# Patient Record
Sex: MALE | Race: WHITE | NOT HISPANIC OR LATINO | Employment: OTHER | URBAN - METROPOLITAN AREA
[De-identification: names, ages, dates, MRNs, and addresses within clinical notes are randomized per-mention and may not be internally consistent; named-entity substitution may affect disease eponyms.]

---

## 2020-03-27 ENCOUNTER — TRANSFERRED RECORDS (OUTPATIENT)
Dept: HEALTH INFORMATION MANAGEMENT | Facility: CLINIC | Age: 75
End: 2020-03-27

## 2020-03-27 ENCOUNTER — APPOINTMENT (OUTPATIENT)
Dept: GENERAL RADIOLOGY | Facility: CLINIC | Age: 75
DRG: 308 | End: 2020-03-27
Attending: EMERGENCY MEDICINE
Payer: COMMERCIAL

## 2020-03-27 ENCOUNTER — HOSPITAL ENCOUNTER (INPATIENT)
Facility: CLINIC | Age: 75
LOS: 14 days | Discharge: ANOTHER HOSPITAL | DRG: 308 | End: 2020-04-10
Attending: EMERGENCY MEDICINE | Admitting: INTERNAL MEDICINE
Payer: COMMERCIAL

## 2020-03-27 DIAGNOSIS — I21.4 NSTEMI (NON-ST ELEVATED MYOCARDIAL INFARCTION) (H): ICD-10-CM

## 2020-03-27 DIAGNOSIS — I48.91 ATRIAL FIBRILLATION WITH RVR (H): ICD-10-CM

## 2020-03-27 DIAGNOSIS — I48.91 ATRIAL FIBRILLATION WITH RAPID VENTRICULAR RESPONSE (H): Primary | ICD-10-CM

## 2020-03-27 DIAGNOSIS — N28.9 RENAL INSUFFICIENCY: ICD-10-CM

## 2020-03-27 LAB
ANION GAP SERPL CALCULATED.3IONS-SCNC: 9 MMOL/L (ref 3–14)
BASE DEFICIT BLDV-SCNC: 15.2 MMOL/L
BASOPHILS # BLD AUTO: 0.1 10E9/L (ref 0–0.2)
BASOPHILS NFR BLD AUTO: 0.5 %
BUN SERPL-MCNC: 33 MG/DL (ref 7–30)
CALCIUM SERPL-MCNC: 8.8 MG/DL (ref 8.5–10.1)
CHLORIDE SERPL-SCNC: 102 MMOL/L (ref 94–109)
CO2 SERPL-SCNC: 20 MMOL/L (ref 20–32)
CREAT SERPL-MCNC: 2.4 MG/DL (ref 0.66–1.25)
CRP SERPL-MCNC: 23.1 MG/L (ref 0–8)
DIFFERENTIAL METHOD BLD: ABNORMAL
EOSINOPHIL # BLD AUTO: 0 10E9/L (ref 0–0.7)
EOSINOPHIL NFR BLD AUTO: 0.3 %
ERYTHROCYTE [DISTWIDTH] IN BLOOD BY AUTOMATED COUNT: 15.3 % (ref 10–15)
FERRITIN SERPL-MCNC: 49 NG/ML (ref 26–388)
GFR SERPL CREATININE-BSD FRML MDRD: 25 ML/MIN/{1.73_M2}
GLUCOSE BLDC GLUCOMTR-MCNC: 200 MG/DL (ref 70–99)
GLUCOSE BLDC GLUCOMTR-MCNC: 203 MG/DL (ref 70–99)
GLUCOSE BLDC GLUCOMTR-MCNC: 218 MG/DL (ref 70–99)
GLUCOSE SERPL-MCNC: 212 MG/DL (ref 70–99)
HBA1C MFR BLD: 6.2 % (ref 0–5.6)
HCO3 BLDV-SCNC: 12 MMOL/L (ref 21–28)
HCT VFR BLD AUTO: 32.6 % (ref 40–53)
HGB BLD-MCNC: 10 G/DL (ref 13.3–17.7)
IMM GRANULOCYTES # BLD: 0 10E9/L (ref 0–0.4)
IMM GRANULOCYTES NFR BLD: 0.4 %
INR PPP: 1.2 (ref 0.86–1.14)
INTERPRETATION ECG - MUSE: NORMAL
IRON SATN MFR SERPL: 8 % (ref 15–46)
IRON SERPL-MCNC: 27 UG/DL (ref 35–180)
LACTATE BLD-SCNC: 2.3 MMOL/L (ref 0.7–2)
LACTATE BLD-SCNC: 4.3 MMOL/L (ref 0.7–2)
LYMPHOCYTES # BLD AUTO: 1.3 10E9/L (ref 0.8–5.3)
LYMPHOCYTES NFR BLD AUTO: 12.7 %
MCH RBC QN AUTO: 25.6 PG (ref 26.5–33)
MCHC RBC AUTO-ENTMCNC: 30.7 G/DL (ref 31.5–36.5)
MCV RBC AUTO: 84 FL (ref 78–100)
MONOCYTES # BLD AUTO: 0.6 10E9/L (ref 0–1.3)
MONOCYTES NFR BLD AUTO: 6 %
NEUTROPHILS # BLD AUTO: 8 10E9/L (ref 1.6–8.3)
NEUTROPHILS NFR BLD AUTO: 80.1 %
NRBC # BLD AUTO: 0 10*3/UL
NRBC BLD AUTO-RTO: 0 /100
NT-PROBNP SERPL-MCNC: ABNORMAL PG/ML (ref 0–1800)
O2/TOTAL GAS SETTING VFR VENT: ABNORMAL %
OXYHGB MFR BLDV: 59 %
PCO2 BLDV: 33 MM HG (ref 40–50)
PH BLDV: 7.18 PH (ref 7.32–7.43)
PLATELET # BLD AUTO: 487 10E9/L (ref 150–450)
PO2 BLDV: 39 MM HG (ref 25–47)
POTASSIUM SERPL-SCNC: 4.2 MMOL/L (ref 3.4–5.3)
RBC # BLD AUTO: 3.9 10E12/L (ref 4.4–5.9)
SODIUM SERPL-SCNC: 131 MMOL/L (ref 133–144)
TIBC SERPL-MCNC: 345 UG/DL (ref 240–430)
TROPONIN I SERPL-MCNC: 0.12 UG/L (ref 0–0.04)
TROPONIN I SERPL-MCNC: 0.17 UG/L (ref 0–0.04)
TSH SERPL DL<=0.005 MIU/L-ACNC: 2.62 MU/L (ref 0.4–4)
WBC # BLD AUTO: 10 10E9/L (ref 4–11)

## 2020-03-27 PROCEDURE — 25800030 ZZH RX IP 258 OP 636: Performed by: EMERGENCY MEDICINE

## 2020-03-27 PROCEDURE — 83880 ASSAY OF NATRIURETIC PEPTIDE: CPT | Performed by: EMERGENCY MEDICINE

## 2020-03-27 PROCEDURE — 96376 TX/PRO/DX INJ SAME DRUG ADON: CPT

## 2020-03-27 PROCEDURE — 36415 COLL VENOUS BLD VENIPUNCTURE: CPT | Performed by: INTERNAL MEDICINE

## 2020-03-27 PROCEDURE — 83036 HEMOGLOBIN GLYCOSYLATED A1C: CPT | Performed by: EMERGENCY MEDICINE

## 2020-03-27 PROCEDURE — 94660 CPAP INITIATION&MGMT: CPT

## 2020-03-27 PROCEDURE — 96366 THER/PROPH/DIAG IV INF ADDON: CPT

## 2020-03-27 PROCEDURE — 83036 HEMOGLOBIN GLYCOSYLATED A1C: CPT | Performed by: INTERNAL MEDICINE

## 2020-03-27 PROCEDURE — 00000146 ZZHCL STATISTIC GLUCOSE BY METER IP

## 2020-03-27 PROCEDURE — 87641 MR-STAPH DNA AMP PROBE: CPT | Performed by: INTERNAL MEDICINE

## 2020-03-27 PROCEDURE — 87635 SARS-COV-2 COVID-19 AMP PRB: CPT | Performed by: EMERGENCY MEDICINE

## 2020-03-27 PROCEDURE — 85610 PROTHROMBIN TIME: CPT | Performed by: EMERGENCY MEDICINE

## 2020-03-27 PROCEDURE — 25000132 ZZH RX MED GY IP 250 OP 250 PS 637: Performed by: INTERNAL MEDICINE

## 2020-03-27 PROCEDURE — 20000003 ZZH R&B ICU

## 2020-03-27 PROCEDURE — 82728 ASSAY OF FERRITIN: CPT | Performed by: EMERGENCY MEDICINE

## 2020-03-27 PROCEDURE — 83540 ASSAY OF IRON: CPT | Performed by: EMERGENCY MEDICINE

## 2020-03-27 PROCEDURE — 83605 ASSAY OF LACTIC ACID: CPT | Performed by: INTERNAL MEDICINE

## 2020-03-27 PROCEDURE — 80048 BASIC METABOLIC PNL TOTAL CA: CPT | Performed by: EMERGENCY MEDICINE

## 2020-03-27 PROCEDURE — 25000125 ZZHC RX 250: Performed by: EMERGENCY MEDICINE

## 2020-03-27 PROCEDURE — 85025 COMPLETE CBC W/AUTO DIFF WBC: CPT | Performed by: INTERNAL MEDICINE

## 2020-03-27 PROCEDURE — 25000132 ZZH RX MED GY IP 250 OP 250 PS 637: Performed by: EMERGENCY MEDICINE

## 2020-03-27 PROCEDURE — 84145 PROCALCITONIN (PCT): CPT | Performed by: INTERNAL MEDICINE

## 2020-03-27 PROCEDURE — 25000131 ZZH RX MED GY IP 250 OP 636 PS 637: Performed by: INTERNAL MEDICINE

## 2020-03-27 PROCEDURE — 80048 BASIC METABOLIC PNL TOTAL CA: CPT | Performed by: INTERNAL MEDICINE

## 2020-03-27 PROCEDURE — 71045 X-RAY EXAM CHEST 1 VIEW: CPT

## 2020-03-27 PROCEDURE — 25000125 ZZHC RX 250

## 2020-03-27 PROCEDURE — 25000128 H RX IP 250 OP 636: Performed by: INTERNAL MEDICINE

## 2020-03-27 PROCEDURE — 93005 ELECTROCARDIOGRAM TRACING: CPT

## 2020-03-27 PROCEDURE — 83605 ASSAY OF LACTIC ACID: CPT | Performed by: EMERGENCY MEDICINE

## 2020-03-27 PROCEDURE — 84484 ASSAY OF TROPONIN QUANT: CPT | Performed by: INTERNAL MEDICINE

## 2020-03-27 PROCEDURE — 99285 EMERGENCY DEPT VISIT HI MDM: CPT | Mod: 25

## 2020-03-27 PROCEDURE — 84443 ASSAY THYROID STIM HORMONE: CPT | Performed by: EMERGENCY MEDICINE

## 2020-03-27 PROCEDURE — 86140 C-REACTIVE PROTEIN: CPT | Performed by: INTERNAL MEDICINE

## 2020-03-27 PROCEDURE — 82805 BLOOD GASES W/O2 SATURATION: CPT | Performed by: INTERNAL MEDICINE

## 2020-03-27 PROCEDURE — 40000275 ZZH STATISTIC RCP TIME EA 10 MIN

## 2020-03-27 PROCEDURE — 87640 STAPH A DNA AMP PROBE: CPT | Performed by: INTERNAL MEDICINE

## 2020-03-27 PROCEDURE — 84484 ASSAY OF TROPONIN QUANT: CPT | Performed by: EMERGENCY MEDICINE

## 2020-03-27 PROCEDURE — 25000128 H RX IP 250 OP 636: Performed by: EMERGENCY MEDICINE

## 2020-03-27 PROCEDURE — 85025 COMPLETE CBC W/AUTO DIFF WBC: CPT | Performed by: EMERGENCY MEDICINE

## 2020-03-27 PROCEDURE — 83550 IRON BINDING TEST: CPT | Performed by: EMERGENCY MEDICINE

## 2020-03-27 PROCEDURE — 99291 CRITICAL CARE FIRST HOUR: CPT | Performed by: INTERNAL MEDICINE

## 2020-03-27 PROCEDURE — 96365 THER/PROPH/DIAG IV INF INIT: CPT | Mod: 59

## 2020-03-27 PROCEDURE — 83550 IRON BINDING TEST: CPT | Performed by: INTERNAL MEDICINE

## 2020-03-27 RX ORDER — ASPIRIN 81 MG/1
81 TABLET ORAL DAILY
Status: DISCONTINUED | OUTPATIENT
Start: 2020-03-28 | End: 2020-03-30 | Stop reason: CLARIF

## 2020-03-27 RX ORDER — HEPARIN SODIUM 10000 [USP'U]/100ML
1100 INJECTION, SOLUTION INTRAVENOUS CONTINUOUS
Status: DISCONTINUED | OUTPATIENT
Start: 2020-03-27 | End: 2020-03-28 | Stop reason: DRUGHIGH

## 2020-03-27 RX ORDER — ASPIRIN 81 MG/1
324 TABLET, CHEWABLE ORAL ONCE
Status: COMPLETED | OUTPATIENT
Start: 2020-03-27 | End: 2020-03-27

## 2020-03-27 RX ORDER — METOPROLOL TARTRATE 25 MG/1
25 TABLET, FILM COATED ORAL 2 TIMES DAILY
Status: DISCONTINUED | OUTPATIENT
Start: 2020-03-27 | End: 2020-03-27

## 2020-03-27 RX ORDER — NALOXONE HYDROCHLORIDE 0.4 MG/ML
.1-.4 INJECTION, SOLUTION INTRAMUSCULAR; INTRAVENOUS; SUBCUTANEOUS
Status: DISCONTINUED | OUTPATIENT
Start: 2020-03-27 | End: 2020-03-28

## 2020-03-27 RX ORDER — GUAIFENESIN 600 MG/1
600 TABLET, EXTENDED RELEASE ORAL 2 TIMES DAILY
Status: DISCONTINUED | OUTPATIENT
Start: 2020-03-27 | End: 2020-03-28

## 2020-03-27 RX ORDER — AMOXICILLIN 250 MG
1 CAPSULE ORAL 2 TIMES DAILY PRN
Status: DISCONTINUED | OUTPATIENT
Start: 2020-03-27 | End: 2020-03-30

## 2020-03-27 RX ORDER — ONDANSETRON 4 MG/1
4 TABLET, ORALLY DISINTEGRATING ORAL EVERY 6 HOURS PRN
Status: DISCONTINUED | OUTPATIENT
Start: 2020-03-27 | End: 2020-04-10 | Stop reason: HOSPADM

## 2020-03-27 RX ORDER — NALOXONE HYDROCHLORIDE 0.4 MG/ML
.1-.4 INJECTION, SOLUTION INTRAMUSCULAR; INTRAVENOUS; SUBCUTANEOUS
Status: DISCONTINUED | OUTPATIENT
Start: 2020-03-27 | End: 2020-03-27

## 2020-03-27 RX ORDER — LIDOCAINE 40 MG/G
CREAM TOPICAL
Status: DISCONTINUED | OUTPATIENT
Start: 2020-03-27 | End: 2020-04-10 | Stop reason: HOSPADM

## 2020-03-27 RX ORDER — POLYETHYLENE GLYCOL 3350 17 G/17G
17 POWDER, FOR SOLUTION ORAL DAILY PRN
Status: DISCONTINUED | OUTPATIENT
Start: 2020-03-27 | End: 2020-03-30

## 2020-03-27 RX ORDER — ACETAMINOPHEN 325 MG/1
650 TABLET ORAL EVERY 4 HOURS PRN
Status: DISCONTINUED | OUTPATIENT
Start: 2020-03-27 | End: 2020-03-30

## 2020-03-27 RX ORDER — FUROSEMIDE 10 MG/ML
40 INJECTION INTRAMUSCULAR; INTRAVENOUS ONCE
Status: COMPLETED | OUTPATIENT
Start: 2020-03-27 | End: 2020-03-27

## 2020-03-27 RX ORDER — FUROSEMIDE 10 MG/ML
20 INJECTION INTRAMUSCULAR; INTRAVENOUS ONCE
Status: DISCONTINUED | OUTPATIENT
Start: 2020-03-27 | End: 2020-03-27

## 2020-03-27 RX ORDER — TAMSULOSIN HYDROCHLORIDE 0.4 MG/1
0.4 CAPSULE ORAL DAILY
Status: DISCONTINUED | OUTPATIENT
Start: 2020-03-27 | End: 2020-03-28

## 2020-03-27 RX ORDER — ONDANSETRON 2 MG/ML
4 INJECTION INTRAMUSCULAR; INTRAVENOUS EVERY 6 HOURS PRN
Status: DISCONTINUED | OUTPATIENT
Start: 2020-03-27 | End: 2020-04-10 | Stop reason: HOSPADM

## 2020-03-27 RX ORDER — ALBUTEROL SULFATE 90 UG/1
2 AEROSOL, METERED RESPIRATORY (INHALATION)
Status: DISCONTINUED | OUTPATIENT
Start: 2020-03-27 | End: 2020-04-10 | Stop reason: HOSPADM

## 2020-03-27 RX ORDER — NITROGLYCERIN 0.4 MG/1
0.4 TABLET SUBLINGUAL EVERY 5 MIN PRN
Status: DISCONTINUED | OUTPATIENT
Start: 2020-03-27 | End: 2020-04-10 | Stop reason: HOSPADM

## 2020-03-27 RX ORDER — LIDOCAINE 40 MG/G
CREAM TOPICAL
Status: DISCONTINUED | OUTPATIENT
Start: 2020-03-27 | End: 2020-03-27

## 2020-03-27 RX ORDER — AMOXICILLIN 250 MG
2 CAPSULE ORAL 2 TIMES DAILY PRN
Status: DISCONTINUED | OUTPATIENT
Start: 2020-03-27 | End: 2020-03-30

## 2020-03-27 RX ORDER — GLIPIZIDE 5 MG/1
5 TABLET, FILM COATED, EXTENDED RELEASE ORAL DAILY
Status: DISCONTINUED | OUTPATIENT
Start: 2020-03-27 | End: 2020-03-28

## 2020-03-27 RX ORDER — DOBUTAMINE HYDROCHLORIDE 200 MG/100ML
2.5-2 INJECTION INTRAVENOUS CONTINUOUS
Status: DISCONTINUED | OUTPATIENT
Start: 2020-03-27 | End: 2020-03-28 | Stop reason: ALTCHOICE

## 2020-03-27 RX ORDER — NICOTINE POLACRILEX 4 MG
15-30 LOZENGE BUCCAL
Status: DISCONTINUED | OUTPATIENT
Start: 2020-03-27 | End: 2020-03-30

## 2020-03-27 RX ORDER — TAMSULOSIN HYDROCHLORIDE 0.4 MG/1
0.4 CAPSULE ORAL
COMMUNITY
Start: 2020-01-20 | End: 2020-04-28

## 2020-03-27 RX ORDER — DILTIAZEM HYDROCHLORIDE 5 MG/ML
20 INJECTION INTRAVENOUS ONCE
Status: COMPLETED | OUTPATIENT
Start: 2020-03-27 | End: 2020-03-27

## 2020-03-27 RX ORDER — HEPARIN SODIUM 10000 [USP'U]/100ML
850 INJECTION, SOLUTION INTRAVENOUS CONTINUOUS
Status: DISCONTINUED | OUTPATIENT
Start: 2020-03-27 | End: 2020-03-27 | Stop reason: DRUGHIGH

## 2020-03-27 RX ORDER — GLIPIZIDE 5 MG/1
5 TABLET, FILM COATED, EXTENDED RELEASE ORAL DAILY
Status: ON HOLD | COMMUNITY
Start: 2019-05-22 | End: 2020-04-17

## 2020-03-27 RX ORDER — DEXTROSE MONOHYDRATE 25 G/50ML
25-50 INJECTION, SOLUTION INTRAVENOUS
Status: DISCONTINUED | OUTPATIENT
Start: 2020-03-27 | End: 2020-03-30

## 2020-03-27 RX ORDER — DILTIAZEM HYDROCHLORIDE 5 MG/ML
INJECTION INTRAVENOUS
Status: COMPLETED
Start: 2020-03-27 | End: 2020-03-27

## 2020-03-27 RX ADMIN — DILTIAZEM HYDROCHLORIDE 20 MG: 5 INJECTION INTRAVENOUS at 13:57

## 2020-03-27 RX ADMIN — METOPROLOL TARTRATE 25 MG: 25 TABLET, FILM COATED ORAL at 19:53

## 2020-03-27 RX ADMIN — ASPIRIN 81 MG 81 MG: 81 TABLET ORAL at 15:09

## 2020-03-27 RX ADMIN — DILTIAZEM HYDROCHLORIDE 5 MG/HR: 5 INJECTION INTRAVENOUS at 15:16

## 2020-03-27 RX ADMIN — HEPARIN SODIUM 850 UNITS/HR: 10000 INJECTION, SOLUTION INTRAVENOUS at 15:19

## 2020-03-27 RX ADMIN — Medication 4300 UNITS: at 15:20

## 2020-03-27 RX ADMIN — SODIUM CHLORIDE 500 ML: 9 INJECTION, SOLUTION INTRAVENOUS at 13:57

## 2020-03-27 RX ADMIN — DILTIAZEM HYDROCHLORIDE 20 MG: 5 INJECTION, SOLUTION INTRAVENOUS at 13:57

## 2020-03-27 RX ADMIN — FUROSEMIDE 40 MG: 10 INJECTION, SOLUTION INTRAMUSCULAR; INTRAVENOUS at 20:00

## 2020-03-27 RX ADMIN — GUAIFENESIN 600 MG: 600 TABLET, EXTENDED RELEASE ORAL at 20:00

## 2020-03-27 RX ADMIN — INSULIN ASPART 2 UNITS: 100 INJECTION, SOLUTION INTRAVENOUS; SUBCUTANEOUS at 19:52

## 2020-03-27 RX ADMIN — GLIPIZIDE 5 MG: 5 TABLET, FILM COATED, EXTENDED RELEASE ORAL at 19:53

## 2020-03-27 ASSESSMENT — ACTIVITIES OF DAILY LIVING (ADL): ADLS_ACUITY_SCORE: 13

## 2020-03-27 ASSESSMENT — MIFFLIN-ST. JEOR
SCORE: 1639.14
SCORE: 1609.63

## 2020-03-27 NOTE — H&P
[facility]  Hospitalist Admission Note  Name: Gene Pagan    MRN: [id number]  YOB: 1945    Age: 74 year old  Date of admission: 3/27/2020  Primary care provider: Ken, Red Ashley    Chief Complaint:  Shortness of breath    Assessment and Plan:   Shortness of breath: 6 to 8 weeks of shortness of breath that was initially exertional now fairly constant.  Associated with chest tightness when he feels short of breath.  Acutely worse today and waking him up from sleep, description sounds like PND without orthopnea.  Suspect his new A. fib with RVR is playing a significant role in symptoms.  He has trace peripheral edema, but may have developed cardiomyopathy from the A. Fib.  His BNP is quite elevated at 19,000.  No murmur on exam.  Chest x-ray shows some by basilar heterogeneous opacities which may be infectious, however I think this is more likely mild pulmonary edema.  He has had intermittent cough for the past 6 weeks with some production of white phlegm unchanged recently.  Also has no fevers or chills.  No sick contacts or recent travel.  Does not have fever here or leukocytosis.  Lactic acid elevated 2.3, but this is in the setting of a creatinine of 2.4, heart rate 160, and is also on metformin.  Doubt sepsis.  -Seems unlikely to be COVID19, but this is being ruled out currently, contact and droplet precautions  -Rate control for now to try to see if this improves shortness of breath, hold off on Lasix as his blood pressures is already low to begin with and starting p.o. metoprolol and is on IV diltiazem  -TTE tomorrow will tell us more  -No empiric antibiotics, monitor for fevers  -Repeat lactic acid this evening  Addendum: After admission patient reporting increased shortness of breath now on 3 L with respiratory rate mid 20s.  Lactic acid repeated and up to 4.3 despite 500 mL normal saline.  Heart rate is under better control on diltiazem drip.  I am less suspicious for  infection at this time and more suspicious of heart failure/A. fib with RVR contributing to shortness of breath.  Based on this no empiric antibiotics and regardless of the elevated lactic acid which may be in part from his metformin, kidney injury, and tachycardia will give 40 mg IV Lasix and reassess respiratory status    New afib with RVR: New A. fib with RVR with heart rates 160s.  Denies palpitations.  -Start p.o. metoprolol tartrate 25 mg twice daily  -Continue IV diltiazem gtt, ideally titrate heart rate to less than 110-120 if able.  Blood pressure may be limiting factor  -Started on heparin drip for elevated troponin and chadsvasc of 3 for age and diabetes  -Obtain TTE   -Telemetry  -Check TSH  -Pharmacy liaison consult for warfarin versus NOAC coverage  -Consult cardiology    Elevated troponin, chest tightness: Troponin elevated at 0.12.  Describes chest tightness intermittently for the past 6 weeks when he is short of breath.  He describes it more like he cannot catch his breath as opposed to actual pressure or pain sensation.  There is no radiation or association with nausea or diaphoresis.  Suspect that the troponin elevation is demand ischemia in the setting of a heart rate of 160s and A. fib.  BNP also elevated 19,000 so may have new CHF as well.  EKG does not show any ST elevation or depression.    -Serial troponin  -He is on heparin drip for now, however doubt ACS at this time  -Received 324 mg aspirin, ordered 81 mg daily for tomorrow  -Check lipid panel in the morning  -Obtain TTE  -Consult cardiology    Type II DM: PTA on metformin 1000 mg twice daily and glipizide XL 5 mg daily.  Blood glucose 212.  Did not take his medications day of admit.  -Hold metformin due to elevated lactic acid  -Resume pta glipizide tomorrow and start medium dose sliding scale insulin    CKD, possible DEONDRE: Creatinine is 2.4.  His creatinine was elevated at 2.3 in 1/2020 and 1.8 in 5/2019, however was only 1.2 in 2018.   Suspect mild DEONDRE on at least stage III CKD.  -Received 1 L normal saline, holding diuretics for right now and rechecking BMP tomorrow.  -Check postvoid residual given recent diagnosis of BPH and starting on Flomax to make sure not retention not contributing to elevated creatinine    Hyponatremia: Sodium 131.  Is not on diuretics.  Decreased oral intake the past couple of days, but denies lack of fluid intake.  Trace edema on exam so may be from CHF.  -Received 1 L normal saline in the ED, no further IV fluids tonight and hold off on diuretics until echocardiogram tomorrow unless more short of breath overnight    BPH: Resume PTA Flomax, started this 6 weeks ago.     Anemia: Hemoglobin is 10.0.  No recent values to compare to.  Denies bloody stools.  MCV is 84.  -Check iron studies    DVT Prophylaxis: Heparin drip  Code Status: Full Code  FEN: Cardiac diet, 2.4 g sodium restriction, no IV fluids  Discharge Dispo: Home  Estimated Disch Date / # of Days until Disch: Admit inpatient for A. fib with RVR and suspect new CHF.  Evaluating for possible COVID19.  Anticipate 2 night hospitalization.  [facility] status for diltiazem drip titration.      History of Present Illness:  Gene Pagan is a 74 year old male with PMH including type II DM on oral agents and BPH who presented initially to urgent care and then referred to the emergency department for A. fib with RVR.  He reports approximately 6 to 8 weeks of shortness of breath, around this time he started Flomax for BPH symptoms.  Initially the shortness of breath was with exertion and then has progressed to at rest as well.  It has been more intermittent over this time and can be worse at night although denies any issues with laying flat.  For the past few nights he said issues with waking up very short of breath after just falling asleep, feeling like he is gasping for air.  He came in today due to acutely worsening shortness of breath since this morning when he woke up.  He  denies any chest pain, but has had some chest tightness when he feels short of breath like he cannot get air in.  Does not describe it as a pressure sensation.  It does not radiate anywhere.  Not associate with nausea or diaphoresis.  He has not had any fevers or chills.  He has had intermittent cough over the past 6 weeks productive of white sputum that is not acutely worse this week.  No recent travel and he has been sheltering at home.  He lives with another person and they have not had any infectious symptoms.  His appetite and oral intake is been lower the past 2 to 3 days although has been drinking plenty of fluids.  He did not take his metformin or glipizide today.  He did not notice any leg swelling, however he said he was told he has some mild edema at urgent care.  He has not felt any palpitations.    History obtained from patient, medical record, and from Dr. Contreras in the emergency department.  EKG shows A. fib with RVR.  Heart rate initially in the 160s.  No ST elevation or depression on EKG.  Lactic acid was elevated at 2.3 so he received 500 mL normal saline.  His troponin is elevated 0.12 so he was given an aspirin 324 mg daily.  His BNP is elevated at 19,387 and to me his chest x-ray looks like mild pulmonary edema so more likely has CHF.  Formal x-ray read states bilateral opacities at the bases suspicious for infection.  White blood cell count is 10.0 and he is afebrile.  Due to his cough and shortness of breath he was tested for COVID19 and placed in contact and droplet precautions.  Rest of his labs show hemoglobin of 10.0, platelet count 487, sodium 131, potassium 4.2, chloride 102, carbon oxide 20, BUN 33, and creatinine 2.4.  Blood glucose is 212.  INR is 1.2.  He will be admitted inpatient to cardiac telemetry unit.  He was started on IV heparin drip and IV diltiazem drip.     Past Medical History reviewed:  Past Medical History:   Diagnosis Date     BPH (benign prostatic hyperplasia)       "Diabetes (H)      Past Surgical History reviewed:  History reviewed. No pertinent surgical history.  Social History reviewed:  Social History     Tobacco Use     Smoking status: Never Smoker   Substance Use Topics     Alcohol use: Yes     Comment: Rare     Social History     Social History Narrative     Not on file     Family History reviewed:  Family History   Problem Relation Age of Onset     Diabetes Sister    Denies any family cardiac history    Allergies:  No Known Allergies  Medications:  Prior to Admission medications    Medication Sig Last Dose Taking? Auth Provider   aspirin (ASA) 81 MG tablet Take 81 mg by mouth daily  3/26/2020 at Unknown time Yes Reported, Patient   glipiZIDE (GLUCOTROL XL) 5 MG 24 hr tablet Take 5 mg by mouth daily  3/26/2020 at Unknown time Yes Reported, Patient   metFORMIN (GLUCOPHAGE) 1000 MG tablet Take 1,000 mg by mouth 2 times daily (with meals)  3/26/2020 Yes Reported, Patient   tamsulosin (FLOMAX) 0.4 MG capsule Take 0.4 mg by mouth 3/26/2020 at Unknown time Yes Reported, Patient     Review of Systems:  A Comprehensive greater than 10 system review of systems was carried out.  Pertinent positives and negatives are noted above.  Otherwise negative.     Physical Exam:  Blood pressure 115/80, pulse 140, temperature 98.9  F (37.2  C), temperature source Oral, resp. rate 27, height 1.753 m (5' 9.02\"), weight 88.9 kg (195 lb 15.8 oz), SpO2 100 %.  Wt Readings from Last 1 Encounters:   03/27/20 88.9 kg (195 lb 15.8 oz)     Exam:  Constitutional: Awake, NAD   Eyes: sclera white   HEENT: atraumatic, MMM  Respiratory: no respiratory distress, does have some crackles at bilateral bases, no wheeze  Cardiovascular: Irregularly, irregular rhythm, tachycardia, no murmur  GI: non-tender, not distended, bowel sounds present  Skin: no rash or lesions, acyanotic  Musculoskeletal/extremities: atraumatic, no major deformities.  Trace bilateral lower extremity edema  Neurologic: A&O, speech clear, " strength and light touch sensation grossly normal  Psychiatric: calm, cooperative, normal affect    Lab and imaging data personally reviewed:  Labs:  Recent Labs   Lab 03/27/20  1348   WBC 10.0   HGB 10.0*   HCT 32.6*   MCV 84   *     Recent Labs   Lab 03/27/20  1348   NTBNPI 19,387*     Recent Labs   Lab 03/27/20  1348   INR 1.20*     Recent Labs   Lab 03/27/20  1348   LACT 2.3*     Recent Labs   Lab 03/27/20  1348   TROPI 0.120*       EKG: A. fib with RVR, no ST elevation or depression    Imaging:  Recent Results (from the past 24 hour(s))   XR Chest Port 1 View    Narrative    CHEST ONE VIEW  3/27/2020 2:26 PM     HISTORY: SOB    COMPARISON: CT dated 1/17/09      Impression    IMPRESSION: Heterogeneous opacities in the lung bases are concerning  for an infectious or inflammatory process. No pleural fluid or  pneumothorax. The heart is normal in size. The cardiomediastinal  silhouette is at the upper limits of normal in size. Old healed rib  and clavicle fractures are again seen.    LESTER J FAHRNER, MD Derek Eklund, MD  Hospitalist  [facility]

## 2020-03-27 NOTE — ED PROVIDER NOTES
History     Chief Complaint:  Shortness of Breath and Tachycardia       HPI   Gene Pagan is a very pleasant 74-year-old male presenting to the emergency department from urgent care for evaluation of shortness of breath and tachycardia.  Patient describes over the past 6 to 8 weeks, he has had increasing shortness of breath.  He notes this has progressively worsened since its onset.  It all began around the time he began Flomax for symptoms of BPH.  He denies feelings of palpitations, though in conjunction with his shortness of breath, will occasionally feel chest tightness.  Patient does acknowledge symptoms worsened in the supine position though denies any associated weight gain.  Over the past few weeks, he also has noted flulike symptoms including feeling feverish, though has not measured his temperature.  He has noted symptoms of nasal congestion, though no rhinorrhea, sore throat, cough, nor body aches.  What brought him to seek care today was his breathing became worse, with periods where he felt as though he was gasping for breath.  He denies any recent travel outside of the state or country.  He denies exposure to anyone known to have been diagnosed with COVID-19.  He does acknowledge exposure at the grocery store and around town.    Allergies:  No Known Allergies     Medications:    aspirin (ASA) 81 MG tablet  Glipizide   Metformin     Past Medical History:    Diabetes     Past Surgical History:    History reviewed. No pertinent surgical history.     Family History:    Family history reviewed. No pertinent family history.      Social History:  Smoking Status: Never Smoker  Smokeless Tobacco: Never Used  Alcohol Use: Yes    Review of Systems  10 point ROS negative aside from that mentioned in HPI    Physical Exam     Patient Vitals for the past 24 hrs:   BP Temp Temp src Pulse Heart Rate Resp SpO2 Height Weight   03/27/20 1500 (!) 127/97 -- -- 135 137 22 98 % -- --   03/27/20 1445 106/73 -- -- 142 133  "25 100 % -- --   03/27/20 1430 92/77 -- -- 124 125 30 100 % -- --   03/27/20 1415 (!) 114/93 -- -- 112 120 16 99 % -- --   03/27/20 1400 106/85 -- -- 140 154 20 98 % -- --   03/27/20 1345 (!) 118/102 -- -- 163 170 (!) 32 98 % -- --   03/27/20 1343 (!) 147/89 98.9  F (37.2  C) Oral 158 158 20 100 % 1.753 m (5' 9.02\") 88.9 kg (195 lb 15.8 oz)   03/27/20 1330 -- -- -- -- 154 (!) 41 98 % -- --        Physical Exam  General:              Well-nourished              Speaking in full sentences  Eyes:              Conjunctiva without injection or scleral icterus  ENT:              Moist mucous membranes              Nares patent              Pinnae normal  Neck:              Full ROM              No stiffness appreciated  Resp:              Diminished breath sounds at bases bilaterally  CV:                    Irregularly irregular rate and rhythm              S1 and S2 present              No murmur, gallop or rub  GI:              BS present              Abdomen soft without distention              Non-tender to light and deep palpation              No guarding or rebound tenderness  Skin:              Warm, dry, well perfused              No rashes or open wounds on exposed skin  MSK:              Moves all extremities              No focal deformities              Trace LE edema  Neuro:              Alert              Answers questions appropriately              Moves all extremities equally              Gait stable  Psych:              Normal affect, normal mood    Emergency Department Course   ECG:  ECG taken at 1329, ECG read at 1408  Atrial fibrillation with rapid ventricular response   Left axis deviation   Anterior infarct, age undetermined   Abnormal ECG  Rate 158 bpm. SC interval * ms. QRS duration 86 ms. QT/QTc 244/395 ms. P-R-T axes * -30 87.     Imaging:  Radiology findings were communicated with the patient who voiced understanding of the findings.    XR Chest Port 1 View  Final Result  IMPRESSION: " Heterogeneous opacities in the lung bases are concerning  for an infectious or inflammatory process. No pleural fluid or  pneumothorax. The heart is normal in size. The cardiomediastinal  silhouette is at the upper limits of normal in size. Old healed rib  and clavicle fractures are again seen.  LESTER J FAHRNER, MD  Reading per radiology     Laboratory:  Laboratory findings were communicated with the patient who voiced understanding of the findings.    COVID-19 Virus (Coronavirus) by PCR (In process)     CBC:  WBC 10.0, HGB 10.0 (L),  (H), o/w WNL     BMP: Glucose 212 (H),  (L), bun 33 (H), GFR 25 (L), o/w WNL (Creatinine: 2.40 (H))     INR: 1.20 (H)    Troponin(1348):  0.120 (H)    BNP: 45853 (H)    Lactic Acid whole blood (1348): 2.3 (H)        Glucose by meter (1329): 200 (H)     Interventions:  1357 Diltiazem 20 mg IV  1357 0.9% sodium chloride BOLUS 300 mL IV - discontinued  Aspirin 324 mg PO  Heparin gtt  Diltiazem gtt    Emergency Department Course:  Past medical records, nursing notes, and vitals reviewed.    1350 I performed an exam of the patient as documented above.    The patient was sent for imaging while in the emergency department, results above.      IV was inserted and blood was drawn for laboratory testing, results above.      1427 Patient rechecked and updated.       Findings and plan explained to the Patient who consents to admission. Discussed the patient with Dr. Sabillon, who will admit the patient to a cardiac telemetry bed for further monitoring, evaluation, and treatment.      Impression & Plan     Medical Decision Making:  Gene Pagan is a 74 year old male who presents to the emergency department today with shortness of breath and tachycardia.  VS on presentation reveal elevated HR.  Patient is found to be in atrial fibrillation with RVR.  No prior diagnosis of this.  He was provided 1 dose of IV diltiazem, with improved HR, though ultimately required initiation of  "diltiazem drip.  In the absence of hypotension, ischemic chest pain, or AMS, no indication for emergent cardioversion.  [id number] score calculated at 2 given age and diabetes for which patient we started on heparin drip.  This was discussed with the patient who was in agreement.  Given above history, timing of onset of A. fib not clear, though certainly may be as long as 6 to 8 weeks prior when he developed shortness of breath.  Laboratory evaluation does reveal elevated BNP as well as elevated troponin, which I suspect to be secondary to demand ischemia in the setting of rapid ventricular response, as well as pulmonary edema from component of LV dysfunction.  Patient does acknowledge \"flulike\" symptoms, the only describes nasal congestion.  She denies associated cough, has a normal WBC count, and is afebrile here.  Do feel holding on systemic antibiotics is reasonable in light of the above history, presentation, and evaluation.  This was discussed with the hospitalist, who was also in agreement.  I considered pulmonary embolism, though clinically feel this to be unlikely.  Pt showing signs of LV dysfunction/HF, which would not be expected with PE (right sided HF).  In light of the current outbreak of COVID-19, this swab was also sent, and patient was maintained on contact and droplet precautions.  Results and clinical impression discussed with the patient.Labs reveal Cr of 2.4, though review of outside records note Cr of 2.3 in January of 2020.  Will plan admission to cardiac telemetry for further treatment and cares.  Questions answered prior to admission.    Discharge Diagnosis:    ICD-10-CM    1. Atrial fibrillation with RVR (H)  I48.91    2. NSTEMI (non-ST elevated myocardial infarction) (H)  I21.4    3. Renal insufficiency  N28.9        Disposition:  Admitted to Dr. Sabillon.    Scribe Disclosure:  Richard KEYS, am serving as a scribe at 1:52 PM on 3/27/2020 to document services personally performed by Ben, " Kannan Wolf MD based on my observations and the provider's statements to me.      3/27/2020   Kannan Contreras MD Roach, Brian Donald, MD  03/27/20 1528

## 2020-03-27 NOTE — ED TRIAGE NOTES
"Patient here by EMS from [facility] with new onset Afib with heart rate of 140's. He was recently started on Flomax \"for an enlarged prostate\" 6-8 weeks ago and has been short of breath since, getting worse. EKG on arrival afib with rate of 158. Denies chest pain, denies fevers/chills.  "

## 2020-03-27 NOTE — PHARMACY-ADMISSION MEDICATION HISTORY
Admission medication history interview status for this patient is complete. See [facility] admission navigator for allergy information, prior to admission medications and immunization status.     Medication history interview source(s):Patient - reviewed PTA medications over the phone.   Medication history resources (including written lists, pill bottles, clinic record):None    Changes made to PTA medication list:  Added: none  Deleted: none  Changed: added frequency to asa and glipizide, changed metformin daily -> BID with meals     Actions taken by pharmacist (provider contacted, etc):None     Additional medication history information: Pt does not take any inhalers, creams/ointments, or eye drops.     Medication reconciliation/reorder completed by provider prior to medication history? No    Prior to Admission medications    Medication Sig Last Dose Taking? Auth Provider   aspirin (ASA) 81 MG tablet Take 81 mg by mouth daily  3/26/2020 at Unknown time Yes Reported, Patient   glipiZIDE (GLUCOTROL XL) 5 MG 24 hr tablet Take 5 mg by mouth daily  3/26/2020 at Unknown time Yes Reported, Patient   metFORMIN (GLUCOPHAGE) 1000 MG tablet Take 1,000 mg by mouth 2 times daily (with meals)  3/26/2020 Yes Reported, Patient   tamsulosin (FLOMAX) 0.4 MG capsule Take 0.4 mg by mouth 3/26/2020 at Unknown time Yes Reported, Patient

## 2020-03-27 NOTE — ED NOTES
"[facility]  ED Nurse Handoff Report    Gene Pagan is a 74 year old male   ED Chief complaint: Shortness of Breath and Tachycardia  . ED Diagnosis:   Final diagnoses:   Atrial fibrillation with RVR (H)   NSTEMI (non-ST elevated myocardial infarction) (H)   Renal insufficiency     Allergies: No Known Allergies    Code Status: Full Code  Activity level - Baseline/Home:  Independent. Activity Level - Current:   Assist X 1. Lift room needed: No. Bariatric: No   Needed: No   Isolation: Yes. Infection: Other - r/o COVID    Vital Signs:   Vitals:    03/27/20 1445 03/27/20 1500 03/27/20 1515 03/27/20 1530   BP: 106/73 (!) 127/97  (!) 121/99   Pulse: 142 135  133   Resp: 25 22 26 27   Temp:       TempSrc:       SpO2: 100% 98% 95% 98%   Weight:       Height:           Cardiac Rhythm:  ,   Cardiac  Cardiac Rhythm: Atrial fibrillation  Pain level: 0-10 Pain Scale: 0  Patient confused: No. Patient Falls Risk: Yes.   Elimination Status: has not voided  Patient Report - Initial Complaint: Patient here by EMS from [facility] with new onset Afib with heart rate of 140's. He was recently started on Flomax \"for an enlarged prostate\" 6-8 weeks ago and has been short of breath since, getting worse. EKG on arrival afib with rate of 158. Denies chest pain, denies fevers/chills.. Focused Assessment: Respiratory - Respiratory WDL: all  Rhythm/Pattern, Respiratory: shortness of breath; tachypneic  Breath Sounds: All Fields   Head To Toe Assessment - All Lung Fields Breath Sounds: crackles; diminished    Tests Performed: labs, xray,. Abnormal Results:   Labs Ordered and Resulted from Time of ED Arrival Up to the Time of Departure from the ED   GLUCOSE BY METER - Abnormal; Notable for the following components:       Result Value    Glucose 200 (*)     All other components within normal limits   CBC WITH PLATELETS DIFFERENTIAL - Abnormal; Notable for the following components:    RBC Count 3.90 (*)     Hemoglobin 10.0 (*)  "    Hematocrit 32.6 (*)     MCH 25.6 (*)     MCHC 30.7 (*)     RDW 15.3 (*)     Platelet Count 487 (*)     All other components within normal limits   BASIC METABOLIC PANEL - Abnormal; Notable for the following components:    Sodium 131 (*)     Glucose 212 (*)     Urea Nitrogen 33 (*)     Creatinine 2.40 (*)     GFR Estimate 25 (*)     GFR Estimate If Black 29 (*)     All other components within normal limits   INR - Abnormal; Notable for the following components:    INR 1.20 (*)     All other components within normal limits   TROPONIN I - Abnormal; Notable for the following components:    Troponin I ES 0.120 (*)     All other components within normal limits   NT PROBNP INPATIENT - Abnormal; Notable for the following components:    N-Terminal Pro BNP Inpatient 19,387 (*)     All other components within normal limits   LACTIC ACID WHOLE BLOOD - Abnormal; Notable for the following components:    Lactic Acid 2.3 (*)     All other components within normal limits   PLATELETS MONITORED PER HEPARIN TREATMENT PROTOCOL (FOR MEANINGFUL USE   MEASURE WEIGHT   NOTIFY PHYSICIAN   NOTIFY PHYSICIAN   COVID-19 VIRUS (CORONAVIRUS) BY PCR     XR Chest Port 1 View   Final Result   IMPRESSION: Heterogeneous opacities in the lung bases are concerning   for an infectious or inflammatory process. No pleural fluid or   pneumothorax. The heart is normal in size. The cardiomediastinal   silhouette is at the upper limits of normal in size. Old healed rib   and clavicle fractures are again seen.      LESTER J FAHRNER, MD        .   Treatments provided: heparin, diltiazem drip, oxygen- 3L NC  Family Comments: family contact- Verónica Joe- [phone]  OBS brochure/video discussed/provided to patient:  N/A  ED Medications:   Medications   heparin ANTICOAGULANT  Loading Dose bolus dose from infusion pump 4,300 Units (4,300 Units Intravenous Given 3/27/20 1520)     Followed by   heparin 25,000 units in 0.45% NaCl 250 mL ANTICOAGULANT  infusion (0  Units/hr Intravenous ED Infusing on Admission/transfer 3/27/20 1531)   diltiazem (CARDIZEM) 125 mg in sodium chloride 0.9 % 125 mL infusion (10 mg/hr Intravenous Rate/Dose Change 3/27/20 1543)   diltiazem (CARDIZEM) injection 20 mg (20 mg Intravenous Given 3/27/20 1357)   0.9% sodium chloride BOLUS (0 mLs Intravenous Stopped 3/27/20 1430)   aspirin (ASA) chewable tablet 324 mg (81 mg Oral Given 3/27/20 1509)     Drips infusing:  Yes  For the majority of the shift, the patient's behavior Green. Interventions performed were none.    Sepsis treatment initiated: No       ED Nurse Name/Phone Number: Estrellita Lucas RN,   3:52 PM    RECEIVING UNIT ED HANDOFF REVIEW    Above ED Nurse Handoff Report was reviewed: Yes  Reviewed by: Jose Infante RN on March 27, 2020 at 4:44 PM

## 2020-03-27 NOTE — PHARMACY-RX INSURANCE COVERAGE
Anticoagulation coverage check.  Patient has Medicare D through [facility] with $435 (of $435) unmet deductible.    Xarelto/Eliquis  Mar:  Upon receipt of RX, Discharge Pharmacy can dispense 1 month free.  April: $442  (fulfills $435 deductible)  May-Dec: $115/mo ($80 at [facility], [facility], [facility], [facility] pharmacies)    Pradaxa is non-formulary and more expensive.    Jantoven (warfarin)  $5/mo      -VIRGINIA Mchugh, Pharmacy Technician/Liaison, Discharge Pharmacy, [phone]

## 2020-03-28 ENCOUNTER — ANESTHESIA (OUTPATIENT)
Dept: INTENSIVE CARE | Facility: CLINIC | Age: 75
DRG: 308 | End: 2020-03-28
Payer: COMMERCIAL

## 2020-03-28 ENCOUNTER — APPOINTMENT (OUTPATIENT)
Dept: GENERAL RADIOLOGY | Facility: CLINIC | Age: 75
DRG: 308 | End: 2020-03-28
Attending: INTERNAL MEDICINE
Payer: COMMERCIAL

## 2020-03-28 ENCOUNTER — ANESTHESIA EVENT (OUTPATIENT)
Dept: INTENSIVE CARE | Facility: CLINIC | Age: 75
DRG: 308 | End: 2020-03-28
Payer: COMMERCIAL

## 2020-03-28 ENCOUNTER — APPOINTMENT (OUTPATIENT)
Dept: CARDIOLOGY | Facility: CLINIC | Age: 75
DRG: 308 | End: 2020-03-28
Attending: INTERNAL MEDICINE
Payer: COMMERCIAL

## 2020-03-28 ENCOUNTER — APPOINTMENT (OUTPATIENT)
Dept: ULTRASOUND IMAGING | Facility: CLINIC | Age: 75
DRG: 308 | End: 2020-03-28
Attending: OBSTETRICS & GYNECOLOGY
Payer: COMMERCIAL

## 2020-03-28 ENCOUNTER — APPOINTMENT (OUTPATIENT)
Dept: CT IMAGING | Facility: CLINIC | Age: 75
DRG: 308 | End: 2020-03-28
Attending: OBSTETRICS & GYNECOLOGY
Payer: COMMERCIAL

## 2020-03-28 LAB
ALBUMIN SERPL-MCNC: 2.8 G/DL (ref 3.4–5)
ALBUMIN SERPL-MCNC: 2.8 G/DL (ref 3.4–5)
ALBUMIN SERPL-MCNC: 2.9 G/DL (ref 3.4–5)
ALBUMIN SERPL-MCNC: 2.9 G/DL (ref 3.4–5)
ALBUMIN SERPL-MCNC: 3 G/DL (ref 3.4–5)
ALBUMIN UR-MCNC: 300 MG/DL
ALP SERPL-CCNC: 91 U/L (ref 40–150)
ALP SERPL-CCNC: 93 U/L (ref 40–150)
ALP SERPL-CCNC: 93 U/L (ref 40–150)
ALP SERPL-CCNC: 94 U/L (ref 40–150)
ALP SERPL-CCNC: 94 U/L (ref 40–150)
ALT SERPL W P-5'-P-CCNC: 2625 U/L (ref 0–70)
ALT SERPL W P-5'-P-CCNC: 4195 U/L (ref 0–70)
ALT SERPL W P-5'-P-CCNC: 4613 U/L (ref 0–70)
ALT SERPL W P-5'-P-CCNC: 4789 U/L (ref 0–70)
ALT SERPL W P-5'-P-CCNC: 5254 U/L (ref 0–70)
ANION GAP SERPL CALCULATED.3IONS-SCNC: 12 MMOL/L (ref 3–14)
ANION GAP SERPL CALCULATED.3IONS-SCNC: 13 MMOL/L (ref 3–14)
ANION GAP SERPL CALCULATED.3IONS-SCNC: 14 MMOL/L (ref 3–14)
ANION GAP SERPL CALCULATED.3IONS-SCNC: 14 MMOL/L (ref 3–14)
ANION GAP SERPL CALCULATED.3IONS-SCNC: 20 MMOL/L (ref 3–14)
ANION GAP SERPL CALCULATED.3IONS-SCNC: NORMAL MMOL/L (ref 3–14)
ANISOCYTOSIS BLD QL SMEAR: SLIGHT
APPEARANCE UR: ABNORMAL
AST SERPL W P-5'-P-CCNC: 2948 U/L (ref 0–45)
AST SERPL W P-5'-P-CCNC: 5606 U/L (ref 0–45)
AST SERPL W P-5'-P-CCNC: 6473 U/L (ref 0–45)
AST SERPL W P-5'-P-CCNC: 7144 U/L (ref 0–45)
AST SERPL W P-5'-P-CCNC: 8219 U/L (ref 0–45)
BASE DEFICIT BLDA-SCNC: 10.5 MMOL/L
BASE DEFICIT BLDA-SCNC: 13.5 MMOL/L
BASE DEFICIT BLDA-SCNC: 19.8 MMOL/L
BASE DEFICIT BLDA-SCNC: 22.2 MMOL/L
BASE DEFICIT BLDV-SCNC: 19.8 MMOL/L
BASOPHILS # BLD AUTO: 0 10E9/L (ref 0–0.2)
BASOPHILS # BLD AUTO: 0.1 10E9/L (ref 0–0.2)
BASOPHILS # BLD AUTO: 0.1 10E9/L (ref 0–0.2)
BASOPHILS NFR BLD AUTO: 0.2 %
BASOPHILS NFR BLD AUTO: 0.3 %
BASOPHILS NFR BLD AUTO: 0.5 %
BILIRUB SERPL-MCNC: 0.6 MG/DL (ref 0.2–1.3)
BILIRUB SERPL-MCNC: 0.7 MG/DL (ref 0.2–1.3)
BILIRUB SERPL-MCNC: 0.8 MG/DL (ref 0.2–1.3)
BILIRUB SERPL-MCNC: 0.8 MG/DL (ref 0.2–1.3)
BILIRUB SERPL-MCNC: 1 MG/DL (ref 0.2–1.3)
BILIRUB UR QL STRIP: NEGATIVE
BUN SERPL-MCNC: 44 MG/DL (ref 7–30)
BUN SERPL-MCNC: 47 MG/DL (ref 7–30)
BUN SERPL-MCNC: 47 MG/DL (ref 7–30)
BUN SERPL-MCNC: 51 MG/DL (ref 7–30)
BUN SERPL-MCNC: 55 MG/DL (ref 7–30)
BUN SERPL-MCNC: NORMAL MG/DL (ref 7–30)
CA-I BLD-MCNC: 4.4 MG/DL (ref 4.4–5.2)
CALCIUM SERPL-MCNC: 7.9 MG/DL (ref 8.5–10.1)
CALCIUM SERPL-MCNC: 8.2 MG/DL (ref 8.5–10.1)
CALCIUM SERPL-MCNC: 8.2 MG/DL (ref 8.5–10.1)
CALCIUM SERPL-MCNC: 8.3 MG/DL (ref 8.5–10.1)
CALCIUM SERPL-MCNC: 8.3 MG/DL (ref 8.5–10.1)
CALCIUM SERPL-MCNC: NORMAL MG/DL (ref 8.5–10.1)
CHLORIDE SERPL-SCNC: 102 MMOL/L (ref 94–109)
CHLORIDE SERPL-SCNC: 102 MMOL/L (ref 94–109)
CHLORIDE SERPL-SCNC: 103 MMOL/L (ref 94–109)
CHLORIDE SERPL-SCNC: 103 MMOL/L (ref 94–109)
CHLORIDE SERPL-SCNC: 104 MMOL/L (ref 94–109)
CHLORIDE SERPL-SCNC: NORMAL MMOL/L (ref 94–109)
CO2 SERPL-SCNC: 13 MMOL/L (ref 20–32)
CO2 SERPL-SCNC: 15 MMOL/L (ref 20–32)
CO2 SERPL-SCNC: 15 MMOL/L (ref 20–32)
CO2 SERPL-SCNC: 17 MMOL/L (ref 20–32)
CO2 SERPL-SCNC: 9 MMOL/L (ref 20–32)
CO2 SERPL-SCNC: NORMAL MMOL/L (ref 20–32)
COLOR UR AUTO: ABNORMAL
CORTIS SERPL-MCNC: 41.4 UG/DL (ref 4–22)
CREAT SERPL-MCNC: 3.57 MG/DL (ref 0.66–1.25)
CREAT SERPL-MCNC: 3.79 MG/DL (ref 0.66–1.25)
CREAT SERPL-MCNC: 3.94 MG/DL (ref 0.66–1.25)
CREAT SERPL-MCNC: 4.2 MG/DL (ref 0.66–1.25)
CREAT SERPL-MCNC: 4.46 MG/DL (ref 0.66–1.25)
CREAT SERPL-MCNC: NORMAL MG/DL (ref 0.66–1.25)
DIFFERENTIAL METHOD BLD: ABNORMAL
EOSINOPHIL # BLD AUTO: 0 10E9/L (ref 0–0.7)
EOSINOPHIL NFR BLD AUTO: 0 %
EOSINOPHIL NFR BLD AUTO: 0.1 %
EOSINOPHIL NFR BLD AUTO: 0.1 %
ERYTHROCYTE [DISTWIDTH] IN BLOOD BY AUTOMATED COUNT: 15.1 % (ref 10–15)
ERYTHROCYTE [DISTWIDTH] IN BLOOD BY AUTOMATED COUNT: 15.2 % (ref 10–15)
ERYTHROCYTE [DISTWIDTH] IN BLOOD BY AUTOMATED COUNT: 15.4 % (ref 10–15)
FLUAV+FLUBV RNA SPEC QL NAA+PROBE: NEGATIVE
FLUAV+FLUBV RNA SPEC QL NAA+PROBE: NEGATIVE
GFR SERPL CREATININE-BSD FRML MDRD: 12 ML/MIN/{1.73_M2}
GFR SERPL CREATININE-BSD FRML MDRD: 13 ML/MIN/{1.73_M2}
GFR SERPL CREATININE-BSD FRML MDRD: 14 ML/MIN/{1.73_M2}
GFR SERPL CREATININE-BSD FRML MDRD: 14 ML/MIN/{1.73_M2}
GFR SERPL CREATININE-BSD FRML MDRD: 16 ML/MIN/{1.73_M2}
GLUCOSE BLDC GLUCOMTR-MCNC: 106 MG/DL (ref 70–99)
GLUCOSE BLDC GLUCOMTR-MCNC: 147 MG/DL (ref 70–99)
GLUCOSE BLDC GLUCOMTR-MCNC: 156 MG/DL (ref 70–99)
GLUCOSE BLDC GLUCOMTR-MCNC: 189 MG/DL (ref 70–99)
GLUCOSE BLDC GLUCOMTR-MCNC: 201 MG/DL (ref 70–99)
GLUCOSE BLDC GLUCOMTR-MCNC: 203 MG/DL (ref 70–99)
GLUCOSE SERPL-MCNC: 114 MG/DL (ref 70–99)
GLUCOSE SERPL-MCNC: 155 MG/DL (ref 70–99)
GLUCOSE SERPL-MCNC: 205 MG/DL (ref 70–99)
GLUCOSE SERPL-MCNC: 218 MG/DL (ref 70–99)
GLUCOSE SERPL-MCNC: 219 MG/DL (ref 70–99)
GLUCOSE SERPL-MCNC: NORMAL MG/DL (ref 70–99)
GLUCOSE UR STRIP-MCNC: 200 MG/DL
HCO3 BLD-SCNC: 12 MMOL/L (ref 21–28)
HCO3 BLD-SCNC: 14 MMOL/L (ref 21–28)
HCO3 BLD-SCNC: 5 MMOL/L (ref 21–28)
HCO3 BLD-SCNC: 8 MMOL/L (ref 21–28)
HCO3 BLDV-SCNC: 8 MMOL/L (ref 21–28)
HCT VFR BLD AUTO: 36.4 % (ref 40–53)
HCT VFR BLD AUTO: 39.5 % (ref 40–53)
HCT VFR BLD AUTO: 40.1 % (ref 40–53)
HGB BLD-MCNC: 10.9 G/DL (ref 13.3–17.7)
HGB BLD-MCNC: 11.4 G/DL (ref 13.3–17.7)
HGB BLD-MCNC: 11.5 G/DL (ref 13.3–17.7)
HGB UR QL STRIP: ABNORMAL
IMM GRANULOCYTES # BLD: 0.4 10E9/L (ref 0–0.4)
IMM GRANULOCYTES # BLD: 0.5 10E9/L (ref 0–0.4)
IMM GRANULOCYTES # BLD: 0.6 10E9/L (ref 0–0.4)
IMM GRANULOCYTES NFR BLD: 2 %
IMM GRANULOCYTES NFR BLD: 2.7 %
IMM GRANULOCYTES NFR BLD: 3.6 %
KETONES UR STRIP-MCNC: NEGATIVE MG/DL
L PNEUMO1 AG UR QL IA: NORMAL
LACTATE BLD-SCNC: 11.2 MMOL/L (ref 0.7–2)
LACTATE BLD-SCNC: 12.1 MMOL/L (ref 0.7–2)
LACTATE BLD-SCNC: 3.2 MMOL/L (ref 0.7–2)
LACTATE BLD-SCNC: 3.3 MMOL/L (ref 0.7–2)
LACTATE BLD-SCNC: 3.9 MMOL/L (ref 0.7–2)
LACTATE BLD-SCNC: 4.7 MMOL/L (ref 0.7–2)
LEUKOCYTE ESTERASE UR QL STRIP: ABNORMAL
LMWH PPP CHRO-ACNC: 0.12 IU/ML
LMWH PPP CHRO-ACNC: 0.12 IU/ML
LMWH PPP CHRO-ACNC: <0.1 IU/ML
LYMPHOCYTES # BLD AUTO: 0.9 10E9/L (ref 0.8–5.3)
LYMPHOCYTES # BLD AUTO: 1.3 10E9/L (ref 0.8–5.3)
LYMPHOCYTES # BLD AUTO: 1.3 10E9/L (ref 0.8–5.3)
LYMPHOCYTES NFR BLD AUTO: 3.4 %
LYMPHOCYTES NFR BLD AUTO: 7.8 %
LYMPHOCYTES NFR BLD AUTO: 8.9 %
MAGNESIUM SERPL-MCNC: 2.3 MG/DL (ref 1.6–2.3)
MCH RBC QN AUTO: 25.4 PG (ref 26.5–33)
MCH RBC QN AUTO: 25.6 PG (ref 26.5–33)
MCH RBC QN AUTO: 25.8 PG (ref 26.5–33)
MCHC RBC AUTO-ENTMCNC: 28.7 G/DL (ref 31.5–36.5)
MCHC RBC AUTO-ENTMCNC: 28.9 G/DL (ref 31.5–36.5)
MCHC RBC AUTO-ENTMCNC: 29.9 G/DL (ref 31.5–36.5)
MCV RBC AUTO: 85 FL (ref 78–100)
MCV RBC AUTO: 88 FL (ref 78–100)
MCV RBC AUTO: 90 FL (ref 78–100)
MONOCYTES # BLD AUTO: 1 10E9/L (ref 0–1.3)
MONOCYTES # BLD AUTO: 1.3 10E9/L (ref 0–1.3)
MONOCYTES # BLD AUTO: 1.8 10E9/L (ref 0–1.3)
MONOCYTES NFR BLD AUTO: 6.9 %
MONOCYTES NFR BLD AUTO: 7.3 %
MONOCYTES NFR BLD AUTO: 7.9 %
MRSA DNA SPEC QL NAA+PROBE: NEGATIVE
NEUTROPHILS # BLD AUTO: 11.4 10E9/L (ref 1.6–8.3)
NEUTROPHILS # BLD AUTO: 13 10E9/L (ref 1.6–8.3)
NEUTROPHILS # BLD AUTO: 22.8 10E9/L (ref 1.6–8.3)
NEUTROPHILS NFR BLD AUTO: 80.1 %
NEUTROPHILS NFR BLD AUTO: 80.8 %
NEUTROPHILS NFR BLD AUTO: 87.4 %
NITRATE UR QL: NEGATIVE
NRBC # BLD AUTO: 0 10*3/UL
NRBC # BLD AUTO: 0 10*3/UL
NRBC # BLD AUTO: 0.1 10*3/UL
NRBC BLD AUTO-RTO: 0 /100
O2/TOTAL GAS SETTING VFR VENT: 80 %
O2/TOTAL GAS SETTING VFR VENT: ABNORMAL %
OXYHGB MFR BLD: 98 % (ref 92–100)
OXYHGB MFR BLD: 98 % (ref 92–100)
OXYHGB MFR BLD: 99 % (ref 92–100)
OXYHGB MFR BLD: 99 % (ref 92–100)
OXYHGB MFR BLDV: 98 %
PCO2 BLD: 13 MM HG (ref 35–45)
PCO2 BLD: 23 MM HG (ref 35–45)
PCO2 BLD: 26 MM HG (ref 35–45)
PCO2 BLD: 28 MM HG (ref 35–45)
PCO2 BLDV: 23 MM HG (ref 40–50)
PH BLD: 7.13 PH (ref 7.35–7.45)
PH BLD: 7.15 PH (ref 7.35–7.45)
PH BLD: 7.26 PH (ref 7.35–7.45)
PH BLD: 7.32 PH (ref 7.35–7.45)
PH BLDV: 7.13 PH (ref 7.32–7.43)
PH UR STRIP: 7.5 PH (ref 5–7)
PHOSPHATE SERPL-MCNC: 7.5 MG/DL (ref 2.5–4.5)
PLATELET # BLD AUTO: 475 10E9/L (ref 150–450)
PLATELET # BLD AUTO: 510 10E9/L (ref 150–450)
PLATELET # BLD AUTO: 518 10E9/L (ref 150–450)
PLATELET # BLD EST: ABNORMAL 10*3/UL
PO2 BLD: 168 MM HG (ref 80–105)
PO2 BLD: 195 MM HG (ref 80–105)
PO2 BLD: 218 MM HG (ref 80–105)
PO2 BLD: 284 MM HG (ref 80–105)
PO2 BLDV: 168 MM HG (ref 25–47)
POIKILOCYTOSIS BLD QL SMEAR: SLIGHT
POTASSIUM SERPL-SCNC: 4.9 MMOL/L (ref 3.4–5.3)
POTASSIUM SERPL-SCNC: 5.3 MMOL/L (ref 3.4–5.3)
POTASSIUM SERPL-SCNC: 5.4 MMOL/L (ref 3.4–5.3)
POTASSIUM SERPL-SCNC: 6.2 MMOL/L (ref 3.4–5.3)
POTASSIUM SERPL-SCNC: 6.4 MMOL/L (ref 3.4–5.3)
POTASSIUM SERPL-SCNC: NORMAL MMOL/L (ref 3.4–5.3)
PROCALCITONIN SERPL-MCNC: 0.07 NG/ML
PROT SERPL-MCNC: 6.2 G/DL (ref 6.8–8.8)
PROT SERPL-MCNC: 6.4 G/DL (ref 6.8–8.8)
PROT SERPL-MCNC: 6.5 G/DL (ref 6.8–8.8)
PROT SERPL-MCNC: 6.5 G/DL (ref 6.8–8.8)
PROT SERPL-MCNC: 6.7 G/DL (ref 6.8–8.8)
RBC # BLD AUTO: 4.26 10E12/L (ref 4.4–5.9)
RBC # BLD AUTO: 4.46 10E12/L (ref 4.4–5.9)
RBC # BLD AUTO: 4.49 10E12/L (ref 4.4–5.9)
RBC #/AREA URNS AUTO: >182 /HPF (ref 0–2)
RSV RNA SPEC NAA+PROBE: NEGATIVE
S PNEUM AG SPEC QL: NORMAL
SARS-COV-2 RNA SPEC QL NAA+PROBE: NOT DETECTED
SODIUM SERPL-SCNC: 130 MMOL/L (ref 133–144)
SODIUM SERPL-SCNC: 131 MMOL/L (ref 133–144)
SODIUM SERPL-SCNC: 134 MMOL/L (ref 133–144)
SODIUM SERPL-SCNC: NORMAL MMOL/L (ref 133–144)
SOURCE: ABNORMAL
SP GR UR STRIP: 1.02 (ref 1–1.03)
SPECIMEN SOURCE: NORMAL
TROPONIN I SERPL-MCNC: 0.22 UG/L (ref 0–0.04)
TROPONIN I SERPL-MCNC: 0.29 UG/L (ref 0–0.04)
TROPONIN I SERPL-MCNC: 0.34 UG/L (ref 0–0.04)
TROPONIN I SERPL-MCNC: 0.44 UG/L (ref 0–0.04)
UROBILINOGEN UR STRIP-MCNC: NORMAL MG/DL (ref 0–2)
WBC # BLD AUTO: 14.1 10E9/L (ref 4–11)
WBC # BLD AUTO: 16.2 10E9/L (ref 4–11)
WBC # BLD AUTO: 26.1 10E9/L (ref 4–11)
WBC #/AREA URNS AUTO: 179 /HPF (ref 0–5)

## 2020-03-28 PROCEDURE — 93306 TTE W/DOPPLER COMPLETE: CPT | Mod: 26 | Performed by: INTERNAL MEDICINE

## 2020-03-28 PROCEDURE — 87899 AGENT NOS ASSAY W/OPTIC: CPT | Performed by: OBSTETRICS & GYNECOLOGY

## 2020-03-28 PROCEDURE — 82533 TOTAL CORTISOL: CPT | Performed by: OBSTETRICS & GYNECOLOGY

## 2020-03-28 PROCEDURE — 99291 CRITICAL CARE FIRST HOUR: CPT | Performed by: OBSTETRICS & GYNECOLOGY

## 2020-03-28 PROCEDURE — 25000128 H RX IP 250 OP 636: Performed by: OBSTETRICS & GYNECOLOGY

## 2020-03-28 PROCEDURE — 25000125 ZZHC RX 250: Performed by: INTERNAL MEDICINE

## 2020-03-28 PROCEDURE — 25500064 ZZH RX 255 OP 636: Performed by: INTERNAL MEDICINE

## 2020-03-28 PROCEDURE — 36600 WITHDRAWAL OF ARTERIAL BLOOD: CPT

## 2020-03-28 PROCEDURE — 25000128 H RX IP 250 OP 636: Performed by: INTERNAL MEDICINE

## 2020-03-28 PROCEDURE — 5A1955Z RESPIRATORY VENTILATION, GREATER THAN 96 CONSECUTIVE HOURS: ICD-10-PCS | Performed by: INTERNAL MEDICINE

## 2020-03-28 PROCEDURE — 87040 BLOOD CULTURE FOR BACTERIA: CPT | Performed by: OBSTETRICS & GYNECOLOGY

## 2020-03-28 PROCEDURE — 40000281 ZZH STATISTIC TRANSPORT TIME EA 15 MIN

## 2020-03-28 PROCEDURE — 80048 BASIC METABOLIC PNL TOTAL CA: CPT | Performed by: INTERNAL MEDICINE

## 2020-03-28 PROCEDURE — 82330 ASSAY OF CALCIUM: CPT | Performed by: OBSTETRICS & GYNECOLOGY

## 2020-03-28 PROCEDURE — 76705 ECHO EXAM OF ABDOMEN: CPT

## 2020-03-28 PROCEDURE — 83605 ASSAY OF LACTIC ACID: CPT | Performed by: INTERNAL MEDICINE

## 2020-03-28 PROCEDURE — 25800030 ZZH RX IP 258 OP 636: Performed by: OBSTETRICS & GYNECOLOGY

## 2020-03-28 PROCEDURE — 40000986 XR CHEST PORT 1 VW

## 2020-03-28 PROCEDURE — 82805 BLOOD GASES W/O2 SATURATION: CPT | Performed by: INTERNAL MEDICINE

## 2020-03-28 PROCEDURE — 3E043XZ INTRODUCTION OF VASOPRESSOR INTO CENTRAL VEIN, PERCUTANEOUS APPROACH: ICD-10-PCS | Performed by: INTERNAL MEDICINE

## 2020-03-28 PROCEDURE — 83735 ASSAY OF MAGNESIUM: CPT | Performed by: OBSTETRICS & GYNECOLOGY

## 2020-03-28 PROCEDURE — 87631 RESP VIRUS 3-5 TARGETS: CPT | Performed by: OBSTETRICS & GYNECOLOGY

## 2020-03-28 PROCEDURE — 40000671 ZZH STATISTIC ANESTHESIA CASE

## 2020-03-28 PROCEDURE — 25000131 ZZH RX MED GY IP 250 OP 636 PS 637: Performed by: INTERNAL MEDICINE

## 2020-03-28 PROCEDURE — 87449 NOS EACH ORGANISM AG IA: CPT | Performed by: OBSTETRICS & GYNECOLOGY

## 2020-03-28 PROCEDURE — 84484 ASSAY OF TROPONIN QUANT: CPT | Performed by: INTERNAL MEDICINE

## 2020-03-28 PROCEDURE — 37000011 ZZH ANESTHESIA WARD SERVICE

## 2020-03-28 PROCEDURE — 84484 ASSAY OF TROPONIN QUANT: CPT | Performed by: OBSTETRICS & GYNECOLOGY

## 2020-03-28 PROCEDURE — 25000132 ZZH RX MED GY IP 250 OP 250 PS 637: Performed by: OBSTETRICS & GYNECOLOGY

## 2020-03-28 PROCEDURE — 20000003 ZZH R&B ICU

## 2020-03-28 PROCEDURE — 25800030 ZZH RX IP 258 OP 636: Performed by: INTERNAL MEDICINE

## 2020-03-28 PROCEDURE — 00000146 ZZHCL STATISTIC GLUCOSE BY METER IP

## 2020-03-28 PROCEDURE — 36415 COLL VENOUS BLD VENIPUNCTURE: CPT | Performed by: INTERNAL MEDICINE

## 2020-03-28 PROCEDURE — 40000809 ZZH STATISTIC NO DOCUMENTATION TO SUPPORT CHARGE

## 2020-03-28 PROCEDURE — 40000264 ECHOCARDIOGRAM COMPLETE

## 2020-03-28 PROCEDURE — 83735 ASSAY OF MAGNESIUM: CPT | Performed by: INTERNAL MEDICINE

## 2020-03-28 PROCEDURE — 25000128 H RX IP 250 OP 636: Performed by: NURSE ANESTHETIST, CERTIFIED REGISTERED

## 2020-03-28 PROCEDURE — 25000132 ZZH RX MED GY IP 250 OP 250 PS 637: Performed by: INTERNAL MEDICINE

## 2020-03-28 PROCEDURE — 74176 CT ABD & PELVIS W/O CONTRAST: CPT

## 2020-03-28 PROCEDURE — 87086 URINE CULTURE/COLONY COUNT: CPT | Performed by: INTERNAL MEDICINE

## 2020-03-28 PROCEDURE — 84100 ASSAY OF PHOSPHORUS: CPT | Performed by: INTERNAL MEDICINE

## 2020-03-28 PROCEDURE — 40000275 ZZH STATISTIC RCP TIME EA 10 MIN

## 2020-03-28 PROCEDURE — 99292 CRITICAL CARE ADDL 30 MIN: CPT | Performed by: OBSTETRICS & GYNECOLOGY

## 2020-03-28 PROCEDURE — 81001 URINALYSIS AUTO W/SCOPE: CPT | Performed by: OBSTETRICS & GYNECOLOGY

## 2020-03-28 PROCEDURE — 99233 SBSQ HOSP IP/OBS HIGH 50: CPT | Performed by: INTERNAL MEDICINE

## 2020-03-28 PROCEDURE — 83605 ASSAY OF LACTIC ACID: CPT | Performed by: OBSTETRICS & GYNECOLOGY

## 2020-03-28 PROCEDURE — 94002 VENT MGMT INPAT INIT DAY: CPT

## 2020-03-28 PROCEDURE — 31500 INSERT EMERGENCY AIRWAY: CPT

## 2020-03-28 PROCEDURE — 80053 COMPREHEN METABOLIC PANEL: CPT | Performed by: INTERNAL MEDICINE

## 2020-03-28 PROCEDURE — 85025 COMPLETE CBC W/AUTO DIFF WBC: CPT | Performed by: INTERNAL MEDICINE

## 2020-03-28 PROCEDURE — 99223 1ST HOSP IP/OBS HIGH 75: CPT | Mod: 25 | Performed by: INTERNAL MEDICINE

## 2020-03-28 PROCEDURE — 85520 HEPARIN ASSAY: CPT | Performed by: INTERNAL MEDICINE

## 2020-03-28 PROCEDURE — 80053 COMPREHEN METABOLIC PANEL: CPT | Performed by: OBSTETRICS & GYNECOLOGY

## 2020-03-28 PROCEDURE — C9113 INJ PANTOPRAZOLE SODIUM, VIA: HCPCS | Performed by: INTERNAL MEDICINE

## 2020-03-28 PROCEDURE — 25800025 ZZH RX 258: Performed by: INTERNAL MEDICINE

## 2020-03-28 PROCEDURE — 25000125 ZZHC RX 250: Performed by: OBSTETRICS & GYNECOLOGY

## 2020-03-28 RX ORDER — DOPAMINE HYDROCHLORIDE 160 MG/100ML
2-20 INJECTION, SOLUTION INTRAVENOUS CONTINUOUS
Status: DISCONTINUED | OUTPATIENT
Start: 2020-03-28 | End: 2020-03-28 | Stop reason: ALTCHOICE

## 2020-03-28 RX ORDER — NICOTINE POLACRILEX 4 MG
15-30 LOZENGE BUCCAL
Status: DISCONTINUED | OUTPATIENT
Start: 2020-03-28 | End: 2020-04-05

## 2020-03-28 RX ORDER — PROPOFOL 10 MG/ML
5-75 INJECTION, EMULSION INTRAVENOUS CONTINUOUS
Status: DISCONTINUED | OUTPATIENT
Start: 2020-03-28 | End: 2020-03-28

## 2020-03-28 RX ORDER — DEXTROSE MONOHYDRATE 25 G/50ML
25 INJECTION, SOLUTION INTRAVENOUS ONCE
Status: COMPLETED | OUTPATIENT
Start: 2020-03-28 | End: 2020-03-28

## 2020-03-28 RX ORDER — DOPAMINE HYDROCHLORIDE 160 MG/100ML
INJECTION, SOLUTION INTRAVENOUS
Status: DISCONTINUED
Start: 2020-03-28 | End: 2020-03-28 | Stop reason: HOSPADM

## 2020-03-28 RX ORDER — DEXTROSE MONOHYDRATE 25 G/50ML
25-50 INJECTION, SOLUTION INTRAVENOUS
Status: DISCONTINUED | OUTPATIENT
Start: 2020-03-28 | End: 2020-04-05

## 2020-03-28 RX ORDER — PROPOFOL 10 MG/ML
5-75 INJECTION, EMULSION INTRAVENOUS CONTINUOUS
Status: DISCONTINUED | OUTPATIENT
Start: 2020-03-28 | End: 2020-04-02

## 2020-03-28 RX ORDER — LORAZEPAM 2 MG/ML
2 INJECTION INTRAMUSCULAR EVERY 4 HOURS PRN
Status: DISCONTINUED | OUTPATIENT
Start: 2020-03-28 | End: 2020-04-02

## 2020-03-28 RX ORDER — PROPOFOL 10 MG/ML
INJECTION, EMULSION INTRAVENOUS PRN
Status: DISCONTINUED | OUTPATIENT
Start: 2020-03-28 | End: 2020-03-28

## 2020-03-28 RX ORDER — METOLAZONE 5 MG/1
5 TABLET ORAL ONCE
Status: COMPLETED | OUTPATIENT
Start: 2020-03-28 | End: 2020-03-28

## 2020-03-28 RX ORDER — NALOXONE HYDROCHLORIDE 0.4 MG/ML
.1-.4 INJECTION, SOLUTION INTRAMUSCULAR; INTRAVENOUS; SUBCUTANEOUS
Status: DISCONTINUED | OUTPATIENT
Start: 2020-03-28 | End: 2020-04-10 | Stop reason: HOSPADM

## 2020-03-28 RX ORDER — FUROSEMIDE 10 MG/ML
80 INJECTION INTRAMUSCULAR; INTRAVENOUS ONCE
Status: COMPLETED | OUTPATIENT
Start: 2020-03-28 | End: 2020-03-28

## 2020-03-28 RX ORDER — FENTANYL CITRATE 50 UG/ML
25 INJECTION, SOLUTION INTRAMUSCULAR; INTRAVENOUS
Status: DISCONTINUED | OUTPATIENT
Start: 2020-03-28 | End: 2020-04-02

## 2020-03-28 RX ORDER — SODIUM POLYSTYRENE SULFONATE 15 G/60ML
15 SUSPENSION ORAL; RECTAL ONCE
Status: COMPLETED | OUTPATIENT
Start: 2020-03-28 | End: 2020-03-28

## 2020-03-28 RX ORDER — HEPARIN SODIUM 10000 [USP'U]/100ML
1550 INJECTION, SOLUTION INTRAVENOUS CONTINUOUS
Status: DISCONTINUED | OUTPATIENT
Start: 2020-03-28 | End: 2020-03-29 | Stop reason: DRUGHIGH

## 2020-03-28 RX ORDER — BUMETANIDE 0.25 MG/ML
2 INJECTION INTRAMUSCULAR; INTRAVENOUS ONCE
Status: COMPLETED | OUTPATIENT
Start: 2020-03-28 | End: 2020-03-28

## 2020-03-28 RX ORDER — NOREPINEPHRINE BITARTRATE 0.06 MG/ML
0.03-0.4 INJECTION, SOLUTION INTRAVENOUS CONTINUOUS
Status: DISCONTINUED | OUTPATIENT
Start: 2020-03-28 | End: 2020-04-02

## 2020-03-28 RX ORDER — NOREPINEPHRINE BITARTRATE 0.06 MG/ML
INJECTION, SOLUTION INTRAVENOUS
Status: DISCONTINUED
Start: 2020-03-28 | End: 2020-03-28 | Stop reason: HOSPADM

## 2020-03-28 RX ORDER — CEFAZOLIN SODIUM 1 G/50ML
2000 SOLUTION INTRAVENOUS
Status: DISCONTINUED | OUTPATIENT
Start: 2020-03-28 | End: 2020-03-29

## 2020-03-28 RX ADMIN — INSULIN ASPART 2 UNITS: 100 INJECTION, SOLUTION INTRAVENOUS; SUBCUTANEOUS at 12:37

## 2020-03-28 RX ADMIN — AMIODARONE HYDROCHLORIDE 150 MG: 1.5 INJECTION, SOLUTION INTRAVENOUS at 16:36

## 2020-03-28 RX ADMIN — PROPOFOL 30 MCG/KG/MIN: 10 INJECTION, EMULSION INTRAVENOUS at 18:41

## 2020-03-28 RX ADMIN — Medication 0.4 MCG/KG/MIN: at 08:31

## 2020-03-28 RX ADMIN — Medication 4 MG/HR: at 15:35

## 2020-03-28 RX ADMIN — VASOPRESSIN 2.4 UNITS/HR: 20 INJECTION INTRAVENOUS at 08:16

## 2020-03-28 RX ADMIN — Medication 3000 UNITS: at 13:04

## 2020-03-28 RX ADMIN — Medication 0.03 MCG/KG/MIN: at 01:14

## 2020-03-28 RX ADMIN — FUROSEMIDE 10 MG/HR: 10 INJECTION, SOLUTION INTRAMUSCULAR; INTRAVENOUS at 00:40

## 2020-03-28 RX ADMIN — TAZOBACTAM SODIUM AND PIPERACILLIN SODIUM 2.25 G: 250; 2 INJECTION, SOLUTION INTRAVENOUS at 00:12

## 2020-03-28 RX ADMIN — DOBUTAMINE HYDROCHLORIDE 2.5 MCG/KG/MIN: 200 INJECTION INTRAVENOUS at 00:08

## 2020-03-28 RX ADMIN — SODIUM BICARBONATE 50 MEQ: 84 INJECTION INTRAVENOUS at 11:31

## 2020-03-28 RX ADMIN — FUROSEMIDE 80 MG: 10 INJECTION, SOLUTION INTRAMUSCULAR; INTRAVENOUS at 11:19

## 2020-03-28 RX ADMIN — VANCOMYCIN HYDROCHLORIDE 2000 MG: 10 INJECTION, POWDER, LYOPHILIZED, FOR SOLUTION INTRAVENOUS at 03:35

## 2020-03-28 RX ADMIN — AMIODARONE HYDROCHLORIDE 0.5 MG/MIN: 50 INJECTION, SOLUTION INTRAVENOUS at 22:47

## 2020-03-28 RX ADMIN — TAZOBACTAM SODIUM AND PIPERACILLIN SODIUM 2.25 G: 250; 2 INJECTION, SOLUTION INTRAVENOUS at 11:04

## 2020-03-28 RX ADMIN — Medication 2100 UNITS: at 20:38

## 2020-03-28 RX ADMIN — GUAIFENESIN 10 ML: 100 SOLUTION ORAL at 20:23

## 2020-03-28 RX ADMIN — VASOPRESSIN 2.4 UNITS/HR: 20 INJECTION INTRAVENOUS at 16:51

## 2020-03-28 RX ADMIN — HEPARIN SODIUM 1100 UNITS/HR: 10000 INJECTION, SOLUTION INTRAVENOUS at 10:20

## 2020-03-28 RX ADMIN — AMIODARONE HYDROCHLORIDE 1 MG/MIN: 50 INJECTION, SOLUTION INTRAVENOUS at 16:48

## 2020-03-28 RX ADMIN — Medication 0.24 MCG/KG/MIN: at 16:02

## 2020-03-28 RX ADMIN — Medication 8 UNITS: at 14:09

## 2020-03-28 RX ADMIN — METOLAZONE 5 MG: 5 TABLET ORAL at 13:14

## 2020-03-28 RX ADMIN — HUMAN ALBUMIN MICROSPHERES AND PERFLUTREN 3 ML: 10; .22 INJECTION, SOLUTION INTRAVENOUS at 11:30

## 2020-03-28 RX ADMIN — ASPIRIN 81 MG: 81 TABLET, COATED ORAL at 08:50

## 2020-03-28 RX ADMIN — Medication 4 MG/HR: at 18:29

## 2020-03-28 RX ADMIN — TAZOBACTAM SODIUM AND PIPERACILLIN SODIUM 2.25 G: 250; 2 INJECTION, SOLUTION INTRAVENOUS at 05:30

## 2020-03-28 RX ADMIN — Medication 2350 UNITS: at 04:47

## 2020-03-28 RX ADMIN — GUAIFENESIN 10 ML: 100 SOLUTION ORAL at 08:50

## 2020-03-28 RX ADMIN — PROPOFOL 35 MCG/KG/MIN: 10 INJECTION, EMULSION INTRAVENOUS at 08:10

## 2020-03-28 RX ADMIN — GUAIFENESIN 10 ML: 100 SOLUTION ORAL at 15:48

## 2020-03-28 RX ADMIN — CALCIUM GLUCONATE 1 G: 98 INJECTION, SOLUTION INTRAVENOUS at 21:00

## 2020-03-28 RX ADMIN — PROPOFOL 5 MCG/KG/MIN: 10 INJECTION, EMULSION INTRAVENOUS at 02:01

## 2020-03-28 RX ADMIN — HEPARIN SODIUM 1250 UNITS/HR: 10000 INJECTION, SOLUTION INTRAVENOUS at 13:04

## 2020-03-28 RX ADMIN — TAZOBACTAM SODIUM AND PIPERACILLIN SODIUM 2.25 G: 250; 2 INJECTION, SOLUTION INTRAVENOUS at 23:41

## 2020-03-28 RX ADMIN — BUMETANIDE 2 MG: 0.25 INJECTION INTRAMUSCULAR; INTRAVENOUS at 13:08

## 2020-03-28 RX ADMIN — PROPOFOL 30 MCG/KG/MIN: 10 INJECTION, EMULSION INTRAVENOUS at 13:05

## 2020-03-28 RX ADMIN — Medication 4 MG/HR: at 23:57

## 2020-03-28 RX ADMIN — PROPOFOL 80 MG: 10 INJECTION, EMULSION INTRAVENOUS at 00:56

## 2020-03-28 RX ADMIN — SODIUM BICARBONATE 25 MEQ: 84 INJECTION INTRAVENOUS at 14:10

## 2020-03-28 RX ADMIN — Medication 4 MG/HR: at 20:36

## 2020-03-28 RX ADMIN — SODIUM POLYSTYRENE SULFONATE 15 G: 15 SUSPENSION ORAL; RECTAL at 14:13

## 2020-03-28 RX ADMIN — PANTOPRAZOLE SODIUM 40 MG: 40 INJECTION, POWDER, FOR SOLUTION INTRAVENOUS at 15:31

## 2020-03-28 RX ADMIN — INSULIN ASPART 2 UNITS: 100 INJECTION, SOLUTION INTRAVENOUS; SUBCUTANEOUS at 09:23

## 2020-03-28 RX ADMIN — DOPAMINE HYDROCHLORIDE 2 MCG/KG/MIN: 160 INJECTION, SOLUTION INTRAVENOUS at 01:10

## 2020-03-28 RX ADMIN — DEXTROSE MONOHYDRATE 25 ML: 25 INJECTION, SOLUTION INTRAVENOUS at 14:06

## 2020-03-28 RX ADMIN — Medication 100 MG: at 00:56

## 2020-03-28 RX ADMIN — VASOPRESSIN 2.4 UNITS/HR: 20 INJECTION INTRAVENOUS at 03:27

## 2020-03-28 RX ADMIN — TAZOBACTAM SODIUM AND PIPERACILLIN SODIUM 2.25 G: 250; 2 INJECTION, SOLUTION INTRAVENOUS at 16:50

## 2020-03-28 RX ADMIN — CALCIUM GLUCONATE 1 G: 98 INJECTION, SOLUTION INTRAVENOUS at 11:31

## 2020-03-28 ASSESSMENT — ACTIVITIES OF DAILY LIVING (ADL)
ADLS_ACUITY_SCORE: 13
ADLS_ACUITY_SCORE: 20
ADLS_ACUITY_SCORE: 13
ADLS_ACUITY_SCORE: 20

## 2020-03-28 ASSESSMENT — ENCOUNTER SYMPTOMS: DYSRHYTHMIAS: 1

## 2020-03-28 ASSESSMENT — MIFFLIN-ST. JEOR: SCORE: 1645.49

## 2020-03-28 NOTE — PROGRESS NOTES
"[facility] ICU VENTILATOR RESPIRATORY NOTE  Date of Admission: 03/27/2020  Date of Intubation (most recent): 03/28/2020  Reason for Mechanical Ventilation:  Shock, likely cardiogenic versus septic  Number of Days on Mechanical Ventilation: 1  Met Criteria for Pressure Support Trial: No  Length of Pressure Support Trial:   Reason for Stopping Pressure Support Trial:   Reason for No Pressure Support Trial: Due to current respiratory status and needs  Significant Events Today:  ABG Results: 7.32/28/195/14 @ 1230  ETT appearance on chest x-ray: mid trachea     Plan: Will continue to monitor and assess the pt's current respiratory status and needs, in hopes of liberating the pt from the ventilator.    Ventilation Mode: CMV/AC  (Continuous Mandatory Ventilation/ Assist Control)  FiO2 (%): 50 %  Rate Set (breaths/minute): 16 breaths/min  Tidal Volume Set (mL): 500 mL  PEEP (cm H2O): 10 cmH2O  Oxygen Concentration (%): 50 %  Resp: 21    Vital signs:  Temp: 99.1  F (37.3  C) Temp src: Bladder BP: 130/64 Pulse: (!) 20 Heart Rate: 101 Resp: 21 SpO2: 100 % O2 Device: Mechanical Ventilator Oxygen Delivery: 2 LPM Height: 174 cm (5' 8.5\") Weight: 93.3 kg (205 lb 11.2 oz)  Estimated body mass index is 30.82 kg/m  as calculated from the following:    Height as of this encounter: 1.74 m (5' 8.5\").    Weight as of this encounter: 93.3 kg (205 lb 11.2 oz).    Recent Labs   Lab 03/28/20  1230 03/28/20  0800 03/28/20  0321 03/28/20  0022   PH 7.32* 7.26* 7.13* 7.15*   PCO2 28* 26* 23* 13*   PO2 195* 284* 168* 218*   HCO3 14* 12* 8* 5*   O2PER 50% 80 100%vent  100% 60%     Past Medical History:   Diagnosis Date     BPH (benign prostatic hyperplasia)      Diabetes (H)        History reviewed. No pertinent surgical history.    Family History   Problem Relation Age of Onset     Diabetes Sister        Social History     Tobacco Use     Smoking status: Never Smoker   Substance Use Topics     Alcohol use: Yes     Comment: Alee CHUNG  M " [facility]  3/28/2020

## 2020-03-28 NOTE — PLAN OF CARE
ICU End of Shift Summary.  For vital signs and complete assessments, please see documentation flowsheets.     Pertinent assessments:   ---Decreased sedation today:  Patient opened eyes, was able to make eye contact, became very restless kicking and thrashing.  RAAS goal changed to -2 to -3.  Propofol titrated accordingly.   ---Unable to tolerate HOB elevated without MAP <60.  Third presser almost initiated this morning. Sedation vacation performed and was able to titrate down on levophed.  Patient slowly began to tolerate increased HOB and now is at 30 degrees.  Vasopressin maintained,    ---SR to Afib HR 's.  Initiated amiodarone.  Converted to SR 70-80's.  Amiodarone infusion maintained.    ---Poor UOP, red in color.  Bumex IVP, metolazone oral and Bumex infusion initiated.  Noted increased UOP danyel in color.  ---Elevated K--Multiple interventions per MD. Frequent lab draws  ---NG LIWS.  Dark brown.  Clamped at times for kayexalate and other oral medications.    ---Heparin infusion adjusted per pharmacy.  ---Coxxyc red with slow blanching.  Turned Q2 hours.    ---  Major Shift Events:   Plan (Upcoming Events):   Discharge/Transfer Needs:     Bedside Shift Report Completed :   Bedside Safety Check Completed:    Right wrist, Left wrist and soft restraints restraints continued 3/28/2020    Clinical Justification: Pulling lines, pulling tubes, and pulling equipment  Less Restrictive Alternative: Repositioning, Reorientation  Attending Physician Notified: MD ordered restraint,     New orders placed Yes  Length of Order: 1 Day      Marsha Marte RN

## 2020-03-28 NOTE — PROGRESS NOTES
Reevaluated patient due to chest pain and shortness of breath.  After taking Mucinex and coughing quite a bit he developed substernal chest pain described as pressure which was different than the chest tightness has been having the past 6 weeks.  It lasted for about 30 minutes and then went away.  He has been having worsening shortness of breath since admission now breathing in the rate of the low 30s.  Heyolande says he cannot breathe at all and when we try to get him to lie flat he can not do this for more than 30 seconds.  Attempted repeat EKG, or he was moving to much to get useful information.  Heart rate is now controlled in the 80s so diltiazem drip shut off as he ripped out 1 of his IVs.  Blood pressure in the 90s systolic, should improve with stopping diltiazem.  His lungs actually sound fairly clear without significant crackles or wheeze, but he is certainly in respiratory distress.  See H&P for further details, however current presentation seems very suspicious for cardiac etiology for shortness of breath and specifically heart failure with a BNP of 19,000, A. fib with RVR on presentation, and elevated troponin.  He already received aspirin and is on a heparin drip.  Only 25 mL urine output with 40 mg of IV Lasix, however creatinine is currently 2.4 so may not of been a large enough dose.  He is being ruled out for COVID19, however other than some bilateral opacities on x-ray which seems more likely to be pulmonary edema he does not have fevers, change in his chronic cough for 6 weeks, lymphopenia, or any exposure history.    Spoke with the tele ICU physician regarding management.    Awaiting VBG and repeat troponin results.  Adding on CRP level and if significantly elevated this would lean in favor of COVID19 infection.    Moving the patient over to the ICU.  Will start with management for suspected heart failure and start him on BiPAP rescue therapy and if blood pressure improving off of diltiazem drip  will start a nitroglycerin drip.  Stat albuterol inhaler ordered to see if this helps in any way, if unable to use then albuterol neb.  Obtain ABG 30 minutes after starting BiPAP.  He did not like morphine or other opiates in the past so, will give a dose of IV Ativan 0.5 mg once placed on BiPAP to try to calm him down.  At that time I will attempt a bedside ultrasound to see if we can get any assessment of gross systolic function or dysfunction.  Also, depending on labs and this imaging will likely give additional IV Lasix at higher dose 60 mg.  Again cannot rule out COVID19 at this time so taking all precautions including adding airborne precautions due to BiPAP therapy and potential need for nebulization treatment.     ADDENDUM:  Patient is moved to the ICU and his shortness of breath feels much better on BiPAP.  He continues to be tachypneic to around 30.  His VBG shows a pH of 7.18, PCO2 33, PO2 39, bicarbonate 12, and venous oxyhemoglobin of 59.  Repeat troponin 0 0.170.  CRP 23.1.  I attempted bedside cardiac ultrasound although views were quite difficult in the setting of tachypnea and BiPAP.  His global EF appeared down on the views I could obtain, likely under 30%.  His IVC looked dilated when he came into view.  His venous O2 sat on his VBG is low which would support cardiogenic shock as the etiology for his worsening acidosis and almost certainly have significant elevation of lactic acid and repeat check now, although not a true mixed venous blood gas.  His extremities are cool and this along with dilated IVC and what to me looks like a depressed EF on 4 views would all support cardiogenic shock.  His heart rate is now down into the 50s.  Blood pressure just above 100 systolic.  Initially when presenting with primarily A. fib with RVR no shock like state I did start metoprolol tartrate earlier to help with rate control and he received 25 mg which is now discontinued.  CRP is not significantly elevated  and this along with duration of symptoms, lack of fever, and no lymphopenia would go against COVID19 infection.    -Given bilateral opacities will start broad-spectrum empiric antibiotics with oral vancomycin and Zosyn to make sure not missing infectious etiology.    -Spoke with intensivist Dr. Licea who recommended dobutamine initiation along with Lasix drip which have been ordered.  -Obtaining second peripheral IV now, dobutamine can be given via peripheral line  -Repeat ABG in 30-60 minutes  -Updated patient's RN, who will follow up with Dr. Licea a little bit later regarding how the dobutamine and Lasix drip are going along with follow-up test results.    -Confirmed with patient he is a Full Code    (In addition to admission/H&P encounter earlier this evening I spent an additional 60 minutes of critical care time management of this patient at the bedside)

## 2020-03-28 NOTE — PROGRESS NOTES
[facility]    Medicine Progress Note - Hospitalist Service       Date of Admission:  3/27/2020  Assessment & Plan      Gene Pagan is a 74 year old male with PMH including type II DM on oral agents and BPH who presented initially to urgent care and then referred to the emergency department for A. fib with RVR.  He reports approximately 6 to 8 weeks of shortness of breath, around this time he started Flomax for BPH symptoms.  Initially the shortness of breath was with exertion and then has progressed to at rest as well.  It has been more intermittent over this time and can be worse at night although denies any issues with laying flat.  For the past few nights he said issues with waking up very short of breath after just falling asleep, feeling like he is gasping for air.  He came in today due to acutely worsening shortness of breath since this morning when he woke up.  He denies any chest pain, but has had some chest tightness when he feels short of breath like he cannot get air in.  Does not describe it as a pressure sensation.  It does not radiate anywhere.  Not associate with nausea or diaphoresis.  He has not had any fevers or chills.  He has had intermittent cough over the past 6 weeks productive of white sputum that is not acutely worse this week.  No recent travel and he has been sheltering at home.  He lives with another person and they have not had any infectious symptoms.  His appetite and oral intake is been lower the past 2 to 3 days although has been drinking plenty of fluids.  He did not take his metformin or glipizide today.  He did not notice any leg swelling, however he said he was told he has some mild edema at urgent care.  He has not felt any palpitations.    He was more SOB last night, transferred to ICU, placed on BiPAP and ultimately intubated and started on pressors.    1. Shock, likely cardiogenic versus septic.  - Favor the former.  - TTE pending.  - On levophed and  vasopressin.  - On emperic Abx to include IV zosyn and IV vavnc.  - Cx NGTD.  - COVID PCR pending.    2. ARF.  - Likely due to above.  - Oliguric, likely due to above.  - Will need RRT though his prognosis is poor.    3. Transaminitis.  - due to shock state.    4. PAF.  - On IV heparin.  Diet: NPO  DVT Prophylaxis: IV heparin.  Mckenzie Catheter: in place, indication: Strict 1-2 Hour I&O  Code Status: DNR      Disposition Plan   Expected discharge: unknown, recommended to unknown once renal function improved.  Entered: Lorelei Andrade MD 03/28/2020, 9:39 AM       The patient's care was discussed with the Bedside Nurse.    Lorelei Andrade MD  Hospitalist Service  [facility] [place]    ______________________________________________________________________    Interval History     Intubated and sedated.    Data reviewed today: I reviewed all medications, new labs and imaging results over the last 24 hours. I personally reviewed no images or EKG's today.    Physical Exam   Vital Signs: Temp: 98.4  F (36.9  C) Temp src: Bladder BP: 114/48 Pulse: 82 Heart Rate: 81 Resp: 23 SpO2: 100 % O2 Device: Mechanical Ventilator Oxygen Delivery: 2 LPM  Weight: 205 lbs 11.2 oz    Gen - intubated and sedated.  Lungs - CTA B.  Heart - RR,S1+S2 nml, no m/g/r.  Abd - soft, NT, ND, + BS.  Ext - no edema.    Data   Recent Labs   Lab 03/28/20  0530 03/28/20  0030 03/27/20  2353 03/27/20  2209 03/27/20  1348   WBC 26.1* 16.2* 14.1*  --  10.0   HGB 10.9* 11.5* 11.4*  --  10.0*   MCV 85 90 88  --  84   * 518* 475*  --  487*   INR  --   --   --   --  1.20*   *  --  Canceled, Test credited, specimen discarded  --  131*   POTASSIUM 5.3  --  Canceled, Test credited, specimen discarded  --  4.2   CHLORIDE 102  --  Canceled, Test credited, specimen discarded  --  102   CO2 9*  --  Canceled, Test credited, specimen discarded  --  20   BUN 44*  --  Canceled, Test credited, specimen discarded  --  33*   CR 3.57*  --  Canceled, Test  credited, specimen discarded  --  2.40*   ANIONGAP 20*  --  Not Calculated  --  9   HARPER 8.3*  --  Canceled, Test credited, specimen discarded  --  8.8   *  --  Canceled, Test credited, specimen discarded  --  212*   ALBUMIN 3.0*  --   --   --   --    PROTTOTAL 6.5*  --   --   --   --    BILITOTAL 1.0  --   --   --   --    ALKPHOS 91  --   --   --   --    ALT 2,625*  --   --   --   --    AST 2,948*  --   --   --   --    TROPI 0.222*  --   --  0.170* 0.120*     Recent Results (from the past 24 hour(s))   XR Chest Port 1 View    Narrative    CHEST ONE VIEW  3/27/2020 2:26 PM     HISTORY: SOB    COMPARISON: CT dated 1/17/09      Impression    IMPRESSION: Heterogeneous opacities in the lung bases are concerning  for an infectious or inflammatory process. No pleural fluid or  pneumothorax. The heart is normal in size. The cardiomediastinal  silhouette is at the upper limits of normal in size. Old healed rib  and clavicle fractures are again seen.    LESTER J FAHRNER, MD   XR Chest Port 1 View    Narrative    EXAM: XR CHEST PORT 1 VW  LOCATION: [facility]  DATE/TIME: 3/28/2020 1:39 AM    INDICATION: Intubation.  COMPARISON: 03/27/2020 at 0216 hours      Impression    IMPRESSION: New ETT with tip in mid trachea at level of aortic arch. Small right pleural effusion and adjacent right lower lung infiltrate or atelectasis, increased. Mild retrocardiac atelectasis/infiltrate may be slightly increased. Heart size near   upper limits of normal.   XR Chest Port 1 View    Narrative    EXAM: XR CHEST PORTABLE 1 VIEW  LOCATION: [facility]  DATE/TIME: 03/28/2020, 6:24 AM    INDICATION: Line placement.  COMPARISON: 03/28/2020 at 0153 hours.      Impression    IMPRESSION: New right IJ line tip in SVC. Otherwise, no change. ETT tip in the mid trachea. Bilateral lower lung opacities.       Medications     HEParin 1,100 Units/hr (03/28/20 0900)     norepinephrine 0.4 mcg/kg/min (03/28/20 0831)     -  MEDICATION INSTRUCTIONS -       - MEDICATION INSTRUCTIONS -       propofol (DIPRIVAN) infusion 35 mcg/kg/min (03/28/20 0810)     vasopressin (PITRESSIN) infusion ADULT (40 mL) 2.4 Units/hr (03/28/20 0816)       aspirin  81 mg Oral Daily     DOPamine         guaiFENesin  10 mL Oral or Feeding Tube Q4H     insulin aspart  1-7 Units Subcutaneous TID AC     insulin aspart  1-5 Units Subcutaneous At Bedtime     piperacillin-tazobactam  2.25 g Intravenous Q6H     sodium chloride (PF)  3 mL Intracatheter Q8H     sodium chloride (PF)  3 mL Intracatheter Q8H     vancomycin (VANCOCIN) IV  2,000 mg Intravenous Q48H

## 2020-03-28 NOTE — PROGRESS NOTES
A BiPAP of  13/7 @ 60% was applied to the pt via the mask for an increase in WOB and/or SOB.  The bridge of the nose looks good Skin integrity is good.Pt is tolerating it well. Will continue to monitor and assess the pt's current respiratory status and needs.    Sujata Stallworth, RT  March 27, 2020.11:00 PM

## 2020-03-28 NOTE — PROGRESS NOTES
ICU Progress Note    74 M admit with 6 week prodrome admit with dyspnea CXR with bilateral LL infiltrate concerning for infection (PNA and COVID rule out) versus edema. Empiric tx for infection and heart failure Labs with DEONDRE 2.4 and elevated LA with new chest pain and dyspnea transferred to the ICU for rapid respiratory decompensation for BIPAP. Repeat labs with worsening lactic acidosis to 12.  On exam patient labored on BIPAP speaking in short sentences and marked WOB.  Pt denied chest pain    Discussed with patient options - with trial of intubation to see if can temporize respiratory status - discussed if COVID infection that we do not have good treatment options. Did mention that is his heart were to stop, resuscitative efforts woulld not be beneficial and thus agreed to DNR.      He named his significant other China Ying as a medical decision maker as well as his daughter Catherine Dumont both were updated via phone and aware of prognosis becoming poor given his multiorgan failure.     Serg Licea MD        Patient intubated hypotensive, dobutamine and lasix stopped. Levophed initiated however still labile hemodynamics thus vasopressin added. MDA at bedside and place TLC and art line. Repeat LA 11.  Overall concerning for mixed shock picture , cardiac and sepsis, ? Trigger.   Continue to try and optimize hemodynamics and ventilatory status.     Serg Licea MD

## 2020-03-28 NOTE — ANESTHESIA CARE TRANSFER NOTE
Patient: Gene Pagan    * No procedures listed *    Diagnosis: * No pre-op diagnosis entered *  Diagnosis Additional Information: No value filed.    Anesthesia Type:   No value filed.     Note:  Airway :ETT  Patient transferred to:ICU  Comments: Pt intubated and placed on ventilator per RT. Report to RN. BBS=.      Vitals: (Last set prior to Anesthesia Care Transfer)              Electronically Signed By: LUIS Martines CRNA  March 28, 2020  1:44 AM

## 2020-03-28 NOTE — PLAN OF CARE
Admitted for new onset A fib. On Dilt at 10 mg per hour with improved HR.. Heparin dose adjusted by RX for weight. Resp 24-28 dim throughout. Sputum thick pale yellow. Receiving Lasix IVP for possible HF.

## 2020-03-28 NOTE — PROVIDER NOTIFICATION
"Writer on phone with MD at this time and pt called and reported to support staff of either \"chest pressure or chest pain\". Writer discussed with MD who ordered and EKG. Pt is already on hep gtt. MD wanted to wait on nitroglycerin until EKG came back.     On assessment pt stated, \"when I was having the CP it was painful and it was 3/10. Pt very SOB and in tripod position EOB. VSS ex RR. RR in the 30s during CP and SOB episode. Back down to 20s while in tripod position. BP 95/58. HR 80s. O2 98%. CP only lasted while pt was working to breath. Writer tried to calm pt down and CP went away but SOB remained. Pt voided only 25 ml (after 40mg IV lasix) and bladder scanned for 0 ml.    MD came to assess pt. EKG completed. IV running dilt ripped out at one point during this event. MD verbally told writer to get another access but not to restart dilt d/t BP soft and HR controlled. Hep gtt infusing. Pt still working to breath. Labs, VBGs, IV placed, EKG done, and pt tranferred to ICU for continuous BIPAP. Off MS3 approx 2300. Pt belongings brought over to ICU.     Updated significant other [place] approx 2330 regarding change in condition and transfer to ICU.     "

## 2020-03-28 NOTE — PROVIDER NOTIFICATION
Lactic acid 4.3 Feeling a little more sob-hard to get deep breathe. RR 24-28 Sat 97 on 3 L.    Dr. Sabillon returned page. Reviewed chart. Feels CHF component and treating with IV Lasix.

## 2020-03-28 NOTE — ANESTHESIA PROCEDURE NOTES
ARTERIAL LINE PROCEDURE NOTE:  Staff -   Anesthesiologist:  Omkar Hernandez MD      Performed By: anesthesiologist    Referred By: silvia     Pre-Procedure  Performed by Omkar Hernandez MD  Referred by silvia  Location: ICU    Procedure Times:3/28/2020 2:56 AM and 3/28/2020 3:14 AM  Pre-Anesthestic Checklist: patient identified, IV checked, site marked, monitors and equipment checked and at physician/surgeon's request    Timeout  Correct Patient: Yes   Correct Procedure: Yes   Correct Site: Yes   Correct Laterality: Yes   Correct Position: Yes   Site Marked: Yes   .   Procedure Documentation  Procedure: arterial line  Diagnosis:PUI COVID19 ASA 4  Supine  Insertion Site:radial.Skin infiltrated with mL of 1% lidocaine. Injection technique: Seldinger Technique  .  .  Patient Prep/Sterile Barriers; all elements of maximal sterile barrier technique followed, mask, hat, sterile gown, sterile gloves, draped, hand hygiene, chlorhexidine gluconate and isopropyl alcohol    Assessment/Narrative    Catheter: 20 gauge, 12 cm     Secured by suture  Tegaderm dressing used.    Arterial waveform: Yes

## 2020-03-28 NOTE — CONSULTS
Consult Date:  03/28/2020      REFERRING PHYSICIAN:  Dr. Marquez.      HISTORY OF PRESENT ILLNESS:  Mr. Pagan is a 74-year-old male who was admitted to the hospital 03/27/2020 after presenting with symptoms of shortness of breath and tachycardia.  He is currently intubated; therefore, all the information is obtained from staff and medical records.  I did review his chart from Care Everywhere.  He had actually presented to his primary care provider on 01/20 with symptoms of BPH and was prescribed Flomax.  He was then nurse triaged by his primary care on 03/27 where he complained of 6-8 weeks of shortness of breath, worsening, and subjective fever, muscle aches and flu-like symptoms.  He was referred to the Emergency Department.  In the Emergency Department, his initial temperature was 98.9, blood pressure 147/89 and pulse was 158.  He was found to be in atrial fibrillation with rapid ventricular response and placed in respiratory isolation and tested for COVID-19.  I do not see that they tested for influenza.  Initial blood work showed a creatinine of 2.4, which if I review his chart back on 01/20, his primary had checked his creatinine and it was 2.32.  He had an underlying chronic kidney disease noted.  NT proBNP was markedly elevated at 19,387.  His lactic acid was also elevated at 2.3.  While in the hospital, he did complain of 3/10 chest pain the night of admission around 9:15 p.m.  Troponins were assessed every 4-6 hours and remained flat, measuring 0.12, subsequent 0.17 and 0.222.  I reviewed his EKGs.  Initial EKG shows atrial fibrillation with a rapid ventricular response.  No acute ST or T-wave abnormalities.  There is left axis deviation noted and poor R-wave progression.  A subsequent EKG performed after the diltiazem IV was given demonstrates a sinus rhythm with wavy baseline, possibly some ST segment changes in the lateral leads, difficult because of the wavy baseline with respirations.  A chest  x-ray, which was read by Radiology as heterogeneous opacities in the lung bases concerning for infectious or inflammatory process, the heart size was normal.  Cardiomediastinal silhouette was upper limits of normal.  His respiratory status continued to deteriorate with the use of BiPAP.  He was placed on heart failure medications including a combination of dobutamine and dopamine with hypotension and ultimately was intubated around 11:30 p.m. last night.  He initially responded to intravenous Lasix with 1000 mL put out yesterday.  His urine output has dropped significantly to 60 mL in the last 8 hours.      PAST MEDICAL HISTORY:  Obtained from his primary care and chart, type 2 diabetes with renal insufficiency, elevated PSA with benign prostatic hypertrophy.      MEDICATIONS ON ADMISSION:  According to his 01/20 visit to his primary, aspirin, Glipizide, Metformin, multivitamin, Flomax, which was just added.  He was recommended to discontinue metformin on that visit.      SOCIAL HISTORY:  Lifelong nonsmoker.  Rare alcohol use.  No travel history.      FAMILY HISTORY AND REVIEW OF SYSTEMS:  I cannot obtain a family history or review of systems at this time.      ALLERGIES:  IN THE DOCUMENTATION OF MEDICAL CHART IS LISTED AS NO KNOWN DRUG ALLERGIES.      PHYSICAL EXAMINATION:   VITAL SIGNS:  His T-max is 99.1 in the last 24 hours.  His blood pressure is ranging between  systolic over 59-88 diastolic.  This is on vasopressin and norepinephrine.  Heart rates are now in the 70s-80s.  He is on a mechanical ventilator, oxygenating 99% on 50 FiO2.   GENERAL:  He is pale and sedate.   HEENT:  Head appears atraumatic.   NECK:  Supple.  I cannot appreciate for jugular venous distention.     SKIN:  There is no rash or jaundice noted on his skin.  His skin is warm to touch.   CARDIOVASCULAR:  Tones are regular.  I did not appreciate a murmur, gallop or rub.   LUNGS:  Lung sounds are coarse anteriorly.   ABDOMEN:  Soft.    EXTREMITIES:  Without significant edema.      ASSESSMENT AND PLAN:  This is a 74-year-old male with underlying type 2 diabetes, chronic kidney disease and BPH with at least a 6 to 8-week history of progressive shortness of breath, dyspnea on exertion and symptoms suggestive of orthopnea with progressive deterioration requiring mechanical ventilation.  His troponins do not suggest a pattern consistent with an acute coronary syndrome.  He was found to be in atrial fibrillation with a rapid ventricular response.  Preliminary echocardiogram findings suggest biventricular heart failure with at least 3+ mitral insufficiency.  Etiology of the mitral insufficiency is unclear at this time.  The LV cavity does not appear significantly dilated and I do not see any flail or severely prolapsing mitral valve on the transthoracic preliminary views.  In reviewing his laboratory values, he came in with hyponatremia with a sodium of 131.  His creatinine baseline was 2.4, has deteriorated to 3.79 today.  His initial white count was normal at 10, hemoglobin of 10, reactive platelets at 487.  Strep pneumonia and Legionella, as well as MRSA have been drawn and pending at this time.  Again, I do not see an influenza swab.  His lactic acid, started at 2.3, peaked at 12.1 and is down to 4.7 today.  I would agree that this appears to be present as cardiogenic shock at this time with notable severe LV and RV dysfunction on echocardiogram, as well as significant valvular disease with worsening renal insufficiency.  Again, his pattern of troponin is not consistent with an acute coronary syndrome and there is global LV dysfunction.  Without a previous patient history, it is difficult to determine whether he has had atrial fibrillation in the past or tachycardia or palpitations.  This could be tachycardia-induced cardiomyopathy in the face of worsening renal insufficiency.  I feel it is important to note that he presented a month ago with BPH  symptoms and was prescribed Flomax, which might suggest urinary retention and worsening renal insufficiency as well.  I would continue supportive cares including blood pressure support with norepinephrine and vasopressin at this time.  I would recommend high-dose Lasix given his drop in urinary output today.  I do not see any clear urgency to take him for ischemic testing such as angiogram given the EKG and global LV dysfunction and troponin pattern.  We are awaiting COVID-19 testing.  I would also add influenza testing.  It appears as heart rate is well controlled and actually in normal sinus at this time after being given IV injection of diltiazem.  I do agree with heparin, given evidence of atrial fibrillation with rapid ventricular response initially with a CHADS-VASc score of 3.  We will recommend continued supportive cares with close monitoring of his urine output and kidney function.  This patient may benefit from a Nephrology consult in consideration of CRT or dialysis with evidence of further renal failure.  I will be happy to follow along in his care.  Please feel free to contact me with any questions you have in regards to his care.         FABIANO OROZCO DO             D: 2020   T: 2020   MT: MARY      Name:     MIKE DUNHAM   MRN:      [id number]        Account:       [id number]   :      1945           Consult Date:  2020      Document: [id number]       cc: Martínez Marquez MD

## 2020-03-28 NOTE — PLAN OF CARE
ICU End of Shift Summary.  For vital signs and complete assessments, please see documentation flowsheets.     Pertinent assessments: intubated, sedated, tele afib CVR, LS dim w/ crackles in the bases, BS +, baker in place  Major Shift Events: transferred from [facility] on bipap, Md at bedside to attempt an echo- see note, soon after pt was not tolerating bipap and decision was made to intubate, tele ICU spoke with SO and oldest daughter prior to initiating, BPs and MAP's were very soft- pressors initiated, sedation initiated with improved map, restraints initiated, NG placed, baker placed with ~1000 out, IV access was very poor, ART line and central line placed, heparin gtt adjusted, x ray pending on central line/NG placement  Plan (Upcoming Events): NESTOR? Monitor blood gases/labs, covid pending  Discharge/Transfer Needs: TBD     Bedside Shift Report Completed : Y  Bedside Safety Check Completed: Y

## 2020-03-28 NOTE — PROGRESS NOTES
Hospitalist paged to see patient at bedside. Tele hub MD would like hospitalist to talk to patient re: intubation based on recent labs. Awaiting return call or MD at bedside.

## 2020-03-28 NOTE — PROGRESS NOTES
RT attempted to draw ABG from right radial artery but unable.      Sujata Stallworth, RT  March 28, 2020.1:49 AM

## 2020-03-28 NOTE — PROGRESS NOTES
Patient remains on vent CMV 18/500/+7/80%, attempting to wean Fi02 as able, breath sounds decreased, coarse at times, RT will continue to monitor.    Sujata Stallworth, RRT  March 28, 2020.6:13 AM

## 2020-03-28 NOTE — PROGRESS NOTES
[facility]  Critical Care Service    Progress Note  Date of Service: 03/28/2020     Assessment & Plan   Gene Pagan is a 74 year old male who was admitted on 3/27/2020. Now here with decompensated heart failure and possible combination cardiogenic and septic shock.    CNS:   RASS 0 to -1: Propofol for acute sedation  Pulm:   Hypoxic respiratory failure 2/2 increased O2 needs and work of breathing. Failed BIPAP earlier  - VC 16;500;PEEP 10/ FiO2 50%  Last ABG with metabolic acidosis and respiratory compensation.  CV:   Shock - suspect combination of cardiogenic shock and possible septic shock.  - EF of 20-25% with volume overload and mitral regurgitation  - Continue Levophed and Vasopressin for BP support  - will attempt diuresis given his volume overload status.   - normal cortisol at 41, will not plan stress dose steroids  - influenza testing and COVID 19 testing as possible viral triggers for cardiomyopathy  Atrial fibrillation with RVR  - initially started on diltiazem with hypotension.   - had been in sinus and rate controlled.   - ultimately had paroxysmal A fib  - Amiodarone started given patient's hemodynamic instability  - heparin drip  FEN/GI:   NPO, will not start tube feeds given significant pressor needs  Abdomen without concerns and no abdominal pain on admission, less likely abdominal source of sepsis  Significant Transaminitis, increasing  - ?shock liver vs congestive hepatopathy in the setting of acute decompensated heart failure.   - continue close monitoring, no acetaminophen  :   DEONDRE on ?CKD - baseline creatinine in 2019 of 1.8, but January of this year, crt was 0.8  - oliguria despite lasix and bumex  - hyperkalemia s/p Insulin, bicarb x2, calcium, kayexlate started - continue to monitor closely  - discussed dialysis with patient's daughter, Catherine, Grand daughter, Smooth and significant other, [place]. He would require transfer to a facility that has the capability for CCRT. And  we discussed that there is a chance that his kidneys recover, if he lives through this, but there is a possibility that he would need long term dialysis.  Heme:  Mild anemia - likely dilutional  Elevated platelets - likely reactive  No acute issues  Musculoskeletal: No acute issues  Endocrine:   DM - hold home metformin in the setting of lactic acidosis  - continue close monitoring of BG  - MSSI  - s/p D50, insulin for hyperkalemia  ID:   Unclear if sepsis is playing a part  - leukocytosis, rising with a left shift is concerning for infection, unclear what this could be given the patient's lack of focal presenting symptoms that are consistent with infection. Could be reactive, will continue to follow. Procalcitonin is only 0.07  - COVID rule out - patient has not been febrile, will need to continue monitoring.  ICU:   Will hold on PT/OT  Prognosis is grim.    General cares:  DVT Prophylaxis: Heparin drip  GI Prophylaxis: PPI  Restraints: Restraints for medical healing needed: YES  Family update by me today: Yes   Current lines are required for patient management  Access: Left internal jugular, ETT, baker, arterial line    Martina Villafana MD 3/28/2020 7:35 AM   Surgery Critical Care Fellow      Time Spent on this Encounter   Billing:  I spent 75 minutes bedside and on the inpatient unit today managing the critical care of Gene Pagan in relation to the issues listed in this note.    Main Plans for Today   Wean pressors as able  Attempt diuresis  Shift potassium    Interval History   Admitted overnight with hypotension in the setting of atrial fibrillation.     Physical Exam   Temp: 98.1  F (36.7  C) Temp src: Axillary Temp  Min: 95.9  F (35.5  C)  Max: 98.9  F (37.2  C) BP: 104/59 Pulse: 84 Heart Rate: 87 Resp: 23 SpO2: 100 % O2 Device: Mechanical Ventilator Oxygen Delivery: 2 LPM  Vitals:    03/27/20 1343 03/27/20 1746 03/28/20 0630   Weight: 88.9 kg (195 lb 15.8 oz) 92.7 kg (204 lb 4.8 oz) 93.3 kg (205 lb  11.2 oz)     I/O last 3 completed shifts:  In: 24.65 [I.V.:24.65]  Out: 1050 [Urine:1050]    GEN: sedated, intubated  EYES: pupils small   HEENT: normocephalic, atraumatic  CV: regular rate and rhythm.   PULM/CHEST: lungs clear to auscultation bilaterally, though diminished.  GI: abdomen is soft, nondistended  : Mckenzie in place  EXTREMITIES: no significant LE edema   NEURO: sedated on my exam  SKIN: no acute skin issues  PSYCH:  Not assessed given intubation    Imaging personally reviewed:  CXR - internal jugular CVC can be used, pulmonary edema bilaterally.    Medications     HEParin 1,100 Units/hr (03/28/20 0452)     norepinephrine 0.4 mcg/kg/min (03/28/20 0200)     - MEDICATION INSTRUCTIONS -       - MEDICATION INSTRUCTIONS -       propofol (DIPRIVAN) infusion 35 mcg/kg/min (03/28/20 0435)     vasopressin (PITRESSIN) infusion ADULT (40 mL) 2.4 Units/hr (03/28/20 0327)       aspirin  81 mg Oral Daily     DOPamine         glipiZIDE  5 mg Oral Daily     guaiFENesin  600 mg Oral BID     insulin aspart  1-7 Units Subcutaneous TID AC     insulin aspart  1-5 Units Subcutaneous At Bedtime     piperacillin-tazobactam  2.25 g Intravenous Q6H     sodium chloride (PF)  3 mL Intracatheter Q8H     sodium chloride (PF)  3 mL Intracatheter Q8H     vancomycin (VANCOCIN) IV  2,000 mg Intravenous Q48H       Data   Recent Labs   Lab 03/28/20  0530 03/28/20  0030 03/27/20  2353 03/27/20  2209 03/27/20  1348   WBC 26.1* 16.2* 14.1*  --  10.0   HGB 10.9* 11.5* 11.4*  --  10.0*   MCV 85 90 88  --  84   * 518* 475*  --  487*   INR  --   --   --   --  1.20*   *  --  Canceled, Test credited, specimen discarded  --  131*   POTASSIUM 5.3  --  Canceled, Test credited, specimen discarded  --  4.2   CHLORIDE 102  --  Canceled, Test credited, specimen discarded  --  102   CO2 PENDING  --  Canceled, Test credited, specimen discarded  --  20   BUN PENDING  --  Canceled, Test credited, specimen discarded  --  33*   CR PENDING  --   Canceled, Test credited, specimen discarded  --  2.40*   ANIONGAP PENDING  --  Not Calculated  --  9   HARPER PENDING  --  Canceled, Test credited, specimen discarded  --  8.8   GLC PENDING  --  Canceled, Test credited, specimen discarded  --  212*   ALBUMIN PENDING  --   --   --   --    PROTTOTAL PENDING  --   --   --   --    BILITOTAL PENDING  --   --   --   --    ALKPHOS PENDING  --   --   --   --    ALT PENDING  --   --   --   --    AST PENDING  --   --   --   --    TROPI  --   --   --  0.170* 0.120*     Recent Results (from the past 24 hour(s))   XR Chest Port 1 View    Narrative    CHEST ONE VIEW  3/27/2020 2:26 PM     HISTORY: SOB    COMPARISON: CT dated 1/17/09      Impression    IMPRESSION: Heterogeneous opacities in the lung bases are concerning  for an infectious or inflammatory process. No pleural fluid or  pneumothorax. The heart is normal in size. The cardiomediastinal  silhouette is at the upper limits of normal in size. Old healed rib  and clavicle fractures are again seen.    LESTER J FAHRNER, MD   XR Chest Port 1 View    Narrative    EXAM: XR CHEST PORT 1 VW  LOCATION: [facility]  DATE/TIME: 3/28/2020 1:39 AM    INDICATION: Intubation.  COMPARISON: 03/27/2020 at 0216 hours      Impression    IMPRESSION: New ETT with tip in mid trachea at level of aortic arch. Small right pleural effusion and adjacent right lower lung infiltrate or atelectasis, increased. Mild retrocardiac atelectasis/infiltrate may be slightly increased. Heart size near   upper limits of normal.   XR Chest Port 1 View    Narrative    EXAM: XR CHEST PORTABLE 1 VIEW  LOCATION: [facility]  DATE/TIME: 03/28/2020, 6:24 AM    INDICATION: Line placement.  COMPARISON: 03/28/2020 at 0153 hours.      Impression    IMPRESSION: New right IJ line tip in SVC. Otherwise, no change. ETT tip in the mid trachea. Bilateral lower lung opacities.

## 2020-03-28 NOTE — ANESTHESIA POSTPROCEDURE EVALUATION
Patient: Gene Pagan    * No procedures listed *    Diagnosis:* No pre-op diagnosis entered *  Diagnosis Additional Information: Respiratory failure.  COVID 1    Anesthesia Type:  Awake technique    Note:  Anesthesia Post Evaluation         Comments: Patient lined up in ICU without problem.        Last vitals:  Vitals:    03/28/20 0526 03/28/20 0530 03/28/20 0600   BP:  110/66 109/70   Pulse: 81 85 84   Resp:      Temp:  97.3  F (36.3  C) 97.7  F (36.5  C)   SpO2: 97% 100% (!) 85%         Electronically Signed By: Omkar Hernandez MD  March 28, 2020  6:59 AM

## 2020-03-28 NOTE — PROVIDER NOTIFICATION
03/27/20 4093   Significant Event   Significant Event Critical result/value  (Venous pH 7.18, RN notified)     MD notified of critical pH.

## 2020-03-28 NOTE — PHARMACY-VANCOMYCIN DOSING SERVICE
Pharmacy Vancomycin Initial Note  Date of Service 2020  Patient's  1945  74 year old, male    Indication: Sepsis    Current estimated CrCl = Estimated Creatinine Clearance: 29.2 mL/min (A) (based on SCr of 2.4 mg/dL (H)).    Creatinine for last 3 days  3/27/2020:  1:48 PM Creatinine 2.40 mg/dL    Recent Vancomycin Level(s) for last 3 days  No results found for requested labs within last 72 hours.      Vancomycin IV Administrations (past 72 hours)      No vancomycin orders with administrations in past 72 hours.                Nephrotoxins and other renal medications (From now, onward)    Start     Dose/Rate Route Frequency Ordered Stop    20 0030  vancomycin (VANCOCIN) 2,000 mg in sodium chloride 0.9 % 500 mL intermittent infusion      2,000 mg  over 2 Hours Intravenous EVERY 48 HOURS 20 0012      20 2345  piperacillin-tazobactam (ZOSYN) infusion 2.25 g      2.25 g  over 1 Hours Intravenous EVERY 6 HOURS 20 2330      20 2330  furosemide (LASIX) 100 mg in sodium chloride 0.9 % 100 mL infusion      10 mg/hr  10 mL/hr  Intravenous CONTINUOUS 20 2329            Contrast Orders - past 72 hours (72h ago, onward)    None                Plan:  1.  Start vancomycin  2000 mg IV q48h.   2.  Goal Trough Level: 15-20 mg/L   3.  Pharmacy will check trough levels as appropriate in 1-3 Days.    4. Serum creatinine levels will be ordered daily for the first week of therapy and at least twice weekly for subsequent weeks.    5. [place] method utilized to dose vancomycin therapy: Method 1    German Galeana [facility]

## 2020-03-28 NOTE — ANESTHESIA PREPROCEDURE EVALUATION
"Anesthesia Pre-Procedure Evaluation    Patient: Gene Pagan   MRN: [id number] : 1945          Preoperative Diagnosis: * No pre-op diagnosis entered *    * No procedures listed *    Past Medical History:   Diagnosis Date     BPH (benign prostatic hyperplasia)      Diabetes (H)      History reviewed. No pertinent surgical history.  Anesthesia Evaluation     .             ROS/MED HX    ENT/Pulmonary:       Neurologic:       Cardiovascular:     (+) ----. : . . . :. dysrhythmias a-fib, .       METS/Exercise Tolerance:     Hematologic:         Musculoskeletal:         GI/Hepatic:         Renal/Genitourinary:         Endo:     (+) type I DM, .      Psychiatric:         Infectious Disease:         Malignancy:         Other:                                 Lab Results   Component Value Date    WBC 16.2 (H) 2020    HGB 11.5 (L) 2020    HCT 40.1 2020     (H) 2020    CRP 23.1 (H) 2020    NA Canceled, Test credited, specimen discarded 2020    POTASSIUM Canceled, Test credited, specimen discarded 2020    CHLORIDE Canceled, Test credited, specimen discarded 2020    CO2 Canceled, Test credited, specimen discarded 2020    BUN Canceled, Test credited, specimen discarded 2020    CR Canceled, Test credited, specimen discarded 2020    GLC Canceled, Test credited, specimen discarded 2020    HARPER Canceled, Test credited, specimen discarded 2020    INR 1.20 (H) 2020    TSH 2.62 2020       Preop Vitals  BP Readings from Last 3 Encounters:   20 (!) 69/35    Pulse Readings from Last 3 Encounters:   20 55      Resp Readings from Last 3 Encounters:   20 23    SpO2 Readings from Last 3 Encounters:   20 93%      Temp Readings from Last 1 Encounters:   20 96.1  F (35.6  C) (Axillary)    Ht Readings from Last 1 Encounters:   20 1.74 m (5' 8.5\")      Wt Readings from Last 1 Encounters:   20 92.7 kg " "(204 lb 4.8 oz)    Estimated body mass index is 30.61 kg/m  as calculated from the following:    Height as of this encounter: 1.74 m (5' 8.5\").    Weight as of this encounter: 92.7 kg (204 lb 4.8 oz).       Anesthesia Plan      History & Physical Review      ASA Status:  4 .    NPO Status:  Full stomach    Plan for General with Intravenous induction.            Postoperative Care      Consents                 LUIS Martines CRNA                    .  "

## 2020-03-28 NOTE — PROGRESS NOTES
Patient intubated for airway protection with ETT 8.0 23 @ teeth, bilateral breath sounds, good color change from ETC02 placed on vent, CMV 12/500/+7/100%, will obtain ABG.    Sujata Stallworth, RRT  March 28, 2020.1:22 AM

## 2020-03-28 NOTE — ANESTHESIA PROCEDURE NOTES
CENTRAL LINE INSERTION PROCEDURE NOTE:   Staff -   Anesthesiologist:  Omkar Hernandez MD      Performed By: anesthesiologist    Referred By: silvia    Pre-Procedure  Performed by Omkar Hernandez MD  Referred by silvia  Location: ICU    Procedure Times:3/28/2020 2:23 AM and 3/28/2020 2:55 AM  Pre-Anesthestic Checklist: patient identified, IV checked, site marked and monitors and equipment checked    Timeout  Correct Patient: Yes   Correct Procedure: Yes   Correct Site: Yes   Correct Laterality: Yes   Correct Position: Yes   Site Marked: Yes   .   Procedure Documentation   Procedure: central line  Position: supine  Patient Prep/Sterile Barriers; chlorhexidine gluconate and isopropyl alcohol, maximum sterile barriers used during central venous catheter insertion  Diagnosis:PUI COVID!(    Insertion Site:right, internal jugular    Skin infiltrated with mL of 1% lidocaine.  Catheter: 7 Fr, 20 cm, 3-lumen.  Assessment/Narrative    Catheter: 7 Fr, 20 cm, 3-lumen.     Secured by suture and anchor securement device  Tegaderm and Biopatch dressing used.  blood aspirated from all lumens  All lumens flushed: Yes  Verification method: X-rayPerson verifying: radha

## 2020-03-29 LAB
ALBUMIN SERPL-MCNC: 2.8 G/DL (ref 3.4–5)
ALP SERPL-CCNC: 102 U/L (ref 40–150)
ALP SERPL-CCNC: 106 U/L (ref 40–150)
ALP SERPL-CCNC: 115 U/L (ref 40–150)
ALP SERPL-CCNC: 121 U/L (ref 40–150)
ALT SERPL W P-5'-P-CCNC: 6193 U/L (ref 0–70)
ALT SERPL W P-5'-P-CCNC: 6983 U/L (ref 0–70)
ALT SERPL W P-5'-P-CCNC: 7094 U/L (ref 0–70)
ALT SERPL W P-5'-P-CCNC: 7341 U/L (ref 0–70)
ANION GAP SERPL CALCULATED.3IONS-SCNC: 14 MMOL/L (ref 3–14)
ANION GAP SERPL CALCULATED.3IONS-SCNC: 15 MMOL/L (ref 3–14)
AST SERPL W P-5'-P-CCNC: 9081 U/L (ref 0–45)
AST SERPL W P-5'-P-CCNC: 9339 U/L (ref 0–45)
AST SERPL W P-5'-P-CCNC: ABNORMAL U/L (ref 0–45)
AST SERPL W P-5'-P-CCNC: ABNORMAL U/L (ref 0–45)
BACTERIA SPEC CULT: NO GROWTH
BASE DEFICIT BLDA-SCNC: 12.6 MMOL/L
BASE DEFICIT BLDA-SCNC: 7.7 MMOL/L
BASOPHILS # BLD AUTO: 0 10E9/L (ref 0–0.2)
BASOPHILS NFR BLD AUTO: 0.1 %
BILIRUB SERPL-MCNC: 0.7 MG/DL (ref 0.2–1.3)
BILIRUB SERPL-MCNC: 0.8 MG/DL (ref 0.2–1.3)
BUN SERPL-MCNC: 56 MG/DL (ref 7–30)
BUN SERPL-MCNC: 64 MG/DL (ref 7–30)
BUN SERPL-MCNC: 65 MG/DL (ref 7–30)
BUN SERPL-MCNC: 70 MG/DL (ref 7–30)
CALCIUM SERPL-MCNC: 6.9 MG/DL (ref 8.5–10.1)
CALCIUM SERPL-MCNC: 7.1 MG/DL (ref 8.5–10.1)
CALCIUM SERPL-MCNC: 7.4 MG/DL (ref 8.5–10.1)
CALCIUM SERPL-MCNC: 7.8 MG/DL (ref 8.5–10.1)
CHLORIDE SERPL-SCNC: 101 MMOL/L (ref 94–109)
CHLORIDE SERPL-SCNC: 101 MMOL/L (ref 94–109)
CHLORIDE SERPL-SCNC: 102 MMOL/L (ref 94–109)
CHLORIDE SERPL-SCNC: 99 MMOL/L (ref 94–109)
CO2 SERPL-SCNC: 15 MMOL/L (ref 20–32)
CO2 SERPL-SCNC: 16 MMOL/L (ref 20–32)
CREAT SERPL-MCNC: 4.59 MG/DL (ref 0.66–1.25)
CREAT SERPL-MCNC: 4.85 MG/DL (ref 0.66–1.25)
CREAT SERPL-MCNC: 4.94 MG/DL (ref 0.66–1.25)
CREAT SERPL-MCNC: 5.14 MG/DL (ref 0.66–1.25)
DIFFERENTIAL METHOD BLD: ABNORMAL
EOSINOPHIL # BLD AUTO: 0 10E9/L (ref 0–0.7)
EOSINOPHIL NFR BLD AUTO: 0 %
ERYTHROCYTE [DISTWIDTH] IN BLOOD BY AUTOMATED COUNT: 15.3 % (ref 10–15)
FIBRINOGEN PPP-MCNC: 405 MG/DL (ref 200–420)
GFR SERPL CREATININE-BSD FRML MDRD: 10 ML/MIN/{1.73_M2}
GFR SERPL CREATININE-BSD FRML MDRD: 10 ML/MIN/{1.73_M2}
GFR SERPL CREATININE-BSD FRML MDRD: 11 ML/MIN/{1.73_M2}
GFR SERPL CREATININE-BSD FRML MDRD: 11 ML/MIN/{1.73_M2}
GLUCOSE BLDC GLUCOMTR-MCNC: 129 MG/DL (ref 70–99)
GLUCOSE BLDC GLUCOMTR-MCNC: 137 MG/DL (ref 70–99)
GLUCOSE BLDC GLUCOMTR-MCNC: 143 MG/DL (ref 70–99)
GLUCOSE BLDC GLUCOMTR-MCNC: 143 MG/DL (ref 70–99)
GLUCOSE BLDC GLUCOMTR-MCNC: 147 MG/DL (ref 70–99)
GLUCOSE BLDC GLUCOMTR-MCNC: 159 MG/DL (ref 70–99)
GLUCOSE BLDC GLUCOMTR-MCNC: 162 MG/DL (ref 70–99)
GLUCOSE SERPL-MCNC: 145 MG/DL (ref 70–99)
GLUCOSE SERPL-MCNC: 165 MG/DL (ref 70–99)
GLUCOSE SERPL-MCNC: 166 MG/DL (ref 70–99)
GLUCOSE SERPL-MCNC: 173 MG/DL (ref 70–99)
HCO3 BLD-SCNC: 14 MMOL/L (ref 21–28)
HCO3 BLD-SCNC: 17 MMOL/L (ref 21–28)
HCT VFR BLD AUTO: 35.1 % (ref 40–53)
HGB BLD-MCNC: 11 G/DL (ref 13.3–17.7)
IMM GRANULOCYTES # BLD: 0.2 10E9/L (ref 0–0.4)
IMM GRANULOCYTES NFR BLD: 1 %
LACTATE BLD-SCNC: 1.7 MMOL/L (ref 0.7–2)
LIPASE SERPL-CCNC: 917 U/L (ref 73–393)
LMWH PPP CHRO-ACNC: 0.1 IU/ML
LMWH PPP CHRO-ACNC: 0.14 IU/ML
LMWH PPP CHRO-ACNC: 0.16 IU/ML
LYMPHOCYTES # BLD AUTO: 1 10E9/L (ref 0.8–5.3)
LYMPHOCYTES NFR BLD AUTO: 5 %
MAGNESIUM SERPL-MCNC: 2.2 MG/DL (ref 1.6–2.3)
MCH RBC QN AUTO: 25.6 PG (ref 26.5–33)
MCHC RBC AUTO-ENTMCNC: 31.3 G/DL (ref 31.5–36.5)
MCV RBC AUTO: 82 FL (ref 78–100)
MONOCYTES # BLD AUTO: 0.8 10E9/L (ref 0–1.3)
MONOCYTES NFR BLD AUTO: 4 %
NEUTROPHILS # BLD AUTO: 18.5 10E9/L (ref 1.6–8.3)
NEUTROPHILS NFR BLD AUTO: 89.9 %
NRBC # BLD AUTO: 0.2 10*3/UL
NRBC BLD AUTO-RTO: 1 /100
O2/TOTAL GAS SETTING VFR VENT: ABNORMAL %
O2/TOTAL GAS SETTING VFR VENT: ABNORMAL %
OXYHGB MFR BLD: 98 % (ref 92–100)
OXYHGB MFR BLD: 99 % (ref 92–100)
PCO2 BLD: 29 MM HG (ref 35–45)
PCO2 BLD: 36 MM HG (ref 35–45)
PH BLD: 7.21 PH (ref 7.35–7.45)
PH BLD: 7.36 PH (ref 7.35–7.45)
PHOSPHATE SERPL-MCNC: 7.6 MG/DL (ref 2.5–4.5)
PLATELET # BLD AUTO: 413 10E9/L (ref 150–450)
PO2 BLD: 111 MM HG (ref 80–105)
PO2 BLD: 141 MM HG (ref 80–105)
POTASSIUM SERPL-SCNC: 4.5 MMOL/L (ref 3.4–5.3)
POTASSIUM SERPL-SCNC: 4.6 MMOL/L (ref 3.4–5.3)
POTASSIUM SERPL-SCNC: 5 MMOL/L (ref 3.4–5.3)
POTASSIUM SERPL-SCNC: 5.7 MMOL/L (ref 3.4–5.3)
PROT SERPL-MCNC: 6.1 G/DL (ref 6.8–8.8)
PROT SERPL-MCNC: 6.2 G/DL (ref 6.8–8.8)
PROT SERPL-MCNC: 6.2 G/DL (ref 6.8–8.8)
PROT SERPL-MCNC: 6.3 G/DL (ref 6.8–8.8)
RBC # BLD AUTO: 4.29 10E12/L (ref 4.4–5.9)
SODIUM SERPL-SCNC: 130 MMOL/L (ref 133–144)
SODIUM SERPL-SCNC: 131 MMOL/L (ref 133–144)
SPECIMEN SOURCE: NORMAL
WBC # BLD AUTO: 20.6 10E9/L (ref 4–11)

## 2020-03-29 PROCEDURE — 93010 ELECTROCARDIOGRAM REPORT: CPT | Performed by: INTERNAL MEDICINE

## 2020-03-29 PROCEDURE — 85300 ANTITHROMBIN III ACTIVITY: CPT | Performed by: INTERNAL MEDICINE

## 2020-03-29 PROCEDURE — C9113 INJ PANTOPRAZOLE SODIUM, VIA: HCPCS | Performed by: INTERNAL MEDICINE

## 2020-03-29 PROCEDURE — 25800030 ZZH RX IP 258 OP 636: Performed by: OBSTETRICS & GYNECOLOGY

## 2020-03-29 PROCEDURE — 82805 BLOOD GASES W/O2 SATURATION: CPT | Performed by: INTERNAL MEDICINE

## 2020-03-29 PROCEDURE — 25000128 H RX IP 250 OP 636: Performed by: INTERNAL MEDICINE

## 2020-03-29 PROCEDURE — 84100 ASSAY OF PHOSPHORUS: CPT | Performed by: INTERNAL MEDICINE

## 2020-03-29 PROCEDURE — 83735 ASSAY OF MAGNESIUM: CPT | Performed by: INTERNAL MEDICINE

## 2020-03-29 PROCEDURE — 94003 VENT MGMT INPAT SUBQ DAY: CPT

## 2020-03-29 PROCEDURE — 93005 ELECTROCARDIOGRAM TRACING: CPT

## 2020-03-29 PROCEDURE — 85384 FIBRINOGEN ACTIVITY: CPT | Performed by: INTERNAL MEDICINE

## 2020-03-29 PROCEDURE — 25000125 ZZHC RX 250: Performed by: OBSTETRICS & GYNECOLOGY

## 2020-03-29 PROCEDURE — 40000275 ZZH STATISTIC RCP TIME EA 10 MIN

## 2020-03-29 PROCEDURE — 25000132 ZZH RX MED GY IP 250 OP 250 PS 637: Performed by: INTERNAL MEDICINE

## 2020-03-29 PROCEDURE — 83690 ASSAY OF LIPASE: CPT | Performed by: INTERNAL MEDICINE

## 2020-03-29 PROCEDURE — 99291 CRITICAL CARE FIRST HOUR: CPT | Performed by: OBSTETRICS & GYNECOLOGY

## 2020-03-29 PROCEDURE — 85520 HEPARIN ASSAY: CPT | Performed by: INTERNAL MEDICINE

## 2020-03-29 PROCEDURE — 25000125 ZZHC RX 250: Performed by: INTERNAL MEDICINE

## 2020-03-29 PROCEDURE — 20000003 ZZH R&B ICU

## 2020-03-29 PROCEDURE — 99233 SBSQ HOSP IP/OBS HIGH 50: CPT | Performed by: INTERNAL MEDICINE

## 2020-03-29 PROCEDURE — 80053 COMPREHEN METABOLIC PANEL: CPT | Performed by: INTERNAL MEDICINE

## 2020-03-29 PROCEDURE — 00000146 ZZHCL STATISTIC GLUCOSE BY METER IP

## 2020-03-29 PROCEDURE — 80053 COMPREHEN METABOLIC PANEL: CPT | Performed by: OBSTETRICS & GYNECOLOGY

## 2020-03-29 PROCEDURE — 85025 COMPLETE CBC W/AUTO DIFF WBC: CPT | Performed by: OBSTETRICS & GYNECOLOGY

## 2020-03-29 PROCEDURE — 99232 SBSQ HOSP IP/OBS MODERATE 35: CPT | Performed by: INTERNAL MEDICINE

## 2020-03-29 PROCEDURE — 83605 ASSAY OF LACTIC ACID: CPT | Performed by: OBSTETRICS & GYNECOLOGY

## 2020-03-29 PROCEDURE — 83690 ASSAY OF LIPASE: CPT | Performed by: OBSTETRICS & GYNECOLOGY

## 2020-03-29 PROCEDURE — 25000128 H RX IP 250 OP 636: Performed by: OBSTETRICS & GYNECOLOGY

## 2020-03-29 RX ORDER — HEPARIN SODIUM 10000 [USP'U]/100ML
1650 INJECTION, SOLUTION INTRAVENOUS CONTINUOUS
Status: DISCONTINUED | OUTPATIENT
Start: 2020-03-29 | End: 2020-04-01

## 2020-03-29 RX ADMIN — TAZOBACTAM SODIUM AND PIPERACILLIN SODIUM 2.25 G: 250; 2 INJECTION, SOLUTION INTRAVENOUS at 23:47

## 2020-03-29 RX ADMIN — AMIODARONE HYDROCHLORIDE 1 MG/MIN: 50 INJECTION, SOLUTION INTRAVENOUS at 16:46

## 2020-03-29 RX ADMIN — Medication 4 MG/HR: at 08:54

## 2020-03-29 RX ADMIN — Medication 2 MG/HR: at 13:59

## 2020-03-29 RX ADMIN — Medication 0.18 MCG/KG/MIN: at 15:03

## 2020-03-29 RX ADMIN — GUAIFENESIN 10 ML: 100 SOLUTION ORAL at 15:05

## 2020-03-29 RX ADMIN — GUAIFENESIN 10 ML: 100 SOLUTION ORAL at 11:15

## 2020-03-29 RX ADMIN — GUAIFENESIN 10 ML: 100 SOLUTION ORAL at 08:46

## 2020-03-29 RX ADMIN — SENNOSIDES AND DOCUSATE SODIUM 2 TABLET: 8.6; 5 TABLET ORAL at 18:53

## 2020-03-29 RX ADMIN — ASPIRIN 81 MG: 81 TABLET, COATED ORAL at 08:46

## 2020-03-29 RX ADMIN — Medication 2350 UNITS: at 02:52

## 2020-03-29 RX ADMIN — GUAIFENESIN 10 ML: 100 SOLUTION ORAL at 20:12

## 2020-03-29 RX ADMIN — Medication 2 MG/HR: at 19:33

## 2020-03-29 RX ADMIN — PROPOFOL 30 MCG/KG/MIN: 10 INJECTION, EMULSION INTRAVENOUS at 06:18

## 2020-03-29 RX ADMIN — TAZOBACTAM SODIUM AND PIPERACILLIN SODIUM 2.25 G: 250; 2 INJECTION, SOLUTION INTRAVENOUS at 11:15

## 2020-03-29 RX ADMIN — Medication 2 MG/HR: at 10:00

## 2020-03-29 RX ADMIN — Medication 4 MG/HR: at 02:45

## 2020-03-29 RX ADMIN — AMIODARONE HYDROCHLORIDE 0.5 MG/MIN: 50 INJECTION, SOLUTION INTRAVENOUS at 05:00

## 2020-03-29 RX ADMIN — FENTANYL CITRATE 25 MCG: 50 INJECTION INTRAMUSCULAR; INTRAVENOUS at 18:30

## 2020-03-29 RX ADMIN — PROPOFOL 30 MCG/KG/MIN: 10 INJECTION, EMULSION INTRAVENOUS at 12:11

## 2020-03-29 RX ADMIN — HEPARIN SODIUM 1550 UNITS/HR: 10000 INJECTION, SOLUTION INTRAVENOUS at 02:53

## 2020-03-29 RX ADMIN — HEPARIN SODIUM 1650 UNITS/HR: 10000 INJECTION, SOLUTION INTRAVENOUS at 18:58

## 2020-03-29 RX ADMIN — GUAIFENESIN 10 ML: 100 SOLUTION ORAL at 04:40

## 2020-03-29 RX ADMIN — PROPOFOL 22 MCG/KG/MIN: 10 INJECTION, EMULSION INTRAVENOUS at 17:17

## 2020-03-29 RX ADMIN — Medication 4 MG/HR: at 05:52

## 2020-03-29 RX ADMIN — TAZOBACTAM SODIUM AND PIPERACILLIN SODIUM 2.25 G: 250; 2 INJECTION, SOLUTION INTRAVENOUS at 05:44

## 2020-03-29 RX ADMIN — PROPOFOL 30 MCG/KG/MIN: 10 INJECTION, EMULSION INTRAVENOUS at 01:07

## 2020-03-29 RX ADMIN — Medication 0.24 MCG/KG/MIN: at 01:39

## 2020-03-29 RX ADMIN — CALCIUM GLUCONATE 1 G: 98 INJECTION, SOLUTION INTRAVENOUS at 16:48

## 2020-03-29 RX ADMIN — TAZOBACTAM SODIUM AND PIPERACILLIN SODIUM 2.25 G: 250; 2 INJECTION, SOLUTION INTRAVENOUS at 17:54

## 2020-03-29 RX ADMIN — PANTOPRAZOLE SODIUM 40 MG: 40 INJECTION, POWDER, FOR SOLUTION INTRAVENOUS at 09:18

## 2020-03-29 RX ADMIN — AMIODARONE HYDROCHLORIDE 0.5 MG/MIN: 50 INJECTION, SOLUTION INTRAVENOUS at 13:14

## 2020-03-29 ASSESSMENT — ACTIVITIES OF DAILY LIVING (ADL)
ADLS_ACUITY_SCORE: 20
ADLS_ACUITY_SCORE: 18
ADLS_ACUITY_SCORE: 18

## 2020-03-29 ASSESSMENT — MIFFLIN-ST. JEOR: SCORE: 1659.44

## 2020-03-29 NOTE — PROGRESS NOTES
Cardiology Progress Note          Assessment and Plan:    74-year-old male with underlying type 2 diabetes, chronic kidney disease and BPH with at least a 6 to 8-week history of progressive shortness of breath, dyspnea on exertion and symptoms suggestive of orthopnea with progressive deterioration requiring mechanical ventilation  1. COVID and influenza negative  2. ARF on CRF- urine output modest on high bumex gtt, Cr continues to rise  3. Biventricular systolic CHF with 2-3+MR- requiring blood pressor support with multiple ionotropic agents.  4. Concern for infection with high WBC, procalcitonin was only 0.07. On empiric antibiotics per intensivist  5. AFib with RVR- started on amio gtt last evening.  6. Discussed with care team, family has made decision they do not want dialysis/CRT, so will continue supportive cares and discuss palliative cares.      Interval History:   Intubated and sedated                Medications:   I have reviewed this patient's current medications         Physical Exam:         Vital Sign Ranges  Temperature Temp  Av.7  F (37.6  C)  Min: 99  F (37.2  C)  Max: 100  F (37.8  C)   Blood pressure Systolic (24hrs), Av , Min:95 , Max:130        Diastolic (24hrs), Av, Min:42, Max:83      Pulse Pulse  Av.2  Min: 20  Max: 133   Respirations Resp  Av.5  Min: 8  Max: 28   Pulse oximetry SpO2  Av.2 %  Min: 97 %  Max: 100 %         Intake/Output Summary (Last 24 hours) at 3/29/2020 1132  Last data filed at 3/29/2020 1100  Gross per 24 hour   Intake 2919.14 ml   Output 2115 ml   Net 804.14 ml       Constitutional:   pale     Skin:   no jaundice     Neck:   supple, symmetrical, trachea midline     Chest:   Normal Symmetry and no tenderness     Lungs:   Coarse BS     Cardiovascular:   Irregular fast     Extremities and Back:   Cool extremitites, no edema     Neurological:   Unable to assess due to sedation.              Data:     Results for orders placed or performed during  the hospital encounter of 03/27/20 (from the past 24 hour(s))   Blood gas arterial with oxyhemoglobin   Result Value Ref Range    pH Arterial 7.32 (L) 7.35 - 7.45 pH    pCO2 Arterial 28 (L) 35 - 45 mm Hg    pO2 Arterial 195 (H) 80 - 105 mm Hg    Bicarbonate Arterial 14 (L) 21 - 28 mmol/L    FIO2 50%     Oxyhemoglobin Arterial 99 92 - 100 %    Base Deficit Art 10.5 mmol/L   Lactic acid whole blood   Result Value Ref Range    Lactic Acid 3.9 (H) 0.7 - 2.0 mmol/L   Comprehensive metabolic panel   Result Value Ref Range    Sodium 131 (L) 133 - 144 mmol/L    Potassium 6.2 (HH) 3.4 - 5.3 mmol/L    Chloride 103 94 - 109 mmol/L    Carbon Dioxide 15 (L) 20 - 32 mmol/L    Anion Gap 13 3 - 14 mmol/L    Glucose 218 (H) 70 - 99 mg/dL    Urea Nitrogen 47 (H) 7 - 30 mg/dL    Creatinine 3.94 (H) 0.66 - 1.25 mg/dL    GFR Estimate 14 (L) >60 mL/min/[1.73_m2]    GFR Estimate If Black 16 (L) >60 mL/min/[1.73_m2]    Calcium 8.3 (L) 8.5 - 10.1 mg/dL    Bilirubin Total 0.8 0.2 - 1.3 mg/dL    Albumin 2.9 (L) 3.4 - 5.0 g/dL    Protein Total 6.5 (L) 6.8 - 8.8 g/dL    Alkaline Phosphatase 93 40 - 150 U/L    ALT 4,613 (HH) 0 - 70 U/L    AST 6,473 (HH) 0 - 45 U/L   Troponin I (STAT q4h x3)   Result Value Ref Range    Troponin I ES 0.336 (HH) 0.000 - 0.045 ug/L   Glucose by meter   Result Value Ref Range    Glucose 203 (H) 70 - 99 mg/dL   Influenza A and B and RSV PCR   Result Value Ref Range    Specimen Description Nasopharyngeal     Influenza A PCR Negative NEG^Negative    Influenza B PCR Negative NEG^Negative    Resp Syncytial Virus Negative NEG^Negative   Glucose by meter   Result Value Ref Range    Glucose 106 (H) 70 - 99 mg/dL   Lactic acid whole blood   Result Value Ref Range    Lactic Acid 3.3 (H) 0.7 - 2.0 mmol/L   Comprehensive metabolic panel   Result Value Ref Range    Sodium 134 133 - 144 mmol/L    Potassium 4.9 3.4 - 5.3 mmol/L    Chloride 104 94 - 109 mmol/L    Carbon Dioxide 17 (L) 20 - 32 mmol/L    Anion Gap 12 3 - 14 mmol/L     Glucose 114 (H) 70 - 99 mg/dL    Urea Nitrogen 51 (H) 7 - 30 mg/dL    Creatinine 4.20 (H) 0.66 - 1.25 mg/dL    GFR Estimate 13 (L) >60 mL/min/[1.73_m2]    GFR Estimate If Black 15 (L) >60 mL/min/[1.73_m2]    Calcium 8.2 (L) 8.5 - 10.1 mg/dL    Bilirubin Total 0.6 0.2 - 1.3 mg/dL    Albumin 2.8 (L) 3.4 - 5.0 g/dL    Protein Total 6.4 (L) 6.8 - 8.8 g/dL    Alkaline Phosphatase 93 40 - 150 U/L    ALT 4,789 (HH) 0 - 70 U/L    AST 7,144 (HH) 0 - 45 U/L   Magnesium   Result Value Ref Range    Magnesium 2.3 1.6 - 2.3 mg/dL   Phosphorus   Result Value Ref Range    Phosphorus 7.5 (H) 2.5 - 4.5 mg/dL   Heparin 10a Level   Result Value Ref Range    Heparin 10A Level 0.12 IU/mL   Comprehensive metabolic panel   Result Value Ref Range    Sodium 131 (L) 133 - 144 mmol/L    Potassium 5.4 (H) 3.4 - 5.3 mmol/L    Chloride 102 94 - 109 mmol/L    Carbon Dioxide 15 (L) 20 - 32 mmol/L    Anion Gap 14 3 - 14 mmol/L    Glucose 155 (H) 70 - 99 mg/dL    Urea Nitrogen 55 (H) 7 - 30 mg/dL    Creatinine 4.46 (H) 0.66 - 1.25 mg/dL    GFR Estimate 12 (L) >60 mL/min/[1.73_m2]    GFR Estimate If Black 14 (L) >60 mL/min/[1.73_m2]    Calcium 7.9 (L) 8.5 - 10.1 mg/dL    Bilirubin Total 0.7 0.2 - 1.3 mg/dL    Albumin 2.8 (L) 3.4 - 5.0 g/dL    Protein Total 6.2 (L) 6.8 - 8.8 g/dL    Alkaline Phosphatase 94 40 - 150 U/L    ALT 5,254 (HH) 0 - 70 U/L    AST 8,219 (HH) 0 - 45 U/L   Lactic acid whole blood   Result Value Ref Range    Lactic Acid 3.2 (H) 0.7 - 2.0 mmol/L   Troponin I (STAT q4h x3)   Result Value Ref Range    Troponin I ES 0.439 (HH) 0.000 - 0.045 ug/L   Glucose by meter   Result Value Ref Range    Glucose 147 (H) 70 - 99 mg/dL   CT Chest Abdomen Pelvis w/o Contrast    Narrative    CT CHEST ABDOMEN PELVIS WITHOUT CONTRAST  3/28/2020 9:48 PM    HISTORY:  Sepsis, unclear cause, rule out intra-abdominal process.    TECHNIQUE: Scans obtained of the chest, abdomen, and pelvis without IV  contrast.   Radiation dose for this scan was reduced using  automated exposure  control, adjustment of the mA and/or kV according to patient size, or  iterative reconstruction technique.    COMPARISON: Chest x-rays dated 3/28/2020 and CT chest dated 1/17/2009.    FINDINGS:   Chest: Moderate size right and small left pleural fluid collection and  associated compressive atelectasis in the posterior bilateral lungs  are noted. Groundglass densities are seen in the anterior lungs.  Nodular densities are also noted in the bilateral lungs.  Solid-appearing nodular density in the left lung in posterolateral  left upper lobe (image 97 series 5) measuring up to 1.0 cm. This could  also represent atelectasis or inflammatory process. Groundglass  nodular densities in bilateral lungs measuring up to 2.0 cm could  represent an inflammatory process. These were not seen on prior CT  dated 2009.    The heart is normal in size. There are coronary artery and aortic  calcifications. Endotracheal tube tip is in the thoracic trachea below  the thoracic inlet and above the bernadette. Nasogastric tube seen in the  stomach and the esophagus.    The thyroid is grossly normal appearance. No mediastinal, hilar, or  axillary lymphadenopathy is identified.    There are degenerative changes in the spine, left hip and left SI  joint. No aggressive osseous lesions or acute osseous fractures are  identified.    Abdomen and pelvis: Gallbladder somewhat indistinct and there is some  stranding around the gallbladder. Acute cholecystitis is certainly not  excluded in this case. Calcification is noted in the spleen indicating  chronic granulomatous disease. This was also noted in 2009. The liver,  pancreas, spleen and bilateral adrenal glands are otherwise normal in  appearance for noncontrast CT.    There is stranding around both kidneys which was not well-seen on the  prior CT chest dated 1/17/2009. There is prominence of the bilateral  extrarenal pelvises more so on the left. There is bilateral  hydroureter,  moderate on the left and mild on right. No evidence for  renal or ureteral calculus is identified. Urinary bladder is  completely decompressed around a catheter. The prostate gland is  enlarged.    No adenopathy or free air is seen in the peritoneal cavity. Small  amount of free fluid in the pelvis.    The colon is grossly of normal caliber without pericolonic  inflammatory change to suggest acute diverticulitis. Appendix is  normal in appearance. Small bowel is of normal caliber. Stomach is  mostly decompressed but otherwise unremarkable.      Impression    IMPRESSION:  1. Irregularity of the gallbladder and stranding around the  gallbladder could represent acute cholecystitis. Further evaluation  with gallbladder ultrasound is recommended as clinically indicated.  2. Stranding around bilateral kidneys and bilateral renal collecting  system prominence including ureters and extrarenal pelvis could be  secondary to chronic bladder outlet obstruction. Pyelonephritis is not  excluded on this study. No evidence for urinary system calculus is  seen.  3. Enlarged prostate gland.  4. Small mammography fluid in pelvis.  5. Moderate right and small left pleural fluid collections and  associated compressive atelectasis in the bilateral posterior lungs.  Groundglass densities in the lungs could represent vascular congestion  and the heart is enlarged indicating probable congestive heart  failure.  6. Nodule left lung measuring up to 1 cm diameter could represent an  inflammatory or infectious process. This could also represent an  neoplastic nodule.   Recommendations for an incidental lung nodule > 8mm:    Low risk patients: Consider follow-up CT in 3 months, PET/CT, and/or  tissue sampling.    High risk patients: Same as for low risk patients.    *Low Risk: Minimal or absent history of smoking or other known risk  factors.  *Nonsolid (ground-glass) or partly solid nodules may require longer  follow-up to exclude indolent  adenocarcinoma.  *Recommendations based on Guidelines for the Management of Incidental  Pulmonary Nodules Detected at CT: From the Fleischner Society 2017,  Radiology 2017.   7. Groundglass nodules in the lungs likely represent inflammatory  infectious process. Attention to these regions is recommended on  follow-up CT chest for the other nodule.  8. Endotracheal tube is in appropriate position. Nasogastric tube tip  appears to be in the stomach. Side port is difficult to see on this  study.    I discussed the renal, gallbladder, and pulmonary findings with Dr. Villafana on 3/28/2020 at approximately 10:02 PM.    CALLIE HERNANDEZ MD   Comprehensive metabolic panel   Result Value Ref Range    Sodium 131 (L) 133 - 144 mmol/L    Potassium 5.7 (H) 3.4 - 5.3 mmol/L    Chloride 102 94 - 109 mmol/L    Carbon Dioxide 15 (L) 20 - 32 mmol/L    Anion Gap 14 3 - 14 mmol/L    Glucose 166 (H) 70 - 99 mg/dL    Urea Nitrogen 56 (H) 7 - 30 mg/dL    Creatinine 4.59 (H) 0.66 - 1.25 mg/dL    GFR Estimate 11 (L) >60 mL/min/[1.73_m2]    GFR Estimate If Black 13 (L) >60 mL/min/[1.73_m2]    Calcium 7.8 (L) 8.5 - 10.1 mg/dL    Bilirubin Total 0.8 0.2 - 1.3 mg/dL    Albumin 2.8 (L) 3.4 - 5.0 g/dL    Protein Total 6.3 (L) 6.8 - 8.8 g/dL    Alkaline Phosphatase 102 40 - 150 U/L    ALT 6,193 (HH) 0 - 70 U/L    AST 9,339 (HH) 0 - 45 U/L   US Abdomen Limited    Narrative    EXAM: US ABDOMEN LIMITED  LOCATION: [facility]  DATE/TIME: 3/28/2020 11:13 PM    INDICATION: Sepsis. Enlarged gallbladder on CT.  COMPARISON: CT 03/28/2020.  TECHNIQUE: Limited abdominal ultrasound.    FINDINGS:    GALLBLADDER: There are no gallstones. There are areas of gallbladder wall thickening to 5 mm. No sonographic Azar sign.    BILE DUCTS: No biliary dilatation. The common duct measures 3 mm.    LIVER: Normal parenchyma with smooth contour. No focal mass.    RIGHT KIDNEY: Mild dilatation of the right renal collecting system.    PANCREAS: The visualized  portions are normal.    No ascites.      Impression    IMPRESSION:  1.  Mild gallbladder wall thickening, a nonspecific finding. No other evidence of cholecystitis. No gallstones or biliary dilatation.  2.  Mild right hydronephrosis.   Glucose by meter   Result Value Ref Range    Glucose 129 (H) 70 - 99 mg/dL   Heparin Xa (10a) Level   Result Value Ref Range    Heparin 10A Level 0.10 IU/mL   Glucose by meter   Result Value Ref Range    Glucose 162 (H) 70 - 99 mg/dL   Comprehensive metabolic panel   Result Value Ref Range    Sodium 131 (L) 133 - 144 mmol/L    Potassium 5.0 3.4 - 5.3 mmol/L    Chloride 101 94 - 109 mmol/L    Carbon Dioxide 16 (L) 20 - 32 mmol/L    Anion Gap 14 3 - 14 mmol/L    Glucose 173 (H) 70 - 99 mg/dL    Urea Nitrogen 65 (H) 7 - 30 mg/dL    Creatinine 4.85 (H) 0.66 - 1.25 mg/dL    GFR Estimate 11 (L) >60 mL/min/[1.73_m2]    GFR Estimate If Black 12 (L) >60 mL/min/[1.73_m2]    Calcium 7.4 (L) 8.5 - 10.1 mg/dL    Bilirubin Total 0.7 0.2 - 1.3 mg/dL    Albumin 2.8 (L) 3.4 - 5.0 g/dL    Protein Total 6.1 (L) 6.8 - 8.8 g/dL    Alkaline Phosphatase 106 40 - 150 U/L    ALT 6,983 (HH) 0 - 70 U/L    AST 11,607 (HH) 0 - 45 U/L   CBC with platelets differential   Result Value Ref Range    WBC 20.6 (H) 4.0 - 11.0 10e9/L    RBC Count 4.29 (L) 4.4 - 5.9 10e12/L    Hemoglobin 11.0 (L) 13.3 - 17.7 g/dL    Hematocrit 35.1 (L) 40.0 - 53.0 %    MCV 82 78 - 100 fl    MCH 25.6 (L) 26.5 - 33.0 pg    MCHC 31.3 (L) 31.5 - 36.5 g/dL    RDW 15.3 (H) 10.0 - 15.0 %    Platelet Count 413 150 - 450 10e9/L    Diff Method Automated Method     % Neutrophils 89.9 %    % Lymphocytes 5.0 %    % Monocytes 4.0 %    % Eosinophils 0.0 %    % Basophils 0.1 %    % Immature Granulocytes 1.0 %    Nucleated RBCs 1 (H) 0 /100    Absolute Neutrophil 18.5 (H) 1.6 - 8.3 10e9/L    Absolute Lymphocytes 1.0 0.8 - 5.3 10e9/L    Absolute Monocytes 0.8 0.0 - 1.3 10e9/L    Absolute Eosinophils 0.0 0.0 - 0.7 10e9/L    Absolute Basophils 0.0 0.0 - 0.2  10e9/L    Abs Immature Granulocytes 0.2 0 - 0.4 10e9/L    Absolute Nucleated RBC 0.2    Blood gas arterial and oxyhgb   Result Value Ref Range    pH Arterial 7.21 (L) 7.35 - 7.45 pH    pCO2 Arterial 36 35 - 45 mm Hg    pO2 Arterial 111 (H) 80 - 105 mm Hg    Bicarbonate Arterial 14 (L) 21 - 28 mmol/L    FIO2 35%     Oxyhemoglobin Arterial 99 92 - 100 %    Base Deficit Art 12.6 mmol/L   Lipase   Result Value Ref Range    Lipase 917 (H) 73 - 393 U/L   Glucose by meter   Result Value Ref Range    Glucose 159 (H) 70 - 99 mg/dL   Lactic acid whole blood   Result Value Ref Range    Lactic Acid 1.7 0.7 - 2.0 mmol/L   Glucose by meter   Result Value Ref Range    Glucose 147 (H) 70 - 99 mg/dL   Heparin Xa (10a) Level   Result Value Ref Range    Heparin 10A Level 0.14 IU/mL   Comprehensive metabolic panel   Result Value Ref Range    Sodium 131 (L) 133 - 144 mmol/L    Potassium 4.6 3.4 - 5.3 mmol/L    Chloride 101 94 - 109 mmol/L    Carbon Dioxide 16 (L) 20 - 32 mmol/L    Anion Gap 14 3 - 14 mmol/L    Glucose 145 (H) 70 - 99 mg/dL    Urea Nitrogen 64 (H) 7 - 30 mg/dL    Creatinine 4.94 (H) 0.66 - 1.25 mg/dL    GFR Estimate 10 (L) >60 mL/min/[1.73_m2]    GFR Estimate If Black 12 (L) >60 mL/min/[1.73_m2]    Calcium 7.1 (L) 8.5 - 10.1 mg/dL    Bilirubin Total 0.7 0.2 - 1.3 mg/dL    Albumin 2.8 (L) 3.4 - 5.0 g/dL    Protein Total 6.2 (L) 6.8 - 8.8 g/dL    Alkaline Phosphatase 115 40 - 150 U/L    ALT 7,341 (HH) 0 - 70 U/L    AST 12,074 (HH) 0 - 45 U/L   Magnesium   Result Value Ref Range    Magnesium 2.2 1.6 - 2.3 mg/dL   Phosphorus   Result Value Ref Range    Phosphorus 7.6 (H) 2.5 - 4.5 mg/dL         TELE: Afib with RVR -110s

## 2020-03-29 NOTE — CONSULTS
Consult Date:  03/29/2020      IDENTIFICATION:  A 74-year-old male admitted through the emergency room dated 03/27/2020 in the setting of increasing shortness of breath and tachycardia.      REASON FOR CONSULTATION:  Evaluation and assessment with respect to apparent acute on chronic renal insufficiency in the setting of probable cardiogenic shock.      IMPRESSION:   1.  Baseline chronic kidney disease, stage III/IV.  Baseline creatinine appears to be greater than 2.0 mg/dL.  Documented creatinine 01/20/2020, 2.32.  Creatinine in 05/2019, 1.81 mg/dL.   2.  Dipstick positive proteinuria without quantification.   3.  Acute kidney injury in the setting of biventricular congestive heart failure and associated mitral regurgitation.   4.  Hemodynamic instability necessitating the use of pressors.  Currently on norepinephrine.   5.  Respiratory failure requiring mechanical ventilation.   6.  Atrial fibrillation with rapid ventricular response rate, currently requiring amiodarone.   7.  Metabolic acidosis.   8.  Modest hyponatremia, presumed hypervolemic, hyponatremia.   9.  Shock liver.      DISCUSSION:  Acute kidney injury in the setting of cardiogenic shock.  Presumably prerenal and/or component of cardiorenal versus evolving acute tubular necrosis.  The patient has responded to Bumex infusion with increasing urine output.  His renal function continues to increase, but the rate of rise is decreasing.  There is notation in the medical record that he and his family do not want renal replacement therapy.      RECOMMENDATIONS:   1.  No indication for renal replacement therapy at this time.   2.  Continue Bumex infusion.   3.  Concentrate current IV infusions in attempt to limit in's and maximize volume status.   4.  Consideration for bicarbonate based on his metabolic acidosis.  We will follow in this regard.   5.  We will continue to follow with you.      HISTORY:  No history available from the patient.  He is intubated and  sedated.  His case was discussed in detail with the ICU staff, Dr. Martina Villafana.  In addition, he has been seen by a cardiologist and the Hospitalist Service.      As we noted, he presented with a several-week history of progressive shortness of breath and dyspnea on exertion.      His underlying medical history includes diabetes, chronic kidney disease, BPH.  His clinical course has deteriorated since admission, requiring mechanical ventilation.  He is now on a decreasing amount of IV pressor medication and responding to Bumex infusion.      Decompensated on admission and was transferred to the ICU for ongoing management.      PAST MEDICAL HISTORY:  Diabetes.      ADMISSION MEDICATIONS:  Aspirin, glipizide, metformin.      ALLERGIES:  NONE.      FAMILY HISTORY AND SOCIAL HISTORY:  Reviewed in the medical record; unable to be obtained directly from the patient.      REVIEW OF SYSTEMS:  Unable to be obtained from the patient, given that he is currently intubated and sedated.      PHYSICAL EXAMINATION:   VITAL SIGNS:  Low-grade temperature 99.1, heart rate , blood pressure in the range of 100-110, 35% FiO2 requirement via ventilator with 100% sat.   GENERAL:  Older-looking male, chronically ill-appearing.   HEENT:  Demonstrates an oral ET tube.   CHEST:  Decreased breath sounds.   CARDIOVASCULAR:  Borderline tachycardic, irregular.   ABDOMEN:  Soft.   GENITOURINARY:  Male genitalia.  Mild scrotal edema.  Mckenzie catheter.   EXTREMITIES:  Without significant edema.     NEUROLOGIC/PSYCHIATRIC:  Unable, given the degree of sedation.      LABORATORY DATA:  Current and historic reviewed in detail.         DAVON DANG MD             D: 2020   T: 2020   MT: [id number]      Name:     MIKE DUNHAM   MRN:      [id number]        Account:       [id number]   :      1945           Consult Date:  2020      Document: [id number]

## 2020-03-29 NOTE — PHARMACY
Concentrated the following IV infusions in attempt to limit in's and maximize volume status:    -Amiodarone from 60mg/hr to 10ml/hr - to be infused via central line (discussed with RN), new concentration to start after the first 24hrs on Amiodarone therapy. Patient is not a candidate for oral amiodarone at this point.    -Vancomycin from 2g/500ml q48h to 2g/250ml q48h.    Pharmacy will continue to follow as new IV infusions are ordered.

## 2020-03-29 NOTE — PROGRESS NOTES
"An EKG was completed on the pt, with no complications noted during the procedure.    Vital signs:  Temp: 99.3  F (37.4  C) Temp src: Bladder BP: 109/64 Pulse: 97 Heart Rate: 116 Resp: 20 SpO2: 100 % O2 Device: Mechanical Ventilator Oxygen Delivery: 2 LPM Height: 174 cm (5' 8.5\") Weight: 94.7 kg (208 lb 12.4 oz)  Estimated body mass index is 31.28 kg/m  as calculated from the following:    Height as of this encounter: 1.74 m (5' 8.5\").    Weight as of this encounter: 94.7 kg (208 lb 12.4 oz).        Past Medical History:   Diagnosis Date     BPH (benign prostatic hyperplasia)      Diabetes (H)        History reviewed. No pertinent surgical history.    Family History   Problem Relation Age of Onset     Diabetes Sister        Social History     Tobacco Use     Smoking status: Never Smoker   Substance Use Topics     Alcohol use: Yes     Comment: Alee Lazo [facility]  3/29/2020    "

## 2020-03-29 NOTE — PROGRESS NOTES
"[facility] ICU VENTILATOR RESPIRATORY NOTE  Date of Admission: 03/27/2020  Date of Intubation (most recent): 03/28/2020  Reason for Mechanical Ventilation:  Shock, likely cardiogenic versus septic  Number of Days on Mechanical Ventilation: 2  Met Criteria for Pressure Support Trial: No  Length of Pressure Support Trial:   Reason for Stopping Pressure Support Trial:   Reason for No Pressure Support Trial: Due to current respiratory status and needs  Significant Events Today: Suctioning out a small amount of thick creamy secretions.   ABG Results: 7.21/36/111/14 @ 0412  ETT appearance on chest x-ray: mid trachea      Plan: Will continue to monitor and assess the pt's current respiratory status and needs, in hopes of liberating the pt from the ventilator.    Ventilation Mode: CMV/AC  (Continuous Mandatory Ventilation/ Assist Control)  FiO2 (%): 35 %  Rate Set (breaths/minute): 18 breaths/min  Tidal Volume Set (mL): 500 mL  PEEP (cm H2O): 10 cmH2O  Oxygen Concentration (%): 35 %  Resp: 20    Vital signs:  Temp: 99.1  F (37.3  C) Temp src: Bladder BP: 106/64 Pulse: 97 Heart Rate: 96 Resp: 20 SpO2: 100 % O2 Device: Mechanical Ventilator Oxygen Delivery: 2 LPM Height: 174 cm (5' 8.5\") Weight: 94.7 kg (208 lb 12.4 oz)  Estimated body mass index is 31.28 kg/m  as calculated from the following:    Height as of this encounter: 1.74 m (5' 8.5\").    Weight as of this encounter: 94.7 kg (208 lb 12.4 oz).    Recent Labs   Lab 03/29/20  0412 03/28/20  1230 03/28/20  0800 03/28/20  0321   PH 7.21* 7.32* 7.26* 7.13*   PCO2 36 28* 26* 23*   PO2 111* 195* 284* 168*   HCO3 14* 14* 12* 8*   O2PER 35% 50% 80 100%vent  100%     Past Medical History:   Diagnosis Date     BPH (benign prostatic hyperplasia)      Diabetes (H)        History reviewed. No pertinent surgical history.    Family History   Problem Relation Age of Onset     Diabetes Sister        Social History     Tobacco Use     Smoking status: Never Smoker   Substance Use Topics     " Alcohol use: Yes     Comment: Alee CHUNG  [facility]  3/29/2020

## 2020-03-29 NOTE — PROGRESS NOTES
[facility]  Critical Care Service    Progress Note  Date of Service: 03/29/2020     Assessment & Plan   Gene Pagan is a 74 year old male who was admitted on 3/27/2020. Now here with decompensated heart failure and possible combination cardiogenic and septic shock.    CNS:   RASS 0 to -1: Propofol for acute sedation, can add fentanyl if additional sedation needed  Pulm:   Hypoxic respiratory failure 2/2 increased O2 needs and work of breathing. Failed BIPAP earlier  - VC 16;500;PEEP 10/ FiO2 40% - oxygenating well. Increased PEEP is present to promote left heart function.  CV:   Shock - suspect combination of cardiogenic shock and possible septic shock.  - EF of 20-25% with volume overload and mitral regurgitation  - Continue Levophed for BP support - Vasopressin discontinued given antidiuretic properties  - will attempt diuresis given his volume overload status.   - normal cortisol at 41, will not plan stress dose steroids  - influenza testing and COVID 19 testing - NEGATIVE   Atrial fibrillation with RVR  - Amiodarone to maintain patient in sinus  - heparin drip  FEN/GI:   NPO, will wait for Levophed to be <0.12 prior to starting tube feeds  Abdomen without concerns and no abdominal pain on admission, less likely abdominal source of sepsis  Significant Transaminitis, increasing  - ?shock liver vs congestive hepatopathy in the setting of acute decompensated heart failure.   - continue close monitoring, no acetaminophen  :   DEONDRE on ?CKD - baseline creatinine in 2019 of 1.8, but January of this year, crt was 0.8  - oliguria despite lasix and bumex + metolazone - Improved with discontinuation of vasopressin  - hyperkalemia s/p Insulin, bicarb x2, calcium, kayexlate started - continue to monitor closely  - discussed dialysis with patient's daughter(3/28/2020), Catherine, Grand daughter, Smooth and significant other, [place]. He would require transfer to a facility that has the capability for CCRT. And we  discussed that there is a chance that his kidneys recover, if he lives through this, but there is a possibility that he would need long term dialysis.  - Nephrology consult today  Metabolic acidosis - likely secondary to renal failure. Will hold off on bicarbonate given his volume status, can increase respiratory rate to allow for resp compensation.    Heme:  Mild anemia - likely dilutional - stable  Elevated platelets - likely reactive - improving  No acute issues  Musculoskeletal: No acute issues  Endocrine:   DM - hold home metformin in the setting of lactic acidosis  - continue close monitoring of BG  - MSSI  - s/p D50, insulin for hyperkalemia (3/28)  ID:   Unclear if sepsis is playing a part - ?pyelonephritis  - leukocytosis, rising with a left shift is concerning for infection, unclear what this could be given the patient's lack of focal presenting symptoms that are consistent with infection. Could be reactive, will continue to follow. Procalcitonin is only 0.07  - stop vancomycin, continue zosyn  - COVID rule out - Negative  ICU:   Will hold on PT/OT  Prognosis is grim.    General cares:  DVT Prophylaxis: Heparin drip  GI Prophylaxis: PPI  Restraints: Restraints for medical healing needed: YES  Family update by me today: Yes   Current lines are required for patient management  Access: RIGHT internal jugular, ETT, baker, arterial line    Martina Villafana MD 3/29/2020 3:11 PM   Surgery Critical Care       Time Spent on this Encounter   Billing:  I spent 40 minutes bedside and on the inpatient unit today managing the critical care of Gene Pagan in relation to the issues listed in this note.    Main Plans for Today   Wean pressors as able  Attempt diuresis  - await pressor decrease prior to decreasing PEEP  -stop vanc    Interval History   Vasopressin titrated off. CT CAP with possible pneumonia, possible pyelonephritis. Stop vancomycin    Physical Exam   Temp: 99.7  F (37.6  C) Temp src: Bladder Temp   Min: 98.4  F (36.9  C)  Max: 100  F (37.8  C) BP: 99/78 Pulse: 96 Heart Rate: 102 Resp: 21 SpO2: 100 % O2 Device: Mechanical Ventilator    Vitals:    03/27/20 1746 03/28/20 0630 03/29/20 0555   Weight: 92.7 kg (204 lb 4.8 oz) 93.3 kg (205 lb 11.2 oz) 94.7 kg (208 lb 12.4 oz)     I/O last 3 completed shifts:  In: 2884.9 [I.V.:2584.9; NG/GT:300]  Out: 1595 [Urine:1395; Emesis/NG output:200]    GEN: sedated, intubated  EYES: pupils small   HEENT: normocephalic, atraumatic  CV: regular rate and rhythm.   PULM/CHEST: lungs clear to auscultation bilaterally, though diminished.  GI: abdomen is soft, nondistended, grimaces with palpation, but also grimaces with palpation of chest.   : Mckenzie in place  EXTREMITIES: no significant LE edema   NEURO: sedated on my exam  SKIN: no acute skin issues  PSYCH:  Not assessed given intubation    Imaging personally reviewed:  CT, bilateral pleural effusions    Medications     amiodarone 0.5 mg/min (03/29/20 0600)     bumetanide 4 mg/hr (03/29/20 0552)     EPINEPHrine IV infusion ADULT Stopped (03/28/20 1217)     HEParin 1,550 Units/hr (03/29/20 0600)     norepinephrine 0.18 mcg/kg/min (03/29/20 0600)     - MEDICATION INSTRUCTIONS -       - MEDICATION INSTRUCTIONS -       propofol (DIPRIVAN) infusion 30 mcg/kg/min (03/29/20 0618)     vasopressin (PITRESSIN) infusion ADULT (40 mL) Stopped (03/28/20 2215)       aspirin  81 mg Oral Daily     guaiFENesin  10 mL Oral or Feeding Tube Q4H     insulin aspart  1-6 Units Subcutaneous Q4H     pantoprazole (PROTONIX) IV  40 mg Intravenous Daily     piperacillin-tazobactam  2.25 g Intravenous Q6H     sodium chloride (PF)  3 mL Intracatheter Q8H     sodium chloride (PF)  3 mL Intracatheter Q8H     vancomycin (VANCOCIN) IV  2,000 mg Intravenous Q48H       Data   Recent Labs   Lab 03/29/20  0412 03/28/20  2346 03/28/20  1900  03/28/20  1230 03/28/20  1015 03/28/20  0530  03/28/20  0030  03/27/20  1348   WBC 20.6*  --   --   --   --   --  26.1*  --   16.2*   < > 10.0   HGB 11.0*  --   --   --   --   --  10.9*  --  11.5*   < > 10.0*   MCV 82  --   --   --   --   --  85  --  90   < > 84     --   --   --   --   --  510*  --  518*   < > 487*   INR  --   --   --   --   --   --   --   --   --   --  1.20*   * 131* 131*   < > 131* 130* 131*  --   --    < > 131*   POTASSIUM 5.0 5.7* 5.4*   < > 6.2* 6.4* 5.3  --   --    < > 4.2   CHLORIDE 101 102 102   < > 103 103 102  --   --    < > 102   CO2 16* 15* 15*   < > 15* 13* 9*  --   --    < > 20   BUN 65* 56* 55*   < > 47* 47* 44*  --   --    < > 33*   CR 4.85* 4.59* 4.46*   < > 3.94* 3.79* 3.57*  --   --    < > 2.40*   ANIONGAP 14 14 14   < > 13 14 20*  --   --    < > 9   HARPER 7.4* 7.8* 7.9*   < > 8.3* 8.2* 8.3*  --   --    < > 8.8   * 166* 155*   < > 218* 219* 205*  --   --    < > 212*   ALBUMIN 2.8* 2.8* 2.8*   < > 2.9* 2.9* 3.0*   < >  --   --   --    PROTTOTAL 6.1* 6.3* 6.2*   < > 6.5* 6.7* 6.5*   < >  --   --   --    BILITOTAL 0.7 0.8 0.7   < > 0.8 0.8 1.0   < >  --   --   --    ALKPHOS 106 102 94   < > 93 94 91   < >  --   --   --    ALT 6,983* 6,193* 5,254*   < > 4,613* 4,195* 2,625*   < >  --   --   --    AST 11,607* 9,339* 8,219*   < > 6,473* 5,606* 2,948*   < >  --   --   --    TROPI  --   --  0.439*  --  0.336* 0.294* 0.222*  --   --    < > 0.120*    < > = values in this interval not displayed.     Recent Results (from the past 24 hour(s))   Echocardiogram Complete    Narrative    [id number]  [id number]  [id number]  [id number]^ZAYRA^SHREYAS^[facility]  Echocardiography Laboratory  [address] [place] [address]        Name: MIKE DUNHAM  MRN: [id number]  : 1945  Study Date: 2020 10:46 AM  Age: 74 yrs  Gender: Male  Patient Location: [facility]  Reason For Study: Afib  Ordering Physician: SHREYAS IVERSON  Referring Physician: Red Rogers  Performed By: Matilde Cox RDCS     BSA: 2.1 m2  Height: 68 in  Weight: 205 lb  HR: 78  BP: 131/95  mmHg  _____________________________________________________________________________  __        Procedure  Complete Portable Echo Adult. Optison (NDC #[id number]) given intravenously.  _____________________________________________________________________________  __        Interpretation Summary     The left ventricle is normal in size.  Left ventricular systolic function is severely reduced.  The visual ejection fraction is estimated at 20-25%.  There is severe global hypokinesia of the left ventricle.  Grade III or advanced diastolic dysfunction.  The right ventricle is moderately dilated.  The right ventricular systolic function is moderate to severely reduced.  There is moderate to mod-severe (2-3+) mitral regurgitation.  No hemodynamically significant valvular aortic stenosis.  Dilation of the inferior vena cava is present with abnormal respiratory  variation in diameter.  The study was technically difficult. There is no comparison study available.  _____________________________________________________________________________  __        Left Ventricle  The left ventricle is normal in size. There is normal left ventricular wall  thickness. Grade III or advanced diastolic dysfunction. Left ventricular  systolic function is severely reduced. The visual ejection fraction is  estimated at 20-25%. There is severe global hypokinesia of the left ventricle.     Right Ventricle  The right ventricle is moderately dilated. The right ventricular systolic  function is moderate to severely reduced.     Atria  The left atrium is mildly dilated. The left atrium is not well visualized.  Right atrium not well visualized. The right atrium is mildly dilated.     Mitral Valve  The mitral valve is not well visualized. There is moderate to mod-severe (2-  3+) mitral regurgitation. Increased mitral valve velocity.        Tricuspid Valve  The tricuspid valve is not well visualized. The right ventricular systolic  pressure is approximated  at 28.0 mmHg plus the right atrial pressure. There is  mild (1+) tricuspid regurgitation.     Aortic Valve  The aortic valve is not well visualized. The aortic valve is trileaflet. There  is mild (1+) aortic regurgitation. There is an eccentric jet of aortic  insufficiency directed against the anterior mitral leaflet. No hemodynamically  significant valvular aortic stenosis.     Pulmonic Valve  The pulmonic valve is not well visualized.     Vessels  Mild aortic root dilatation. Dilation of the inferior vena cava is present  with abnormal respiratory variation in diameter. Pulmonary venous flow  reversal noted.     Pericardium  There is no pericardial effusion. Moderate left pleural effusion.     _____________________________________________________________________________  __  MMode/2D Measurements & Calculations  IVSd: 0.80 cm  LVIDd: 5.3 cm  LVIDs: 5.0 cm  LVPWd: 0.79 cm  IVC diam: 3.0 cm  FS: 6.7 %  LV mass(C)d: 150.4 grams  LV mass(C)dI: 72.8 grams/m2     Ao root diam: 4.3 cm  LA dimension: 4.5 cm  asc Aorta Diam: 4.2 cm  LA/Ao: 1.1  LVOT diam: 2.6 cm  LVOT area: 5.3 cm2  LA Volume (BP): 73.0 ml  LA Volume Index (BP): 35.3 ml/m2  RWT: 0.30        Doppler Measurements & Calculations  MV E max chaz: 92.8 cm/sec  MV A max chaz: 26.2 cm/sec  MV E/A: 3.5  MV max P.6 mmHg  MV mean P.8 mmHg  MV V2 VTI: 27.5 cm     MV P1/2t max chaz: 121.0 cm/sec  MV P1/2t: 76.2 msec  MVA(P1/2t): 2.9 cm2  MV dec slope: 465.1 cm/sec2  MV dec time: 0.12 sec  Ao V2 max: 129.3 cm/sec  Ao max P.0 mmHg  AI P1/2t: 386.9 msec  MR ERO: 0.23 cm2  MR volume: 25.3 ml  TR max chaz: 264.7 cm/sec  TR max P.0 mmHg  E/E' av.3  Lateral E/e': 8.9  Medial E/e': 17.8           _____________________________________________________________________________  __           Report approved by: Tammi Sanchez 2020 11:53 AM      CT Chest Abdomen Pelvis w/o Contrast    Narrative    CT CHEST ABDOMEN PELVIS WITHOUT CONTRAST  3/28/2020  9:48 PM    HISTORY:  Sepsis, unclear cause, rule out intra-abdominal process.    TECHNIQUE: Scans obtained of the chest, abdomen, and pelvis without IV  contrast.   Radiation dose for this scan was reduced using automated exposure  control, adjustment of the mA and/or kV according to patient size, or  iterative reconstruction technique.    COMPARISON: Chest x-rays dated 3/28/2020 and CT chest dated 1/17/2009.    FINDINGS:   Chest: Moderate size right and small left pleural fluid collection and  associated compressive atelectasis in the posterior bilateral lungs  are noted. Groundglass densities are seen in the anterior lungs.  Nodular densities are also noted in the bilateral lungs.  Solid-appearing nodular density in the left lung in posterolateral  left upper lobe (image 97 series 5) measuring up to 1.0 cm. This could  also represent atelectasis or inflammatory process. Groundglass  nodular densities in bilateral lungs measuring up to 2.0 cm could  represent an inflammatory process. These were not seen on prior CT  dated 2009.    The heart is normal in size. There are coronary artery and aortic  calcifications. Endotracheal tube tip is in the thoracic trachea below  the thoracic inlet and above the bernadette. Nasogastric tube seen in the  stomach and the esophagus.    The thyroid is grossly normal appearance. No mediastinal, hilar, or  axillary lymphadenopathy is identified.    There are degenerative changes in the spine, left hip and left SI  joint. No aggressive osseous lesions or acute osseous fractures are  identified.    Abdomen and pelvis: Gallbladder somewhat indistinct and there is some  stranding around the gallbladder. Acute cholecystitis is certainly not  excluded in this case. Calcification is noted in the spleen indicating  chronic granulomatous disease. This was also noted in 2009. The liver,  pancreas, spleen and bilateral adrenal glands are otherwise normal in  appearance for noncontrast CT.    There  is stranding around both kidneys which was not well-seen on the  prior CT chest dated 1/17/2009. There is prominence of the bilateral  extrarenal pelvises more so on the left. There is bilateral  hydroureter, moderate on the left and mild on right. No evidence for  renal or ureteral calculus is identified. Urinary bladder is  completely decompressed around a catheter. The prostate gland is  enlarged.    No adenopathy or free air is seen in the peritoneal cavity. Small  amount of free fluid in the pelvis.    The colon is grossly of normal caliber without pericolonic  inflammatory change to suggest acute diverticulitis. Appendix is  normal in appearance. Small bowel is of normal caliber. Stomach is  mostly decompressed but otherwise unremarkable.      Impression    IMPRESSION:  1. Irregularity of the gallbladder and stranding around the  gallbladder could represent acute cholecystitis. Further evaluation  with gallbladder ultrasound is recommended as clinically indicated.  2. Stranding around bilateral kidneys and bilateral renal collecting  system prominence including ureters and extrarenal pelvis could be  secondary to chronic bladder outlet obstruction. Pyelonephritis is not  excluded on this study. No evidence for urinary system calculus is  seen.  3. Enlarged prostate gland.  4. Small mammography fluid in pelvis.  5. Moderate right and small left pleural fluid collections and  associated compressive atelectasis in the bilateral posterior lungs.  Groundglass densities in the lungs could represent vascular congestion  and the heart is enlarged indicating probable congestive heart  failure.  6. Nodule left lung measuring up to 1 cm diameter could represent an  inflammatory or infectious process. This could also represent an  neoplastic nodule.   Recommendations for an incidental lung nodule > 8mm:    Low risk patients: Consider follow-up CT in 3 months, PET/CT, and/or  tissue sampling.    High risk patients: Same as  for low risk patients.    *Low Risk: Minimal or absent history of smoking or other known risk  factors.  *Nonsolid (ground-glass) or partly solid nodules may require longer  follow-up to exclude indolent adenocarcinoma.  *Recommendations based on Guidelines for the Management of Incidental  Pulmonary Nodules Detected at CT: From the Fleischner Society 2017,  Radiology 2017.   7. Groundglass nodules in the lungs likely represent inflammatory  infectious process. Attention to these regions is recommended on  follow-up CT chest for the other nodule.  8. Endotracheal tube is in appropriate position. Nasogastric tube tip  appears to be in the stomach. Side port is difficult to see on this  study.    I discussed the renal, gallbladder, and pulmonary findings with Dr. Villafana on 3/28/2020 at approximately 10:02 PM.    CALLIE HERNANDEZ MD   US Abdomen Limited    Narrative    EXAM: US ABDOMEN LIMITED  LOCATION: [facility]  DATE/TIME: 3/28/2020 11:13 PM    INDICATION: Sepsis. Enlarged gallbladder on CT.  COMPARISON: CT 03/28/2020.  TECHNIQUE: Limited abdominal ultrasound.    FINDINGS:    GALLBLADDER: There are no gallstones. There are areas of gallbladder wall thickening to 5 mm. No sonographic Azar sign.    BILE DUCTS: No biliary dilatation. The common duct measures 3 mm.    LIVER: Normal parenchyma with smooth contour. No focal mass.    RIGHT KIDNEY: Mild dilatation of the right renal collecting system.    PANCREAS: The visualized portions are normal.    No ascites.      Impression    IMPRESSION:  1.  Mild gallbladder wall thickening, a nonspecific finding. No other evidence of cholecystitis. No gallstones or biliary dilatation.  2.  Mild right hydronephrosis.

## 2020-03-29 NOTE — PROGRESS NOTES
Respiratory Therapy  Patient remains intubated on mechanical ventilator with the following settings:    Ventilation Mode: CMV/AC  (Continuous Mandatory Ventilation/ Assist Control)  FiO2 (%): 35 %  Rate Set (breaths/minute): 18 breaths/min  Tidal Volume Set (mL): 500 mL  PEEP (cm H2O): 10 cmH2O  Oxygen Concentration (%): 35 %  Resp: 21    Temp: 99.7  F (37.6  C) Temp src: Bladder BP: 116/66 Pulse: 98 Heart Rate: 96 Resp: 21 SpO2: 100 % O2 Device: Mechanical Ventilator      BS clear and diminished. Will continue to follow and assess the patient's respiratory needs.    Yoly Hamilton, RT  3/29/2020 5:18 AM

## 2020-03-29 NOTE — PLAN OF CARE
ICU End of Shift Summary.  For vital signs and complete assessments, please see documentation flowsheets.     Pertinent assessments: pt vented and sedated, RASS -2-3, LS clear/dim, tele afib CVR  Major Shift Events: K 5.4- calcium gluconate given- recheck 5.7 & 5.0, CT of chest/abd/pelvis- see extensive note, US abd showed ,ild hydronephrosis, Vaso turned off, levo titrating down, heparin not therapeutic and increased, Covid neg- iso d/c'd, urine OP increased- 1000 out, FiO2 decreased, CHG bath given, foam dressing applied to coccyx  Plan (Upcoming Events): monitor labs/heart function  Discharge/Transfer Needs: TBD    Bedside Shift Report Completed : Y  Bedside Safety Check Completed: Y

## 2020-03-30 LAB
ALBUMIN SERPL-MCNC: 2.8 G/DL (ref 3.4–5)
ALP SERPL-CCNC: 133 U/L (ref 40–150)
ALT SERPL W P-5'-P-CCNC: 5985 U/L (ref 0–70)
ANION GAP SERPL CALCULATED.3IONS-SCNC: 17 MMOL/L (ref 3–14)
AST SERPL W P-5'-P-CCNC: 4960 U/L (ref 0–45)
AT III ACT/NOR PPP CHRO: 58 % (ref 85–135)
BASE DEFICIT BLDA-SCNC: 5.5 MMOL/L
BASOPHILS # BLD AUTO: 0.1 10E9/L (ref 0–0.2)
BASOPHILS NFR BLD AUTO: 0.4 %
BILIRUB SERPL-MCNC: 0.8 MG/DL (ref 0.2–1.3)
BUN SERPL-MCNC: 78 MG/DL (ref 7–30)
CALCIUM SERPL-MCNC: 6.8 MG/DL (ref 8.5–10.1)
CHLORIDE SERPL-SCNC: 98 MMOL/L (ref 94–109)
CO2 SERPL-SCNC: 15 MMOL/L (ref 20–32)
CREAT SERPL-MCNC: 5.58 MG/DL (ref 0.66–1.25)
DIFFERENTIAL METHOD BLD: ABNORMAL
EOSINOPHIL # BLD AUTO: 0.1 10E9/L (ref 0–0.7)
EOSINOPHIL NFR BLD AUTO: 0.2 %
ERYTHROCYTE [DISTWIDTH] IN BLOOD BY AUTOMATED COUNT: 15.2 % (ref 10–15)
GFR SERPL CREATININE-BSD FRML MDRD: 9 ML/MIN/{1.73_M2}
GLUCOSE BLDC GLUCOMTR-MCNC: 105 MG/DL (ref 70–99)
GLUCOSE BLDC GLUCOMTR-MCNC: 113 MG/DL (ref 70–99)
GLUCOSE BLDC GLUCOMTR-MCNC: 138 MG/DL (ref 70–99)
GLUCOSE BLDC GLUCOMTR-MCNC: 154 MG/DL (ref 70–99)
GLUCOSE BLDC GLUCOMTR-MCNC: 168 MG/DL (ref 70–99)
GLUCOSE BLDC GLUCOMTR-MCNC: 171 MG/DL (ref 70–99)
GLUCOSE SERPL-MCNC: 130 MG/DL (ref 70–99)
HBV SURFACE AB SERPL IA-ACNC: 0 M[IU]/ML
HBV SURFACE AG SERPL QL IA: NONREACTIVE
HCO3 BLD-SCNC: 19 MMOL/L (ref 21–28)
HCT VFR BLD AUTO: 35.1 % (ref 40–53)
HGB BLD-MCNC: 11.1 G/DL (ref 13.3–17.7)
IMM GRANULOCYTES # BLD: 0.3 10E9/L (ref 0–0.4)
IMM GRANULOCYTES NFR BLD: 1.6 %
INTERPRETATION ECG - MUSE: NORMAL
LMWH PPP CHRO-ACNC: 0.29 IU/ML
LYMPHOCYTES # BLD AUTO: 1.4 10E9/L (ref 0.8–5.3)
LYMPHOCYTES NFR BLD AUTO: 6.5 %
MCH RBC QN AUTO: 25.2 PG (ref 26.5–33)
MCHC RBC AUTO-ENTMCNC: 31.6 G/DL (ref 31.5–36.5)
MCV RBC AUTO: 80 FL (ref 78–100)
MONOCYTES # BLD AUTO: 1.3 10E9/L (ref 0–1.3)
MONOCYTES NFR BLD AUTO: 6 %
NEUTROPHILS # BLD AUTO: 17.7 10E9/L (ref 1.6–8.3)
NEUTROPHILS NFR BLD AUTO: 85.3 %
NRBC # BLD AUTO: 0.1 10*3/UL
NRBC BLD AUTO-RTO: 1 /100
O2/TOTAL GAS SETTING VFR VENT: ABNORMAL %
OXYHGB MFR BLD: 98 % (ref 92–100)
PCO2 BLD: 31 MM HG (ref 35–45)
PH BLD: 7.39 PH (ref 7.35–7.45)
PHOSPHATE SERPL-MCNC: 8.6 MG/DL (ref 2.5–4.5)
PLATELET # BLD AUTO: 365 10E9/L (ref 150–450)
PO2 BLD: 119 MM HG (ref 80–105)
POTASSIUM SERPL-SCNC: 4.1 MMOL/L (ref 3.4–5.3)
PROT SERPL-MCNC: 6.3 G/DL (ref 6.8–8.8)
RBC # BLD AUTO: 4.41 10E12/L (ref 4.4–5.9)
SODIUM SERPL-SCNC: 130 MMOL/L (ref 133–144)
WBC # BLD AUTO: 20.8 10E9/L (ref 4–11)

## 2020-03-30 PROCEDURE — 85520 HEPARIN ASSAY: CPT | Performed by: INTERNAL MEDICINE

## 2020-03-30 PROCEDURE — 82805 BLOOD GASES W/O2 SATURATION: CPT | Performed by: INTERNAL MEDICINE

## 2020-03-30 PROCEDURE — 20000003 ZZH R&B ICU

## 2020-03-30 PROCEDURE — 99233 SBSQ HOSP IP/OBS HIGH 50: CPT | Performed by: INTERNAL MEDICINE

## 2020-03-30 PROCEDURE — 86706 HEP B SURFACE ANTIBODY: CPT | Performed by: INTERNAL MEDICINE

## 2020-03-30 PROCEDURE — C9113 INJ PANTOPRAZOLE SODIUM, VIA: HCPCS | Performed by: INTERNAL MEDICINE

## 2020-03-30 PROCEDURE — 25000128 H RX IP 250 OP 636: Performed by: INTERNAL MEDICINE

## 2020-03-30 PROCEDURE — 25000132 ZZH RX MED GY IP 250 OP 250 PS 637: Performed by: INTERNAL MEDICINE

## 2020-03-30 PROCEDURE — 00000146 ZZHCL STATISTIC GLUCOSE BY METER IP

## 2020-03-30 PROCEDURE — 84100 ASSAY OF PHOSPHORUS: CPT | Performed by: INTERNAL MEDICINE

## 2020-03-30 PROCEDURE — 25000125 ZZHC RX 250: Performed by: OBSTETRICS & GYNECOLOGY

## 2020-03-30 PROCEDURE — 80053 COMPREHEN METABOLIC PANEL: CPT | Performed by: INTERNAL MEDICINE

## 2020-03-30 PROCEDURE — 94003 VENT MGMT INPAT SUBQ DAY: CPT

## 2020-03-30 PROCEDURE — 99291 CRITICAL CARE FIRST HOUR: CPT | Performed by: INTERNAL MEDICINE

## 2020-03-30 PROCEDURE — 40000275 ZZH STATISTIC RCP TIME EA 10 MIN

## 2020-03-30 PROCEDURE — 25000125 ZZHC RX 250: Performed by: INTERNAL MEDICINE

## 2020-03-30 PROCEDURE — 85025 COMPLETE CBC W/AUTO DIFF WBC: CPT | Performed by: INTERNAL MEDICINE

## 2020-03-30 PROCEDURE — 25800030 ZZH RX IP 258 OP 636: Performed by: INTERNAL MEDICINE

## 2020-03-30 PROCEDURE — 87340 HEPATITIS B SURFACE AG IA: CPT | Performed by: INTERNAL MEDICINE

## 2020-03-30 RX ORDER — AMOXICILLIN 250 MG
2 CAPSULE ORAL 2 TIMES DAILY PRN
Status: DISCONTINUED | OUTPATIENT
Start: 2020-03-30 | End: 2020-04-05

## 2020-03-30 RX ORDER — ACETAMINOPHEN 325 MG/1
650 TABLET ORAL EVERY 4 HOURS PRN
Status: DISCONTINUED | OUTPATIENT
Start: 2020-03-30 | End: 2020-04-04 | Stop reason: ALTCHOICE

## 2020-03-30 RX ORDER — B COMPLEX C NO.10/FOLIC ACID 900MCG/5ML
5 LIQUID (ML) ORAL DAILY
Status: DISCONTINUED | OUTPATIENT
Start: 2020-03-30 | End: 2020-04-03

## 2020-03-30 RX ORDER — AMINO AC/PROTEIN HYDR/WHEY PRO 10G-100/30
1 LIQUID (ML) ORAL 2 TIMES DAILY
Status: DISCONTINUED | OUTPATIENT
Start: 2020-03-31 | End: 2020-03-31

## 2020-03-30 RX ORDER — DEXTROSE MONOHYDRATE 100 MG/ML
INJECTION, SOLUTION INTRAVENOUS CONTINUOUS PRN
Status: DISCONTINUED | OUTPATIENT
Start: 2020-03-30 | End: 2020-04-10 | Stop reason: HOSPADM

## 2020-03-30 RX ORDER — ASPIRIN 81 MG/1
81 TABLET, CHEWABLE ORAL DAILY
Status: DISCONTINUED | OUTPATIENT
Start: 2020-03-31 | End: 2020-04-04

## 2020-03-30 RX ORDER — POLYETHYLENE GLYCOL 3350 17 G/17G
17 POWDER, FOR SOLUTION ORAL DAILY PRN
Status: DISCONTINUED | OUTPATIENT
Start: 2020-03-30 | End: 2020-04-05

## 2020-03-30 RX ORDER — AMOXICILLIN 250 MG
1 CAPSULE ORAL 2 TIMES DAILY PRN
Status: DISCONTINUED | OUTPATIENT
Start: 2020-03-30 | End: 2020-04-05

## 2020-03-30 RX ADMIN — ASPIRIN 81 MG: 81 TABLET, COATED ORAL at 08:14

## 2020-03-30 RX ADMIN — Medication 5 ML: at 14:33

## 2020-03-30 RX ADMIN — Medication 2 MG/HR: at 07:30

## 2020-03-30 RX ADMIN — PANTOPRAZOLE SODIUM 40 MG: 40 INJECTION, POWDER, FOR SOLUTION INTRAVENOUS at 08:14

## 2020-03-30 RX ADMIN — TAZOBACTAM SODIUM AND PIPERACILLIN SODIUM 2.25 G: 250; 2 INJECTION, SOLUTION INTRAVENOUS at 11:41

## 2020-03-30 RX ADMIN — HEPARIN SODIUM 1650 UNITS/HR: 10000 INJECTION, SOLUTION INTRAVENOUS at 11:41

## 2020-03-30 RX ADMIN — GUAIFENESIN 10 ML: 100 SOLUTION ORAL at 00:21

## 2020-03-30 RX ADMIN — PROPOFOL 20 MCG/KG/MIN: 10 INJECTION, EMULSION INTRAVENOUS at 02:29

## 2020-03-30 RX ADMIN — GUAIFENESIN 10 ML: 100 SOLUTION ORAL at 20:25

## 2020-03-30 RX ADMIN — TAZOBACTAM SODIUM AND PIPERACILLIN SODIUM 2.25 G: 250; 2 INJECTION, SOLUTION INTRAVENOUS at 05:27

## 2020-03-30 RX ADMIN — GUAIFENESIN 10 ML: 100 SOLUTION ORAL at 04:53

## 2020-03-30 RX ADMIN — VANCOMYCIN HYDROCHLORIDE 2000 MG: 1 INJECTION, POWDER, LYOPHILIZED, FOR SOLUTION INTRAVENOUS at 00:21

## 2020-03-30 RX ADMIN — Medication 2 MG/HR: at 20:48

## 2020-03-30 RX ADMIN — GUAIFENESIN 10 ML: 100 SOLUTION ORAL at 23:32

## 2020-03-30 RX ADMIN — PROPOFOL 10 MCG/KG/MIN: 10 INJECTION, EMULSION INTRAVENOUS at 14:33

## 2020-03-30 RX ADMIN — GUAIFENESIN 10 ML: 100 SOLUTION ORAL at 11:41

## 2020-03-30 RX ADMIN — AMIODARONE HYDROCHLORIDE 0.5 MG/MIN: 50 INJECTION, SOLUTION INTRAVENOUS at 23:38

## 2020-03-30 RX ADMIN — Medication 0.11 MCG/KG/MIN: at 11:41

## 2020-03-30 RX ADMIN — AMIODARONE HYDROCHLORIDE 0.5 MG/MIN: 50 INJECTION, SOLUTION INTRAVENOUS at 16:22

## 2020-03-30 RX ADMIN — ACETAMINOPHEN 650 MG: 325 TABLET, FILM COATED ORAL at 23:32

## 2020-03-30 RX ADMIN — Medication 2 MG/HR: at 01:20

## 2020-03-30 RX ADMIN — GUAIFENESIN 10 ML: 100 SOLUTION ORAL at 16:23

## 2020-03-30 RX ADMIN — Medication 2 MG/HR: at 15:20

## 2020-03-30 RX ADMIN — GUAIFENESIN 10 ML: 100 SOLUTION ORAL at 08:14

## 2020-03-30 RX ADMIN — TAZOBACTAM SODIUM AND PIPERACILLIN SODIUM 2.25 G: 250; 2 INJECTION, SOLUTION INTRAVENOUS at 18:35

## 2020-03-30 ASSESSMENT — MIFFLIN-ST. JEOR: SCORE: 1632.44

## 2020-03-30 ASSESSMENT — ACTIVITIES OF DAILY LIVING (ADL)
ADLS_ACUITY_SCORE: 18

## 2020-03-30 NOTE — PROGRESS NOTES
"Intensivist note    Comfortable on vent; following some commands    His creatinine continues to slowly rise, but he is making a fair amount of urine on IV Bumex infusion at 2.    1. Acute respiratory failure- on FIO2 30% and +10 and now +8 PEEP. We ill titrate PEEP down slowly. He may be ready for a trial of extubation soon.   2. Shock, likely combination cardiogenic and septic and remains on Levophed at 0.11. cultures negative but suspicion GB may be occult cause; continue vanco and zosyn   3. Atrial fibrillation- HR in 80's  4. Cardiomyopathy EF 20-25%  5. Acute renal failure (baseline creatinine 2.3)- as above, creatinine now above 5; continue Bumex. Case discussed with Dr. Rodrigez and appreciate his input and fine care  6. Shock liver- enzymes trending down.  Overall, he remains acute and critical. Overall some improvement, especially in respiratory function, but persists with MODS. I spent 40 minutes of critical care time with him today.    Physical exam  Well developed male NAD  /76   Pulse 78   Temp 100  F (37.8  C)   Resp 22   Ht 1.74 m (5' 8.5\")   Wt 92 kg (202 lb 13.2 oz)   SpO2 99%   BMI 30.39 kg/m    Lungs are mostly clear on exam today  Heart is RRR  Abdomen is soft and mostly non-tender with suspicion for tenderness in epigastrium  Extremities are warm with minimal edema  Skin shows no cyanosis or mottling  CNS moves extremities trace to command    Labs reviewed    luisa lora  March 30, 2020                  "

## 2020-03-30 NOTE — PROGRESS NOTES
[facility]             Hospitalist Progress Note    Name: Gene Pagan    MRN: [id number]  YOB: 1945    Age: 74 year old  Date of admission: 3/27/2020  Primary care provider: Red Rogers      Reason for Stay (Diagnosis): Acute shock with acute respiratory failure         Assessment and Plan:      Summary of Stay: Patient is a 74-year-old male with a past medical history significant for type 2 diabetes, and BPH who was seen initially at an outside urgent care center and transferred to the ED for atrial fibrillation with RVR.  According to history, patient reported 6 to 8 weeks of progressive shortness of breath.  His shortness of breath initially started with exertion but had progressed to dyspnea even at rest.  For the past few nights prior to admission, he did report some PND symptoms. Since presentation, patient developed progressive shortness of breath overnight on 3/28/2020.  Was also notably hypotensive.  He was placed on BiPAP initially with some favorable results but respiratory status continued to decompensate and was eventually intubated.  He was also hypotensive and started on levophed. He was also notably in acute renal failure and was initially oliguric.  Nephrology has since been consulted and did raise the discussion of possible transfer to [facility] for CRRT but family had decided to keep the patient here and not transfer understanding that his clinical course can deteriorate at any point in time.    He lives with his significant other for the past 20 years although in talking to his significant other, [place], they are not legally  and he has 2 daughters.     Problem List/Plan:    Acute shock with acute respiratory failure: Given clinical history leading up to hospitalization, suspect cardiogenic in nature with flash pulmonary edema although septic shock cannot be ruled out.  Transthoracic echo demonstrated an EF of 20 to 25% with  severe global hypokinesia and grade 3 diastolic dysfunction, moderate to severe MR as well as moderate to severe RV systolic function.  Not entirely sure if patient's acute decompensation of systolic heart failure could also be related to tachycardic cardiomyopathy from his atrial fibrillation.  CT of the chest, abdomen, and pelvis on 3/28/2020 showed some gallbladder stranding as well as stranding around both kidneys and collecting symptoms.  Abdominal ultrasound demonstrated some nonspecific gallbladder wall thickening and mild right-sided hydronephrosis  -Continue Levophed  -Empirically on vancomycin and Zosyn.  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  Creatinine continues to rise but now making urine on a Bumex drip.  -Discussed with nephrology.  We will continue Bumex drip  -Family initially did not want CRRT but may contemplate regular dialysis here if creatinine continues to worsen as patient does seem to be slowly improving since his decompensation on 3/28/2020.  Did discuss the case with patient's significant other who will confer with patient's daughter to decide on whether or not they should pursue dialysis if that was an option    Acute transaminitis: Secondary to shock liver. LFTs downtrending  -Continue to monitor    Paroxysmal atrial fibrillation: Rate reasonably controlled at this time.  -Discussed with cardiology.  Will decrease amiodarone drip to 0.5 mg/h  -Currently also on a heparin drip    Fluid/electrolyte/nutrition: per discussion with significant other, goals are still restorative so we will start tube feeds via NG tube  -Nutrition consult in order tube feeds per routine    DVT Prophylaxis: Heparin drip  Code Status: DNR  Discharge Dispo: To be determined  Estimated Disch Date / # of Days until Disch: To be determined based on patient's clinical progress with acute  "respiratory failure and acute shock    I did update the patient's significant other, [place], and they will decide on which way to proceed in terms of offering dialysis but she does reaffirm that patient is DNR        Interval History (Subjective):      Unable         Physical Exam:      Vital signs:  Temp: 99.9  F (37.7  C) Temp src: Bladder BP: 114/73 Pulse: 89 Heart Rate: 79 Resp: 23 SpO2: 100 % O2 Device: Mechanical Ventilator Oxygen Delivery: 2 LPM Height: 174 cm (5' 8.5\") Weight: 92 kg (202 lb 13.2 oz)  Estimated body mass index is 30.39 kg/m  as calculated from the following:    Height as of this encounter: 1.74 m (5' 8.5\").    Weight as of this encounter: 92 kg (202 lb 13.2 oz).    I/O last 3 completed shifts:  In: 2125.72 [I.V.:1945.72; NG/GT:180]  Out: 4650 [Urine:4650]  Vitals:    03/27/20 1343 03/27/20 1746 03/28/20 0630 03/29/20 0555   Weight: 88.9 kg (195 lb 15.8 oz) 92.7 kg (204 lb 4.8 oz) 93.3 kg (205 lb 11.2 oz) 94.7 kg (208 lb 12.4 oz)    03/30/20 0600   Weight: 92 kg (202 lb 13.2 oz)       Constitutional:  Intubated sedated     Respiratory:  On the ventilator.  Lung sounds are diminished at the bases but relatively clear anteriorly   Cardiovascular:  Irregularly irregular, normal S1 and S2, and no murmur noted   Abdomen: Normal bowel sounds, soft, non-distended, non-tender   Skin: No rashes, no cyanosis, dry to touch   Neuro:  Pupils constricted but equally round and reactive to light   Extremities:  2+ edema   HEENT Normocephalic, atraumatic, normal nasal turbinates; oropharynx clear   Neck Supple; nl inspection; trachea midline; no thryomegaly   Psychiatric:  Unable to assess          Medications:      All current medications were reviewed with changes reflected in problem list.         Data:      All new lab and imaging data was reviewed.   Labs:  Recent Labs   Lab 03/28/20  0947 03/28/20  0822 03/28/20  0800   CULT No growth No growth after 2 days No growth after 2 days     Recent Labs   Lab " 03/30/20  0500 03/29/20  0412 03/28/20  0530   WBC 20.8* 20.6* 26.1*   HGB 11.1* 11.0* 10.9*   HCT 35.1* 35.1* 36.4*   MCV 80 82 85    413 510*     Recent Labs   Lab 03/30/20  0500 03/29/20  1528 03/29/20  0910  03/28/20  1557   * 130* 131*   < > 134   POTASSIUM 4.1 4.5 4.6   < > 4.9   CHLORIDE 98 99 101   < > 104   CO2 15* 16* 16*   < > 17*   ANIONGAP 17* 15* 14   < > 12   * 165* 145*   < > 114*   BUN 78* 70* 64*   < > 51*   CR 5.58* 5.14* 4.94*   < > 4.20*   GFRESTIMATED 9* 10* 10*   < > 13*   GFRESTBLACK 10* 12* 12*   < > 15*   HARPER 6.8* 6.9* 7.1*   < > 8.2*   MAG  --   --  2.2  --  2.3   PHOS  --   --  7.6*  --  7.5*   PROTTOTAL 6.3* 6.2* 6.2*   < > 6.4*   ALBUMIN 2.8* 2.8* 2.8*   < > 2.8*   BILITOTAL 0.8 0.7 0.7   < > 0.6   ALKPHOS 133 121 115   < > 93   AST 4,960* 9,081* 12,074*   < > 7,144*   ALT 5,985* 7,094* 7,341*   < > 4,789*    < > = values in this interval not displayed.     Recent Labs   Lab 03/28/20  1900 03/28/20  1230 03/28/20  1015   TROPI 0.439* 0.336* 0.294*     Recent Labs   Lab 03/27/20  1348   TSH 2.62      Imaging:   No results found for this or any previous visit (from the past 24 hour(s)).    Renaldo Hameed [phone]

## 2020-03-30 NOTE — PROGRESS NOTES
Renal Medicine Progress Note                                Gene Pagan MRN# [id number]   Age: 74 year old YOB: 1945   Date of Admission: 3/27/2020 Hospital LOS: 3                  Assessment/Plan:     74-year-old male admitted through the emergency room dated 03/27/2020 in the setting of increasing shortness of breath and tachycardia.     Evaluated with respect to apparent acute on chronic renal insufficiency in the setting of probable cardiogenic shock.     1. Baseline CKD III/IV   -creatinine 2.0 mg/dl range   -GFR 30 ml/min  2.  Dipstick proteinuria  3.  ARF   -non oliguric   -pre renal v ATN  4.  Hemodynamic instability   -NE  5.  Metabolic acidosis   -lactate normalized  6.  Hyponatremia    -hypo osmolar hypervolemic  7.  Respiratory failure   -vented  8.  Shock liver      Multisystem organ failure  Creatinine continues to rise  Non oliguric on bumetanide drip    No dialysis indication at this time  Initiation of dialysis likely commits patient indefinitely       Continue diuretic  Further discussion regarding goals of care       Interval History:     Intubated and sedated  IO and UO reviewed  Labs reviewed    Discussed with bedside RN and ICU MD     ROS:     Intubated and sedated: ROS unable    Medications and Allergies:     Reviewed    Physical Exam:     Vitals were reviewed  Patient Vitals for the past 8 hrs:   BP Temp Temp src Pulse Heart Rate Resp SpO2 Weight   03/30/20 1145 -- 100  F (37.8  C) -- -- 84 20 100 % --   03/30/20 1130 100/66 99.9  F (37.7  C) -- 85 90 21 99 % --   03/30/20 1125 -- 99.9  F (37.7  C) -- -- 79 23 100 % --   03/30/20 1120 -- 99.9  F (37.7  C) -- -- 101 21 100 % --   03/30/20 1115 -- 99.9  F (37.7  C) -- -- 92 15 (!) 87 % --   03/30/20 1100 114/73 99.7  F (37.6  C) -- 89 89 20 96 % --   03/30/20 1015 -- 99.9  F (37.7  C) -- -- 101 21 95 % --   03/30/20 1000 116/79 99.9  F (37.7  C) -- 86 92 20 95 % --   03/30/20 0945 -- 99.9  F (37.7  C) -- -- 106 19 100 % --    03/30/20 0930 123/78 99.9  F (37.7  C) -- 90 92 24 100 % --   03/30/20 0915 -- 99.9  F (37.7  C) -- -- 77 25 100 % --   03/30/20 0900 118/73 99.9  F (37.7  C) -- 81 79 21 100 % --   03/30/20 0845 -- 99.9  F (37.7  C) -- -- 103 27 100 % --   03/30/20 0830 107/64 100  F (37.8  C) -- 98 90 25 100 % --   03/30/20 0815 -- 100  F (37.8  C) -- -- 78 19 100 % --   03/30/20 0800 117/80 100  F (37.8  C) Bladder 86 89 23 99 % --   03/30/20 0700 119/70 100.2  F (37.9  C) -- 80 80 24 100 % --   03/30/20 0600 115/71 100.4  F (38  C) -- 83 92 18 100 % 92 kg (202 lb 13.2 oz)   03/30/20 0530 116/67 100.4  F (38  C) -- 86 89 22 100 % --   03/30/20 0505 -- -- -- -- 72 25 100 % --   03/30/20 0500 113/86 -- -- 86 84 24 100 % --   03/30/20 0435 -- -- -- -- 73 22 100 % --   03/30/20 0430 100/70 100.4  F (38  C) -- 88 76 21 -- --   03/30/20 0415 -- -- -- -- 75 20 -- --   03/30/20 0400 95/68 100.2  F (37.9  C) Bladder 84 89 20 97 % --     I/O last 3 completed shifts:  In: 2125.72 [I.V.:1945.72; NG/GT:180]  Out: 4650 [Urine:4650]    Vitals:    03/27/20 1343 03/27/20 1746 03/28/20 0630 03/29/20 0555   Weight: 88.9 kg (195 lb 15.8 oz) 92.7 kg (204 lb 4.8 oz) 93.3 kg (205 lb 11.2 oz) 94.7 kg (208 lb 12.4 oz)    03/30/20 0600   Weight: 92 kg (202 lb 13.2 oz)     GENERAL:  intubated and sedated; chronically ill appearing   HEENT: ETT  RESP:  clear anteriorly  CV: RRR, normal S1 S2  ABDOMEN: soft, nontender, no HSM or masses and bowel sounds normal  :  baker catheter  MS: no clubbing, cyanosis   EXT: bilateral edema    Data:     Recent Labs   Lab 03/30/20  0500 03/29/20  1528 03/29/20  0910 03/29/20  0412   * 130* 131* 131*   POTASSIUM 4.1 4.5 4.6 5.0   CHLORIDE 98 99 101 101   CO2 15* 16* 16* 16*   ANIONGAP 17* 15* 14 14   * 165* 145* 173*   BUN 78* 70* 64* 65*   CR 5.58* 5.14* 4.94* 4.85*   GFRESTIMATED 9* 10* 10* 11*   GFRESTBLACK 10* 12* 12* 12*   HARPER 6.8* 6.9* 7.1* 7.4*         Recent Labs   Lab Test 03/30/20  0500  03/29/20  1528 03/29/20  0910 03/29/20  0412 03/28/20  2346 03/28/20  1900 03/28/20  1557 03/28/20  1230 03/28/20  1015 03/28/20  0530   CR 5.58* 5.14* 4.94* 4.85* 4.59* 4.46* 4.20* 3.94* 3.79* 3.57*     Recent Labs   Lab 03/30/20  0500 03/29/20  1528 03/29/20  0910 03/29/20  0412   ALBUMIN 2.8* 2.8* 2.8* 2.8*     Recent Labs   Lab 03/29/20  0609 03/28/20  1900 03/28/20  1557 03/28/20  1230   LACT 1.7 3.2* 3.3* 3.9*     Recent Labs   Lab 03/30/20  0500 03/29/20  0910 03/29/20  0412 03/28/20  1557 03/28/20  0530 03/28/20  0030   PHOS  --  7.6*  --  7.5*  --   --    HGB 11.1*  --  11.0*  --  10.9* 11.5*     Recent Labs   Lab 03/28/20  0947   COLOR Dark Brown   APPEARANCE Cloudy   URINEGLC 200*   URINEBILI Negative   URINEKETONE Negative   SG 1.024   UBLD Large*   URINEPH 7.5*   PROTEIN 300*   NITRITE Negative   LEUKEST Moderate*   RBCU >182*   WBCU 179*         G Terrell Hilario MD    [facility] - Nephrology  [phone]

## 2020-03-30 NOTE — PHARMACY-CONSULT NOTE
Feeding tube consult    Meds reviewed- change oral to FT route where able.  Changed ASA to chew tab from EC.  No interactions noted

## 2020-03-30 NOTE — PLAN OF CARE
ICU End of Shift Summary.  For vital signs and complete assessments, please see documentation flowsheets.     Pertinent assessments: Sedated with propofol RAAS goal -2--3.  Decreased propofol as tolerated.  Able to titrate Levophed down post calcium gluconate.   Decreased Bumex infusion per MD still adequate UOP.  Increased edema to scrotum and penis during the day.  NPO.    Major Shift Events:   Plan (Upcoming Events):   Discharge/Transfer Needs:     Bedside Shift Report Completed :   Bedside Safety Check Completed:

## 2020-03-30 NOTE — PROGRESS NOTES
Respiratory Therapy  Patient remains intubated on mechanical ventilator with the following settings:    Ventilation Mode: CMV/AC  (Continuous Mandatory Ventilation/ Assist Control)  FiO2 (%): 30 %  Rate Set (breaths/minute): 18 breaths/min  Tidal Volume Set (mL): 500 mL  PEEP (cm H2O): 10 cmH2O  Oxygen Concentration (%): (S) 30 %  Resp: 20    Temp: 100.2  F (37.9  C) Temp src: Bladder BP: (!) 88/68 Pulse: 95 Heart Rate: 92 Resp: 20 SpO2: 100 % O2 Device: Mechanical Ventilator      BS clear and diminished.  Will continue to follow and assess the patient's respiratory needs.    Yoly Hamilton, RT  3/30/2020 1:00 AM

## 2020-03-30 NOTE — CONSULTS
"CLINICAL NUTRITION SERVICES  -  ASSESSMENT NOTE      MALNUTRITION:  % Weight Loss:  None noted  % Intake:  Decreased intake does not meet criteria for malnutrition --> during admit  Subcutaneous Fat Loss: Unable to complete, deferred  Muscle Loss: Unable to complete, deferred  Fluid Retention: None currently documented    Malnutrition Diagnosis: Unable to determine due to lack of history and physical exam per direction of department guidelines in the setting of COVID19          REASON FOR ASSESSMENT  Gene Pagan is a 74 year old male seen by Registered Dietitian for Provider Order - Registered Dietitian to Assess and Order TF per Medical Nutrition Therapy Protocol.    PMH of: DMII, CKD stage III/IV (baseline Cr >2),    Admit 2/2: A fib, SOB, now cardiogenic/septic shock.    NUTRITION HISTORY  - Information obtained from chart given off-site per direction of department guidelines in the setting of COVID19 + patient intubated/sedated.  - Allergies: NKFA.      CURRENT NUTRITION ORDERS  Diet Order:     Cardiac/No caffeine (assuming NPO given below)    Current Intake/Tolerance:  Transferred to ICU on 3/27/2020, initially improved with use of bipap, though unfortunately required intubation 3/28/2020.  Negligible PO intakes since admit, EN not originally pursued per direction of intensivist (refer to notes).       NUTRITION FOCUSED PHYSICAL ASSESSMENT FOR DIAGNOSING MALNUTRITION)  No:  per direction of department guidelines in the setting of COVID19             Observed:    Deferred, d/t above    Obtained from Chart/Interdisciplinary Team:  - Nephrology following, refer to notes regarding dialysis needs/ongoing goals of care discussions  - No documentation of skin issues   - Stooling patterns reviewed, no BM since admit  - 16 Fr NGT (XR \"confirmed\" by CT 3/28/2020), previously to [facility]      ANTHROPOMETRICS  Height: 5' 8.5\"  Weight: 202 lbs 13.17 oz  Body mass index is 30.39 kg/m .  Weight Status:  Obesity Grade I BMI " 30-34.9  Weight History:  Wt Readings from Last 10 Encounters:   03/30/20 92 kg (202 lb 13.2 oz)   - Wt of 205# from 1/20/2020 per care everywhere.    LABS  Labs reviewed:  Electrolytes  Potassium (mmol/L)   Date Value   03/30/2020 4.1   03/29/2020 4.5   03/29/2020 4.6     Phosphorus (mg/dL)   Date Value   03/29/2020 7.6 (H)   03/28/2020 7.5 (H)    Blood Glucose  Glucose (mg/dL)   Date Value   03/30/2020 130 (H)   03/29/2020 165 (H)   03/29/2020 145 (H)   03/29/2020 173 (H)   03/28/2020 166 (H)     Hemoglobin A1C (%)   Date Value   03/27/2020 6.2 (H)    Inflammatory Markers  CRP Inflammation (mg/L)   Date Value   03/27/2020 23.1 (H)     WBC (10e9/L)   Date Value   03/30/2020 20.8 (H)   03/29/2020 20.6 (H)   03/28/2020 26.1 (H)     Albumin (g/dL)   Date Value   03/30/2020 2.8 (L)   03/29/2020 2.8 (L)   03/29/2020 2.8 (L)      Magnesium (mg/dL)   Date Value   03/29/2020 2.2   03/28/2020 2.3     Sodium (mmol/L)   Date Value   03/30/2020 130 (L)   03/29/2020 130 (L)   03/29/2020 131 (L)    Renal  Urea Nitrogen (mg/dL)   Date Value   03/30/2020 78 (H)   03/29/2020 70 (H)   03/29/2020 64 (H)     Creatinine (mg/dL)   Date Value   03/30/2020 5.58 (H)   03/29/2020 5.14 (H)   03/29/2020 4.94 (H)     Additional  Ketones Urine (mg/dL)   Date Value   03/28/2020 Negative        B/P: 100/66, T: 100, P: 85, R: 20      MEDICATIONS  Medications reviewed:    aspirin  81 mg Oral Daily     guaiFENesin  10 mL Oral or Feeding Tube Q4H     insulin aspart  1-6 Units Subcutaneous Q4H     pantoprazole (PROTONIX) IV  40 mg Intravenous Daily     piperacillin-tazobactam  2.25 g Intravenous Q6H     sodium chloride (PF)  3 mL Intracatheter Q8H     sodium chloride (PF)  3 mL Intracatheter Q8H     vancomycin (VANCOCIN) IV  2,000 mg (central catheter) Intravenous Q48H        amiodarone 0.5 mg/min (03/30/20 1128)     bumetanide 2 mg/hr (03/30/20 1000)     HEParin 1,650 Units/hr (03/30/20 1141)     norepinephrine 0.11 mcg/kg/min (03/30/20 1141)     -  MEDICATION INSTRUCTIONS -       - MEDICATION INSTRUCTIONS -       propofol (DIPRIVAN) infusion 10 mcg/kg/min (03/30/20 1000)      - off vaso, prop highest rate of 16.7 ml/hr over the past 24 hrs    ASSESSED NUTRITION NEEDS PER APPROVED PRACTICE GUIDELINES:    Dosing Weight   92 kg - actual body weight for energy  70 kg - ideal body weight for protein  Estimated Energy Needs: 3236-7858 kcals (11-14 Kcal/Kg)  Justification: vented  Estimated Protein Needs: 105-140 grams protein (1.5-2 g pro/Kg)  Justification: hypercatabolism with critical illness and consideration of CKD, not yet on dialysis (ASPEN guidelines = at least 2 g/kg ideal body weight)  Estimated Fluid Needs: per MD      NUTRITION DIAGNOSIS:  Inadequate protein-energy intake related to respiratory needs, now intubated as evidenced by meeting <50% energy needs via Propofol, 0% protein needs since 3 day admit.    NUTRITION INTERVENTIONS  Recommendations / Nutrition Prescription  Initiation of EN per MD:    - EAD: 16 Fr NGT (3/28/2020)  - Continuous  - Peptamen Intense VHP at 40 ml/hr x 24 hrs  - 960 mL provides 960 kcal, 88 g protein, 73 g CHO, 4 g fiber, and 806 mL free water daily.  - 1306 mg K, 653 mg phosphorus daily.  - Renal MVI daily.  - Free water flushes of 30 ml q 4 hrs.   - 1 pkt Prosource BID to provide additional 22 g protein daily, readdress ability to increase daily based on goals of care and consideration of renal function.  - Initiate at 10 ml/hr x 10 hrs.  Advance by 10 mL q 10 hrs as tolerated.    - Goal regimen pending Propofol trending.  - Phos add-on for today, elevated yesterday + daily electrolyte checks while advancing to goal.  - Daily weights.    Implementation  Nutrition education: Not appropriate at this time due to patient condition    Collaboration and Referral of Nutrition care: Discussed POC with team during rounds via phone.    EN Composition, EN Schedule and Feeding Tube Flush: As above.    Nutrition Goals  EN to reach goal  rate w/in 48 hrs of initiation.      MONITORING AND EVALUATION:  Progress towards goals will be monitored and evaluated per protocol and Practice Guidelines        Catherine Rao RDN, LD  Clinical Dietitian  3rd floor/ICU: [phone]  All other floors: [phone]  Weekend/holiday: [phone]

## 2020-03-30 NOTE — PROGRESS NOTES
Ventilation Mode: CMV/AC  (Continuous Mandatory Ventilation/ Assist Control)  FiO2 (%): 45 %  Rate Set (breaths/minute): 18 breaths/min  Tidal Volume Set (mL): 500 mL  PEEP (cm H2O): 8 cmH2O  Oxygen Concentration (%): 30 %  Resp: 18  [facility] ICU VENTILATOR RESPIRATORY NOTE  Date of Admission: 3/27/2020  Date of Intubation (most recent): 3/28/2020  Reason for Mechanical Ventilation: Shock - cardiogenic likely  Number of Days on Mechanical Ventilation: 3  Met Criteria for Pressure Support Trial: No    Reason for No Pressure Support Trial: Peep 8  Significant Events Today: None  ABG Results:   Recent Labs   Lab 03/30/20  1705 03/29/20  1848 03/29/20  0412 03/28/20  1230   PH 7.39 7.36 7.21* 7.32*   PCO2 31* 29* 36 28*   PO2 119* 141* 111* 195*   HCO3 19* 17* 14* 14*   O2PER Ventilator 55% 35% 50%         ETT appearance on chest x-ray: Mid trachea    Plan:  Continue to monitor and wean as able.

## 2020-03-30 NOTE — PLAN OF CARE
ICU End of Shift Summary.  For vital signs and complete assessments, please see documentation flowsheets.     Pertinent assessments: Pt sedated, RASS goal -2 to -3, follows commands. LS crackles/diminished, BS audible - no BM. Mckenzie catheter in place. Tele - Afib CVR.   Major Shift Events:   - Around 1130 pt desatted to 70s, RT called. Large plug suctioned out of ETT with lavage and sterile suction.   - Levophed weaned.   - PEEP decreased to 8 by Dr. Hay.   - Amiodarone infusion decreased.   Plan (Upcoming Events): Wean Levophed, continue ICU cares.  Discharge/Transfer Needs: TBD    Bedside Shift Report Completed : Y  Bedside Safety Check Completed: Y

## 2020-03-30 NOTE — PLAN OF CARE
ICU End of Shift Summary.  For vital signs and complete assessments, please see documentation flowsheets.     Pertinent assessments: Pt remains intubated and sedated- RASS -2-3, LS dim, baker, BM 3/27, tele a fib CVR  Major Shift Events: FiO2 weaned to 30%, large urine OP, levo increased to 0.14 to maintain MAP parameters, prop tritiated 10, 3+ scrotal edema- elevated, CHG bath complete- foam dressing applied to coccyx   Plan (Upcoming Events): wean gtts/ vent, monitor crt  Discharge/Transfer Needs: TBD    Bedside Shift Report Completed : Y  Bedside Safety Check Completed: Y

## 2020-03-31 ENCOUNTER — APPOINTMENT (OUTPATIENT)
Dept: GENERAL RADIOLOGY | Facility: CLINIC | Age: 75
DRG: 308 | End: 2020-03-31
Attending: INTERNAL MEDICINE
Payer: COMMERCIAL

## 2020-03-31 LAB
ALBUMIN SERPL-MCNC: 2.5 G/DL (ref 3.4–5)
ALP SERPL-CCNC: 133 U/L (ref 40–150)
ALT SERPL W P-5'-P-CCNC: 3860 U/L (ref 0–70)
ANION GAP SERPL CALCULATED.3IONS-SCNC: 16 MMOL/L (ref 3–14)
AST SERPL W P-5'-P-CCNC: 1606 U/L (ref 0–45)
BASE DEFICIT BLDA-SCNC: 2.4 MMOL/L
BASE DEFICIT BLDA-SCNC: 3.9 MMOL/L
BASOPHILS # BLD AUTO: 0.1 10E9/L (ref 0–0.2)
BASOPHILS NFR BLD AUTO: 0.5 %
BILIRUB SERPL-MCNC: 0.9 MG/DL (ref 0.2–1.3)
BUN SERPL-MCNC: 91 MG/DL (ref 7–30)
CALCIUM SERPL-MCNC: 7.4 MG/DL (ref 8.5–10.1)
CHLORIDE SERPL-SCNC: 98 MMOL/L (ref 94–109)
CO2 SERPL-SCNC: 21 MMOL/L (ref 20–32)
CREAT SERPL-MCNC: 5.76 MG/DL (ref 0.66–1.25)
DIFFERENTIAL METHOD BLD: ABNORMAL
EOSINOPHIL # BLD AUTO: 0.2 10E9/L (ref 0–0.7)
EOSINOPHIL NFR BLD AUTO: 1.1 %
ERYTHROCYTE [DISTWIDTH] IN BLOOD BY AUTOMATED COUNT: 15.3 % (ref 10–15)
GFR SERPL CREATININE-BSD FRML MDRD: 9 ML/MIN/{1.73_M2}
GLUCOSE BLDC GLUCOMTR-MCNC: 100 MG/DL (ref 70–99)
GLUCOSE BLDC GLUCOMTR-MCNC: 137 MG/DL (ref 70–99)
GLUCOSE BLDC GLUCOMTR-MCNC: 138 MG/DL (ref 70–99)
GLUCOSE BLDC GLUCOMTR-MCNC: 153 MG/DL (ref 70–99)
GLUCOSE BLDC GLUCOMTR-MCNC: 155 MG/DL (ref 70–99)
GLUCOSE BLDC GLUCOMTR-MCNC: 166 MG/DL (ref 70–99)
GLUCOSE SERPL-MCNC: 145 MG/DL (ref 70–99)
HCO3 BLD-SCNC: 20 MMOL/L (ref 21–28)
HCO3 BLD-SCNC: 21 MMOL/L (ref 21–28)
HCT VFR BLD AUTO: 33.6 % (ref 40–53)
HGB BLD-MCNC: 10.8 G/DL (ref 13.3–17.7)
IMM GRANULOCYTES # BLD: 0.2 10E9/L (ref 0–0.4)
IMM GRANULOCYTES NFR BLD: 1.1 %
INTERPRETATION ECG - MUSE: NORMAL
LMWH PPP CHRO-ACNC: 0.28 IU/ML
LYMPHOCYTES # BLD AUTO: 1.1 10E9/L (ref 0.8–5.3)
LYMPHOCYTES NFR BLD AUTO: 6.7 %
MAGNESIUM SERPL-MCNC: 2.3 MG/DL (ref 1.6–2.3)
MCH RBC QN AUTO: 25.4 PG (ref 26.5–33)
MCHC RBC AUTO-ENTMCNC: 32.1 G/DL (ref 31.5–36.5)
MCV RBC AUTO: 79 FL (ref 78–100)
MONOCYTES # BLD AUTO: 1 10E9/L (ref 0–1.3)
MONOCYTES NFR BLD AUTO: 5.8 %
NEUTROPHILS # BLD AUTO: 14.4 10E9/L (ref 1.6–8.3)
NEUTROPHILS NFR BLD AUTO: 84.8 %
NRBC # BLD AUTO: 0.1 10*3/UL
NRBC BLD AUTO-RTO: 1 /100
O2/TOTAL GAS SETTING VFR VENT: ABNORMAL %
O2/TOTAL GAS SETTING VFR VENT: ABNORMAL %
OXYHGB MFR BLD: 97 % (ref 92–100)
OXYHGB MFR BLD: 98 % (ref 92–100)
PCO2 BLD: 30 MM HG (ref 35–45)
PCO2 BLD: 31 MM HG (ref 35–45)
PH BLD: 7.42 PH (ref 7.35–7.45)
PH BLD: 7.44 PH (ref 7.35–7.45)
PHOSPHATE SERPL-MCNC: 8.5 MG/DL (ref 2.5–4.5)
PLATELET # BLD AUTO: 331 10E9/L (ref 150–450)
PO2 BLD: 111 MM HG (ref 80–105)
PO2 BLD: 125 MM HG (ref 80–105)
POTASSIUM SERPL-SCNC: 3.1 MMOL/L (ref 3.4–5.3)
PROT SERPL-MCNC: 6.2 G/DL (ref 6.8–8.8)
RBC # BLD AUTO: 4.25 10E12/L (ref 4.4–5.9)
SODIUM SERPL-SCNC: 134 MMOL/L (ref 133–144)
VANCOMYCIN SERPL-MCNC: 24 MG/L
WBC # BLD AUTO: 17 10E9/L (ref 4–11)

## 2020-03-31 PROCEDURE — 85520 HEPARIN ASSAY: CPT | Performed by: INTERNAL MEDICINE

## 2020-03-31 PROCEDURE — 99291 CRITICAL CARE FIRST HOUR: CPT | Performed by: INTERNAL MEDICINE

## 2020-03-31 PROCEDURE — 85025 COMPLETE CBC W/AUTO DIFF WBC: CPT | Performed by: INTERNAL MEDICINE

## 2020-03-31 PROCEDURE — 25000125 ZZHC RX 250: Performed by: INTERNAL MEDICINE

## 2020-03-31 PROCEDURE — 27210437 ZZH NUTRITION PRODUCT SEMIELEM INTERMED LITER

## 2020-03-31 PROCEDURE — 25000128 H RX IP 250 OP 636: Performed by: INTERNAL MEDICINE

## 2020-03-31 PROCEDURE — 83735 ASSAY OF MAGNESIUM: CPT | Performed by: INTERNAL MEDICINE

## 2020-03-31 PROCEDURE — 82805 BLOOD GASES W/O2 SATURATION: CPT | Performed by: INTERNAL MEDICINE

## 2020-03-31 PROCEDURE — 20000003 ZZH R&B ICU

## 2020-03-31 PROCEDURE — 25000132 ZZH RX MED GY IP 250 OP 250 PS 637: Performed by: INTERNAL MEDICINE

## 2020-03-31 PROCEDURE — 94003 VENT MGMT INPAT SUBQ DAY: CPT

## 2020-03-31 PROCEDURE — 40000275 ZZH STATISTIC RCP TIME EA 10 MIN

## 2020-03-31 PROCEDURE — 40000986 XR ABDOMEN PORT 1 VW

## 2020-03-31 PROCEDURE — 25000125 ZZHC RX 250: Performed by: OBSTETRICS & GYNECOLOGY

## 2020-03-31 PROCEDURE — 80053 COMPREHEN METABOLIC PANEL: CPT | Performed by: INTERNAL MEDICINE

## 2020-03-31 PROCEDURE — 99233 SBSQ HOSP IP/OBS HIGH 50: CPT | Performed by: INTERNAL MEDICINE

## 2020-03-31 PROCEDURE — 00000146 ZZHCL STATISTIC GLUCOSE BY METER IP

## 2020-03-31 PROCEDURE — 84100 ASSAY OF PHOSPHORUS: CPT | Performed by: INTERNAL MEDICINE

## 2020-03-31 PROCEDURE — C9113 INJ PANTOPRAZOLE SODIUM, VIA: HCPCS | Performed by: INTERNAL MEDICINE

## 2020-03-31 PROCEDURE — 80202 ASSAY OF VANCOMYCIN: CPT | Performed by: INTERNAL MEDICINE

## 2020-03-31 RX ORDER — SODIUM CHLORIDE 9 MG/ML
INJECTION, SOLUTION INTRAVENOUS CONTINUOUS
Status: DISCONTINUED | OUTPATIENT
Start: 2020-03-31 | End: 2020-04-02

## 2020-03-31 RX ORDER — AMIODARONE HYDROCHLORIDE 200 MG/1
200 TABLET ORAL DAILY
Status: DISCONTINUED | OUTPATIENT
Start: 2020-03-31 | End: 2020-04-04

## 2020-03-31 RX ADMIN — Medication 1 PACKET: at 08:06

## 2020-03-31 RX ADMIN — PROPOFOL 25 MCG/KG/MIN: 10 INJECTION, EMULSION INTRAVENOUS at 21:24

## 2020-03-31 RX ADMIN — PROPOFOL 25 MCG/KG/MIN: 10 INJECTION, EMULSION INTRAVENOUS at 03:01

## 2020-03-31 RX ADMIN — PANTOPRAZOLE SODIUM 40 MG: 40 INJECTION, POWDER, FOR SOLUTION INTRAVENOUS at 08:06

## 2020-03-31 RX ADMIN — TAZOBACTAM SODIUM AND PIPERACILLIN SODIUM 2.25 G: 250; 2 INJECTION, SOLUTION INTRAVENOUS at 12:08

## 2020-03-31 RX ADMIN — Medication 1 MG/HR: at 18:29

## 2020-03-31 RX ADMIN — Medication 2 MG/HR: at 03:51

## 2020-03-31 RX ADMIN — GUAIFENESIN 10 ML: 100 SOLUTION ORAL at 16:01

## 2020-03-31 RX ADMIN — GUAIFENESIN 10 ML: 100 SOLUTION ORAL at 20:13

## 2020-03-31 RX ADMIN — FENTANYL CITRATE 25 MCG: 50 INJECTION INTRAMUSCULAR; INTRAVENOUS at 20:13

## 2020-03-31 RX ADMIN — SENNOSIDES AND DOCUSATE SODIUM 2 TABLET: 8.6; 5 TABLET ORAL at 17:43

## 2020-03-31 RX ADMIN — HEPARIN SODIUM 1650 UNITS/HR: 10000 INJECTION, SOLUTION INTRAVENOUS at 05:38

## 2020-03-31 RX ADMIN — GUAIFENESIN 10 ML: 100 SOLUTION ORAL at 12:08

## 2020-03-31 RX ADMIN — TAZOBACTAM SODIUM AND PIPERACILLIN SODIUM 2.25 G: 250; 2 INJECTION, SOLUTION INTRAVENOUS at 05:39

## 2020-03-31 RX ADMIN — GUAIFENESIN 10 ML: 100 SOLUTION ORAL at 08:06

## 2020-03-31 RX ADMIN — GUAIFENESIN 10 ML: 100 SOLUTION ORAL at 03:01

## 2020-03-31 RX ADMIN — Medication 5 ML: at 08:05

## 2020-03-31 RX ADMIN — Medication 2 MG/HR: at 10:08

## 2020-03-31 RX ADMIN — ASPIRIN 81 MG 81 MG: 81 TABLET ORAL at 08:06

## 2020-03-31 RX ADMIN — HEPARIN SODIUM 1650 UNITS/HR: 10000 INJECTION, SOLUTION INTRAVENOUS at 20:01

## 2020-03-31 RX ADMIN — TAZOBACTAM SODIUM AND PIPERACILLIN SODIUM 2.25 G: 250; 2 INJECTION, SOLUTION INTRAVENOUS at 17:42

## 2020-03-31 RX ADMIN — FENTANYL CITRATE 25 MCG: 50 INJECTION INTRAMUSCULAR; INTRAVENOUS at 12:08

## 2020-03-31 RX ADMIN — PROPOFOL 15 MCG/KG/MIN: 10 INJECTION, EMULSION INTRAVENOUS at 12:28

## 2020-03-31 RX ADMIN — TAZOBACTAM SODIUM AND PIPERACILLIN SODIUM 2.25 G: 250; 2 INJECTION, SOLUTION INTRAVENOUS at 23:41

## 2020-03-31 RX ADMIN — AMIODARONE HYDROCHLORIDE 200 MG: 200 TABLET ORAL at 10:26

## 2020-03-31 ASSESSMENT — ACTIVITIES OF DAILY LIVING (ADL)
ADLS_ACUITY_SCORE: 18

## 2020-03-31 ASSESSMENT — MIFFLIN-ST. JEOR: SCORE: 1570.44

## 2020-03-31 NOTE — PROGRESS NOTES
"[facility] ICU VENTILATOR RESPIRATORY NOTE    Date of Admission: 3/27/2020  Date of Intubation (most recent): 3/28/2020  Reason for Mechanical Ventilation: shock  Number of Days on Mechanical Ventilation: 4  Met Criteria for Pressure Support Trial: No  Reason for No Pressure Support Trial: High need of PEEP 8    Settings: CMV 18, tidal volume 500, PEEP of 8, 30% FIO2    Vital signs:  Temp: 100.6  F (38.1  C) Temp src: Bladder BP: 112/73 Pulse: 88 Heart Rate: 92 Resp: 17 SpO2: 99 % O2 Device: Mechanical Ventilator Oxygen Delivery: 2 LPM Height: 174 cm (5' 8.5\") Weight: 92 kg (202 lb 13.2 oz)  Estimated body mass index is 30.39 kg/m  as calculated from the following:    Height as of this encounter: 1.74 m (5' 8.5\").    Weight as of this encounter: 92 kg (202 lb 13.2 oz).    Auscultated coarse diminished breath sounds. Suctioned moderate amount of tan to cloudy thick secretions.     Plan:  Continue scheduled treatments. Continue to follow and monitor.    Gianna Roth, RRT        "

## 2020-03-31 NOTE — PROGRESS NOTES
Clinical Nutrition Brief Note    Discussed patient in rounds and reviewed chart. Was asked to review TF schedule today to evaluate for a quicker advancement to goal rate    TF Peptamen VHP currently running at 20 mL/hr (goal rate 40 mL/hr) + Prosource pkt BID. Flushes 30 mL q 4 hrs (plan to keep standard flushes for now per MD)    Medications reviewed:  - Bumex  - Levophed  - Propofol @ 8.3 mL/hr = 219 kcal/day  Labs reviewed:   - K 3.1 (L)  - Mg 3.2 (NL)  - Phos 8.5 (H)  - BUN 91 (H)  - Cr 5.76 (H)    Vitals:    03/29/20 0555 03/30/20 0600 03/31/20 0329   Weight: 94.7 kg (208 lb 12.4 oz) 92 kg (202 lb 13.2 oz) 85.8 kg (189 lb 2.5 oz)       Re-evaluated Nutrient Needs:  Dosing weight:   - 85.8 kg -- lowest actual wt on 3/31 for energy  - 70 kg - ideal body wt for protein  Energy:  3563-4203 kcals (14-17 kcal/kg)  Justification: Overweight, vented   Protein: 105-140 grams protein (1.5-2 g pro/Kg)  Justification: hypercatabolism with critical illness and consideration of CKD, not yet on dialysis (ASPEN guidelines = at least 2 g/kg ideal body weight)    -- Of note with increasing BUN, may consider decreasing protein provisions and pending dialysis needs    OK to increase TF goal rate and advance per the following ~  Increase TF by 15 mL q 6 hrs to new goal rate of 45 mL/hr. This will provide 1080 mL, 1080 kcal, 99 gm protein (1.4 gm/kg), 82 gm CHO, 4 gm fiber, 907 mL free water, 1468 mg K and 734 mg Phos  - Cancel Prosource pkt BID  - Total (TF + Propofol) = 1299 kcal (15 kcal/kg)    Recommend potassium replacements      RD will continue to follow daily      Ashley Vasquez RD, LD  Clinical Dietitian

## 2020-03-31 NOTE — PROGRESS NOTES
"Intensivist note  More awake, but still only following intermittent commands with 4 extremities     His creatinine continues to slowly rise, and agree with decreasing IV bumex to 1.     His vent support was lowered to 30%, +8 to +5 today.     He remains on Levophed but slowly decreasing to 0.03 to 0.06    Assessment and Plan  1. Acute respiratory failure- on FIO2 30% and +8 and now +5 PEEP. We ill titrate support down slowly, and may be ready for a trial of extubation tomorrow.   2. Shock, likely combination cardiogenic and septic and remains on Levophed but down to 0.06. cultures negative but suspicion GB may be occult cause; continue vanco and zosyn   3. Atrial fibrillation- HR in 80's  4. Cardiomyopathy EF 20-25%  5. Acute renal failure (baseline creatinine 2.3)- as above, creatinine now 5.76; continue Bumex at 1. Case discussed with Dr. Rodrigez and appreciate his input and fine care  6. Shock liver- enzymes trending down, and will continue to follow.  Overall, he remains critical with slowly resolving MODS, though will likely need HD. He still appears quite marginalI I spent 40 minutes of critical care time with him today, addressing shock on levophed and mechanical ventilatory support.     Physical exam  Well developed male NAD on vent  /62 (BP Location: Left arm)   Pulse 86   Temp 99  F (37.2  C)   Resp 17   Ht 1.74 m (5' 8.5\")   Wt 85.8 kg (189 lb 2.5 oz)   SpO2 100%   BMI 28.34 kg/m    Lungs mostly clear   Heart is RRR  Abdomen is soft and with only mild epigastric tenderness   Extremities are warm with minimal edema  Skin shows no cyanosis or mottling   CNS as above    Labs reviewed    luisa lora  March 31, 2020    "

## 2020-03-31 NOTE — PROGRESS NOTES
Chart review  74-year-old male with underlying type 2 diabetes, chronic kidney disease and BPH with at least a 6 to 8-week history of progressive shortness of breath, dyspnea on exertion and symptoms suggestive of orthopnea with progressive deterioration requiring mechanical ventilation, acute on chronic renal failure  1. Diuresing well and Cr slowly plateauing- family does not want dialysis, appreciate nephrology managing diuretics.  2. Still requiring BP support with norepi-slowly wean as tolerates  3.AFib with CVR- switch to oral amiodarone 200mg per [place] tube  4. Continue supportive measures for biventricular systolic HF  Will continue to follow

## 2020-03-31 NOTE — PROGRESS NOTES
[facility]             Hospitalist Progress Note    Name: Gene Pagan    MRN: [id number]  YOB: 1945    Age: 74 year old  Date of admission: 3/27/2020  Primary care provider: Red Rogers      Reason for Stay (Diagnosis): Acute shock with acute respiratory failure         Assessment and Plan:      Summary of Stay: Patient is a 74-year-old male with a past medical history significant for type 2 diabetes, and BPH who was seen initially at an outside urgent care center and transferred to the ED for atrial fibrillation with RVR.  According to history, patient reported 6 to 8 weeks of progressive shortness of breath.  His shortness of breath initially started with exertion but had progressed to dyspnea even at rest.  For the past few nights prior to admission, he did report some PND symptoms. Since presentation, patient developed progressive shortness of breath overnight on 3/28/2020.  Was also notably hypotensive.  He was placed on BiPAP initially with some favorable results but respiratory status continued to decompensate and was eventually intubated.  He was also hypotensive and started on levophed. He was also notably in acute renal failure and was initially oliguric.  Nephrology has since been consulted and did raise the discussion of possible transfer to [facility] for CRRT but family had decided to keep the patient here and not transfer understanding that his clinical course can deteriorate at any point in time.  Since then, patient was placed on Bumex drip and started making urine.  Weight down approximately 8 kg now with greater than 4 L urine output on a day-to-day basis.  I have discussed with the patient significant other yesterday and today who are undecided with dialysis.  At this time, not urgent but will ultimately may have to happen if renal function worsens and for the most part will likely need long-term dialysis even if he is able to be  discharged in the near future.    He lives with his significant other for the past 20 years although in talking to his significant other, [place], they are not legally  and he has 2 daughters.     Problem List/Plan:    Acute shock with acute respiratory failure: Given clinical history leading up to hospitalization, suspect cardiogenic in nature with flash pulmonary edema although septic shock cannot be ruled out.  Transthoracic echo demonstrated an EF of 20 to 25% with severe global hypokinesia and grade 3 diastolic dysfunction, moderate to severe MR as well as moderate to severe RV systolic function.  Not entirely sure if patient's acute decompensation of systolic heart failure could also be related to tachycardic cardiomyopathy from his atrial fibrillation.  CT of the chest, abdomen, and pelvis on 3/28/2020 showed some gallbladder stranding as well as stranding around both kidneys and collecting symptoms.  Abdominal ultrasound demonstrated some nonspecific gallbladder wall thickening and mild right-sided hydronephrosis  -Continue Levophed and wean off as able  -Empirically on vancomycin and Zosyn.  Cultures remain negative to date.  Possibly de-escalate antibiotics soon.  -covid-19 negative    Acute kidney injury on chronic kidney disease stage III-IV: Suspect likely due to cardiorenal syndrome.  Creatinine continues to rise slowly but now making good urine on a Bumex drip.  -Discussed with nephrology.  With rising BUN and creatinine, will reduce Bumex to 1 mg/h to see if this may make a difference  -I did discuss with significant yesterday and again today.  She has spoken with the patient's 2 daughters who are the legal guardians and POA.  They have not decided on dialysis yet but I did convey to them that he may potentially needed this admission and likely will need it long-term in the near future even if he is discharged from the hospital.  I told that they should be prepared to make this decision for the  "same.    Acute transaminitis: Secondary to shock liver. LFTs downtrending  -Continue to monitor    Paroxysmal atrial fibrillation: Rate reasonably controlled at this time.  -Discussed with cardiology.  Will discontinue amiodarone drip and start p.o. amiodarone 200 mg daily via Keofeed  -Currently also on a heparin drip    Fluid/electrolyte/nutrition: per discussion with significant other, goals are still restorative so tube feeds slowly started yesterday.  Could tolerate a faster rate as discussed in interdisciplinary rounds.  -Nutrition will make adjustments.      DVT Prophylaxis: Heparin drip  Code Status: DNR  Discharge Dispo: To be determined  Estimated Disch Date / # of Days until Disch: To be determined based on patient's clinical progress with acute respiratory failure and acute shock    I did update the patient's significant other, [place] as well as the daughters are not yet able to commit to dialysis as they are not quite sure what the patient's feelings may be about this.        Interval History (Subjective):      Unable         Physical Exam:      Vital signs:  Temp: 99  F (37.2  C) Temp src: Bladder BP: 109/62 Pulse: 86 Heart Rate: 81 Resp: 17 SpO2: 100 % O2 Device: Mechanical Ventilator Oxygen Delivery: 2 LPM Height: 174 cm (5' 8.5\") Weight: 85.8 kg (189 lb 2.5 oz)  Estimated body mass index is 28.34 kg/m  as calculated from the following:    Height as of this encounter: 1.74 m (5' 8.5\").    Weight as of this encounter: 85.8 kg (189 lb 2.5 oz).    I/O last 3 completed shifts:  In: 1572.99 [I.V.:1152.99; NG/GT:240]  Out: 5565 [Urine:5565]  Vitals:    03/27/20 1746 03/28/20 0630 03/29/20 0555 03/30/20 0600   Weight: 92.7 kg (204 lb 4.8 oz) 93.3 kg (205 lb 11.2 oz) 94.7 kg (208 lb 12.4 oz) 92 kg (202 lb 13.2 oz)    03/31/20 0329   Weight: 85.8 kg (189 lb 2.5 oz)       Constitutional:  Intubated sedated     Respiratory:  On the ventilator.  Lung sounds are diminished at the bases but relatively clear " anteriorly   Cardiovascular:  Irregularly irregular, normal S1 and S2, and no murmur noted   Abdomen: Normal bowel sounds, soft, non-distended, non-tender   Skin: No rashes, no cyanosis, dry to touch   Neuro:  Pupils constricted but equally round and reactive to light   Extremities:  2+ edema   HEENT Normocephalic, atraumatic, normal nasal turbinates; oropharynx clear   Neck Supple; nl inspection; trachea midline; no thryomegaly   Psychiatric:  Unable to assess          Medications:      All current medications were reviewed with changes reflected in problem list.         Data:      All new lab and imaging data was reviewed.   Labs:  Recent Labs   Lab 03/28/20  0947 03/28/20  0822 03/28/20  0800   CULT No growth No growth after 3 days No growth after 3 days     Recent Labs   Lab 03/31/20  0540 03/30/20  0500 03/29/20  0412   WBC 17.0* 20.8* 20.6*   HGB 10.8* 11.1* 11.0*   HCT 33.6* 35.1* 35.1*   MCV 79 80 82    365 413     Recent Labs   Lab 03/31/20  0540 03/30/20  0500 03/29/20  1528 03/29/20  0910  03/28/20  1557    130* 130* 131*   < > 134   POTASSIUM 3.1* 4.1 4.5 4.6   < > 4.9   CHLORIDE 98 98 99 101   < > 104   CO2 21 15* 16* 16*   < > 17*   ANIONGAP 16* 17* 15* 14   < > 12   * 130* 165* 145*   < > 114*   BUN 91* 78* 70* 64*   < > 51*   CR 5.76* 5.58* 5.14* 4.94*   < > 4.20*   GFRESTIMATED 9* 9* 10* 10*   < > 13*   GFRESTBLACK 10* 10* 12* 12*   < > 15*   HARPER 7.4* 6.8* 6.9* 7.1*   < > 8.2*   MAG 2.3  --   --  2.2  --  2.3   PHOS 8.5* 8.6*  --  7.6*  --  7.5*   PROTTOTAL 6.2* 6.3* 6.2* 6.2*   < > 6.4*   ALBUMIN 2.5* 2.8* 2.8* 2.8*   < > 2.8*   BILITOTAL 0.9 0.8 0.7 0.7   < > 0.6   ALKPHOS 133 133 121 115   < > 93   AST 1,606* 4,960* 9,081* 12,074*   < > 7,144*   ALT 3,860* 5,985* 7,094* 7,341*   < > 4,789*    < > = values in this interval not displayed.     Recent Labs   Lab 03/28/20  1900 03/28/20  1230 03/28/20  1015   TROPI 0.439* 0.336* 0.294*     Recent Labs   Lab 03/27/20  1348   TSH  2.62      Imaging:   No results found for this or any previous visit (from the past 24 hour(s)).    Renaldo Hameed [phone]

## 2020-03-31 NOTE — PROGRESS NOTES
[facility] ICU VENTILATOR RESPIRATORY NOTE  Date of Admission: 3/27/2020  Date of Intubation (most recent): 3/28/2020  Reason for Mechanical Ventilation: Respiratory failure from shock  Number of Days on Mechanical Ventilation: 4   Met Criteria for Pressure Support Trial: yes  Length of Pressure Support Trial: 2.5 hours    Reason for Stopping Pressure Support Trial: end of designated trial    Significant Events Today: Vent settings changed RR down to 10 from 18 and PEEP down to 5 from 8        Plan:  Wean as tolerated, did a 2.5 hour pressure support trial today on 10/8 30%.    Ventilation Mode: CMV/AC  (Continuous Mandatory Ventilation/ Assist Control)  FiO2 (%): 30 %  Rate Set (breaths/minute): 10 breaths/min  Tidal Volume Set (mL): 500 mL  PEEP (cm H2O): 5 cmH2O  Pressure Support (cm H2O): 10 cmH2O  Oxygen Concentration (%): 30 %  Resp: 15    Akanksha Ford, RRT  3/31/2020

## 2020-03-31 NOTE — PLAN OF CARE
ICU End of Shift Summary.  For vital signs and complete assessments, please see documentation flowsheets.     Pertinent assessments: Sedated follows commands SOLO when sedation lifted. Restless at times, denies pain.  Lungs slightly coarse and diminished. Secretions less. Belly soft, UOP good with bumex. Mckenzie. Coccyx blanchable redness. Slight edema. PIV X 1, Right I.J. CVC and Art line. Heparin/propofol/bumex/levo. restraints  Major Shift Events: Wean X 2.5 hours, tolerated on 10/8. Decreased peep to 5. Amiodarone drip d/c'd. Bumex drip decreased. Attempt to wean pressors. QID glucs with S.S. insulin coverage as ordered. Keofeed placed and TF advanced to goal.  Plan (Upcoming Events): Family will decide on conventional HD. Continue to monitor kidney function. Diurese per renal. Vent weaning and pressor weaning. ? Extubate tomorrow  Discharge/Transfer Needs: unknown.    Bedside Shift Report Completed   Bedside Safety Check Completed

## 2020-03-31 NOTE — PLAN OF CARE
Right wrist and Left wrist restraints continued 3/31/2020    Clinical Justification: Pulling lines, pulling tubes, and pulling equipment  Less Restrictive Alternative: Repositioning, Pain management, Reorientation  Attending Physician Notified: MD ordered restraint, Attending Physician's Name:  her   New orders placed Yes  Length of Order: 1 Day      Patience Vargas RN

## 2020-03-31 NOTE — PLAN OF CARE
ICU End of Shift Summary.  For vital signs and complete assessments, please see documentation flowsheets.     Pertinent assessments:   Neuro: RASS goal -2/-3 achieved by titrating Propofol; will follow commands; tmax 100.6  Pain: denies; CPOT 0/1  Cardiac: Tele: Afib RVR/ CVR; MAP>65 with levo  Respiratory: Vent 30%/500/18/8; lung sounds coarse and diminished; large amount of secretions; suctioning and oral cares Q4hrs and PRN  : Mckenzie in place with adequate output  GI: BS+; no BM; tube feeding through NG tube @ 20ml/hr Q4hr 30ml flushes  Skin: blanchable redness on coccyx, mepilex in place; Right art line; Right CVC  Ax2 to turn and reposition Q2hrs.      Major Shift Events: Maintain MAP>65 with levo; maintain RASS goal -2/-3 with propofol  Plan (Upcoming Events): wean vent and pressors as able  Discharge/Transfer Needs: TBD    Bedside Shift Report Completed    Bedside Safety Check Completed    Right wrist and Left wrist restraints continued 3/31/2020    Clinical Justification: Pulling lines, pulling tubes, and pulling equipment  Less Restrictive Alternative: 1:1 patient care, Repositioning, De-escalation, Reorientation  Attending Physician Notified: MD ordered restraint, Attending Physician's Name:  Her   New orders placed will need new order this AM  Length of Order: 1 Day      Kari Justice RN

## 2020-03-31 NOTE — PROGRESS NOTES
Renal Medicine Progress Note                                Gene Pagan MRN# [id number]   Age: 74 year old YOB: 1945   Date of Admission: 3/27/2020 Hospital LOS: 4                  Assessment/Plan:     74-year-old male admitted through the emergency room dated 03/27/2020 in the setting of increasing shortness of breath and tachycardia.     Evaluated with respect to apparent acute on chronic renal insufficiency in the setting of probable cardiogenic shock.     1. Baseline CKD III/IV   -creatinine 2.0 mg/dl range   -GFR 30 ml/min  2.  Dipstick proteinuria  3.  ARF   -non oliguric   -pre renal v ATN  4.  Hemodynamic instability   -NE  5.  Metabolic acidosis   -lactate normalized   -improving   6.  Hyponatremia    -hypo osmolar hypervolemic  7.  Respiratory failure   -vented  8.  Shock liver   -transaminases falling      Multisystem organ failure  Creatinine continues to rise  Non oliguric on bumetanide drip    No dialysis indication at this time  Initiation of dialysis likely commits patient indefinitely       Reduce bumetanide drip to 1 mg/hr       Interval History:     Intubated and sedated  IO and UO reviewed  5 liters UO yesterday  Now negative 7 liters  Weight decreasing     Labs reviewed    Discussed with bedside RN and ICU MD     ROS:     Intubated and sedated: ROS unable    Medications and Allergies:     Reviewed    Physical Exam:     Vitals were reviewed  Patient Vitals for the past 8 hrs:   BP Temp Temp src Pulse Heart Rate Resp SpO2   03/31/20 1119 -- -- -- -- -- -- 100 %   03/31/20 1100 -- 99  F (37.2  C) -- -- 81 17 100 %   03/31/20 1000 -- 99.1  F (37.3  C) -- -- 75 17 99 %   03/31/20 0915 -- -- -- -- 71 17 100 %   03/31/20 0900 -- 99.3  F (37.4  C) -- -- 87 14 100 %   03/31/20 0845 -- -- -- -- 84 17 100 %   03/31/20 0830 -- -- -- -- 84 27 100 %   03/31/20 0815 -- -- -- -- 86 21 100 %   03/31/20 0800 -- -- Bladder -- 88 17 100 %   03/31/20 0745 -- -- -- -- 88 16 100 %   03/31/20 0754  109/62 -- -- -- 84 15 100 %   03/31/20 0730 109/62 98.8  F (37.1  C) Oral -- 86 20 100 %   03/31/20 0716 106/43 -- -- -- 68 -- 100 %   03/31/20 0714 -- -- -- -- -- -- 99 %   03/31/20 0500 107/72 99.1  F (37.3  C) -- 86 75 18 100 %   03/31/20 0400 105/71 99.5  F (37.5  C) -- 76 88 18 100 %     I/O last 3 completed shifts:  In: 1572.99 [I.V.:1152.99; NG/GT:240]  Out: 5565 [Urine:5565]    Vitals:    03/27/20 1746 03/28/20 0630 03/29/20 0555 03/30/20 0600   Weight: 92.7 kg (204 lb 4.8 oz) 93.3 kg (205 lb 11.2 oz) 94.7 kg (208 lb 12.4 oz) 92 kg (202 lb 13.2 oz)    03/31/20 0329   Weight: 85.8 kg (189 lb 2.5 oz)     GENERAL:  intubated and sedated; chronically ill appearing   HEENT: ETT  RESP:  clear anteriorly  CV: RRR, normal S1 S2  ABDOMEN: soft, nontender, no HSM or masses and bowel sounds normal  :  baker catheter  MS: no clubbing, cyanosis   EXT: bilateral edema    Data:     Recent Labs   Lab 03/31/20  0540 03/30/20  0500 03/29/20  1528 03/29/20  0910    130* 130* 131*   POTASSIUM 3.1* 4.1 4.5 4.6   CHLORIDE 98 98 99 101   CO2 21 15* 16* 16*   ANIONGAP 16* 17* 15* 14   * 130* 165* 145*   BUN 91* 78* 70* 64*   CR 5.76* 5.58* 5.14* 4.94*   GFRESTIMATED 9* 9* 10* 10*   GFRESTBLACK 10* 10* 12* 12*   HARPER 7.4* 6.8* 6.9* 7.1*         Recent Labs   Lab Test 03/31/20  0540 03/30/20  0500 03/29/20  1528 03/29/20  0910 03/29/20  0412 03/28/20  2346 03/28/20  1900 03/28/20  1557 03/28/20  1230 03/28/20  1015   CR 5.76* 5.58* 5.14* 4.94* 4.85* 4.59* 4.46* 4.20* 3.94* 3.79*     Recent Labs   Lab 03/31/20  0540 03/30/20  0500 03/29/20  1528 03/29/20  0910   ALBUMIN 2.5* 2.8* 2.8* 2.8*     Recent Labs   Lab 03/29/20  0609 03/28/20  1900 03/28/20  1557 03/28/20  1230   LACT 1.7 3.2* 3.3* 3.9*     Recent Labs   Lab 03/31/20  0540 03/30/20  0500 03/29/20  0910 03/29/20  0412 03/28/20  1557 03/28/20  0530   PHOS 8.5* 8.6* 7.6*  --  7.5*  --    HGB 10.8* 11.1*  --  11.0*  --  10.9*     Recent Labs   Lab 03/28/20  0947    COLOR Dark Brown   APPEARANCE Cloudy   URINEGLC 200*   URINEBILI Negative   URINEKETONE Negative   SG 1.024   UBLD Large*   URINEPH 7.5*   PROTEIN 300*   NITRITE Negative   LEUKEST Moderate*   RBCU >182*   WBCU 179*         G Terrell Hilaroi MD    [facility] - Nephrology  [phone]

## 2020-04-01 LAB
ALBUMIN SERPL-MCNC: 2.6 G/DL (ref 3.4–5)
ALP SERPL-CCNC: 141 U/L (ref 40–150)
ALT SERPL W P-5'-P-CCNC: 2750 U/L (ref 0–70)
ANION GAP SERPL CALCULATED.3IONS-SCNC: 14 MMOL/L (ref 3–14)
AST SERPL W P-5'-P-CCNC: 581 U/L (ref 0–45)
BASE EXCESS BLDA CALC-SCNC: 1.8 MMOL/L
BILIRUB SERPL-MCNC: 1.2 MG/DL (ref 0.2–1.3)
BUN SERPL-MCNC: 116 MG/DL (ref 7–30)
CALCIUM SERPL-MCNC: 8.5 MG/DL (ref 8.5–10.1)
CHLORIDE SERPL-SCNC: 95 MMOL/L (ref 94–109)
CO2 SERPL-SCNC: 26 MMOL/L (ref 20–32)
CREAT SERPL-MCNC: 5.72 MG/DL (ref 0.66–1.25)
ERYTHROCYTE [DISTWIDTH] IN BLOOD BY AUTOMATED COUNT: 15.2 % (ref 10–15)
GFR SERPL CREATININE-BSD FRML MDRD: 9 ML/MIN/{1.73_M2}
GLUCOSE BLDC GLUCOMTR-MCNC: 143 MG/DL (ref 70–99)
GLUCOSE BLDC GLUCOMTR-MCNC: 163 MG/DL (ref 70–99)
GLUCOSE BLDC GLUCOMTR-MCNC: 163 MG/DL (ref 70–99)
GLUCOSE BLDC GLUCOMTR-MCNC: 175 MG/DL (ref 70–99)
GLUCOSE BLDC GLUCOMTR-MCNC: 182 MG/DL (ref 70–99)
GLUCOSE BLDC GLUCOMTR-MCNC: 207 MG/DL (ref 70–99)
GLUCOSE SERPL-MCNC: 158 MG/DL (ref 70–99)
HCO3 BLD-SCNC: 26 MMOL/L (ref 21–28)
HCT VFR BLD AUTO: 37.3 % (ref 40–53)
HGB BLD-MCNC: 11.9 G/DL (ref 13.3–17.7)
LAB SCANNED RESULT: NORMAL
LMWH PPP CHRO-ACNC: 0.28 IU/ML
MAGNESIUM SERPL-MCNC: 2.6 MG/DL (ref 1.6–2.3)
MCH RBC QN AUTO: 25.2 PG (ref 26.5–33)
MCHC RBC AUTO-ENTMCNC: 31.9 G/DL (ref 31.5–36.5)
MCV RBC AUTO: 79 FL (ref 78–100)
O2/TOTAL GAS SETTING VFR VENT: NORMAL %
OXYHGB MFR BLD: 97 % (ref 92–100)
PCO2 BLD: 38 MM HG (ref 35–45)
PH BLD: 7.44 PH (ref 7.35–7.45)
PHOSPHATE SERPL-MCNC: 8.1 MG/DL (ref 2.5–4.5)
PLATELET # BLD AUTO: 364 10E9/L (ref 150–450)
PO2 BLD: 104 MM HG (ref 80–105)
POTASSIUM SERPL-SCNC: 2.7 MMOL/L (ref 3.4–5.3)
POTASSIUM SERPL-SCNC: 3.3 MMOL/L (ref 3.4–5.3)
POTASSIUM SERPL-SCNC: 3.5 MMOL/L (ref 3.4–5.3)
PROT SERPL-MCNC: 6.7 G/DL (ref 6.8–8.8)
RBC # BLD AUTO: 4.72 10E12/L (ref 4.4–5.9)
SODIUM SERPL-SCNC: 135 MMOL/L (ref 133–144)
VANCOMYCIN SERPL-MCNC: 23.7 MG/L
WBC # BLD AUTO: 13.4 10E9/L (ref 4–11)

## 2020-04-01 PROCEDURE — 25000132 ZZH RX MED GY IP 250 OP 250 PS 637: Performed by: INTERNAL MEDICINE

## 2020-04-01 PROCEDURE — 85520 HEPARIN ASSAY: CPT | Performed by: INTERNAL MEDICINE

## 2020-04-01 PROCEDURE — 84132 ASSAY OF SERUM POTASSIUM: CPT | Performed by: INTERNAL MEDICINE

## 2020-04-01 PROCEDURE — 80053 COMPREHEN METABOLIC PANEL: CPT | Performed by: INTERNAL MEDICINE

## 2020-04-01 PROCEDURE — 80202 ASSAY OF VANCOMYCIN: CPT | Performed by: INTERNAL MEDICINE

## 2020-04-01 PROCEDURE — 40000275 ZZH STATISTIC RCP TIME EA 10 MIN

## 2020-04-01 PROCEDURE — 82805 BLOOD GASES W/O2 SATURATION: CPT | Performed by: INTERNAL MEDICINE

## 2020-04-01 PROCEDURE — 85027 COMPLETE CBC AUTOMATED: CPT | Performed by: INTERNAL MEDICINE

## 2020-04-01 PROCEDURE — 25000125 ZZHC RX 250: Performed by: INTERNAL MEDICINE

## 2020-04-01 PROCEDURE — 99291 CRITICAL CARE FIRST HOUR: CPT | Performed by: INTERNAL MEDICINE

## 2020-04-01 PROCEDURE — 25000128 H RX IP 250 OP 636: Performed by: INTERNAL MEDICINE

## 2020-04-01 PROCEDURE — 83735 ASSAY OF MAGNESIUM: CPT | Performed by: INTERNAL MEDICINE

## 2020-04-01 PROCEDURE — 00000146 ZZHCL STATISTIC GLUCOSE BY METER IP

## 2020-04-01 PROCEDURE — 84100 ASSAY OF PHOSPHORUS: CPT | Performed by: INTERNAL MEDICINE

## 2020-04-01 PROCEDURE — 99233 SBSQ HOSP IP/OBS HIGH 50: CPT | Performed by: INTERNAL MEDICINE

## 2020-04-01 PROCEDURE — 20000003 ZZH R&B ICU

## 2020-04-01 RX ORDER — POTASSIUM CHLORIDE 7.45 MG/ML
10 INJECTION INTRAVENOUS
Status: DISCONTINUED | OUTPATIENT
Start: 2020-04-01 | End: 2020-04-02

## 2020-04-01 RX ORDER — POTASSIUM CHLORIDE 29.8 MG/ML
20 INJECTION INTRAVENOUS
Status: DISCONTINUED | OUTPATIENT
Start: 2020-04-01 | End: 2020-04-02

## 2020-04-01 RX ORDER — POTASSIUM CL/LIDO/0.9 % NACL 10MEQ/0.1L
10 INTRAVENOUS SOLUTION, PIGGYBACK (ML) INTRAVENOUS
Status: DISCONTINUED | OUTPATIENT
Start: 2020-04-01 | End: 2020-04-02

## 2020-04-01 RX ORDER — POTASSIUM CHLORIDE 1500 MG/1
20-40 TABLET, EXTENDED RELEASE ORAL
Status: DISCONTINUED | OUTPATIENT
Start: 2020-04-01 | End: 2020-04-02

## 2020-04-01 RX ORDER — POTASSIUM CHLORIDE 1.5 G/1.58G
20-40 POWDER, FOR SOLUTION ORAL
Status: DISCONTINUED | OUTPATIENT
Start: 2020-04-01 | End: 2020-04-02

## 2020-04-01 RX ADMIN — HEPARIN SODIUM 1650 UNITS/HR: 10000 INJECTION, SOLUTION INTRAVENOUS at 11:38

## 2020-04-01 RX ADMIN — ASPIRIN 81 MG 81 MG: 81 TABLET ORAL at 08:32

## 2020-04-01 RX ADMIN — GUAIFENESIN 10 ML: 100 SOLUTION ORAL at 15:28

## 2020-04-01 RX ADMIN — TAZOBACTAM SODIUM AND PIPERACILLIN SODIUM 2.25 G: 250; 2 INJECTION, SOLUTION INTRAVENOUS at 11:39

## 2020-04-01 RX ADMIN — GUAIFENESIN 10 ML: 100 SOLUTION ORAL at 08:32

## 2020-04-01 RX ADMIN — AMIODARONE HYDROCHLORIDE 200 MG: 200 TABLET ORAL at 08:32

## 2020-04-01 RX ADMIN — Medication 0.05 MCG/KG/MIN: at 08:08

## 2020-04-01 RX ADMIN — TAZOBACTAM SODIUM AND PIPERACILLIN SODIUM 2.25 G: 250; 2 INJECTION, SOLUTION INTRAVENOUS at 17:04

## 2020-04-01 RX ADMIN — GUAIFENESIN 10 ML: 100 SOLUTION ORAL at 00:17

## 2020-04-01 RX ADMIN — GUAIFENESIN 10 ML: 100 SOLUTION ORAL at 11:42

## 2020-04-01 RX ADMIN — Medication 5 ML: at 08:32

## 2020-04-01 RX ADMIN — Medication 40 MG: at 08:33

## 2020-04-01 RX ADMIN — TAZOBACTAM SODIUM AND PIPERACILLIN SODIUM 2.25 G: 250; 2 INJECTION, SOLUTION INTRAVENOUS at 05:42

## 2020-04-01 RX ADMIN — POTASSIUM CHLORIDE 40 MEQ: 1.5 POWDER, FOR SOLUTION ORAL at 13:09

## 2020-04-01 RX ADMIN — GUAIFENESIN 10 ML: 100 SOLUTION ORAL at 19:47

## 2020-04-01 RX ADMIN — POTASSIUM CHLORIDE 40 MEQ: 1.5 POWDER, FOR SOLUTION ORAL at 06:48

## 2020-04-01 RX ADMIN — GUAIFENESIN 10 ML: 100 SOLUTION ORAL at 04:07

## 2020-04-01 RX ADMIN — POTASSIUM CHLORIDE 20 MEQ: 1.5 POWDER, FOR SOLUTION ORAL at 15:04

## 2020-04-01 RX ADMIN — FENTANYL CITRATE 25 MCG: 50 INJECTION INTRAMUSCULAR; INTRAVENOUS at 02:10

## 2020-04-01 RX ADMIN — POTASSIUM CHLORIDE 20 MEQ: 1.5 POWDER, FOR SOLUTION ORAL at 08:32

## 2020-04-01 RX ADMIN — PROPOFOL 30 MCG/KG/MIN: 10 INJECTION, EMULSION INTRAVENOUS at 04:07

## 2020-04-01 ASSESSMENT — ACTIVITIES OF DAILY LIVING (ADL)
ADLS_ACUITY_SCORE: 18

## 2020-04-01 ASSESSMENT — MIFFLIN-ST. JEOR: SCORE: 1528.44

## 2020-04-01 NOTE — PROGRESS NOTES
Renal Medicine Progress Note                                Gene Pagan MRN# [id number]   Age: 74 year old YOB: 1945   Date of Admission: 3/27/2020 Hospital LOS: 5                  Assessment/Plan:     74-year-old male admitted through the emergency room dated 03/27/2020 in the setting of increasing shortness of breath and tachycardia.     Evaluated with respect to apparent acute on chronic renal insufficiency in the setting of probable cardiogenic shock.     1. Baseline CKD III/IV   -creatinine 2.0 mg/dl range   -GFR 30 ml/min  2.  Dipstick proteinuria  3.  ARF   -non oliguric   -pre renal v ATN  4.  Hemodynamic instability   -NE  5.  Metabolic acidosis   -lactate normalized   -improving   6.  Hyponatremia    -hypo osmolar hypervolemic  7.  Respiratory failure   -vented  8.  Shock liver   -transaminases falling      Bumetanide discontinued    Follow UO  Adjust TF to renal formula  Reduce calories from protein    No dialysis at this time       Interval History:     Extubated  Confused  IO and UO reviewed  4.5 liters UO yesterday  Now negative 9.5 liters  Weight decreasing     Labs reviewed  BUN increasing from diuresis and protein load      ROS:     ROS unable    Medications and Allergies:     Reviewed    Physical Exam:     Vitals were reviewed  Patient Vitals for the past 8 hrs:   BP Temp Heart Rate Resp SpO2   04/01/20 1300 -- 98.6  F (37  C) 110 15 99 %   04/01/20 1200 -- 99  F (37.2  C) 121 18 99 %   04/01/20 1100 -- 99.1  F (37.3  C) 118 18 100 %   04/01/20 1000 -- 99.1  F (37.3  C) 128 14 100 %   04/01/20 0930 -- 99.1  F (37.3  C) 121 12 100 %   04/01/20 0900 -- 99.3  F (37.4  C) 129 16 97 %   04/01/20 0800 -- 99.3  F (37.4  C) 125 20 100 %   04/01/20 0717 110/55 -- 122 -- 99 %   04/01/20 0700 -- 99.3  F (37.4  C) 122 21 100 %     I/O last 3 completed shifts:  In: 2720.87 [I.V.:1225.87; NG/GT:685]  Out: 5925 [Urine:5925]    Vitals:    03/28/20 0630 03/29/20 0555 03/30/20 0600 03/31/20 0329    Weight: 93.3 kg (205 lb 11.2 oz) 94.7 kg (208 lb 12.4 oz) 92 kg (202 lb 13.2 oz) 85.8 kg (189 lb 2.5 oz)    04/01/20 0354   Weight: 81.6 kg (179 lb 14.3 oz)     GENERAL:  intubated and sedated; chronically ill appearing   HEENT: ETT  RESP:  clear anteriorly  CV: RRR, normal S1 S2  ABDOMEN: soft, nontender, no HSM or masses and bowel sounds normal  :  baker catheter  MS: no clubbing, cyanosis   EXT: bilateral edema    Data:     Recent Labs   Lab 04/01/20  1240 04/01/20  0545 03/31/20  0540 03/30/20  0500 03/29/20  1528   NA  --  135 134 130* 130*   POTASSIUM 3.3* 2.7* 3.1* 4.1 4.5   CHLORIDE  --  95 98 98 99   CO2  --  26 21 15* 16*   ANIONGAP  --  14 16* 17* 15*   GLC  --  158* 145* 130* 165*   BUN  --  116* 91* 78* 70*   CR  --  5.72* 5.76* 5.58* 5.14*   GFRESTIMATED  --  9* 9* 9* 10*   GFRESTBLACK  --  10* 10* 10* 12*   HARPER  --  8.5 7.4* 6.8* 6.9*         Recent Labs   Lab Test 04/01/20  0545 03/31/20  0540 03/30/20  0500 03/29/20  1528 03/29/20  0910 03/29/20  0412 03/28/20  2346 03/28/20  1900 03/28/20  1557 03/28/20  1230   CR 5.72* 5.76* 5.58* 5.14* 4.94* 4.85* 4.59* 4.46* 4.20* 3.94*     Recent Labs   Lab 04/01/20  0545 03/31/20  0540 03/30/20  0500 03/29/20  1528   ALBUMIN 2.6* 2.5* 2.8* 2.8*     Recent Labs   Lab 03/29/20  0609 03/28/20  1900 03/28/20  1557 03/28/20  1230   LACT 1.7 3.2* 3.3* 3.9*     Recent Labs   Lab 04/01/20  0545 03/31/20  0540 03/30/20  0500 03/29/20  0910 03/29/20  0412   PHOS 8.1* 8.5* 8.6* 7.6*  --    HGB 11.9* 10.8* 11.1*  --  11.0*     Recent Labs   Lab 03/28/20  0947   COLOR Dark Brown   APPEARANCE Cloudy   URINEGLC 200*   URINEBILI Negative   URINEKETONE Negative   SG 1.024   UBLD Large*   URINEPH 7.5*   PROTEIN 300*   NITRITE Negative   LEUKEST Moderate*   RBCU >182*   WBCU 179*         G Terrell Hilario MD    [facility] - Nephrology  [phone]

## 2020-04-01 NOTE — PROGRESS NOTES
"Significant other called and informed writer that she and daughter would not want any \"medical intervention\" if something were to happen. Writer clarified that they would not want patient intubated again and significant other stated \"that is correct\"  Dr. Gonzalez paged with this information. Await response.  Marilee Viramontes RN    "

## 2020-04-01 NOTE — PROGRESS NOTES
Chart reviewed  73 yo male admitted for respiratory failure. Biventricular systolic dysfunction noted on echo with LVEF 20-25%, mod-severe RV systolic function, 2-3+ MR.  Concern for possible combination cardiogenic and septic shock.  Acute on chronic renal failure- ?post ATN diuresis phase, UO brisk  Still requiring BP support on norepi  Afib with mild RVR at times- transitioned to oral amiodarone yesterday. Would continue and try and wean off Norepi as BP tolerates, liver  Enzymes decreasing.  Mechanical ventilator- Fio2 requirements stable  No further recs at this time, HR may increase at times, unless consistently above 120, nothing really we can do at this time. Call if >120 consistently

## 2020-04-01 NOTE — PHARMACY-VANCOMYCIN DOSING SERVICE
Pharmacy Vancomycin Note  Date of Service 2020  Patient's  1945   74 year old, male    Indication: Sepsis  Goal Trough Level: 15-20 mg/L  Day of Therapy: 5  Current Vancomycin regimen:  Intermittent dosing    Current estimated CrCl = Estimated Creatinine Clearance: 12.7 mL/min (A) (based on SCr of 5.72 mg/dL (H)).    Creatinine for last 3 days  3/29/2020:  9:10 AM Creatinine 4.94 mg/dL;  3:28 PM Creatinine 5.14 mg/dL  3/30/2020:  5:00 AM Creatinine 5.58 mg/dL  3/31/2020:  5:40 AM Creatinine 5.76 mg/dL  2020:  5:45 AM Creatinine 5.72 mg/dL    Recent Vancomycin Levels (past 3 days)  3/31/2020:  8:15 PM Vancomycin Level 24.0 mg/L  2020:  5:45 AM Vancomycin Level 23.7 mg/L    Vancomycin IV Administrations (past 72 hours)                   vancomycin (VANCOCIN) 2,000 mg in sodium chloride 0.9 % 250 mL intermittent infusion (mg) 2,000 mg New Bag 20 0021                Nephrotoxins and other renal medications (From now, onward)    Start     Dose/Rate Route Frequency Ordered Stop    20 1309  vancomycin place asencio - receiving intermittent dosing      1 each Intravenous SEE ADMIN INSTRUCTIONS 20 1310      20 0115  norepinephrine (LEVOPHED) 16 mg in  mL infusion      0.03-0.4 mcg/kg/min × 92.7 kg  2.6-34.8 mL/hr  Intravenous CONTINUOUS 20 0103      20 2345  piperacillin-tazobactam (ZOSYN) infusion 2.25 g      2.25 g  over 1 Hours Intravenous EVERY 6 HOURS 20 2330               Contrast Orders - past 72 hours (72h ago, onward)    None          Interpretation of levels and current regimen:  Trough level is  Supratherapeutic    Has serum creatinine changed > 50% in last 72 hours: No    Urine output:  unable to determine    Renal Function: Stable    Plan:  1.  continue to hold for today- recheck level in am   2.  Pharmacy will check trough levels as appropriate in 1-3 Days.    3. Serum creatinine levels will be ordered daily for the first week of therapy  and at least twice weekly for subsequent weeks.      Lorraine Fernandez [facility]        .

## 2020-04-01 NOTE — PLAN OF CARE
ICU End of Shift Summary.  For vital signs and complete assessments, please see documentation flowsheets.     Pertinent assessments:  Neuro: RASS goal -2/-3 achieved by titrating propofol; tmax 99.5  Pain: CPOT 0-3; PRN pain meds  Cardiac: Tele: Afib RVR; HR 100s-120s; MAP>65 maintained by titrating levo  Respiratory: Vent 30%/500/10/5; lung sounds coarse/ diminished; moderate amount of secretions; oral cares and suctioning Q4hrs and PRN  : Mckenzie in place with adequate output  GI: BS+; BMx1; TF @ 45ml/hr through keofeed with a FW 30ml Q4hrs  Skin: blanchable redness on coccyx  Ax2 to turn and reposition Q2hrs.  Right art line and Right CVC    Major Shift Events: intermittent abx, bumex stopped; titrating levo as able;   Plan (Upcoming Events): wean vent as able, wean levo and sedation as able; family discussing dialysis  Discharge/Transfer Needs: TBD    Bedside Shift Report Completed    Bedside Safety Check Completed    Right wrist and Left wrist restraints continued 4/1/2020    Clinical Justification: Pulling lines, pulling tubes, and pulling equipment  Less Restrictive Alternative: 1:1 patient care, Repositioning, De-escalation, Reorientation, Alarm  Attending Physician Notified: MD ordered restraint, Attending Physician's Name:  Her   New orders placed will need new order today  Length of Order: 1 Day      Kari Justice RN

## 2020-04-01 NOTE — PLAN OF CARE
ICU End of Shift Summary.  For vital signs and complete assessments, please see documentation flowsheets.     Pertinent assessments: lethargic, disoriented to time/situation, follows commands, afebrile, AF/tachy 115-130, BP stable on levo, LS clear/dim on RA after extubation, TF at goal via NJ, very good UO per baker, no BM but good bowel sounds, good pulses  Major Shift Events: extabated at 1200, change TF to Nepro formula, K+ replaced  Plan (Upcoming Events): recheck K+, determine whether pt is DNI or not when he is able to communicate better for himself, wean off levo as elvia, PT and OT consults  Discharge/Transfer Needs: transfer out of ICU when stable off levo, discharge plan TBD    Bedside Shift Report Completed : y  Bedside Safety Check Completed: y

## 2020-04-01 NOTE — PROGRESS NOTES
"Intensivist note  He was comfortable on a breathing trial today, on minimal vent support. He was extubated and is now on room air (remarkably).    His creatinine is the same today, about 5.72; and is making urine. He will still likely need HD but will continue to monitor.     I spoke with significant other, and daughter today. Between the 2 they were inconclusive as to whether he would want to be reintubated with SO saying yes, and daughter no. He is still confused this afternoon and will readdress this issue with him when clear. In the meantime we will re-intubate of necessary but well compensated at this time.     Assessment and Plan  1. Acute respiratory failure- on FIO2 30% and +5 PEEP. He did well on a breathing trial and was extubated.we will focus on pulmonary hygiene.    2. Shock, likely combination cardiogenic and septic and remains on Levophed at about 0.06. cultures negative and suspicion GB as cause; continue vanco and zosyn   3. Atrial fibrillation- HR in 80's  4. Cardiomyopathy EF 20-25%  5. Acute renal failure (baseline creatinine 2.3)- as above, creatinine now 5.72; Bumex now off. Case discussed with Dr. Rodrigez and appreciate his input and fine care  6. Shock liver- enzymes continue to trend down, and will continue to follow.  7. Debility- PT and OT  Overall, he was critical earlier but now with some improvement. I spent 35 minutes of critical care time with him today    Physical exam  Well developed male NAD  /55   Pulse 86   Temp 98.6  F (37  C)   Resp 15   Ht 1.74 m (5' 8.5\")   Wt 81.6 kg (179 lb 14.3 oz)   SpO2 99%   BMI 26.96 kg/m    Lungs mostly clear   Heart is RRR  Abdomen is soft and non-tender today  Extremities are warm with minimal edema  Skin shows no cyanosis or mottling  CNS moves all extremities to command well    Labs reviewed    Rowena lora  April 1, 2020                  "

## 2020-04-01 NOTE — CONSULTS
NUTRITION BRIEF NOTE  See RD note 3/30 for full assessment details    New findings in last 24 hours:    Diet order: NPO. TF at goal rate via NJ placed 3/31: Peptamen Intense VHP at 45 mL/hr - received <50% of goal rate since TF started 3/29, meeting< 50% of kcal needs with kcal contribution from propofol, <25% of protein needs x 7 days since admit. Planned extubation today    Weight: 81.6 kg, down ~7 kg since admit (diuresis)    Skin: coccyx red    BM: 4/1    Labs: reviewed     Electrolytes  Potassium (mmol/L)   Date Value   04/01/2020 2.7 (L)   03/31/2020 3.1 (L)   03/30/2020 4.1     Phosphorus (mg/dL)   Date Value   04/01/2020 8.1 (H)   03/31/2020 8.5 (H)   03/30/2020 8.6 (H)   03/29/2020 7.6 (H)   03/28/2020 7.5 (H)        Blood Glucose  Glucose (mg/dL)   Date Value   04/01/2020 158 (H)   03/31/2020 145 (H)   03/30/2020 130 (H)   03/29/2020 165 (H)   03/29/2020 145 (H)     Hemoglobin A1C (%)   Date Value   03/27/2020 6.2 (H)        Inflammatory Markers  CRP Inflammation (mg/L)   Date Value   03/27/2020 23.1 (H)     WBC (10e9/L)   Date Value   04/01/2020 13.4 (H)   03/31/2020 17.0 (H)   03/30/2020 20.8 (H)     Albumin (g/dL)   Date Value   04/01/2020 2.6 (L)   03/31/2020 2.5 (L)   03/30/2020 2.8 (L)        Magnesium (mg/dL)   Date Value   04/01/2020 2.6 (H)   03/31/2020 2.3   03/29/2020 2.2     Sodium (mmol/L)   Date Value   04/01/2020 135   03/31/2020 134   03/30/2020 130 (L)        Renal  Urea Nitrogen (mg/dL)   Date Value   04/01/2020 116 (H)   03/31/2020 91 (H)   03/30/2020 78 (H)     Creatinine (mg/dL)   Date Value   04/01/2020 5.72 (H)   03/31/2020 5.76 (H)   03/30/2020 5.58 (H)         Additional  Ketones Urine (mg/dL)   Date Value   03/28/2020 Negative                  amiodarone  200 mg Oral or FT or NG tube Daily     aspirin  81 mg Per Feeding Tube Daily     B and C vitamin Complex with folic acid  5 mL Per Feeding Tube Daily     guaiFENesin  10 mL Oral or Feeding Tube Q4H     insulin aspart  1-6 Units  Subcutaneous Q4H     pantoprazole  40 mg Per Feeding Tube QAM AC     piperacillin-tazobactam  2.25 g Intravenous Q6H     vancomycin place asencio - receiving intermittent dosing  1 each Intravenous See Admin Instructions          HEParin 1,650 Units/hr (04/01/20 1000)     norepinephrine 0.08 mcg/kg/min (04/01/20 1000)     propofol (DIPRIVAN) infusion 5 mcg/kg/min (04/01/20 1016)     sodium chloride 10 mL/hr at 04/01/20 1000        Updated Assessed Nutrition Needs (DW: 82 kg):  Energy: 7573-3377 kcals (25-30 kcal/kg)  Justification: Overweight, exutubation   Protein: ~82 grams protein (1 g pro/Kg)  Justification: CKD not yet on HD, hypercatabolism with critical illness    Interventions:  Updated TF regimen:  Type of Feeding Tube: NJ (3/31)  Enteral Frequency:  Continuous  Enteral Regimen: Nepro at 45 mL/hr  Total Enteral Provisions: 1080 mL per day provides 1944 kcal, 87 g protein, 788 ml free H2O, 174 g CHO and 14 g Fiber daily. Meets > 100% of DRI's.  Free Water Flush: 30 mL q4 hours  **May need to transition to combo Nepro + Suplena for higher kcal, lower protein pending plan of care**      Entered orders for regimen outlined above    Collaboration and Referral of care: Discussed patient during interdisciplinary care rounds this morning via phone    Please page/consult as needed.      Judy Carey, MS, RDN, LD, CNSC  Pager - 3rd floor/ICU: [phone]  Pager - All other floors: [phone]  Pager - Weekend/holiday: [phone]  Office: [phone]

## 2020-04-01 NOTE — PROGRESS NOTES
[facility] ICU VENTILATOR RESPIRATORY NOTE    Date of Admission: 3/27/20  Date of Intubation (most recent): 3/28/20  Reason for Mechanical Ventilation: shock  Number of Days on Mechanical Ventilation: 5  Met Criteria for Pressure Support Trial: yes  Length of Pressure Support Trial: Pt tolerated PS 8/5 trial for 3+ hours this am.    Significant Events Today: Patient extubated to room air, SPO2 97%. He had a strong productive cough post extubation. Moderate, thick, and creamy secretions coughed up.   BS diminished t/o. Will continue to follow and assess.

## 2020-04-01 NOTE — PLAN OF CARE
ICU End of Shift Summary.  For vital signs and complete assessments, please see documentation flowsheets.     Pertinent assessments: Patient remains on room air. Still tachycardic. Good urine output  Major Shift Events: levophed weaned off.  Plan (Upcoming Events): continue to monitor  Discharge/Transfer Needs: to be determined    Bedside Shift Report Completed : yes  Bedside Safety Check Completed:yes  Marilee Viramontes RN

## 2020-04-01 NOTE — PROGRESS NOTES
[facility] ICU VENTILATOR RESPIRATORY NOTE    Date of Admission: 3/27/2020    Date of Intubation (most recent): 3/28/2020    Reason for Mechanical Ventilation: Respiratory failure from shock    Number of Days on Mechanical Ventilation: 5     Met Criteria for Pressure Support Trial: yes    Plan:  Patient switched to PS 8/5 at 5:45am. Currently tolerating well. Suctioning small amounts of thick creamy secretions. RT will continue to follow.    Elizabeth Piña, RT  April 1, 2020.5:56 AM

## 2020-04-01 NOTE — PROGRESS NOTES
[facility]    Hospitalist Progress Note      Assessment & Plan   Gene Pagan is a 74 year old male who was admitted on 3/27/2020.    PATIENT WAS PRIMARILY SEEN BY DR HAY, INTENSIVIST. EVALUATED PATIENT DATA AND DISCUSSED DURING MULTI-DISCIPLINARY ROUND.    Summary of Stay:   Patient is a 74-year-old male with a past medical history significant for type 2 diabetes, and BPH who was seen initially at an outside urgent care center and transferred to the ED for atrial fibrillation with RVR.  According to history, patient reported 6 to 8 weeks of progressive shortness of breath.  His shortness of breath initially started with exertion but had progressed to dyspnea even at rest.     patient developed progressive shortness of breath overnight on 3/28/2020.  Was also notably hypotensive.  He was placed on BiPAP initially with some favorable results but respiratory status continued to decompensate and was eventually intubated on the night of 3/28/20.    He was also hypotensive and started on levophed. He was also notably in acute renal failure and was initially oliguric.  Nephrology has since been consulted and did raise the discussion of possible transfer to [facility] for CRRT but family had decided to keep the patient here and not transfer understanding that his clinical course can deteriorate at any point in time.  Since then, patient was placed on Bumex drip and started making urine.  Weight down approximately 8 kg now with greater than 4 L urine output on a day-to-day basis.     Patient was responding well this morning. Was seen by Dr Hay who discussed the situation with patient's family. Patient was extubated afterwards.    Noted to have a fib with rate close to 120. Seen by cardiology as well.    Plan:    Acute hypoxic respiratory failure.  -In the setting of CHF and cardiogenic shock.  - He was doing well on the ventilator so extubated this morning.  - Patient is DNR but not DNI.  -  Continue to closely monitor in the ICU.    Cardiogenic shock  - EF of 20 to 25%, moderate to severe MR, moderate to severe RV systolic dysfunction.  - Overall poor prognosis.  - On low-dose norepinephrine, aim to slowly wean off.  - associated infection can not be ruled out- significant leikocytosis so also on iV zosyn and vanco (start date 3/28)  -NEGATIVE COVID19    Atrial fibrillation with rapid rate.  -Currently on amiodarone 200 mg daily orally.  - Seen by cardiology this morning: Recommended current treatment plan of amiodarone and attempt to decrease and wean off norepinephrine.  No other recommendations per cardiology.  -On heparin infusion.    Acute kidney injury in the setting of chronic kidney disease.  - Nephrology following.  Good urine output.  Creatinine is overall stable.  Baseline creatinine is close to 2    Elevated AST and ALT  -Thought to be ischemic hepatitis/shock liver/liver congestion in the setting of CHF  - LFTs are slowly improving.    1 cm pulmonary nodule on CT.  - Outpatient follow-up.    Hypokalemia  -Replace per protocol.    Lines: Mckenzie. Right IJ. A line.    DVT Prophylaxis: IV heparin  Code Status: DNR  Expected discharge: uncertain    Hemant Gonzalez MD  Text Page (7am - 6pm, M-F)    Interval History   Patient was evaluated with nursing staff. Overnight issues discussed.    Review of systems:  Unable to obtain    -Data reviewed today: Labs and medications.    Physical Exam   Temp: 98.6  F (37  C) Temp src: Bladder BP: 110/55   Heart Rate: 110 Resp: 15 SpO2: 99 % O2 Device: None (Room air)    Vitals:    03/30/20 0600 03/31/20 0329 04/01/20 0354   Weight: 92 kg (202 lb 13.2 oz) 85.8 kg (189 lb 2.5 oz) 81.6 kg (179 lb 14.3 oz)     Vital Signs with Ranges  Temp:  [98.4  F (36.9  C)-99.5  F (37.5  C)] 98.6  F (37  C)  Heart Rate:  [] 110  Resp:  [0-21] 15  BP: (110-112)/(48-55) 110/55  MAP:  [61 mmHg-96 mmHg] 72 mmHg  Arterial Line BP: ()/(40-69) 120/55  FiO2 (%):  [30 %] 30  %  SpO2:  [97 %-100 %] 99 %  I/O last 3 completed shifts:  In: 2720.87 [I.V.:1225.87; NG/GT:685]  Out: 5925 [Urine:5925]      Constitutional: Sedated, comfortable  HEENT: ETT NGT in place  Rest of the exam deferred, see Dr Hay's note.    Medications     dextrose       HEParin 1,650 Units/hr (04/01/20 1200)     norepinephrine 0.03 mcg/kg/min (04/01/20 1232)     - MEDICATION INSTRUCTIONS -       - MEDICATION INSTRUCTIONS -       propofol (DIPRIVAN) infusion Stopped (04/01/20 1150)     sodium chloride 10 mL/hr at 04/01/20 1200       amiodarone  200 mg Oral or FT or NG tube Daily     aspirin  81 mg Per Feeding Tube Daily     B and C vitamin Complex with folic acid  5 mL Per Feeding Tube Daily     guaiFENesin  10 mL Oral or Feeding Tube Q4H     insulin aspart  1-6 Units Subcutaneous Q4H     pantoprazole  40 mg Per Feeding Tube QAM AC     piperacillin-tazobactam  2.25 g Intravenous Q6H     sodium chloride (PF)  10 mL Intracatheter Q8H     sodium chloride (PF)  3 mL Intracatheter Q8H     sodium chloride (PF)  3 mL Intracatheter Q8H     vancomycin place asencio - receiving intermittent dosing  1 each Intravenous See Admin Instructions       Data   Recent Labs   Lab 04/01/20  1240 04/01/20  0545 03/31/20  0540 03/30/20  0500  03/29/20  0412  03/28/20  1900  03/28/20  1230 03/28/20  1015  03/27/20  1348   WBC  --  13.4* 17.0* 20.8*  --  20.6*  --   --   --   --   --    < > 10.0   HGB  --  11.9* 10.8* 11.1*  --  11.0*  --   --   --   --   --    < > 10.0*   MCV  --  79 79 80  --  82  --   --   --   --   --    < > 84   PLT  --  364 331 365  --  413  --   --   --   --   --    < > 487*   INR  --   --   --   --   --   --   --   --   --   --   --   --  1.20*   NA  --  135 134 130*   < > 131*   < > 131*   < > 131* 130*   < > 131*   POTASSIUM 3.3* 2.7* 3.1* 4.1   < > 5.0   < > 5.4*   < > 6.2* 6.4*   < > 4.2   CHLORIDE  --  95 98 98   < > 101   < > 102   < > 103 103   < > 102   CO2  --  26 21 15*   < > 16*   < > 15*   < > 15* 13*    < > 20   BUN  --  116* 91* 78*   < > 65*   < > 55*   < > 47* 47*   < > 33*   CR  --  5.72* 5.76* 5.58*   < > 4.85*   < > 4.46*   < > 3.94* 3.79*   < > 2.40*   ANIONGAP  --  14 16* 17*   < > 14   < > 14   < > 13 14   < > 9   HARPER  --  8.5 7.4* 6.8*   < > 7.4*   < > 7.9*   < > 8.3* 8.2*   < > 8.8   GLC  --  158* 145* 130*   < > 173*   < > 155*   < > 218* 219*   < > 212*   ALBUMIN  --  2.6* 2.5* 2.8*   < > 2.8*   < > 2.8*   < > 2.9* 2.9*   < >  --    PROTTOTAL  --  6.7* 6.2* 6.3*   < > 6.1*   < > 6.2*   < > 6.5* 6.7*   < >  --    BILITOTAL  --  1.2 0.9 0.8   < > 0.7   < > 0.7   < > 0.8 0.8   < >  --    ALKPHOS  --  141 133 133   < > 106   < > 94   < > 93 94   < >  --    ALT  --  2,750* 3,860* 5,985*   < > 6,983*   < > 5,254*   < > 4,613* 4,195*   < >  --    AST  --  581* 1,606* 4,960*   < > 11,607*   < > 8,219*   < > 6,473* 5,606*   < >  --    LIPASE  --   --   --   --   --  917*  --   --   --   --   --   --   --    TROPI  --   --   --   --   --   --   --  0.439*  --  0.336* 0.294*   < > 0.120*    < > = values in this interval not displayed.       No results found for this or any previous visit (from the past 24 hour(s)).

## 2020-04-02 ENCOUNTER — APPOINTMENT (OUTPATIENT)
Dept: PHYSICAL THERAPY | Facility: CLINIC | Age: 75
DRG: 308 | End: 2020-04-02
Attending: INTERNAL MEDICINE
Payer: COMMERCIAL

## 2020-04-02 ENCOUNTER — APPOINTMENT (OUTPATIENT)
Dept: SPEECH THERAPY | Facility: CLINIC | Age: 75
DRG: 308 | End: 2020-04-02
Attending: INTERNAL MEDICINE
Payer: COMMERCIAL

## 2020-04-02 ENCOUNTER — APPOINTMENT (OUTPATIENT)
Dept: OCCUPATIONAL THERAPY | Facility: CLINIC | Age: 75
DRG: 308 | End: 2020-04-02
Attending: INTERNAL MEDICINE
Payer: COMMERCIAL

## 2020-04-02 LAB
ANION GAP SERPL CALCULATED.3IONS-SCNC: 11 MMOL/L (ref 3–14)
BASOPHILS # BLD AUTO: 0.1 10E9/L (ref 0–0.2)
BASOPHILS NFR BLD AUTO: 0.6 %
BUN SERPL-MCNC: 133 MG/DL (ref 7–30)
CALCIUM SERPL-MCNC: 9.1 MG/DL (ref 8.5–10.1)
CHLORIDE SERPL-SCNC: 97 MMOL/L (ref 94–109)
CO2 SERPL-SCNC: 29 MMOL/L (ref 20–32)
CREAT SERPL-MCNC: 5.51 MG/DL (ref 0.66–1.25)
DIFFERENTIAL METHOD BLD: ABNORMAL
EOSINOPHIL # BLD AUTO: 0.4 10E9/L (ref 0–0.7)
EOSINOPHIL NFR BLD AUTO: 3 %
ERYTHROCYTE [DISTWIDTH] IN BLOOD BY AUTOMATED COUNT: 15.5 % (ref 10–15)
GFR SERPL CREATININE-BSD FRML MDRD: 9 ML/MIN/{1.73_M2}
GLUCOSE BLDC GLUCOMTR-MCNC: 180 MG/DL (ref 70–99)
GLUCOSE BLDC GLUCOMTR-MCNC: 187 MG/DL (ref 70–99)
GLUCOSE BLDC GLUCOMTR-MCNC: 191 MG/DL (ref 70–99)
GLUCOSE BLDC GLUCOMTR-MCNC: 235 MG/DL (ref 70–99)
GLUCOSE BLDC GLUCOMTR-MCNC: 257 MG/DL (ref 70–99)
GLUCOSE BLDC GLUCOMTR-MCNC: 285 MG/DL (ref 70–99)
GLUCOSE SERPL-MCNC: 213 MG/DL (ref 70–99)
HCT VFR BLD AUTO: 40 % (ref 40–53)
HGB BLD-MCNC: 12.7 G/DL (ref 13.3–17.7)
IMM GRANULOCYTES # BLD: 0.1 10E9/L (ref 0–0.4)
IMM GRANULOCYTES NFR BLD: 0.8 %
LMWH PPP CHRO-ACNC: <0.1 IU/ML
LYMPHOCYTES # BLD AUTO: 0.9 10E9/L (ref 0.8–5.3)
LYMPHOCYTES NFR BLD AUTO: 6.6 %
MCH RBC QN AUTO: 25.2 PG (ref 26.5–33)
MCHC RBC AUTO-ENTMCNC: 31.8 G/DL (ref 31.5–36.5)
MCV RBC AUTO: 80 FL (ref 78–100)
MONOCYTES # BLD AUTO: 1.7 10E9/L (ref 0–1.3)
MONOCYTES NFR BLD AUTO: 12.2 %
NEUTROPHILS # BLD AUTO: 10.9 10E9/L (ref 1.6–8.3)
NEUTROPHILS NFR BLD AUTO: 76.8 %
NRBC # BLD AUTO: 0 10*3/UL
NRBC BLD AUTO-RTO: 0 /100
PLATELET # BLD AUTO: 359 10E9/L (ref 150–450)
POTASSIUM SERPL-SCNC: 3.3 MMOL/L (ref 3.4–5.3)
POTASSIUM SERPL-SCNC: 4.1 MMOL/L (ref 3.4–5.3)
RBC # BLD AUTO: 5.03 10E12/L (ref 4.4–5.9)
SODIUM SERPL-SCNC: 137 MMOL/L (ref 133–144)
VANCOMYCIN SERPL-MCNC: 20.6 MG/L
WBC # BLD AUTO: 14.2 10E9/L (ref 4–11)

## 2020-04-02 PROCEDURE — 99231 SBSQ HOSP IP/OBS SF/LOW 25: CPT | Performed by: INTERNAL MEDICINE

## 2020-04-02 PROCEDURE — 25000132 ZZH RX MED GY IP 250 OP 250 PS 637: Performed by: INTERNAL MEDICINE

## 2020-04-02 PROCEDURE — 25000131 ZZH RX MED GY IP 250 OP 636 PS 637: Performed by: INTERNAL MEDICINE

## 2020-04-02 PROCEDURE — 97166 OT EVAL MOD COMPLEX 45 MIN: CPT | Mod: GO

## 2020-04-02 PROCEDURE — 80048 BASIC METABOLIC PNL TOTAL CA: CPT | Performed by: INTERNAL MEDICINE

## 2020-04-02 PROCEDURE — 25000125 ZZHC RX 250: Performed by: INTERNAL MEDICINE

## 2020-04-02 PROCEDURE — 97163 PT EVAL HIGH COMPLEX 45 MIN: CPT | Mod: GP

## 2020-04-02 PROCEDURE — 85025 COMPLETE CBC W/AUTO DIFF WBC: CPT | Performed by: INTERNAL MEDICINE

## 2020-04-02 PROCEDURE — 25000128 H RX IP 250 OP 636: Performed by: INTERNAL MEDICINE

## 2020-04-02 PROCEDURE — 92526 ORAL FUNCTION THERAPY: CPT | Mod: GN

## 2020-04-02 PROCEDURE — 97535 SELF CARE MNGMENT TRAINING: CPT | Mod: GO

## 2020-04-02 PROCEDURE — 99232 SBSQ HOSP IP/OBS MODERATE 35: CPT | Performed by: INTERNAL MEDICINE

## 2020-04-02 PROCEDURE — 25800030 ZZH RX IP 258 OP 636: Performed by: INTERNAL MEDICINE

## 2020-04-02 PROCEDURE — 27210432 ZZH NUTRITION PRODUCT RENAL BASIC LITER

## 2020-04-02 PROCEDURE — 92610 EVALUATE SWALLOWING FUNCTION: CPT | Mod: GN

## 2020-04-02 PROCEDURE — 99233 SBSQ HOSP IP/OBS HIGH 50: CPT | Performed by: INTERNAL MEDICINE

## 2020-04-02 PROCEDURE — 85520 HEPARIN ASSAY: CPT | Performed by: INTERNAL MEDICINE

## 2020-04-02 PROCEDURE — 00000146 ZZHCL STATISTIC GLUCOSE BY METER IP

## 2020-04-02 PROCEDURE — 20000003 ZZH R&B ICU

## 2020-04-02 PROCEDURE — 84132 ASSAY OF SERUM POTASSIUM: CPT | Performed by: INTERNAL MEDICINE

## 2020-04-02 PROCEDURE — 97530 THERAPEUTIC ACTIVITIES: CPT | Mod: GP

## 2020-04-02 PROCEDURE — 80202 ASSAY OF VANCOMYCIN: CPT | Performed by: INTERNAL MEDICINE

## 2020-04-02 RX ORDER — POTASSIUM CL/LIDO/0.9 % NACL 10MEQ/0.1L
10 INTRAVENOUS SOLUTION, PIGGYBACK (ML) INTRAVENOUS
Status: DISCONTINUED | OUTPATIENT
Start: 2020-04-02 | End: 2020-04-10 | Stop reason: HOSPADM

## 2020-04-02 RX ORDER — POTASSIUM CHLORIDE 1.5 G/1.58G
20-40 POWDER, FOR SOLUTION ORAL
Status: DISCONTINUED | OUTPATIENT
Start: 2020-04-02 | End: 2020-04-05

## 2020-04-02 RX ORDER — POTASSIUM CHLORIDE 1500 MG/1
20-40 TABLET, EXTENDED RELEASE ORAL
Status: DISCONTINUED | OUTPATIENT
Start: 2020-04-02 | End: 2020-04-10 | Stop reason: HOSPADM

## 2020-04-02 RX ORDER — SODIUM CHLORIDE 9 MG/ML
INJECTION, SOLUTION INTRAVENOUS CONTINUOUS
Status: DISCONTINUED | OUTPATIENT
Start: 2020-04-02 | End: 2020-04-05

## 2020-04-02 RX ORDER — POTASSIUM CHLORIDE 7.45 MG/ML
10 INJECTION INTRAVENOUS
Status: DISCONTINUED | OUTPATIENT
Start: 2020-04-02 | End: 2020-04-10 | Stop reason: HOSPADM

## 2020-04-02 RX ORDER — POTASSIUM CHLORIDE 29.8 MG/ML
20 INJECTION INTRAVENOUS
Status: DISCONTINUED | OUTPATIENT
Start: 2020-04-02 | End: 2020-04-10 | Stop reason: HOSPADM

## 2020-04-02 RX ADMIN — INSULIN GLARGINE 10 UNITS: 100 INJECTION, SOLUTION SUBCUTANEOUS at 22:46

## 2020-04-02 RX ADMIN — TAZOBACTAM SODIUM AND PIPERACILLIN SODIUM 2.25 G: 250; 2 INJECTION, SOLUTION INTRAVENOUS at 06:08

## 2020-04-02 RX ADMIN — POTASSIUM CHLORIDE 40 MEQ: 1.5 POWDER, FOR SOLUTION ORAL at 06:44

## 2020-04-02 RX ADMIN — Medication 40 MG: at 06:44

## 2020-04-02 RX ADMIN — Medication 5 ML: at 08:18

## 2020-04-02 RX ADMIN — GUAIFENESIN 10 ML: 100 SOLUTION ORAL at 08:17

## 2020-04-02 RX ADMIN — Medication 12.5 MG: at 10:38

## 2020-04-02 RX ADMIN — GUAIFENESIN 10 ML: 100 SOLUTION ORAL at 00:36

## 2020-04-02 RX ADMIN — SODIUM CHLORIDE: 9 INJECTION, SOLUTION INTRAVENOUS at 19:54

## 2020-04-02 RX ADMIN — GUAIFENESIN 10 ML: 100 SOLUTION ORAL at 03:52

## 2020-04-02 RX ADMIN — POTASSIUM CHLORIDE 20 MEQ: 1.5 POWDER, FOR SOLUTION ORAL at 08:18

## 2020-04-02 RX ADMIN — GUAIFENESIN 10 ML: 100 SOLUTION ORAL at 12:29

## 2020-04-02 RX ADMIN — GUAIFENESIN 10 ML: 100 SOLUTION ORAL at 19:55

## 2020-04-02 RX ADMIN — TAZOBACTAM SODIUM AND PIPERACILLIN SODIUM 2.25 G: 250; 2 INJECTION, SOLUTION INTRAVENOUS at 11:30

## 2020-04-02 RX ADMIN — SODIUM CHLORIDE: 9 INJECTION, SOLUTION INTRAVENOUS at 11:30

## 2020-04-02 RX ADMIN — GUAIFENESIN 10 ML: 100 SOLUTION ORAL at 16:26

## 2020-04-02 RX ADMIN — TAZOBACTAM SODIUM AND PIPERACILLIN SODIUM 2.25 G: 250; 2 INJECTION, SOLUTION INTRAVENOUS at 18:20

## 2020-04-02 RX ADMIN — AMIODARONE HYDROCHLORIDE 200 MG: 200 TABLET ORAL at 08:18

## 2020-04-02 RX ADMIN — ASPIRIN 81 MG 81 MG: 81 TABLET ORAL at 08:18

## 2020-04-02 RX ADMIN — TAZOBACTAM SODIUM AND PIPERACILLIN SODIUM 2.25 G: 250; 2 INJECTION, SOLUTION INTRAVENOUS at 00:36

## 2020-04-02 ASSESSMENT — ACTIVITIES OF DAILY LIVING (ADL)
ADLS_ACUITY_SCORE: 18
ADLS_ACUITY_SCORE: 17
ADLS_ACUITY_SCORE: 17
PREVIOUS_RESPONSIBILITIES: MEAL PREP;HOUSEKEEPING;LAUNDRY;SHOPPING;YARDWORK;MEDICATION MANAGEMENT;FINANCES;DRIVING;WORK
ADLS_ACUITY_SCORE: 17
ADLS_ACUITY_SCORE: 18
ADLS_ACUITY_SCORE: 18

## 2020-04-02 ASSESSMENT — MIFFLIN-ST. JEOR: SCORE: 1504.44

## 2020-04-02 NOTE — PROGRESS NOTES
Clinical Swallow Evaluation:      04/02/20 1441   General Information   Onset Date 03/27/20   Start of Care Date 04/02/20   Referring Physician Dr. Gonzalez   Patient Profile Review/OT: Additional Occupational Profile Info See Profile for full history and prior level of function   Patient/Family Goals Statement to eat/drink   Swallowing Evaluation Bedside swallow evaluation   Behaviorial Observations WFL (within functional limits)   Mode of current nutrition [place];NPO   Respiratory Status Intubated on (date);Extubated on (date);Room air   Comments Patient is a 74-year-old male with a past medical history significant for type 2 diabetes, and BPH who was seen initially at an outside urgent care center and transferred to the ED for atrial fibrillation with RVR.  According to history, patient reported 6 to 8 weeks of progressive shortness of breath.  His shortness of breath initially started with exertion but had progressed to dyspnea even at rest.    Clinical Swallow Evaluation   Oral Musculature generally intact   Structural Abnormalities none present   Dentition upper dentures;lower dentures  (* needs adhesive to stay in plae)   Secretion Management   (yankour use prior to session (limited use aross day))   Mucosal Quality good   Mandibular Strength and Mobility intact   Oral Labial Strength and Mobility WFL   Lingual Strength and Mobility WFL   Velar Elevation intact   Buccal Strength and Mobility intact   Laryngeal Function Cough;Swallow;Voicing initiated;Dry swallow palpated   Oral Musculature Comments strong cough   Additional Documentation Yes   Clinical Swallow Eval: Thin Liquid Texture Trial   Mode of Presentation, Thin Liquids spoon;cup   Volume of Liquid or Food Presented < 1 oz   Oral Phase of Swallow Premature pharyngeal entry   Pharyngeal Phase of Swallow impaired;reduction in laryngeal movement;coughing/choking;repeated swallows   Diagnostic Statement overt coughing highly suspect aspiration   Clinical Swallow  Eval: Nectar Thick Liquid Texture Trial   Mode of Presentation, Nectar spoon;cup;straw   Volume of Nectar Presented 4 oz   Oral Phase, [place] WFL   Pharyngeal Phase, Nectar impaired;reduction in laryngeal movement;repeated swallows   Diagnostic Statement slow effortful swallow, x2-3 swallows per sip, no signs/sx aspiration noted   Clinical Swallow Eval: Puree Solid Texture Trial   Mode of Presentation, Puree spoon   Volume of Puree Presented 4 oz   Oral Phase, Puree WFL   Pharyngeal Phase, Puree impaired;reduction in laryngeal movement;repeated swallows   Diagnostic Statement slow effortful swallow, x2-3 swallows per bite, no signs/sx aspiration noted   Clinical Swallow Eval: Semisolid Texture Trial   Mode of Presentation, Semisolid self-fed   Volume of Semisolid Food Presented 2 oz   Oral Phase, Semisolid Poor AP movement;Residue in oral cavity   Oral Residue, Semisolid   (lingual surface)   Pharyngeal Phase, Semisolid impaired;reduction in laryngeal movement;repeated swallows   Diagnostic Statement slow mastiation, lingual residuals, slow effortful swallow, x2-3 swallows per bite, no signs/sx aspiration noted   General Therapy Interventions   Planned Therapy Interventions Dysphagia Treatment   Swallow Eval: Clinical Impressions   Skilled Criteria for Therapy Intervention Skilled criteria met.  Treatment indicated.   Functional Assessment Scale (FAS) 4   Treatment Diagnosis mild-moderate oropharyngeal dysphagia    Diet texture recommendations Dysphagia diet level 1;Nectar thick liquids   Recommended Feeding/Eating Techniques alternate between small bites and sips of food/liquid;maintain upright posture during/after eating for 30 mins;small sips/bites;hard swallow w/ each bite or sip   Therapy Frequency 5x/week   Predicted Duration of Therapy Intervention (days/wks) 1 week   Anticipated Discharge Disposition inpatient rehabilitation facility   Risks and Benefits of Treatment have been explained. Yes   Patient,  family and/or staff in agreement with Plan of Care Yes   Clinical Impression Comments Clinical swallow evaluation completed given intubation of 5 days, failed RN bedside screening d/t coughing with water. Pt currently on room air, mild hypophonia, vocal quality functional, pt denies any throat pain/soreness. No hx dysphagia. Assessment completed with thin liquids, nectar thick liquids, puree solids, semi-solids. He currently presents with mild-moderate oropharyngeal dysphagia 2/2 intubation, generalized weakness. Mastication slow/prolonged (*needs adhesive for dentures to stay in place) and mild lingual oral residuals with chewable solids. Hyolaryngeal ROM reduced to palpation, mild delay, effortful to produce. Pt with x2-3 swallows per bite/sips likely secondary to pharyngeal residuals (d/t weakness and also [place] TF placement?). Pt with overt coughing with thin liquids, no signs/sx aspiration with nectar or solids.    Total Evaluation Time   Total Evaluation Time (Minutes) 48

## 2020-04-02 NOTE — PLAN OF CARE
OT: Order received, eval completed and treatment initiated. Pt is a 74 year old male admitted with afib w/ RVR, progressive SOB acute hypoxic respiratory failure requiring intubation. Pt lives with significant other in townhouse, stairs to enter, full flight within, however is attempting to move everything to main level, walk in shower with shower chair, standard height toilet. Pt reports indep in all ADLs, IADLs and mobility tasks with no AD at baseline. 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       Discharge Planner OT   Patient plan for discharge: Home  Current status: RN reporting pt's HR consistently in 130s, ok'd light activity with close HR monitoring. Pt completed bed mobility supine <> sit EOB with mod/max A, max A to advance hips to EOB, pt able to sit EOB with CGA for increased time. HR 130s-140s. Pt completed sit > stand from EOB to FWW with heavy max A, strong posterior lean, unable to advance LEs to attempt side stepping to HOB. HR noted to elevate to 150s while standing. BP 86/67 supine; /84 post activity, mild dizziness reported with position change which resolved with time. Pt requiring max A for LB dressing, min A for light grooming task while seated. Generalized weakness noted in BUEs. No pain reported.   Barriers to return to prior living situation: Current level of assist for ADLs/transfers, decreased functional activity tolerance and strength, impaired balance  Recommendations for discharge: ARU  Rationale for recommendations: Pt is below baseline level of functioning with regards to ADLs, IADLs and mobility tasks. Recommend ongoing skilled OT while IP and in ARU setting to improve strength, functional activity tolerance and balance needed for daily tasks. Pt motivated to return to [facility], has support of significant other in home environment. Anticipate with medical stability, pt will be able to tolerate the 3 hours of therapy/day required in ARU setting.         Entered by: Kathy Partida 04/02/2020 10:08 AM

## 2020-04-02 NOTE — PROGRESS NOTES
Cardiology Progress Note          Assessment and Plan:         73 yo male admitted for respiratory failure. Biventricular systolic dysfunction noted on echo with LVEF 20-25%, mod-severe RV systolic function, 2-3+ MR.  Concern for possible combination cardiogenic and septic shock.  Acute on chronic renal failure- ?post ATN diuresis phase, UO brisk  Cr plateau- 5.51, UO is now negative 11.5  L  Non supplemental O2 requirements off vent this am.  NO CP, SOB  Rapid AF despite full amio load and amio oral 200mg  Recommend trial of low dose metoprolol, if blood pressure does not tolerate can give one time dose of digoxin 0.125mg IV  Continue supportive cares for biventricular systolic HF, slowly try to institute afterload reducing HF meds, but first need to address HR with BB.  Keep K>4.0        Interval History:   denies chest pain and denies shortness of breath                Medications:   I have reviewed this patient's current medications         Physical Exam:         Vital Sign Ranges  Temperature Temp  Av.9  F (37.2  C)  Min: 98.4  F (36.9  C)  Max: 99.5  F (37.5  C)   Blood pressure Systolic (24hrs), Av , Min:99 , Max:99        Diastolic (24hrs), Av, Min:71, Max:71      Pulse No data recorded   Respirations Resp  Av.1  Min: 8  Max: 32   Pulse oximetry SpO2  Av.4 %  Min: 95 %  Max: 100 %         Intake/Output Summary (Last 24 hours) at 2020 1007  Last data filed at 2020 1000  Gross per 24 hour   Intake 2670.21 ml   Output 4455 ml   Net -1784.79 ml       Constitutional:   pale     Skin:   no lesions and no jaundice     Neck:   supple, symmetrical, trachea midline     Chest:   Normal Symmetry and no tenderness     Lungs:   Clear anteriorly     Cardiovascular:   Irregular fast no murmur     Extremities and Back:   No edema     Neurological:   No gross or focal neurologic abnormalities              Data:     Results for orders placed or performed during the hospital encounter of 20  (from the past 24 hour(s))   Glucose by meter   Result Value Ref Range    Glucose 207 (H) 70 - 99 mg/dL   Potassium   Result Value Ref Range    Potassium 3.3 (L) 3.4 - 5.3 mmol/L   Glucose by meter   Result Value Ref Range    Glucose 182 (H) 70 - 99 mg/dL   Potassium   Result Value Ref Range    Potassium 3.5 3.4 - 5.3 mmol/L   Glucose by meter   Result Value Ref Range    Glucose 175 (H) 70 - 99 mg/dL   Glucose by meter   Result Value Ref Range    Glucose 180 (H) 70 - 99 mg/dL   Glucose by meter   Result Value Ref Range    Glucose 187 (H) 70 - 99 mg/dL   Heparin Xa (10a) Level   Result Value Ref Range    Heparin 10A Level <0.10 IU/mL   Vancomycin level   Result Value Ref Range    Vancomycin Level 20.6 mg/L   CBC with platelets differential   Result Value Ref Range    WBC 14.2 (H) 4.0 - 11.0 10e9/L    RBC Count 5.03 4.4 - 5.9 10e12/L    Hemoglobin 12.7 (L) 13.3 - 17.7 g/dL    Hematocrit 40.0 40.0 - 53.0 %    MCV 80 78 - 100 fl    MCH 25.2 (L) 26.5 - 33.0 pg    MCHC 31.8 31.5 - 36.5 g/dL    RDW 15.5 (H) 10.0 - 15.0 %    Platelet Count 359 150 - 450 10e9/L    Diff Method Automated Method     % Neutrophils 76.8 %    % Lymphocytes 6.6 %    % Monocytes 12.2 %    % Eosinophils 3.0 %    % Basophils 0.6 %    % Immature Granulocytes 0.8 %    Nucleated RBCs 0 0 /100    Absolute Neutrophil 10.9 (H) 1.6 - 8.3 10e9/L    Absolute Lymphocytes 0.9 0.8 - 5.3 10e9/L    Absolute Monocytes 1.7 (H) 0.0 - 1.3 10e9/L    Absolute Eosinophils 0.4 0.0 - 0.7 10e9/L    Absolute Basophils 0.1 0.0 - 0.2 10e9/L    Abs Immature Granulocytes 0.1 0 - 0.4 10e9/L    Absolute Nucleated RBC 0.0    Basic metabolic panel   Result Value Ref Range    Sodium 137 133 - 144 mmol/L    Potassium 3.3 (L) 3.4 - 5.3 mmol/L    Chloride 97 94 - 109 mmol/L    Carbon Dioxide 29 20 - 32 mmol/L    Anion Gap 11 3 - 14 mmol/L    Glucose 213 (H) 70 - 99 mg/dL    Urea Nitrogen 133 (H) 7 - 30 mg/dL    Creatinine 5.51 (H) 0.66 - 1.25 mg/dL    GFR Estimate 9 (L) >60  mL/min/[1.73_m2]    GFR Estimate If Black 11 (L) >60 mL/min/[1.73_m2]    Calcium 9.1 8.5 - 10.1 mg/dL   Glucose by meter   Result Value Ref Range    Glucose 191 (H) 70 - 99 mg/dL

## 2020-04-02 NOTE — PHARMACY-VANCOMYCIN DOSING SERVICE
Pharmacy Vancomycin Note  Date of Service 2020  Patient's  1945   74 year old, male    Indication: Sepsis  Goal Trough Level: 15-20 mg/L  Day of Therapy: 6  Current Vancomycin regimen:  Intermittent dosing-last 2000  Mg on 3/30.     Current estimated CrCl = Estimated Creatinine Clearance: 12.8 mL/min (A) (based on SCr of 5.51 mg/dL (H)).    Creatinine for last 3 days  3/31/2020:  5:40 AM Creatinine 5.76 mg/dL  2020:  5:45 AM Creatinine 5.72 mg/dL  2020:  6:10 AM Creatinine 5.51 mg/dL    Recent Vancomycin Levels (past 3 days)  3/31/2020:  8:15 PM Vancomycin Level 24.0 mg/L  2020:  5:45 AM Vancomycin Level 23.7 mg/L  2020:  6:10 AM Vancomycin Level 20.6 mg/L    Vancomycin IV Administrations (past 72 hours)      No vancomycin orders with administrations in past 72 hours.                Nephrotoxins and other renal medications (From now, onward)    Start     Dose/Rate Route Frequency Ordered Stop    20 1309  vancomycin place asencio - receiving intermittent dosing      1 each Intravenous SEE ADMIN INSTRUCTIONS 20 1310      20 0115  norepinephrine (LEVOPHED) 16 mg in  mL infusion      0.03-0.4 mcg/kg/min × 92.7 kg  2.6-34.8 mL/hr  Intravenous CONTINUOUS 20 0103      20 2345  piperacillin-tazobactam (ZOSYN) infusion 2.25 g      2.25 g  over 1 Hours Intravenous EVERY 6 HOURS 20 2330               Contrast Orders - past 72 hours (72h ago, onward)    None          Interpretation of levels and current regimen:  Trough level is  20.6, per ID would like to wait until closer to 15 before re-dosing. Re-check level in AM.    Has serum creatinine changed > 50% in last 72 hours: No    Renal Function: Stable    Plan:  1.  Trough level is  20.6, per ID would like to wait until closer to 15 before re-dosing. Re-check level in AM.  2.  Pharmacy will check trough levels as appropriate in in AM..    3. Serum creatinine levels will be ordered daily for the first week  of therapy and at least twice weekly for subsequent weeks.      Saad Carcamo, [facility]        .

## 2020-04-02 NOTE — PLAN OF CARE
ICU End of Shift Summary.  For vital signs and complete assessments, please see documentation flowsheets.     Pertinent assessments: A&Ox4, AF with rate 110-130s, BP stable, LS dim/clear on RA, good productive cough, BGs increasing, TF at goal, tolerating DD1/nectar thick diet, one soft BM, good UO per baker, icteric colored urine, denies pain  Major Shift Events: A-line and CL removed, SLP assessed and approved pureed diet with nectar thick liquids, PT and OT assessed and to follow  Plan (Upcoming Events): increase diet and activity as elvia, HR control with PO metoprolol, add Lantus at bedtime to decrease BGs  Discharge/Transfer Needs: could transfer out of ICU, likely need rehab upon discharge to improve mobility    Bedside Shift Report Completed : y  Bedside Safety Check Completed: y

## 2020-04-02 NOTE — PLAN OF CARE
Discharge Planner SLP   Patient plan for discharge: did not discuss  Current status: Clinical swallow evaluation completed given intubation of 5 days, failed RN bedside screening d/t coughing with water. Pt currently on room air, mild hypophonia, vocal quality functional, pt denies any throat pain/soreness. No hx dysphagia. Assessment completed with thin liquids, nectar thick liquids, puree solids, semi-solids. He currently presents with mild-moderate oropharyngeal dysphagia 2/2 intubation, generalized weakness. Mastication slow/prolonged (*needs adhesive for dentures to stay in place) and mild lingual oral residuals with chewable solids. Hyolaryngeal ROM reduced to palpation, mild delay, effortful to produce. Pt with x2-3 swallows per bite/sips likely secondary to pharyngeal residuals (d/t weakness and also [place] TF placement?). Pt with overt coughing with thin liquids, no signs/sx aspiration with nectar or solids.     Swallow tx initiated with emphasis on education. Pt educated on dysphagia s/p intubation, current presentation, aspiration risk/signs, possible complications of aspiration, current diet modifications and possibility of video swallow study if unable to safely upgrade diet to baseline (regular/thin) - verbalized understanding.     Recommendations: cautious initiation of dysphagia diet level 1 and nectar thick liquids when fully upright, slow pacing, allow time for x2-3 extra swallow. Hold PO if any decline in respiratory status or any difficulty observed.     Barriers to return to prior living situation: dysphagia, weakness, assist  Recommendations for discharge: ARU  Rationale for recommendations: Ongoing SLP for dysphagia management for safe return to baseline diet of regular/thin. Somewhat slow responses, will monitor cognitive linguistic status/need for evaluation.        Entered by: Maddy Lemus 04/02/2020 2:29 PM

## 2020-04-02 NOTE — PLAN OF CARE
Discharge Planner PT   Patient plan for discharge: Not state  Current status:     Eval complete, treatment indicated. Edu PT role and POC. Pt motivated to participate in session. RN approved.  HR remains elevated/tachy, fluctuating 120-130 however up to 150 with standing. Limit per . Session limited  mostly by elevated HR. BP soft however stable, pt denied dizziness. INcreased time for line management and rm set up.     Supine > sitting EOB with CGA and cues for proper technique. pt demonstrating posterior lean, cures  for postural correction and improved core activation. SBA-CGA provided  sitting EOB.Skilled monitoring of vitals thorughout session. STS x 2 with FWW required mod A x 1 + SBA x 1 for safety, pt heavily braces BLE  against bed to successfully stand, cue sfor  proper hand placement provided. Once standing pt demo posterior lean, cues fo  rimproved posture  and anterior wt shift. Fair correction. Pt  had difficulty marching place 2nd standing trial.     Sit > supine end of session with CGA to elevate LE into bed. Total A x2 boosting in bed. Pt left supine with needs in reach end of session. RN informed of pt performance       Barriers to return to prior living situation: Level of assist, unable to ambulate, impaired  Activity tolerance  Recommendations for discharge: aru  Rationale for recommendations: Pt will benefit from intense therapy at ARU to optimize IND with functional mobility as pt below baseline. Predict pt can/will be able to tolerate 3 hrs of therapy/day          Entered by: Shahida Lee 04/02/2020 4:23 PM

## 2020-04-02 NOTE — PROGRESS NOTES
04/02/20 0928   Quick Adds   Type of Visit Initial Occupational Therapy Evaluation   Living Environment   Lives With significant other   Living Arrangements house  ([facility])   Home Accessibility stairs to enter home;stairs within home   Number of Stairs, Main Entrance 3   Number of Stairs, Within Home, Primary other (see comments)  (15)   Transportation Anticipated car, drives self;family or friend will provide   Living Environment Comment Pt lives with significant other in [facility], stairs to enter, full flight within, however is attempting to move everything to main level, walk in shower with shower chair, standard height toilet   Self-Care   Usual Activity Tolerance good   Current Activity Tolerance fair   Regular Exercise No   Equipment Currently Used at Home none   Functional Level   Ambulation 0-->independent   Transferring 0-->independent   Toileting 0-->independent   Bathing 0-->independent   Dressing 0-->independent   Eating 0-->independent   Communication 0-->understands/communicates without difficulty   Swallowing 0-->swallows foods/liquids without difficulty   Cognition 0 - no cognition issues reported   Fall history within last six months no   Which of the above functional risks had a recent onset or change? transferring;toileting;bathing;dressing   Prior Functional Level Comment Pt reports indep in all ADLs, IADLs and mobility tasks with no AD at baseline. 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    General Information   Onset of Illness/Injury or Date of Surgery - Date 03/27/20   Referring Physician Vernon Hay MD   Patient/Family Goals Statement Pt's goal is to d/c home   Additional Occupational Profile Info/Pertinent History of Current Problem Per chart: Pt is a 74 year old male admitted with afib w/ RVR, progressive SOB acute hypoxic respiratory failure requiring intubation.     Precautions/Limitations fall precautions   Visual Perception   Visual  Perception Wears glasses   Sensory Examination   Sensory Comments Pt denies numbness/tingling in  BUEs   Pain Assessment   Patient Currently in Pain No   Range of Motion (ROM)   ROM Comment BUEs WFL   Strength   Strength Comments Generalized weakness BUEs 4-/5   Hand Strength   Hand Strength Comments Generalized weakness B gross grasp   Bed Mobility Skill: Sit to Supine   Level of [place]: Sit/Supine maximum assist (25% patients effort)   Physical Assist/Nonphysical Assist: Sit/Supine 1 person assist   Bed Mobility Skill: Supine to Sit   Level of [place]: Supine/Sit moderate assist (50% patients effort)   Physical Assist/Nonphysical Assist: Supine/Sit 1 person assist   Transfer Skills   Transfer Comments Attempted sit > stand from EOB to FWW, requiring max A, unable to advance feet to side step   Balance   Balance Comments CGA provided while seated EOB, max A while in stance due to posterior lean   Upper Body Dressing   Level of [place]: Dress Upper Body moderate assist (50% patients effort)   Physical Assist/Nonphysical Assist: Dress Upper Body 1 person assist   Lower Body Dressing   Level of [place]: Dress Lower Body dependent (less than 25% patients effort)   Physical Assist/Nonphysical Assist: Dress Lower Body 1 person assist   Toileting   Level of [place]: Toilet dependent (less than 25% patients effort)   Physical Assist/Nonphysical Assist: Toilet 2 person assist   Grooming   Level of Bland: Grooming minimum assist (75% patients effort)   Physical Assist/Nonphysical Assist: Grooming 1 person assist   Instrumental Activities of Daily Living (IADL)   Previous Responsibilities meal prep;housekeeping;laundry;shopping;yardwork;medication management;finances;driving;work   IADL Comments Pt has support of significant other for IADls in home environment   Activities of Daily Living Analysis   Impairments Contributing to Impaired Activities of Daily Living balance impaired;strength  "decreased   ADL Comments Pt presents to OT below baseline level of functioning in all areas of ADL tasks   General Therapy Interventions   Planned Therapy Interventions ADL retraining;IADL retraining;strengthening;transfer training   Clinical Impression   Criteria for Skilled Therapeutic Interventions Met yes, treatment indicated   OT Diagnosis Impaired ADLs, IADLs and mobility tasks   Influenced by the following impairments Decreased functional activity tolerance and strength, impaired balance   Assessment of Occupational Performance 5 or more Performance Deficits   Identified Performance Deficits Bathing, dressing, grooming, toileting, homemaking, transfers   Clinical Decision Making (Complexity) Moderate complexity   Therapy Frequency Daily   Predicted Duration of Therapy Intervention (days/wks) 4 days   Anticipated Discharge Disposition Acute Rehabilitation Facility   Risks and Benefits of Treatment have been explained. Yes   Patient, Family & other staff in agreement with plan of care Yes   Clinical Impression Comments Pt would benefit from skilled OT tomaximize safety and indep in all ADLs, IADLs and mobility tasks due to current deficits impacting function    [facility] AM-PAC  \"6 Clicks\" Daily Activity Inpatient Short Form   1. Putting on and taking off regular lower body clothing? 2 - A Lot   2. Bathing (including washing, rinsing, drying)? 2 - A Lot   3. Toileting, which includes using toilet, bedpan or urinal? 2 - A Lot   4. Putting on and taking off regular upper body clothing? 3 - A Little   5. Taking care of personal grooming such as brushing teeth? 3 - A Little   6. Eating meals? 3 - A Little   Daily Activity Raw Score (Score out of 24.Lower scores equate to lower levels of function) 15   Total Evaluation Time   Total Evaluation Time (Minutes) 10     "

## 2020-04-02 NOTE — PROGRESS NOTES
[facility]    Hospitalist Progress Note      Assessment & Plan   Gene Pagan is a 74 year old male who was admitted on 3/27/2020.    Summary of Stay:   Patient is a 74-year-old male with a past medical history significant for type 2 diabetes, and BPH who was seen initially at an outside urgent care center and transferred to the ED for atrial fibrillation with RVR.  According to history, patient reported 6 to 8 weeks of progressive shortness of breath.  His shortness of breath initially started with exertion but had progressed to dyspnea even at rest.      patient developed progressive shortness of breath overnight on 3/28/2020.  Was also notably hypotensive.  He was placed on BiPAP initially with some favorable results but respiratory status continued to decompensate and was eventually intubated on the night of 3/28/20.     He was also hypotensive and started on levophed. He was also notably in acute renal failure and was initially oliguric.  Nephrology has since been consulted and did raise the discussion of possible transfer to [facility] for CRRT but family had decided to keep the patient here and not transfer understanding that his clinical course can deteriorate at any point in time.  Since then, patient was placed on Bumex drip and started making urine.  Weight down approximately 8 kg now with greater than 4 L urine output on a day-to-day basis.   Currently, the diuretics have been stopped.     Patient was successfully extubated on 4/1/2020.  Patient is stable from respiratory point of view.  Appears comfortable.  Not requiring oxygen.     Noted to have a fib with rate greater than 120. Seen by cardiology again today, recommended oral metoprolol.  Patient is off of Levophed, stopped yesterday in the evening.    Overnight, patient noted to have gross hematuria.  Per nursing staff, patient might have tugged on the Mckenzie catheter.  The heparin infusion was stopped.  No blood clots seen.   Pink appearing urine.    Plan:    Acute hypoxic respiratory failure.  Improved.  -In the setting of CHF and cardiogenic shock.  - Extubated on 4/1/2020.  - Patient is DNR but not DNI. Dr Hay discussed with the patient regarding intubation if needed again in the future: Patient expressed that he does want to be reintubated if necessary.     Cardiogenic shock  - EF of 20 to 25%, moderate to severe MR, moderate to severe RV systolic dysfunction.  - Overall poor prognosis.  - Norepinephrine has been weaned off, stopped on the evening of 4/1/2020.  - associated infection can not be ruled out- significant leikocytosis so also on iV zosyn and vanco (start date 3/28).  Negative MRSA in nares, discontinue vancomycin.  Continue Zosyn.  -NEGATIVE COVID19     Atrial fibrillation with rapid rate.  Uncontrolled.  -Heart rate continues to be elevated, close to 130.  -Currently on amiodarone 200 mg daily orally.  -Seen by cardiology again: Recommended continuing the amiodarone as it is without increasing it.  Recommended low-dose metoprolol 12.5 twice daily.  May try one-time dose of digoxin.  -heparin infusion: On hold due to gross hematuria.     Acute kidney injury in the setting of chronic kidney disease.  - Nephrology following.  Good urine output, and polyuric phase.  Creatinine is slightly better.  Baseline creatinine is close to 2  -Per nephrology, a liter of normal saline today.     Elevated AST and ALT  -Thought to be ischemic hepatitis/shock liver/liver congestion in the setting of CHF  - LFTs are slowly improving.  -Repeat LFTs in the morning.     1 cm pulmonary nodule on CT.  - Outpatient follow-up.     Hypokalemia  -Replace per protocol.     Lines: Mckenzie. Right IJ. A line.  Discontinue arterial line.     DVT Prophylaxis:  PCD  Code Status: DNR  Expected discharge: uncertain    Hemant Gonzalez MD  Text Page (7am - 6pm, M-F)    Interval History   Patient was evaluated with nursing staff. Overnight issues  discussed.    Review of systems:  No nausea or vomiting.  No abdominal pain.  No diarrhea.  No chest pain/palpitations.  No new cough/shortness of breath.  No headache/visual disturbance/new weakness.    -Data reviewed today: Labs and medications.    Physical Exam   Temp: 99.5  F (37.5  C) Temp src: Bladder BP: 98/68 Pulse: 134 Heart Rate: 129 Resp: 9 SpO2: 98 % O2 Device: None (Room air)    Vitals:    03/31/20 0329 04/01/20 0354 04/02/20 0350   Weight: 85.8 kg (189 lb 2.5 oz) 81.6 kg (179 lb 14.3 oz) 79.2 kg (174 lb 9.7 oz)     Vital Signs with Ranges  Temp:  [98.4  F (36.9  C)-99.5  F (37.5  C)] 99.5  F (37.5  C)  Pulse:  [134] 134  Heart Rate:  [109-144] 129  Resp:  [8-32] 9  BP: (98-99)/(68-71) 98/68  MAP:  [59 mmHg-96 mmHg] 71 mmHg  Arterial Line BP: ()/(42-69) 105/57  FiO2 (%):  [30 %] 30 %  SpO2:  [95 %-100 %] 98 %  I/O last 3 completed shifts:  In: 2728.79 [I.V.:798.79; NG/GT:850]  Out: 4795 [Urine:4795]    Constitutional: Awake, alert, cooperative, no apparent distress  HEENT: Trachea midline, sclera is clear   Respiratory: Clear to auscultation bilaterally, no crackles or wheezing  Cardiovascular: Regular rate and rhythm, normal S1 and S2, and no murmur noted  GI: Normal bowel sounds, soft, non-distended, non-tender  Skin/Integumen: No rashes, no cyanosis    Medications     dextrose       norepinephrine Stopped (04/01/20 1830)     - MEDICATION INSTRUCTIONS -       - MEDICATION INSTRUCTIONS -       sodium chloride 10 mL/hr at 04/02/20 1000       amiodarone  200 mg Oral or FT or NG tube Daily     aspirin  81 mg Per Feeding Tube Daily     B and C vitamin Complex with folic acid  5 mL Per Feeding Tube Daily     guaiFENesin  10 mL Oral or Feeding Tube Q4H     insulin aspart  1-6 Units Subcutaneous Q4H     metoprolol tartrate  12.5 mg Oral BID     pantoprazole  40 mg Per Feeding Tube QAM AC     piperacillin-tazobactam  2.25 g Intravenous Q6H     sodium chloride (PF)  10 mL Intracatheter Q8H     sodium  chloride (PF)  3 mL Intracatheter Q8H     sodium chloride (PF)  3 mL Intracatheter Q8H     vancomycin place asencio - receiving intermittent dosing  1 each Intravenous See Admin Instructions       Data   Recent Labs   Lab 04/02/20  0610 04/01/20  1945 04/01/20  1240 04/01/20  0545 03/31/20  0540  03/29/20  0412  03/28/20  1900  03/28/20  1230 03/28/20  1015  03/27/20  1348   WBC 14.2*  --   --  13.4* 17.0*   < > 20.6*  --   --   --   --   --    < > 10.0   HGB 12.7*  --   --  11.9* 10.8*   < > 11.0*  --   --   --   --   --    < > 10.0*   MCV 80  --   --  79 79   < > 82  --   --   --   --   --    < > 84     --   --  364 331   < > 413  --   --   --   --   --    < > 487*   INR  --   --   --   --   --   --   --   --   --   --   --   --   --  1.20*     --   --  135 134   < > 131*   < > 131*   < > 131* 130*   < > 131*   POTASSIUM 3.3* 3.5 3.3* 2.7* 3.1*   < > 5.0   < > 5.4*   < > 6.2* 6.4*   < > 4.2   CHLORIDE 97  --   --  95 98   < > 101   < > 102   < > 103 103   < > 102   CO2 29  --   --  26 21   < > 16*   < > 15*   < > 15* 13*   < > 20   *  --   --  116* 91*   < > 65*   < > 55*   < > 47* 47*   < > 33*   CR 5.51*  --   --  5.72* 5.76*   < > 4.85*   < > 4.46*   < > 3.94* 3.79*   < > 2.40*   ANIONGAP 11  --   --  14 16*   < > 14   < > 14   < > 13 14   < > 9   HARPER 9.1  --   --  8.5 7.4*   < > 7.4*   < > 7.9*   < > 8.3* 8.2*   < > 8.8   *  --   --  158* 145*   < > 173*   < > 155*   < > 218* 219*   < > 212*   ALBUMIN  --   --   --  2.6* 2.5*   < > 2.8*   < > 2.8*   < > 2.9* 2.9*   < >  --    PROTTOTAL  --   --   --  6.7* 6.2*   < > 6.1*   < > 6.2*   < > 6.5* 6.7*   < >  --    BILITOTAL  --   --   --  1.2 0.9   < > 0.7   < > 0.7   < > 0.8 0.8   < >  --    ALKPHOS  --   --   --  141 133   < > 106   < > 94   < > 93 94   < >  --    ALT  --   --   --  2,750* 3,860*   < > 6,983*   < > 5,254*   < > 4,613* 4,195*   < >  --    AST  --   --   --  581* 1,606*   < > 11,607*   < > 8,219*   < > 6,473* 5,606*   < >   --    LIPASE  --   --   --   --   --   --  917*  --   --   --   --   --   --   --    TROPI  --   --   --   --   --   --   --   --  0.439*  --  0.336* 0.294*   < > 0.120*    < > = values in this interval not displayed.       No results found for this or any previous visit (from the past 24 hour(s)).

## 2020-04-02 NOTE — PROGRESS NOTES
Intensivist note  He was awake and alert today. Oxygenating well on room air and off of vasopressors since late yesterday afternoon.     I had a discussion with regarding potential reintubation (and fortunately he seems to be breathing quite well at present), and if he decompensates he would want to be reintubated.     Though all cultures negative, I wrote for stop dates on antibiotics for a total of 7 full days (stop 4/4).     He is making urine and creatinine continues to hover about 5.5. He does not have an indication for dialysis at this time, but will likely need it going forward.     Critical care will sign off case at this time.    Assessment and Plan  1. Acute respiratory failure- on FIO2 30% and +5 PEEP. He did well on a breathing trial and was extubated.we will focus on pulmonary hygiene.    2. Shock, likely combination cardiogenic and septic and remains on Levophed at about 0.06. cultures negative and suspicion GB as cause; continue vanco and zosyn   3. Atrial fibrillation- HR in 80's  4. Cardiomyopathy EF 20-25%  5. Acute renal failure (baseline creatinine 2.3)- as above, creatinine now 5.72; Bumex now off. Case discussed with Dr. Rodrigez and appreciate his input and fine care  6. Shock liver- enzymes continue to trend down, and will continue to follow.  7. Debility- PT and MARY ANN moran md  April 2, 2020

## 2020-04-02 NOTE — PROGRESS NOTES
04/02/20 1608   Quick Adds   Type of Visit Initial PT Evaluation   Living Environment   Lives With significant other   Living Arrangements house  ([facility])   Home Accessibility stairs to enter home;stairs within home   Number of Stairs, Main Entrance 3   Number of Stairs, Within Home, Primary other (see comments)  (15)   Transportation Anticipated car, drives self;family or friend will provide   Living Environment Comment Pt lives with significant other in [facility], stairs to enter, full flight within, however is attempting to move everything to main level, walk in shower with shower chair, standard height toilet    Self-Care   Usual Activity Tolerance good   Current Activity Tolerance fair   Regular Exercise No   Equipment Currently Used at Home none   Functional Level Prior   Ambulation 0-->independent   Transferring 0-->independent   Toileting 0-->independent   Bathing 0-->independent   Communication 0-->understands/communicates without difficulty   Swallowing 0-->swallows foods/liquids without difficulty   Which of the above functional risks had a recent onset or change? transferring;toileting;bathing;dressing   Prior Functional Level Comment Pt lives with significant other in [facility], stairs to enter, full flight within, however is attempting to move everything to main level, walk in shower with shower chair, standard height toilet; IND at baseline   General Information   Onset of Illness/Injury or Date of Surgery - Date 03/27/20   Referring Physician Vernon Hay MD   Pertinent History of Current Problem (include personal factors and/or comorbidities that impact the POC) Patient is a 74-year-old male with a past medical history significant for type 2 diabetes, and BPH who was seen initially at an outside urgent care center and transferred to the ED for atrial fibrillation with RVR.  According to history, patient reported 6 to 8 weeks of progressive shortness of breath.  His shortness of  "breath initially started with exertion but had progressed to dyspnea even at rest.    Precautions/Limitations fall precautions  (Monitor HR)   General Observations RN approved session   Cognitive Status Examination   Orientation orientation to person, place and time   Posture    Posture Forward head position   Range of Motion (ROM)   ROM Comment BLE WFL   Strength   Strength Comments BLE > 3/5 strength however pt demonstrates significant functional weakness with OOB mobility    Bed Mobility   Bed Mobility Comments supine > sitting EOB with CGA, posterior  lean    Transfer Skills   Transfer Comments STS with FWw and mod A x 1    Gait   Gait Comments NT   Balance   Balance Comments Pt demonstrates posterior lean sitting EoB, requires BUe support and SBA-CGA sittingEOB to  maintain balance   Sensory Examination   Sensory Perception Comments intact to lightt ouch    General Therapy Interventions   Planned Therapy Interventions balance training;bed mobility training;gait training;strengthening;transfer training   Clinical Impression   Criteria for Skilled Therapeutic Intervention yes, treatment indicated   PT Diagnosis impaired IND with functional mobility from baseline   Influenced by the following impairments medical condition, functional weakness, balance, activity tolerance   Functional limitations due to impairments impaired IND with functional mobiltiy    Clinical Presentation Unstable/Unpredictable   Clinical Presentation Rationale Medical condition, co morbidities, clinical judgement   Clinical Decision Making (Complexity) High complexity   Therapy Frequency Daily   Predicted Duration of Therapy Intervention (days/wks) 1  week   Anticipated Discharge Disposition Acute Rehabilitation Facility   Risk & Benefits of therapy have been explained Yes   Patient, Family & other staff in agreement with plan of care Yes   [facility] AM-PAC  \"6 Clicks\" V.2 Basic Mobility Inpatient Short Form   1. Turning from your " back to your side while in a flat bed without using bedrails? 3 - A Little   2. Moving from lying on your back to sitting on the side of a flat bed without using bedrails? 3 - A Little   3. Moving to and from a bed to a chair (including a wheelchair)? 2 - A Lot   4. Standing up from a chair using your arms (e.g., wheelchair, or bedside chair)? 2 - A Lot   5. To walk in hospital room? 2 - A Lot   6. Climbing 3-5 steps with a railing? 1 - Total   Basic Mobility Raw Score (Score out of 24.Lower scores equate to lower levels of function) 13   Total Evaluation Time   Total Evaluation Time (Minutes) 15

## 2020-04-02 NOTE — PLAN OF CARE
ICU End of Shift Summary.  For vital signs and complete assessments, please see documentation flowsheets.     Pertinent assessments:   Neuro: Alert and oriented. Tmax 99.1  Pain: denies  Cardiac: Tele: afib RVR; HR 120s-130s  Respiratory: Room air; lung sounds diminished; infrequent productive cough  : Mckenzie in place; urine red  GI: BS+; BMx3; TF @ 45ml/hr with Q4hr 30ml flushes via keofeed  Skin: blanchable redness on coccyx  Ax2 to turn and reposition Q2hrs.  Right art line and right CVC    Major Shift Events: urine became red, heparin drip stopped, Hgb 12.7 this AM; pt remained off levophed for entire shift maintaining a MAP>65  Plan (Upcoming Events): family discussing dialysis options  Discharge/Transfer Needs: will need PT/OT    Bedside Shift Report Completed   Bedside Safety Check Completed

## 2020-04-02 NOTE — CONSULTS
NUTRITION BRIEF NOTE  See RD note 3/30 for full assessment details    New findings in last 24 hours:    Diet order: NPO. Bedside eval today for diet advancement?    TF at goal rate via NJ placed 3/31: Nepro at 45 mL/hr x 24 hours, fluid flush of 30 mL q4 hours. Formula changed 4/1 after extubation to renal appropriate    Weight: 79.2 kg, down trending    BM: 4/2     Labs: reviewed, Cr and BUN climbing    Updated Assessed Nutrition Needs (DW: 82 kg):  Energy: 8191-9789 kcals (25-30 kcal/kg)  Justification: Overweight, exutubation   Protein: 49-82 grams protein (0.6-1 g pro/Kg)  Justification: CKD not yet on HD, hypercatabolism with critical illness    Interventions:  Updated TF regimen:    Type of Feeding Tube: NJ (3/31)   Enteral Frequency:  Continuous   Enteral Regimen: Suplena at 45 mL/hr   Total Enteral Provisions: 1080 mL daily provides 1944 kcal, 49 g protein, 212 g CHO, 13 g fiber and 799 mL free water. Meets > 100% of DRI's.   Free Water Flush: 30 mL q4 hours     **Goal rate pending adequacy of oral intake**      Entered orders for regimen outlined above    Collaboration and Referral of care: Discussed patient during interdisciplinary care rounds this morning via phone    Please page/consult as needed.      Judy Carey MS, RDN, LD, CNSC  Pager - 3rd floor/ICU: [phone]  Pager - All other floors: [phone]  Pager - Weekend/holiday: [phone]  Office: [phone]

## 2020-04-03 ENCOUNTER — APPOINTMENT (OUTPATIENT)
Dept: PHYSICAL THERAPY | Facility: CLINIC | Age: 75
DRG: 308 | End: 2020-04-03
Payer: COMMERCIAL

## 2020-04-03 ENCOUNTER — APPOINTMENT (OUTPATIENT)
Dept: SPEECH THERAPY | Facility: CLINIC | Age: 75
DRG: 308 | End: 2020-04-03
Payer: COMMERCIAL

## 2020-04-03 ENCOUNTER — APPOINTMENT (OUTPATIENT)
Dept: OCCUPATIONAL THERAPY | Facility: CLINIC | Age: 75
DRG: 308 | End: 2020-04-03
Payer: COMMERCIAL

## 2020-04-03 LAB
ALBUMIN SERPL-MCNC: 2.6 G/DL (ref 3.4–5)
ALP SERPL-CCNC: 244 U/L (ref 40–150)
ALT SERPL W P-5'-P-CCNC: 1171 U/L (ref 0–70)
ANION GAP SERPL CALCULATED.3IONS-SCNC: 10 MMOL/L (ref 3–14)
AST SERPL W P-5'-P-CCNC: 163 U/L (ref 0–45)
BACTERIA SPEC CULT: NO GROWTH
BACTERIA SPEC CULT: NO GROWTH
BILIRUB DIRECT SERPL-MCNC: 0.4 MG/DL (ref 0–0.2)
BILIRUB SERPL-MCNC: 0.7 MG/DL (ref 0.2–1.3)
BUN SERPL-MCNC: 136 MG/DL (ref 7–30)
CALCIUM SERPL-MCNC: 9 MG/DL (ref 8.5–10.1)
CHLORIDE SERPL-SCNC: 99 MMOL/L (ref 94–109)
CO2 SERPL-SCNC: 29 MMOL/L (ref 20–32)
CREAT SERPL-MCNC: 5.18 MG/DL (ref 0.66–1.25)
GFR SERPL CREATININE-BSD FRML MDRD: 10 ML/MIN/{1.73_M2}
GLUCOSE BLDC GLUCOMTR-MCNC: 174 MG/DL (ref 70–99)
GLUCOSE BLDC GLUCOMTR-MCNC: 194 MG/DL (ref 70–99)
GLUCOSE BLDC GLUCOMTR-MCNC: 199 MG/DL (ref 70–99)
GLUCOSE BLDC GLUCOMTR-MCNC: 231 MG/DL (ref 70–99)
GLUCOSE BLDC GLUCOMTR-MCNC: 234 MG/DL (ref 70–99)
GLUCOSE BLDC GLUCOMTR-MCNC: 303 MG/DL (ref 70–99)
GLUCOSE BLDC GLUCOMTR-MCNC: 314 MG/DL (ref 70–99)
GLUCOSE SERPL-MCNC: 223 MG/DL (ref 70–99)
INR PPP: 1.23 (ref 0.86–1.14)
LMWH PPP CHRO-ACNC: 0.37 IU/ML
POTASSIUM SERPL-SCNC: 3.4 MMOL/L (ref 3.4–5.3)
PROT SERPL-MCNC: 6.6 G/DL (ref 6.8–8.8)
SODIUM SERPL-SCNC: 138 MMOL/L (ref 133–144)
SPECIMEN SOURCE: NORMAL
SPECIMEN SOURCE: NORMAL

## 2020-04-03 PROCEDURE — 25000131 ZZH RX MED GY IP 250 OP 636 PS 637: Performed by: INTERNAL MEDICINE

## 2020-04-03 PROCEDURE — 92526 ORAL FUNCTION THERAPY: CPT | Mod: GN

## 2020-04-03 PROCEDURE — 97110 THERAPEUTIC EXERCISES: CPT | Mod: GP

## 2020-04-03 PROCEDURE — 85610 PROTHROMBIN TIME: CPT | Performed by: INTERNAL MEDICINE

## 2020-04-03 PROCEDURE — 20000003 ZZH R&B ICU

## 2020-04-03 PROCEDURE — 25800030 ZZH RX IP 258 OP 636: Performed by: INTERNAL MEDICINE

## 2020-04-03 PROCEDURE — 25000125 ZZHC RX 250: Performed by: INTERNAL MEDICINE

## 2020-04-03 PROCEDURE — 99232 SBSQ HOSP IP/OBS MODERATE 35: CPT | Performed by: INTERNAL MEDICINE

## 2020-04-03 PROCEDURE — 80076 HEPATIC FUNCTION PANEL: CPT | Performed by: INTERNAL MEDICINE

## 2020-04-03 PROCEDURE — 25000132 ZZH RX MED GY IP 250 OP 250 PS 637: Performed by: INTERNAL MEDICINE

## 2020-04-03 PROCEDURE — 25000128 H RX IP 250 OP 636: Performed by: INTERNAL MEDICINE

## 2020-04-03 PROCEDURE — 97530 THERAPEUTIC ACTIVITIES: CPT | Mod: GP

## 2020-04-03 PROCEDURE — 85520 HEPARIN ASSAY: CPT | Performed by: INTERNAL MEDICINE

## 2020-04-03 PROCEDURE — 36415 COLL VENOUS BLD VENIPUNCTURE: CPT | Performed by: INTERNAL MEDICINE

## 2020-04-03 PROCEDURE — 80048 BASIC METABOLIC PNL TOTAL CA: CPT | Performed by: INTERNAL MEDICINE

## 2020-04-03 PROCEDURE — 00000146 ZZHCL STATISTIC GLUCOSE BY METER IP

## 2020-04-03 PROCEDURE — 97530 THERAPEUTIC ACTIVITIES: CPT | Mod: GO | Performed by: REHABILITATION PRACTITIONER

## 2020-04-03 PROCEDURE — 97110 THERAPEUTIC EXERCISES: CPT | Mod: GO | Performed by: REHABILITATION PRACTITIONER

## 2020-04-03 RX ORDER — DIGOXIN 0.25 MG/ML
125 INJECTION INTRAMUSCULAR; INTRAVENOUS ONCE
Status: COMPLETED | OUTPATIENT
Start: 2020-04-03 | End: 2020-04-03

## 2020-04-03 RX ORDER — DIGOXIN 0.25 MG/ML
125 INJECTION INTRAMUSCULAR; INTRAVENOUS ONCE
Status: DISCONTINUED | OUTPATIENT
Start: 2020-04-03 | End: 2020-04-03

## 2020-04-03 RX ORDER — HEPARIN SODIUM 10000 [USP'U]/100ML
1400 INJECTION, SOLUTION INTRAVENOUS CONTINUOUS
Status: DISCONTINUED | OUTPATIENT
Start: 2020-04-03 | End: 2020-04-07

## 2020-04-03 RX ADMIN — Medication 5 ML: at 08:39

## 2020-04-03 RX ADMIN — TAZOBACTAM SODIUM AND PIPERACILLIN SODIUM 2.25 G: 250; 2 INJECTION, SOLUTION INTRAVENOUS at 23:20

## 2020-04-03 RX ADMIN — Medication 40 MG: at 08:39

## 2020-04-03 RX ADMIN — TAZOBACTAM SODIUM AND PIPERACILLIN SODIUM 2.25 G: 250; 2 INJECTION, SOLUTION INTRAVENOUS at 12:10

## 2020-04-03 RX ADMIN — GUAIFENESIN 10 ML: 100 SOLUTION ORAL at 23:20

## 2020-04-03 RX ADMIN — DIGOXIN 125 MCG: 0.25 INJECTION INTRAMUSCULAR; INTRAVENOUS at 06:42

## 2020-04-03 RX ADMIN — GUAIFENESIN 10 ML: 100 SOLUTION ORAL at 12:10

## 2020-04-03 RX ADMIN — TAZOBACTAM SODIUM AND PIPERACILLIN SODIUM 2.25 G: 250; 2 INJECTION, SOLUTION INTRAVENOUS at 00:06

## 2020-04-03 RX ADMIN — Medication 12.5 MG: at 08:44

## 2020-04-03 RX ADMIN — GUAIFENESIN 10 ML: 100 SOLUTION ORAL at 16:18

## 2020-04-03 RX ADMIN — GUAIFENESIN 10 ML: 100 SOLUTION ORAL at 00:09

## 2020-04-03 RX ADMIN — ASPIRIN 81 MG 81 MG: 81 TABLET ORAL at 08:40

## 2020-04-03 RX ADMIN — GUAIFENESIN 10 ML: 100 SOLUTION ORAL at 04:12

## 2020-04-03 RX ADMIN — TAZOBACTAM SODIUM AND PIPERACILLIN SODIUM 2.25 G: 250; 2 INJECTION, SOLUTION INTRAVENOUS at 18:02

## 2020-04-03 RX ADMIN — GUAIFENESIN 10 ML: 100 SOLUTION ORAL at 08:40

## 2020-04-03 RX ADMIN — SODIUM CHLORIDE: 9 INJECTION, SOLUTION INTRAVENOUS at 08:39

## 2020-04-03 RX ADMIN — POTASSIUM CHLORIDE 20 MEQ: 1.5 POWDER, FOR SOLUTION ORAL at 08:45

## 2020-04-03 RX ADMIN — GUAIFENESIN 10 ML: 100 SOLUTION ORAL at 19:48

## 2020-04-03 RX ADMIN — TAZOBACTAM SODIUM AND PIPERACILLIN SODIUM 2.25 G: 250; 2 INJECTION, SOLUTION INTRAVENOUS at 05:53

## 2020-04-03 RX ADMIN — HEPARIN SODIUM 1650 UNITS/HR: 10000 INJECTION, SOLUTION INTRAVENOUS at 14:33

## 2020-04-03 RX ADMIN — AMIODARONE HYDROCHLORIDE 200 MG: 200 TABLET ORAL at 08:40

## 2020-04-03 RX ADMIN — INSULIN GLARGINE 16 UNITS: 100 INJECTION, SOLUTION SUBCUTANEOUS at 22:09

## 2020-04-03 ASSESSMENT — MIFFLIN-ST. JEOR: SCORE: 1517.44

## 2020-04-03 ASSESSMENT — ACTIVITIES OF DAILY LIVING (ADL)
ADLS_ACUITY_SCORE: 16
ADLS_ACUITY_SCORE: 17
ADLS_ACUITY_SCORE: 16
ADLS_ACUITY_SCORE: 17
ADLS_ACUITY_SCORE: 16
ADLS_ACUITY_SCORE: 17

## 2020-04-03 NOTE — PLAN OF CARE
.ICU End of Shift Summary.  For vital signs and complete assessments, please see documentation flowsheets.     Pertinent assessments: A&Ox4.  Afebrile.  Tele Afib 's-130's.  BP stable but soft at times.  Denies pain.  IVF infusing; receiving Zosyn.  Keofeed in place.  TF infusing at goal 45 ml/hr with free water flushes 30 ml every 4 hours.  DD1 Pureed Nectar thick liquids; speech to see.  Mckenzie patent with good urine output.  No BM this shift.  Patient did not sleep all night.  Major Shift Events: was unable to give night time dose of metoprolol due to BP; as the night progresses HR went up to 150's but unsustained.  Tele Hub notified; one time dose of Digoxin given.    Plan (Upcoming Events): Continue with current plan of care.    Discharge/Transfer Needs: TBD    Bedside Shift Report Completed :   Bedside Safety Check Completed:

## 2020-04-03 NOTE — PLAN OF CARE
Discharge Planner PT   Patient plan for discharge: Not stated  Current status: Patient supine upon arrival.  Patient transferred to sitting at EOB with mod A.  Patient unable to maintain balance in sitting without UE support at bed rail for longer than 10 seconds.  Patient participated in limited LEs strengthening exercises including knee extension and seated marching.  With activity, patient's HR between 110-152 bpm, no sustained bpm over 130 noted.  BP taken in supine and sitting, remained in 80s/60s; patient asymptomatic in both positions.  Patient returned to supine at end of session, required max A x2 for bed mobility.  Barriers to return to prior living situation: Level of assist, unable to ambulate, impaired  Activity tolerance  Recommendations for discharge: ARU  Rationale for recommendations: Pt will benefit from intense therapy at ARU to optimize IND with functional mobility as pt below baseline. Predict pt can/will be able to tolerate 3 hrs of therapy/day

## 2020-04-03 NOTE — PROGRESS NOTES
CLINICAL NUTRITION SERVICES - REASSESSMENT NOTE    Recommendations Ordered by Registered Dietitian (RD):     Continue TF regimen as ordered, ok to hold 1 hour before/after meals    Calorie counts    Discontinue nephronex   Future/Additional Recommendations:     Justify wean of nutrition support when able to meet >65% of estimated needs orally, or per MD discretion    Renal diet restrictions when intake consistently adequate, oral nutrition supplements    Malnutrition:  % Weight Loss:  > 2% in 1 week (severe malnutrition)  % Intake:  <75% for > 7 days (non-severe malnutrition)  Subcutaneous Fat Loss: unable to determine   Muscle Loss: unable to determine   Fluid Retention: Trace    Malnutrition Diagnosis: Non-Severe malnutrition  In Context of:  Acute illness or injury with underlying Chronic illness or disease. May meet severe criteria if a nutrition focused physical exam was completed      EVALUATION OF PROGRESS TOWARD GOALS/NEW FINDINGS   Progress towards goals will be monitored and evaluated per protocol and Practice Guidelines    Diet order: Dysphagia Diet Level 2: Pureed; Thin Liquids  Per RN, waiting for breakfast to arrive    Patient remains on enteral nutrition with good tolerance:     Type of Feeding Tube: NJ (3/31)   Enteral Frequency:  Continuous   Enteral Regimen: Suplena at 45 mL/hr   Total Enteral Provisions: 1080 mL daily provides 1944 kcal, 49 g protein, 212 g CHO, 13 g fiber and 799 mL free water. Meets > 100% of DRI's.   Free Water Flush: 30 mL q4 hours     3/26-3/31: receiving <50% of prescribed volume  4/1: Peptamen Intense VHP, extubated --> switched to Nepro, >95% prescribed volume  4/2: switched to Suplena, >95% prescribed volume      BM: 4/2    I/O last 3 completed shifts:    In: 3685 [I.V.:2105; NG/GT:500]    Out: 2965 [Urine:2965]    Skin: coccyx suspected pressure injury, WOCN not consulted    Fluid: +1 generalized edema    Generalized weakness     Weight: 80 kg - down 9 kg since admit  (peak of 94.7 kg)    Sam nutrition score: 3; total score: 16    Labs:    Electrolytes  Potassium (mmol/L)   Date Value   04/03/2020 3.4   04/02/2020 4.1   04/02/2020 3.3 (L)     Phosphorus (mg/dL)   Date Value   04/01/2020 8.1 (H)   03/31/2020 8.5 (H)        Blood Glucose  Glucose (mg/dL)   Date Value   04/03/2020 223 (H)   04/02/2020 213 (H)   04/01/2020 158 (H)   03/31/2020 145 (H)   03/30/2020 130 (H)     Hemoglobin A1C (%)   Date Value   03/27/2020 6.2 (H)        Inflammatory Markers    WBC (10e9/L)   Date Value   04/02/2020 14.2 (H)   04/01/2020 13.4 (H)   03/31/2020 17.0 (H)     Albumin (g/dL)   Date Value   04/03/2020 2.6 (L)   04/01/2020 2.6 (L)   03/31/2020 2.5 (L)        Magnesium (mg/dL)   Date Value   04/01/2020 2.6 (H)   03/31/2020 2.3   03/29/2020 2.2     Sodium (mmol/L)   Date Value   04/03/2020 138   04/02/2020 137   04/01/2020 135        Renal  Urea Nitrogen (mg/dL)   Date Value   04/03/2020 136 (H)   04/02/2020 133 (H)   04/01/2020 116 (H)     Creatinine (mg/dL)   Date Value   04/03/2020 5.18 (H)   04/02/2020 5.51 (H)   04/01/2020 5.72 (H)         Additional  Ketones Urine (mg/dL)   Date Value   03/28/2020 Negative        Meds:    amiodarone  200 mg Oral or FT or NG tube Daily     aspirin  81 mg Per Feeding Tube Daily     B and C vitamin Complex with folic acid  5 mL Per Feeding Tube Daily     guaiFENesin  10 mL Oral or Feeding Tube Q4H     insulin aspart  1-6 Units Subcutaneous Q4H     insulin glargine  10 Units Subcutaneous At Bedtime     metoprolol tartrate  12.5 mg Oral BID     pantoprazole  40 mg Per Feeding Tube QAM AC     piperacillin-tazobactam  2.25 g Intravenous Q6H        sodium chloride 100 mL/hr at 04/03/20 0800        ASSESSED NUTRITION NEEDS (PER APPROVED PRACTICE GUIDELINES; DW: 82 kg):  Energy: 6275-6958 kcals (25-30 kcal/kg)  Justification: Overweight, exutubation   Protein: 49-82 grams protein (0.6-1 g pro/Kg)  Justification: CKD not yet on HD, hypercatabolism with critical  illness    Previous Goals:   EN to reach goal rate w/in 48 hrs of initiation.  Evaluation: Not met    Previous Nutrition Diagnosis:   Inadequate protein-energy intake related to respiratory needs, now intubated as evidenced by meeting <50% energy needs via Propofol, 0% protein needs since 3 day admit.  Evaluation: Improving, updated below    CURRENT NUTRITION DIAGNOSIS  Inadequate oral intake related to post extubation dysphagia as evidenced by DD2 diet restrictions, TF reliance     INTERVENTIONS  Recommendations / Nutrition Prescription  Diet consistency/texture per SLP + Diet appropriate oral nutrition supplements to increase protein and calorie intake. Will need renal diet restrictions once oral intake consistently adequate    Continue TF regimen as ordered, ok to hold for 1 hour before/after meals to promote intake. Justify wean of nutrition support when able to meet >65% of estimated needs orally    Implementation  EN Composition, EN Schedule and Feeding Tube Flush: Entered orders to reflect regimen outlined above  Medical food supplement: Nepro 4 oz if tolerated orally  Diet order: per SLP  Collaboration and Referral of care: Discussed patient during interdisciplinary care rounds this morning    Goals  TF at goal rate to meet >900% of estimated needs until oral intake improves  Oral intake to meet >65% of estimated needs to justify wean of nutrition support       MONITORING AND EVALUATION:  Progress towards goals will be monitored and evaluated per protocol and Practice Guidelines      Judy Carey, MS, RDN, LD, CNSC  Pager - 3rd floor/ICU: [phone]  Pager - All other floors: [phone]  Pager - Weekend/holiday: [phone]  Office: [phone]

## 2020-04-03 NOTE — PLAN OF CARE
Discharge Planner SLP   Patient plan for discharge: did not discuss  Current status: Swallow tx completed. Pt reported good tolerance of PO since evaluation yesterday. Seen with 4 oz nectar thick liquids, 10 oz thin liquids, 4 oz puree solids, 2 oz chewable solids. Improvements in swallow function, strength noted. More timely/complete mastication and oral clearance. Timely swallow trigger with stronger ROM to palpation. Only occasional double swallows observed. Consistent coughing with thin liquids via straw and only x1 cough via cup (on last sip, suspect d/t tipping head/cup back far). No other signs/sx across ~ 9 oz thin via cup, nectar thick liquids or solids. He has good awareness of small single sips and safe swallow strategies.     Recommendations: upgrade to dysphagia diet level 2 with THIN liquids (no straws, small single sips) when fully upright, slow pacing. If any coughing noted with thin liquids, downgrade back to nectar thick. If any concerns re: tolerance, may consider a video swallow study.      Barriers to return to prior living situation: dysphagia, weakness, assist  Recommendations for discharge: ARU  Rationale for recommendations: Ongoing SLP for dysphagia management for safe return to baseline diet of regular/thin. Somewhat slow responses, will monitor cognitive linguistic status/need for evaluation.        Entered by: Maddy Lemus 04/03/2020 9:44 AM

## 2020-04-03 NOTE — PLAN OF CARE
Discharge Planner OT   Patient plan for discharge: not stated  Current status: Patient in bed, requesting to get OOB. Sancho steady obtained, educated patient in process. Nursing A present throughout transfers. BP, HR assess throughout activity. Mod A with transfer from reclined position to sit at EOB. Patient reported mild dizziness upon sitting, trialed standing with sancho steady, min A x2, patient able to maintain standing position with sanhco steady support and CGA, due to BP, patient returned to sit with CGA and supine with min A. Completed 4 sets of B UE CVC's for 1 minute each with 1 minute rest break. Session ended d/t BP concerns. RN updated.   BP at rest 105/84  BP upon sittin/67   BP upon stand: 78/63   BP upon return to bed 97/86   BP during CVC's while in bed: 84/68.   Barriers to return to prior living situation: Current level of assist for ADLs/transfers, decreased functional activity tolerance and strength, impaired balance  Recommendations for discharge: ARU  Rationale for recommendations: Pt is below baseline level of functioning with regards to ADLs, IADLs and mobility tasks. Recommend ongoing skilled OT while IP and in ARU setting to improve strength, functional activity tolerance and balance needed for daily tasks. Pt motivated to return to [facility] P[facility], has support of significant other in home environment. Anticipate with medical stability, pt will be able to tolerate the 3 hours of therapy/day required in ARU setting.        Entered by: Viola Zuniga 2020 1:09 PM

## 2020-04-03 NOTE — PROGRESS NOTES
Noted that PT/OT/SLP recommending ARU at discharge. SW paged Tyler with ARU to assess for appropriateness and bed availability.     1530: Spoke with Tyler, pt likely meets criteria for ARU. They will continue to follow pt during his stay here, and anticipate a bed available early next week.     Sandra Friend, [facility]   Inpatient Care Coordination  [facility]   [phone]

## 2020-04-03 NOTE — PROGRESS NOTES
Renal Medicine Progress Note                                Gene Pagan MRN# [id number]   Age: 74 year old YOB: 1945   Date of Admission: 3/27/2020 Hospital LOS: 7                  Assessment/Plan:     74-year-old male admitted through the emergency room dated 03/27/2020 in the setting of increasing shortness of breath and tachycardia.     Evaluated with respect to apparent acute on chronic renal insufficiency in the setting of probable cardiogenic shock.     1. Baseline CKD III/IV   -creatinine 2.0 mg/dl range   -GFR 30 ml/min  2.  Dipstick proteinuria  3.  ARF   -non oliguric   -pre renal v ATN  4.  Hemodynamic instability   -NE  5.  Metabolic acidosis   -lactate normalized   -improving   6.  Hyponatremia    -hypo osmolar hypervolemic  7.  Respiratory failure   -extubated   8.  Shock liver   -transaminases falling        Continue off diuretic  Replace K  Continue NS but decrease rate     No dialysis at this time  Azotemia stable  Creatinine falling        Interval History:     More alert today     IO and UO reviewed  3.3 liters UO yesterday  IO even     Now negative 11.6 liters  Weight decreasing     Labs reviewed        ROS:     No CP or SOB    Medications and Allergies:     Reviewed    Physical Exam:     Vitals were reviewed  Patient Vitals for the past 8 hrs:   BP Temp Temp src Pulse Heart Rate Resp SpO2   04/03/20 1200 106/73 98.4  F (36.9  C) Bladder 131 112 16 99 %   04/03/20 1130 110/85 98.6  F (37  C) -- 133 134 19 100 %   04/03/20 1100 93/69 98.8  F (37.1  C) -- 111 131 14 98 %   04/03/20 1030 101/83 98.8  F (37.1  C) -- 118 117 15 99 %   04/03/20 1005 97/86 98.4  F (36.9  C) -- 122 126 21 99 %   04/03/20 1000 -- -- -- 128 128 15 --   04/03/20 0930 105/84 98.8  F (37.1  C) -- 121 107 11 100 %   04/03/20 0915 99/79 98.8  F (37.1  C) -- -- 112 9 95 %   04/03/20 0900 -- 98.6  F (37  C) -- -- -- -- 97 %   04/03/20 0845 108/75 98.6  F (37  C) -- 123 134 14 96 %   04/03/20 0844 108/75 -- --  124 -- -- --   04/03/20 0830 95/68 98.6  F (37  C) -- 137 124 21 98 %   04/03/20 0800 96/80 98.8  F (37.1  C) Bladder 144 109 21 97 %   04/03/20 0730 92/73 98.8  F (37.1  C) -- 133 122 17 95 %   04/03/20 0700 (!) 81/66 98.8  F (37.1  C) -- 142 125 23 98 %   04/03/20 0640 116/73 98.8  F (37.1  C) -- 137 137 13 97 %   04/03/20 0630 90/71 99  F (37.2  C) -- 134 125 16 96 %   04/03/20 0600 90/74 99  F (37.2  C) -- 147 134 15 97 %   04/03/20 0540 (!) 113/97 99  F (37.2  C) -- -- 156 26 98 %   04/03/20 0530 (!) 82/69 99  F (37.2  C) -- 133 137 23 95 %   04/03/20 0500 111/72 99.1  F (37.3  C) -- 134 130 22 97 %   04/03/20 0430 101/84 99  F (37.2  C) -- 130 114 20 99 %     I/O last 3 completed shifts:  In: 3685 [I.V.:2105; NG/GT:500]  Out: 2965 [Urine:2965]    Vitals:    03/30/20 0600 03/31/20 0329 04/01/20 0354 04/02/20 0350   Weight: 92 kg (202 lb 13.2 oz) 85.8 kg (189 lb 2.5 oz) 81.6 kg (179 lb 14.3 oz) 79.2 kg (174 lb 9.7 oz)    04/03/20 0400   Weight: 80.5 kg (177 lb 7.5 oz)     GENERAL:  intubated and sedated; chronically ill appearing   HEENT: ETT  RESP:  clear anteriorly  CV: RRR, normal S1 S2  ABDOMEN: soft, nontender, no HSM or masses and bowel sounds normal  :  baker catheter  MS: no clubbing, cyanosis   EXT: bilateral edema    Data:     Recent Labs   Lab 04/03/20  0604 04/02/20  1225 04/02/20  0610 04/01/20  1945  04/01/20  0545 03/31/20  0540     --  137  --   --  135 134   POTASSIUM 3.4 4.1 3.3* 3.5   < > 2.7* 3.1*   CHLORIDE 99  --  97  --   --  95 98   CO2 29  --  29  --   --  26 21   ANIONGAP 10  --  11  --   --  14 16*   *  --  213*  --   --  158* 145*   *  --  133*  --   --  116* 91*   CR 5.18*  --  5.51*  --   --  5.72* 5.76*   GFRESTIMATED 10*  --  9*  --   --  9* 9*   GFRESTBLACK 11*  --  11*  --   --  10* 10*   HARPER 9.0  --  9.1  --   --  8.5 7.4*    < > = values in this interval not displayed.         Recent Labs   Lab Test 04/03/20  0604 04/02/20  0610 04/01/20  0545  03/31/20  0540 03/30/20  0500 03/29/20  1528 03/29/20  0910 03/29/20  0412 03/28/20  2346 03/28/20  1900   CR 5.18* 5.51* 5.72* 5.76* 5.58* 5.14* 4.94* 4.85* 4.59* 4.46*     Recent Labs   Lab 04/03/20  0604 04/01/20  0545 03/31/20  0540 03/30/20  0500   ALBUMIN 2.6* 2.6* 2.5* 2.8*     Recent Labs   Lab 03/29/20  0609 03/28/20  1900 03/28/20  1557 03/28/20  1230   LACT 1.7 3.2* 3.3* 3.9*     Recent Labs   Lab 04/02/20  0610 04/01/20  0545 03/31/20  0540 03/30/20  0500 03/29/20  0910   PHOS  --  8.1* 8.5* 8.6* 7.6*   HGB 12.7* 11.9* 10.8* 11.1*  --      Recent Labs   Lab 03/28/20  0947   COLOR Dark Brown   APPEARANCE Cloudy   URINEGLC 200*   URINEBILI Negative   URINEKETONE Negative   SG 1.024   UBLD Large*   URINEPH 7.5*   PROTEIN 300*   NITRITE Negative   LEUKEST Moderate*   RBCU >182*   WBCU 179*         G Terrell Hilario MD    [facility] - Nephrology  [phone]

## 2020-04-03 NOTE — PLAN OF CARE
ICU End of Shift Summary.  For vital signs and complete assessments, please see documentation flowsheets.     Pertinent assessments: A&Ox4, LS clear/dim on RA. Productive infrequent cough clear thin phlegm. Remains in a-fib rates ranging 80's-120's, up to 140's with activity. BP soft when sitting/standing EOB with PT. Tolerating oral intake and TF, incontinent of one small stool. Large UO via baker catheter. PIV w/IVF and Heparin infusing.   Major Shift Events: Heparin gtt initiated; Worked w/speech (diet advanced); Dizziness with standing at EOB when working with PT/OT (BP 78/63)- resolved w/rest; K+replaced  Plan (Upcoming Events): Nephrology following; Continue w/ IV anbx; PT/OT for improved strength & mobility.  Discharge/Transfer Needs: TBD    Bedside Shift Report Completed Y   Bedside Safety Check Completed: Y

## 2020-04-03 NOTE — PROVIDER NOTIFICATION
Reviewed need for baker catheter w/Dr. Gonzalez. Nephrology asking for strict I/Os. Pt incontinent of stool and voiding large amounts via baker.   Per Dr. Gonzalez, okay to leave baker catheter in.   Plan to re-evaluate tomorrow.

## 2020-04-03 NOTE — PROGRESS NOTES
[facility]    Hospitalist Progress Note      Assessment & Plan   Gene Pagan is a 74 year old male who was admitted on 3/27/2020.    Summary of Stay:   Patient is a 74-year-old male with a past medical history significant for type 2 diabetes, and BPH who was seen initially at an outside urgent care center and transferred to the ED for atrial fibrillation with RVR.  According to history, patient reported 6 to 8 weeks of progressive shortness of breath.  His shortness of breath initially started with exertion but had progressed to dyspnea even at rest.      patient developed progressive shortness of breath overnight on 3/28/2020.  Was also notably hypotensive.  He was placed on BiPAP initially with some favorable results but respiratory status continued to decompensate and was eventually intubated on the night of 3/28/20.     He was also hypotensive and started on levophed. He was also notably in acute renal failure and was initially oliguric.  Nephrology has since been consulted and did raise the discussion of possible transfer to [facility] for CRRT but family had decided to keep the patient here and not transfer understanding that his clinical course can deteriorate at any point in time.  Since then, patient was placed on Bumex drip and started making urine.  Weight down approximately 8 kg now with greater than 4 L urine output on a day-to-day basis.   Currently, the diuretics have been stopped.     Patient was successfully extubated on 4/1/2020.  Patient is stable from respiratory point of view.  Appears comfortable.  Not requiring oxygen.     Noted to have a fib with rate greater than 120. Seen by cardiology again today, recommended oral metoprolol.  Patient is off of Levophed, stopped on 4/1/20, in the evening.     Yesterday, patient noted to have gross hematuria.  Per nursing staff, patient might have tugged on the Mckenzie catheter.  The heparin infusion was stopped.  No blood clots  seen. Now the gross hematuria has resolved.    Plan:    Acute hypoxic respiratory failure.  Improved.  -In the setting of CHF and cardiogenic shock.  - Extubated on 4/1/2020.  - Patient is DNR but not DNI. Dr Hay discussed with the patient regarding intubation if needed again in the future: Patient expressed that he does want to be reintubated if necessary.  - stable, not requiring O2     Cardiogenic shock  - EF of 20 to 25%, moderate to severe MR, moderate to severe RV systolic dysfunction.  - Overall poor prognosis.  - Norepinephrine has been weaned off, stopped on the evening of 4/1/2020.  - associated infection can not be ruled out- significant leikocytosis so also started on iV zosyn and vanco (start date 3/28).  Negative MRSA in nares, vancomycin discontinued on 4/2/20.  Continue Zosyn.  -NEGATIVE COVID19     Atrial fibrillation with rapid rate.  Uncontrolled.  -Heart rate continues to be elevated  -Currently on amiodarone 200 mg daily orally, and metoprolol 12.5 BID which was started yesterday. But did not get evening dose due to low BP  - was also given one time dose of digoxin 125mcg, this morning.  - resume heparin infusion as gross hematuria has resoled     Acute kidney injury in the setting of chronic kidney disease.  - Nephrology following.  Good urine output, and polyuric phase.  Creatinine is 5.1 (<- 5.5).  Baseline creatinine is close to 2  - on IV NS per nephrology     Elevated AST and ALT  -Thought to be ischemic hepatitis/shock liver/liver congestion in the setting of CHF  - LFTs are improving.     1 cm pulmonary nodule on CT.  - Outpatient follow-up.     Hypokalemia  -Replace per protocol.     Lines: Baker. Right IJ.   Can discontinue baker.     DVT Prophylaxis:  PCD. Restart IV heparin  Code Status: DNR  Disposition: can potentially be transferred [place] [facility] [place]    Hemant oGnzalez MD  Text Page (7am - 6pm, M-F)    Interval History   Patient was evaluated with nursing staff. Overnight issues  discussed.    Review of systems:  No nausea or vomiting.  No abdominal pain.  No diarrhea.  No chest pain/palpitations.  No new cough/shortness of breath.  No headache/visual disturbance/new weakness.    -Data reviewed today: Labs and medications.    Physical Exam   Temp: 98.4  F (36.9  C) Temp src: Bladder BP: 106/73 Pulse: 131 Heart Rate: 112 Resp: 16 SpO2: 99 % O2 Device: None (Room air)    Vitals:    04/01/20 0354 04/02/20 0350 04/03/20 0400   Weight: 81.6 kg (179 lb 14.3 oz) 79.2 kg (174 lb 9.7 oz) 80.5 kg (177 lb 7.5 oz)     Vital Signs with Ranges  Temp:  [98.4  F (36.9  C)-99.1  F (37.3  C)] 98.4  F (36.9  C)  Pulse:  [103-147] 131  Heart Rate:  [107-156] 112  Resp:  [9-31] 16  BP: ()/() 106/73  SpO2:  [90 %-100 %] 99 %  I/O last 3 completed shifts:  In: 3685 [I.V.:2105; NG/GT:500]  Out: 2965 [Urine:2965]    Constitutional: Awake, alert, cooperative, no apparent distress  HEENT: Trachea midline, sclera is clear   Respiratory: Clear to auscultation bilaterally, no wheezing  Cardiovascular: irregular rate and rhythm, normal S1 and S2, and no murmur noted, tachycardic  GI: Normal bowel sounds, soft, non-distended, non-tender  Skin/Integumen: No rashes, no cyanosis    Medications     dextrose       - MEDICATION INSTRUCTIONS -       - MEDICATION INSTRUCTIONS -       sodium chloride 75 mL/hr at 04/03/20 1233       amiodarone  200 mg Oral or FT or NG tube Daily     aspirin  81 mg Per Feeding Tube Daily     guaiFENesin  10 mL Oral or Feeding Tube Q4H     insulin aspart  1-6 Units Subcutaneous Q4H     insulin glargine  15 Units Subcutaneous At Bedtime     metoprolol tartrate  12.5 mg Oral BID     pantoprazole  40 mg Per Feeding Tube QAM AC     piperacillin-tazobactam  2.25 g Intravenous Q6H     sodium chloride (PF)  3 mL Intracatheter Q8H       Data   Recent Labs   Lab 04/03/20  0604 04/02/20  1225 04/02/20  0610  04/01/20  0545 03/31/20  0540  03/29/20  0412  03/28/20  1900  03/28/20  1230 03/28/20  1015   03/27/20  1348   WBC  --   --  14.2*  --  13.4* 17.0*   < > 20.6*  --   --   --   --   --    < > 10.0   HGB  --   --  12.7*  --  11.9* 10.8*   < > 11.0*  --   --   --   --   --    < > 10.0*   MCV  --   --  80  --  79 79   < > 82  --   --   --   --   --    < > 84   PLT  --   --  359  --  364 331   < > 413  --   --   --   --   --    < > 487*   INR 1.23*  --   --   --   --   --   --   --   --   --   --   --   --   --  1.20*     --  137  --  135 134   < > 131*   < > 131*   < > 131* 130*   < > 131*   POTASSIUM 3.4 4.1 3.3*   < > 2.7* 3.1*   < > 5.0   < > 5.4*   < > 6.2* 6.4*   < > 4.2   CHLORIDE 99  --  97  --  95 98   < > 101   < > 102   < > 103 103   < > 102   CO2 29  --  29  --  26 21   < > 16*   < > 15*   < > 15* 13*   < > 20   *  --  133*  --  116* 91*   < > 65*   < > 55*   < > 47* 47*   < > 33*   CR 5.18*  --  5.51*  --  5.72* 5.76*   < > 4.85*   < > 4.46*   < > 3.94* 3.79*   < > 2.40*   ANIONGAP 10  --  11  --  14 16*   < > 14   < > 14   < > 13 14   < > 9   HARPER 9.0  --  9.1  --  8.5 7.4*   < > 7.4*   < > 7.9*   < > 8.3* 8.2*   < > 8.8   *  --  213*  --  158* 145*   < > 173*   < > 155*   < > 218* 219*   < > 212*   ALBUMIN 2.6*  --   --   --  2.6* 2.5*   < > 2.8*   < > 2.8*   < > 2.9* 2.9*   < >  --    PROTTOTAL 6.6*  --   --   --  6.7* 6.2*   < > 6.1*   < > 6.2*   < > 6.5* 6.7*   < >  --    BILITOTAL 0.7  --   --   --  1.2 0.9   < > 0.7   < > 0.7   < > 0.8 0.8   < >  --    ALKPHOS 244*  --   --   --  141 133   < > 106   < > 94   < > 93 94   < >  --    ALT 1,171*  --   --   --  2,750* 3,860*   < > 6,983*   < > 5,254*   < > 4,613* 4,195*   < >  --    *  --   --   --  581* 1,606*   < > 11,607*   < > 8,219*   < > 6,473* 5,606*   < >  --    LIPASE  --   --   --   --   --   --   --  917*  --   --   --   --   --   --   --    TROPI  --   --   --   --   --   --   --   --   --  0.439*  --  0.336* 0.294*   < > 0.120*    < > = values in this interval not displayed.       No results found for this or  any previous visit (from the past 24 hour(s)).

## 2020-04-03 NOTE — PROGRESS NOTES
Cardiology Progress Note          Assessment and Plan:     75 yo male admitted for respiratory failure. Biventricular systolic dysfunction noted on echo with LVEF 20-25%, mod-severe RV systolic function, 2-3+ MR.  Concern for possible combination cardiogenic and septic shock.  Acute on chronic renal failure- ?post ATN diuresis phase, UO brisk   (neg 10.5L)  AFib with RVR HR 120s, soft BPs- low dose BB not consistently given due to low BPs, trial of one dose IV digoxin today, may repeat dose times one (125mcg), do not schedule due to renal failure. We can measure level after 6hrs, to ensure no digoxin toxicity given ARF   Continue amiodarone oral 200mg daily- Patient was loaded IV initially and was on IV for several days. Transitioned to oral 2 days ago  Biventricular systolic HF- off pressors, tenuous BP, cautiously starting BB for HR control and HF. Will not tolerate afterload reduction right now, continues brisk diuresis, compensated on exam.   Depending on what happens with his kidney function, at some point when he becomes more stable, will need ischemic eval.        Interval History:   denies chest pain and denies shortness of breath                Medications:   I have reviewed this patient's current medications         Physical Exam:         Vital Sign Ranges  Temperature Temp  Av.9  F (37.2  C)  Min: 98.4  F (36.9  C)  Max: 99.1  F (37.3  C)   Blood pressure Systolic (24hrs), Av , Min:80 , Max:120        Diastolic (24hrs), Av, Min:58, Max:101      Pulse Pulse  Av.2  Min: 103  Max: 147   Respirations Resp  Av.3  Min: 9  Max: 31   Pulse oximetry SpO2  Av.1 %  Min: 90 %  Max: 100 %         Intake/Output Summary (Last 24 hours) at 4/3/2020 1110  Last data filed at 4/3/2020 1000  Gross per 24 hour   Intake 3970 ml   Output 2930 ml   Net 1040 ml       Constitutional:   in no apparent distress     Skin:   normal     Neck:   no JVD     Chest:   Normal Symmetry and no tenderness      Lungs:   Diminished BS      Cardiovascular:   Irregular fast no murmur     Extremities and Back:   no cyanosis or clubbing     Neurological:   No gross or focal neurologic abnormalities              Data:     Results for orders placed or performed during the hospital encounter of 03/27/20 (from the past 24 hour(s))   Glucose by meter   Result Value Ref Range    Glucose 257 (H) 70 - 99 mg/dL   Potassium   Result Value Ref Range    Potassium 4.1 3.4 - 5.3 mmol/L   Glucose by meter   Result Value Ref Range    Glucose 285 (H) 70 - 99 mg/dL   Glucose by meter   Result Value Ref Range    Glucose 235 (H) 70 - 99 mg/dL   Glucose by meter   Result Value Ref Range    Glucose 199 (H) 70 - 99 mg/dL   Glucose by meter   Result Value Ref Range    Glucose 174 (H) 70 - 99 mg/dL   Basic metabolic panel   Result Value Ref Range    Sodium 138 133 - 144 mmol/L    Potassium 3.4 3.4 - 5.3 mmol/L    Chloride 99 94 - 109 mmol/L    Carbon Dioxide 29 20 - 32 mmol/L    Anion Gap 10 3 - 14 mmol/L    Glucose 223 (H) 70 - 99 mg/dL    Urea Nitrogen 136 (H) 7 - 30 mg/dL    Creatinine 5.18 (H) 0.66 - 1.25 mg/dL    GFR Estimate 10 (L) >60 mL/min/[1.73_m2]    GFR Estimate If Black 11 (L) >60 mL/min/[1.73_m2]    Calcium 9.0 8.5 - 10.1 mg/dL   Hepatic panel   Result Value Ref Range    Bilirubin Direct 0.4 (H) 0.0 - 0.2 mg/dL    Bilirubin Total 0.7 0.2 - 1.3 mg/dL    Albumin 2.6 (L) 3.4 - 5.0 g/dL    Protein Total 6.6 (L) 6.8 - 8.8 g/dL    Alkaline Phosphatase 244 (H) 40 - 150 U/L    ALT 1,171 (HH) 0 - 70 U/L     (H) 0 - 45 U/L   INR   Result Value Ref Range    INR 1.23 (H) 0.86 - 1.14   Glucose by meter   Result Value Ref Range    Glucose 234 (H) 70 - 99 mg/dL

## 2020-04-03 NOTE — PROGRESS NOTES
"SPIRITUAL HEALTH SERVICES Progress Note  [facility] ICU    Saw pt Gene per his length of stay.  Gene reported that he is recovering from a heart attack.  He shared that he has good family support, including his SO [place] and two daughters.  His daughters have set up a facebook page to communicate his progress.  Gene mentioned two friends, who are sending him Reiki healing.  He described himself as being spiritual and reflected that when he was \"deciding to keep going or let go\" his family \"encircled\" him, entrusted him to Spirit, and trusted that he would \"make the right decision.\"  Gene denied having an concerns or distress at this time.  He was about to start a job at [employer] before he had the heart attack.  Gene plans to return to looking for work and is considering going back to school but is not sure for what.  Offered reflective listening and affirmation.  Informed him how he can request further  support.    Plan: Per pt's request, this author will see him at the beginning of next week for an emotional support check-in.    Scott Delarosa M.Div., [facility]  Staff   Pager [phone]    "

## 2020-04-04 ENCOUNTER — APPOINTMENT (OUTPATIENT)
Dept: OCCUPATIONAL THERAPY | Facility: CLINIC | Age: 75
DRG: 308 | End: 2020-04-04
Payer: COMMERCIAL

## 2020-04-04 LAB
ANION GAP SERPL CALCULATED.3IONS-SCNC: 7 MMOL/L (ref 3–14)
BUN SERPL-MCNC: 119 MG/DL (ref 7–30)
CALCIUM SERPL-MCNC: 8.9 MG/DL (ref 8.5–10.1)
CHLORIDE SERPL-SCNC: 98 MMOL/L (ref 94–109)
CO2 SERPL-SCNC: 30 MMOL/L (ref 20–32)
CREAT SERPL-MCNC: 4.05 MG/DL (ref 0.66–1.25)
GFR SERPL CREATININE-BSD FRML MDRD: 13 ML/MIN/{1.73_M2}
GLUCOSE BLDC GLUCOMTR-MCNC: 198 MG/DL (ref 70–99)
GLUCOSE BLDC GLUCOMTR-MCNC: 199 MG/DL (ref 70–99)
GLUCOSE BLDC GLUCOMTR-MCNC: 215 MG/DL (ref 70–99)
GLUCOSE BLDC GLUCOMTR-MCNC: 218 MG/DL (ref 70–99)
GLUCOSE BLDC GLUCOMTR-MCNC: 223 MG/DL (ref 70–99)
GLUCOSE BLDC GLUCOMTR-MCNC: 270 MG/DL (ref 70–99)
GLUCOSE SERPL-MCNC: 218 MG/DL (ref 70–99)
LMWH PPP CHRO-ACNC: 0.32 IU/ML
PHOSPHATE SERPL-MCNC: 5.1 MG/DL (ref 2.5–4.5)
POTASSIUM SERPL-SCNC: 3.5 MMOL/L (ref 3.4–5.3)
SODIUM SERPL-SCNC: 135 MMOL/L (ref 133–144)

## 2020-04-04 PROCEDURE — 25000132 ZZH RX MED GY IP 250 OP 250 PS 637: Performed by: INTERNAL MEDICINE

## 2020-04-04 PROCEDURE — 00000146 ZZHCL STATISTIC GLUCOSE BY METER IP

## 2020-04-04 PROCEDURE — 85520 HEPARIN ASSAY: CPT | Performed by: INTERNAL MEDICINE

## 2020-04-04 PROCEDURE — 97535 SELF CARE MNGMENT TRAINING: CPT | Mod: GO

## 2020-04-04 PROCEDURE — 25000131 ZZH RX MED GY IP 250 OP 636 PS 637: Performed by: INTERNAL MEDICINE

## 2020-04-04 PROCEDURE — 99291 CRITICAL CARE FIRST HOUR: CPT | Performed by: INTERNAL MEDICINE

## 2020-04-04 PROCEDURE — 25800030 ZZH RX IP 258 OP 636: Performed by: INTERNAL MEDICINE

## 2020-04-04 PROCEDURE — 25000128 H RX IP 250 OP 636: Performed by: INTERNAL MEDICINE

## 2020-04-04 PROCEDURE — 80048 BASIC METABOLIC PNL TOTAL CA: CPT | Performed by: INTERNAL MEDICINE

## 2020-04-04 PROCEDURE — 84100 ASSAY OF PHOSPHORUS: CPT | Performed by: INTERNAL MEDICINE

## 2020-04-04 PROCEDURE — 20000003 ZZH R&B ICU

## 2020-04-04 PROCEDURE — 99233 SBSQ HOSP IP/OBS HIGH 50: CPT | Performed by: INTERNAL MEDICINE

## 2020-04-04 PROCEDURE — 36415 COLL VENOUS BLD VENIPUNCTURE: CPT | Performed by: INTERNAL MEDICINE

## 2020-04-04 PROCEDURE — 25000125 ZZHC RX 250: Performed by: INTERNAL MEDICINE

## 2020-04-04 RX ORDER — AMIODARONE HYDROCHLORIDE 200 MG/1
200 TABLET ORAL
Status: DISCONTINUED | OUTPATIENT
Start: 2020-04-04 | End: 2020-04-05

## 2020-04-04 RX ADMIN — POTASSIUM CHLORIDE 20 MEQ: 1.5 POWDER, FOR SOLUTION ORAL at 06:22

## 2020-04-04 RX ADMIN — ASPIRIN 81 MG 81 MG: 81 TABLET ORAL at 08:10

## 2020-04-04 RX ADMIN — GUAIFENESIN 10 ML: 100 SOLUTION ORAL at 20:32

## 2020-04-04 RX ADMIN — GUAIFENESIN 10 ML: 100 SOLUTION ORAL at 23:07

## 2020-04-04 RX ADMIN — GUAIFENESIN 10 ML: 100 SOLUTION ORAL at 04:20

## 2020-04-04 RX ADMIN — ACETAMINOPHEN 650 MG: 325 TABLET, FILM COATED ORAL at 02:03

## 2020-04-04 RX ADMIN — INSULIN GLARGINE 20 UNITS: 100 INJECTION, SOLUTION SUBCUTANEOUS at 23:05

## 2020-04-04 RX ADMIN — Medication 12.5 MG: at 20:32

## 2020-04-04 RX ADMIN — TAZOBACTAM SODIUM AND PIPERACILLIN SODIUM 2.25 G: 250; 2 INJECTION, SOLUTION INTRAVENOUS at 05:28

## 2020-04-04 RX ADMIN — Medication 12.5 MG: at 08:10

## 2020-04-04 RX ADMIN — Medication 40 MG: at 08:11

## 2020-04-04 RX ADMIN — GUAIFENESIN 10 ML: 100 SOLUTION ORAL at 08:10

## 2020-04-04 RX ADMIN — GUAIFENESIN 10 ML: 100 SOLUTION ORAL at 16:18

## 2020-04-04 RX ADMIN — AMIODARONE HYDROCHLORIDE 200 MG: 200 TABLET ORAL at 23:07

## 2020-04-04 RX ADMIN — AMIODARONE HYDROCHLORIDE 200 MG: 200 TABLET ORAL at 08:11

## 2020-04-04 RX ADMIN — SODIUM CHLORIDE: 9 INJECTION, SOLUTION INTRAVENOUS at 16:20

## 2020-04-04 RX ADMIN — SODIUM CHLORIDE: 9 INJECTION, SOLUTION INTRAVENOUS at 01:58

## 2020-04-04 RX ADMIN — AMIODARONE HYDROCHLORIDE 200 MG: 200 TABLET ORAL at 16:18

## 2020-04-04 RX ADMIN — GUAIFENESIN 10 ML: 100 SOLUTION ORAL at 11:52

## 2020-04-04 ASSESSMENT — ACTIVITIES OF DAILY LIVING (ADL)
ADLS_ACUITY_SCORE: 14

## 2020-04-04 ASSESSMENT — MIFFLIN-ST. JEOR: SCORE: 1528.44

## 2020-04-04 NOTE — PROGRESS NOTES
Cardiology notified of ongoing heparin drip with pink/red tinged urine.    Plan to discuss with attending and intensivist.  Pharmacy to dose adjust heparin as appropriate.

## 2020-04-04 NOTE — PLAN OF CARE
ICU End of Shift Summary.  For vital signs and complete assessments, please see documentation flowsheets.     Pertinent assessments: AO. Pain controlled with prn tylenol. TMAX 99.3. BP 80-90 systolic. Lungs diminished. RA. Tolerating tube feed. Tolerating thin liquids. Mckenzie in place with adequate urine output.  Major Shift Events: none  Plan (Upcoming Events): heart rate control  Discharge/Transfer Needs: Continue current plan of care    Bedside Shift Report Completed : Y  Bedside Safety Check Completed: Y

## 2020-04-04 NOTE — PROGRESS NOTES
"[facility] Cardiology Progress Note            Interval History:   Sepsis.  Atrial fibrillation with rapid ventricular response.  Biventricular failure.  Ejection fraction approximately 25%.  Acute renal failure.  Respiratory failure.  Initially requiring pressors but now off pressors but blood pressure is soft precluding the use of rate controlling medications.  On low-dose oral amiodarone.  Was on intravenous amiodarone for several days.  Diabetic.  Pulse rate somewhat better than previously at 115 bpm.  Has abnormal liver function tests will have to be careful with the use of amiodarone. Feels better and tolerating metoprolol though dose had to be held last night his blood pressure dropped below 90.  Cherry colored urine.  History of benign prostatic hypertrophy.  Urinary catheter in situ.              Medications:       amiodarone  200 mg Oral or FT or NG tube 3 times daily     aspirin  81 mg Per Feeding Tube Daily     guaiFENesin  10 mL Oral or Feeding Tube Q4H     insulin aspart  4 Units Subcutaneous Q4H     insulin aspart  1-6 Units Subcutaneous Q4H     insulin glargine  20 Units Subcutaneous At Bedtime     metoprolol tartrate  12.5 mg Oral BID     pantoprazole  40 mg Per Feeding Tube QAM AC     piperacillin-tazobactam  2.25 g Intravenous Q6H     sodium chloride (PF)  3 mL Intracatheter Q8H               Physical Exam:   Blood pressure 97/58, pulse 115, temperature 99.1  F (37.3  C), resp. rate 11, height 1.74 m (5' 8.5\"), weight 81.6 kg (179 lb 14.3 oz), SpO2 100 %.  Rhythm: Atrial fibrillation with rapid ventricular response.   Constitutional:   awake, alert, cooperative, no apparent distress, and appears stated age     Neck:   no jugular venous distension and no carotid bruits     Lungs:   no increased work of breathing and crackles right base and left base     Cardiovascular:   irregularly irregular rhythm, variable S1 and normal S2, no murmur noted, no edema and carotids without bruits bilaterally "            Data:          Lab Results   Component Value Date     04/04/2020     04/03/2020     04/02/2020    Lab Results   Component Value Date    CHLORIDE 98 04/04/2020    CHLORIDE 99 04/03/2020    CHLORIDE 97 04/02/2020    Lab Results   Component Value Date     04/04/2020     04/03/2020     04/02/2020      Lab Results   Component Value Date    POTASSIUM 3.5 04/04/2020    POTASSIUM 3.4 04/03/2020    POTASSIUM 4.1 04/02/2020    Lab Results   Component Value Date    CO2 30 04/04/2020    CO2 29 04/03/2020    CO2 29 04/02/2020    Lab Results   Component Value Date    CR 4.05 04/04/2020    CR 5.18 04/03/2020    CR 5.51 04/02/2020        Lab Results   Component Value Date    HGB 12.7 (L) 04/02/2020    HGB 11.9 (L) 04/01/2020    HGB 10.8 (L) 03/31/2020     Lab Results   Component Value Date     04/02/2020     04/01/2020     03/31/2020     Lab Results   Component Value Date    TROPI 0.439 (HH) 03/28/2020    TROPI 0.336 (HH) 03/28/2020    TROPI 0.294 (HH) 03/28/2020     Lab Results   Component Value Date    WBC 14.2 (H) 04/02/2020    WBC 13.4 (H) 04/01/2020    WBC 17.0 (H) 03/31/2020                   Assessment and Plan:   Assessment:   Problem List    Atrial fibrillation with rapid ventricular response (H).  Ventricular rate difficult to control because of hypotension.  Biventricular failure probably related to rapid heartbeat.  Sepsis which appears to be improving.  Acute renal failure.  Mild hematuria.  On heparin.  History of benign prostatic hypertrophy.    * No resolved hospital problems. *       Plan:   1.  Increase oral dose of amiodarone to 200 mg 3 times a day.  Will check hepatic panel today.  2.  Continue with oral metoprolol.  3.  At some stage during this admission with the patient's clinical situation improves we should consider NESTOR and cardioversion.  4.  When clinical situation improves will need an ischemic work-up.       Attestation:  I have  reviewed today's vital signs, notes, medications, labs and imaging.  Amount of time spent providing critical care service today: 35 minutes.  Care coordination / counseling time: 25 minutes         Paulino Perez MD, MD 4/4/2020  8:14 AM

## 2020-04-04 NOTE — PLAN OF CARE
Discharge Planner OT   Patient plan for discharge: not stated  Current status: Pt completed bed mobility supine > sit EOB with CGA. Pt able to sit EOB for increased time with CGA/SBa. Pt completed sit > stand from EOB to Indira Steady with CGA, transferred to bedside chair via Indira Steady. Agreeable to trial sitting upright in bedside chair. VS monitored t/o. BP 99/70,  in supine; BP 82/57,  post activity. HR increasing to 140s at times with activity, no report of symptoms or dizziness/lightheadedness this date. Pt completed shaving task while seated with SBA.    Barriers to return to prior living situation: Current level of assist for ADLs/transfers, decreased functional activity tolerance and strength, impaired balance  Recommendations for discharge: ARU  Rationale for recommendations: Pt is below baseline level of functioning with regards to ADLs, IADLs and mobility tasks. Recommend ongoing skilled OT while IP and in ARU setting to improve strength, functional activity tolerance and balance needed for daily tasks. Pt motivated to return to indep PLOF, has support of significant other in home environment. Anticipate with medical stability, pt will be able to tolerate the 3 hours of therapy/day required in ARU setting.        Entered by: Kathy Partida 04/04/2020 9:02 AM

## 2020-04-04 NOTE — PROGRESS NOTES
[facility]    Hospitalist Progress Note      Assessment & Plan   Gene Pagan is a 74 year old male who was admitted on 3/27/2020.    Summary of Stay:   Patient is a 74-year-old male with a past medical history significant for type 2 diabetes, and BPH who was seen initially at an outside urgent care center and transferred to the ED for atrial fibrillation with RVR.  According to history, patient reported 6 to 8 weeks of progressive shortness of breath.  His shortness of breath initially started with exertion but had progressed to dyspnea even at rest.      patient developed progressive shortness of breath overnight on 3/28/2020.  Was also notably hypotensive.  He was placed on BiPAP initially with some favorable results but respiratory status continued to decompensate and was eventually intubated on the night of 3/28/20.     He was also hypotensive and started on levophed. He was also notably in acute renal failure and was initially oliguric.  Nephrology has since been consulted and did raise the discussion of possible transfer to [facility] for CRRT but family had decided to keep the patient here and not transfer understanding that his clinical course can deteriorate at any point in time.  Since then, patient was placed on Bumex drip and started making urine.  Weight down approximately 8 kg now with greater than 4 L urine output on a day-to-day basis.   Currently, the diuretics have been stopped.     Patient was successfully extubated on 4/1/2020.  Patient is stable from respiratory point of view.  Appears comfortable.  Not requiring oxygen.     Noted to have a fib with rate greater than 120. Seen by cardiology again today, recommended oral metoprolol.  Patient is off of Levophed, stopped on 4/1/20, in the evening.     On the night of 4/1, patient noted to have gross hematuria.  So the heparin was stopped.  Afterwards, hematuria resolved.  Heparin was resumed yesterday.  This morning noted  to have hematuria once again.  Initially the urine was pinkish-red but now getting worse.    Plan:    Cardiogenic shock  - EF of 20 to 25%, moderate to severe MR, moderate to severe RV systolic dysfunction.  - Overall poor prognosis.  - Norepinephrine has been weaned off, stopped on the evening of 4/1/2020.  - associated infection can not be ruled out- significant leikocytosis so also started on iV zosyn and vanco (start date 3/28).  Negative MRSA in nares, vancomycin discontinued on 4/2/20.  Discontinue Zosyn after today's dose  -NEGATIVE COVID19     Atrial fibrillation with rapid rate.  Uncontrolled.  -Heart rate continues to be elevated  -Amiodarone was increased to 203 times a day by cardiology today.  Continue metoprolol 12.5 twice daily.  - was also given one time dose of digoxin 125mcg, on the morning of 4/3/2020.    Gross hematuria  - Gross hematuria has recurred after resuming the IV heparin.  - On IV heparin for stroke prophylaxis in the setting of A. fib.  - This morning, noted to have light pink urine which progressively worsened over the next few hours.  For that reason IV heparin is being held again.  - Xa level 32     Acute kidney injury in the setting of chronic kidney disease.  - Nephrology following.  Good urine output, and polyuric phase.  Creatinine is 4.0 <- 5.1 <- 5.5).  Baseline creatinine is close to 2  -currently on normal saline which is being managed by nephrology.    DM  - on lantus and sliding scale insulin Q4h  - currently getting TF via NGT. Hope to advance oral diet slowly     Elevated AST and ALT  -Thought to be ischemic hepatitis/shock liver/liver congestion in the setting of CHF  - LFTs improved.       Acute hypoxic respiratory failure.  Improved.  -In the setting of CHF and cardiogenic shock.  - Extubated on 4/1/2020.  - Patient is DNR but not DNI. Dr Hay discussed with the patient regarding intubation if needed again in the future: Patient expressed that he does want to be  reintubated if necessary.  - stable, not requiring O2    1 cm pulmonary nodule on CT.  - Outpatient follow-up.     Hypokalemia  -Replace per protocol.    Malnutrition  getting TF via NGT     Lines: Baker. Right IJ.   Continue baker for now given the gross hematuria and need of accurate output monitoring.     DVT Prophylaxis:  PCD. Stop IV heparin for now  Code Status: DNR  Disposition: continue ICU care    Hemant Gonzalez MD  [employer] [facility] (7am - 6pm, M-F)    Interval History   Patient was evaluated with nursing staff. Overnight issues discussed.    Review of systems:  No nausea or vomiting.  No abdominal pain.  No diarrhea.  No chest pain/palpitations.  No new cough/shortness of breath.  No headache/visual disturbance/new weakness.    -Data reviewed today: Labs and medications.    Physical Exam   Temp: 99.1  F (37.3  C) Temp src: Bladder BP: 108/72 Pulse: 128 Heart Rate: 123 Resp: 29 SpO2: 95 % O2 Device: None (Room air)    Vitals:    04/02/20 0350 04/03/20 0400 04/04/20 0540   Weight: 79.2 kg (174 lb 9.7 oz) 80.5 kg (177 lb 7.5 oz) 81.6 kg (179 lb 14.3 oz)     Vital Signs with Ranges  Temp:  [97.7  F (36.5  C)-99.3  F (37.4  C)] 99.1  F (37.3  C)  Pulse:  [104-154] 128  Heart Rate:  [102-146] 123  Resp:  [8-35] 29  BP: ()/(58-89) 108/72  SpO2:  [94 %-100 %] 95 %  I/O last 3 completed shifts:  In: 4308.25 [P.O.:1120; I.V.:1761.25; NG/GT:460]  Out: 3450 [Urine:3450]    Constitutional: Awake, alert, cooperative, no apparent distress  HEENT: Trachea midline, sclera is clear   Respiratory: Clear to auscultation bilaterally, no crackles or wheezing  Cardiovascular: tachycardic, Iregular rate and rhythm, normal S1 and S2, and no murmur noted  GI: Normal bowel sounds, soft, non-distended, non-tender  Skin/Integumen: No rashes, no cyanosis, no edema      Medications     dextrose       [Held by provider] HEParin 1,550 Units/hr (04/04/20 1100)     - MEDICATION INSTRUCTIONS -       - MEDICATION INSTRUCTIONS -       sodium  chloride 75 mL/hr at 04/04/20 0800       amiodarone  200 mg Oral or FT or NG tube 3 times daily     guaiFENesin  10 mL Oral or Feeding Tube Q4H     insulin aspart  4 Units Subcutaneous Q4H     insulin aspart  1-6 Units Subcutaneous Q4H     insulin glargine  20 Units Subcutaneous At Bedtime     metoprolol tartrate  12.5 mg Oral BID     pantoprazole  40 mg Per Feeding Tube QAM AC     piperacillin-tazobactam  2.25 g Intravenous Q6H     sodium chloride (PF)  3 mL Intracatheter Q8H       Data   Recent Labs   Lab 04/04/20  0544 04/03/20  0604 04/02/20  1225 04/02/20  0610  04/01/20  0545 03/31/20  0540  03/29/20  0412  03/28/20  1900   WBC  --   --   --  14.2*  --  13.4* 17.0*   < > 20.6*  --   --    HGB  --   --   --  12.7*  --  11.9* 10.8*   < > 11.0*  --   --    MCV  --   --   --  80  --  79 79   < > 82  --   --    PLT  --   --   --  359  --  364 331   < > 413  --   --    INR  --  1.23*  --   --   --   --   --   --   --   --   --     138  --  137  --  135 134   < > 131*   < > 131*   POTASSIUM 3.5 3.4 4.1 3.3*   < > 2.7* 3.1*   < > 5.0   < > 5.4*   CHLORIDE 98 99  --  97  --  95 98   < > 101   < > 102   CO2 30 29  --  29  --  26 21   < > 16*   < > 15*   * 136*  --  133*  --  116* 91*   < > 65*   < > 55*   CR 4.05* 5.18*  --  5.51*  --  5.72* 5.76*   < > 4.85*   < > 4.46*   ANIONGAP 7 10  --  11  --  14 16*   < > 14   < > 14   HARPER 8.9 9.0  --  9.1  --  8.5 7.4*   < > 7.4*   < > 7.9*   * 223*  --  213*  --  158* 145*   < > 173*   < > 155*   ALBUMIN  --  2.6*  --   --   --  2.6* 2.5*   < > 2.8*   < > 2.8*   PROTTOTAL  --  6.6*  --   --   --  6.7* 6.2*   < > 6.1*   < > 6.2*   BILITOTAL  --  0.7  --   --   --  1.2 0.9   < > 0.7   < > 0.7   ALKPHOS  --  244*  --   --   --  141 133   < > 106   < > 94   ALT  --  1,171*  --   --   --  2,750* 3,860*   < > 6,983*   < > 5,254*   AST  --  163*  --   --   --  581* 1,606*   < > 11,607*   < > 8,219*   LIPASE  --   --   --   --   --   --   --   --  917*  --   --     TROPI  --   --   --   --   --   --   --   --   --   --  0.439*    < > = values in this interval not displayed.       No results found for this or any previous visit (from the past 24 hour(s)).

## 2020-04-04 NOTE — PROGRESS NOTES
CALORIE COUNT    Current Diet Order:    Dysphagia Diet Level 2: Mechan Altered; Thin Liquids     Supplement Order:   4 oz Nepro with meals    Approximate Oral Intake for 4/3:   Calories: 1400 kcals   Protein: 54 g     Number of Meals Recorded: 2  Number of Snacks Recorded: 0    Enteral NS Order:  Type of Feeding Tube: NJ (3/31)   Enteral Frequency:  Continuous   Enteral Regimen: Suplena at 45 mL/hr   Total Enteral Provisions: 1080 mL daily provides 1944 kcal, 49 g protein, 212 g CHO, 13 g fiber and 799 mL free water. Meets > 100% of DRI's.   Free Water Flush: 30 mL q4 hours     ASSESSED NUTRITION NEEDS: (DW 82 kg)  Energy: 3568-6813 kcals (25-30 kcal/kg)  Justification: Overweight, exutubation   Protein: 49-82 grams protein (0.6-1 g pro/Kg)  Justification: CKD not yet on HD    Summary:   Able to meet ~65% of estimated energy needs and at least the low end of estimated protein needs through oral intake. If no setbacks today for breakfast/lunch, would be ok with FT removal; order/final decision per MD discretion in context of insulin administration. Continue to push oral intake. Renal diet restrictions once intake consistent (in next 1-2 days?). Left message for RN.   Calorie counts to continue - appreciate documentation of oral intake. Please page/consult as needed.      Judy Carey RDN, LD, CNSC  3rd floor/ICU: [phone]  All other floors: [phone]  Weekend/holiday: [phone]  Office: [phone]

## 2020-04-04 NOTE — PLAN OF CARE
PT: Attempted to see pt in PM, pt soundly sleeping, arouses to voice. Slow to respond to questions. Requesting to rest this PM, declining OOB mobility

## 2020-04-04 NOTE — PLAN OF CARE
ICU End of Shift Summary.  For vital signs and complete assessments, please see documentation flowsheets.     Pertinent assessments: Patient alert, oriented x4, follows commands, able to make needs known, denies pain.  Participates in care and asks appropriate questions. Room air, intermittent cough, self suctions, room air.  Atrial fibrillation persistant.  Heparin drip. Gross hematuria observed.     Major Shift Events: Atrial fibrillation persistant.  Rate varies significantly. Heparin drip infusing, cardiology present at bedside. Gross hematuria increasing. Physician discontinues heparin drip. Patient bathed, patient participates in OT, unable to participate in PT, oob to chair twice, self feeding meals.     Plan (Upcoming Events): Consider transition to IMC status once blood pressure and heart rate stabilize.     Discharge/Transfer Needs: TBD      Bedside Shift Report Completed : Yes  Bedside Safety Check Completed: Yes

## 2020-04-04 NOTE — PROGRESS NOTES
[facility]    Nephrology Progress Note     Assessment & Plan     74-year-old male admitted through the emergency room dated 03/27/2020 in the setting of increasing shortness of breath and tachycardia.      Evaluated with respect to apparent acute on chronic renal insufficiency in the setting of probable cardiogenic shock.      1. Baseline CKD III/IV              -creatinine 2.0 mg/dl range              -GFR 30 ml/min  2.  Dipstick proteinuria  3.  ARF              -non oliguric              -acting like resolving ATN  4.  Hemodynamic instability              -off NE  5.  Metabolic acidosis              -resolved  6.  Hyponatremia               -hypo osmolar hypervolemic - better  7.  Respiratory failure              -extubated   8.  Shock liver              -transaminases falling       Plan:    Continue NS at 75 mL/hour.             Landry Aguayo MD  [facility] - Nephrology  [phone]    Interval History     Feeling better.  Breathing is fine.  HR still high and BP soft.    On NS 75 mL/hour but is making > 3 liters of urine per day.  Cr down slowly.    Physical Exam   Temp: 99.1  F (37.3  C) Temp src: Bladder BP: 108/72 Pulse: 128 Heart Rate: 123 Resp: 29 SpO2: 95 % O2 Device: None (Room air)    Vitals:    04/02/20 0350 04/03/20 0400 04/04/20 0540   Weight: 79.2 kg (174 lb 9.7 oz) 80.5 kg (177 lb 7.5 oz) 81.6 kg (179 lb 14.3 oz)     Vital Signs with Ranges  Temp:  [97.7  F (36.5  C)-99.3  F (37.4  C)] 99.1  F (37.3  C)  Pulse:  [104-154] 128  Heart Rate:  [102-146] 123  Resp:  [9-29] 29  BP: ()/(58-89) 108/72  SpO2:  [94 %-100 %] 95 %  I/O last 3 completed shifts:  In: 3119 [P.O.:880; I.V.:1087; NG/GT:320]  Out: 3450 [Urine:3450]    GENERAL APPEARANCE: pleasant, NAD, a & o  HEENT:  Eyes/ears/nose/neck grossly normal  RESP: lungs cta b c good efforts, no crackles, rhonchi or wheezes  CV: irreg irreg, no M, no JVD  ABDOMEN: o/s/nt/nd, bs present  EXTREMITIES/SKIN: no rashes/lesions;  no edema    Medications     dextrose       [Held by provider] HEParin 1,550 Units/hr (04/04/20 1100)     - MEDICATION INSTRUCTIONS -       - MEDICATION INSTRUCTIONS -       sodium chloride 75 mL/hr at 04/04/20 0800       amiodarone  200 mg Oral or FT or NG tube 3 times daily     guaiFENesin  10 mL Oral or Feeding Tube Q4H     insulin aspart  4 Units Subcutaneous Q4H     insulin aspart  1-6 Units Subcutaneous Q4H     insulin glargine  20 Units Subcutaneous At Bedtime     metoprolol tartrate  12.5 mg Oral BID     pantoprazole  40 mg Per Feeding Tube QAM AC     piperacillin-tazobactam  2.25 g Intravenous Q6H     sodium chloride (PF)  3 mL Intracatheter Q8H       Data   BMP  Recent Labs   Lab 04/04/20  0544 04/03/20  0604 04/02/20  1225 04/02/20  0610  04/01/20  0545    138  --  137  --  135   POTASSIUM 3.5 3.4 4.1 3.3*   < > 2.7*   CHLORIDE 98 99  --  97  --  95   HARPER 8.9 9.0  --  9.1  --  8.5   CO2 30 29  --  29  --  26   * 136*  --  133*  --  116*   CR 4.05* 5.18*  --  5.51*  --  5.72*   * 223*  --  213*  --  158*    < > = values in this interval not displayed.     [email or web address](phos:4)  CBC)  Recent Labs   Lab 04/02/20  0610 04/01/20  0545 03/31/20  0540 03/30/20  0500   WBC 14.2* 13.4* 17.0* 20.8*   HGB 12.7* 11.9* 10.8* 11.1*   HCT 40.0 37.3* 33.6* 35.1*   MCV 80 79 79 80    364 331 365     Recent Labs   Lab 04/03/20  0604   *   ALT 1,171*   ALKPHOS 244*   BILITOTAL 0.7     Recent Labs   Lab 04/03/20  0604   INR 1.23*     No results found for: D2VIT, D3VIT, DTOT  Recent Labs   Lab 04/02/20  0610   HGB 12.7*   HCT 40.0   MCV 80     No results for input(s): PTHI in the last 168 hours.    Attestation:   I have reviewed today's relevant vital signs, notes, medications, labs and imaging.

## 2020-04-05 ENCOUNTER — APPOINTMENT (OUTPATIENT)
Dept: OCCUPATIONAL THERAPY | Facility: CLINIC | Age: 75
DRG: 308 | End: 2020-04-05
Payer: COMMERCIAL

## 2020-04-05 ENCOUNTER — APPOINTMENT (OUTPATIENT)
Dept: PHYSICAL THERAPY | Facility: CLINIC | Age: 75
DRG: 308 | End: 2020-04-05
Payer: COMMERCIAL

## 2020-04-05 ENCOUNTER — APPOINTMENT (OUTPATIENT)
Dept: CARDIOLOGY | Facility: CLINIC | Age: 75
DRG: 308 | End: 2020-04-05
Attending: INTERNAL MEDICINE
Payer: COMMERCIAL

## 2020-04-05 LAB
ALBUMIN SERPL-MCNC: 2.3 G/DL (ref 3.4–5)
ALP SERPL-CCNC: 210 U/L (ref 40–150)
ALT SERPL W P-5'-P-CCNC: 527 U/L (ref 0–70)
ANION GAP SERPL CALCULATED.3IONS-SCNC: 5 MMOL/L (ref 3–14)
AST SERPL W P-5'-P-CCNC: 62 U/L (ref 0–45)
BILIRUB DIRECT SERPL-MCNC: 0.3 MG/DL (ref 0–0.2)
BILIRUB SERPL-MCNC: 0.7 MG/DL (ref 0.2–1.3)
BUN SERPL-MCNC: 88 MG/DL (ref 7–30)
CALCIUM SERPL-MCNC: 9.2 MG/DL (ref 8.5–10.1)
CHLORIDE SERPL-SCNC: 102 MMOL/L (ref 94–109)
CO2 SERPL-SCNC: 31 MMOL/L (ref 20–32)
CREAT SERPL-MCNC: 2.95 MG/DL (ref 0.66–1.25)
ERYTHROCYTE [DISTWIDTH] IN BLOOD BY AUTOMATED COUNT: 15.6 % (ref 10–15)
GFR SERPL CREATININE-BSD FRML MDRD: 20 ML/MIN/{1.73_M2}
GLUCOSE BLDC GLUCOMTR-MCNC: 129 MG/DL (ref 70–99)
GLUCOSE BLDC GLUCOMTR-MCNC: 171 MG/DL (ref 70–99)
GLUCOSE BLDC GLUCOMTR-MCNC: 171 MG/DL (ref 70–99)
GLUCOSE BLDC GLUCOMTR-MCNC: 181 MG/DL (ref 70–99)
GLUCOSE BLDC GLUCOMTR-MCNC: 258 MG/DL (ref 70–99)
GLUCOSE SERPL-MCNC: 159 MG/DL (ref 70–99)
HCT VFR BLD AUTO: 37.7 % (ref 40–53)
HGB BLD-MCNC: 11.1 G/DL (ref 13.3–17.7)
LMWH PPP CHRO-ACNC: <0.1 IU/ML
MCH RBC QN AUTO: 24.6 PG (ref 26.5–33)
MCHC RBC AUTO-ENTMCNC: 29.4 G/DL (ref 31.5–36.5)
MCV RBC AUTO: 84 FL (ref 78–100)
PHOSPHATE SERPL-MCNC: 4.1 MG/DL (ref 2.5–4.5)
PLATELET # BLD AUTO: 302 10E9/L (ref 150–450)
POTASSIUM SERPL-SCNC: 3.6 MMOL/L (ref 3.4–5.3)
PROT SERPL-MCNC: 6.3 G/DL (ref 6.8–8.8)
RBC # BLD AUTO: 4.51 10E12/L (ref 4.4–5.9)
SODIUM SERPL-SCNC: 137 MMOL/L (ref 133–144)
WBC # BLD AUTO: 14 10E9/L (ref 4–11)

## 2020-04-05 PROCEDURE — 25800030 ZZH RX IP 258 OP 636: Performed by: INTERNAL MEDICINE

## 2020-04-05 PROCEDURE — 25000132 ZZH RX MED GY IP 250 OP 250 PS 637: Performed by: INTERNAL MEDICINE

## 2020-04-05 PROCEDURE — 97110 THERAPEUTIC EXERCISES: CPT | Mod: GP | Performed by: PHYSICAL THERAPIST

## 2020-04-05 PROCEDURE — 84155 ASSAY OF PROTEIN SERUM: CPT | Performed by: INTERNAL MEDICINE

## 2020-04-05 PROCEDURE — 25000128 H RX IP 250 OP 636: Performed by: INTERNAL MEDICINE

## 2020-04-05 PROCEDURE — 97110 THERAPEUTIC EXERCISES: CPT | Mod: GO

## 2020-04-05 PROCEDURE — 25000131 ZZH RX MED GY IP 250 OP 636 PS 637: Performed by: INTERNAL MEDICINE

## 2020-04-05 PROCEDURE — 93325 DOPPLER ECHO COLOR FLOW MAPG: CPT | Mod: 26 | Performed by: INTERNAL MEDICINE

## 2020-04-05 PROCEDURE — 85520 HEPARIN ASSAY: CPT | Performed by: INTERNAL MEDICINE

## 2020-04-05 PROCEDURE — 84450 TRANSFERASE (AST) (SGOT): CPT | Performed by: INTERNAL MEDICINE

## 2020-04-05 PROCEDURE — 93321 DOPPLER ECHO F-UP/LMTD STD: CPT | Mod: 26 | Performed by: INTERNAL MEDICINE

## 2020-04-05 PROCEDURE — 20000003 ZZH R&B ICU

## 2020-04-05 PROCEDURE — 99232 SBSQ HOSP IP/OBS MODERATE 35: CPT | Performed by: INTERNAL MEDICINE

## 2020-04-05 PROCEDURE — 80069 RENAL FUNCTION PANEL: CPT | Performed by: INTERNAL MEDICINE

## 2020-04-05 PROCEDURE — 84460 ALANINE AMINO (ALT) (SGPT): CPT | Performed by: INTERNAL MEDICINE

## 2020-04-05 PROCEDURE — 82248 BILIRUBIN DIRECT: CPT | Performed by: INTERNAL MEDICINE

## 2020-04-05 PROCEDURE — 36415 COLL VENOUS BLD VENIPUNCTURE: CPT | Performed by: INTERNAL MEDICINE

## 2020-04-05 PROCEDURE — 93308 TTE F-UP OR LMTD: CPT | Mod: 26 | Performed by: INTERNAL MEDICINE

## 2020-04-05 PROCEDURE — 97530 THERAPEUTIC ACTIVITIES: CPT | Mod: GP | Performed by: PHYSICAL THERAPIST

## 2020-04-05 PROCEDURE — 84075 ASSAY ALKALINE PHOSPHATASE: CPT | Performed by: INTERNAL MEDICINE

## 2020-04-05 PROCEDURE — 85027 COMPLETE CBC AUTOMATED: CPT | Performed by: INTERNAL MEDICINE

## 2020-04-05 PROCEDURE — 25000125 ZZHC RX 250: Performed by: INTERNAL MEDICINE

## 2020-04-05 PROCEDURE — 00000146 ZZHCL STATISTIC GLUCOSE BY METER IP

## 2020-04-05 PROCEDURE — 25500064 ZZH RX 255 OP 636: Performed by: INTERNAL MEDICINE

## 2020-04-05 PROCEDURE — 93308 TTE F-UP OR LMTD: CPT

## 2020-04-05 PROCEDURE — 99291 CRITICAL CARE FIRST HOUR: CPT | Mod: 25 | Performed by: INTERNAL MEDICINE

## 2020-04-05 PROCEDURE — 82247 BILIRUBIN TOTAL: CPT | Performed by: INTERNAL MEDICINE

## 2020-04-05 RX ORDER — NICOTINE POLACRILEX 4 MG
15-30 LOZENGE BUCCAL
Status: DISCONTINUED | OUTPATIENT
Start: 2020-04-05 | End: 2020-04-10 | Stop reason: HOSPADM

## 2020-04-05 RX ORDER — ACETAMINOPHEN 325 MG/1
650 TABLET ORAL EVERY 4 HOURS PRN
Status: DISCONTINUED | OUTPATIENT
Start: 2020-04-05 | End: 2020-04-10 | Stop reason: HOSPADM

## 2020-04-05 RX ORDER — LIDOCAINE HYDROCHLORIDE 20 MG/ML
JELLY TOPICAL ONCE
Status: COMPLETED | OUTPATIENT
Start: 2020-04-05 | End: 2020-04-05

## 2020-04-05 RX ORDER — PANTOPRAZOLE SODIUM 40 MG/1
40 TABLET, DELAYED RELEASE ORAL
Status: DISCONTINUED | OUTPATIENT
Start: 2020-04-06 | End: 2020-04-10 | Stop reason: HOSPADM

## 2020-04-05 RX ORDER — AMOXICILLIN 250 MG
2 CAPSULE ORAL 2 TIMES DAILY PRN
Status: DISCONTINUED | OUTPATIENT
Start: 2020-04-05 | End: 2020-04-10 | Stop reason: HOSPADM

## 2020-04-05 RX ORDER — AMIODARONE HYDROCHLORIDE 200 MG/1
400 TABLET ORAL 2 TIMES DAILY
Status: DISCONTINUED | OUTPATIENT
Start: 2020-04-05 | End: 2020-04-05

## 2020-04-05 RX ORDER — POLYETHYLENE GLYCOL 3350 17 G/17G
17 POWDER, FOR SOLUTION ORAL DAILY PRN
Status: DISCONTINUED | OUTPATIENT
Start: 2020-04-05 | End: 2020-04-10 | Stop reason: HOSPADM

## 2020-04-05 RX ORDER — AMIODARONE HYDROCHLORIDE 200 MG/1
400 TABLET ORAL 2 TIMES DAILY
Status: DISCONTINUED | OUTPATIENT
Start: 2020-04-05 | End: 2020-04-07

## 2020-04-05 RX ORDER — AMOXICILLIN 250 MG
1 CAPSULE ORAL 2 TIMES DAILY PRN
Status: DISCONTINUED | OUTPATIENT
Start: 2020-04-05 | End: 2020-04-10 | Stop reason: HOSPADM

## 2020-04-05 RX ORDER — DEXTROSE MONOHYDRATE 25 G/50ML
25-50 INJECTION, SOLUTION INTRAVENOUS
Status: DISCONTINUED | OUTPATIENT
Start: 2020-04-05 | End: 2020-04-10 | Stop reason: HOSPADM

## 2020-04-05 RX ORDER — POTASSIUM CHLORIDE 1.5 G/1.58G
20-40 POWDER, FOR SOLUTION ORAL
Status: DISCONTINUED | OUTPATIENT
Start: 2020-04-05 | End: 2020-04-10 | Stop reason: HOSPADM

## 2020-04-05 RX ADMIN — GUAIFENESIN 10 ML: 100 SOLUTION ORAL at 19:51

## 2020-04-05 RX ADMIN — GUAIFENESIN 10 ML: 100 SOLUTION ORAL at 16:28

## 2020-04-05 RX ADMIN — AMIODARONE HYDROCHLORIDE 400 MG: 200 TABLET ORAL at 21:19

## 2020-04-05 RX ADMIN — AMIODARONE HYDROCHLORIDE 400 MG: 200 TABLET ORAL at 08:48

## 2020-04-05 RX ADMIN — GUAIFENESIN 10 ML: 100 SOLUTION ORAL at 08:26

## 2020-04-05 RX ADMIN — HUMAN ALBUMIN MICROSPHERES AND PERFLUTREN 3 ML: 10; .22 INJECTION, SOLUTION INTRAVENOUS at 13:30

## 2020-04-05 RX ADMIN — Medication 6.25 MG: at 21:19

## 2020-04-05 RX ADMIN — Medication 6.25 MG: at 08:48

## 2020-04-05 RX ADMIN — HEPARIN SODIUM 1550 UNITS/HR: 10000 INJECTION, SOLUTION INTRAVENOUS at 16:41

## 2020-04-05 RX ADMIN — GUAIFENESIN 10 ML: 100 SOLUTION ORAL at 12:29

## 2020-04-05 RX ADMIN — POTASSIUM CHLORIDE 20 MEQ: 1.5 POWDER, FOR SOLUTION ORAL at 06:37

## 2020-04-05 RX ADMIN — LIDOCAINE HYDROCHLORIDE: 20 JELLY TOPICAL at 16:28

## 2020-04-05 RX ADMIN — Medication 40 MG: at 06:37

## 2020-04-05 RX ADMIN — GUAIFENESIN 10 ML: 100 SOLUTION ORAL at 23:18

## 2020-04-05 RX ADMIN — Medication 1 MG: at 21:19

## 2020-04-05 RX ADMIN — INSULIN GLARGINE 16 UNITS: 100 INJECTION, SOLUTION SUBCUTANEOUS at 21:16

## 2020-04-05 RX ADMIN — GUAIFENESIN 10 ML: 100 SOLUTION ORAL at 03:01

## 2020-04-05 RX ADMIN — SODIUM CHLORIDE: 9 INJECTION, SOLUTION INTRAVENOUS at 06:39

## 2020-04-05 ASSESSMENT — MIFFLIN-ST. JEOR: SCORE: 1545.44

## 2020-04-05 ASSESSMENT — ACTIVITIES OF DAILY LIVING (ADL)
ADLS_ACUITY_SCORE: 14

## 2020-04-05 NOTE — PLAN OF CARE
ICU End of Shift Summary.  For vital signs and complete assessments, please see documentation flowsheets.     Pertinent assessments: Patient alert, oriented x4, follows commands, able to make needs known, denies pain.  Participates in care and asks appropriate questions. Room air, intermittent cough, self suctions.  Atrial fibrillation persistant at an improved rate.      Major Shift Events: Tube feeding stopped during day time hours. Increased PO intake.  Patient out of bed with PT for standing exercises.  Decrease in observable blood in urine. Urology consult,  Recommendations received for 3 Way 24Fr catheter for possible CBI if hematuria develops. 3 way catheter placed and Heparin drip resumed for anticoaggulation prior to NESTOR with possible cardioversion. Discontinued NGTube. Discontinued Tube feedings. Lantus/Insulin doses adjusted - AC&HS.     Plan (Upcoming Events):  Anticipate planning for NESTOR/cardioverision.  Ongoing monitoring for hematuria while recieving anti-coaggulation.  Anticipate outpatient pulmonary follow up and outpatient stress test.     Discharge/Transfer Needs: TBD    Bedside Shift Report Completed : Yes  Bedside Safety Check Completed: Yes

## 2020-04-05 NOTE — PLAN OF CARE
Discharge Planner PT   Patient plan for discharge: Not stated  Current status: Pt found up in chair, willing to participate in PT. RN reports pt feeling much better with more energy today. HR in the 110's before activity. Instructed patient to perform sit to stand transfer with CGA-Anne-Marie and cuing for UE placement on chair for increased leverage. BP measured in standing at 91/60. Instructed patient to perform stand pivot transfer following exercises and sidestepping EOB with CGA and use of FWW with PT managing lines. Instructed patient to perform sit to supine transfer with Anne-Marie at LEs to complete.    Instructed patient to perform standing marching exercises with B UE support on FWW w/CGA. Pt required increased UE support and had forward flexed posturing d/t fatigue and weakness. Instructed patient to increase upright posturing and provided cuing to perform scapular retraction. Educated patient on muscle lengthening and weakness occuring with posturing. Instructed patient in standing marching exercises and performing to self reported moderate intensity. HR measured in 120's during exercises. Patient able to recognize exercise limitation and take rest breaks as needed. Pt asymptomatic throughout session.  Barriers to return to prior living situation: Level of assist, decrease activity tolerance  Recommendations for discharge: ARU  Rationale for recommendations: Pt will benefit from intense therapy at ARU to optimize IND with functional mobility as pt below baseline. Predict pt can/will be able to tolerate 3 hrs of therapy/day        Entered by: Asim Uribe 04/05/2020 10:03 AM

## 2020-04-05 NOTE — PROGRESS NOTES
[facility]    Hospitalist Progress Note      Assessment & Plan   Gene Pagan is a 74 year old male who was admitted on 3/27/2020.    Summary of Stay:   Patient is a 74-year-old male with a past medical history significant for type 2 diabetes, and BPH who was seen initially at an outside urgent care center and transferred to the ED for atrial fibrillation with RVR.  According to history, patient reported 6 to 8 weeks of progressive shortness of breath.  His shortness of breath initially started with exertion but had progressed to dyspnea even at rest.      patient developed progressive shortness of breath overnight on 3/28/2020.  Was also notably hypotensive.  He was placed on BiPAP initially with some favorable results but respiratory status continued to decompensate and was eventually intubated on the night of 3/28/20.    He was also hypotensive and started on levophed. He was also notably in acute renal failure and was initially oliguric.  Nephrology has since been consulted and did raise the discussion of possible transfer to [facility] for CRRT but family had decided to keep the patient here and not transfer understanding that his clinical course can deteriorate at any point in time.  Since then, patient was placed on Bumex drip and started making urine. Bumex was eventually stopped due to excellent output and polyuria.    Patient was successfully extubated on 4/1/2020.  Patient is stable from respiratory point of view.  Appears comfortable.  Not requiring oxygen.     Noted to have a fib with rate greater than 120. Initially treated with IV amio which was switched to oral Amio. Seen by cardiology, recommended adding low oral metoprolol.  Patient is off of Levophed, stopped on 4/1/20, in the evening. Heart rate remains elevated. Amio dose was increased on 4/4, which has helped control the heart rate- although still elevated. Cardiology is hoping to do NESTOR-cardioversion with IV heparin  onboard.      On the night of 4/1, patient noted to have gross hematuria.  So the heparin was stopped.  Afterwards, hematuria resolved.  Heparin was resumed on 4/3. But on the morning of 4/4 noted to have hematuria once again.  Initially the urine was pinkish-red but then became significantly worse with small clots. So the IV heparin was stopped. Today, the urine has cleared.      Plan:    Cardiogenic shock  - EF of 20 to 25%, moderate to severe MR, moderate to severe RV systolic dysfunction.  - Overall poor prognosis.  - Norepinephrine has been weaned off, stopped on the evening of 4/1/2020.  - associated infection can not be ruled out- significant leikocytosis so also started on iV zosyn and vanco (start date 3/28).  Negative MRSA in nares, vancomycin discontinued on 4/2/20.  Zosyn discontinued on 4/4/2020.  -NEGATIVE COVID19      Atrial fibrillation with rapid rate.  Uncontrolled.  -Heart rate continues to be elevated  -On amiodarone and low-dose metoprolol, per cardiology.  Amiodarone was increased to 400 twice daily.  Metoprolol was decreased to 6.25 twice daily.  -Rate is better controlled although still elevated.  -From cardiology point of view, they would like to do a NESTOR cardioversion in the near future with IV heparin on board.  But patient's recurrent gross hematuria due to IV heparin is complicating the picture.  - Urology consult obtained.  See the discussion below.     Gross hematuria  - Gross hematuria has recurred after resuming the IV heparin.  - On IV heparin for stroke prophylaxis in the setting of A. fib.  - Discussed with urology: They have recommended placing a three-way urinary catheter.  After that, IV heparin can be resumed.  If the patient developed gross hematuria again then start continuous bladder irrigation.     Acute kidney injury in the setting of chronic kidney disease.  - Nephrology following.  Good urine output, and polyuric phase.  Creatinine is 2.95 <- 4.0 <- 5.1 <- 5.5).   Baseline creatinine is close to 2  -currently on normal saline which is being managed by nephrology.     DM  - on lantus and sliding scale insulin Q4h  - currently getting TF via NGT. Hope to advance oral diet slowly     Elevated AST and ALT  -Thought to be ischemic hepatitis/shock liver/liver congestion in the setting of CHF  - LFTs improved.       Acute hypoxic respiratory failure.  Improved.  -In the setting of CHF and cardiogenic shock.  - Extubated on 4/1/2020.  - Patient is DNR but not DNI. Dr Hay discussed with the patient regarding intubation if needed again in the future: Patient expressed that he does want to be reintubated if necessary.  - stable, not requiring O2     1 cm pulmonary nodule on CT.  - Outpatient follow-up.     Malnutrition  getting TF via NGT.    Oral intake has significantly improved.  Remove NG tube.     Lines: Mckenzie.  Continue Mckenzie, as the patient would likely need bladder irrigation.     DVT Prophylaxis:  PCD.   Code Status: DNR  Disposition: continue ICU care    Hemant Gonzalez MD  [facility] (7am - 6pm, M-F)    Interval History   Patient was evaluated with nursing staff. Overnight issues discussed.    Review of systems:  No nausea or vomiting.  No abdominal pain.  No diarrhea.  No chest pain/palpitations.  No new cough/shortness of breath.  No headache/visual disturbance/new weakness.    -Data reviewed today: Labs and medications.    Physical Exam   Temp: 99.3  F (37.4  C) Temp src: Bladder BP: 100/81 Pulse: 120 Heart Rate: 105 Resp: 29 SpO2: 100 % O2 Device: None (Room air)    Vitals:    04/03/20 0400 04/04/20 0540 04/05/20 0430   Weight: 80.5 kg (177 lb 7.5 oz) 81.6 kg (179 lb 14.3 oz) 83.3 kg (183 lb 10.3 oz)     Vital Signs with Ranges  Temp:  [98.8  F (37.1  C)-99.9  F (37.7  C)] 99.3  F (37.4  C)  Pulse:  [101-144] 120  Heart Rate:  [] 105  Resp:  [8-29] 29  BP: ()/(58-87) 100/81  SpO2:  [93 %-100 %] 100 %  I/O last 3 completed shifts:  In: 3677 [P.O.:200;  I.V.:2242; NG/GT:380]  Out: 3300 [Urine:3300]    Constitutional: Awake, alert, cooperative, no apparent distress  HEENT: Trachea midline, sclera is clear, NG tube in place.  Respiratory: Clear to auscultation bilaterally, no crackles or wheezing  Cardiovascular: Regular rate and rhythm, normal S1 and S2, and no murmur noted  GI: Normal bowel sounds, soft, non-distended, non-tender      Medications     dextrose       [Held by provider] HEParin Stopped (04/04/20 1226)     - MEDICATION INSTRUCTIONS -       - MEDICATION INSTRUCTIONS -         amiodarone  400 mg Oral BID     guaiFENesin  10 mL Oral Q4H     insulin aspart  4 Units Subcutaneous Q4H     insulin aspart  1-6 Units Subcutaneous Q4H     insulin glargine  20 Units Subcutaneous At Bedtime     metoprolol tartrate  6.25 mg Oral BID     [START ON 4/6/2020] pantoprazole  40 mg Oral QAM AC     sodium chloride (PF)  3 mL Intracatheter Q8H       Data   Recent Labs   Lab 04/05/20  0544 04/04/20  0544 04/03/20  0604  04/02/20  0610  04/01/20  0545   WBC 14.0*  --   --   --  14.2*  --  13.4*   HGB 11.1*  --   --   --  12.7*  --  11.9*   MCV 84  --   --   --  80  --  79     --   --   --  359  --  364   INR  --   --  1.23*  --   --   --   --     135 138  --  137  --  135   POTASSIUM 3.6 3.5 3.4   < > 3.3*   < > 2.7*   CHLORIDE 102 98 99  --  97  --  95   CO2 31 30 29  --  29  --  26   BUN 88* 119* 136*  --  133*  --  116*   CR 2.95* 4.05* 5.18*  --  5.51*  --  5.72*   ANIONGAP 5 7 10  --  11  --  14   HARPER 9.2 8.9 9.0  --  9.1  --  8.5   * 218* 223*  --  213*  --  158*   ALBUMIN 2.3*  --  2.6*  --   --   --  2.6*   PROTTOTAL 6.3*  --  6.6*  --   --   --  6.7*   BILITOTAL 0.7  --  0.7  --   --   --  1.2   ALKPHOS 210*  --  244*  --   --   --  141   *  --  1,171*  --   --   --  2,750*   AST 62*  --  163*  --   --   --  581*    < > = values in this interval not displayed.       No results found for this or any previous visit (from the past 24  hour(s)).

## 2020-04-05 NOTE — PLAN OF CARE
Discharge Planner OT   Patient plan for discharge: not stated  Current status: Pt being transferred to bedside chair via Indira Steady with nursing. Pt completed grooming tasks including oral cares with mouthwash and face washing with set up. Pt participated in BUE CVC/AROM exercises, able to tolerate 4 exercises x 30 seconds with rest breaks between due to SOB. HR elevated to 130s with UE exercises. Shortened session due to arrival of breakfast.   Barriers to return to prior living situation: Current level of assist for ADLs/transfers, decreased functional activity tolerance and strength, impaired balance  Recommendations for discharge: ARU  Rationale for recommendations: Pt is below baseline level of functioning with regards to ADLs, IADLs and mobility tasks. Recommend ongoing skilled OT while IP and in ARU setting to improve strength, functional activity tolerance and balance needed for daily tasks. Pt motivated to return to indep PLOF, has support of significant other in home environment. Anticipate with medical stability, pt will be able to tolerate the 3 hours of therapy/day required in ARU setting.        Entered by: Kathy Partida 04/05/2020 8:42 AM

## 2020-04-05 NOTE — PROGRESS NOTES
"[facility] Cardiology Progress Note            Interval History:   Sepsis.  Acute renal failure.  Biventricular failure.  Respiratory failure.  Atrial fibrillation with rapid ventricular response.  Initially on pressors.  Heart rate not ideally controlled and lowish blood pressure precludes the use of calcium channel blockers and beta-blockers.  Hematuria on intravenous heparin.  Diabetes mellitus.  Ejection fraction 20 to 25%.  Grade 3 diastolic dysfunction on this echo on 28 March.              Medications:       amiodarone  400 mg Oral or FT or NG tube BID     guaiFENesin  10 mL Oral or Feeding Tube Q4H     insulin aspart  4 Units Subcutaneous Q4H     insulin aspart  1-6 Units Subcutaneous Q4H     insulin glargine  20 Units Subcutaneous At Bedtime     metoprolol tartrate  12.5 mg Oral BID     pantoprazole  40 mg Per Feeding Tube QAM AC     sodium chloride (PF)  3 mL Intracatheter Q8H               Physical Exam:   Blood pressure (!) 89/58, pulse 114, temperature 99.5  F (37.5  C), resp. rate 23, height 1.74 m (5' 8.5\"), weight 83.3 kg (183 lb 10.3 oz), SpO2 98 %.  Rhythm: Atrial fibrillation with rapid ventricular response.   Constitutional:   awake, alert, cooperative, no apparent distress, and appears stated age     Neck:   Feeding tube intranasally., no jugular venous distension and no carotid bruits     Lungs:   No increased work of breathing, good air exchange, clear to auscultation bilaterally, no crackles or wheezing     Cardiovascular:   irregularly irregular rhythm, S3 present, no murmur noted, no edema and variable S1 and normal S2.            Data:          Lab Results   Component Value Date     04/05/2020     04/04/2020     04/03/2020    Lab Results   Component Value Date    CHLORIDE 102 04/05/2020    CHLORIDE 98 04/04/2020    CHLORIDE 99 04/03/2020    Lab Results   Component Value Date    BUN 88 04/05/2020     04/04/2020     04/03/2020      Lab Results "   Component Value Date    POTASSIUM 3.6 04/05/2020    POTASSIUM 3.5 04/04/2020    POTASSIUM 3.4 04/03/2020    Lab Results   Component Value Date    CO2 31 04/05/2020    CO2 30 04/04/2020    CO2 29 04/03/2020    Lab Results   Component Value Date    CR 2.95 04/05/2020    CR 4.05 04/04/2020    CR 5.18 04/03/2020        Lab Results   Component Value Date    HGB 12.7 (L) 04/02/2020    HGB 11.9 (L) 04/01/2020    HGB 10.8 (L) 03/31/2020     Lab Results   Component Value Date     04/02/2020     04/01/2020     03/31/2020     Lab Results   Component Value Date    TROPI 0.439 (HH) 03/28/2020    TROPI 0.336 (HH) 03/28/2020    TROPI 0.294 (HH) 03/28/2020     Lab Results   Component Value Date    WBC 14.2 (H) 04/02/2020    WBC 13.4 (H) 04/01/2020    WBC 17.0 (H) 03/31/2020                   Assessment and Plan:   Assessment:   Problem List    Atrial fibrillation with rapid ventricular response (H).  Difficult to control heart rate as BP is soft.  Biventricular failure with an ejection fraction of 20 to 25% on echocardiography a week ago.  This was in the setting of sepsis and septic shock.  Improving hepatic function.  Renal failure.  Improving renal function.  Hematuria.  Heparin held.  This is a significant issue since the patient will need to be on anticoagulation before we can consider cardioversion.  Being off anticoagulation puts him at significant risk of stroke.      * No resolved hospital problems. *       Plan:   Increase oral dose of amiodarone to 400 mg twice a day.  Hopefully this will control her heart rate.  We will reduce metoprolol to 6.25 mg twice a day.  Investigation of hematuria.  The presence of anticoagulation is important from the point of view of electrical cardioversion but also from the point of view of stroke prophylaxis down the line.  Limited echocardiogram to see if ejection fraction has improved as his clinical situation has improved from the point of view of sepsis and shock.        Attestation:  Amount of time spent providing critical care service today: 25 minutes.  Care coordination / counseling time: 35 minutes         Paulino Perez MD, MD 4/5/2020  7:52 AM

## 2020-04-05 NOTE — PLAN OF CARE
ICU End of Shift Summary.  For vital signs and complete assessments, please see documentation flowsheets.     Pertinent assessments: AO. Denies pain. TMAX 99.9. Tele AFIB 100-130. Lungs diminished. RA. Tolerating diet. Mckenzie in place with adequate urine output. Urine clearing in color.  Major Shift Events: none  Plan (Upcoming Events): rate control  Discharge/Transfer Needs: Continue current plan of care    Bedside Shift Report Completed : Y  Bedside Safety Check Completed: Y

## 2020-04-05 NOTE — PROGRESS NOTES
[facility]    Nephrology Progress Note     Assessment & Plan            74-year-old male admitted through the emergency room dated 03/27/2020 in the setting of increasing shortness of breath and tachycardia.      Evaluated with respect to apparent acute on chronic renal insufficiency in the setting of probable cardiogenic shock.      1. Baseline CKD III/IV              -creatinine 2.0 mg/dl range              -GFR 30 ml/min  2.  Dipstick proteinuria  3.  ARF              -non oliguric              -acting like resolving ATN  4.  Hemodynamic instability              -off NE  5.  Metabolic acidosis              -resolved  6.  Hyponatremia               -hypo osmolar hypervolemic - better  7.  Respiratory failure              -extubated   8.  Shock liver              -transaminases falling    9.  Cardiomyopathy with LVEF 20-25% + Grade III diastolic dysfunction.       Plan:     Stop IV fluid  Observe for more renal recovery.          Landry Aguayo MD  [facility] - Nephrology  [phone]    Interval History     Feeling well.  Not SOB.  Eating and drinking well.  He has been up out of bed for meals.      Physical Exam   Temp: 99.3  F (37.4  C) Temp src: Bladder BP: 100/81 Pulse: 120 Heart Rate: 105 Resp: 29 SpO2: 100 % O2 Device: None (Room air)    Vitals:    04/03/20 0400 04/04/20 0540 04/05/20 0430   Weight: 80.5 kg (177 lb 7.5 oz) 81.6 kg (179 lb 14.3 oz) 83.3 kg (183 lb 10.3 oz)     Vital Signs with Ranges  Temp:  [98.8  F (37.1  C)-99.9  F (37.7  C)] 99.3  F (37.4  C)  Pulse:  [101-144] 120  Heart Rate:  [] 105  Resp:  [8-29] 29  BP: ()/(58-87) 100/81  SpO2:  [93 %-100 %] 100 %  I/O last 3 completed shifts:  In: 3677 [P.O.:200; I.V.:2242; NG/GT:380]  Out: 3300 [Urine:3300]    GENERAL APPEARANCE: pleasant, NAD, a & o  HEENT:  Eyes/ears/nose/neck grossly normal  RESP: lungs cta b c good efforts, no crackles, rhonchi or wheezes  CV: irreg irreg No M  ABDOMEN: o/s/nt/nd, bs  present  EXTREMITIES/SKIN: no rashes/lesions; no edema    Medications     dextrose       [Held by provider] HEParin Stopped (04/04/20 1226)     - MEDICATION INSTRUCTIONS -       - MEDICATION INSTRUCTIONS -         amiodarone  400 mg Oral BID     guaiFENesin  10 mL Oral Q4H     insulin aspart  4 Units Subcutaneous Q4H     insulin aspart  1-6 Units Subcutaneous Q4H     insulin glargine  20 Units Subcutaneous At Bedtime     metoprolol tartrate  6.25 mg Oral BID     [START ON 4/6/2020] pantoprazole  40 mg Oral QAM AC     sodium chloride (PF)  3 mL Intracatheter Q8H       Data   BMP  Recent Labs   Lab 04/05/20  0544 04/04/20  0544 04/03/20  0604 04/02/20  1225 04/02/20  0610    135 138  --  137   POTASSIUM 3.6 3.5 3.4 4.1 3.3*   CHLORIDE 102 98 99  --  97   HARPER 9.2 8.9 9.0  --  9.1   CO2 31 30 29  --  29   BUN 88* 119* 136*  --  133*   CR 2.95* 4.05* 5.18*  --  5.51*   * 218* 223*  --  213*     [id number](phos:4)  CBC)  Recent Labs   Lab 04/05/20  0544 04/02/20  0610 04/01/20  0545 03/31/20  0540   WBC 14.0* 14.2* 13.4* 17.0*   HGB 11.1* 12.7* 11.9* 10.8*   HCT 37.7* 40.0 37.3* 33.6*   MCV 84 80 79 79    359 364 331     Recent Labs   Lab 04/05/20  0544   AST 62*   *   ALKPHOS 210*   BILITOTAL 0.7     Recent Labs   Lab 04/03/20  0604   INR 1.23*     No results found for: D2VIT, D3VIT, DTOT  Recent Labs   Lab 04/05/20  0544   HGB 11.1*   HCT 37.7*   MCV 84     No results for input(s): PTHI in the last 168 hours.    Attestation:   I have reviewed today's relevant vital signs, notes, medications, labs and imaging.

## 2020-04-05 NOTE — CONSULTS
Urology Consult    Name: Gene Pagan    MRN: [id number]   YOB: 1945       We were asked to see Gene Pagan for evaluation and treatment of gross hematuria    History is obtained from the patient          History of Present Illness:   Gene Pagan is a 74 year old man with a past medical history of DMII and a urologic history of LUTS who was admitted omn 3/27 in the setting of SOB, atrial fibrillation with RVR and DEONDRE with further workup suggesting cardiogenic shock.  His hospital course has been notable for respiratory failure requiring intubation on 3/28 with subseqeuent extubation on 4/1.  In the setting of persistent atrial fibrillation with RVR cardiology recommended NESTOR cardioversion with IV heparin on board.  The team has started heparin on two occasions (4/1 and 4/3) in anticipation of cardioversion however each time the patient has developed gross hematuria which has resolved with discontinuation of IV heparin.  Given the continued need for cardioversion on IV heparin, urology has been consulted to evaluate a possible bleeding sources and advise on further management.     Aside from LUTS the patient's prior urologic history is limited to a self-limited episode of gross hematuria with exertion a few years ago.  The patient denies a history of urolithiasis or ongoing flank pain.  He further denies a history of smoking or industrial exposures frequently implicated in cases of urothelial carcinoma.         Past Medical History:     Past Medical History:   Diagnosis Date     BPH (benign prostatic hyperplasia)      Diabetes (H)             Past Surgical History:   History reviewed. No pertinent surgical history.         Social History:     Social History     Tobacco Use     Smoking status: Never Smoker   Substance Use Topics     Alcohol use: Yes     Comment: Rare            Family History:     Family History   Problem Relation Age of Onset     Diabetes Sister             Allergies:   No  Known Allergies         Medications:     Current Facility-Administered Medications   Medication     acetaminophen (TYLENOL) solution 650 mg     acetaminophen (TYLENOL) tablet 650 mg     albuterol (PROAIR HFA/PROVENTIL HFA/VENTOLIN HFA) 108 (90 Base) MCG/ACT inhaler 2 puff     amiodarone (PACERONE) tablet 400 mg     dextrose 10% infusion     glucose gel 15-30 g    Or     dextrose 50 % injection 25-50 mL    Or     glucagon injection 1 mg     guaiFENesin (ROBITUSSIN) 20 mg/mL solution 10 mL     heparin 25,000 units in 0.45% NaCl 250 mL ANTICOAGULANT  infusion     insulin aspart (NovoLOG) injection (RAPID ACTING)     insulin glargine (LANTUS) injection 16 Units     lidocaine (LMX4) cream     lidocaine 1 % 0.1-1 mL     melatonin tablet 1 mg     metoprolol tartrate (LOPRESSOR) quarter-tab 6.25 mg     naloxone (NARCAN) injection 0.1-0.4 mg     nitroGLYcerin (NITROSTAT) sublingual tablet 0.4 mg     ondansetron (ZOFRAN-ODT) ODT tab 4 mg    Or     ondansetron (ZOFRAN) injection 4 mg     [START ON 4/6/2020] pantoprazole (PROTONIX) EC tablet 40 mg     Patient is already receiving anticoagulation with heparin, enoxaparin (LOVENOX), warfarin (COUMADIN)  or other anticoagulant medication     Patient is already receiving anticoagulation with heparin, enoxaparin (LOVENOX), warfarin (COUMADIN)  or other anticoagulant medication     polyethylene glycol (MIRALAX) Packet 17 g     potassium chloride (KLOR-CON) Packet 20-40 mEq     potassium chloride 10 mEq in 100 mL intermittent infusion with 10 mg lidocaine     potassium chloride 10 mEq in 100 mL sterile water intermittent infusion (premix)     potassium chloride 20 mEq in 50 mL intermittent infusion     potassium chloride ER (KLOR-CON M) CR tablet 20-40 mEq     senna-docusate (SENOKOT-S/PERICOLACE) 8.6-50 MG per tablet 1 tablet    Or     senna-docusate (SENOKOT-S/PERICOLACE) 8.6-50 MG per tablet 2 tablet     sodium chloride (PF) 0.9% PF flush 10-20 mL     sodium chloride (PF) 0.9% PF  flush 3 mL     sodium chloride (PF) 0.9% PF flush 3 mL             Review of Systems:    ROS: 10 point ROS neg other than the symptoms noted above in the HPI           Physical Exam:   VS:  T: 99.3    HR: 120    BP: 100/81    RR: 29   GEN:  AOx3.  NAD.    CV:  RRR  LUNGS: Non-labored breathing.   BACK:  No midline or CVA tenderness.  ABD:  Soft.  NT.  ND.    :  Mckenzie in place with danyel output         Data:   All laboratory data reviewed:    Recent Labs   Lab 04/05/20  0544 04/02/20  0610 04/01/20  0545 03/31/20  0540   WBC 14.0* 14.2* 13.4* 17.0*   HGB 11.1* 12.7* 11.9* 10.8*    359 364 331     Recent Labs   Lab 04/05/20  0544 04/04/20  0544 04/03/20  0604 04/02/20  1225 04/02/20  0610  04/01/20  0545 03/31/20  0540    135 138  --  137  --  135 134   POTASSIUM 3.6 3.5 3.4 4.1 3.3*   < > 2.7* 3.1*   CHLORIDE 102 98 99  --  97  --  95 98   CO2 31 30 29  --  29  --  26 21   BUN 88* 119* 136*  --  133*  --  116* 91*   CR 2.95* 4.05* 5.18*  --  5.51*  --  5.72* 5.76*   * 218* 223*  --  213*  --  158* 145*   HARPER 9.2 8.9 9.0  --  9.1  --  8.5 7.4*   MAG  --   --   --   --   --   --  2.6* 2.3   PHOS 4.1 5.1*  --   --   --   --  8.1* 8.5*    < > = values in this interval not displayed.     No lab results found in last 7 days.    Invalid input(s): URINEBLOOD    All pertinent imaging reviewed:           Impression and Plan:      Gene Pagan is a 74 year old man admitted in the setting of cardiogenic shock now with persistent atrial fibrillaiton with RVR requiring IV heparin in anticipation of NESTOR cardioversion.  Unfortunately prior trials on heparin have provoked gross hematuria which has resolved with discontinuation of IV heparin.    The patient's source of the urinary bleeding is not apparent at this time.  Given his history of moderate prostatic enlargement as evidenced on recent CT, a prostatic source is certainly possible.  Although he exhibits no clear risk factors for bladder or upper  urinary tract cancer, he should be evaluated for both at some point going forward.  There is no evidence for underlying urolithiasis or a causative infectious process.  While our evaluation of the renal parenchyma is limited with the available non-contrast imaging, it appears unlikely that he has a large renal mass in communication with the collecting system.      While the patient should eventually undergo a complete hematuria work up with cystoscopy, urine cytology and upper tract imaging, the value of such evaluation of this time is uncertain as the diagnostic steps would delay cardioversion while offering few therapeutic measures in the short term that would allow for immediate resumption of IV heparin.      Considering that urinary bleeds rarely result in significant anemia and that any potential clotting and associated obstruction from a urinary bleed can be avoided with continuous bladder irrigation, we would recommend placement of a 3-way catheter at this time and initiation of IV heparin in anticipation of cardioversion in the near future.  Should heparin result in new onset hematuria, continuous bladder irrigation should be started immediately while continuing anticoagulation.          -Place 3 way catheter - set up CBI (no need to start until bleeding occurs)  -Start IV heparin as needed in anticipation of cardioversion  -Start CBI as needed - wean as able  -Full hematuria workup to follow with urine cytology, upper tract imaging (either retrogrades or MR urogram) and cystoscopy.  Timing to depend on extent of resulting hematuria after transitioning to outpatient anticoagulation regimen    This patient's exam findings, labs, and imaging discussed with urology staff surgeon Dr. Lozoya, who developed the treatment plan.    Jose Andrade MD  Urology Resident

## 2020-04-06 ENCOUNTER — ANESTHESIA EVENT (OUTPATIENT)
Dept: SURGERY | Facility: CLINIC | Age: 75
DRG: 308 | End: 2020-04-06
Payer: COMMERCIAL

## 2020-04-06 ENCOUNTER — APPOINTMENT (OUTPATIENT)
Dept: SPEECH THERAPY | Facility: CLINIC | Age: 75
DRG: 308 | End: 2020-04-06
Payer: COMMERCIAL

## 2020-04-06 ENCOUNTER — APPOINTMENT (OUTPATIENT)
Dept: CARDIOLOGY | Facility: CLINIC | Age: 75
DRG: 308 | End: 2020-04-06
Attending: INTERNAL MEDICINE
Payer: COMMERCIAL

## 2020-04-06 ENCOUNTER — APPOINTMENT (OUTPATIENT)
Dept: OCCUPATIONAL THERAPY | Facility: CLINIC | Age: 75
DRG: 308 | End: 2020-04-06
Payer: COMMERCIAL

## 2020-04-06 ENCOUNTER — ANESTHESIA (OUTPATIENT)
Dept: SURGERY | Facility: CLINIC | Age: 75
DRG: 308 | End: 2020-04-06
Payer: COMMERCIAL

## 2020-04-06 ENCOUNTER — SURGERY (OUTPATIENT)
Age: 75
End: 2020-04-06
Payer: COMMERCIAL

## 2020-04-06 ENCOUNTER — APPOINTMENT (OUTPATIENT)
Dept: CARDIOLOGY | Facility: CLINIC | Age: 75
DRG: 308 | End: 2020-04-06
Attending: PHYSICIAN ASSISTANT
Payer: COMMERCIAL

## 2020-04-06 LAB
ANION GAP SERPL CALCULATED.3IONS-SCNC: 5 MMOL/L (ref 3–14)
BUN SERPL-MCNC: 72 MG/DL (ref 7–30)
CALCIUM SERPL-MCNC: 9.6 MG/DL (ref 8.5–10.1)
CHLORIDE SERPL-SCNC: 102 MMOL/L (ref 94–109)
CO2 SERPL-SCNC: 29 MMOL/L (ref 20–32)
CREAT SERPL-MCNC: 2.32 MG/DL (ref 0.66–1.25)
DIGOXIN SERPL-MCNC: 0.3 UG/L (ref 0.5–2)
GFR SERPL CREATININE-BSD FRML MDRD: 26 ML/MIN/{1.73_M2}
GLUCOSE BLDC GLUCOMTR-MCNC: 125 MG/DL (ref 70–99)
GLUCOSE BLDC GLUCOMTR-MCNC: 129 MG/DL (ref 70–99)
GLUCOSE BLDC GLUCOMTR-MCNC: 141 MG/DL (ref 70–99)
GLUCOSE BLDC GLUCOMTR-MCNC: 190 MG/DL (ref 70–99)
GLUCOSE BLDC GLUCOMTR-MCNC: 216 MG/DL (ref 70–99)
GLUCOSE SERPL-MCNC: 117 MG/DL (ref 70–99)
INR PPP: 1.17 (ref 0.86–1.14)
LMWH PPP CHRO-ACNC: 0.24 IU/ML
LMWH PPP CHRO-ACNC: 0.34 IU/ML
LMWH PPP CHRO-ACNC: 0.45 IU/ML
MAGNESIUM SERPL-MCNC: 2 MG/DL (ref 1.6–2.3)
POTASSIUM SERPL-SCNC: 3.7 MMOL/L (ref 3.4–5.3)
POTASSIUM SERPL-SCNC: 4.2 MMOL/L (ref 3.4–5.3)
SODIUM SERPL-SCNC: 136 MMOL/L (ref 133–144)

## 2020-04-06 PROCEDURE — 85520 HEPARIN ASSAY: CPT | Performed by: INTERNAL MEDICINE

## 2020-04-06 PROCEDURE — 36415 COLL VENOUS BLD VENIPUNCTURE: CPT | Performed by: INTERNAL MEDICINE

## 2020-04-06 PROCEDURE — 99232 SBSQ HOSP IP/OBS MODERATE 35: CPT | Performed by: INTERNAL MEDICINE

## 2020-04-06 PROCEDURE — 25800030 ZZH RX IP 258 OP 636: Performed by: INTERNAL MEDICINE

## 2020-04-06 PROCEDURE — 93010 ELECTROCARDIOGRAM REPORT: CPT | Performed by: INTERNAL MEDICINE

## 2020-04-06 PROCEDURE — 25000128 H RX IP 250 OP 636: Performed by: NURSE ANESTHETIST, CERTIFIED REGISTERED

## 2020-04-06 PROCEDURE — 97530 THERAPEUTIC ACTIVITIES: CPT | Mod: GO

## 2020-04-06 PROCEDURE — 92960 CARDIOVERSION ELECTRIC EXT: CPT | Performed by: INTERNAL MEDICINE

## 2020-04-06 PROCEDURE — 80162 ASSAY OF DIGOXIN TOTAL: CPT | Performed by: INTERNAL MEDICINE

## 2020-04-06 PROCEDURE — 20000003 ZZH R&B ICU

## 2020-04-06 PROCEDURE — 25000128 H RX IP 250 OP 636: Performed by: INTERNAL MEDICINE

## 2020-04-06 PROCEDURE — 93312 ECHO TRANSESOPHAGEAL: CPT | Mod: 26 | Performed by: INTERNAL MEDICINE

## 2020-04-06 PROCEDURE — 25000132 ZZH RX MED GY IP 250 OP 250 PS 637: Performed by: INTERNAL MEDICINE

## 2020-04-06 PROCEDURE — 92960 CARDIOVERSION ELECTRIC EXT: CPT

## 2020-04-06 PROCEDURE — 99232 SBSQ HOSP IP/OBS MODERATE 35: CPT | Mod: 25 | Performed by: INTERNAL MEDICINE

## 2020-04-06 PROCEDURE — 83735 ASSAY OF MAGNESIUM: CPT | Performed by: INTERNAL MEDICINE

## 2020-04-06 PROCEDURE — 00000146 ZZHCL STATISTIC GLUCOSE BY METER IP

## 2020-04-06 PROCEDURE — 25000131 ZZH RX MED GY IP 250 OP 636 PS 637: Performed by: INTERNAL MEDICINE

## 2020-04-06 PROCEDURE — 5A2204Z RESTORATION OF CARDIAC RHYTHM, SINGLE: ICD-10-PCS | Performed by: INTERNAL MEDICINE

## 2020-04-06 PROCEDURE — 85610 PROTHROMBIN TIME: CPT | Performed by: INTERNAL MEDICINE

## 2020-04-06 PROCEDURE — 93325 DOPPLER ECHO COLOR FLOW MAPG: CPT | Mod: 26 | Performed by: INTERNAL MEDICINE

## 2020-04-06 PROCEDURE — 92526 ORAL FUNCTION THERAPY: CPT | Mod: GN

## 2020-04-06 PROCEDURE — 99152 MOD SED SAME PHYS/QHP 5/>YRS: CPT | Mod: 59 | Performed by: INTERNAL MEDICINE

## 2020-04-06 PROCEDURE — 84132 ASSAY OF SERUM POTASSIUM: CPT | Performed by: INTERNAL MEDICINE

## 2020-04-06 PROCEDURE — 25000125 ZZHC RX 250: Performed by: INTERNAL MEDICINE

## 2020-04-06 PROCEDURE — 25000125 ZZHC RX 250

## 2020-04-06 PROCEDURE — 80048 BASIC METABOLIC PNL TOTAL CA: CPT | Performed by: INTERNAL MEDICINE

## 2020-04-06 PROCEDURE — 37000008 ZZH ANESTHESIA TECHNICAL FEE, 1ST 30 MIN

## 2020-04-06 PROCEDURE — 93312 ECHO TRANSESOPHAGEAL: CPT

## 2020-04-06 PROCEDURE — 93320 DOPPLER ECHO COMPLETE: CPT | Mod: 26 | Performed by: INTERNAL MEDICINE

## 2020-04-06 PROCEDURE — 97110 THERAPEUTIC EXERCISES: CPT | Mod: GO

## 2020-04-06 RX ORDER — FLUMAZENIL 0.1 MG/ML
0.2 INJECTION, SOLUTION INTRAVENOUS
Status: ACTIVE | OUTPATIENT
Start: 2020-04-06 | End: 2020-04-08

## 2020-04-06 RX ORDER — LIDOCAINE 50 MG/G
OINTMENT TOPICAL ONCE
Status: DISCONTINUED | OUTPATIENT
Start: 2020-04-06 | End: 2020-04-10 | Stop reason: HOSPADM

## 2020-04-06 RX ORDER — SODIUM CHLORIDE 9 MG/ML
1000 INJECTION, SOLUTION INTRAVENOUS CONTINUOUS
Status: DISCONTINUED | OUTPATIENT
Start: 2020-04-06 | End: 2020-04-07

## 2020-04-06 RX ORDER — DEXTROSE MONOHYDRATE 25 G/50ML
9.5 INJECTION, SOLUTION INTRAVENOUS
Status: DISCONTINUED | OUTPATIENT
Start: 2020-04-06 | End: 2020-04-10 | Stop reason: HOSPADM

## 2020-04-06 RX ORDER — ATROPINE SULFATE 0.1 MG/ML
.5-1 INJECTION INTRAVENOUS
Status: DISCONTINUED | OUTPATIENT
Start: 2020-04-06 | End: 2020-04-10 | Stop reason: HOSPADM

## 2020-04-06 RX ORDER — NALOXONE HYDROCHLORIDE 0.4 MG/ML
INJECTION, SOLUTION INTRAMUSCULAR; INTRAVENOUS; SUBCUTANEOUS
Status: DISCONTINUED
Start: 2020-04-06 | End: 2020-04-06 | Stop reason: WASHOUT

## 2020-04-06 RX ORDER — MAGNESIUM SULFATE HEPTAHYDRATE 40 MG/ML
2 INJECTION, SOLUTION INTRAVENOUS
Status: CANCELLED | OUTPATIENT
Start: 2020-04-06

## 2020-04-06 RX ORDER — POTASSIUM CHLORIDE 1500 MG/1
20 TABLET, EXTENDED RELEASE ORAL
Status: DISCONTINUED | OUTPATIENT
Start: 2020-04-06 | End: 2020-04-10 | Stop reason: HOSPADM

## 2020-04-06 RX ORDER — MAGNESIUM SULFATE HEPTAHYDRATE 40 MG/ML
2 INJECTION, SOLUTION INTRAVENOUS
Status: DISCONTINUED | OUTPATIENT
Start: 2020-04-06 | End: 2020-04-10 | Stop reason: HOSPADM

## 2020-04-06 RX ORDER — FENTANYL CITRATE 50 UG/ML
25 INJECTION, SOLUTION INTRAMUSCULAR; INTRAVENOUS
Status: DISCONTINUED | OUTPATIENT
Start: 2020-04-06 | End: 2020-04-10 | Stop reason: HOSPADM

## 2020-04-06 RX ORDER — PROPOFOL 10 MG/ML
INJECTION, EMULSION INTRAVENOUS PRN
Status: DISCONTINUED | OUTPATIENT
Start: 2020-04-06 | End: 2020-04-06

## 2020-04-06 RX ORDER — METOPROLOL TARTRATE 1 MG/ML
2.5-5 INJECTION, SOLUTION INTRAVENOUS
Status: DISCONTINUED | OUTPATIENT
Start: 2020-04-06 | End: 2020-04-10 | Stop reason: HOSPADM

## 2020-04-06 RX ORDER — NALOXONE HYDROCHLORIDE 0.4 MG/ML
.1-.4 INJECTION, SOLUTION INTRAMUSCULAR; INTRAVENOUS; SUBCUTANEOUS
Status: ACTIVE | OUTPATIENT
Start: 2020-04-06 | End: 2020-04-08

## 2020-04-06 RX ORDER — GLYCOPYRROLATE 0.2 MG/ML
INJECTION INTRAMUSCULAR; INTRAVENOUS
Status: DISCONTINUED
Start: 2020-04-06 | End: 2020-04-07 | Stop reason: HOSPADM

## 2020-04-06 RX ORDER — LIDOCAINE HYDROCHLORIDE 40 MG/ML
1.5 SOLUTION TOPICAL ONCE
Status: DISCONTINUED | OUTPATIENT
Start: 2020-04-06 | End: 2020-04-10 | Stop reason: HOSPADM

## 2020-04-06 RX ORDER — POTASSIUM CHLORIDE 1500 MG/1
20 TABLET, EXTENDED RELEASE ORAL
Status: CANCELLED | OUTPATIENT
Start: 2020-04-06

## 2020-04-06 RX ORDER — POTASSIUM CHLORIDE 1500 MG/1
40 TABLET, EXTENDED RELEASE ORAL
Status: CANCELLED | OUTPATIENT
Start: 2020-04-06

## 2020-04-06 RX ORDER — FENTANYL CITRATE 50 UG/ML
INJECTION, SOLUTION INTRAMUSCULAR; INTRAVENOUS
Status: DISCONTINUED
Start: 2020-04-06 | End: 2020-04-07 | Stop reason: HOSPADM

## 2020-04-06 RX ORDER — GLYCOPYRROLATE 0.2 MG/ML
0.1 INJECTION, SOLUTION INTRAMUSCULAR; INTRAVENOUS ONCE
Status: COMPLETED | OUTPATIENT
Start: 2020-04-06 | End: 2020-04-06

## 2020-04-06 RX ORDER — FLUMAZENIL 0.1 MG/ML
INJECTION, SOLUTION INTRAVENOUS
Status: DISCONTINUED
Start: 2020-04-06 | End: 2020-04-06 | Stop reason: WASHOUT

## 2020-04-06 RX ORDER — POTASSIUM CHLORIDE 1500 MG/1
40 TABLET, EXTENDED RELEASE ORAL
Status: DISCONTINUED | OUTPATIENT
Start: 2020-04-06 | End: 2020-04-10 | Stop reason: HOSPADM

## 2020-04-06 RX ORDER — LIDOCAINE HYDROCHLORIDE 20 MG/ML
SOLUTION OROPHARYNGEAL
Status: COMPLETED
Start: 2020-04-06 | End: 2020-04-06

## 2020-04-06 RX ADMIN — PANTOPRAZOLE SODIUM 40 MG: 40 TABLET, DELAYED RELEASE ORAL at 08:45

## 2020-04-06 RX ADMIN — Medication 6.25 MG: at 08:45

## 2020-04-06 RX ADMIN — HEPARIN SODIUM 1400 UNITS/HR: 10000 INJECTION, SOLUTION INTRAVENOUS at 21:42

## 2020-04-06 RX ADMIN — PROPOFOL 40 MG: 10 INJECTION, EMULSION INTRAVENOUS at 15:52

## 2020-04-06 RX ADMIN — AMIODARONE HYDROCHLORIDE 400 MG: 200 TABLET ORAL at 08:45

## 2020-04-06 RX ADMIN — GUAIFENESIN 10 ML: 100 SOLUTION ORAL at 03:15

## 2020-04-06 RX ADMIN — INSULIN GLARGINE 16 UNITS: 100 INJECTION, SOLUTION SUBCUTANEOUS at 22:21

## 2020-04-06 RX ADMIN — MIDAZOLAM 1 MG: 1 INJECTION INTRAMUSCULAR; INTRAVENOUS at 15:35

## 2020-04-06 RX ADMIN — SODIUM CHLORIDE 1000 ML: 9 INJECTION, SOLUTION INTRAVENOUS at 12:16

## 2020-04-06 RX ADMIN — GLYCOPYRROLATE 0.1 MG: 0.2 INJECTION, SOLUTION INTRAMUSCULAR; INTRAVENOUS at 15:03

## 2020-04-06 RX ADMIN — MIDAZOLAM 1 MG: 1 INJECTION INTRAMUSCULAR; INTRAVENOUS at 15:27

## 2020-04-06 RX ADMIN — LIDOCAINE HYDROCHLORIDE 30 ML: 20 SOLUTION ORAL; TOPICAL at 14:52

## 2020-04-06 RX ADMIN — POTASSIUM CHLORIDE 20 MEQ: 1.5 POWDER, FOR SOLUTION ORAL at 06:12

## 2020-04-06 RX ADMIN — Medication 6.25 MG: at 20:45

## 2020-04-06 RX ADMIN — FENTANYL CITRATE 25 MCG: 50 INJECTION INTRAMUSCULAR; INTRAVENOUS at 15:28

## 2020-04-06 RX ADMIN — TOPICAL ANESTHETIC 1 ML: 200 SPRAY DENTAL; PERIODONTAL at 14:53

## 2020-04-06 RX ADMIN — AMIODARONE HYDROCHLORIDE 400 MG: 200 TABLET ORAL at 20:44

## 2020-04-06 RX ADMIN — HEPARIN SODIUM 1550 UNITS/HR: 10000 INJECTION, SOLUTION INTRAVENOUS at 03:19

## 2020-04-06 ASSESSMENT — ACTIVITIES OF DAILY LIVING (ADL)
ADLS_ACUITY_SCORE: 14

## 2020-04-06 ASSESSMENT — MIFFLIN-ST. JEOR: SCORE: 1554.44

## 2020-04-06 NOTE — PROGRESS NOTES
CLINICAL NUTRITION SERVICES - REASSESSMENT NOTE    Future/Additional Recommendations:   Renal and/or sodium diet restrictions once oral intake consistently adequate (difficult to adhere to low sodium restrictions on dysphagia diet)   Malnutrition:  % Weight Loss:  > 2% in 1 week (severe malnutrition)  % Intake:  <75% for > 7 days (non-severe malnutrition) --> improving since 4/2 with FT and now with diet advancement  Subcutaneous Fat Loss: unable to determine   Muscle Loss: unable to determine   Fluid Retention: Trace     Malnutrition Diagnosis: Non-Severe malnutrition  In Context of:  Acute illness or injury with underlying Chronic illness or disease. May meet severe criteria if a nutrition focused physical exam was completed      EVALUATION OF PROGRESS TOWARD GOALS/NEW FINDINGS   Progress towards goals will be monitored and evaluated per protocol and Practice Guidelines    Diet order: DD3 with thin liquids per SLP (NPO for NESTOR this afternoon)  TF switched to [place] 4/3, held before/after meals 4/4 and FT removed 4/5 after intake improved. Per flow sheet review, intake now 100% for documented meals      BM: 4/5    I/O last 3 completed shifts:    In: 1161.63 [P.O.:780; I.V.:231.63; NG/GT:60]    Out: 2625 [Urine:2625]    Skin: red, blanchable    Fluid: +1 generalized edema    Weight: 84 kg - down from admit of 89 kg    ARU at discharge?    Sam nutrition score: 3; total score: 17    Labs:    Electrolytes  Potassium (mmol/L)   Date Value   04/06/2020 3.7   04/05/2020 3.6   04/04/2020 3.5     Phosphorus (mg/dL)   Date Value   04/05/2020 4.1   04/04/2020 5.1 (H)   04/01/2020 8.1 (H)   03/31/2020 8.5 (H)   03/30/2020 8.6 (H)        Blood Glucose  Glucose (mg/dL)   Date Value   04/06/2020 117 (H)   04/05/2020 159 (H)   04/04/2020 218 (H)   04/03/2020 223 (H)   04/02/2020 213 (H)     Hemoglobin A1C (%)   Date Value   03/27/2020 6.2 (H)        Inflammatory Markers  CRP Inflammation (mg/L)   Date Value   03/27/2020 23.1  (H)     WBC (10e9/L)   Date Value   04/05/2020 14.0 (H)   04/02/2020 14.2 (H)   04/01/2020 13.4 (H)     Albumin (g/dL)   Date Value   04/05/2020 2.3 (L)   04/03/2020 2.6 (L)   04/01/2020 2.6 (L)        Magnesium (mg/dL)   Date Value   04/01/2020 2.6 (H)   03/31/2020 2.3   03/29/2020 2.2     Sodium (mmol/L)   Date Value   04/06/2020 136   04/05/2020 137   04/04/2020 135        Renal  Urea Nitrogen (mg/dL)   Date Value   04/06/2020 72 (H)   04/05/2020 88 (H)   04/04/2020 119 (H)     Creatinine (mg/dL)   Date Value   04/06/2020 2.32 (H)   04/05/2020 2.95 (H)   04/04/2020 4.05 (H)         Additional  Ketones Urine (mg/dL)   Date Value   03/28/2020 Negative        Meds:    amiodarone  400 mg Oral BID     guaiFENesin  10 mL Oral Q4H     insulin aspart  1-6 Units Subcutaneous TID w/meals     insulin aspart  1-5 Units Subcutaneous At Bedtime     insulin aspart   Subcutaneous TID AC     insulin glargine  16 Units Subcutaneous At Bedtime     metoprolol tartrate  6.25 mg Oral BID     pantoprazole  40 mg Oral QAM AC      ASSESSED NUTRITION NEEDS (PER APPROVED PRACTICE GUIDELINES; DW: 82 kg):  Energy: 0491-3971 kcals (25-30 kcal/kg)  Justification: Overweight, exutubation   Protein: 466-82 grams protein (0.8-1 g pro/Kg)  Justification: CKD not yet on HD    Previous Goals:   TF at goal rate to meet >90% of estimated needs until oral intake improves  Evaluation: Met   Oral intake to meet >65% of estimated needs to justify wean of nutrition support   Evaluation: Met    Previous Nutrition Diagnosis:   Inadequate oral intake related to post extubation dysphagia as evidenced by DD2 diet restrictions, TF reliance   Evaluation: Improving, updated below    CURRENT NUTRITION DIAGNOSIS  Swallowing Difficulty  related to post extubation dysphagia as evidenced by DD3 diet, previously reliant on nasoenteric FT to meet nutrition needs    INTERVENTIONS  Recommendations / Nutrition Prescription  Diet consistency/texture per SLP, defer renal  and/or sodium diet restrictions to MD once oral intake consistently adequate (difficult to adhere to low sodium restrictions on dysphagia diet) + oral nutrition supplements prn    Implementation  EN Composition, EN Schedule and Feeding Tube Flush: cancelled associated orders  Diet order: per SLP  Collaboration and Referral of care: Discussed patient during interdisciplinary care rounds this morning    Goals  Patient to consume >/=75% of meals TID       MONITORING AND EVALUATION:  Progress towards goals will be monitored and evaluated per protocol and Practice Guidelines      Judy Carey, MS, RDN, LD, CNSC  Pager - 3rd floor/ICU: [phone]  Pager - All other floors: [phone]  Pager - Weekend/holiday: [phone]  Office: [phone]

## 2020-04-06 NOTE — PROGRESS NOTES
[facility]    Hospitalist Progress Note      Assessment & Plan   Gene Pagan is a 74 year old male who was admitted on 3/27/2020.    Summary of Stay:   Patient is a 74-year-old male with a past medical history significant for type 2 diabetes, and BPH who was seen initially at an outside urgent care center and transferred to the ED for atrial fibrillation with RVR.  According to history, patient reported 6 to 8 weeks of progressive shortness of breath.  His shortness of breath initially started with exertion but had progressed to dyspnea even at rest.      patient developed progressive shortness of breath overnight on 3/28/2020.  Was also notably hypotensive.  He was placed on BiPAP initially with some favorable results but respiratory status continued to decompensate and was eventually intubated on the night of 3/28/20.     He was also hypotensive and started on levophed. He was also notably in acute renal failure and was initially oliguric.  Nephrology has since been consulted and did raise the discussion of possible transfer to [facility] for CRRT but family had decided to keep the patient here and not transfer understanding that his clinical course can deteriorate at any point in time.  Since then, patient was placed on Bumex drip and started making urine. Bumex was eventually stopped due to excellent output and polyuria.     Patient was successfully extubated on 4/1/2020.  Patient is stable from respiratory point of view.  Appears comfortable.  Not requiring oxygen.     Noted to have a fib with rate greater than 120. Initially treated with IV amio which was switched to oral Amio. Seen by cardiology, recommended adding low oral metoprolol.  Patient is off of Levophed, stopped on 4/1/20, in the evening. Heart rate remains elevated. Amio dose was increased on 4/4, which has helped control the heart rate- although still elevated. Cardiology is hoping to do NESTOR-cardioversion with IV heparin  onboard.   This is a new diagnosis.     On the night of 4/1, patient noted to have gross hematuria.  So the heparin was stopped.  Afterwards, hematuria resolved.  Heparin was resumed on 4/3. But on the morning of 4/4 noted to have hematuria once again.  Initially the urine was pinkish-red but then became significantly worse with small clots. So the IV heparin was stopped.      Seen by urology: Recommended placing a three-way catheter and resuming IV heparin.  If patient develops hematuria then start continuous bladder irrigation.  After restarting the IV heparin hematuria has not noted.    Plan:    Cardiogenic shock  - EF of 20 to 25%, moderate to severe MR, moderate to severe RV systolic dysfunction.  - Norepinephrine has been weaned off, stopped on the evening of 4/1/2020.  - associated infection can not be ruled out- significant leikocytosis so also started on iV zosyn and vanco (start date 3/28).  Negative MRSA in nares, vancomycin discontinued on 4/2/20.  Zosyn discontinued on 4/4/2020.  -NEGATIVE COVID19  -Blood pressure is low normal.      Atrial fibrillation with rapid rate.  Uncontrolled.  -On amiodarone and low-dose metoprolol, per cardiology.  Amiodarone was increased to 400 twice daily.  Metoprolol was decreased to 6.25 twice daily.  -Rate is better controlled although still elevated.  -Seen by cardiology: Likely NESTOR plus cardioversion today.  On IV heparin.  -Possibly ischemia work-up in the outpatient setting.  -Transition to oral anticoagulation in a day or 2, depending on renal function and the issue of hematuria.     Gross hematuria  -IV heparin was resumed again yesterday.  Hematuria has not recurred.  Aspirin was also stopped.  -Three-way urinary catheter in place.  If hematuria develops then start continuous bladder irrigation and do not stop IV heparin.  - Further investigations regarding hematuria will likely be in the outpatient setting by urology.     Acute kidney injury in the setting of  chronic kidney disease.  - Nephrology following.  Good urine output, and polyuric phase.  Creatinine is 2.32 <- 2.95 <- 4.0 <- 5.1 <- 5.5).  Baseline creatinine is close to 2     DM  - on lantus and sliding scale insulin     Elevated AST and ALT  -Thought to be ischemic hepatitis/shock liver/liver congestion in the setting of CHF  - LFTs improved.    Acute hypoxic respiratory failure.  Improved.  -In the setting of CHF and cardiogenic shock.  - Extubated on 4/1/2020.  - Patient is DNR but not DNI. Dr Hay discussed with the patient regarding intubation if needed again in the future: Patient expressed that he does want to be reintubated if necessary.  - stable, not requiring O2     1 cm pulmonary nodule on CT.  - Outpatient follow-up.     Malnutrition  Oral intake has significantly improved.     Lines: Mckenzie.  Continue Mckenzie, as the patient may need bladder irrigation.     DVT Prophylaxis:  PCD.   Code Status: DNR  Disposition: continue ICU care    Hemant Gonzalez MD  Text Page (7am - 6pm, M-F)    Interval History   Patient was evaluated with nursing staff. Overnight issues discussed.    Review of systems:  No nausea or vomiting.  No abdominal pain.  No diarrhea.  No chest pain/palpitations.  No new cough/shortness of breath.  No headache/visual disturbance/new weakness.    -Data reviewed today: Labs and medications.    Physical Exam   Temp: 97.5  F (36.4  C) Temp src: Axillary BP: 121/80 Pulse: 116 Heart Rate: 118 Resp: 24 SpO2: 100 % O2 Device: None (Room air)    Vitals:    04/04/20 0540 04/05/20 0430 04/06/20 0621   Weight: 81.6 kg (179 lb 14.3 oz) 83.3 kg (183 lb 10.3 oz) 84.2 kg (185 lb 10 oz)     Vital Signs with Ranges  Temp:  [97.5  F (36.4  C)-99.3  F (37.4  C)] 97.5  F (36.4  C)  Pulse:  [101-135] 116  Heart Rate:  [100-133] 118  Resp:  [10-31] 24  BP: ()/(55-92) 121/80  SpO2:  [88 %-100 %] 100 %  I/O last 3 completed shifts:  In: 1161.63 [P.O.:780; I.V.:231.63; NG/GT:60]  Out: 7981  [Urine:2621]    Constitutional: Awake, alert, cooperative, no apparent distress  HEENT: Trachea midline, sclera is clear   Respiratory: Clear to auscultation bilaterally, no crackles or wheezing  Cardiovascular: Tachycardic, irregular rate and rhythm, normal S1 and S2, and no murmur noted  GI: Normal bowel sounds, soft, non-distended, non-tender    Medications     dextrose       - MEDICATION INSTRUCTIONS -       HEParin 1,400 Units/hr (04/06/20 0634)     - MEDICATION INSTRUCTIONS -       - MEDICATION INSTRUCTIONS -       - MEDICATION INSTRUCTIONS -       sodium chloride 1,000 mL (04/06/20 1216)       amiodarone  400 mg Oral BID     benzocaine 20%  1-4 spray Mouth/Throat Once     glycopyrrolate  0.1 mg Intravenous Once     guaiFENesin  10 mL Oral Q4H     insulin aspart  1-6 Units Subcutaneous TID w/meals     insulin aspart  1-5 Units Subcutaneous At Bedtime     insulin aspart   Subcutaneous TID AC     insulin glargine  16 Units Subcutaneous At Bedtime     lidocaine   Topical Once    Or     lidocaine  1.5 mL Topical Once     metoprolol tartrate  6.25 mg Oral BID     pantoprazole  40 mg Oral QAM AC     sodium chloride (PF)  3 mL Intravenous Q8H     sodium chloride (PF)  3 mL Intracatheter Q8H       Data   Recent Labs   Lab 04/06/20  1154 04/06/20  0542 04/05/20  0544 04/04/20  0544 04/03/20  0604  04/02/20  0610  04/01/20  0545   WBC  --   --  14.0*  --   --   --  14.2*  --  13.4*   HGB  --   --  11.1*  --   --   --  12.7*  --  11.9*   MCV  --   --  84  --   --   --  80  --  79   PLT  --   --  302  --   --   --  359  --  364   INR 1.17*  --   --   --  1.23*  --   --   --   --    NA  --  136 137 135 138  --  137  --  135   POTASSIUM 4.2 3.7 3.6 3.5 3.4   < > 3.3*   < > 2.7*   CHLORIDE  --  102 102 98 99  --  97  --  95   CO2  --  29 31 30 29  --  29  --  26   BUN  --  72* 88* 119* 136*  --  133*  --  116*   CR  --  2.32* 2.95* 4.05* 5.18*  --  5.51*  --  5.72*   ANIONGAP  --  5 5 7 10  --  11  --  14   HARPER  --  9.6 9.2  8.9 9.0  --  9.1  --  8.5   GLC  --  117* 159* 218* 223*  --  213*  --  158*   ALBUMIN  --   --  2.3*  --  2.6*  --   --   --  2.6*   PROTTOTAL  --   --  6.3*  --  6.6*  --   --   --  6.7*   BILITOTAL  --   --  0.7  --  0.7  --   --   --  1.2   ALKPHOS  --   --  210*  --  244*  --   --   --  141   ALT  --   --  527*  --  1,171*  --   --   --  2,750*   AST  --   --  62*  --  163*  --   --   --  581*    < > = values in this interval not displayed.       Recent Results (from the past 24 hour(s))   Echocardiogram Limited    Narrative    [id number]  [id number]  [id number]  [id number]^KOBE^NISREEN^[facility]  Echocardiography Laboratory  [address] [place] [address]        Name: MIKE DUNHAM  MRN: [id number]  : 1945  Study Date: 2020 01:22 PM  Age: 74 yrs  Gender: Male  Patient Location: [facility]  Reason For Study: Cardiomyopathy  Ordering Physician: NISREEN BULLOCK  Referring Physician: [facility]  Performed By: Ramya Mckeon     BSA: 2.0 m2  Height: 68 in  Weight: 183 lb  BP: 105/73 mmHg  _____________________________________________________________________________  __        Procedure  Limited Portable Echo Adult. Optison (NDC #6615-1602) given intravenously.  _____________________________________________________________________________  __        Interpretation Summary     A limited study was performed to reassess LV/RV systolic performance and  compare with last study.  Left ventricular systolic function is severely reduced.  The visual ejection fraction is estimated at 20-25%.  There is severe global hypokinesia of the left ventricle.  The left ventricle is normal in size.  The right ventricle is moderately dilated.  Moderately decreased right ventricular systolic function  There is moderate biatrial enlargement.  There is mild (1+) mitral regurgitation.  The rhythm was atrial fibrillation.     There has been no significant change since  3/28/2020.  _____________________________________________________________________________  __        Left Ventricle  The left ventricle is normal in size. There is normal left ventricular wall  thickness. Left ventricular systolic function is severely reduced. The visual  ejection fraction is estimated at 20-25%. Diastolic function not assessed due  to atrial fibrillation. There is severe global hypokinesia of the left  ventricle. There is no thrombus seen in the left ventricle.     Right Ventricle  The right ventricle is moderately dilated. There is normal right ventricular  wall thickness. Moderately decreased right ventricular systolic function.  There is no mass or thrombus in the right ventricle.     Atria  There is moderate biatrial enlargement. There is no atrial shunt seen. The  left atrial appendage is not well visualized.     Mitral Valve  The mitral valve leaflets appear normal. There is no evidence of stenosis,  fluttering, or prolapse. There is mild (1+) mitral regurgitation. Normal  mitral valve velocity.        Tricuspid Valve  Normal tricuspid valve. The right ventricular systolic pressure is  approximated at 25.7 mmHg plus the right atrial pressure. There is trace to  mild tricuspid regurgitation. There is no tricuspid stenosis.     Aortic Valve  Normal tricuspid aortic valve. No aortic regurgitation is present. No aortic  stenosis is present.     Pulmonic Valve  The pulmonic valve is not well visualized.     Vessels  The aortic root is normal size. Inferior vena cava not well visualized for  estimation of right atrial pressure. Pulmonary arteries not well visualized.     Pericardium  The pericardium appears normal.        Rhythm  The rhythm was atrial fibrillation.  _____________________________________________________________________________  __  MMode/2D Measurements & Calculations     IVSd: 0.90 cm  LVIDd: 5.5 cm  LVIDs: 5.1 cm  LVPWd: 0.74 cm  FS: 6.7 %  LV mass(C)d: 162.4 grams  LV mass(C)dI:  82.5 grams/m2  RWT: 0.27        Doppler Measurements & Calculations  TR max chaz: 253.4 cm/sec  TR max P.7 mmHg           _____________________________________________________________________________  __           Report approved by: Dr. Joseph Castillo 2020 02:27 PM

## 2020-04-06 NOTE — PROGRESS NOTES
[facility]  Cardiology Progress Note    Date of Service: 04/06/2020       Patient being seen by cardiology in follow up of atrial fibrillation with RVR.       Interval history:  Remains in afib, rates 110-130s this AM. Remains on heparin gtt. No hematuria this Am per RN.  No longer on pressors but BP remains marginal.  SCr continues to trend downward.     Discussed with available staff at bedside, chart reviewed, patient seen and examined.      ASSESSMENT/PLAN:    1. Atrial fibrillation with RVR.   --Presented in afib with RVR in setting of respiratory failure and acute on chronic renal failure. Negative COVID/influenza. Mild troponin elevation 0.120-->0.439 without chest pain. EKG without ischemic changes. Echo with low EF 20-25% as below.    --Rate control pursued, initially in IV diltiazem and PO metoprolol. Transitioned to amiodarone due to BP and low EF.  Now loaded amio the last several days, rate control marginal despite continuing to clinically improve. Continues with low dose metoprolol. Plan for NESTOR cardioversion later today. Risks and benefits discussed by Dr. Erazo with patient this AM.    --Continue heparin gtt, continue to monitor for recurrent hematuria. Will eventually need transition to apixaban vs warfarin.     2. Biventricular heart failure.   --2D echo on arrival showed LVEF 20-25%, mod-severe RV systolic function, 2-3+ MR.  Concern for possible combination cardiogenic vs septic shock. Repeat limited echo 4/5 unchanged, EF 20-25%, severe global hypokinesia of the LV. MR1+ reported.    --Diuresed with bumex gtt I/O (-)11L since admission. No longer on diuresis, renal function continues to improve. Volume status appears acceptable currently.   --On low dose metoprolol as above. BP and renal function to not allow for addition of ACE-I for now.    --Will likely still need an ischemic evaluation in the future.       HPI:   Gene Pagan is a 74 year old male with past medical history  "significant for DM2, CKD, BPH who was admitted on 3/27/2020 with progressive shortness of breath and tachycardia. Also had some flu like symptoms over the few weeks prior. COVID/influenza negative. Respiratory failure, required intubation and transient pressor support.       Patient Active Problem List   Diagnosis     Atrial fibrillation with rapid ventricular response (H)       Subjective:  Pt denies chest pain, palpitations, or significant dyspnea currently.       Objective:  Vital signs:  /79   Pulse 116   Temp 98  F (36.7  C)   Resp (!) 31   Ht 1.74 m (5' 8.5\")   Wt 84.2 kg (185 lb 10 oz)   SpO2 95%   BMI 27.81 kg/m      Intake/Output Summary (Last 24 hours) at 4/6/2020 0919  Last data filed at 4/6/2020 0900  Gross per 24 hour   Intake 1041.63 ml   Output 2600 ml   Net -1558.37 ml       Vitals:    04/04/20 0540 04/05/20 0430 04/06/20 0621   Weight: 81.6 kg (179 lb 14.3 oz) 83.3 kg (183 lb 10.3 oz) 84.2 kg (185 lb 10 oz)       PE:  Gen: In general, this is a well nourished  male, resting in bed,  in no acute distress on NC.  Neck: Supple with midline trachea. No significant JVD identified with patient HOB elevated.   CV: Irregularly irregular rhythm. No significant murmur or rub appreciated.   Resp:Respirations are unlabored without use of accessory muscles. Auscultation of the lungs reveals clear lung fields anterolaterally without wheezes or rhonchi.   GI: Soft, nontender, nondistended.   Extrem: Extremities are warm, without cyanosis or clubbing. No pitting lower extremity edema.   Neuro: Patient is alert, oriented, and cooperative but does drift off to sleep at times during exam.     Current Medications:?    amiodarone  400 mg Oral BID     guaiFENesin  10 mL Oral Q4H     insulin aspart  1-6 Units Subcutaneous TID w/meals     insulin aspart  1-5 Units Subcutaneous At Bedtime     insulin aspart   Subcutaneous TID AC     insulin glargine  16 Units Subcutaneous At Bedtime     metoprolol " tartrate  6.25 mg Oral BID     pantoprazole  40 mg Oral QAM AC     sodium chloride (PF)  3 mL Intravenous Q8H     sodium chloride (PF)  3 mL Intracatheter Q8H         Labs/Imaging/Additional testing:  The following labs and imaging were reviewed during today's visit:    Recent Labs   Lab 04/06/20  0542 04/05/20  0544 04/04/20  0544 04/03/20  0604  04/02/20  0610  04/01/20  0545   WBC  --  14.0*  --   --   --  14.2*  --  13.4*   HGB  --  11.1*  --   --   --  12.7*  --  11.9*   MCV  --  84  --   --   --  80  --  79   PLT  --  302  --   --   --  359  --  364   INR  --   --   --  1.23*  --   --   --   --     137 135 138  --  137  --  135   POTASSIUM 3.7 3.6 3.5 3.4   < > 3.3*   < > 2.7*   CHLORIDE 102 102 98 99  --  97  --  95   CO2 29 31 30 29  --  29  --  26   BUN 72* 88* 119* 136*  --  133*  --  116*   CR 2.32* 2.95* 4.05* 5.18*  --  5.51*  --  5.72*   GFRESTIMATED 26* 20* 13* 10*  --  9*  --  9*   GFRESTBLACK 30* 23* 15* 11*  --  11*  --  10*   ANIONGAP 5 5 7 10  --  11  --  14   HARPER 9.6 9.2 8.9 9.0  --  9.1  --  8.5   * 159* 218* 223*  --  213*  --  158*   ALBUMIN  --  2.3*  --  2.6*  --   --   --  2.6*   PROTTOTAL  --  6.3*  --  6.6*  --   --   --  6.7*   BILITOTAL  --  0.7  --  0.7  --   --   --  1.2   ALKPHOS  --  210*  --  244*  --   --   --  141   ALT  --  527*  --  1,171*  --   --   --  2,750*   AST  --  62*  --  163*  --   --   --  581*    < > = values in this interval not displayed.       Echo:  4/5/20     Interpretation Summary     A limited study was performed to reassess LV/RV systolic performance and  compare with last study.  Left ventricular systolic function is severely reduced.  The visual ejection fraction is estimated at 20-25%.  There is severe global hypokinesia of the left ventricle.  The left ventricle is normal in size.  The right ventricle is moderately dilated.  Moderately decreased right ventricular systolic function  There is moderate biatrial enlargement.  There is mild (1+)  mitral regurgitation.  The rhythm was atrial fibrillation.     There has been no significant change since 3/28/2020.    ??  Vicky Jackson PA-C  [facility]  Pager [phone]     Cardiology staff  I have personally taken the patient's interim history, examined him and reviewed laboratory studies. He has recently diagnosed acute systolic heart failure in the setting of atrial fibrillation ( uncertain duration), acute on top of chronic kidney disease and  Diabetes. ECGs show atrial fibrillation with no other acute changes and TTE shows biventricular systolic dysfunction with no regional wall motion abnormalities. Although CAD cannot be entirely excluded at this time, the presentation is more suggestive of nonischemic cardiomyopathy. He has been loaded with amiodarone for a week and has had intermittent hematuria. Urology does not feel urgent cystoscopy will , so he remains on IV heparin. Clinically he shows symptomatic improvement with improved creatinine.   Exam shows lungs are clear. Heart rhythm irregular with no murmur.     Plan  1) NESTOR/cardioversion today.: risks and benefits discussed with patient. I have also discussed management with  who will perform the procedure later today.  2) He will need oral anticoagulant therapy, likely long term. Choices are warfarin or if renal function permits DOAC.  3) We will continue amiodarone, long term target 200 mg daily  4) Eventual reassessment of LV systolic performance once cardioversion performed. Likely upward titration of neurohormonal inhibitors as tolerated. If LVEF fails to return to normal, ischemic evaluation and discussion of ICD MAY be appropriate, however, if he really prefers DNR/DNI status would need to discuss implications of invasive procedures.     Castro

## 2020-04-06 NOTE — PROGRESS NOTES
[facility]    Nephrology Progress Note     Assessment & Plan       74-year-old male admitted through the emergency room dated 03/27/2020 in the setting of increasing shortness of breath and tachycardia.      Evaluated with respect to apparent acute on chronic renal insufficiency in the setting of probable cardiogenic shock.      1. Baseline CKD III/IV              -creatinine 2.0 mg/dl range              -GFR 30 ml/min  2.  Dipstick proteinuria  3.  ARF              -non oliguric              -acting like resolving ATN  4.  Hemodynamic instability              -off NE  5.  Metabolic acidosis              -resolved  6.  Hyponatremia               -hypo osmolar hypervolemic - better  7.  Respiratory failure              -extubated   8.  Shock liver              -transaminases falling    9.  Cardiomyopathy with LVEF 20-25% + Grade III diastolic dysfunction.       Plan:     No IV fluid  No diuretic.        Landry Aguaoy MD  [facility] - Nephrology  [phone]    Interval History     Feeling well.  Not SOB.  Appetite OK.  He was not on any diuretic PTA.      Physical Exam   Temp: 98  F (36.7  C) Temp src: Axillary BP: 106/79 Pulse: 116 Heart Rate: 115 Resp: (!) 31 SpO2: 95 % O2 Device: None (Room air)    Vitals:    04/04/20 0540 04/05/20 0430 04/06/20 0621   Weight: 81.6 kg (179 lb 14.3 oz) 83.3 kg (183 lb 10.3 oz) 84.2 kg (185 lb 10 oz)     Vital Signs with Ranges  Temp:  [97.5  F (36.4  C)-99.3  F (37.4  C)] 98  F (36.7  C)  Pulse:  [101-135] 116  Heart Rate:  [100-133] 115  Resp:  [10-31] 31  BP: ()/(55-92) 106/79  SpO2:  [88 %-100 %] 95 %  I/O last 3 completed shifts:  In: 1161.63 [P.O.:780; I.V.:231.63; NG/GT:60]  Out: 2625 [Urine:2625]    GENERAL APPEARANCE: pleasant, NAD, a & o  HEENT:  Eyes/ears/nose/neck grossly normal  RESP: lungs cta b c good efforts, no crackles, rhonchi or wheezes  CV: irreg irreg, no M  ABDOMEN: o/s/nt/nd, bs present  EXTREMITIES/SKIN: no rashes/lesions;  no edema    Medications     dextrose       - MEDICATION INSTRUCTIONS -       HEParin 1,400 Units/hr (04/06/20 0634)     - MEDICATION INSTRUCTIONS -       - MEDICATION INSTRUCTIONS -       - MEDICATION INSTRUCTIONS -         amiodarone  400 mg Oral BID     guaiFENesin  10 mL Oral Q4H     insulin aspart  1-6 Units Subcutaneous TID w/meals     insulin aspart  1-5 Units Subcutaneous At Bedtime     insulin aspart   Subcutaneous TID AC     insulin glargine  16 Units Subcutaneous At Bedtime     metoprolol tartrate  6.25 mg Oral BID     pantoprazole  40 mg Oral QAM AC     sodium chloride (PF)  3 mL Intravenous Q8H     sodium chloride (PF)  3 mL Intracatheter Q8H       Data   BMP  Recent Labs   Lab 04/06/20  0542 04/05/20  0544 04/04/20  0544 04/03/20  0604    137 135 138   POTASSIUM 3.7 3.6 3.5 3.4   CHLORIDE 102 102 98 99   HARPER 9.6 9.2 8.9 9.0   CO2 29 31 30 29   BUN 72* 88* 119* 136*   CR 2.32* 2.95* 4.05* 5.18*   * 159* 218* 223*     [id number](phos:4)  CBC)  Recent Labs   Lab 04/05/20  0544 04/02/20  0610 04/01/20  0545 03/31/20  0540   WBC 14.0* 14.2* 13.4* 17.0*   HGB 11.1* 12.7* 11.9* 10.8*   HCT 37.7* 40.0 37.3* 33.6*   MCV 84 80 79 79    359 364 331     Recent Labs   Lab 04/05/20  0544   AST 62*   *   ALKPHOS 210*   BILITOTAL 0.7     Recent Labs   Lab 04/03/20  0604   INR 1.23*     No results found for: D2VIT, D3VIT, DTOT  Recent Labs   Lab 04/05/20  0544   HGB 11.1*   HCT 37.7*   MCV 84     No results for input(s): PTHI in the last 168 hours.    Attestation:   I have reviewed today's relevant vital signs, notes, medications, labs and imaging.

## 2020-04-06 NOTE — PLAN OF CARE
Discharge Planner SLP   Patient plan for discharge: Patient did not state  Current status: Patient reported tolerance of current diet over the weekend. Pt consumed 4 oz of thin liquids with no overt s/sx of aspiration, denied coughing/choking. No dentures in initially, but were placed with solid trials to assess mastication. Adequate mastication with cracker with mild oral residue and independent use of strategies to clear. Pt reported he does not eat food he cannot handle, good awareness of swallow function and strategies.     Recommend upgrade to dysphagia diet level 3 with thin liquids. Swallow precautions: no straws, slow rate, upright in chair, small bites/sips, and dentures must be in when eating solid textures. SLP to follow for tolerance.     Barriers to return to prior living situation: Below baseline  Recommendations for discharge: ARU  Rationale for recommendations: Ongoing ST for management of dysphagia and return to baseline diet. Continue to assess for cognitive/linguistic evaluation at next level of care as indicated. Will tolerate 3hr therapy/day.       Entered by: Anat Gerard 04/06/2020 8:52 AM

## 2020-04-06 NOTE — PROGRESS NOTES
Urology    Spoke with nurse  Has 3 way baker in but urine clear and yellow  Plan to leave in 3 way baker and only run irrigation if patient develops gross hematuria  Plan for hematuria workup as an outpatient in the future

## 2020-04-06 NOTE — PLAN OF CARE
ICU End of Shift Summary.  For vital signs and complete assessments, please see documentation flowsheets.     Pertinent assessments: Patient alert and oriented. Up to chair in room.tolerating diet. Remains on heparin drip  Major Shift Events: cardioversion done at bedside.  Plan (Upcoming Events): continue to monitor  Discharge/Transfer Needs: to be determined    Bedside Shift Report Completed : yes  Bedside Safety Check Completed: yes  Marilee Viramontes RN

## 2020-04-06 NOTE — PLAN OF CARE
Discharge Planner OT   Patient plan for discharge: not stated  Current status: Pt completes bed mobility min A for sup>sit and management of lines. Tranfers to chair with CGA and FWW, cues for hand placement. Pt completes UE CVC exercises.    Barriers to return to prior living situation: Current level of assist for ADLs/transfers, decreased functional activity tolerance and strength, impaired balance  Recommendations for discharge: ARU  Rationale for recommendations: Pt is below baseline level of functioning with regards to ADLs, IADLs and mobility tasks. Recommend ongoing skilled OT while IP and in ARU setting to improve strength, functional activity tolerance and balance needed for daily tasks. Pt motivated to return to indep PLOF, has support of significant other in home environment. Anticipate with medical stability, pt will be able to tolerate the 3 hours of therapy/day required in ARU setting.        Entered by: Cristin Knott 04/06/2020 8:03 AM

## 2020-04-06 NOTE — PRE-PROCEDURE
GENERAL PRE-PROCEDURE:   Procedure:  NESTOR and cardioversion  Date/Time:  4/6/2020 3:26 PM    Verbal consent obtained?: Yes    Written consent obtained?: Yes    Risks and benefits: Risks, benefits and alternatives were discussed    Consent given by:  Patient  Patient states understanding of procedure being performed: Yes    Patient's understanding of procedure matches consent: Yes    Procedure consent matches procedure scheduled: Yes    Expected level of sedation:  Moderate  Appropriately NPO:  Yes  ASA Class:  Class 3- Severe systemic disease, definite functional limitations  Mallampati  :  Grade 2- soft palate, base of uvula, tonsillar pillars, and portion of posterior pharyngeal wall visible  Heart:  A-fib  History & Physical reviewed:  History and physical reviewed and no updates needed  Statement of review:  I have reviewed the lab findings, diagnostic data, medications, and the plan for sedation

## 2020-04-06 NOTE — ANESTHESIA CARE TRANSFER NOTE
Patient: Gene Pagan    Procedure(s):  ANESTHESIA, FOR CARDIOVERSION    Diagnosis: Arrhythmia [I49.9]  Diagnosis Additional Information: No value filed.    Anesthesia Type:   MAC     Note:  Airway :Nasal Cannula  Patient transferred to:ICU  Comments: VSS, adequate spontaneous respiration, RN + MD at bedside ICU Handoff: Call for PAUSE to initiate/utilize ICU HANDOFF, Identified Patient, Identified Responsible Provider, Reviewed the Pertinent Medical History, Discussed Surgical Course, Reviewed Intra-OP Anesthesia Management and Issues during Anesthesia, Set Expectations for Post Procedure Period and Allowed Opportunity for Questions and Acknowledgement of Understanding      Vitals: (Last set prior to Anesthesia Care Transfer)    CRNA VITALS  4/6/2020 1545 - 4/6/2020 1615      4/6/2020             NIBP:  92/61    NIBP Mean:  71    Ht Rate:  88                Electronically Signed By: LUIS Kahn CRNA  April 6, 2020  4:15 PM

## 2020-04-06 NOTE — ANESTHESIA PREPROCEDURE EVALUATION
"Anesthesia Pre-Procedure Evaluation    Patient: Gene Pagan   MRN: [id number] : 1945          Preoperative Diagnosis: Arrhythmia [I49.9]    Procedure(s):  ANESTHESIA, FOR CARDIOVERSION    Past Medical History:   Diagnosis Date     BPH (benign prostatic hyperplasia)      Diabetes (H)      History reviewed. No pertinent surgical history.  Anesthesia Evaluation     .             ROS/MED HX    ENT/Pulmonary:       Neurologic:       Cardiovascular:         METS/Exercise Tolerance:     Hematologic:         Musculoskeletal:         GI/Hepatic:         Renal/Genitourinary:         Endo:     (+) type I DM, .      Psychiatric:         Infectious Disease:         Malignancy:         Other:                                 Lab Results   Component Value Date    WBC 14.0 (H) 2020    HGB 11.1 (L) 2020    HCT 37.7 (L) 2020     2020    CRP 23.1 (H) 2020     2020    POTASSIUM 4.2 2020    CHLORIDE 102 2020    CO2 29 2020    BUN 72 (H) 2020    CR 2.32 (H) 2020     (H) 2020    HARPER 9.6 2020    PHOS 4.1 2020    MAG 2.0 2020    ALBUMIN 2.3 (L) 2020    PROTTOTAL 6.3 (L) 2020     (HH) 2020    AST 62 (H) 2020    ALKPHOS 210 (H) 2020    BILITOTAL 0.7 2020    LIPASE 917 (H) 2020    INR 1.17 (H) 2020    FIBR 405 2020    TSH 2.62 2020       Preop Vitals  BP Readings from Last 3 Encounters:   20 92/61    Pulse Readings from Last 3 Encounters:   20 129      Resp Readings from Last 3 Encounters:   20 16    SpO2 Readings from Last 3 Encounters:   20 98%      Temp Readings from Last 1 Encounters:   20 97.5  F (36.4  C) (Axillary)    Ht Readings from Last 1 Encounters:   20 1.74 m (5' 8.5\")      Wt Readings from Last 1 Encounters:   20 84.2 kg (185 lb 10 oz)    Estimated body mass index is 27.81 kg/m  as calculated from the " "following:    Height as of this encounter: 1.74 m (5' 8.5\").    Weight as of this encounter: 84.2 kg (185 lb 10 oz).       Anesthesia Plan      History & Physical Review      ASA Status:  3 .    NPO Status:  > 8 hours    Plan for MAC with Propofol induction. Reason for MAC:  Chronic cardiopulmonary disease (G9)           Postoperative Care      Consents                 LUIS Kahn CRNA                    .  "

## 2020-04-06 NOTE — PROCEDURES
DC Cardioversion Procedure Note:    Informed consent obtained.  Pads placed in AP position.  Anesthesia used, please see their documentation.    Synchronized, biphasic 120J shock x 1 delivered and successful in converting atrial fibrillation to normal sinus rhythm without bradycardia or pauses. Pre-cardioversion heart rate 120 bpm, post cardioversion 75 bpm.    No apparent complications.

## 2020-04-06 NOTE — CONSULTS
Discharge Planner   Discharge Plans in progress: Yes. Therapy recommending discharge to ARU facility. Met with patient to discuss potential discharge plan. Patient informed of ARU services and process. He is agreeable to discharge to an ARU. Patient reports prior to this inpatient stay, he was living independently at home with his s.o., [place]. Patient states his 2 daughters live out of state ([place] & [place]). He requested CC give his s.o. [place] an update on his discharge plan.     CTS called ARU, ph#[phone], spoke with LiaisonSharona. [facility] aware of patient and are following for discharge. Patient meeting criteria for ARU at this time. They will continue to follow and request CC notify ARU liaison 24-48 prior to discharge date, as they will need to request insurance authorization.     CTS called patient s.o.[place]. Family received update on anticipated discharge to ARU. [place] acknowledged understanding of plan. We discussed transportation at time of discharge. Patient will need HE transportation arranged for discharge. Reviewed with s.o, out of pocket cost for [facility] transport, $75 for base rate and $5 per mile to the destination. Family expressed understanding and felt his insurance maybe able to cover the cost.     Barriers to discharge plan: awaiting medical readiness for discharge as determined by MD. Would appreciate ongoing therapy notes and 24-48 hr notification to complete ARU insurance processing.    Follow up plan: CTS will continue to follow and assist with discharge to ARU.        Entered by: Ciara Le 04/06/2020 12:51 PM      Ciara Le RN BSN PHN CCM   Inpatient Care Coordination   [facility]   [phone]

## 2020-04-06 NOTE — PLAN OF CARE
PT: Pt to have NESTOR and cardioversion this PM. Pt with other providers for extended period upon PT check in. Cancel therapy this day

## 2020-04-06 NOTE — PLAN OF CARE
ICU End of Shift Summary.  For vital signs and complete assessments, please see documentation flowsheets.     Pertinent assessments: AO. Denies pain. Afebrile. Afib 100-130. MAPs greater than 65. Lungs diminished. RA. Mckenzie in place with adequate urine output. Tolerating diet.  Major Shift Events: no hematuria  Plan (Upcoming Events): NESTOR with cardioversion?  Discharge/Transfer Needs: IMC status?    Bedside Shift Report Completed : Y  Bedside Safety Check Completed: Y

## 2020-04-07 ENCOUNTER — APPOINTMENT (OUTPATIENT)
Dept: SPEECH THERAPY | Facility: CLINIC | Age: 75
DRG: 308 | End: 2020-04-07
Payer: COMMERCIAL

## 2020-04-07 ENCOUNTER — APPOINTMENT (OUTPATIENT)
Dept: OCCUPATIONAL THERAPY | Facility: CLINIC | Age: 75
DRG: 308 | End: 2020-04-07
Payer: COMMERCIAL

## 2020-04-07 LAB
ALBUMIN SERPL-MCNC: 2.6 G/DL (ref 3.4–5)
ANION GAP SERPL CALCULATED.3IONS-SCNC: 8 MMOL/L (ref 3–14)
BUN SERPL-MCNC: 68 MG/DL (ref 7–30)
CALCIUM SERPL-MCNC: 9.5 MG/DL (ref 8.5–10.1)
CHLORIDE SERPL-SCNC: 101 MMOL/L (ref 94–109)
CO2 SERPL-SCNC: 24 MMOL/L (ref 20–32)
CREAT SERPL-MCNC: 2.25 MG/DL (ref 0.66–1.25)
GFR SERPL CREATININE-BSD FRML MDRD: 27 ML/MIN/{1.73_M2}
GLUCOSE BLDC GLUCOMTR-MCNC: 142 MG/DL (ref 70–99)
GLUCOSE BLDC GLUCOMTR-MCNC: 152 MG/DL (ref 70–99)
GLUCOSE BLDC GLUCOMTR-MCNC: 194 MG/DL (ref 70–99)
GLUCOSE BLDC GLUCOMTR-MCNC: 208 MG/DL (ref 70–99)
GLUCOSE SERPL-MCNC: 163 MG/DL (ref 70–99)
INTERPRETATION ECG - MUSE: NORMAL
INTERPRETATION ECG - MUSE: NORMAL
LMWH PPP CHRO-ACNC: 0.27 IU/ML
PHOSPHATE SERPL-MCNC: 3.5 MG/DL (ref 2.5–4.5)
POTASSIUM SERPL-SCNC: 4.3 MMOL/L (ref 3.4–5.3)
SODIUM SERPL-SCNC: 133 MMOL/L (ref 133–144)

## 2020-04-07 PROCEDURE — 97535 SELF CARE MNGMENT TRAINING: CPT | Mod: GO

## 2020-04-07 PROCEDURE — 25000131 ZZH RX MED GY IP 250 OP 636 PS 637: Performed by: INTERNAL MEDICINE

## 2020-04-07 PROCEDURE — 99232 SBSQ HOSP IP/OBS MODERATE 35: CPT | Mod: 25 | Performed by: INTERNAL MEDICINE

## 2020-04-07 PROCEDURE — 20000003 ZZH R&B ICU

## 2020-04-07 PROCEDURE — 93010 ELECTROCARDIOGRAM REPORT: CPT | Performed by: INTERNAL MEDICINE

## 2020-04-07 PROCEDURE — 25000132 ZZH RX MED GY IP 250 OP 250 PS 637: Performed by: INTERNAL MEDICINE

## 2020-04-07 PROCEDURE — 92526 ORAL FUNCTION THERAPY: CPT | Mod: GN | Performed by: SPEECH-LANGUAGE PATHOLOGIST

## 2020-04-07 PROCEDURE — 80069 RENAL FUNCTION PANEL: CPT | Performed by: INTERNAL MEDICINE

## 2020-04-07 PROCEDURE — 85520 HEPARIN ASSAY: CPT | Performed by: INTERNAL MEDICINE

## 2020-04-07 PROCEDURE — 00000146 ZZHCL STATISTIC GLUCOSE BY METER IP

## 2020-04-07 PROCEDURE — 99233 SBSQ HOSP IP/OBS HIGH 50: CPT | Performed by: INTERNAL MEDICINE

## 2020-04-07 PROCEDURE — 36415 COLL VENOUS BLD VENIPUNCTURE: CPT | Performed by: INTERNAL MEDICINE

## 2020-04-07 RX ORDER — AMIODARONE HYDROCHLORIDE 200 MG/1
200 TABLET ORAL 2 TIMES DAILY
Status: DISCONTINUED | OUTPATIENT
Start: 2020-04-07 | End: 2020-04-10 | Stop reason: HOSPADM

## 2020-04-07 RX ORDER — METOPROLOL SUCCINATE 25 MG/1
25 TABLET, EXTENDED RELEASE ORAL 2 TIMES DAILY
Status: DISCONTINUED | OUTPATIENT
Start: 2020-04-07 | End: 2020-04-08

## 2020-04-07 RX ADMIN — INSULIN GLARGINE 16 UNITS: 100 INJECTION, SOLUTION SUBCUTANEOUS at 21:30

## 2020-04-07 RX ADMIN — ACETAMINOPHEN 650 MG: 325 TABLET, FILM COATED ORAL at 04:32

## 2020-04-07 RX ADMIN — AMIODARONE HYDROCHLORIDE 200 MG: 200 TABLET ORAL at 20:23

## 2020-04-07 RX ADMIN — Medication 6.25 MG: at 07:49

## 2020-04-07 RX ADMIN — Medication 1 MG: at 21:37

## 2020-04-07 RX ADMIN — ACETAMINOPHEN 650 MG: 325 TABLET, FILM COATED ORAL at 07:50

## 2020-04-07 RX ADMIN — AMIODARONE HYDROCHLORIDE 400 MG: 200 TABLET ORAL at 07:49

## 2020-04-07 RX ADMIN — METOPROLOL SUCCINATE 25 MG: 25 TABLET, EXTENDED RELEASE ORAL at 20:23

## 2020-04-07 RX ADMIN — APIXABAN 5 MG: 5 TABLET, FILM COATED ORAL at 20:23

## 2020-04-07 RX ADMIN — ACETAMINOPHEN 650 MG: 325 TABLET, FILM COATED ORAL at 00:35

## 2020-04-07 RX ADMIN — APIXABAN 5 MG: 5 TABLET, FILM COATED ORAL at 12:35

## 2020-04-07 RX ADMIN — PANTOPRAZOLE SODIUM 40 MG: 40 TABLET, DELAYED RELEASE ORAL at 07:49

## 2020-04-07 RX ADMIN — METOPROLOL SUCCINATE 25 MG: 25 TABLET, EXTENDED RELEASE ORAL at 10:51

## 2020-04-07 ASSESSMENT — ACTIVITIES OF DAILY LIVING (ADL)
ADLS_ACUITY_SCORE: 14
ADLS_ACUITY_SCORE: 15

## 2020-04-07 NOTE — PLAN OF CARE
Discharge Planner SLP   Patient plan for discharge: Did not state  Current status: SLP: ?premature spillage with subtle throat clear with thin liquids. Pt refused denture cream and ill fit noted while pt masticating cottage cheese. No additional s/sx of aspiration.  Pt denied Video Swallow Study or further work up at this time. Pt also denied diet upgrade.     Continue dysphagia diet level 3 and thin liquids with pt sitting as upright as possible. Encourage use of denture cream    Barriers to return to prior living situation: ?cognition; deconditioning  Recommendations for discharge: ARU  Rationale for recommendations: Pt agreeable to 1-2 sessions for follow up and further education as needed.       Entered by: Heena Edwards 04/07/2020 3:08 PM

## 2020-04-07 NOTE — PROGRESS NOTES
[facility]    Hospitalist Progress Note      Assessment & Plan   Gene Pagan is a 74 year old male who was admitted on 3/27/2020.    Summary of Stay:   Patient is a 74-year-old male with a past medical history significant for type 2 diabetes, and BPH who was seen initially at an outside urgent care center and transferred to the ED for atrial fibrillation with RVR.  According to history, patient reported 6 to 8 weeks of progressive shortness of breath.  His shortness of breath initially started with exertion but had progressed to dyspnea even at rest.      patient developed progressive shortness of breath overnight on 3/28/2020.  Was also notably hypotensive.  He was placed on BiPAP initially with some favorable results but respiratory status continued to decompensate and was eventually intubated on the night of 3/28/20.     He was also hypotensive and started on levophed. He was also notably in acute renal failure and was initially oliguric.  Nephrology has since been consulted and did raise the discussion of possible transfer to [facility] for CRRT but family had decided to keep the patient here and not transfer understanding that his clinical course can deteriorate at any point in time.  Since then, patient was placed on Bumex drip and started making urine. Bumex was eventually stopped due to excellent output and polyuria.     Patient was successfully extubated on 4/1/2020.  Patient is stable from respiratory point of view.  Appears comfortable.  Not requiring oxygen.     Noted to have a fib with rate greater than 120. Initially treated with IV amio which was switched to oral Amio. Seen by cardiology, recommended adding low oral metoprolol.  Patient is off of Levophed, stopped on 4/1/20, in the evening. Heart rate remains elevated. Amio dose was increased on 4/4, which has helped control the heart rate- although still elevated. Cardiology is hoping to do NESTOR-cardioversion with IV heparin  onboard.   This is a new diagnosis.     On the night of 4/1, patient noted to have gross hematuria.  So the heparin was stopped.  Afterwards, hematuria resolved.  Heparin was resumed on 4/3. But on the morning of 4/4 noted to have hematuria once again.  Initially the urine was pinkish-red but then became significantly worse with small clots. So the IV heparin was stopped.       Seen by urology: Recommended placing a three-way catheter and resuming IV heparin.  If patient develops hematuria then start continuous bladder irrigation.  After restarting the IV heparin hematuria has not been noted.    Patient underwent NESTOR cardioversion on 4/6/2020.  But later on in the evening he went back into A. fib.    Plan:    Cardiogenic shock  - EF of 20 to 25%, moderate to severe MR, moderate to severe RV systolic dysfunction.  - Norepinephrine has been weaned off, stopped on the evening of 4/1/2020.  - associated infection can not be ruled out- significant leikocytosis so also started on iV zosyn and vanco (start date 3/28).  Negative MRSA in nares, vancomycin discontinued on 4/2/20.  Zosyn discontinued on 4/4/2020.  -NEGATIVE COVID19  -Blood pressure is low normal.      Atrial fibrillation with rapid rate.  Uncontrolled.  -On amiodarone and low-dose metoprolol, per cardiology.  Amiodarone was increased to 400 twice daily.  Also on metoprolol.  -Rate is better controlled although still elevated.  -s/p NESTOR plus cardioversion on 4/6/20: But he went back into A. fib later on in the evening.  On IV heparin.  -Possibly ischemia work-up in the outpatient setting.  -Stroke prevention: Currently on IV heparin.  Discussed with cardiology: Transition to oral apixaban.  Per clinical pharmacist recommendations, dose will be 5 mg twice daily.  - Discussed with the patient regarding this plan and he is in agreement.  -After starting the apixaban, keep the three-way Mckenzie in for another day in case patient develops hematuria.     Gross  hematuria  -Happened on 2 different occasions after starting IV heparin.   -This time, gross hematuria did not recurred after heparin was resumed.  Aspirin was stopped.  -Three-way urinary catheter in place.  If hematuria develops then start continuous bladder irrigation and do not stop anticoagulation.   - Further investigations regarding hematuria will likely be in the outpatient setting by urology.     Acute kidney injury in the setting of chronic kidney disease.  - Nephrology following.  Good urine output, and polyuric phase.  Creatinine is 2.25 <- 2.32 <- 2.95 <- 4.0 <- 5.1 <- 5.5).  Baseline creatinine is close to 2     DM  - on lantus and sliding scale insulin     Elevated AST and ALT  -Thought to be ischemic hepatitis/shock liver/liver congestion in the setting of CHF  - LFTs improved.     Acute hypoxic respiratory failure.  Improved.  -In the setting of CHF and cardiogenic shock.  - Extubated on 4/1/2020.  - Patient is DNR but not DNI. Dr Hay discussed with the patient regarding intubation if needed again in the future: Patient expressed that he does want to be reintubated if necessary.  - stable, not requiring O2     1 cm pulmonary nodule on CT.  - Outpatient follow-up.     Malnutrition  Oral intake has significantly improved.     Lines: Mckenzie.  Continue Mckenzie, as the patient may need bladder irrigation.     DVT Prophylaxis:  PCD.   Code Status: DNR  Disposition: continue ICU care      Hemant Gonzalez MD  Text Page (7am - 6pm, M-F)    Interval History   Patient was evaluated with nursing staff. Overnight issues discussed.    Review of systems:  No nausea or vomiting.  No abdominal pain.  No diarrhea.  No chest pain/palpitations.  No new cough/shortness of breath.  No headache/visual disturbance/new weakness.    -Data reviewed today: Labs and medications.    Physical Exam   Temp: 98.6  F (37  C) Temp src: Oral BP: 93/57 Pulse: 106 Heart Rate: 120 Resp: 23 SpO2: 99 % O2 Device: None (Room air) Oxygen  Delivery: 3 LPM  Vitals:    04/04/20 0540 04/05/20 0430 04/06/20 0621   Weight: 81.6 kg (179 lb 14.3 oz) 83.3 kg (183 lb 10.3 oz) 84.2 kg (185 lb 10 oz)     Vital Signs with Ranges  Temp:  [97.3  F (36.3  C)-98.6  F (37  C)] 98.6  F (37  C)  Pulse:  [] 106  Heart Rate:  [] 120  Resp:  [8-34] 23  BP: ()/(50-86) 93/57  SpO2:  [87 %-100 %] 99 %  I/O last 3 completed shifts:  In: 280 [I.V.:280]  Out: 1625 [Urine:1625]    Constitutional: Awake, alert, cooperative, no apparent distress  HEENT: Trachea midline, sclera is clear   Respiratory: Clear to auscultation bilaterally, no crackles or wheezing  Cardiovascular: irregular rate and rhythm, normal S1 and S2, and no murmur noted  GI: Normal bowel sounds, soft, non-distended, non-tender  Skin/Integumen: No rashes, no cyanosis, no edema  Psych: appropriate affect, no agitation   Extremities: No pitting edema     Medications     dextrose       - MEDICATION INSTRUCTIONS -       HEParin 1,400 Units/hr (04/06/20 2142)     - MEDICATION INSTRUCTIONS -       - MEDICATION INSTRUCTIONS -       - MEDICATION INSTRUCTIONS -       sodium chloride Stopped (04/06/20 1600)       amiodarone  400 mg Oral BID     insulin aspart  1-6 Units Subcutaneous TID w/meals     insulin aspart  1-5 Units Subcutaneous At Bedtime     insulin aspart   Subcutaneous TID AC     insulin glargine  16 Units Subcutaneous At Bedtime     lidocaine   Topical Once    Or     lidocaine  1.5 mL Topical Once     metoprolol tartrate  6.25 mg Oral BID     pantoprazole  40 mg Oral QAM AC     sodium chloride (PF)  3 mL Intracatheter Q8H       Data   Recent Labs   Lab 04/07/20  0533 04/06/20  1154 04/06/20  0542 04/05/20  0544  04/03/20  0604  04/02/20  0610  04/01/20  0545   WBC  --   --   --  14.0*  --   --   --  14.2*  --  13.4*   HGB  --   --   --  11.1*  --   --   --  12.7*  --  11.9*   MCV  --   --   --  84  --   --   --  80  --  79   PLT  --   --   --  302  --   --   --  359  --  364   INR  --  1.17*   --   --   --  1.23*  --   --   --   --      --  136 137   < > 138  --  137  --  135   POTASSIUM 4.3 4.2 3.7 3.6   < > 3.4   < > 3.3*   < > 2.7*   CHLORIDE 101  --  102 102   < > 99  --  97  --  95   CO2 24  --  29 31   < > 29  --  29  --  26   BUN 68*  --  72* 88*   < > 136*  --  133*  --  116*   CR 2.25*  --  2.32* 2.95*   < > 5.18*  --  5.51*  --  5.72*   ANIONGAP 8  --  5 5   < > 10  --  11  --  14   HARPER 9.5  --  9.6 9.2   < > 9.0  --  9.1  --  8.5   *  --  117* 159*   < > 223*  --  213*  --  158*   ALBUMIN 2.6*  --   --  2.3*  --  2.6*  --   --   --  2.6*   PROTTOTAL  --   --   --  6.3*  --  6.6*  --   --   --  6.7*   BILITOTAL  --   --   --  0.7  --  0.7  --   --   --  1.2   ALKPHOS  --   --   --  210*  --  244*  --   --   --  141   ALT  --   --   --  527*  --  1,171*  --   --   --  2,750*   AST  --   --   --  62*  --  163*  --   --   --  581*    < > = values in this interval not displayed.       Recent Results (from the past 24 hour(s))   Transesophageal Echocardiogram    Narrative    [id number]  [id number]  [id number]  [id number]^KRISTINA^JORGE ALBERTO           [facility]  Echocardiography Laboratory  [address] [place] [address]        Name: MIKE DUNHAM  MRN: [id number]  : 1945  Study Date: 2020 03:06 PM  Age: 74 yrs  Gender: Male  Patient Location: [facility]  Reason For Study: Afib  Ordering Physician: JORGE ALBERTO ACEVEDO  Performed By: Lyric Crow     BSA: 2.0 m2  Height: 68 in  Weight: 185 lb  _____________________________________________________________________________  __        Procedure  Complete NESTOR Adult.  _____________________________________________________________________________  __        Interpretation Summary     The visual ejection fraction is estimated at 20-25%.  There is severe global hypokinesia of the left ventricle.  There is moderate (2+) mitral regurgitation.  The rhythm was rapid atrial  fibrillation.  _____________________________________________________________________________  __        NESTOR  Prior to the exam, the oral cavity was checked and no overcrowding was noted.  Consent to the procedure was obtained prior to sedation. The transesophageal  probe was passed without difficulty. There were no complications associated  with this procedure. I determined this patient to be an appropriate candidate  for the planned sedation and procedure and have reassessed the patient  immediately prior to sedation and procedure. Total sedation time: 15 minutes  of continuous bedside 1:1 monitoring. Versed (2mg) was given intravenously.  Fentanyl (25mcg) was given intravenously.     Left Ventricle  The visual ejection fraction is estimated at 20-25%. There is severe global  hypokinesia of the left ventricle.     Right Ventricle  The right ventricle is normal in size and function.        Atria  The left atrium is moderately dilated. The right atrium is moderate to  severely dilated. A contrast injection (Bubble Study) was performed that was  negative for flow across the interatrial septum. There is no color Doppler  evidence of an atrial shunt. No thrombus is detected in the left atrial  appendage.     Mitral Valve  The mitral valve leaflets are mildly thickened. There is moderate (2+) mitral  regurgitation.     Tricuspid Valve  There is mild (1+) tricuspid regurgitation.     Aortic Valve  There is trivial trileaflet aortic sclerosis. There is trace aortic  regurgitation.     Pulmonic Valve  There is trace pulmonic valvular regurgitation.        Vessels  Mild aortic root dilatation. The ascending aorta is Borderline dilated.  Moderate atherosclerotic plaque(s) in the descending aorta.     Pericardial/Pleural  There is no pericardial effusion.     Rhythm  The rhythm was rapid atrial fibrillation.  _____________________________________________________________________________  __                                Report  approved by: Garcia Contreras MDon 04/06/2020 04:10 PM                    _____________________________________________________________________________  __

## 2020-04-07 NOTE — PLAN OF CARE
Discharge Planner OT   Patient plan for discharge: not stated  Current status: Pt completed bed mobility supine > sit EOB with min A for advancement of hips to EOB, able to sit EOB with SBA. Pt denies dizziness. Pt completed sit > stand from EOB to FWW with CGA. Pt able to ambulated to bedside chair FWW level with CGA. BP 91/62 in supine; /73 post activity, O2 sats >90% on RA t/o session. Pt completed grooming/hygiene tasks including oral cares with set up.    Barriers to return to prior living situation: Current level of assist for ADLs/transfers, decreased functional activity tolerance and strength, impaired balance  Recommendations for discharge: ARU  Rationale for recommendations: Pt is below baseline level of functioning with regards to ADLs, IADLs and mobility tasks. Recommend ongoing skilled OT while IP and in ARU setting to improve strength, functional activity tolerance and balance needed for daily tasks. Pt motivated to return to indep PLOF, has support of significant other in home environment. Anticipate with medical stability, pt will be able to tolerate the 3 hours of therapy/day required in ARU setting.        Entered by: Kathy Partida 04/07/2020 2:30 PM

## 2020-04-07 NOTE — PROGRESS NOTES
"[facility]  Cardiology Progress Note    Date of Service: 04/07/2020       Patient being seen by cardiology in follow up of atrial fibrillation with RVR.       Interval history:  S/P bedside NESTOR/cardioversion yesterday to sinus rhythm. EF remains significantly impaired at 20-25%. With 2+MR.   This morning, appears to be back in atrial fibrillation with rates low 100s-120.  SCr continues to slowly improve. I/O (-)12.6L since admit, (-)1.3L last 24 hours.      Discussed with available staff at bedside, chart reviewed, patient seen and examined.      ASSESSMENT/PLAN:    1. Atrial fibrillation with RVR.   --Presented in afib with RVR in setting of respiratory failure and acute on chronic renal failure. Negative COVID/influenza. Mild troponin elevation 0.120-->0.439 without chest pain. EKG without ischemic changes. Echo with low EF 20-25% as below.    --Rate control pursued, initially in IV diltiazem and PO metoprolol. Transitioned to amiodarone due to BP and low EF.  Loaded with amiodarone, and now s/p NESTOR/cardioversion yesterday afternoon. Continues with low dose metoprolol. Today  back in afib with variable rates.   --Continue heparin gtt, and monitor for recurrent hematuria. Will eventually need transition to apixaban vs warfarin.     Plan  1) change from metoprolol tartrate to succinate as it is preferred agent in systolic heart failure ( MERIT trial). Metoprolol xl  25 bid and titrate upward  2) Anticoagulation with either apixaban or warfarin.   3) will plan for \"rate control strategy\" at this time, but continue oral amiodarone in hospital while anticoagulating to see if he converts. Another future  try at cardioversion might be considered as he improves clinically.     2. Acute biventricular heart failure.   --2D echo on arrival showed LVEF 20-25%, mod-severe RV systolic dysfunction, 2-3+ MR.  Concern for possible combination cardiogenic vs septic shock. Repeat limited echo 4/5 unchanged, EF 20-25% " "(NESTOR yesterday shows similar), severe global hypokinesis and 1-2+ MR.   --Diuresed with bumex gtt I/O (-)12L since admission. No longer on diuresis, renal function continues to improve. Volume status appears acceptable currently.   --On low dose metoprolol as above. BP and renal function to not allow for addition of ACE-I for now.    -  Plan  1) Depending upon improvement in renal function and blood pressure will consider ACE/  ARB. Alternatives would be Isosorbide/hydralazine  2) Given DNR/DNI status will at some point need to address implications of invasive workup for CAD =/- ICD if LVEF remains below 35%.   3) Will watch weights and symptoms carefully to determine whether he will need chronic diuretic therapy.       HPI:   Gene Pagan is a 74 year old male with past medical history significant for DM2, CKD, BPH who was admitted on 3/27/2020 with progressive shortness of breath and tachycardia. Also had some flu like symptoms over the few weeks prior. COVID/influenza negative. Respiratory failure, required intubation and transient pressor support.       Patient Active Problem List   Diagnosis     Atrial fibrillation with rapid ventricular response (H)       Subjective:  Pt states he feels \"about the same\" as yesterday but currently denies any chest pain or palpitations. Noted some \"pressure\" in his chest earlier but said this went away with Tylenol. Denies shortness of breath.        Objective:  Vital signs:  /78 (BP Location: Left arm)   Pulse 96   Temp 97.5  F (36.4  C) (Oral)   Resp 20   Ht 1.74 m (5' 8.5\")   Wt 84.2 kg (185 lb 10 oz)   SpO2 97%   BMI 27.81 kg/m      Intake/Output Summary (Last 24 hours) at 4/6/2020 0919  Last data filed at 4/6/2020 0900  Gross per 24 hour   Intake 1041.63 ml   Output 2600 ml   Net -1558.37 ml       Vitals:    04/04/20 0540 04/05/20 0430 04/06/20 0621   Weight: 81.6 kg (179 lb 14.3 oz) 83.3 kg (183 lb 10.3 oz) 84.2 kg (185 lb 10 oz)       PE:  Gen: In general, " this is a well nourished  male, resting in bed,  in no acute distress on NC.  Neck: Supple with midline trachea. No significant JVD identified with patient HOB elevated.   CV: Irregular rhythm. No significant murmur or rub appreciated.   Resp:Respirations are unlabored without use of accessory muscles. Auscultation of the lungs reveals clear lung fields anterolaterally without wheezes or rhonchi.   GI: Soft, nontender, nondistended.   Extrem: Extremities are warm, without cyanosis or clubbing. No pitting lower extremity edema.   Neuro: Patient wakes easily to exam but somewhat sleepy. Answers questions appropriately.      Current Medications:?    amiodarone  400 mg Oral BID     insulin aspart  1-6 Units Subcutaneous TID w/meals     insulin aspart  1-5 Units Subcutaneous At Bedtime     insulin aspart   Subcutaneous TID AC     insulin glargine  16 Units Subcutaneous At Bedtime     lidocaine   Topical Once    Or     lidocaine  1.5 mL Topical Once     metoprolol tartrate  6.25 mg Oral BID     pantoprazole  40 mg Oral QAM AC     sodium chloride (PF)  3 mL Intracatheter Q8H         Labs/Imaging/Additional testing:  The following labs and imaging were reviewed during today's visit:    Recent Labs   Lab 04/07/20  0533 04/06/20  1154 04/06/20  0542 04/05/20  0544  04/03/20  0604  04/02/20  0610  04/01/20  0545   WBC  --   --   --  14.0*  --   --   --  14.2*  --  13.4*   HGB  --   --   --  11.1*  --   --   --  12.7*  --  11.9*   MCV  --   --   --  84  --   --   --  80  --  79   PLT  --   --   --  302  --   --   --  359  --  364   INR  --  1.17*  --   --   --  1.23*  --   --   --   --      --  136 137   < > 138  --  137  --  135   POTASSIUM 4.3 4.2 3.7 3.6   < > 3.4   < > 3.3*   < > 2.7*   CHLORIDE 101  --  102 102   < > 99  --  97  --  95   CO2 24  --  29 31   < > 29  --  29  --  26   BUN 68*  --  72* 88*   < > 136*  --  133*  --  116*   CR 2.25*  --  2.32* 2.95*   < > 5.18*  --  5.51*  --  5.72*   GFRESTIMATED  27*  --  26* 20*   < > 10*  --  9*  --  9*   GFRESTBLACK 31*  --  30* 23*   < > 11*  --  11*  --  10*   ANIONGAP 8  --  5 5   < > 10  --  11  --  14   HARPER 9.5  --  9.6 9.2   < > 9.0  --  9.1  --  8.5   *  --  117* 159*   < > 223*  --  213*  --  158*   ALBUMIN 2.6*  --   --  2.3*  --  2.6*  --   --   --  2.6*   PROTTOTAL  --   --   --  6.3*  --  6.6*  --   --   --  6.7*   BILITOTAL  --   --   --  0.7  --  0.7  --   --   --  1.2   ALKPHOS  --   --   --  210*  --  244*  --   --   --  141   ALT  --   --   --  527*  --  1,171*  --   --   --  2,750*   AST  --   --   --  62*  --  163*  --   --   --  581*    < > = values in this interval not displayed.       Echo:  4/7/2020 NESTOR     Interpretation Summary     The visual ejection fraction is estimated at 20-25%.  There is severe global hypokinesia of the left ventricle.  There is moderate (2+) mitral regurgitation.  The rhythm was rapid atrial fibrillation.     Vicky Jackson PA-C  [facility]  Pager [phone]     Cardiology staff  I agree with 's excellent note. Please see plan that we have outlined above  I have personally examined the patient, taken his interim history, reviewed labs and discussed management with him and , his internist. His lungs are clear. Heart tones irregular.

## 2020-04-07 NOTE — PROGRESS NOTES
He is off IV fluid and currently not on diuretic.  PTA he was not on diuretic.    His kidney function is approaching his baseline.    I will let the cards team determine if he needs diuretic going forward to manage his cardiomyopathy with reduced EF of 20-25%.      I will sign off.      Landry Aguayo MD

## 2020-04-07 NOTE — PLAN OF CARE
ICU End of Shift Summary.  For vital signs and complete assessments, please see documentation flowsheets.     Pertinent assessments: patient alert and oriented. Up with walker in room. Vitals stable. Tolerating diet  Major Shift Events: transitioned to oral anticoagulant  Plan (Upcoming Events): continue to monitor  Discharge/Transfer Needs: to be determined    Bedside Shift Report Completed : yes  Bedside Safety Check Completed:yes  Marilee Viramontes RN

## 2020-04-08 ENCOUNTER — APPOINTMENT (OUTPATIENT)
Dept: OCCUPATIONAL THERAPY | Facility: CLINIC | Age: 75
DRG: 308 | End: 2020-04-08
Payer: COMMERCIAL

## 2020-04-08 ENCOUNTER — APPOINTMENT (OUTPATIENT)
Dept: PHYSICAL THERAPY | Facility: CLINIC | Age: 75
DRG: 308 | End: 2020-04-08
Payer: COMMERCIAL

## 2020-04-08 ENCOUNTER — APPOINTMENT (OUTPATIENT)
Dept: SPEECH THERAPY | Facility: CLINIC | Age: 75
DRG: 308 | End: 2020-04-08
Payer: COMMERCIAL

## 2020-04-08 LAB
GLUCOSE BLDC GLUCOMTR-MCNC: 122 MG/DL (ref 70–99)
GLUCOSE BLDC GLUCOMTR-MCNC: 145 MG/DL (ref 70–99)
GLUCOSE BLDC GLUCOMTR-MCNC: 147 MG/DL (ref 70–99)
GLUCOSE BLDC GLUCOMTR-MCNC: 157 MG/DL (ref 70–99)
GLUCOSE BLDC GLUCOMTR-MCNC: 202 MG/DL (ref 70–99)
INTERPRETATION ECG - MUSE: NORMAL
LMWH PPP CHRO-ACNC: >2 IU/ML

## 2020-04-08 PROCEDURE — 20000003 ZZH R&B ICU

## 2020-04-08 PROCEDURE — 85520 HEPARIN ASSAY: CPT | Performed by: INTERNAL MEDICINE

## 2020-04-08 PROCEDURE — 25000132 ZZH RX MED GY IP 250 OP 250 PS 637: Performed by: INTERNAL MEDICINE

## 2020-04-08 PROCEDURE — 25000131 ZZH RX MED GY IP 250 OP 636 PS 637: Performed by: INTERNAL MEDICINE

## 2020-04-08 PROCEDURE — 97530 THERAPEUTIC ACTIVITIES: CPT | Mod: GP

## 2020-04-08 PROCEDURE — 25000132 ZZH RX MED GY IP 250 OP 250 PS 637: Performed by: PHYSICIAN ASSISTANT

## 2020-04-08 PROCEDURE — 97535 SELF CARE MNGMENT TRAINING: CPT | Mod: GO

## 2020-04-08 PROCEDURE — 99232 SBSQ HOSP IP/OBS MODERATE 35: CPT | Performed by: INTERNAL MEDICINE

## 2020-04-08 PROCEDURE — 97530 THERAPEUTIC ACTIVITIES: CPT | Mod: GO

## 2020-04-08 PROCEDURE — 36415 COLL VENOUS BLD VENIPUNCTURE: CPT | Performed by: INTERNAL MEDICINE

## 2020-04-08 PROCEDURE — 00000146 ZZHCL STATISTIC GLUCOSE BY METER IP

## 2020-04-08 PROCEDURE — 99233 SBSQ HOSP IP/OBS HIGH 50: CPT | Performed by: HOSPITALIST

## 2020-04-08 PROCEDURE — 92526 ORAL FUNCTION THERAPY: CPT | Mod: GN

## 2020-04-08 RX ADMIN — INSULIN GLARGINE 16 UNITS: 100 INJECTION, SOLUTION SUBCUTANEOUS at 21:03

## 2020-04-08 RX ADMIN — APIXABAN 5 MG: 5 TABLET, FILM COATED ORAL at 20:36

## 2020-04-08 RX ADMIN — Medication 1 MG: at 21:11

## 2020-04-08 RX ADMIN — PANTOPRAZOLE SODIUM 40 MG: 40 TABLET, DELAYED RELEASE ORAL at 08:30

## 2020-04-08 RX ADMIN — APIXABAN 5 MG: 5 TABLET, FILM COATED ORAL at 08:30

## 2020-04-08 RX ADMIN — ACETAMINOPHEN 650 MG: 325 TABLET, FILM COATED ORAL at 23:46

## 2020-04-08 RX ADMIN — AMIODARONE HYDROCHLORIDE 200 MG: 200 TABLET ORAL at 08:30

## 2020-04-08 RX ADMIN — Medication 12.5 MG: at 20:36

## 2020-04-08 RX ADMIN — AMIODARONE HYDROCHLORIDE 200 MG: 200 TABLET ORAL at 20:36

## 2020-04-08 ASSESSMENT — ACTIVITIES OF DAILY LIVING (ADL)
ADLS_ACUITY_SCORE: 15

## 2020-04-08 ASSESSMENT — MIFFLIN-ST. JEOR: SCORE: 1571.44

## 2020-04-08 NOTE — PLAN OF CARE
Discharge Planner OT   Patient plan for discharge: not stated  Current status: Pt completed sit > stand from chair to FWW with CGA. Pt ambulated to/from BR FWW level with CGA, mild unsteadiness noted. Pt completed toilet transfer on/off with use of grab bar for safe descent, min A provided. Pt required max A for toileting tasks for posterior hygiene post BM. Vitals monitored. BP 76/58, /72 post activity, HR 110s-130s t/o session. Increased time required for toileting tasks.   Barriers to return to prior living situation: Current level of assist for ADLs/transfers, decreased functional activity tolerance and strength, impaired balance  Recommendations for discharge: ARU  Rationale for recommendations: Pt is below baseline level of functioning with regards to ADLs, IADLs and mobility tasks. Recommend ongoing skilled OT while IP and in ARU setting to improve strength, functional activity tolerance and balance needed for daily tasks. Pt motivated to return to indep PLOF, has support of significant other in home environment. Anticipate with medical stability, pt will be able to tolerate the 3 hours of therapy/day required in ARU setting.        Entered by: Kathy Partida 04/08/2020 1:06 PM

## 2020-04-08 NOTE — PLAN OF CARE
ICU End of Shift Summary.  For vital signs and complete assessments, please see documentation flowsheets.     Pertinent assessments: AO. Denies pain. Afebrile. Tele Afib 100-135. MAPs greater than 65. Lungs diminished. RA. Tolerating diet. Up to chair with walker and gait belt. Mckenzie in place with adequate urine output.  Major Shift Events: none  Plan (Upcoming Events): Mckenzie discontinue today?  Discharge/Transfer Needs: TBD    Bedside Shift Report Completed : Y  Bedside Safety Check Completed: Y

## 2020-04-08 NOTE — PLAN OF CARE
Discharge Planner PT   Patient plan for discharge: unknown  Current status: Patient supine upon arrival, BP 91/62, HR between 105-120.  Discussed therapy plan with nursing; nursing agreeable to limited activity within room secondary to low BP and ongoing A-fib.  Patient required min A to transfer to sitting at EOB.  Transferred to standing with CGA and 2WW.  Patient amb 3 feet to bedside chair with 2WW and CGA, assist for line/tube management.  Patient with BP 88/63, HR between 110-135 following limited activity.  Patient asymptomatic.   Barriers to return to prior living situation: Level of assist, decrease activity tolerance  Recommendations for discharge: ARU  Rationale for recommendations: Patient continues to be limited in ability to participate in session secondary to low BP and A-fib.  Patient will benefit from intense therapy at ARU to optimize IND with functional mobility as pt below baseline. Predict pt can/will be able to tolerate 3 hrs of therapy/day

## 2020-04-08 NOTE — PROGRESS NOTES
[facility]  Cardiology Progress Note    Date of Service: 04/08/2020       Patient being seen by cardiology in follow up of atrial fibrillation with RVR.       Interval history:  Patient remains in atrial fibrillation, with rates variable once again in the 100-120s.  SCr continues to slowly improve, near baseline. I/O near even last 24 hrs, still overall (-)14L since admission. No longer on diuretics.     Discussed with available staff at bedside, chart reviewed, patient seen and examined.      ASSESSMENT/PLAN:    1. Atrial fibrillation with RVR.   --Presented in afib with RVR in setting of respiratory failure and acute on chronic renal failure. Mild troponin elevation without chest pain and EKG without ischemic changes. Echo with low EF 20-25% as below.    --Initially in IV diltiazem and PO metoprolol. Transitioned to amiodarone due to BP and low EF.  Loaded with amiodarone, and s/p successful NESTOR cardioversion, though converted back to afib early the next morning.    --Today he remains in afib with variable rate control. We will continue with rate control efforts. Yesterday we converted his metoprolol to succinate formulation but BP is limiting. Will reduce Toprol XL dose to 12.5mg BID and parameters to hold for SBP <90mmHg.    --Long term plan to continue amiodarone for now to see if he spontaneously converts, and potentially pursue a repeat DCCV as an outpatient later if needed.    --Continue apixaban and monitor closely for recurrence of hematuria.    2. Acute biventricular heart failure.   --2D echo on arrival showed LVEF 20-25%, mod-severe RV systolic dysfunction, 2-3+ MR.  Concern for combination cardiogenic vs septic shock. Repeat limited echo 4/5 unchanged, EF 20-25% (NESTOR showed similar), severe global hypokinesis and 1-2+ MR.   --Diuresed with bumex gtt initially and now I/O (-)14L since admission. No longer on diuresis, renal function continues to improve. Volume status appears acceptable  "currently but low threshold to resume oral diuretics with weight gain or swelling. Currently he does not require supplemental oxygen or have subjective dyspnea.    --On Toprol XL as above. BP and renal function to not allow for addition of ACE-I or hydral/isosorbide combination for now.    --Given DNR (note he will still accept intubation) status will at some point need to address implications of invasive workup for CAD =/- ICD if LVEF remains below 35%.     3. Acute on top of chronic kidney injury      Brisk urine output with slowly improving creatinine levels off diuretics.       HPI:   Gene Pagan is a 74 year old male with past medical history significant for DM2, CKD, BPH who was admitted on 3/27/2020 with progressive shortness of breath and tachycardia. Also had some flu like symptoms over the few weeks prior. COVID/influenza negative. Respiratory failure, required intubation and transient pressor support.       Patient Active Problem List   Diagnosis     Atrial fibrillation with rapid ventricular response (H)       Subjective:  Pt states he feels \"pretty good\" this morning.  Denies any current chest pain, palpitations, or shortness of breath. States he was up in chair for breakfast without dizziness.       Objective:  Vital signs:  BP (!) 84/60   Pulse 113   Temp 97.5  F (36.4  C) (Axillary)   Resp 16   Ht 1.74 m (5' 8.5\")   Wt 85.9 kg (189 lb 6 oz)   SpO2 97%   BMI 28.38 kg/m      Intake/Output Summary (Last 24 hours) at 4/6/2020 0919  Last data filed at 4/6/2020 0900  Gross per 24 hour   Intake 1041.63 ml   Output 2600 ml   Net -1558.37 ml       Vitals:    04/05/20 0430 04/06/20 0621 04/08/20 0500   Weight: 83.3 kg (183 lb 10.3 oz) 84.2 kg (185 lb 10 oz) 85.9 kg (189 lb 6 oz)       PE:  Gen: In general, this is a well nourished  male, resting in bed,  in no acute distress on room air.  Neck: Supple with midline trachea. No significant JVD identified with patient HOB elevated.   CV: " Irregular rhythm, intermittently tachycardic. No significant murmur or rub appreciated.   Resp:Respirations are unlabored without use of accessory muscles. Auscultation of the lungs reveals clear lung fields anterolaterally without wheezes or rhonchi. I did not turn him for posterior auscultation.  GI: Soft, nontender, nondistended.   Extrem: Extremities are warm, without cyanosis or clubbing. He has trace pitting bilateral PT edema currently.    Neuro: Patient wakes easily to exam but somewhat sleepy. Answers questions appropriately.      Current Medications:?    amiodarone  200 mg Oral BID     apixaban ANTICOAGULANT  5 mg Oral BID     insulin aspart  1-6 Units Subcutaneous TID w/meals     insulin aspart  1-5 Units Subcutaneous At Bedtime     insulin aspart   Subcutaneous TID AC     insulin glargine  16 Units Subcutaneous At Bedtime     lidocaine   Topical Once    Or     lidocaine  1.5 mL Topical Once     metoprolol succinate ER  25 mg Oral BID     pantoprazole  40 mg Oral QAM AC     sodium chloride (PF)  3 mL Intracatheter Q8H         Labs/Imaging/Additional testing:  The following labs and imaging were reviewed during today's visit:    Recent Labs   Lab 04/07/20  0533 04/06/20  1154 04/06/20  0542 04/05/20  0544  04/03/20  0604  04/02/20  0610   WBC  --   --   --  14.0*  --   --   --  14.2*   HGB  --   --   --  11.1*  --   --   --  12.7*   MCV  --   --   --  84  --   --   --  80   PLT  --   --   --  302  --   --   --  359   INR  --  1.17*  --   --   --  1.23*  --   --      --  136 137   < > 138  --  137   POTASSIUM 4.3 4.2 3.7 3.6   < > 3.4   < > 3.3*   CHLORIDE 101  --  102 102   < > 99  --  97   CO2 24  --  29 31   < > 29  --  29   BUN 68*  --  72* 88*   < > 136*  --  133*   CR 2.25*  --  2.32* 2.95*   < > 5.18*  --  5.51*   GFRESTIMATED 27*  --  26* 20*   < > 10*  --  9*   GFRESTBLACK 31*  --  30* 23*   < > 11*  --  11*   ANIONGAP 8  --  5 5   < > 10  --  11   HARPER 9.5  --  9.6 9.2   < > 9.0  --  9.1    *  --  117* 159*   < > 223*  --  213*   ALBUMIN 2.6*  --   --  2.3*  --  2.6*   < >  --    PROTTOTAL  --   --   --  6.3*  --  6.6*  --   --    BILITOTAL  --   --   --  0.7  --  0.7  --   --    ALKPHOS  --   --   --  210*  --  244*  --   --    ALT  --   --   --  527*  --  1,171*  --   --    AST  --   --   --  62*  --  163*  --   --     < > = values in this interval not displayed.       Echo:  4/7/2020 NESTOR     Interpretation Summary     The visual ejection fraction is estimated at 20-25%.  There is severe global hypokinesia of the left ventricle.  There is moderate (2+) mitral regurgitation.  The rhythm was rapid atrial fibrillation.       Vicky Jackson PA-C  [facility]  Pager [phone]     Cardiology staff  I have personally taken the patient's interim history, examined him and reviewed his laboratory values. Despite hypotension, he is warm, alert, appropriate and free of complaints. Hypotension limits neurohormonal inhibitors. His lungs are clear. His heart tones irregular. Eventually we will address further cardiac testing. In view of his renal dysfunction we would like to hold off on invasive cardiac evaluation until he is at baseline. We will continue to load with amiodarone 200 bid and may consider a second attempt at cardioversion in future while continuing anticoagulants.     [place]

## 2020-04-08 NOTE — PLAN OF CARE
ICU End of Shift Summary.  For vital signs and complete assessments, please see documentation flowsheets.     Pertinent assessments: HR in low 100s to 130s, Afib. Low bp, metoprolol held for low bp this morning. On RA. Up to chair for meals. Up with assist of one with walker and gait belt. Special seat cushion ordered and placed under patient on chair. Ambulated with OT to bathroom. No redness noted on skin. No c/o pain.  Major Shift Events: Spoke with Dr Horne of Urology, he said to pull baker first thing in the morning and order placed.   Plan (Upcoming Events): improve BP and rhythm, increase activity, transfer out of icu.  Discharge/Transfer Needs: TBD    Bedside Shift Report Completed : y  Bedside Safety Check Completed: y

## 2020-04-08 NOTE — PROGRESS NOTES
[facility]    Hospitalist Progress Note      Assessment & Plan   Patient is a 74-year-old male with a past medical history significant for type 2 diabetes, and BPH who was seen initially at an outside urgent care center and transferred to the ED for atrial fibrillation with RVR.  He had 6 to 8 weeks of progressive shortness of breath that progressed to dyspnea at rest.  He was admitted to the ICU with according to history, patient reported 6 to 8 weeks of progressive shortness of breath.  His shortness of breath initially started with exertion but had progressed to dyspnea even at rest.  He was admitted to the ICU in cardiogenic shock and treated for infection (possible pneumonia).  He did initially require pressors and intubation.  He is now off of pressors and extubated.      #Cardiogenic shock.  EF of 20 to 25%, moderate to severe MR, moderate to severe RV systolic dysfunction.  He did initially require norepinephrine which has been weaned off as of 4/1.  There was concern for associated infection with his presentation with significant leukocytosis and shortness of breath.  Chest x-ray did seem to show some infiltrate.  He was initially on vancomycin and Zosyn.  He did complete 7 days of Zosyn therapy.  His COVID19 was negative.    -Blood pressure is low normal.  -I have placed hold parameters on his metoprolol.  -Cardiology is consulted, appreciate recommendations.  -He will require an ischemic work-up in the outpatient setting.     #Atrial fibrillation with rapid rate.  Rates remain elevated in the 110s to 120s.  -On amiodarone and low-dose metoprolol, per cardiology.  Did place hold parameters on his metoprolol given soft blood pressures.  -s/p NESTOR plus cardioversion on 4/6/20: But he went back into A. fib later on in the evening.  On IV heparin.  -Possibly ischemia work-up in the outpatient setting.  -Stroke prevention:  He was on IV heparin.  In consultation with cardiology, he was transitioned  to apixaban therapy.  -Keeping the three-way Mckenzie in place in case he does develop recurrent hematuria.  Would clear with urology before removal.    #Acute hypoxic respiratory failure.  Improved. In the setting of CHF and cardiogenic shock.  He also had a possible pneumonia.  He did complete 7 days of treatment with Zosyn therapy.  Extubated on 4/1/2020.  - Patient is DNR but not DNI. Dr Hay discussed with the patient regarding intubation if needed again in the future: Patient expressed that he does want to be reintubated if necessary.  - stable    #Gross hematuria.  Happened on 2 different occasions on anticoagulation. Three-way urinary catheter in place.  If hematuria develops then start continuous bladder irrigation and do not stop anticoagulation.   - Further investigations regarding hematuria will likely be in the outpatient setting by urology.     #Acute kidney injury in the setting of chronic kidney disease.  Baseline creatinine is around 2.  Creatinine has improved with treatment.  - Nephrology following.       #DM: on lantus and sliding scale insulin     Elevated AST and ALT Thought to be ischemic hepatitis/shock liver/liver congestion in the setting of CHF  - LFTs improved.     #Incidental 1 cm pulmonary nodule on CT.  - Outpatient follow-up.     #Malnutrition  Oral intake has significantly improved.     Lines: Mckenzie.  Continue Mckenzie for now to monitor for hematuria.     DVT Prophylaxis:  PCD.   Code Status: DNR  Disposition: continue ICU care.      Denis Yañez MD  Text Page    Interval History   Patient was softer blood pressures overnight.  Still maintaining maps.  Denies any lightheadedness.  No chest pain.  No significant palpitations.  Breathing feels improved.  Denies any nausea or vomiting.  Discussed with nursing staff.    -Data reviewed today: I reviewed all new labs and imaging results over the last 24 hours.    Physical Exam   Temp: 97.5  F (36.4  C) Temp src: Axillary BP: (!) 84/60 Pulse:  113 Heart Rate: 118 Resp: 16 SpO2: 97 % O2 Device: None (Room air)    Vitals:    04/05/20 0430 04/06/20 0621 04/08/20 0500   Weight: 83.3 kg (183 lb 10.3 oz) 84.2 kg (185 lb 10 oz) 85.9 kg (189 lb 6 oz)     Vital Signs with Ranges  Temp:  [97.1  F (36.2  C)-98.4  F (36.9  C)] 97.5  F (36.4  C)  Pulse:  [106-129] 113  Heart Rate:  [108-131] 118  Resp:  [11-30] 16  BP: ()/(56-82) 84/60  SpO2:  [92 %-99 %] 97 %  I/O last 3 completed shifts:  In: 500.4 [P.O.:240; I.V.:260.4]  Out: 2175 [Urine:2175]    Constitutional: Awake, alert, cooperative, no apparent distress.  Sitting up in bed.  HEENT: Trachea midline, sclera is clear   Respiratory: Clear to auscultation bilaterally, no crackles or wheezing  Cardiovascular: irregular rate and rhythm, normal S1 and S2, and no murmur noted  GI: Normal bowel sounds, soft, non-distended, non-tender  Skin/Integumen: No rashes, no cyanosis, no edema  Psych: appropriate affect, no agitation   Extremities: No pitting edema     Medications     dextrose       - MEDICATION INSTRUCTIONS -       - MEDICATION INSTRUCTIONS -       - MEDICATION INSTRUCTIONS -       - MEDICATION INSTRUCTIONS -         amiodarone  200 mg Oral BID     apixaban ANTICOAGULANT  5 mg Oral BID     insulin aspart  1-6 Units Subcutaneous TID w/meals     insulin aspart  1-5 Units Subcutaneous At Bedtime     insulin aspart   Subcutaneous TID AC     insulin glargine  16 Units Subcutaneous At Bedtime     lidocaine   Topical Once    Or     lidocaine  1.5 mL Topical Once     metoprolol succinate ER  12.5 mg Oral BID     pantoprazole  40 mg Oral QAM AC     sodium chloride (PF)  3 mL Intracatheter Q8H       Data   Recent Labs   Lab 04/07/20  0533 04/06/20  1154 04/06/20  0542 04/05/20  0544  04/03/20  0604  04/02/20  0610   WBC  --   --   --  14.0*  --   --   --  14.2*   HGB  --   --   --  11.1*  --   --   --  12.7*   MCV  --   --   --  84  --   --   --  80   PLT  --   --   --  302  --   --   --  359   INR  --  1.17*  --    --   --  1.23*  --   --      --  136 137   < > 138  --  137   POTASSIUM 4.3 4.2 3.7 3.6   < > 3.4   < > 3.3*   CHLORIDE 101  --  102 102   < > 99  --  97   CO2 24  --  29 31   < > 29  --  29   BUN 68*  --  72* 88*   < > 136*  --  133*   CR 2.25*  --  2.32* 2.95*   < > 5.18*  --  5.51*   ANIONGAP 8  --  5 5   < > 10  --  11   HARPER 9.5  --  9.6 9.2   < > 9.0  --  9.1   *  --  117* 159*   < > 223*  --  213*   ALBUMIN 2.6*  --   --  2.3*  --  2.6*   < >  --    PROTTOTAL  --   --   --  6.3*  --  6.6*  --   --    BILITOTAL  --   --   --  0.7  --  0.7  --   --    ALKPHOS  --   --   --  210*  --  244*  --   --    ALT  --   --   --  527*  --  1,171*  --   --    AST  --   --   --  62*  --  163*  --   --     < > = values in this interval not displayed.       No results found for this or any previous visit (from the past 24 hour(s)).

## 2020-04-08 NOTE — PHARMACY-RX INSURANCE COVERAGE
Anticoagulation coverage check.  Patient has Medicare D through [facility] with $435 (of $435) unmet deductible.    Xarelto/Eliquis  April:  Upon receipt of RX, [facility] can dispense 1 month free.  May: $443  (fulfills $435 deductible)  June-Dec: $115/mo    Pradaxa is non-formulary and more expensive.:     Jantoven (warfarin)  $6/mo      -VIRGINIA Mchugh, Pharmacy Technician/Liaison, [facility], [phone]

## 2020-04-09 LAB
ANION GAP SERPL CALCULATED.3IONS-SCNC: 7 MMOL/L (ref 3–14)
BUN SERPL-MCNC: 68 MG/DL (ref 7–30)
CALCIUM SERPL-MCNC: 9.2 MG/DL (ref 8.5–10.1)
CHLORIDE SERPL-SCNC: 102 MMOL/L (ref 94–109)
CO2 SERPL-SCNC: 24 MMOL/L (ref 20–32)
CREAT SERPL-MCNC: 2.3 MG/DL (ref 0.66–1.25)
ERYTHROCYTE [DISTWIDTH] IN BLOOD BY AUTOMATED COUNT: 15.9 % (ref 10–15)
GFR SERPL CREATININE-BSD FRML MDRD: 26 ML/MIN/{1.73_M2}
GLUCOSE BLDC GLUCOMTR-MCNC: 127 MG/DL (ref 70–99)
GLUCOSE BLDC GLUCOMTR-MCNC: 141 MG/DL (ref 70–99)
GLUCOSE BLDC GLUCOMTR-MCNC: 157 MG/DL (ref 70–99)
GLUCOSE BLDC GLUCOMTR-MCNC: 184 MG/DL (ref 70–99)
GLUCOSE BLDC GLUCOMTR-MCNC: 214 MG/DL (ref 70–99)
GLUCOSE SERPL-MCNC: 117 MG/DL (ref 70–99)
HCT VFR BLD AUTO: 33.8 % (ref 40–53)
HGB BLD-MCNC: 10.1 G/DL (ref 13.3–17.7)
LACTATE BLD-SCNC: 2.1 MMOL/L (ref 0.7–2)
MAGNESIUM SERPL-MCNC: 1.9 MG/DL (ref 1.6–2.3)
MCH RBC QN AUTO: 24.8 PG (ref 26.5–33)
MCHC RBC AUTO-ENTMCNC: 29.9 G/DL (ref 31.5–36.5)
MCV RBC AUTO: 83 FL (ref 78–100)
PLATELET # BLD AUTO: 383 10E9/L (ref 150–450)
POTASSIUM SERPL-SCNC: 3.7 MMOL/L (ref 3.4–5.3)
RBC # BLD AUTO: 4.07 10E12/L (ref 4.4–5.9)
SODIUM SERPL-SCNC: 133 MMOL/L (ref 133–144)
WBC # BLD AUTO: 15.3 10E9/L (ref 4–11)

## 2020-04-09 PROCEDURE — 80048 BASIC METABOLIC PNL TOTAL CA: CPT | Performed by: HOSPITALIST

## 2020-04-09 PROCEDURE — 85027 COMPLETE CBC AUTOMATED: CPT | Performed by: HOSPITALIST

## 2020-04-09 PROCEDURE — 99233 SBSQ HOSP IP/OBS HIGH 50: CPT | Performed by: HOSPITALIST

## 2020-04-09 PROCEDURE — 25000131 ZZH RX MED GY IP 250 OP 636 PS 637: Performed by: INTERNAL MEDICINE

## 2020-04-09 PROCEDURE — 00000146 ZZHCL STATISTIC GLUCOSE BY METER IP

## 2020-04-09 PROCEDURE — 36415 COLL VENOUS BLD VENIPUNCTURE: CPT | Performed by: HOSPITALIST

## 2020-04-09 PROCEDURE — 25000132 ZZH RX MED GY IP 250 OP 250 PS 637: Performed by: INTERNAL MEDICINE

## 2020-04-09 PROCEDURE — 25000132 ZZH RX MED GY IP 250 OP 250 PS 637: Performed by: PHYSICIAN ASSISTANT

## 2020-04-09 PROCEDURE — 12000000 ZZH R&B MED SURG/OB

## 2020-04-09 PROCEDURE — 99232 SBSQ HOSP IP/OBS MODERATE 35: CPT | Performed by: INTERNAL MEDICINE

## 2020-04-09 PROCEDURE — 83735 ASSAY OF MAGNESIUM: CPT | Performed by: HOSPITALIST

## 2020-04-09 PROCEDURE — 83605 ASSAY OF LACTIC ACID: CPT | Performed by: HOSPITALIST

## 2020-04-09 RX ORDER — FUROSEMIDE 40 MG
40 TABLET ORAL DAILY
Status: DISCONTINUED | OUTPATIENT
Start: 2020-04-09 | End: 2020-04-10 | Stop reason: HOSPADM

## 2020-04-09 RX ORDER — METOPROLOL SUCCINATE 25 MG/1
25 TABLET, EXTENDED RELEASE ORAL 2 TIMES DAILY
Status: DISCONTINUED | OUTPATIENT
Start: 2020-04-09 | End: 2020-04-10 | Stop reason: HOSPADM

## 2020-04-09 RX ADMIN — APIXABAN 5 MG: 5 TABLET, FILM COATED ORAL at 21:32

## 2020-04-09 RX ADMIN — AMIODARONE HYDROCHLORIDE 200 MG: 200 TABLET ORAL at 21:32

## 2020-04-09 RX ADMIN — FUROSEMIDE 40 MG: 40 TABLET ORAL at 10:31

## 2020-04-09 RX ADMIN — METOPROLOL SUCCINATE 25 MG: 25 TABLET, EXTENDED RELEASE ORAL at 21:32

## 2020-04-09 RX ADMIN — Medication 12.5 MG: at 08:10

## 2020-04-09 RX ADMIN — POTASSIUM CHLORIDE 20 MEQ: 1.5 POWDER, FOR SOLUTION ORAL at 08:10

## 2020-04-09 RX ADMIN — INSULIN GLARGINE 16 UNITS: 100 INJECTION, SOLUTION SUBCUTANEOUS at 23:06

## 2020-04-09 RX ADMIN — PANTOPRAZOLE SODIUM 40 MG: 40 TABLET, DELAYED RELEASE ORAL at 08:10

## 2020-04-09 RX ADMIN — APIXABAN 5 MG: 5 TABLET, FILM COATED ORAL at 08:11

## 2020-04-09 RX ADMIN — AMIODARONE HYDROCHLORIDE 200 MG: 200 TABLET ORAL at 08:11

## 2020-04-09 ASSESSMENT — MIFFLIN-ST. JEOR: SCORE: 1572.44

## 2020-04-09 ASSESSMENT — ACTIVITIES OF DAILY LIVING (ADL)
ADLS_ACUITY_SCORE: 15

## 2020-04-09 NOTE — PROGRESS NOTES
[facility]  Cardiology Progress Note    Date of Service: 04/09/2020       Patient being seen by cardiology in follow up of atrial fibrillation with RVR.       Interval history:  Patient remains in atrial fibrillation, rates continue to vary 100-120s.  I/O slightly pos last 24 hrs, still overall (-)14L since admission. Not currently on diuretics.   BP has drifted up slightly this morning.     Discussed with available staff at bedside, chart reviewed, patient seen and examined.      ASSESSMENT/PLAN:    1. Atrial fibrillation with RVR.   --Presented in afib with RVR in setting of respiratory failure and acute on chronic renal failure. Mild troponin elevation without chest pain and no ischemic changes on EKG. Echo with low EF 20-25% as below.    --Initially in IV diltiazem and PO metoprolol. Transitioned to amiodarone due to BP and low EF.  Loaded with amiodarone, and s/p successful NESTOR cardioversion, though converted back to afib early the next morning. Has remained in afib with variable rate control since that time.    --Will continue metoprolol 12.5mg BID, but with BP a bit improved this Am, we may be able to try to increase dose further. We have been limited by hypotension over the las several days.    --Long term plan to continue amiodarone 200mg BID for now to see if he spontaneously converts, and potentially pursue a repeat DCCV as an outpatient later if needed. Will also d/w EP to see if an ablation would be considered.    --Continue apixaban. Note that baker removed this AM, now with small amount hematuria again. Continue to monitor.     2. Acute biventricular heart failure.   --2D echo on arrival showed LVEF 20-25%, mod-severe RV systolic dysfunction, 2-3+ MR.  Concern for combination cardiogenic vs septic shock. Repeat limited echo 4/5 unchanged, severe global hypokinesis and 1-2+ MR.   --Diuresed with bumex gtt initially and now I/O (-)14L since admission. No longer on diuresis, and renal  "function felt to be back to baseline. Weight slowly trending back up with some mild lower extremity edema. Will resume diuretics with lasix 40mg PO daily.    --On Toprol XL as above. BP and renal function to not allow for addition of ACE-I or hydral/isosorbide combination for now.    --Given DNR (note he will still accept intubation) status, will at some point need to address implications of invasive workup for CAD +/- ICD if LVEF remains below 35%.     3. Acute on top of chronic kidney disease.      -UO has been acceptable, though recent removal of baker. Monitor UO and continue to trend BMP.       HPI:   Gene Pagan is a 74 year old male with past medical history significant for DM2, CKD, BPH who was admitted on 3/27/2020 with progressive shortness of breath and tachycardia. Also had some flu like symptoms over the few weeks prior. COVID/influenza negative. Respiratory failure, required intubation and transient pressor support.       Patient Active Problem List   Diagnosis     Atrial fibrillation with rapid ventricular response (H)       Subjective:  Pt denies dyspnea,chest pain, or palpitations. States he was up in chair for breakfast without dizziness or significant GORE.       Objective:  Vital signs:  /70   Pulse 111   Temp 98.1  F (36.7  C) (Oral)   Resp 28   Ht 1.74 m (5' 8.5\")   Wt 86 kg (189 lb 9.5 oz)   SpO2 100%   BMI 28.41 kg/m      Intake/Output Summary (Last 24 hours) at 4/6/2020 0919  Last data filed at 4/6/2020 0900  Gross per 24 hour   Intake 1041.63 ml   Output 2600 ml   Net -1558.37 ml       Vitals:    04/06/20 0621 04/08/20 0500 04/09/20 0400   Weight: 84.2 kg (185 lb 10 oz) 85.9 kg (189 lb 6 oz) 86 kg (189 lb 9.5 oz)       PE:  Gen: In general, this is a well nourished  male, resting in bed,  in no acute distress on NC.  Neck: Supple with midline trachea. Mild JVD with HOB relatively flat.   CV: Irregular rhythm, intermittently tachycardic. No significant murmur or rub " appreciated.   Resp:Respirations are unlabored without use of accessory muscles. Auscultation of the lungs reveals clear lung fields anterolaterally without wheezes or rhonchi. I did not turn him for posterior auscultation.  GI: Soft, nontender, nondistended.   Extrem: Extremities are warm, without cyanosis or clubbing. He has trace pitting bilateral PT edema.    Neuro: Patient wakes easily to exam but somewhat sleepy. Answers questions appropriately.      Current Medications:?    amiodarone  200 mg Oral BID     apixaban ANTICOAGULANT  5 mg Oral BID     insulin aspart  1-6 Units Subcutaneous TID w/meals     insulin aspart  1-5 Units Subcutaneous At Bedtime     insulin aspart   Subcutaneous TID AC     insulin glargine  16 Units Subcutaneous At Bedtime     lidocaine   Topical Once    Or     lidocaine  1.5 mL Topical Once     metoprolol succinate ER  12.5 mg Oral BID     pantoprazole  40 mg Oral QAM AC     sodium chloride (PF)  3 mL Intracatheter Q8H         Labs/Imaging/Additional testing:  The following labs and imaging were reviewed during today's visit:    Recent Labs   Lab 04/09/20  0534 04/07/20  0533 04/06/20  1154 04/06/20  0542 04/05/20  0544  04/03/20  0604   WBC 15.3*  --   --   --  14.0*  --   --    HGB 10.1*  --   --   --  11.1*  --   --    MCV 83  --   --   --  84  --   --      --   --   --  302  --   --    INR  --   --  1.17*  --   --   --  1.23*    133  --  136 137   < > 138   POTASSIUM 3.7 4.3 4.2 3.7 3.6   < > 3.4   CHLORIDE 102 101  --  102 102   < > 99   CO2 24 24  --  29 31   < > 29   BUN 68* 68*  --  72* 88*   < > 136*   CR 2.30* 2.25*  --  2.32* 2.95*   < > 5.18*   GFRESTIMATED 26* 27*  --  26* 20*   < > 10*   GFRESTBLACK 31* 31*  --  30* 23*   < > 11*   ANIONGAP 7 8  --  5 5   < > 10   HARPER 9.2 9.5  --  9.6 9.2   < > 9.0   * 163*  --  117* 159*   < > 223*   ALBUMIN  --  2.6*  --   --  2.3*  --  2.6*   PROTTOTAL  --   --   --   --  6.3*  --  6.6*   BILITOTAL  --   --   --   --   0.7  --  0.7   ALKPHOS  --   --   --   --  210*  --  244*   ALT  --   --   --   --  527*  --  1,171*   AST  --   --   --   --  62*  --  163*    < > = values in this interval not displayed.       Echo:  4/7/2020 NESTOR     Interpretation Summary     The visual ejection fraction is estimated at 20-25%.  There is severe global hypokinesia of the left ventricle.  There is moderate (2+) mitral regurgitation.  The rhythm was rapid atrial fibrillation.       Vicky Jackson PA-C  [facility]  Pager [phone]     Cardiology staff  I have personally taken his interim history, examined him and reviewed his medications and labs. His lungs are clear to exam, but he has gained weight and has some pedal edema, which I expect is a consequence of acute systolic heart failure. His rate remains rapid and irregular.  Unfortunately, soft blood pressures have limited upward titration of beta blocker, though he may tolerate an increase in metoprolol xl. I am uncertain at this point whether a repeat attempt at cardioversion would be helpful or if we need to consider AVN/PPM. We would eventually plan for coronary angiography to evaluate his LV dysfunction.   1) increase metoprolol xl to 25 bid, hold systolic pressure less than 85  2) continue amiodarone and apixaban for now  3) will discuss management of his rapid ventricular response rate with EP service in view of failed attempt to maintain NSR after cardioversion on amiodarone earlier this admission.     [place]

## 2020-04-09 NOTE — PROGRESS NOTES
[facility]    Hospitalist Progress Note      Assessment & Plan   Patient is a 74-year-old male with a past medical history significant for type 2 diabetes, and BPH who was seen initially at an outside urgent care center and transferred to the ED for atrial fibrillation with RVR.  He had 6 to 8 weeks of progressive shortness of breath that progressed to dyspnea at rest.  He was admitted to the ICU with according to history, patient reported 6 to 8 weeks of progressive shortness of breath.  His shortness of breath initially started with exertion but had progressed to dyspnea even at rest.  He was admitted to the ICU in cardiogenic shock and treated for infection (possible pneumonia).  He did initially require pressors and intubation.  He is now off of pressors and extubated.      #Cardiogenic shock.  EF of 20 to 25%, moderate to severe MR, moderate to severe RV systolic dysfunction.  He did initially require norepinephrine which has been weaned off as of 4/1.  There was concern for associated infection with his presentation with significant leukocytosis and shortness of breath.  Chest x-ray did seem to show some infiltrate.  He was initially on vancomycin and Zosyn.  He did complete 7 days of Zosyn therapy.  His COVID19 was negative.    He is mildly volume overloaded at this point.  -Blood pressure is low normal but improved to this a.m.  - Giving 40 mg p.o. Lasix daily starting on 4/9.  -We will uptitrate metoprolol as tolerated per cardiology.  Hold parameters are in place.  -Cardiology is consulted, appreciate recommendations.  -He will require an ischemic work-up in the outpatient setting.     #Atrial fibrillation with rapid rate.  Rates remain elevated in the 110s to 120s.  -On amiodarone and low-dose metoprolol, per cardiology.  Did place hold parameters on his metoprolol given soft blood pressures.  -s/p NESTOR plus cardioversion on 4/6/20: But he went back into A. fib later on in the evening.    -Cardiology deciding whether they want to repeat cardioversion versus transfer to [facility] for ablation versus pursue medical therapy.  -Possibly ischemia work-up in the outpatient setting.  -Stroke prevention:  He was on IV heparin.  In consultation with cardiology, he was transitioned to apixaban therapy on 4/8     #Acute hypoxic respiratory failure.  Improved. In the setting of CHF and cardiogenic shock.  He also had a possible pneumonia.  He did complete 7 days of treatment with Zosyn therapy.  Extubated on 4/1/2020.  - Patient is DNR but not DNI. Dr Hay discussed with the patient regarding intubation if needed again in the future: Patient expressed that he does want to be reintubated if necessary.  - stable     #Gross hematuria.  Happened on 2 different occasions on anticoagulation. Three-way urinary catheter in place.  If hematuria develops then start continuous bladder irrigation and do not stop anticoagulation.   - Further investigations regarding hematuria will likely be in the outpatient setting by urology.  --Mckenzie catheter was removed on the a.m. of 4/9 at 0700 per urology.  If patient has persistent hematuria or low urine output then will need a bladder scan and potentially replace Mckenzie and reconsult urology    #Acute kidney injury in the setting of chronic kidney disease.  Baseline creatinine is around 2.  Creatinine has improved with treatment.  - Nephrology following.       #DM: on lantus and sliding scale insulin     Elevated AST and ALT Thought to be ischemic hepatitis/shock liver/liver congestion in the setting of CHF  - LFTs improved.     #Incidental 1 cm pulmonary nodule on CT.  - Outpatient follow-up.     #Malnutrition  Oral intake has significantly improved.     DVT Prophylaxis:  DOAC therapy  Code Status: DNR  Disposition: Transfer out of the ICU to cardiac telemetry given clinical improvement    Denis Yañez MD  Text Page    Interval History   No acute events.  Patient denies any  chest pain or palpitations.  He denies any shortness of breath.  He has been ambulating around the room.  He did have his Mckenzie removed and did have some hematuria immediately after.  He denies any pain or headache.  Fevers or chills    -Data reviewed today: I reviewed all new labs and imaging results over the last 24 hours.    Physical Exam   Temp: 98.1  F (36.7  C) Temp src: Oral BP: 113/70 Pulse: 111 Heart Rate: 122 Resp: 28 SpO2: 100 % O2 Device: Nasal cannula Oxygen Delivery: 1 LPM  Vitals:    04/06/20 0621 04/08/20 0500 04/09/20 0400   Weight: 84.2 kg (185 lb 10 oz) 85.9 kg (189 lb 6 oz) 86 kg (189 lb 9.5 oz)     Vital Signs with Ranges  Temp:  [97.6  F (36.4  C)-98.2  F (36.8  C)] 98.1  F (36.7  C)  Pulse:  [100-129] 111  Heart Rate:  [] 122  Resp:  [13-38] 28  BP: ()/(55-85) 113/70  SpO2:  [88 %-100 %] 100 %  I/O last 3 completed shifts:  In: 1040 [P.O.:1040]  Out: 1990 [Urine:1990]    Constitutional: Awake, alert, cooperative, no apparent distress.    HEENT: Trachea midline, sclera is clear.  There is mild to moderate elevation of JVD at 45 degrees.  Respiratory: Clear to auscultation bilaterally, no crackles or wheezing  Cardiovascular: irregular rate and rhythm, normal S1 and S2, and no murmur noted  GI: Normal bowel sounds, soft, non-distended, non-tender  Skin/Integumen: No rashes, no cyanosis, some mild bilateral edema  Psych: appropriate affect, no agitation   Extremities: Mild bilateral edema.    Medications     dextrose       - MEDICATION INSTRUCTIONS -       - MEDICATION INSTRUCTIONS -       - MEDICATION INSTRUCTIONS -       - MEDICATION INSTRUCTIONS -         amiodarone  200 mg Oral BID     apixaban ANTICOAGULANT  5 mg Oral BID     furosemide  40 mg Oral Daily     insulin aspart  1-6 Units Subcutaneous TID w/meals     insulin aspart  1-5 Units Subcutaneous At Bedtime     insulin aspart   Subcutaneous TID AC     insulin glargine  16 Units Subcutaneous At Bedtime     lidocaine   Topical  Once    Or     lidocaine  1.5 mL Topical Once     metoprolol succinate ER  12.5 mg Oral BID     pantoprazole  40 mg Oral QAM AC     sodium chloride (PF)  3 mL Intracatheter Q8H       Data   Recent Labs   Lab 04/09/20  0534 04/07/20  0533 04/06/20  1154 04/06/20  0542 04/05/20  0544  04/03/20  0604   WBC 15.3*  --   --   --  14.0*  --   --    HGB 10.1*  --   --   --  11.1*  --   --    MCV 83  --   --   --  84  --   --      --   --   --  302  --   --    INR  --   --  1.17*  --   --   --  1.23*    133  --  136 137   < > 138   POTASSIUM 3.7 4.3 4.2 3.7 3.6   < > 3.4   CHLORIDE 102 101  --  102 102   < > 99   CO2 24 24  --  29 31   < > 29   BUN 68* 68*  --  72* 88*   < > 136*   CR 2.30* 2.25*  --  2.32* 2.95*   < > 5.18*   ANIONGAP 7 8  --  5 5   < > 10   HARPER 9.2 9.5  --  9.6 9.2   < > 9.0   * 163*  --  117* 159*   < > 223*   ALBUMIN  --  2.6*  --   --  2.3*  --  2.6*   PROTTOTAL  --   --   --   --  6.3*  --  6.6*   BILITOTAL  --   --   --   --  0.7  --  0.7   ALKPHOS  --   --   --   --  210*  --  244*   ALT  --   --   --   --  527*  --  1,171*   AST  --   --   --   --  62*  --  163*    < > = values in this interval not displayed.       No results found for this or any previous visit (from the past 24 hour(s)).

## 2020-04-09 NOTE — PROGRESS NOTES
[facility] Progress Note  [facility]  ICU    Attempted to see pt Gene twice today for an emotional support check-in.  Pt was sleeping upon first attempt and was receiving cares upon the second.  [facility] will see pt when he is available.    Scott Delarosa M.Div., [facility]  Staff   Pager [phone]

## 2020-04-09 NOTE — PLAN OF CARE
Patient transferred from ICU to room 304, patient stated he will call his SO LA 2.1, MD notified, per MD patient not septic at this time. VS stable, denied pain. BS checked, see MAR for intervention. Tele- A Fib, RVR.Continue to monitor.

## 2020-04-09 NOTE — PLAN OF CARE
OT session attempted - pt currently requiring nursing cares (straight cath), noted HR to be elevated into 130s at rest in supine. Will re-schedule.

## 2020-04-09 NOTE — PLAN OF CARE
ICU End of Shift Summary.  For vital signs and complete assessments, please see documentation flowsheets.     Pertinent assessments: Pt unable to sleep much tonight. States he normally only sleeps from midnight to 0500 at home on a good night. Alert and oriented. Up with assist of 1, walker, and gait belt to chair. C/o pain of 9/10 in L shoulder blade that he has had before this hospitalization. States it feels like muscle pain. Tylenol give and pt moved to chair from bed with pain relieved after 1 hour. LS remain diminished in the bases. Denies SOB. Apneic at times with sats dipping into the 80s on RA. Placed on 2L NC for sleep.   Major Shift Events: Pulled Mckenzie catheter at 0655 per MD order. Resistance met when balloon at urethra site despite being deflated all the way. Rechecked to be sure no fluid left in balloon. A small amount of force used to get catheter out. Balloon confirmed to be completely deflated. Scant amount of blood noted at urethral opening. Will inform next to monitor closely. No hematuria noted throughout shift in catheter bag. Good urine output.                     - Tele remain Afib with rates mainly in the 100s-120s. Into the 90s for short bouts of time. Denies any chest pain.  Plan (Upcoming Events): Monitor heart rate closely on medication per Cardiology. Plan for possibly outpatient cardioversion in the future if needed. Monitor for acute hematuria on blood thinner and start CBI again if it occurs with plan not to stop blood thinner per Urology.   Discharge/Transfer Needs: PT\OT following with recommendation for discharge to ARU.    Bedside Shift Report Completed   Bedside Safety Check Completed

## 2020-04-09 NOTE — PLAN OF CARE
PT: Pt just returned from walking to BR with RN, HR up to 137 currently, will allow for pt to rest before attempting more mobility.     PT check back: resting HR remains >120bpm at rest.

## 2020-04-10 ENCOUNTER — SURGERY (OUTPATIENT)
Age: 75
End: 2020-04-10
Payer: COMMERCIAL

## 2020-04-10 ENCOUNTER — HOSPITAL ENCOUNTER (INPATIENT)
Facility: CLINIC | Age: 75
LOS: 7 days | Discharge: HOME HEALTH | DRG: 242 | End: 2020-04-17
Attending: INTERNAL MEDICINE | Admitting: INTERNAL MEDICINE
Payer: COMMERCIAL

## 2020-04-10 ENCOUNTER — APPOINTMENT (OUTPATIENT)
Dept: SPEECH THERAPY | Facility: CLINIC | Age: 75
DRG: 308 | End: 2020-04-10
Payer: COMMERCIAL

## 2020-04-10 VITALS
TEMPERATURE: 97.6 F | SYSTOLIC BLOOD PRESSURE: 103 MMHG | OXYGEN SATURATION: 97 % | HEIGHT: 69 IN | RESPIRATION RATE: 20 BRPM | HEART RATE: 121 BPM | WEIGHT: 186.4 LBS | BODY MASS INDEX: 27.61 KG/M2 | DIASTOLIC BLOOD PRESSURE: 61 MMHG

## 2020-04-10 DIAGNOSIS — N12 PYELONEPHRITIS: ICD-10-CM

## 2020-04-10 DIAGNOSIS — I48.91 ATRIAL FIBRILLATION WITH RAPID VENTRICULAR RESPONSE (H): Primary | ICD-10-CM

## 2020-04-10 DIAGNOSIS — Z76.89 HEALTH CARE HOME: ICD-10-CM

## 2020-04-10 DIAGNOSIS — I50.40 COMBINED SYSTOLIC AND DIASTOLIC CONGESTIVE HEART FAILURE, UNSPECIFIED HF CHRONICITY (H): ICD-10-CM

## 2020-04-10 DIAGNOSIS — E11.69 TYPE 2 DIABETES MELLITUS WITH OTHER SPECIFIED COMPLICATION, UNSPECIFIED WHETHER LONG TERM INSULIN USE (H): ICD-10-CM

## 2020-04-10 PROBLEM — I50.9 CHF (CONGESTIVE HEART FAILURE) (H): Status: ACTIVE | Noted: 2020-04-10

## 2020-04-10 LAB
ANION GAP SERPL CALCULATED.3IONS-SCNC: 8 MMOL/L (ref 3–14)
BASOPHILS # BLD AUTO: 0.2 10E9/L (ref 0–0.2)
BASOPHILS NFR BLD AUTO: 1.4 %
BUN SERPL-MCNC: 64 MG/DL (ref 7–30)
CALCIUM SERPL-MCNC: 9.4 MG/DL (ref 8.5–10.1)
CHLORIDE SERPL-SCNC: 101 MMOL/L (ref 94–109)
CO2 SERPL-SCNC: 23 MMOL/L (ref 20–32)
CREAT SERPL-MCNC: 2.48 MG/DL (ref 0.66–1.25)
DIFFERENTIAL METHOD BLD: ABNORMAL
EOSINOPHIL # BLD AUTO: 0.5 10E9/L (ref 0–0.7)
EOSINOPHIL NFR BLD AUTO: 3.1 %
ERYTHROCYTE [DISTWIDTH] IN BLOOD BY AUTOMATED COUNT: 16.4 % (ref 10–15)
GFR SERPL CREATININE-BSD FRML MDRD: 24 ML/MIN/{1.73_M2}
GLUCOSE BLDC GLUCOMTR-MCNC: 144 MG/DL (ref 70–99)
GLUCOSE BLDC GLUCOMTR-MCNC: 157 MG/DL (ref 70–99)
GLUCOSE BLDC GLUCOMTR-MCNC: 169 MG/DL (ref 70–99)
GLUCOSE BLDC GLUCOMTR-MCNC: 211 MG/DL (ref 70–99)
GLUCOSE SERPL-MCNC: 136 MG/DL (ref 70–99)
HCT VFR BLD AUTO: 33.4 % (ref 40–53)
HGB BLD-MCNC: 10 G/DL (ref 13.3–17.7)
IMM GRANULOCYTES # BLD: 0.2 10E9/L (ref 0–0.4)
IMM GRANULOCYTES NFR BLD: 1.5 %
LACTATE BLD-SCNC: 2.6 MMOL/L (ref 0.7–2)
LYMPHOCYTES # BLD AUTO: 0.8 10E9/L (ref 0.8–5.3)
LYMPHOCYTES NFR BLD AUTO: 5.2 %
MAGNESIUM SERPL-MCNC: 1.9 MG/DL (ref 1.6–2.3)
MCH RBC QN AUTO: 25.3 PG (ref 26.5–33)
MCHC RBC AUTO-ENTMCNC: 29.9 G/DL (ref 31.5–36.5)
MCV RBC AUTO: 84 FL (ref 78–100)
MONOCYTES # BLD AUTO: 1.5 10E9/L (ref 0–1.3)
MONOCYTES NFR BLD AUTO: 9.9 %
NEUTROPHILS # BLD AUTO: 11.6 10E9/L (ref 1.6–8.3)
NEUTROPHILS NFR BLD AUTO: 78.9 %
NRBC # BLD AUTO: 0 10*3/UL
NRBC BLD AUTO-RTO: 0 /100
PLATELET # BLD AUTO: 454 10E9/L (ref 150–450)
POTASSIUM SERPL-SCNC: 3.9 MMOL/L (ref 3.4–5.3)
PROCALCITONIN SERPL-MCNC: 0.47 NG/ML
RBC # BLD AUTO: 3.96 10E12/L (ref 4.4–5.9)
SODIUM SERPL-SCNC: 132 MMOL/L (ref 133–144)
WBC # BLD AUTO: 14.7 10E9/L (ref 4–11)

## 2020-04-10 PROCEDURE — 80048 BASIC METABOLIC PNL TOTAL CA: CPT | Performed by: HOSPITALIST

## 2020-04-10 PROCEDURE — C1894 INTRO/SHEATH, NON-LASER: HCPCS | Performed by: INTERNAL MEDICINE

## 2020-04-10 PROCEDURE — 99232 SBSQ HOSP IP/OBS MODERATE 35: CPT | Performed by: INTERNAL MEDICINE

## 2020-04-10 PROCEDURE — 92526 ORAL FUNCTION THERAPY: CPT | Mod: GN

## 2020-04-10 PROCEDURE — 25000132 ZZH RX MED GY IP 250 OP 250 PS 637: Performed by: INTERNAL MEDICINE

## 2020-04-10 PROCEDURE — 84145 PROCALCITONIN (PCT): CPT | Performed by: INTERNAL MEDICINE

## 2020-04-10 PROCEDURE — 99222 1ST HOSP IP/OBS MODERATE 55: CPT | Mod: 25 | Performed by: INTERNAL MEDICINE

## 2020-04-10 PROCEDURE — C1900 LEAD, CORONARY VENOUS: HCPCS | Performed by: INTERNAL MEDICINE

## 2020-04-10 PROCEDURE — 36415 COLL VENOUS BLD VENIPUNCTURE: CPT | Performed by: INTERNAL MEDICINE

## 2020-04-10 PROCEDURE — 33225 L VENTRIC PACING LEAD ADD-ON: CPT | Performed by: INTERNAL MEDICINE

## 2020-04-10 PROCEDURE — 99233 SBSQ HOSP IP/OBS HIGH 50: CPT | Performed by: INTERNAL MEDICINE

## 2020-04-10 PROCEDURE — 33225 L VENTRIC PACING LEAD ADD-ON: CPT | Mod: 59 | Performed by: INTERNAL MEDICINE

## 2020-04-10 PROCEDURE — 02HK3JZ INSERTION OF PACEMAKER LEAD INTO RIGHT VENTRICLE, PERCUTANEOUS APPROACH: ICD-10-PCS | Performed by: INTERNAL MEDICINE

## 2020-04-10 PROCEDURE — C1887 CATHETER, GUIDING: HCPCS | Performed by: INTERNAL MEDICINE

## 2020-04-10 PROCEDURE — 02H63JZ INSERTION OF PACEMAKER LEAD INTO RIGHT ATRIUM, PERCUTANEOUS APPROACH: ICD-10-PCS | Performed by: INTERNAL MEDICINE

## 2020-04-10 PROCEDURE — B2111ZZ FLUOROSCOPY OF MULTIPLE CORONARY ARTERIES USING LOW OSMOLAR CONTRAST: ICD-10-PCS | Performed by: INTERNAL MEDICINE

## 2020-04-10 PROCEDURE — 99152 MOD SED SAME PHYS/QHP 5/>YRS: CPT | Performed by: INTERNAL MEDICINE

## 2020-04-10 PROCEDURE — 83735 ASSAY OF MAGNESIUM: CPT | Performed by: HOSPITALIST

## 2020-04-10 PROCEDURE — 25000128 H RX IP 250 OP 636: Performed by: INTERNAL MEDICINE

## 2020-04-10 PROCEDURE — C1769 GUIDE WIRE: HCPCS | Performed by: INTERNAL MEDICINE

## 2020-04-10 PROCEDURE — 99207 ZZC CDG-HISTORY COMP: MEETS EXP. PROBLEM FOCUSED-DOWN CODED LACK OF ROS: CPT | Performed by: INTERNAL MEDICINE

## 2020-04-10 PROCEDURE — 25000131 ZZH RX MED GY IP 250 OP 636 PS 637: Performed by: INTERNAL MEDICINE

## 2020-04-10 PROCEDURE — 33208 INSRT HEART PM ATRIAL & VENT: CPT | Mod: KX | Performed by: INTERNAL MEDICINE

## 2020-04-10 PROCEDURE — 25000125 ZZHC RX 250: Performed by: INTERNAL MEDICINE

## 2020-04-10 PROCEDURE — 27210794 ZZH OR GENERAL SUPPLY STERILE: Performed by: INTERNAL MEDICINE

## 2020-04-10 PROCEDURE — 00000146 ZZHCL STATISTIC GLUCOSE BY METER IP

## 2020-04-10 PROCEDURE — 99207 ZZC CDG-HISTORY COMP: MEETS EXP. PROBLEM FOCUSED - DOWN CODED LACK OF HPI: CPT | Performed by: INTERNAL MEDICINE

## 2020-04-10 PROCEDURE — 0JH637Z INSERTION OF CARDIAC RESYNCHRONIZATION PACEMAKER PULSE GENERATOR INTO CHEST SUBCUTANEOUS TISSUE AND FASCIA, PERCUTANEOUS APPROACH: ICD-10-PCS | Performed by: INTERNAL MEDICINE

## 2020-04-10 PROCEDURE — C1898 LEAD, PMKR, OTHER THAN TRANS: HCPCS | Performed by: INTERNAL MEDICINE

## 2020-04-10 PROCEDURE — 99152 MOD SED SAME PHYS/QHP 5/>YRS: CPT | Mod: 59 | Performed by: INTERNAL MEDICINE

## 2020-04-10 PROCEDURE — 02HL3JZ INSERTION OF PACEMAKER LEAD INTO LEFT VENTRICLE, PERCUTANEOUS APPROACH: ICD-10-PCS | Performed by: INTERNAL MEDICINE

## 2020-04-10 PROCEDURE — 85025 COMPLETE CBC W/AUTO DIFF WBC: CPT | Performed by: INTERNAL MEDICINE

## 2020-04-10 PROCEDURE — 36415 COLL VENOUS BLD VENIPUNCTURE: CPT | Performed by: HOSPITALIST

## 2020-04-10 PROCEDURE — C1733 CATH, EP, OTHR THAN COOL-TIP: HCPCS | Performed by: INTERNAL MEDICINE

## 2020-04-10 PROCEDURE — 83605 ASSAY OF LACTIC ACID: CPT | Performed by: INTERNAL MEDICINE

## 2020-04-10 PROCEDURE — C1730 CATH, EP, 19 OR FEW ELECT: HCPCS | Performed by: INTERNAL MEDICINE

## 2020-04-10 PROCEDURE — 25000132 ZZH RX MED GY IP 250 OP 250 PS 637: Performed by: PHYSICIAN ASSISTANT

## 2020-04-10 PROCEDURE — 21000001 ZZH R&B HEART CARE

## 2020-04-10 PROCEDURE — 02583ZZ DESTRUCTION OF CONDUCTION MECHANISM, PERCUTANEOUS APPROACH: ICD-10-PCS | Performed by: INTERNAL MEDICINE

## 2020-04-10 PROCEDURE — 99153 MOD SED SAME PHYS/QHP EA: CPT | Performed by: INTERNAL MEDICINE

## 2020-04-10 PROCEDURE — 93650 ICAR CATH ABLTJ AV NODE FUNC: CPT | Performed by: INTERNAL MEDICINE

## 2020-04-10 PROCEDURE — C2621 PMKR, OTHER THAN SING/DUAL: HCPCS | Performed by: INTERNAL MEDICINE

## 2020-04-10 PROCEDURE — 33208 INSRT HEART PM ATRIAL & VENT: CPT | Performed by: INTERNAL MEDICINE

## 2020-04-10 DEVICE — IMPLANTABLE DEVICE: Type: IMPLANTABLE DEVICE | Status: FUNCTIONAL

## 2020-04-10 DEVICE — CRT-P VALITUDE X4: Type: IMPLANTABLE DEVICE | Status: FUNCTIONAL

## 2020-04-10 DEVICE — LEAD INGEVITY ACTIVE MRI 45 CM: Type: IMPLANTABLE DEVICE | Status: FUNCTIONAL

## 2020-04-10 RX ORDER — POTASSIUM CHLORIDE 1.5 G/1.58G
20-40 POWDER, FOR SOLUTION ORAL
Status: DISCONTINUED | OUTPATIENT
Start: 2020-04-10 | End: 2020-04-11

## 2020-04-10 RX ORDER — BISACODYL 10 MG
10 SUPPOSITORY, RECTAL RECTAL DAILY PRN
Status: DISCONTINUED | OUTPATIENT
Start: 2020-04-10 | End: 2020-04-17 | Stop reason: HOSPADM

## 2020-04-10 RX ORDER — POTASSIUM CHLORIDE 1.5 G/1.58G
20-40 POWDER, FOR SOLUTION ORAL
Status: DISCONTINUED | OUTPATIENT
Start: 2020-04-10 | End: 2020-04-13

## 2020-04-10 RX ORDER — POTASSIUM CHLORIDE 7.45 MG/ML
10 INJECTION INTRAVENOUS
Status: DISCONTINUED | OUTPATIENT
Start: 2020-04-10 | End: 2020-04-13

## 2020-04-10 RX ORDER — FUROSEMIDE 40 MG
40 TABLET ORAL DAILY
Status: DISCONTINUED | OUTPATIENT
Start: 2020-04-11 | End: 2020-04-11

## 2020-04-10 RX ORDER — PROCHLORPERAZINE 25 MG
12.5 SUPPOSITORY, RECTAL RECTAL EVERY 12 HOURS PRN
Status: DISCONTINUED | OUTPATIENT
Start: 2020-04-10 | End: 2020-04-11

## 2020-04-10 RX ORDER — HEPARIN SODIUM 200 [USP'U]/100ML
100-600 INJECTION, SOLUTION INTRAVENOUS CONTINUOUS PRN
Status: DISCONTINUED | OUTPATIENT
Start: 2020-04-10 | End: 2020-04-10 | Stop reason: HOSPADM

## 2020-04-10 RX ORDER — FENTANYL CITRATE 50 UG/ML
INJECTION, SOLUTION INTRAMUSCULAR; INTRAVENOUS
Status: DISCONTINUED | OUTPATIENT
Start: 2020-04-10 | End: 2020-04-10 | Stop reason: HOSPADM

## 2020-04-10 RX ORDER — PIPERACILLIN SODIUM, TAZOBACTAM SODIUM 2; .25 G/10ML; G/10ML
2.25 INJECTION, POWDER, LYOPHILIZED, FOR SOLUTION INTRAVENOUS EVERY 6 HOURS
Status: DISCONTINUED | OUTPATIENT
Start: 2020-04-10 | End: 2020-04-12

## 2020-04-10 RX ORDER — SODIUM CHLORIDE 450 MG/100ML
INJECTION, SOLUTION INTRAVENOUS CONTINUOUS
Status: DISCONTINUED | OUTPATIENT
Start: 2020-04-10 | End: 2020-04-10 | Stop reason: HOSPADM

## 2020-04-10 RX ORDER — ONDANSETRON 4 MG/1
4 TABLET, ORALLY DISINTEGRATING ORAL EVERY 6 HOURS PRN
Status: DISCONTINUED | OUTPATIENT
Start: 2020-04-10 | End: 2020-05-07

## 2020-04-10 RX ORDER — LIDOCAINE 40 MG/G
CREAM TOPICAL
Status: DISCONTINUED | OUTPATIENT
Start: 2020-04-10 | End: 2020-04-10 | Stop reason: HOSPADM

## 2020-04-10 RX ORDER — DEXTROSE MONOHYDRATE 25 G/50ML
25-50 INJECTION, SOLUTION INTRAVENOUS
Status: DISCONTINUED | OUTPATIENT
Start: 2020-04-10 | End: 2020-04-11

## 2020-04-10 RX ORDER — PROCHLORPERAZINE MALEATE 5 MG
5 TABLET ORAL EVERY 6 HOURS PRN
Status: DISCONTINUED | OUTPATIENT
Start: 2020-04-10 | End: 2020-04-17 | Stop reason: HOSPADM

## 2020-04-10 RX ORDER — HYDROCODONE BITARTRATE AND ACETAMINOPHEN 5; 325 MG/1; MG/1
1-2 TABLET ORAL EVERY 4 HOURS PRN
Status: DISCONTINUED | OUTPATIENT
Start: 2020-04-10 | End: 2020-04-10 | Stop reason: ALTCHOICE

## 2020-04-10 RX ORDER — ONDANSETRON 2 MG/ML
4 INJECTION INTRAMUSCULAR; INTRAVENOUS EVERY 6 HOURS PRN
Status: DISCONTINUED | OUTPATIENT
Start: 2020-04-10 | End: 2020-05-07

## 2020-04-10 RX ORDER — DEXTROSE MONOHYDRATE 25 G/50ML
25-50 INJECTION, SOLUTION INTRAVENOUS
Status: DISCONTINUED | OUTPATIENT
Start: 2020-04-10 | End: 2020-04-17 | Stop reason: HOSPADM

## 2020-04-10 RX ORDER — LIDOCAINE 40 MG/G
CREAM TOPICAL
Status: DISCONTINUED | OUTPATIENT
Start: 2020-04-10 | End: 2020-04-11

## 2020-04-10 RX ORDER — FUROSEMIDE 10 MG/ML
40 INJECTION INTRAMUSCULAR; INTRAVENOUS EVERY 12 HOURS
Status: DISCONTINUED | OUTPATIENT
Start: 2020-04-10 | End: 2020-04-10

## 2020-04-10 RX ORDER — POTASSIUM CHLORIDE 7.45 MG/ML
10 INJECTION INTRAVENOUS
Status: DISCONTINUED | OUTPATIENT
Start: 2020-04-10 | End: 2020-04-11

## 2020-04-10 RX ORDER — HYDROMORPHONE HYDROCHLORIDE 1 MG/ML
0.3 INJECTION, SOLUTION INTRAMUSCULAR; INTRAVENOUS; SUBCUTANEOUS
Status: DISCONTINUED | OUTPATIENT
Start: 2020-04-10 | End: 2020-04-17 | Stop reason: HOSPADM

## 2020-04-10 RX ORDER — POTASSIUM CL/LIDO/0.9 % NACL 10MEQ/0.1L
10 INTRAVENOUS SOLUTION, PIGGYBACK (ML) INTRAVENOUS
Status: DISCONTINUED | OUTPATIENT
Start: 2020-04-10 | End: 2020-04-13

## 2020-04-10 RX ORDER — HEPARIN SODIUM 200 [USP'U]/100ML
100-500 INJECTION, SOLUTION INTRAVENOUS CONTINUOUS PRN
Status: DISCONTINUED | OUTPATIENT
Start: 2020-04-10 | End: 2020-04-10 | Stop reason: HOSPADM

## 2020-04-10 RX ORDER — SODIUM CHLORIDE, SODIUM LACTATE, POTASSIUM CHLORIDE, CALCIUM CHLORIDE 600; 310; 30; 20 MG/100ML; MG/100ML; MG/100ML; MG/100ML
INJECTION, SOLUTION INTRAVENOUS CONTINUOUS
Status: DISCONTINUED | OUTPATIENT
Start: 2020-04-10 | End: 2020-04-10 | Stop reason: HOSPADM

## 2020-04-10 RX ORDER — POTASSIUM CHLORIDE 29.8 MG/ML
20 INJECTION INTRAVENOUS
Status: DISCONTINUED | OUTPATIENT
Start: 2020-04-10 | End: 2020-04-13

## 2020-04-10 RX ORDER — GLIPIZIDE 5 MG/1
5 TABLET, FILM COATED, EXTENDED RELEASE ORAL DAILY
Status: DISCONTINUED | OUTPATIENT
Start: 2020-04-10 | End: 2020-04-10

## 2020-04-10 RX ORDER — TAMSULOSIN HYDROCHLORIDE 0.4 MG/1
0.4 CAPSULE ORAL DAILY
Status: DISCONTINUED | OUTPATIENT
Start: 2020-04-10 | End: 2020-04-17 | Stop reason: HOSPADM

## 2020-04-10 RX ORDER — BUPIVACAINE HYDROCHLORIDE 2.5 MG/ML
INJECTION, SOLUTION EPIDURAL; INFILTRATION; INTRACAUDAL
Status: DISCONTINUED | OUTPATIENT
Start: 2020-04-10 | End: 2020-04-10 | Stop reason: HOSPADM

## 2020-04-10 RX ORDER — POTASSIUM CHLORIDE 1500 MG/1
20-40 TABLET, EXTENDED RELEASE ORAL
Status: DISCONTINUED | OUTPATIENT
Start: 2020-04-10 | End: 2020-04-13

## 2020-04-10 RX ORDER — ALBUMIN, HUMAN INJ 5% 5 %
12.5 SOLUTION INTRAVENOUS ONCE
Status: COMPLETED | OUTPATIENT
Start: 2020-04-10 | End: 2020-04-11

## 2020-04-10 RX ORDER — ONDANSETRON 4 MG/1
4 TABLET, ORALLY DISINTEGRATING ORAL EVERY 6 HOURS PRN
Status: DISCONTINUED | OUTPATIENT
Start: 2020-04-10 | End: 2020-04-10

## 2020-04-10 RX ORDER — CEFAZOLIN SODIUM 2 G/100ML
2 INJECTION, SOLUTION INTRAVENOUS
Status: COMPLETED | OUTPATIENT
Start: 2020-04-10 | End: 2020-04-10

## 2020-04-10 RX ORDER — POTASSIUM CHLORIDE 29.8 MG/ML
20 INJECTION INTRAVENOUS
Status: DISCONTINUED | OUTPATIENT
Start: 2020-04-10 | End: 2020-04-11

## 2020-04-10 RX ORDER — HYDROMORPHONE HYDROCHLORIDE 1 MG/ML
0.3 INJECTION, SOLUTION INTRAMUSCULAR; INTRAVENOUS; SUBCUTANEOUS
Status: DISCONTINUED | OUTPATIENT
Start: 2020-04-10 | End: 2020-04-11

## 2020-04-10 RX ORDER — TAMSULOSIN HYDROCHLORIDE 0.4 MG/1
0.4 CAPSULE ORAL DAILY
Status: DISCONTINUED | OUTPATIENT
Start: 2020-04-10 | End: 2020-04-10

## 2020-04-10 RX ORDER — HYDROCODONE BITARTRATE AND ACETAMINOPHEN 5; 325 MG/1; MG/1
1-2 TABLET ORAL EVERY 4 HOURS PRN
Status: ACTIVE | OUTPATIENT
Start: 2020-04-10

## 2020-04-10 RX ORDER — LIDOCAINE 40 MG/G
CREAM TOPICAL
Status: DISCONTINUED | OUTPATIENT
Start: 2020-04-10 | End: 2020-04-17 | Stop reason: HOSPADM

## 2020-04-10 RX ORDER — FUROSEMIDE 40 MG
40 TABLET ORAL DAILY
Status: DISCONTINUED | OUTPATIENT
Start: 2020-04-11 | End: 2020-04-11 | Stop reason: ALTCHOICE

## 2020-04-10 RX ORDER — NALOXONE HYDROCHLORIDE 0.4 MG/ML
.1-.4 INJECTION, SOLUTION INTRAMUSCULAR; INTRAVENOUS; SUBCUTANEOUS
Status: DISCONTINUED | OUTPATIENT
Start: 2020-04-10 | End: 2020-04-10

## 2020-04-10 RX ORDER — METOPROLOL SUCCINATE 25 MG/1
25 TABLET, EXTENDED RELEASE ORAL DAILY
Status: DISCONTINUED | OUTPATIENT
Start: 2020-04-11 | End: 2020-04-11

## 2020-04-10 RX ORDER — POLYETHYLENE GLYCOL 3350 17 G/17G
17 POWDER, FOR SOLUTION ORAL DAILY PRN
Status: DISCONTINUED | OUTPATIENT
Start: 2020-04-10 | End: 2020-04-10

## 2020-04-10 RX ORDER — PROCHLORPERAZINE MALEATE 5 MG
5 TABLET ORAL EVERY 6 HOURS PRN
Status: DISCONTINUED | OUTPATIENT
Start: 2020-04-10 | End: 2020-04-11

## 2020-04-10 RX ORDER — NALOXONE HYDROCHLORIDE 0.4 MG/ML
.1-.4 INJECTION, SOLUTION INTRAMUSCULAR; INTRAVENOUS; SUBCUTANEOUS
Status: DISCONTINUED | OUTPATIENT
Start: 2020-04-10 | End: 2020-04-17 | Stop reason: HOSPADM

## 2020-04-10 RX ORDER — ONDANSETRON 2 MG/ML
4 INJECTION INTRAMUSCULAR; INTRAVENOUS EVERY 6 HOURS PRN
Status: DISCONTINUED | OUTPATIENT
Start: 2020-04-10 | End: 2020-04-10

## 2020-04-10 RX ORDER — ACETAMINOPHEN 325 MG/1
650 TABLET ORAL EVERY 4 HOURS PRN
Status: DISCONTINUED | OUTPATIENT
Start: 2020-04-10 | End: 2020-04-11

## 2020-04-10 RX ORDER — DOBUTAMINE HYDROCHLORIDE 200 MG/100ML
5-40 INJECTION INTRAVENOUS CONTINUOUS PRN
Status: DISCONTINUED | OUTPATIENT
Start: 2020-04-10 | End: 2020-04-10 | Stop reason: HOSPADM

## 2020-04-10 RX ORDER — POTASSIUM CHLORIDE 1500 MG/1
20-40 TABLET, EXTENDED RELEASE ORAL
Status: DISCONTINUED | OUTPATIENT
Start: 2020-04-10 | End: 2020-04-11

## 2020-04-10 RX ORDER — NICOTINE POLACRILEX 4 MG
15-30 LOZENGE BUCCAL
Status: DISCONTINUED | OUTPATIENT
Start: 2020-04-10 | End: 2020-04-11

## 2020-04-10 RX ORDER — POLYETHYLENE GLYCOL 3350 17 G/17G
17 POWDER, FOR SOLUTION ORAL DAILY PRN
Status: ACTIVE | OUTPATIENT
Start: 2020-04-10

## 2020-04-10 RX ORDER — NICOTINE POLACRILEX 4 MG
15-30 LOZENGE BUCCAL
Status: DISCONTINUED | OUTPATIENT
Start: 2020-04-10 | End: 2020-04-17 | Stop reason: HOSPADM

## 2020-04-10 RX ORDER — METOPROLOL SUCCINATE 25 MG/1
25 TABLET, EXTENDED RELEASE ORAL 2 TIMES DAILY
Status: DISCONTINUED | OUTPATIENT
Start: 2020-04-10 | End: 2020-04-10

## 2020-04-10 RX ORDER — PROCHLORPERAZINE 25 MG
12.5 SUPPOSITORY, RECTAL RECTAL EVERY 12 HOURS PRN
Status: DISCONTINUED | OUTPATIENT
Start: 2020-04-10 | End: 2020-04-17 | Stop reason: HOSPADM

## 2020-04-10 RX ORDER — POTASSIUM CL/LIDO/0.9 % NACL 10MEQ/0.1L
10 INTRAVENOUS SOLUTION, PIGGYBACK (ML) INTRAVENOUS
Status: DISCONTINUED | OUTPATIENT
Start: 2020-04-10 | End: 2020-04-11

## 2020-04-10 RX ORDER — CEFAZOLIN SODIUM 1 G/3ML
1 INJECTION, POWDER, FOR SOLUTION INTRAMUSCULAR; INTRAVENOUS
Status: DISCONTINUED | OUTPATIENT
Start: 2020-04-10 | End: 2020-04-10 | Stop reason: HOSPADM

## 2020-04-10 RX ORDER — OXYCODONE AND ACETAMINOPHEN 5; 325 MG/1; MG/1
1 TABLET ORAL EVERY 4 HOURS PRN
Status: DISCONTINUED | OUTPATIENT
Start: 2020-04-10 | End: 2020-04-14

## 2020-04-10 RX ORDER — BISACODYL 10 MG
10 SUPPOSITORY, RECTAL RECTAL DAILY PRN
Status: DISCONTINUED | OUTPATIENT
Start: 2020-04-10 | End: 2020-04-11

## 2020-04-10 RX ORDER — NALOXONE HYDROCHLORIDE 0.4 MG/ML
.1-.4 INJECTION, SOLUTION INTRAMUSCULAR; INTRAVENOUS; SUBCUTANEOUS
Status: DISCONTINUED | OUTPATIENT
Start: 2020-04-10 | End: 2020-04-11

## 2020-04-10 RX ADMIN — ACETAMINOPHEN 650 MG: 325 TABLET, FILM COATED ORAL at 01:07

## 2020-04-10 RX ADMIN — FUROSEMIDE 40 MG: 40 TABLET ORAL at 08:33

## 2020-04-10 RX ADMIN — CEFAZOLIN SODIUM 2 G: 2 INJECTION, SOLUTION INTRAVENOUS at 15:23

## 2020-04-10 RX ADMIN — AMIODARONE HYDROCHLORIDE 200 MG: 200 TABLET ORAL at 08:33

## 2020-04-10 RX ADMIN — MIDAZOLAM 1 MG: 1 INJECTION INTRAMUSCULAR; INTRAVENOUS at 15:23

## 2020-04-10 RX ADMIN — PANTOPRAZOLE SODIUM 40 MG: 40 TABLET, DELAYED RELEASE ORAL at 07:01

## 2020-04-10 RX ADMIN — INSULIN GLARGINE 16 UNITS: 100 INJECTION, SOLUTION SUBCUTANEOUS at 21:42

## 2020-04-10 RX ADMIN — METOPROLOL SUCCINATE 25 MG: 25 TABLET, EXTENDED RELEASE ORAL at 08:33

## 2020-04-10 RX ADMIN — FENTANYL CITRATE 25 MCG: 50 INJECTION, SOLUTION INTRAMUSCULAR; INTRAVENOUS at 15:23

## 2020-04-10 RX ADMIN — LIDOCAINE HYDROCHLORIDE 10 ML: 10 INJECTION, SOLUTION EPIDURAL; INFILTRATION; INTRACAUDAL; PERINEURAL at 16:30

## 2020-04-10 RX ADMIN — BUPIVACAINE HYDROCHLORIDE 10 ML: 2.5 INJECTION, SOLUTION EPIDURAL; INFILTRATION; INTRACAUDAL; PERINEURAL at 15:29

## 2020-04-10 RX ADMIN — FENTANYL CITRATE 25 MCG: 50 INJECTION, SOLUTION INTRAMUSCULAR; INTRAVENOUS at 16:11

## 2020-04-10 RX ADMIN — APIXABAN 5 MG: 5 TABLET, FILM COATED ORAL at 08:33

## 2020-04-10 RX ADMIN — LIDOCAINE HYDROCHLORIDE 10 ML: 10 INJECTION, SOLUTION EPIDURAL; INFILTRATION; INTRACAUDAL; PERINEURAL at 15:29

## 2020-04-10 RX ADMIN — Medication 1 MG: at 00:11

## 2020-04-10 RX ADMIN — BACITRACIN 25000 UNITS: 5000 INJECTION, POWDER, FOR SOLUTION INTRAMUSCULAR at 16:08

## 2020-04-10 RX ADMIN — MIDAZOLAM 0.5 MG: 1 INJECTION INTRAMUSCULAR; INTRAVENOUS at 16:11

## 2020-04-10 RX ADMIN — TAMSULOSIN HYDROCHLORIDE 0.4 MG: 0.4 CAPSULE ORAL at 18:30

## 2020-04-10 RX ADMIN — POTASSIUM CHLORIDE 20 MEQ: 1.5 POWDER, FOR SOLUTION ORAL at 10:41

## 2020-04-10 RX ADMIN — ACETAMINOPHEN 650 MG: 325 TABLET, FILM COATED ORAL at 07:01

## 2020-04-10 RX ADMIN — PIPERACILLIN SODIUM AND TAZOBACTAM SODIUM 2.25 G: 2; .25 INJECTION, POWDER, LYOPHILIZED, FOR SOLUTION INTRAVENOUS at 18:30

## 2020-04-10 ASSESSMENT — ACTIVITIES OF DAILY LIVING (ADL)
ADLS_ACUITY_SCORE: 16
ADLS_ACUITY_SCORE: 15

## 2020-04-10 ASSESSMENT — MIFFLIN-ST. JEOR: SCORE: 1557.94

## 2020-04-10 NOTE — PROGRESS NOTES
[facility]  Cardiology Progress Note    Date of Service: 04/10/2020       Patient being seen by cardiology in follow up of atrial fibrillation with RVR.       Interval history:  Patient remains in atrial fibrillation, rates continue to vary 110-140s at times.  I/O neg 924cc last 24 hrs, overall (-)20L since admission. Resumed PO diuretics yesterday.   SBP still fluctuating 90-low 100s. Metoprolol increased to 25mg dose yesterday afternoon.    Discussed with available staff at bedside, chart reviewed, patient seen and examined.      ASSESSMENT/PLAN:    1. Atrial fibrillation with RVR.   --Presented in afib with RVR in setting of respiratory failure and acute on chronic renal failure. Initially on IV diltiazem and PO metoprolol, then transitioned to amiodarone due to BP and low EF.  After loading, underwent NESTOR cardioversion 4/6,  though converted back to afib early the next morning. Has remained in afib with variable rate control since that time.    --Were able to increase Toprol XL to 25mg BID but rates are still not well controlled. Further uptitration has been limited by low BP. After discussion with Dr. Willams regarding options of another attempt at cardioversion versus AVN ablation /BIV pacer,  favors intervention ( as do I)  as the patient's severe biatrial enlargement and LV dysfunction make maintenance of NSR long term after cardioversion unlikely.    --Continue apixaban. Continue to monitor for recurrence of hematuria.    Plan  1) NPO now  2) transfer to [facility] for AVN ablation/PPM today    2. Acute biventricular heart failure.   --On arrival, mild troponin elevation without chest pain and no ischemic changes on EKG. Echo with globally  impaired LVEF 20-25%, mod-severe RV systolic dysfunction, 2-+ MR.   Repeat limited echo 4/5 unchanged, severe global hypokinesis and 1-2+ MR.   --Diuresed with bumex gtt initially and overall (-)20L since admission. Diuresis held transiently, and resumed  "yesterday with Lasix 40mg PO daily. SCr back up slightly, but renal function overall appears near baseline. Will continue for now without change.    --On Toprol XL as above. BP and renal function to not allow for addition of ACE-I or hydral/isosorbide combination for now.    --Given DNR (note he will still accept intubation) status, will at some point need to address implications of invasive workup with angiogram for CAD +/- ICD if LVEF remains low.     Plan  1. Hopefully, better rate control will improve LV systolic performance.   2. At some point, if bp allows would add Hydralazine/ISDN.  3. May consider eventual evaluation for CAD either with nuclear stress test or diagnostic CAG in future.    3. Acute on top of chronic kidney injury: He appears to be near baseline Creatinine 2.4.     4. Respiratory Failure : resolved. COVID 19 negative.     5. Diabetes Mellitus    5. BPH : previous hematuria, now resolved.         HPI:   Gene Pagan is a 74 year old male with past medical history significant for DM2, CKD, BPH who was admitted on 3/27/2020 with progressive shortness of breath and tachycardia. Also had some flu like symptoms over the few weeks prior. COVID/influenza negative. Respiratory failure, required intubation and transient pressor support.       Patient Active Problem List   Diagnosis     Atrial fibrillation with rapid ventricular response (H)       Subjective:  Pt denies dyspnea,chest pain, or palpitations. Still tired. Denies dizziness when up in chair for breakfast.     Objective:  Vital signs:  /61   Pulse 121   Temp 97.6  F (36.4  C) (Oral)   Resp 20   Ht 1.74 m (5' 8.5\")   Wt 84.6 kg (186 lb 6.4 oz)   SpO2 97%   BMI 27.93 kg/m      Intake/Output Summary (Last 24 hours) at 4/6/2020 0919  Last data filed at 4/6/2020 0900  Gross per 24 hour   Intake 1041.63 ml   Output 2600 ml   Net -1558.37 ml       Vitals:    04/08/20 0500 04/09/20 0400 04/10/20 0629   Weight: 85.9 kg (189 lb 6 oz) 86 " kg (189 lb 9.5 oz) 84.6 kg (186 lb 6.4 oz)       PE:  Gen: In general, this is a well nourished  male, resting in bed,  in no acute distress on NC.   Neck: Supple with midline trachea. No significant JVD with HOB elevated.    CV: Irregular rhythm, intermittently tachycardic. No significant murmur or rub appreciated.   Resp:Respirations are unlabored without use of accessory muscles. Few crackles in the bases. No wheezing on exam.   GI: Soft, nontender, not significantly distended.    Extrem: Extremities are warm, without cyanosis or clubbing. He has trace bilateral PT edema.    Neuro: Patient wakes to exam but falls back asleep easily. Answers short questions appropriately.      Current Medications:?    amiodarone  200 mg Oral BID     apixaban ANTICOAGULANT  5 mg Oral BID     furosemide  40 mg Oral Daily     insulin aspart  1-6 Units Subcutaneous TID w/meals     insulin aspart  1-5 Units Subcutaneous At Bedtime     insulin aspart   Subcutaneous TID AC     insulin glargine  16 Units Subcutaneous At Bedtime     lidocaine   Topical Once    Or     lidocaine  1.5 mL Topical Once     metoprolol succinate ER  25 mg Oral BID     pantoprazole  40 mg Oral QAM AC     sodium chloride (PF)  3 mL Intracatheter Q8H         Labs/Imaging/Additional testing:  The following labs and imaging were reviewed during today's visit:    Recent Labs   Lab 04/10/20  0801 04/10/20  0655 04/09/20  0534 04/07/20  0533 04/06/20  1154  04/05/20  0544   WBC 14.7*  --  15.3*  --   --   --  14.0*   HGB 10.0*  --  10.1*  --   --   --  11.1*   MCV 84  --  83  --   --   --  84   *  --  383  --   --   --  302   INR  --   --   --   --  1.17*  --   --    NA  --  132* 133 133  --    < > 137   POTASSIUM  --  3.9 3.7 4.3 4.2   < > 3.6   CHLORIDE  --  101 102 101  --    < > 102   CO2  --  23 24 24  --    < > 31   BUN  --  64* 68* 68*  --    < > 88*   CR  --  2.48* 2.30* 2.25*  --    < > 2.95*   GFRESTIMATED  --  24* 26* 27*  --    < > 20*    GFRESTBLACK  --  28* 31* 31*  --    < > 23*   ANIONGAP  --  8 7 8  --    < > 5   HARPER  --  9.4 9.2 9.5  --    < > 9.2   GLC  --  136* 117* 163*  --    < > 159*   ALBUMIN  --   --   --  2.6*  --   --  2.3*   PROTTOTAL  --   --   --   --   --   --  6.3*   BILITOTAL  --   --   --   --   --   --  0.7   ALKPHOS  --   --   --   --   --   --  210*   ALT  --   --   --   --   --   --  527*   AST  --   --   --   --   --   --  62*    < > = values in this interval not displayed.       Echo:  4/7/2020 NESTOR     Interpretation Summary     The visual ejection fraction is estimated at 20-25%.  There is severe global hypokinesia of the left ventricle.  There is moderate (2+) mitral regurgitation.  The rhythm was rapid atrial fibrillation.       Vicky Jackson PA-C  [facility]  Pager [phone]     Cardiology staff  I have personally taken the patient's interim history, discussed his management with Ms.Renetta, the patient, his significant other ( over the phone) and  from EP service. He remains tachycardic ( SBPS 90s) despite amiodarone and current doses of metoprolol After a discussion of the options in detail, including risks and benefits of AVN ablation/BIVAD PPM he is agreeable to transfer and the EP procedure. His lungs are clear.His heart tones irregular and rapid.     [place]

## 2020-04-10 NOTE — H&P
[facility]    History and Physical - Hospitalist Service       Date of Admission: 4/10/2020    Assessment & Plan   Gene Pagan is a 74 year old male with PMHx of NIDDM and BPH who was seen initially at [facility] 3/27/2020 for progressive SOB, ultimately transferred to the ED after found to be in atrial fibrillation with RVR (new diagnosis).  Admitted for further evaluation and treatment. Ultimately pt decompensated 3/28/2020 with more progressive SOB and hypotension requiring intubation and pressor support. Please refer to H&P by Dr. Sabillon from 3/27/2020 for complete details on initial presentation. Pt ultimately with ongoing a fib with RVR and failed cardioversion. Transferred to [facility] 4/10/2020 per Cardiology recommendation for EP intervention. Note Tmax 101.1 degrees fahrenheit overnight, persistent a fib with RVR in the 120s, softer BPs. 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 Stop amiodarone and restart Eliquis in am. No ASA while on Eliquis per EP     Fever most likely from acute pyelonephritis with chronic  bladder outlet obstruction  : Tmax 101.1 degrees fahrenheit overnight. WBC peak 20.8. Procal on admission 0.07. Covid negative, CRP 23.1, ferritin 49 (WNL). Strep pneumo negative, legionella negative, influenza and RSV PCR negative. Urine culture negative. BC NGTD. CXR on admission with heterogeneous opacities in the lung bases concerning for infectious or inflammatory process. CT C/A/P from 3/27/2020 with irregularity of the gallbladder and stranding around the gallbladder, stranding around bilateral kidneys and bilateral renal collecting system prominence including ureters and extrarenal pelvis could be secondary to chronic bladder outlet obstruction, pyelonephritis not excluded. US abdomen 3/27/2020 with mild gallbladder wall thickening, no other evidence of cholecystitis, no  gallstones or biliary dilatation, mild right hydronephrosis. Pt was treated with 7 days of IV Zosyn (3/27/2020 - 4/4/2020) and 2 days of IV Vancomycin (3/28/2020 - 3/29/2020).   -- Since fever: WBC 14.7, procal recheck 0.47, lactic 2.1   -- Monitor fever curve   Continue zosyn for now to complete 10-14days of antibiotics in the setting of acute pyelonephritis picture  Gallbladder wall thickening most likely from fluid overload in the setting of CHF      Atrial fibrillation with RVR, new diagnosis S/p AV sarah ablation and CRT  By EP on 4/10/20  S/P NESTOR cardioversion (4/6/2020): Presented with A fib with RVR in the setting of respiratory failure and acute on chronic renal failure. Initially treated with IV diltiazem and PO metoprolol, transitioned to IV amiodarone due to softer BPs and low EF. Anticoagulated with IV heparin since admission. Underwent successful NESTOR cardioversion 4/6/2020, but converted back to a fib the AM of 4/7/2020. Has remained in a fib with variable rate control since that time.   -- Cardiology following, plan for transfer to [facility] for cardioversion vs AVN ablation/biV pacer  -- Continue Toprol XL 25 mg daily , stop  Amiodarone 200 mg BID per EP   -- Continue Eliquis 5 mg BID (last dose 0833 on 4/10/2020). [id number] Score at least   3  Stop amiodarone and restart elliquis in AM per EP. Monitor for hematuria with starting elliquis     Acute diastolic and systolic CHF exacerbation (EF 20-25%)  Cardiomyopathy, suspect rate-related, new  Bilateral pleural effusions (moderate on left, small on right)  Grade III diastolic dysfunction   Moderate to severe mitral regurgitation  NSTEMI type II, demand ischemia in the setting of above: BNP 65185 on admission. Trop peak 0.439. EKG without evidence of ischemia. CT C/A/P from 3/27/2020 with moderate right and small left pleural fluid collections and associated compressive atelectasis in the bilateral posterior lungs. Groundglass densities in the lungs  could reepresent vascular congestion and the heart is enlarged indicating probable CHF. Echocardiogram 3/28/2020 with EF 20-25%, severe global hypokinesia of the left ventricle and grade III diastolic dysfunction. Also notes moderate to severe mitral regurgitation. Repeat limited echo on 4/5/2020 unchanged. Pt was diuresed with bumex gtt initially (down 20 L since admission).   -- Cardiology following, consider eventual evaluation for CAD in the future   -- Continue Lasix 40 mg po every day   -- Continue Toprol XL 25mg po daily   -- Intake and output, daily weights   Needs close follow up with [facility] on discharge     Cardiogenic shock, resolved  Acute hypoxic respiratory failure: Respiratory failure in the setting of CHF, cardiogenic shock and questionable infiltrate as above. Cardiac results as above. He did initially require norepinephrine which was weaned off as of 4/1/2020.    -- Patient is DNI but OK to do CPR as per patient     Acute respiratory failure, resolved: Secondary to above. Intubated 3/28/2020, extubated 4/1/2020.   -- Saturating well on room air  -- Encourage pulmonary toilet with IS     Lactic acidosis: Peak 12.1  -- 2.1 when checked overnight for fever and tachycardia     Elevated transaminases, improved: Peak ALT 7094, AST 28213. Thought to be ischemic hepatitis/shock liver/liver congestion in the setting of CHF. Hepatitis B surface antigen nonreactive   -- , AST 62 when last checked   -- Repeat hepatic panel in the AM     T2DM, non-insulin dependent: A1C on 1/20/2020 was 6.0. A1C checked during admission 6.2.   [Metformin 1000 mg BID, Glipizide XL 5 mg po every day]  -- Hold oral agents   -- Continue Lantus 16 U at bedtime (started during hospitalization)  -- Medium sliding scale insulin ordered   -- BS per protocol   -- Monitor for hypoglycemia     Recent Labs   Lab 04/10/20  1207 04/10/20  0655 04/10/20  0151 04/09/20  2159 04/09/20  1736 04/09/20  1122 04/09/20  0734  04/09/20  0534  04/07/20  0533  04/06/20  0542  04/05/20  0544  04/04/20  0544   GLC  --  136*  --   --   --   --   --  117*  --  163*  --  117*  --  159*  --  218*   *  --  144* 157* 184* 214* 127*  --    < >  --    < >  --    < >  --    < >  --     < > = values in this interval not displayed.     DEONDRE on CKD III: Creatinine peak 5.72. Note in 1/2020, creatinine was 2.3, prior to that, 1.8 in 5/2019.   -- Monitor  -- Avoid nephrotoxic agents     Enlarged prostate  Mild right hydronephrosis  Hx of BPH:   [Flomax 0.4 mg po every day]  -- PTA Flomax continue   -- Monitor I&Os, monitor for retention     Hyponatremia  Hypokalemia, resolved: Sodium borderline low on admission, improved with above intervention.   -- Sodium 132 today   -- Repeat BMP in the AM     Iron deficiency anemia: No prior Hgb for comparison sake. Iron 27, , MIKI 8.   -- Monitor      Gross hematuria, resolved:  Noted hematuria starting 4/1/2020 which has since resolved in the context of IV heparin use.   -- Urology consulted for hematuria, three way urinary catheter initially placed for hematuria, removed 4/9/2020 after no return of hematuria despite re-initiation of anticoagulation     Lung nodule, left: Noted on CT, measuring up to 1 cm.   -- Consider follow-up CT in 3 months, PET/CT and or tissue sampling; defer to outpatient provider upon discharge     GI prophylaxis: Continue Protonix 40 mg po every day      Diet: 2gram sodium and moderate carb controlled diet   DVT Prophylaxis: Eliquis  Mckenzie Catheter: in place, indication:    Code Status: DNI    Disposition Plan   Expected discharge: 2 - 3 days, recommended to recommendation per PT/OT  and if remains afebrile and once cleared by cardiology and or EP       Lin Ordonez MD   Page [phone](7AM-6PM)    ______________________________________________________________________    Chief Complaint   SOB    History is obtained from the patient and chart review     History of Present  Zhou Pagan is a 74 year old male with PMHx of NIDDM and BPH who was seen initially at [facility] 3/27/2020 for progressive SOB, ultimately transferred to the ED after found to be in atrial fibrillation with RVR (new diagnosis).  Admitted for further evaluation and treatment. Ultimately pt decompensated 3/28/2020 with more progressive SOB and hypotension requiring intubation and pressor support. Please refer to H&P by Dr. Sabillon from 3/27/2020 for complete details on initial presentation. Pt ultimately required pressor support until 4/1/2020, was also extubated 4/1/2020.     Presented with A fib with RVR in the setting of respiratory failure and acute on chronic renal failure. Initially treated with IV diltiazem and PO metoprolol, transitioned to IV amiodarone due to softer BPs and low EF. Anticoagulated with IV heparin since admission. Underwent successful NESTOR cardioversion 4/6/2020, but converted back to a fib the AM of 4/7/2020. Has remained in a fib with variable rate control since that time.     Note BNP 15864 on admission. Trop peak 0.439. EKG without evidence of ischemia. CT C/A/P from 3/27/2020 with moderate right and small left pleural fluid collections and associated compressive atelectasis in the bilateral posterior lungs. Groundglass densities in the lungs could reepresent vascular congestion and the heart is enlarged indicating probable CHF. Echocardiogram 3/28/2020 with EF 20-25%, severe global hypokinesia of the left ventricle and grade III diastolic dysfunction. Also notes moderate to severe mitral regurgitation. Repeat limited echo on 4/5/2020 unchanged. Pt was diuresed with bumex gtt initially (down 20 L since admission).     Also note Tmax 101.1 degrees fahrenheit overnight. WBC peak 20.8. Procal on admission 0.07. Covid negative, CRP 23.1, ferritin 49 (WNL). Strep pneumo negative, legionella negative, influenza and RSV PCR negative. Urine culture negative. BC NGTD. CXR on  admission with heterogeneous opacities in the lung bases concerning for infectious or inflammatory process. CT C/A/P from 3/27/2020 with irregularity of the gallbladder and stranding around the gallbladder, stranding around bilateral kidneys and bilateral renal collecting system prominence including ureters and extrarenal pelvis could be secondary to chronic bladder outlet obstruction, pyelonephritis not excluded. US abdomen 3/27/2020 with mild gallbladder wall thickening, no other evidence of cholecystitis, no gallstones or biliary dilatation, mild right hydronephrosis. Pt was treated with 7 days of IV Zosyn and 2 days of IV Vancomycin.       Review of Systems    The 10 point Review of Systems is negative other than noted in the HPI.    Past Medical History    1. BPH   2. T2DM, non-insulin dependent, A1C 6.0 on 1/20/2020    3. Morbid obesity   4. Vitamin D deficiency     Past Surgical History   I have reviewed this patient's surgical history and updated it with pertinent information if needed.  Past Surgical History:   Procedure Laterality Date     ANESTHESIA CARDIOVERSION N/A 4/6/2020    Procedure: ANESTHESIA, FOR CARDIOVERSION;  Surgeon: GENERIC ANESTHESIA PROVIDER;  Location:  OR     Social History   I have reviewed this patient's social history and updated it with pertinent information if needed.  Social History     Tobacco Use     Smoking status: Never Smoker   Substance Use Topics     Alcohol use: Yes     Comment: Rare     Drug use: Not on file     Family History   I have reviewed this patient's family history and updated it with pertinent information if needed.   Family History   Problem Relation Age of Onset     Diabetes Sister      Prior to Admission Medications   Cannot display prior to admission medications because the patient has not been admitted in this contact.     Allergies   No Known Allergies    Physical Exam   Vital Signs:                    Reviewed in EPIC stable and showed   /61    "Pulse 70   Temp 97.6  F (36.4  C) (Oral)   Resp 20   Ht 1.74 m (5' 8.5\")   Wt 84.6 kg (186 lb 6.4 oz)   SpO2 97%   BMI 27.93 kg/m         CONSTITUTIONAL: Pt laying in bed,thin frail male with NAD   HEENT: Normocephalic, atraumatic. Pupils equal, round, and reactive to light. Negative for conjunctival redness or scleral icterus.  Oral mucosa pink and moist; negative for ulcerations, erythema, or exudates  CARDIOVASCULAR: RRR, no murmurs, rubs, or extra heart sounds appreciated. RESPIRATORY: No increased work of breathing.  Supplemental oxygenation via NC at 2liters  .   CTA, bilat; no wheezes, rales, or rhonchi appreciated.Diminished in the bases   GASTROINTESTINAL:  Abdomen soft, non-distended. BS auscultated in all four quadrants. Negative for tenderness to percussion or light and deep palpation.  No masses or organomegaly noted.  MUSCULOSKELETAL: Strength +5/5 in upper and lower extremities, bilaterally. No gross deformities noted. Normal muscle tone.   HEMATOLOGIC/LYMPHATIC/IMMUNOLOGIC: No anterior or posterior cervical LAD, bilaterally. Negative for lower extremity edema, bilaterally.  NEUROLOGIC: Alert and oriented to person, place, and time.  strength intact. CN's II-XII grossly intact.  SKIN: Warm, dry, intact. No jaundice noted. Negative for suspicious lesions, rashes, bruising, open sores or abrasions.     Data   Data reviewed today: I reviewed all medications, new labs and imaging results over the last 24 hours. Reviewed all labs and imaging to date.     Recent Labs   Lab 04/10/20  0801 04/10/20  0655 04/09/20  0534 04/07/20  0533 04/06/20  1154  04/05/20  0544   WBC 14.7*  --  15.3*  --   --   --  14.0*   HGB 10.0*  --  10.1*  --   --   --  11.1*   MCV 84  --  83  --   --   --  84   *  --  383  --   --   --  302   INR  --   --   --   --  1.17*  --   --    NA  --  132* 133 133  --    < > 137   POTASSIUM  --  3.9 3.7 4.3 4.2   < > 3.6   CHLORIDE  --  101 102 101  --    < > 102   CO2  --  23 " 24 24  --    < > 31   BUN  --  64* 68* 68*  --    < > 88*   CR  --  2.48* 2.30* 2.25*  --    < > 2.95*   ANIONGAP  --  8 7 8  --    < > 5   HARPER  --  9.4 9.2 9.5  --    < > 9.2   GLC  --  136* 117* 163*  --    < > 159*   ALBUMIN  --   --   --  2.6*  --   --  2.3*   PROTTOTAL  --   --   --   --   --   --  6.3*   BILITOTAL  --   --   --   --   --   --  0.7   ALKPHOS  --   --   --   --   --   --  210*   ALT  --   --   --   --   --   --  527*   AST  --   --   --   --   --   --  62*    < > = values in this interval not displayed.     No results found for this or any previous visit (from the past 24 hour(s)).

## 2020-04-10 NOTE — DISCHARGE SUMMARY
[facility]  Hospitalist Discharge Summary      Date of Admission:  3/27/2020  Date of Discharge:  4/10/2020  Discharging Provider: Lorelei Andrade MD      Discharge Diagnoses     1. A fib with RVR.    2. Cardiogenic shock.    3. Acute systolic heart failure.    4. Gross hematuria.    5. Left lung nodule 1 cm in diameter, ? Inflammatory/infectious or neoplasm. Needs f/u as out-pt.    Follow-ups Needed After Discharge       Unresulted Labs Ordered in the Past 30 Days of this Admission     No orders found from 2/26/2020 to 3/28/2020.          Discharge Disposition   Transferred to [facility]  Condition at discharge: Stable    Hospital Course   See progress note 4/10. 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 Has been hemodynamically stable. Hematuria resolved with 3 way baker catheter.    Consultations This Hospital Stay   PHARMACY TO DOSE HEPARIN  PHARMACY TO DOSE HEPARIN  PHARMACY LIAISON FOR MEDICATION COVERAGE CONSULT  CARDIOLOGY IP CONSULT  PHARMACY TO DOSE VANCO  NEPHROLOGY IP CONSULT  PHARMACY TO DOSE HEPARIN  NUTRITION SERVICES ADULT IP CONSULT  PHARMACY IP CONSULT  PHYSICAL THERAPY ADULT IP CONSULT  OCCUPATIONAL THERAPY ADULT IP CONSULT  SPEECH LANGUAGE PATH ADULT IP CONSULT  PHARMACY TO DOSE HEPARIN  UROLOGY IP CONSULT  CARE COORDINATOR IP CONSULT  PHARMACY DISCHARGE EDUCATION BY PHARMACIST    Code Status   DNR    Time Spent on this Encounter   I, Lorelei Andrade MD, personally saw the patient today and spent greater than 30 minutes discharging this patient.       Lorelei Andrade MD  [facility]  ______________________________________________________________________    Physical Exam   Vital Signs: Temp: 97.6  F (36.4  C) Temp src: Oral BP: 103/61 Pulse: 121 Heart Rate: 123 Resp: 20 SpO2: 97 % O2 Device: None (Room air) Oxygen Delivery: 1 LPM  Weight: 186 lbs 6.4 oz    See note.       Primary Care Physician   [facility]    Discharge Orders   No discharge procedures on  file.    Significant Results and Procedures   Results for orders placed or performed during the hospital encounter of 03/27/20   XR Chest Port 1 View    Narrative    CHEST ONE VIEW  3/27/2020 2:26 PM     HISTORY: SOB    COMPARISON: CT dated 1/17/09      Impression    IMPRESSION: Heterogeneous opacities in the lung bases are concerning  for an infectious or inflammatory process. No pleural fluid or  pneumothorax. The heart is normal in size. The cardiomediastinal  silhouette is at the upper limits of normal in size. Old healed rib  and clavicle fractures are again seen.    LESTER J FAHRNER, MD   XR Chest Port 1 View    Narrative    EXAM: XR CHEST PORT 1 VW  LOCATION: [facility]  DATE/TIME: 3/28/2020 1:39 AM    INDICATION: Intubation.  COMPARISON: 03/27/2020 at 0216 hours      Impression    IMPRESSION: New ETT with tip in mid trachea at level of aortic arch. Small right pleural effusion and adjacent right lower lung infiltrate or atelectasis, increased. Mild retrocardiac atelectasis/infiltrate may be slightly increased. Heart size near   upper limits of normal.   XR Chest Port 1 View    Narrative    EXAM: XR CHEST PORTABLE 1 VIEW  LOCATION: [facility]  DATE/TIME: 03/28/2020, 6:24 AM    INDICATION: Line placement.  COMPARISON: 03/28/2020 at 0153 hours.      Impression    IMPRESSION: New right IJ line tip in SVC. Otherwise, no change. ETT tip in the mid trachea. Bilateral lower lung opacities.     CT Chest Abdomen Pelvis w/o Contrast    Narrative    CT CHEST ABDOMEN PELVIS WITHOUT CONTRAST  3/28/2020 9:48 PM    HISTORY:  Sepsis, unclear cause, rule out intra-abdominal process.    TECHNIQUE: Scans obtained of the chest, abdomen, and pelvis without IV  contrast.   Radiation dose for this scan was reduced using automated exposure  control, adjustment of the mA and/or kV according to patient size, or  iterative reconstruction technique.    COMPARISON: Chest x-rays dated 3/28/2020 and CT chest  dated 1/17/2009.    FINDINGS:   Chest: Moderate size right and small left pleural fluid collection and  associated compressive atelectasis in the posterior bilateral lungs  are noted. Groundglass densities are seen in the anterior lungs.  Nodular densities are also noted in the bilateral lungs.  Solid-appearing nodular density in the left lung in posterolateral  left upper lobe (image 97 series 5) measuring up to 1.0 cm. This could  also represent atelectasis or inflammatory process. Groundglass  nodular densities in bilateral lungs measuring up to 2.0 cm could  represent an inflammatory process. These were not seen on prior CT  dated 2009.    The heart is normal in size. There are coronary artery and aortic  calcifications. Endotracheal tube tip is in the thoracic trachea below  the thoracic inlet and above the bernadette. Nasogastric tube seen in the  stomach and the esophagus.    The thyroid is grossly normal appearance. No mediastinal, hilar, or  axillary lymphadenopathy is identified.    There are degenerative changes in the spine, left hip and left SI  joint. No aggressive osseous lesions or acute osseous fractures are  identified.    Abdomen and pelvis: Gallbladder somewhat indistinct and there is some  stranding around the gallbladder. Acute cholecystitis is certainly not  excluded in this case. Calcification is noted in the spleen indicating  chronic granulomatous disease. This was also noted in 2009. The liver,  pancreas, spleen and bilateral adrenal glands are otherwise normal in  appearance for noncontrast CT.    There is stranding around both kidneys which was not well-seen on the  prior CT chest dated 1/17/2009. There is prominence of the bilateral  extrarenal pelvises more so on the left. There is bilateral  hydroureter, moderate on the left and mild on right. No evidence for  renal or ureteral calculus is identified. Urinary bladder is  completely decompressed around a catheter. The prostate gland  is  enlarged.    No adenopathy or free air is seen in the peritoneal cavity. Small  amount of free fluid in the pelvis.    The colon is grossly of normal caliber without pericolonic  inflammatory change to suggest acute diverticulitis. Appendix is  normal in appearance. Small bowel is of normal caliber. Stomach is  mostly decompressed but otherwise unremarkable.      Impression    IMPRESSION:  1. Irregularity of the gallbladder and stranding around the  gallbladder could represent acute cholecystitis. Further evaluation  with gallbladder ultrasound is recommended as clinically indicated.  2. Stranding around bilateral kidneys and bilateral renal collecting  system prominence including ureters and extrarenal pelvis could be  secondary to chronic bladder outlet obstruction. Pyelonephritis is not  excluded on this study. No evidence for urinary system calculus is  seen.  3. Enlarged prostate gland.  4. Small mammography fluid in pelvis.  5. Moderate right and small left pleural fluid collections and  associated compressive atelectasis in the bilateral posterior lungs.  Groundglass densities in the lungs could represent vascular congestion  and the heart is enlarged indicating probable congestive heart  failure.  6. Nodule left lung measuring up to 1 cm diameter could represent an  inflammatory or infectious process. This could also represent an  neoplastic nodule.   Recommendations for an incidental lung nodule > 8mm:    Low risk patients: Consider follow-up CT in 3 months, PET/CT, and/or  tissue sampling.    High risk patients: Same as for low risk patients.    *Low Risk: Minimal or absent history of smoking or other known risk  factors.  *Nonsolid (ground-glass) or partly solid nodules may require longer  follow-up to exclude indolent adenocarcinoma.  *Recommendations based on Guidelines for the Management of Incidental  Pulmonary Nodules Detected at CT: From the Fleischner Society 2017,  Radiology 2017.   7.  Groundglass nodules in the lungs likely represent inflammatory  infectious process. Attention to these regions is recommended on  follow-up CT chest for the other nodule.  8. Endotracheal tube is in appropriate position. Nasogastric tube tip  appears to be in the stomach. Side port is difficult to see on this  study.    I discussed the renal, gallbladder, and pulmonary findings with Dr. Villafana on 3/28/2020 at approximately 10:02 PM.    CALLIE HERNANDEZ MD   US Abdomen Limited    Narrative    EXAM: US ABDOMEN LIMITED  LOCATION: [facility]  DATE/TIME: 3/28/2020 11:13 PM    INDICATION: Sepsis. Enlarged gallbladder on CT.  COMPARISON: CT 03/28/2020.  TECHNIQUE: Limited abdominal ultrasound.    FINDINGS:    GALLBLADDER: There are no gallstones. There are areas of gallbladder wall thickening to 5 mm. No sonographic Azar sign.    BILE DUCTS: No biliary dilatation. The common duct measures 3 mm.    LIVER: Normal parenchyma with smooth contour. No focal mass.    RIGHT KIDNEY: Mild dilatation of the right renal collecting system.    PANCREAS: The visualized portions are normal.    No ascites.      Impression    IMPRESSION:  1.  Mild gallbladder wall thickening, a nonspecific finding. No other evidence of cholecystitis. No gallstones or biliary dilatation.  2.  Mild right hydronephrosis.   XR Abdomen Port 1 View    Narrative    ABDOMEN PORTABLE ONE VIEW March 31, 2020 10:04 AM     HISTORY: Status post feeding tube placement, verify post pyloric  placement.       Impression    IMPRESSION: Portable supine view of the abdomen is performed. Feeding  tube has been placed with the tract of the tube appearing to terminate  near the ligament of Treitz in the distal small duodenum in good  position. Retrocardiac opacity and elevation left hemidiaphragm is  noted. Bowel gas pattern is unremarkable.    URSULA LI MD   Echocardiogram Complete    Narrative    [id number]  [id number]  [id number]  [id number]^ZAYRA^SHREYAS^DOUG            [facility]  Echocardiography Laboratory  [address]  [place] [address]        Name: MIKE DUNHAM  MRN: [id number]  : 1945  Study Date: 2020 10:46 AM  Age: 74 yrs  Gender: Male  Patient Location: [facility]  Reason For Study: Afib  Ordering Physician: SHREYAS IVERSON  Referring Physician: Red Rogers  Performed By: Matilde Cox RDCS     BSA: 2.1 m2  Height: 68 in  Weight: 205 lb  HR: 78  BP: 131/95 mmHg  _____________________________________________________________________________  __        Procedure  Complete Portable Echo Adult. Optison (NDC #[id number]) given intravenously.  _____________________________________________________________________________  __        Interpretation Summary     The left ventricle is normal in size.  Left ventricular systolic function is severely reduced.  The visual ejection fraction is estimated at 20-25%.  There is severe global hypokinesia of the left ventricle.  Grade III or advanced diastolic dysfunction.  The right ventricle is moderately dilated.  The right ventricular systolic function is moderate to severely reduced.  There is moderate to mod-severe (2-3+) mitral regurgitation.  No hemodynamically significant valvular aortic stenosis.  Dilation of the inferior vena cava is present with abnormal respiratory  variation in diameter.  The study was technically difficult. There is no comparison study available.  _____________________________________________________________________________  __        Left Ventricle  The left ventricle is normal in size. There is normal left ventricular wall  thickness. Grade III or advanced diastolic dysfunction. Left ventricular  systolic function is severely reduced. The visual ejection fraction is  estimated at 20-25%. There is severe global hypokinesia of the left ventricle.     Right Ventricle  The right ventricle is moderately dilated. The right ventricular systolic  function is moderate  to severely reduced.     Atria  The left atrium is mildly dilated. The left atrium is not well visualized.  Right atrium not well visualized. The right atrium is mildly dilated.     Mitral Valve  The mitral valve is not well visualized. There is moderate to mod-severe (2-  3+) mitral regurgitation. Increased mitral valve velocity.        Tricuspid Valve  The tricuspid valve is not well visualized. The right ventricular systolic  pressure is approximated at 28.0 mmHg plus the right atrial pressure. There is  mild (1+) tricuspid regurgitation.     Aortic Valve  The aortic valve is not well visualized. The aortic valve is trileaflet. There  is mild (1+) aortic regurgitation. There is an eccentric jet of aortic  insufficiency directed against the anterior mitral leaflet. No hemodynamically  significant valvular aortic stenosis.     Pulmonic Valve  The pulmonic valve is not well visualized.     Vessels  Mild aortic root dilatation. Dilation of the inferior vena cava is present  with abnormal respiratory variation in diameter. Pulmonary venous flow  reversal noted.     Pericardium  There is no pericardial effusion. Moderate left pleural effusion.     _____________________________________________________________________________  __  MMode/2D Measurements & Calculations  IVSd: 0.80 cm  LVIDd: 5.3 cm  LVIDs: 5.0 cm  LVPWd: 0.79 cm  IVC diam: 3.0 cm  FS: 6.7 %  LV mass(C)d: 150.4 grams  LV mass(C)dI: 72.8 grams/m2     Ao root diam: 4.3 cm  LA dimension: 4.5 cm  asc Aorta Diam: 4.2 cm  LA/Ao: 1.1  LVOT diam: 2.6 cm  LVOT area: 5.3 cm2  LA Volume (BP): 73.0 ml  LA Volume Index (BP): 35.3 ml/m2  RWT: 0.30        Doppler Measurements & Calculations  MV E max chaz: 92.8 cm/sec  MV A max chaz: 26.2 cm/sec  MV E/A: 3.5  MV max P.6 mmHg  MV mean P.8 mmHg  MV V2 VTI: 27.5 cm     MV P1/2t max chaz: 121.0 cm/sec  MV P1/2t: 76.2 msec  MVA(P1/2t): 2.9 cm2  MV dec slope: 465.1 cm/sec2  MV dec time: 0.12 sec  Ao V2 max: 129.3  cm/sec  Ao max P.0 mmHg  AI P1/2t: 386.9 msec  MR ERO: 0.23 cm2  MR volume: 25.3 ml  TR max chaz: 264.7 cm/sec  TR max P.0 mmHg  E/E' av.3  Lateral E/e': 8.9  Medial E/e': 17.8           _____________________________________________________________________________  __           Report approved by: Tammi Sanchez 2020 11:53 AM      Echocardiogram Limited    Narrative    [id number]  [id number]  [id number]  [id number]^KOBE^NISREEN^[facility]  Echocardiography Laboratory  [address] [place] [address]        Name: MIKE DUNHAM  MRN: [id number]  : 1945  Study Date: 2020 01:22 PM  Age: 74 yrs  Gender: Male  Patient Location: [facility]  Reason For Study: Cardiomyopathy  Ordering Physician: NISREEN BULLOCK  Referring Physician: Red Rogers  Performed By: Ramya Mckeon     BSA: 2.0 m2  Height: 68 in  Weight: 183 lb  BP: 105/73 mmHg  _____________________________________________________________________________  __        Procedure  Limited Portable Echo Adult. Optison (NDC #[id number]) given intravenously.  _____________________________________________________________________________  __        Interpretation Summary     A limited study was performed to reassess LV/RV systolic performance and  compare with last study.  Left ventricular systolic function is severely reduced.  The visual ejection fraction is estimated at 20-25%.  There is severe global hypokinesia of the left ventricle.  The left ventricle is normal in size.  The right ventricle is moderately dilated.  Moderately decreased right ventricular systolic function  There is moderate biatrial enlargement.  There is mild (1+) mitral regurgitation.  The rhythm was atrial fibrillation.     There has been no significant change since 3/28/2020.  _____________________________________________________________________________  __        Left Ventricle  The left ventricle is normal  in size. There is normal left ventricular wall  thickness. Left ventricular systolic function is severely reduced. The visual  ejection fraction is estimated at 20-25%. Diastolic function not assessed due  to atrial fibrillation. There is severe global hypokinesia of the left  ventricle. There is no thrombus seen in the left ventricle.     Right Ventricle  The right ventricle is moderately dilated. There is normal right ventricular  wall thickness. Moderately decreased right ventricular systolic function.  There is no mass or thrombus in the right ventricle.     Atria  There is moderate biatrial enlargement. There is no atrial shunt seen. The  left atrial appendage is not well visualized.     Mitral Valve  The mitral valve leaflets appear normal. There is no evidence of stenosis,  fluttering, or prolapse. There is mild (1+) mitral regurgitation. Normal  mitral valve velocity.        Tricuspid Valve  Normal tricuspid valve. The right ventricular systolic pressure is  approximated at 25.7 mmHg plus the right atrial pressure. There is trace to  mild tricuspid regurgitation. There is no tricuspid stenosis.     Aortic Valve  Normal tricuspid aortic valve. No aortic regurgitation is present. No aortic  stenosis is present.     Pulmonic Valve  The pulmonic valve is not well visualized.     Vessels  The aortic root is normal size. Inferior vena cava not well visualized for  estimation of right atrial pressure. Pulmonary arteries not well visualized.     Pericardium  The pericardium appears normal.        Rhythm  The rhythm was atrial fibrillation.  _____________________________________________________________________________  __  MMode/2D Measurements & Calculations     IVSd: 0.90 cm  LVIDd: 5.5 cm  LVIDs: 5.1 cm  LVPWd: 0.74 cm  FS: 6.7 %  LV mass(C)d: 162.4 grams  LV mass(C)dI: 82.5 grams/m2  RWT: 0.27        Doppler Measurements & Calculations  TR max chaz: 253.4 cm/sec  TR max P.7 mmHg            _____________________________________________________________________________  __           Report approved by: Dr. Joseph Castillo 2020 02:27 PM      Transesophageal Echocardiogram    Narrative    [id number]  [id number]  [id number]  [id number]^KRISTINA^JORGE ALBERTO           [facility]  Echocardiography Laboratory  [address] [place] [address]        Name: MIKE DUNHAM  MRN: [id number]  : 1945  Study Date: 2020 03:06 PM  Age: 74 yrs  Gender: Male  Patient Location: [facility]  Reason For Study: Afib  Ordering Physician: JORGE ALBERTO ACEVEDO  Performed By: Lyric Crow     BSA: 2.0 m2  Height: 68 in  Weight: 185 lb  _____________________________________________________________________________  __        Procedure  Complete NESTOR Adult.  _____________________________________________________________________________  __        Interpretation Summary     The visual ejection fraction is estimated at 20-25%.  There is severe global hypokinesia of the left ventricle.  There is moderate (2+) mitral regurgitation.  The rhythm was rapid atrial fibrillation.  _____________________________________________________________________________  __        NESTOR  Prior to the exam, the oral cavity was checked and no overcrowding was noted.  Consent to the procedure was obtained prior to sedation. The transesophageal  probe was passed without difficulty. There were no complications associated  with this procedure. I determined this patient to be an appropriate candidate  for the planned sedation and procedure and have reassessed the patient  immediately prior to sedation and procedure. Total sedation time: 15 minutes  of continuous bedside 1:1 monitoring. Versed (2mg) was given intravenously.  Fentanyl (25mcg) was given intravenously.     Left Ventricle  The visual ejection fraction is estimated at 20-25%. There is severe global  hypokinesia of the left ventricle.     Right Ventricle  The right ventricle is  normal in size and function.        Atria  The left atrium is moderately dilated. The right atrium is moderate to  severely dilated. A contrast injection (Bubble Study) was performed that was  negative for flow across the interatrial septum. There is no color Doppler  evidence of an atrial shunt. No thrombus is detected in the left atrial  appendage.     Mitral Valve  The mitral valve leaflets are mildly thickened. There is moderate (2+) mitral  regurgitation.     Tricuspid Valve  There is mild (1+) tricuspid regurgitation.     Aortic Valve  There is trivial trileaflet aortic sclerosis. There is trace aortic  regurgitation.     Pulmonic Valve  There is trace pulmonic valvular regurgitation.        Vessels  Mild aortic root dilatation. The ascending aorta is Borderline dilated.  Moderate atherosclerotic plaque(s) in the descending aorta.     Pericardial/Pleural  There is no pericardial effusion.     Rhythm  The rhythm was rapid atrial fibrillation.  _____________________________________________________________________________  __                                Report approved by: Tammi Whyte 04/06/2020 04:10 PM                    _____________________________________________________________________________  __            Discharge Medications   Current Discharge Medication List      CONTINUE these medications which have NOT CHANGED    Details   aspirin (ASA) 81 MG tablet Take 81 mg by mouth daily       glipiZIDE (GLUCOTROL XL) 5 MG 24 hr tablet Take 5 mg by mouth daily       metFORMIN (GLUCOPHAGE) 1000 MG tablet Take 1,000 mg by mouth 2 times daily (with meals)       tamsulosin (FLOMAX) 0.4 MG capsule Take 0.4 mg by mouth           Allergies   No Known Allergies

## 2020-04-10 NOTE — PHARMACY-ADMISSION MEDICATION HISTORY
Pharmacy Medication History    Medication history compiled 100% from [facility] notes and MAR.    Prior to Admission medications    Medication Sig Last Dose Taking? Auth Provider   aspirin (ASA) 81 MG tablet Take 81 mg by mouth daily  4/4/2020 Yes Reported, Patient   glipiZIDE (GLUCOTROL XL) 5 MG 24 hr tablet Take 5 mg by mouth daily  3/27/2020 Yes Reported, Patient   metFORMIN (GLUCOPHAGE) 1000 MG tablet Take 1,000 mg by mouth 2 times daily (with meals)  3/26/2020 Yes Reported, Patient   tamsulosin (FLOMAX) 0.4 MG capsule Take 0.4 mg by mouth 3/26/2020 Yes Reported, Patient         Landry Busch, PharmD

## 2020-04-10 NOTE — PLAN OF CARE
OT: Per chart review, pt being transferred to Critical access hospital.     Occupational Therapy Discharge Summary    Reason for therapy discharge:    Discharged to Critical access hospital    Progress towards therapy goal(s). See goals on Care Plan in [facility] electronic health record for goal details.  Goals not met.  Barriers to achieving goals:   transfer to Critical access hospital.    Therapy recommendation(s):    Continued therapy is recommended.  Rationale/Recommendations:  OT eval and treat at Critical access hospital when medically appropriate.

## 2020-04-10 NOTE — PROGRESS NOTES
Items at bedside include,  Dentures, U&L  Glasses x4  Black cell phone   with black cord  Electric razor  Blue shirt x2, black shirt  Wallets x5  Blue stripped bath robe  Black hat  Black sweatpants  Brown shoes  Black pants  Black shoes  Socks x2  Laptop computer

## 2020-04-10 NOTE — PLAN OF CARE
A&O x 4, assist x 1 w/gb & walker, regular diet,  & 144, tele ST, -140's, doctor notified and wants to be repaged if HR sustains over 140, LS fine crackles RLL,O2 93-97% RA, pt was unable to void during shift, had blood clots coming from penis, doctor ordered CBI, T max: 101.1F, Tylenol given, will continue with POC.

## 2020-04-10 NOTE — PRE-PROCEDURE
GENERAL PRE-PROCEDURE:     Written consent obtained?: Yes    Risks and benefits: Risks, benefits and alternatives were discussed    Consent given by:  Patient  Patient states understanding of procedure being performed: Yes    Patient's understanding of procedure matches consent: Yes    Procedure consent matches procedure scheduled: Yes    Expected level of sedation:  Moderate  Appropriately NPO:  Yes  ASA Class:  Class 4- Severe systemic disease, acute unstable problems  Mallampati  :  Grade 1- soft palate, uvula, tonsillar pillars, and posterior pharyngeal wall visible  Lungs:  Lungs clear with good breath sounds bilaterally  Heart:  Normal heart sounds and rate  History & Physical reviewed:  History and physical reviewed and no updates needed  Statement of review:  I have reviewed the lab findings, diagnostic data, medications, and the plan for sedation

## 2020-04-10 NOTE — DISCHARGE SUMMARY
Pt transferred to [facility] via EMS for ablation and pacemaker this afternoon. Report given to EMS and Yulia Olivier RN receiving Pt. PIV and CBI remain in place. Telemetry box removed. All belongings sent with Pt. Wife updated on transfer.

## 2020-04-10 NOTE — PROGRESS NOTES
CM aware of patient's fluid overload. He is transferring to [facility] for EP procedure & pacer placement.     CM will continue to follow patient until discharge for any additional needs.     Ami Infante RN, BSN, CPHN, CM  Inpatient Care Coordination  [facility]  [phone]

## 2020-04-10 NOTE — PROGRESS NOTES
I was called for hematuria, urinary clots, and urinary retention (bladder scanned for 800 ml). Continuous bladder irrigation ordered.

## 2020-04-10 NOTE — PROGRESS NOTES
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 Stop amiodarone and restart Eliquis in am. No ASA while on Eliquis.

## 2020-04-10 NOTE — PLAN OF CARE
Presentation/Diagnosis: Admitted on 3/27/20 with SOB and tachycardia. Diagnosed with a-fib RVR, cardiogenic shock, and CHF. Intubated on 3/28/20 for hypoxic respiratory failure. Extubated on 20.  History: Diabetes  Labs/Protocols: K+ 3.9, replaced today. Creatinine: 2.48. Ma.9. Na+: 132. Hgb: 10. Platelets: 454  Vitals: VSS with soft blood pressures.   Cardiac: Irregular, tachycardic. EF 20-25% with moderate to severe mitral regurgitation. Cardioversion on 20, returned to a-fib that night.  Telemetry: A-fib RVR.  Respiratory: Clear, diminished.  Neuro: A&O x4  GI/: CBI d/t clots overnight. Urine this shift clear and yellow. Small clots present.  Skin: Scattered bruising  LDAs: 20 G IV in right AC, saline locked. 18 G IV in L forearm, saline locked.   Diet: Previously regular diet. Currently NPO for procedure later today.   Activity: SBA with gait belt and walker.  Teaching: Pt updated on plan of care and transfer to [facility]  Plan: Transferring to [facility] for ablation and pacemaker at 1430.

## 2020-04-10 NOTE — PROGRESS NOTES
[facility]    Medicine Progress Note - Hospitalist Service       Date of Admission:  3/27/2020  Assessment & Plan   Patient is a 74-year-old male with a past medical history significant for type 2 diabetes, and BPH who was seen initially at an outside urgent care center and transferred to the ED for atrial fibrillation with RVR.  He had 6 to 8 weeks of progressive shortness of breath that progressed to dyspnea at rest.  He was admitted to the ICU with according to history, patient reported 6 to 8 weeks of progressive shortness of breath.  His shortness of breath initially started with exertion but had progressed to dyspnea even at rest.  He was admitted to the ICU in cardiogenic shock and treated for infection (possible pneumonia).  He did initially require pressors and intubation.  He is now off of pressors and extubated.      #Cardiogenic shock.  EF of 20 to 25%, moderate to severe MR, moderate to severe RV systolic dysfunction.  He did initially require norepinephrine which has been weaned off as of 4/1.  There was concern for associated infection with his presentation with significant leukocytosis and shortness of breath.  Chest x-ray did seem to show some infiltrate.  He was initially on vancomycin and Zosyn.  He did complete 7 days of Zosyn therapy.  His COVID19 was negative.    He is mildly volume overloaded at this point.  -Blood pressure is low normal but improved to this a.m.  - Giving 40 mg p.o. Lasix daily starting on 4/9.  -We will uptitrate metoprolol as tolerated per cardiology.  Hold parameters are in place.  -Cardiology is consulted, appreciate recommendations.  -He will require an ischemic work-up in the outpatient setting.     #Atrial fibrillation with rapid rate.  Rates remain elevated in the 110s to 120s.  -On amiodarone and low-dose metoprolol, per cardiology.  Did place hold parameters on his metoprolol given soft blood pressures.  -s/p NSETOR plus cardioversion on 4/6/20: But he  went back into A. fib later on in the evening.   -Cardiology deciding whether they want to repeat cardioversion versus transfer to [facility] for ablation versus pursue medical therapy.  -Possibly ischemia work-up in the outpatient setting.  -Stroke prevention:  He was on IV heparin.  In consultation with cardiology, he was transitioned to apixaban therapy on 4/8     #Acute hypoxic respiratory failure.  Improved. In the setting of CHF and cardiogenic shock.  He also had a possible pneumonia.  He did complete 7 days of treatment with Zosyn therapy.  Extubated on 4/1/2020.  - Patient is DNR but not DNI. Dr Hay discussed with the patient regarding intubation if needed again in the future: Patient expressed that he does want to be reintubated if necessary.  - stable     #Gross hematuria.  Happened on 3 different occasions on anticoagulation. Three-way urinary catheter in place.   - Further investigations regarding hematuria will likely be in the outpatient setting by urology.  --3 way baker placed 4/9 due to hematuria and clots, this has since cleared.    #Acute kidney injury in the setting of chronic kidney disease.  Baseline creatinine is around 2.  Creatinine has improved with treatment.  - Nephrology following.       #DM: on lantus and sliding scale insulin     Elevated AST and ALT Thought to be ischemic hepatitis/shock liver/liver congestion in the setting of CHF  - LFTs improved.     #Incidental 1 cm pulmonary nodule on CT.  - Outpatient follow-up.     #Malnutrition  Oral intake has significantly improved.          Diet: Regular Diet Adult    DVT Prophylaxis: eliquis  Baker Catheter: not present  Code Status: DNR      Disposition Plan   Expected discharge: 2 - 3 days, recommended to transitional care unit once renal function improved and HR controlled..  Entered: Lorelei Andrade MD 04/10/2020, 7:33 AM       The patient's care was discussed with the Patient.    Lorelei Andrade MD  Hospitalist Service  [place]  [facility]    ______________________________________________________________________    Interval History     + fever. No cough/chest pain/SOB. 3 way catheter placed due to hematuria.    Data reviewed today: I reviewed all medications, new labs and imaging results over the last 24 hours. I personally reviewed no images or EKG's today.    Physical Exam   Vital Signs: Temp: 99.5  F (37.5  C) Temp src: Oral BP: 112/72 Pulse: 121 Heart Rate: 105 Resp: 18 SpO2: 97 % O2 Device: None (Room air) Oxygen Delivery: 1 LPM  Weight: 186 lbs 6.4 oz    Gen - AAO x 3 in NAD.  Lungs - CTA B with few crackles in bases.  Heart - tachycardic, irregularly irregular, S1+S2 nml, no m/g/r.  Abd - soft, NT, ND,+ BS.  Ext - no edema.    Data   Recent Labs   Lab 04/09/20  0534 04/07/20  0533 04/06/20  1154 04/06/20  0542 04/05/20  0544   WBC 15.3*  --   --   --  14.0*   HGB 10.1*  --   --   --  11.1*   MCV 83  --   --   --  84     --   --   --  302   INR  --   --  1.17*  --   --     133  --  136 137   POTASSIUM 3.7 4.3 4.2 3.7 3.6   CHLORIDE 102 101  --  102 102   CO2 24 24  --  29 31   BUN 68* 68*  --  72* 88*   CR 2.30* 2.25*  --  2.32* 2.95*   ANIONGAP 7 8  --  5 5   HARPER 9.2 9.5  --  9.6 9.2   * 163*  --  117* 159*   ALBUMIN  --  2.6*  --   --  2.3*   PROTTOTAL  --   --   --   --  6.3*   BILITOTAL  --   --   --   --  0.7   ALKPHOS  --   --   --   --  210*   ALT  --   --   --   --  527*   AST  --   --   --   --  62*     No results found for this or any previous visit (from the past 24 hour(s)).  Medications     dextrose       - MEDICATION INSTRUCTIONS -       - MEDICATION INSTRUCTIONS -       - MEDICATION INSTRUCTIONS -       - MEDICATION INSTRUCTIONS -         amiodarone  200 mg Oral BID     apixaban ANTICOAGULANT  5 mg Oral BID     furosemide  40 mg Oral Daily     insulin aspart  1-6 Units Subcutaneous TID w/meals     insulin aspart  1-5 Units Subcutaneous At Bedtime     insulin aspart   Subcutaneous TID AC      insulin glargine  16 Units Subcutaneous At Bedtime     lidocaine   Topical Once    Or     lidocaine  1.5 mL Topical Once     metoprolol succinate ER  25 mg Oral BID     pantoprazole  40 mg Oral QAM AC     sodium chloride (PF)  3 mL Intracatheter Q8H

## 2020-04-10 NOTE — CONSULTS
"[facility]    Cardiac Electrophysiology Consultation     Date of Admission:  4/10/2020  Date of Consult (When I saw the patient): 04/10/20    Assessment & Plan   Gene Pagan is a 74 year old male who was admitted on 4/10/2020. I was asked to see the patient for new onset AF. H/o DM-II who presented with worsening sob past several weeks. Noted to be quite hypoxemia along with tachycardia due to AF. Intubated soon thereafter for respiratory failure. COVID-19 negative. Severe global LV dysfunction noted. Had NESTOR guided CVN on 4/6 but recurred despite being on amiodarone. LA moderately dilated. No classic sxs of palpitations just sob. Patient was transferred here for further evaluation.    Unclear duration of AF but suspect long standing given moderately dilated LA and likely contributing to cardiomyopathy and CHF. Given low bp rate control has been quite challenging and AF had recurred despite adequate amio loading only options would be complex AF ablation vs. AVN ablation with CRT-p. No certainty that complex AF ablation would be a long term solution and may require additional procedure and in light of current COVID-19 outbreak it would be best to offer a potential \"fix\" with AVN ablation and CRT-p on an urgent, non-elective basis. Very good chance LVEF would improve once rate is controlled. I went over the procedure in details with risks including but not limited to vascular injury and infection. Stop amiodarone after procedure and continue with Eliquis.    Abhay Zapata    Code Status    Prior    Primary Care Physician   [facility]    History is obtained from the patient    Past Medical History   I have reviewed this patient's medical history and updated it with pertinent information if needed.   Past Medical History:   Diagnosis Date     BPH (benign prostatic hyperplasia)      Diabetes (H)        Past Surgical History   I have reviewed this patient's surgical history and updated it " with pertinent information if needed.  Past Surgical History:   Procedure Laterality Date     ANESTHESIA CARDIOVERSION N/A 4/6/2020    Procedure: ANESTHESIA, FOR CARDIOVERSION;  Surgeon: GENERIC ANESTHESIA PROVIDER;  Location:  OR       Prior to Admission Medications   Prior to Admission Medications   Prescriptions Last Dose Informant Patient Reported? Taking?   aspirin (ASA) 81 MG tablet   Yes No   Sig: Take 81 mg by mouth daily    glipiZIDE (GLUCOTROL XL) 5 MG 24 hr tablet   Yes No   Sig: Take 5 mg by mouth daily    metFORMIN (GLUCOPHAGE) 1000 MG tablet   Yes No   Sig: Take 1,000 mg by mouth 2 times daily (with meals)    tamsulosin (FLOMAX) 0.4 MG capsule   Yes No   Sig: Take 0.4 mg by mouth      Facility-Administered Medications: None     Allergies   No Known Allergies    Social History   I have reviewed this patient's social history and updated it with pertinent information if needed. Gene Pagan  reports that he has never smoked. He does not have any smokeless tobacco history on file. He reports current alcohol use.    Family History   I have reviewed this patient's family history and updated it with pertinent information if needed.   Family History   Problem Relation Age of Onset     Diabetes Sister        Review of Systems   Comprehensive review of systems was performed with pertinent positives and negatives listed in assessment and plan section.    Physical Exam                 O2 Device: Nasal cannula Oxygen Delivery: 2 LPM  Vital Signs with Ranges  Temp:  [97.6  F (36.4  C)-101.1  F (38.4  C)] 97.6  F (36.4  C)  Pulse:  [121] 121  Heart Rate:  [105-123] 123  Resp:  [18-20] 20  BP: ()/(61-72) 103/61  SpO2:  [93 %-97 %] 97 %  0 lbs 0 oz    Constitutional: awake, alert, cooperative, no apparent distress, and appears stated age  Eyes: Lids and lashes normal, pupils equal, round and reactive to light, extra ocular muscles intact, sclera clear, conjunctiva normal  ENT: Normocephalic, without obvious  abnormality, atraumatic, sinuses nontender on palpation, external ears without lesions, oral pharynx with moist mucous membranes, tonsils without erythema or exudates, gums normal and good dentition.  Hematologic / Lymphatic: no cervical lymphadenopathy  Respiratory: No increased work of breathing, good air exchange, clear to auscultation bilaterally, no crackles or wheezing  Cardiovascular: displaced and irregularly irregular rhythm  GI: No scars, normal bowel sounds, soft, non-distended, non-tender, no masses palpated, no hepatosplenomegally  Skin: no bruising or bleeding  Musculoskeletal: There is no redness, warmth, or swelling of the joints.  Full range of motion noted.  .  Neurologic: Awake, alert, l.  Neuropsychiatric: General: normal, calm and normal eye contact    Data   I personally reviewed all recent ECGs and images.  Results for orders placed or performed during the hospital encounter of 03/27/20 (from the past 24 hour(s))   Lactic acid level STAT   Result Value Ref Range    Lactate for Sepsis Protocol 2.1 (H) 0.7 - 2.0 mmol/L   Glucose by meter   Result Value Ref Range    Glucose 184 (H) 70 - 99 mg/dL   Glucose by meter   Result Value Ref Range    Glucose 157 (H) 70 - 99 mg/dL   Glucose by meter   Result Value Ref Range    Glucose 144 (H) 70 - 99 mg/dL   Basic metabolic panel   Result Value Ref Range    Sodium 132 (L) 133 - 144 mmol/L    Potassium 3.9 3.4 - 5.3 mmol/L    Chloride 101 94 - 109 mmol/L    Carbon Dioxide 23 20 - 32 mmol/L    Anion Gap 8 3 - 14 mmol/L    Glucose 136 (H) 70 - 99 mg/dL    Urea Nitrogen 64 (H) 7 - 30 mg/dL    Creatinine 2.48 (H) 0.66 - 1.25 mg/dL    GFR Estimate 24 (L) >60 mL/min/[1.73_m2]    GFR Estimate If Black 28 (L) >60 mL/min/[1.73_m2]    Calcium 9.4 8.5 - 10.1 mg/dL   Magnesium   Result Value Ref Range    Magnesium 1.9 1.6 - 2.3 mg/dL   CBC with platelets differential   Result Value Ref Range    WBC 14.7 (H) 4.0 - 11.0 10e9/L    RBC Count 3.96 (L) 4.4 - 5.9 10e12/L     Hemoglobin 10.0 (L) 13.3 - 17.7 g/dL    Hematocrit 33.4 (L) 40.0 - 53.0 %    MCV 84 78 - 100 fl    MCH 25.3 (L) 26.5 - 33.0 pg    MCHC 29.9 (L) 31.5 - 36.5 g/dL    RDW 16.4 (H) 10.0 - 15.0 %    Platelet Count 454 (H) 150 - 450 10e9/L    Diff Method Automated Method     % Neutrophils 78.9 %    % Lymphocytes 5.2 %    % Monocytes 9.9 %    % Eosinophils 3.1 %    % Basophils 1.4 %    % Immature Granulocytes 1.5 %    Nucleated RBCs 0 0 /100    Absolute Neutrophil 11.6 (H) 1.6 - 8.3 10e9/L    Absolute Lymphocytes 0.8 0.8 - 5.3 10e9/L    Absolute Monocytes 1.5 (H) 0.0 - 1.3 10e9/L    Absolute Eosinophils 0.5 0.0 - 0.7 10e9/L    Absolute Basophils 0.2 0.0 - 0.2 10e9/L    Abs Immature Granulocytes 0.2 0 - 0.4 10e9/L    Absolute Nucleated RBC 0.0    Procalcitonin   Result Value Ref Range    Procalcitonin 0.47 ng/ml   Glucose by meter   Result Value Ref Range    Glucose 169 (H) 70 - 99 mg/dL

## 2020-04-10 NOTE — PLAN OF CARE
Speech Language Therapy Discharge Summary    Reason for therapy discharge:    All goals and outcomes met, no further needs identified.    Progress towards therapy goal(s). See goals on Care Plan in [facility] electronic health record for goal details.  Goals met.    Pt seen with breakfast meal of regular solids, thin liquids. Independent with slow pacing, small bites/sips. Clinically tolerating with only x1 throat clear.    Therapy recommendation(s):    No further therapy is recommended.

## 2020-04-10 NOTE — PLAN OF CARE
PT/OT: Pt's HR tachy this AM, per discussion with RN pt not appropriate for therapies this AM       Physical Therapy Discharge Summary    Reason for therapy discharge:    Discharged to [facility]    Progress towards therapy goal(s). See goals on Care Plan in [facility] electronic health record for goal details.  Goals not met.  Barriers to achieving goals:   discharge from facility.    Therapy recommendation(s):    Continued therapy is recommended.  Rationale/Recommendations:  Continue therapy at [facility] when medically appropriate.

## 2020-04-10 NOTE — PROVIDER NOTIFICATION
MD notified. Per Cardiology, Pt to transfer to [facility] today for pacemaker and ablation. Contact [place] with questions.

## 2020-04-10 NOTE — PROGRESS NOTES
Cross Cx:   Paged by nursing for sustained atrial fibrillation with rapid ventricular response in the 120-140s. Patient failed cardioversion and is currently being treated with metoprolol 12.5 mg BID and amiodarone 200 mg BID. Review of vitals shows a HR of 100-140s throughout hospitalization. Patient is comfortable and sleeping at this time. Earlier this evening had hematuria with clots and retention. CBI initiated at 2245. /72. During hospitalization has had low BP. Will monitor patient for now and if HR sustained >130's will reassess.

## 2020-04-11 ENCOUNTER — APPOINTMENT (OUTPATIENT)
Dept: GENERAL RADIOLOGY | Facility: CLINIC | Age: 75
DRG: 242 | End: 2020-04-11
Attending: INTERNAL MEDICINE
Payer: COMMERCIAL

## 2020-04-11 ENCOUNTER — APPOINTMENT (OUTPATIENT)
Dept: CARDIOLOGY | Facility: CLINIC | Age: 75
DRG: 242 | End: 2020-04-11
Attending: INTERNAL MEDICINE
Payer: COMMERCIAL

## 2020-04-11 ENCOUNTER — APPOINTMENT (OUTPATIENT)
Dept: PHYSICAL THERAPY | Facility: CLINIC | Age: 75
DRG: 242 | End: 2020-04-11
Attending: INTERNAL MEDICINE
Payer: COMMERCIAL

## 2020-04-11 ENCOUNTER — APPOINTMENT (OUTPATIENT)
Dept: OCCUPATIONAL THERAPY | Facility: CLINIC | Age: 75
DRG: 242 | End: 2020-04-11
Attending: INTERNAL MEDICINE
Payer: COMMERCIAL

## 2020-04-11 LAB
ALBUMIN SERPL-MCNC: 2.5 G/DL (ref 3.4–5)
ALBUMIN UR-MCNC: 30 MG/DL
ALP SERPL-CCNC: 117 U/L (ref 40–150)
ALT SERPL W P-5'-P-CCNC: 107 U/L (ref 0–70)
ANION GAP SERPL CALCULATED.3IONS-SCNC: 10 MMOL/L (ref 3–14)
ANION GAP SERPL CALCULATED.3IONS-SCNC: 11 MMOL/L (ref 3–14)
APPEARANCE UR: ABNORMAL
AST SERPL W P-5'-P-CCNC: 49 U/L (ref 0–45)
BILIRUB SERPL-MCNC: 0.6 MG/DL (ref 0.2–1.3)
BILIRUB UR QL STRIP: NEGATIVE
BUN SERPL-MCNC: 79 MG/DL (ref 7–30)
BUN SERPL-MCNC: 82 MG/DL (ref 7–30)
CALCIUM SERPL-MCNC: 8.9 MG/DL (ref 8.5–10.1)
CALCIUM SERPL-MCNC: 9.1 MG/DL (ref 8.5–10.1)
CHLORIDE SERPL-SCNC: 98 MMOL/L (ref 94–109)
CHLORIDE SERPL-SCNC: 98 MMOL/L (ref 94–109)
CO2 SERPL-SCNC: 19 MMOL/L (ref 20–32)
CO2 SERPL-SCNC: 21 MMOL/L (ref 20–32)
COLOR UR AUTO: YELLOW
CREAT SERPL-MCNC: 3.45 MG/DL (ref 0.66–1.25)
CREAT SERPL-MCNC: 3.7 MG/DL (ref 0.66–1.25)
CREAT UR-MCNC: 87 MG/DL
ERYTHROCYTE [DISTWIDTH] IN BLOOD BY AUTOMATED COUNT: 16.4 % (ref 10–15)
FRACT EXCRET NA UR+SERPL-RTO: 0.4 %
GFR SERPL CREATININE-BSD FRML MDRD: 15 ML/MIN/{1.73_M2}
GFR SERPL CREATININE-BSD FRML MDRD: 16 ML/MIN/{1.73_M2}
GLUCOSE BLDC GLUCOMTR-MCNC: 159 MG/DL (ref 70–99)
GLUCOSE BLDC GLUCOMTR-MCNC: 180 MG/DL (ref 70–99)
GLUCOSE BLDC GLUCOMTR-MCNC: 211 MG/DL (ref 70–99)
GLUCOSE BLDC GLUCOMTR-MCNC: 213 MG/DL (ref 70–99)
GLUCOSE SERPL-MCNC: 179 MG/DL (ref 70–99)
GLUCOSE SERPL-MCNC: 217 MG/DL (ref 70–99)
GLUCOSE UR STRIP-MCNC: NEGATIVE MG/DL
HCT VFR BLD AUTO: 32.6 % (ref 40–53)
HGB BLD-MCNC: 10.2 G/DL (ref 13.3–17.7)
HGB UR QL STRIP: ABNORMAL
HYALINE CASTS #/AREA URNS LPF: 3 /LPF (ref 0–2)
KETONES UR STRIP-MCNC: NEGATIVE MG/DL
LACTATE BLD-SCNC: 1.6 MMOL/L (ref 0.7–2)
LEUKOCYTE ESTERASE UR QL STRIP: ABNORMAL
MCH RBC QN AUTO: 25.2 PG (ref 26.5–33)
MCHC RBC AUTO-ENTMCNC: 31.3 G/DL (ref 31.5–36.5)
MCV RBC AUTO: 81 FL (ref 78–100)
MUCOUS THREADS #/AREA URNS LPF: PRESENT /LPF
NITRATE UR QL: NEGATIVE
PH UR STRIP: 5 PH (ref 5–7)
PLATELET # BLD AUTO: 476 10E9/L (ref 150–450)
POTASSIUM SERPL-SCNC: 4.5 MMOL/L (ref 3.4–5.3)
POTASSIUM SERPL-SCNC: 4.8 MMOL/L (ref 3.4–5.3)
PROT SERPL-MCNC: 6.4 G/DL (ref 6.8–8.8)
RBC # BLD AUTO: 4.05 10E12/L (ref 4.4–5.9)
RBC #/AREA URNS AUTO: 133 /HPF (ref 0–2)
SODIUM SERPL-SCNC: 128 MMOL/L (ref 133–144)
SODIUM SERPL-SCNC: 129 MMOL/L (ref 133–144)
SODIUM UR-SCNC: 12 MMOL/L
SOURCE: ABNORMAL
SP GR UR STRIP: 1.02 (ref 1–1.03)
UROBILINOGEN UR STRIP-MCNC: NORMAL MG/DL (ref 0–2)
WBC # BLD AUTO: 15.5 10E9/L (ref 4–11)
WBC #/AREA URNS AUTO: 39 /HPF (ref 0–5)

## 2020-04-11 PROCEDURE — P9041 ALBUMIN (HUMAN),5%, 50ML: HCPCS | Performed by: INTERNAL MEDICINE

## 2020-04-11 PROCEDURE — 93321 DOPPLER ECHO F-UP/LMTD STD: CPT | Mod: 26 | Performed by: INTERNAL MEDICINE

## 2020-04-11 PROCEDURE — 97535 SELF CARE MNGMENT TRAINING: CPT | Mod: GO

## 2020-04-11 PROCEDURE — 97162 PT EVAL MOD COMPLEX 30 MIN: CPT | Mod: GP

## 2020-04-11 PROCEDURE — 93325 DOPPLER ECHO COLOR FLOW MAPG: CPT | Mod: 26 | Performed by: INTERNAL MEDICINE

## 2020-04-11 PROCEDURE — 81001 URINALYSIS AUTO W/SCOPE: CPT | Performed by: HOSPITALIST

## 2020-04-11 PROCEDURE — 25000132 ZZH RX MED GY IP 250 OP 250 PS 637: Performed by: HOSPITALIST

## 2020-04-11 PROCEDURE — 99233 SBSQ HOSP IP/OBS HIGH 50: CPT | Mod: 25 | Performed by: INTERNAL MEDICINE

## 2020-04-11 PROCEDURE — 36415 COLL VENOUS BLD VENIPUNCTURE: CPT | Performed by: HOSPITALIST

## 2020-04-11 PROCEDURE — 97530 THERAPEUTIC ACTIVITIES: CPT | Mod: GP

## 2020-04-11 PROCEDURE — 40000264 ECHOCARDIOGRAM LIMITED

## 2020-04-11 PROCEDURE — 40000275 ZZH STATISTIC RCP TIME EA 10 MIN

## 2020-04-11 PROCEDURE — 97116 GAIT TRAINING THERAPY: CPT | Mod: GP

## 2020-04-11 PROCEDURE — 99233 SBSQ HOSP IP/OBS HIGH 50: CPT | Performed by: HOSPITALIST

## 2020-04-11 PROCEDURE — 25000128 H RX IP 250 OP 636: Performed by: INTERNAL MEDICINE

## 2020-04-11 PROCEDURE — 25000132 ZZH RX MED GY IP 250 OP 250 PS 637: Performed by: INTERNAL MEDICINE

## 2020-04-11 PROCEDURE — 87086 URINE CULTURE/COLONY COUNT: CPT | Performed by: INTERNAL MEDICINE

## 2020-04-11 PROCEDURE — 97165 OT EVAL LOW COMPLEX 30 MIN: CPT | Mod: GO

## 2020-04-11 PROCEDURE — 82570 ASSAY OF URINE CREATININE: CPT | Performed by: HOSPITALIST

## 2020-04-11 PROCEDURE — 25000131 ZZH RX MED GY IP 250 OP 636 PS 637: Performed by: INTERNAL MEDICINE

## 2020-04-11 PROCEDURE — 93308 TTE F-UP OR LMTD: CPT | Mod: 26 | Performed by: INTERNAL MEDICINE

## 2020-04-11 PROCEDURE — 84300 ASSAY OF URINE SODIUM: CPT | Performed by: HOSPITALIST

## 2020-04-11 PROCEDURE — 83605 ASSAY OF LACTIC ACID: CPT | Performed by: HOSPITALIST

## 2020-04-11 PROCEDURE — 21000001 ZZH R&B HEART CARE

## 2020-04-11 PROCEDURE — 00000146 ZZHCL STATISTIC GLUCOSE BY METER IP

## 2020-04-11 PROCEDURE — 85027 COMPLETE CBC AUTOMATED: CPT | Performed by: INTERNAL MEDICINE

## 2020-04-11 PROCEDURE — 80053 COMPREHEN METABOLIC PANEL: CPT | Performed by: INTERNAL MEDICINE

## 2020-04-11 PROCEDURE — 97110 THERAPEUTIC EXERCISES: CPT | Mod: GP

## 2020-04-11 PROCEDURE — 80048 BASIC METABOLIC PNL TOTAL CA: CPT | Performed by: HOSPITALIST

## 2020-04-11 PROCEDURE — 36415 COLL VENOUS BLD VENIPUNCTURE: CPT | Performed by: INTERNAL MEDICINE

## 2020-04-11 PROCEDURE — 94660 CPAP INITIATION&MGMT: CPT

## 2020-04-11 PROCEDURE — 40000986 XR CHEST 2 VW

## 2020-04-11 PROCEDURE — 25500064 ZZH RX 255 OP 636: Performed by: INTERNAL MEDICINE

## 2020-04-11 RX ORDER — ACETAMINOPHEN 650 MG/1
650 SUPPOSITORY RECTAL EVERY 4 HOURS PRN
Status: DISCONTINUED | OUTPATIENT
Start: 2020-04-11 | End: 2020-04-11

## 2020-04-11 RX ORDER — ACETAMINOPHEN 325 MG/1
650 TABLET ORAL EVERY 4 HOURS PRN
Status: DISCONTINUED | OUTPATIENT
Start: 2020-04-11 | End: 2020-04-17 | Stop reason: HOSPADM

## 2020-04-11 RX ORDER — ALBUMIN, HUMAN INJ 5% 5 %
25 SOLUTION INTRAVENOUS ONCE
Status: COMPLETED | OUTPATIENT
Start: 2020-04-11 | End: 2020-04-11

## 2020-04-11 RX ORDER — ACETAMINOPHEN 650 MG/1
650 SUPPOSITORY RECTAL EVERY 4 HOURS PRN
Status: DISCONTINUED | OUTPATIENT
Start: 2020-04-11 | End: 2020-04-17 | Stop reason: HOSPADM

## 2020-04-11 RX ORDER — ACETAMINOPHEN 325 MG/1
650 TABLET ORAL EVERY 4 HOURS PRN
Status: DISCONTINUED | OUTPATIENT
Start: 2020-04-11 | End: 2020-04-11

## 2020-04-11 RX ADMIN — APIXABAN 5 MG: 5 TABLET, FILM COATED ORAL at 20:30

## 2020-04-11 RX ADMIN — PIPERACILLIN SODIUM AND TAZOBACTAM SODIUM 2.25 G: 2; .25 INJECTION, POWDER, LYOPHILIZED, FOR SOLUTION INTRAVENOUS at 00:05

## 2020-04-11 RX ADMIN — PIPERACILLIN SODIUM AND TAZOBACTAM SODIUM 2.25 G: 2; .25 INJECTION, POWDER, LYOPHILIZED, FOR SOLUTION INTRAVENOUS at 11:44

## 2020-04-11 RX ADMIN — INSULIN ASPART 2 UNITS: 100 INJECTION, SOLUTION INTRAVENOUS; SUBCUTANEOUS at 07:50

## 2020-04-11 RX ADMIN — TAMSULOSIN HYDROCHLORIDE 0.4 MG: 0.4 CAPSULE ORAL at 20:30

## 2020-04-11 RX ADMIN — ALBUMIN HUMAN 12.5 G: 0.05 INJECTION, SOLUTION INTRAVENOUS at 00:04

## 2020-04-11 RX ADMIN — ACETAMINOPHEN 650 MG: 325 TABLET, FILM COATED ORAL at 13:00

## 2020-04-11 RX ADMIN — HUMAN ALBUMIN MICROSPHERES AND PERFLUTREN 9 ML: 10; .22 INJECTION, SOLUTION INTRAVENOUS at 09:45

## 2020-04-11 RX ADMIN — PIPERACILLIN SODIUM AND TAZOBACTAM SODIUM 2.25 G: 2; .25 INJECTION, POWDER, LYOPHILIZED, FOR SOLUTION INTRAVENOUS at 05:39

## 2020-04-11 RX ADMIN — PIPERACILLIN SODIUM AND TAZOBACTAM SODIUM 2.25 G: 2; .25 INJECTION, POWDER, LYOPHILIZED, FOR SOLUTION INTRAVENOUS at 23:51

## 2020-04-11 RX ADMIN — ALBUMIN HUMAN 25 G: 0.05 INJECTION, SOLUTION INTRAVENOUS at 17:10

## 2020-04-11 RX ADMIN — APIXABAN 5 MG: 5 TABLET, FILM COATED ORAL at 08:58

## 2020-04-11 RX ADMIN — INSULIN ASPART 1 UNITS: 100 INJECTION, SOLUTION INTRAVENOUS; SUBCUTANEOUS at 18:19

## 2020-04-11 RX ADMIN — PIPERACILLIN SODIUM AND TAZOBACTAM SODIUM 2.25 G: 2; .25 INJECTION, POWDER, LYOPHILIZED, FOR SOLUTION INTRAVENOUS at 18:38

## 2020-04-11 RX ADMIN — INSULIN GLARGINE 16 UNITS: 100 INJECTION, SOLUTION SUBCUTANEOUS at 21:51

## 2020-04-11 ASSESSMENT — ACTIVITIES OF DAILY LIVING (ADL)
ADLS_ACUITY_SCORE: 14
ADLS_ACUITY_SCORE: 17
ADLS_ACUITY_SCORE: 14
ADLS_ACUITY_SCORE: 17
ADLS_ACUITY_SCORE: 17
PREVIOUS_RESPONSIBILITIES: MEAL PREP;HOUSEKEEPING;LAUNDRY;SHOPPING;YARDWORK;MEDICATION MANAGEMENT;FINANCES;DRIVING;WORK
ADLS_ACUITY_SCORE: 14

## 2020-04-11 NOTE — PLAN OF CARE
A&O x 3-4. Patient denies pain. Pt is hypotensive, albumin has been ordered. Up with Ax1 w/gait belt and walker.. bedrest ended at 2130. Tele: AV paced. Right groin venous site, WDL & CDI. Plan for albumin dose. Continue to Monitor.

## 2020-04-11 NOTE — PLAN OF CARE
Discharge Planner PT   Patient plan for discharge: Pt receptive to rehab  Current status: Pt is 74 year old male adm on 4/10/2020 as a transfer from [facility] for further cardiac evaluation and treatment, underwent AVN ablation and PPM placement. At baseline, pt is independent without use of assistive device, lives with significant other in two [facility] with 3 stairs to enter.    PT/CR orders received, chart reviewed, evaluation completed and treatment initiated. Pt is min assist for bed mobility, min assist for sit to stand transfers, min assist to ambulate 40' with FWW. Pt fatigues quickly and needs frequent rests. Pt participated in supine, seated, and standing exercises.  Barriers to return to prior living situation: Decreased strength, decreased balance, decreased activity tolerance  Recommendations for discharge: ARU  Rationale for recommendations: Pt is below baseline level of function and would benefit from intensive inpatient rehabilitation program with multidisciplinary approach to optimize functional recovery. Anticipate pt could tolerate 3 hours of therapy a day when medically stable for discharge.       Entered by: Funmi Nichols 04/11/2020 4:41 PM

## 2020-04-11 NOTE — CONSULTS
"Care Coordination: consulted for \"Congestive Heart Failure (CHF)\"     \"Congestive Heart Failure (CHF)\"     NOTE:   + CHF teaching to be done by bedside RN.   + [facility] evaluation is ordered and will be managed by CORE RN    Per brief chart review,   + pt has active PCP at [facility] in [place], Dr. Martínez Marquez [phone]   + pt has active insurance UCARE/UCARE MEDICARE   + discharge is \"Unclear pending further evaluation and clinical improvement\"    Shayy Jung RN, BSN, PHN  [facility] Care Coordination  [facility]   Mobile: [phone]    "

## 2020-04-11 NOTE — PLAN OF CARE
Rcvd pt from cath lab post AV node ablation and pacemaker placement. A&Ox4, drowsy. VSS, AV paced at 70. Irregular respiratoins, NC at 2L. Right groin venous site with gauze and tegaderm, CDI. Left chest pacemaker site with island dressing, tegaderm and pressure dressing, CDI. CMS intact. Wallets (3) sent to security.

## 2020-04-11 NOTE — CONSULTS
Standard CHF consult received for 2gm Na diet education  Diet: 2 gm Na, no caffeine  Will plan to provide diet review with pt prior to d/c

## 2020-04-11 NOTE — PROVIDER NOTIFICATION
MD Notification    Notified Person: MD    Notified Person Name: Dr. Palmer    Notification Date/Time: 4/10/20 21:48    Notification Interaction: Page    Purpose of Notification: Pt's BP is 77/53. Pt is asymptomatic.     Orders Received: - Try 250 ml dose of 5% albumin (12 g).    Comments:

## 2020-04-11 NOTE — PROGRESS NOTES
Sepsis Evaluation Progress Note    I was called to see Gene Pagan due to abnormal vital signs triggering the Sepsis SIRS screening alert. He is known to have an infection.     Physical Exam   Vital Signs:  Temp: 99.2  F (37.3  C) Temp src: Oral BP: (!) 82/50   Heart Rate: 100 Resp: 18 SpO2: 100 % O2 Device: None (Room air) Oxygen Delivery: 2 LPM    Lab:  Lactic Acid   Date Value Ref Range Status   03/29/2020 1.7 0.7 - 2.0 mmol/L Final     Lactate for Sepsis Protocol   Date Value Ref Range Status   04/10/2020 2.6 (H) 0.7 - 2.0 mmol/L Final     Comment:     Significant value called to and read back by  ROSALINDA CARLIN IN [facility] [place] [place] AT 2239 MS         The patient is at baseline mental status.     The rest of their physical exam is significant for tachycardia, hypotension, hematuria.    Assessment & Plan   Gene Pagan meets SIRS criteria but does NOT have a lactate >2 or other evidence of acute organ damage.  These vital sign, lab and physical exam findings are consistent with SEPSIS.    Sepsis Time-Zero (time Sepsis diagnosis confirmed): 10:51  04/10/20    Anti-infectives (From now, onward)    Start     Dose/Rate Route Frequency Ordered Stop    04/10/20 1745  piperacillin-tazobactam (ZOSYN) 2.25 g vial to attach to  ml bag      2.25 g  over 1 Hours Intravenous EVERY 6 HOURS 04/10/20 1742          Current antibiotic coverage is appropriate for source of infection.     Disposition: The patient will remain on the current unit. We will continue to monitor this patient closely..  Fernanda Leyva MD    Sepsis Criteria   Sepsis: 2+ SIRS criteria due to infection  Severe Sepsis: Sepsis AND 1+ new sign of acute organ dysfunction (Note: lactate >2 is organ dysfunction)  Septic Shock: Sepsis AND hypotension despite volume resuscitation with 30 ml/kg crystalloid

## 2020-04-11 NOTE — PLAN OF CARE
Left shoulder c/d/I with pressure dressing intact post PPM.   Pt reports no pain or SOB.  Pt was hypotensive and received Albumin with good results, he was asymptomatic with low BP's.  Pt has been 100% paced over night.  Will continue to monitor.

## 2020-04-11 NOTE — PROGRESS NOTES
[facility]    Medicine Progress Note - Hospitalist Service       Date of Admission:  4/10/2020  Assessment & Plan   Gene Pagan is a 74 year old male with PMHx of NIDDM and BPH who was seen initially at [facility] 3/27/2020 for progressive SOB, ultimately transferred to the ED after found to be in atrial fibrillation with RVR (new diagnosis).  Admitted for further evaluation and treatment. Ultimately pt decompensated 3/28/2020 with more progressive SOB and hypotension requiring intubation and pressor support. Please refer to H&P by Dr. Sabillon from 3/27/2020 for complete details on initial presentation. Pt ultimately with ongoing a fib with RVR and failed cardioversion. Transferred to [facility] 4/10/2020 per Cardiology recommendation for EP intervention. Note Tmax 101.1 degrees fahrenheit overnight, persistent a fib with RVR in the 120s, softer BPs. 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 No ASA while on Eliquis per EP     Fever most likely from acute pyelonephritis with chronic bladder outlet obstruction  Tmax 101.1 degrees fahrenheit overnight. WBC peak 20.8. Procal on admission 0.07. Covid negative, CRP 23.1, ferritin 49 (WNL). Strep pneumo negative, legionella negative, influenza and RSV PCR negative. Urine culture negative. BC NGTD. CXR on admission with heterogeneous opacities in the lung bases concerning for infectious or inflammatory process. CT C/A/P from 3/27/2020 with irregularity of the gallbladder and stranding around the gallbladder, stranding around bilateral kidneys and bilateral renal collecting system prominence including ureters and extrarenal pelvis could be secondary to chronic bladder outlet obstruction, pyelonephritis not excluded. US abdomen 3/27/2020 with mild gallbladder wall thickening, no other evidence of cholecystitis, no gallstones or biliary dilatation, mild right  hydronephrosis. Pt was treated with 7 days of IV Zosyn (3/27/2020 - 4/4/2020) and 2 days of IV Vancomycin (3/28/2020 - 3/29/2020).   - Since fever: WBC 14.7, procal recheck 0.47, lactic 2.1.  - Fever improving.  - Continue Zosyn for now for possible acute pyelonephritis.  - Discontinue vancomycin as nasal MRSA was negative and his renal function is worsening.  - Check UA.  - Obtain blood culture if he develops recurrent fever.    Atrial fibrillation with RVR, new diagnosis  S/p AV sarah ablation and CRT by EP on 4/10/20  S/P NESTOR cardioversion (4/6/2020)  Presented with A fib with RVR in the setting of respiratory failure and acute on chronic renal failure. Initially treated with IV diltiazem and PO metoprolol, transitioned to IV amiodarone due to softer BPs and low EF. Anticoagulated with IV heparin since admission. Underwent successful NESTOR cardioversion 4/6/2020, but converted back to a fib the AM of 4/7/2020. Had remained in a fib with variable rate control since that time.   - Cardiology followed, recommended transfer to [facility] for cardioversion vs AVN ablation/biV pacer.  - EP consulted, s/p AV node ablation and CRT-P placement on 4/10/20.  - Discontinue amiodarone and Toprol XL per EP recommendations.  - Continue Eliquis per EP recommendations, may need to readdress depending on renal function.  - Continue to monitor on telemetry.    Acute diastolic and systolic CHF exacerbation (EF 20-25%)  Cardiomyopathy, suspect rate-related, new  Bilateral pleural effusions (moderate on left, small on right)  Grade III diastolic dysfunction   Moderate to severe mitral regurgitation  NSTEMI type II, demand ischemia in the setting of above  BNP 10527 on admission. Trop peak 0.439. EKG without evidence of ischemia. CT C/A/P from 3/27/2020 with moderate right and small left pleural fluid collections and associated compressive atelectasis in the bilateral posterior lungs. Groundglass densities in the lungs could reepresent  vascular congestion and the heart is enlarged indicating probable CHF. Echocardiogram 3/28/2020 with EF 20-25%, severe global hypokinesia of the left ventricle and grade III diastolic dysfunction. Also notes moderate to severe mitral regurgitation. Repeat limited echo on 4/5/2020 unchanged. Pt was diuresed with bumex gtt initially (down 20 L since admission).   - Cardiology following, consider eventual evaluation for CAD in the future.  - Hold Lasix this morning pending cardiology and nephrology evaluations. Creatinine trending up.  - Continue Toprol XL 25mg po daily   - Intake and output, daily weights    - Initial weight was around 204 pounds, down to 186 when discharged from [facility] on 4/10.   - Weight stable at 187 pounds this morning.  - Needs close follow up with [facility] on discharge     Cardiogenic shock, resolved  Acute hypoxic respiratory failure  Respiratory failure in the setting of CHF, cardiogenic shock and questionable infiltrate as above. Cardiac results as above. He did initially require norepinephrine which was weaned off as of 4/1/2020. Intubated 3/28/2020, extubated 4/1/2020.    -- Patient is DNI but OK to do CPR as per patient.  - Saturating well on 1 lpm of supplemental oxygen.  - Encourage pulmonary toilet with incentive spirometry.    Acute kidney injury  Chronic kidney disease, stage 3  Creatinine peaked 5.72, then trended down. Baseline creatinine seems to be about 2.  - Creatinine trending up this morning.  - Recheck this afternoon.  - Hold AM dose of furosemide for now.  - Consult nephrology regarding further management.    Lactic acidosis  Peaked at 12.1.  - 2.6 when checked overnight for fever and tachycardia.    Elevated transaminases, improved  Peak ALT 7094, AST 35501. Thought to be ischemic hepatitis/shock liver/liver congestion in the setting of CHF. Hepatitis B surface antigen nonreactive.  - LFTs continue to trend down as of 4/11/20.    T2DM, non-insulin dependent  Hemoglobin  A1C on 1/20/2020 was 6.0. A1C checked during admission 6.2.   [PTA medications: Metformin 1000 mg BID, Glipizide XL 5 mg po every day]  - Hold oral agents for now.   - Continue Lantus 16 U at bedtime (started during hospitalization).  - Continue accuchecks and medium dose sliding scale NovoLog.  - Monitor for hypoglycemia .  - Blood sugars fairly well controlled, mostly around 200.    Enlarged prostate  Mild right hydronephrosis  Hx of BPH:   [Flomax 0.4 mg po every day]  - Continue PTA Flomax.  - Mckenzie catheter in place.  - Monitor I&Os.    Hyponatremia  Hypokalemia, resolved  Sodium borderline low on admission, improved with above intervention.   - Sodium down to 129 this morning, recheck this afternoon.  - Nephrology consulted as noted above.    Iron deficiency anemia  No prior hemoglobin for comparison.  - Iron 27, , MIKI 8.   - Hemoglobin stable at 10.2 on 4/11/20.     Gross hematuria, resolved  Noted hematuria starting 4/1/2020, which has since resolved in the context of IV heparin use.   - Urology consulted for hematuria, three way urinary catheter initially placed for hematuria, removed 4/9/2020 after no return of hematuria despite re-initiation of anticoagulation.    Lung nodule, left  Noted on CT scan on 3/28/20, measuring up to 1 cm.   - Consider follow-up CT in 3 months, PET/CT and or tissue sampling; defer to outpatient provider upon discharge.        Diet: 2 Gram Sodium Diet No Caffeine for 24 hours (once tests completed, may have caffeine)    DVT Prophylaxis: Eliquis  Mckenzie Catheter: in place, indication: Obstruction  Code Status: DNI      Disposition Plan   Expected discharge: unclear pending further evaluation and clinical improvement  Entered: Jax Navarro MD 04/11/2020, 1:00 PM       The patient's care was discussed with the Bedside Nurse and Patient.    Jax Navarro MD  Hospitalist Service  [facility]  Hospital    ______________________________________________________________________    Interval History   Gene Pagan was seen earlier today. Had AV node ablation and implantation of cardiac resynchronization therapy with pacemaker yesterday. Feels a little better today. Mild pain at the pacemaker site. Fever improved. Mild shortness of breath at times. Denies nausea and abdominal pain. Was able to walk a little in the room with therapy, states it went well, denies dizziness/lightheadedness.    Data reviewed today: I reviewed all medications, new labs and imaging results over the last 24 hours. I personally reviewed no images or EKG's today.    Physical Exam   Vital Signs: Temp: 97.6  F (36.4  C) Temp src: Oral BP: 99/66 Pulse: 80 Heart Rate: 80 Resp: 18 SpO2: 97 % O2 Device: Nasal cannula Oxygen Delivery: 1 LPM  Weight: 187 lbs 9.6 oz  Constitutional: awake, alert, cooperative, no apparent distress, frail  Respiratory: diminished at the bases  Cardiovascular: regular rate and rhythm, normal S1 and S2, no murmur noted, dressing in place over pacemaker insertion site  GI: normal bowel sounds, soft, non-distended, non-tender  Skin: warm, dry  Musculoskeletal: 1+ lower extremity pitting edema present  Neurologic: awake, alert, oriented, motor is 5 out of 5 bilaterally. sensory is intact.    Data   Recent Labs   Lab 04/11/20  1315 04/11/20  0536 04/10/20  0801 04/10/20  0655 04/09/20  0534 04/07/20  0533 04/06/20  1154  04/05/20  0544   WBC  --  15.5* 14.7*  --  15.3*  --   --   --  14.0*   HGB  --  10.2* 10.0*  --  10.1*  --   --   --  11.1*   MCV  --  81 84  --  83  --   --   --  84   PLT  --  476* 454*  --  383  --   --   --  302   INR  --   --   --   --   --   --  1.17*  --   --    * 129*  --  132* 133 133  --    < > 137   POTASSIUM 4.5 4.8  --  3.9 3.7 4.3 4.2   < > 3.6   CHLORIDE 98 98  --  101 102 101  --    < > 102   CO2 19* 21  --  23 24 24  --    < > 31   BUN 82* 79*  --  64* 68* 68*  --    < > 88*    CR 3.70* 3.45*  --  2.48* 2.30* 2.25*  --    < > 2.95*   ANIONGAP 11 10  --  8 7 8  --    < > 5   HARPER 8.9 9.1  --  9.4 9.2 9.5  --    < > 9.2   * 217*  --  136* 117* 163*  --    < > 159*   ALBUMIN  --  2.5*  --   --   --  2.6*  --   --  2.3*   PROTTOTAL  --  6.4*  --   --   --   --   --   --  6.3*   BILITOTAL  --  0.6  --   --   --   --   --   --  0.7   ALKPHOS  --  117  --   --   --   --   --   --  210*   ALT  --  107*  --   --   --   --   --   --  527*   AST  --  49*  --   --   --   --   --   --  62*    < > = values in this interval not displayed.     Medications     - MEDICATION INSTRUCTIONS -       ACE/ARB/ARNI NOT PRESCRIBED         apixaban ANTICOAGULANT  5 mg Oral BID     furosemide  40 mg Oral Daily     insulin aspart  1-7 Units Subcutaneous TID AC     insulin aspart  1-5 Units Subcutaneous At Bedtime     insulin glargine  16 Units Subcutaneous At Bedtime     piperacillin-tazobactam  2.25 g Intravenous Q6H     sodium chloride (PF)  3 mL Intracatheter Q8H     tamsulosin  0.4 mg Oral Daily

## 2020-04-11 NOTE — PLAN OF CARE
A&Ox4. Denies CP, dyspnea on exertion. Lungs clear, 1L NC. AV Paced, SBP soft in afternoon after ambulating to BR. Improved with rest, 25g albumin given. Pt asymptomatic, Lactic negative. Fair appetite. Mckenzie with yellow urine and sediment. Awaiting urine studies. Up to chair x3 today, gait steady with walker.

## 2020-04-11 NOTE — PROVIDER NOTIFICATION
MD Notification    Notified Person: MD    Notified Person Name: Fernanda Leyva    Notification Date/Time: 4/10/20 20:41    Notification Interaction: Page    Purpose of Notification: 256 CA...M Pt's lactic fired at 2.6.     Pt is requesting melatonin & CPAP.    JIMMIE Wallis *[id number]    Orders Received:    Comments:

## 2020-04-11 NOTE — PROGRESS NOTES
INTERNAL MEDICINE UPDATE    Called re: low BP, asymptomatic    BP in 70's to 80's systolic.    PLAN:  - Try 250 ml dose of 5% albumin (12 g).    Julio Palmer Jr., MD  [phone] (p)  Text Page

## 2020-04-11 NOTE — PROGRESS NOTES
[facility]    Cardiology Progress Note     Assessment & Plan     Gene Pagan is a 74 year old male who was admitted on 4/11/2020 who has PMHx of NIDDM and BPH here for AFIB with RVR and decompensated HF. She had ongoing a fib with RVR and failed cardioversion. Transferred to [facility] 4/10/2020 per Cardiology recommendation. 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 Amiodarone stopped and apixaban restarted. Cardiomyopathy thought to be related to AFIB. NSTEMI Type II with TN 0.439. Echocardiogram with EF 25-30%, no WMA, global hypokinesis and grade III diastolic dysfunction, severe MR.       Plan:  1. Continue apixaban, no aspirin  2. Stop amiodarone, metoprolol  3. Will need outpatient EP follow up, general cardiology with repeat echocardiogram, as well as [facility] clinic for CHF  4. Dry weight 186 lbs, would initiate maintenance diuresis as outpatient, monitor lytes and Cr   5. Outpatient coronary artery disease evaluation     Disposition Plan   Expected discharge in 2-3 days     Entered: Roro Chavez 04/11/2020, 12:49 PM       Roro Chavez MD  Text Page  (Monday - Friday, 8 am- 5 pm)    Interval History     s/p AVN ablation leading to CHB and junctional escape to 30 bpm, s/p CRT-p  Overnight low blood pressures, patient asymptomatic. Denies chest pain or sob. Given 250 ml bolus IVFs. Amiodarone stopped. Was on 50 mg XL toprol.      Physical Exam   Temp: 97.6  F (36.4  C) Temp src: Oral BP: 99/66 Pulse: 80 Heart Rate: 80 Resp: 18 SpO2: 97 % O2 Device: Nasal cannula Oxygen Delivery: 1 LPM  Vitals:    04/10/20 1900 04/11/20 0515   Weight: 88.6 kg (195 lb 6.4 oz) 85.1 kg (187 lb 9.6 oz)     Vital Signs with Ranges  Temp:  [97.4  F (36.3  C)-99.2  F (37.3  C)] 97.6  F (36.4  C)  Pulse:  [69-80] 80  Heart Rate:  [] 80  Resp:  [18-20] 18  BP: ()/(50-76) 99/66  SpO2:  [92 %-100 %] 97 %  I/O last 3 completed  shifts:  In: 300 [P.O.:100; I.V.:200]  Out: 425 [Urine:425]  Patient Active Problem List   Diagnosis     Atrial fibrillation with rapid ventricular response (H)     CHF (congestive heart failure) (H)       Constitutional: Awake, alert, cooperative, no apparent distress  GI: Normal bowel sounds, soft, non-distended, non-tender  Skin/Integumen: No rashes, no cyanosis, no edema  Other:  neuro intact  Pacemaker site C/D/I    Medications     - MEDICATION INSTRUCTIONS -       ACE/ARB/ARNI NOT PRESCRIBED         apixaban ANTICOAGULANT  5 mg Oral BID     furosemide  40 mg Oral Daily     insulin aspart  1-7 Units Subcutaneous TID AC     insulin aspart  1-5 Units Subcutaneous At Bedtime     insulin glargine  16 Units Subcutaneous At Bedtime     piperacillin-tazobactam  2.25 g Intravenous Q6H     sodium chloride (PF)  3 mL Intracatheter Q8H     tamsulosin  0.4 mg Oral Daily       Data   Results for orders placed or performed during the hospital encounter of 04/10/20 (from the past 24 hour(s))   EP Device    Narrative    PROCEDURES PERFORMED:   1. Ablation of atrioventricular node (AVN)  2. Implantation of a cardiac resynchronization therapy with pacemaker   (CRT-P)  3. Conscious sedation.   4. Cardiac fluoroscopy    INDICATION: Medical refractory AFib with cardiomyopathy    HISTORY OF PRESENT ILLNESS: This is a 74 year old year-old patient   presented with CHF decompensation along with severe cardiomyopathy noted   to be in AF with RVR refractory to medical therapy. Patient is referred   for a CRT-P and AVN ablation.    METHOD: The patient was prepped and draped in the usual manner.   Intravenous antibiotic was given. 1% lidocaine was infiltrated into the   left axillary vein area. This vein was access using the modified   Seldinger's technique. An incision was then made with a #15 blade. A   peelable sheath was then glide over the wire into the vein. A lead was   then advanced into the heart and was fixed into the right  ventricular   apical area. Appropriate sensing and threshold were obtained. There was no   diaphragmatic stimulation with high output pacing. The sheath was then   peeled away. Another peelable sheath was then glide over the wire into the   vein.  A lead was then advanced into the heart and was fixed into the   right atrial wall. There was no diaphragmatic stimulation with high output   pacing. The sheath was then peeled away. The lead(s) was/were then secured   to the pectoralis muscle fascia using O-Ethibond sutures.    A Coronary sinus (CS) sheath advanced over the guidewire into the right   atrium.  An EP catheter was then introduced into the CS sheath and   cannulated into the CS lumen. The EP catheter was exchanged for a   [place]-Kia catheter.  Occlusive venograms were performed with diluted   contrast in [place], RODRIGUEZ and AP position(s).  There was only one   posterolateral branch which was selected for placement of the left   ventricular lead. A subselecive sheath was then advanced into this branch.   The lead along with an angioplasty wire were then advanced into this   branch. Appropriate sensing and threshold were obtained. There was no   diaphragmatic stimulation with high output pacing. The CS sheath was then   cut away under fluoroscopic guidance. Using the splitter the subselective   sheath was split away, then the outer CS sheath was removed in similar   manner. The lead was then secured to the pectoralis muscle fascia using   O-Ethibond sutures.     The right groin was then prepped and draped the usual fashion. 1%   Lidocaine was infiltrated into the area. An 8-Polish sheath was placed in   the right femoral vein via the modified Seldinger's technique. A large   curve 5 mm ablation catheter was then advanced into the heart. Ablation   was then performed using an EPT generator. Both catheter and sheath were   then removed. Hemostasis was achieved by direct manual pressure. The   patient was then  [facility].       ABLATION SUMMARY: Approximately 2 applications of radiofrequency ablation   were targeted at the AV node at 50 [place] and 60 degrees Celsius for 10-60   seconds after mapping of the AVN was done. Complete heart block was   achieved with a junctional escape rate of 30 beats per minute.    DEVICE INFORMATION:  Implant Name Type Inv. Item Serial No.  Lot No. LRB No. Used   Action   LEAD INGEVITY ACTIVE MRI 45 CM  LEAD INGEVITY ACTIVE MRI 45 CM [id number]   [employer] [id number]  1 Implanted   52CM INGEVITY MRI ACTIVE FIXATION PACING LEAD Leads  [id number] [employer] [id number]  1 Implanted   CRT-P VALITUDE X4 Pacemaker CRT-P VALITUDE X4 [id number] [employer]   [id number]  1 Implanted   86CM, ACUITY X4 LV PACING LEAD, MODEL [id number], TINES FIXATION Leads  [id number]   [employer] [id number]  1 Implanted     STIMULATION THRESHOLDS:  RA -  Sense:afib mV, Threshold:  V @ 0.5 ms, Impedance: 601 ohms  RV -  Sense:14 mV, Threshold: 0.4 V @ 0.5 ms, Impedance: 906 ohms  LV -  Sense:7.2 mV, Threshold: 0.8 V @ 0.5 ms, Impedance: 1260 ohms (1 to   3)    CONTRAST (mL) USED: 10    COMPLICATIONS: None.    CONCLUSION:  1. Uneventful implantation of a cardiac resynchronization therapy with   pacemaker and AVN ablation    Abhay Willams MD   Glucose by meter   Result Value Ref Range    Glucose 157 (H) 70 - 99 mg/dL   Nutrition Services Adult IP Consult    Narrative    Yulia Marshall RD, LD     4/11/2020  8:17 AM  Standard CHF consult received for 2gm Na diet education  Diet: 2 gm Na, no caffeine  Will plan to provide diet review with pt prior to d/c   Glucose by meter   Result Value Ref Range    Glucose 211 (H) 70 - 99 mg/dL   Lactic acid level STAT   Result Value Ref Range    Lactate for Sepsis Protocol 2.6 (H) 0.7 - 2.0 mmol/L   Glucose by meter   Result Value Ref Range    Glucose 211 (H) 70 - 99 mg/dL   CBC with platelets   Result Value Ref Range     WBC 15.5 (H) 4.0 - 11.0 10e9/L    RBC Count 4.05 (L) 4.4 - 5.9 10e12/L    Hemoglobin 10.2 (L) 13.3 - 17.7 g/dL    Hematocrit 32.6 (L) 40.0 - 53.0 %    MCV 81 78 - 100 fl    MCH 25.2 (L) 26.5 - 33.0 pg    MCHC 31.3 (L) 31.5 - 36.5 g/dL    RDW 16.4 (H) 10.0 - 15.0 %    Platelet Count 476 (H) 150 - 450 10e9/L   Comprehensive metabolic panel   Result Value Ref Range    Sodium 129 (L) 133 - 144 mmol/L    Potassium 4.8 3.4 - 5.3 mmol/L    Chloride 98 94 - 109 mmol/L    Carbon Dioxide 21 20 - 32 mmol/L    Anion Gap 10 3 - 14 mmol/L    Glucose 217 (H) 70 - 99 mg/dL    Urea Nitrogen 79 (H) 7 - 30 mg/dL    Creatinine 3.45 (H) 0.66 - 1.25 mg/dL    GFR Estimate 16 (L) >60 mL/min/[1.73_m2]    GFR Estimate If Black 19 (L) >60 mL/min/[1.73_m2]    Calcium 9.1 8.5 - 10.1 mg/dL    Bilirubin Total 0.6 0.2 - 1.3 mg/dL    Albumin 2.5 (L) 3.4 - 5.0 g/dL    Protein Total 6.4 (L) 6.8 - 8.8 g/dL    Alkaline Phosphatase 117 40 - 150 U/L     (H) 0 - 70 U/L    AST 49 (H) 0 - 45 U/L   Glucose by meter   Result Value Ref Range    Glucose 213 (H) 70 - 99 mg/dL   X-ray Chest 2 vws*    Narrative    CHEST TWO VIEWS  2020 8:05 AM     HISTORY: Status post pacer/ICD.    COMPARISON: 3/28/2020.      Impression    IMPRESSION: Left chest pacemaker newly identified. No acute airspace  disease. Stable cardiomegaly. No pneumothorax.   Echocardiogram Limited    Narrative    [id number]  [id number]  [id number]  [id number]^MARILYN^KHADIJAH           [facility]  Echocardiography Laboratory  [address] [place] [id number]        Name: MIKE DUNHAM  MRN: [id number]  : 1945  Study Date: 2020 08:58 AM  Age: 74 yrs  Gender: Male  Patient Location: [facility]  Reason For Study: Shock  Ordering Physician: KHADIJAH SANDERS  Referring Physician: KHADIJAH SANDERS  Performed By: LATASHA Armenta     BSA: 1.9 m2  Height: 68 in  Weight: 176 lb  HR: 80  BP: 93/76  mmHg  _____________________________________________________________________________  __        Procedure  Limited Portable Echo Adult. Optison (NDC #[id number]) given intravenously.  _____________________________________________________________________________  __        Interpretation Summary     Technically difficult imaging  The rhythm was atrial fibrillation with paced rhythm.  Left ventricular systolic function is severely reduced.  The visual ejection fraction is estimated at 25-30%.  The left ventricle is mildly dilated.  There is mild concentric left ventricular hypertrophy.  There is severe global hypokinesia of the left ventricle.  The right ventricle is mild to moderately dilated.  Moderately decreased right ventricular systolic function  There is a pacemaker lead in the right ventricle.  There is moderate biatrial enlargement.  There is mild (1+) mitral regurgitation.  Mild aortic root dilatation.  The ascending aorta is Mildly dilated.     There has been interim placement of a dual chamber pacemaker. Estimated global  LV systolic performance, though diminished, appears sliightly better than on  previous studies ( was in 20 to 25^% range, now 25 to 30% range)  _____________________________________________________________________________  __        Left Ventricle  The left ventricle is mildly dilated. There is mild concentric left  ventricular hypertrophy. Left ventricular systolic function is severely  reduced. The visual ejection fraction is estimated at 25-30%. Left ventricular  diastolic function is indeterminate. Septal wall motion abnormality may  reflect pacemaker activation. There is severe global hypokinesia of the left  ventricle. There is no thrombus seen in the left ventricle.     Right Ventricle  The right ventricle is mild to moderately dilated. Moderately decreased right  ventricular systolic function. There is a pacemaker lead in the right  ventricle.     Atria  There is moderate biatrial  enlargement. Pacer wire in right atrium. There is  no atrial shunt seen. The left atrial appendage is not well visualized.     Mitral Valve  The mitral valve leaflets are mildly thickened. There is mild (1+) mitral  regurgitation. There is no mitral valve stenosis.        Tricuspid Valve  Normal tricuspid valve. No tricuspid regurgitation. There is no tricuspid  stenosis.     Aortic Valve  The aortic valve is trileaflet. No aortic regurgitation is present. No aortic  stenosis is present.     Pulmonic Valve  The pulmonic valve is not well visualized. There is no pulmonic valvular  regurgitation. There is no pulmonic valvular stenosis.     Vessels  Mild aortic root dilatation. The ascending aorta is Mildly dilated. Dilation  of the inferior vena cava is present with abnormal respiratory variation in  diameter. The pulmonary artery is normal size.     Pericardium  The pericardium appears normal. There is no pleural effusion.        Rhythm  The rhythm was atrial fibrillation with paced rhythm.  _____________________________________________________________________________  __  MMode/2D Measurements & Calculations  IVSd: 1.4 cm     LVIDd: 4.5 cm  LVIDs: 4.0 cm  LVPWd: 1.3 cm  FS: 11.8 %  LV mass(C)d: 233.6 grams  LV mass(C)dI: 120.7 grams/m2  Ao root diam: 4.2 cm  asc Aorta Diam: 4.0 cm  LVOT diam: 2.1 cm  LVOT area: 3.5 cm2  RWT: 0.57                 _____________________________________________________________________________  __           Report approved by: Dr. Joseph Castillo 04/11/2020 11:42 AM

## 2020-04-11 NOTE — PROGRESS NOTES
04/11/20 1505   Quick Adds   Type of Visit Initial Occupational Therapy Evaluation   Living Environment   Lives With significant other   Living Arrangements house  (town home )   Home Accessibility stairs to enter home;stairs within home   Transportation Anticipated family or friend will provide  (pt typically drives )   Living Environment Comment Pt transferred from [facility].    Self-Care   Usual Activity Tolerance good   Current Activity Tolerance fair   Regular Exercise No   Equipment Currently Used at Home none   Functional Level   Ambulation 0-->independent   Transferring 0-->independent   Toileting 0-->independent   Bathing 0-->independent   Dressing 0-->independent   Fall history within last six months no   General Information   Onset of Illness/Injury or Date of Surgery - Date 04/10/20   Referring Physician Lin Ordonez MD    Patient/Family Goals Statement Home    Additional Occupational Profile Info/Pertinent History of Current Problem Per chart: Gene Pagan is a 74 year old male with PMHx of NIDDM and BPH who was seen initially at [facility] 3/27/2020 for progressive SOB, ultimately transferred to the ED after found to be in atrial fibrillation with RVR (new diagnosis).  Admitted for further evaluation and treatment. Ultimately pt decompensated 3/28/2020 with more progressive SOB and hypotension requiring intubation and pressor support. Please refer to H&P by Dr. Sabillon from 3/27/2020 for complete details on initial presentation. Pt ultimately with ongoing a fib with RVR and failed cardioversion. Transferred to [facility] 4/10/2020 per Cardiology recommendation for EP intervention. See chart for details.    Precautions/Limitations fall precautions  (PPM precuations )   Cognitive Status Examination   Orientation orientation to person, place and time   Level of Consciousness alert   Mobility   Bed Mobility Bed mobility skill: Sit to supine;Bed mobility skill: Supine to sit   Bed Mobility Skill: Supine  to Sit   Level of [place]: Supine/Sit contact guard   Physical Assist/Nonphysical Assist: Supine/Sit set-up required;verbal cues;1 person assist   Transfer Skills   Transfer Transfer Safety Analysis Bed/Chair;Transfer Skill: Stand to Sit;Transfer Safety Analysis Sit/Stand   Transfer Skill: Bed to Chair/Chair to Bed   Level of [place]: Bed to Chair minimum assist (75% patients effort)   Physical Assist/Nonphysical Assist: Bed to Chair set-up required;verbal cues;1 person assist   Transfer Skill: Sit to Stand   Level of [place]: Sit/Stand minimum assist (75% patients effort)   Physical Assist/Nonphysical Assist: Sit/Stand set-up required;verbal cues   Toilet Transfer   Toilet Transfer Toilet Transfer Skill;Toilet Transfer Safety Analysis   Transfer Skill: Toilet Transfer   Level of [place]: Toilet minimum assist (75% patients effort)   Physical Assist/Nonphysical Assist: Toilet set-up required;verbal cues;1 person assist   Instrumental Activities of Daily Living (IADL)   Previous Responsibilities meal prep;housekeeping;laundry;shopping;yardwork;medication management;finances;driving;work   General Therapy Interventions   Planned Therapy Interventions ADL retraining;IADL retraining;cognition;transfer training;strengthening   Clinical Impression   Criteria for Skilled Therapeutic Interventions Met yes, treatment indicated   OT Diagnosis Decreased independence in I/ADLs   Influenced by the following impairments Decreased independence in I/ADLs   Assessment of Occupational Performance 1-3 Performance Deficits   Identified Performance Deficits Decreased independence in I/ADLs (Dressing, bathing, toileting)   Clinical Decision Making (Complexity) Low complexity   Therapy Frequency Daily   Predicted Duration of Therapy Intervention (days/wks) 4 days   Anticipated Discharge Disposition Acute Rehabilitation Facility   Risks and Benefits of Treatment have been explained. Yes   Patient, Family & other staff  "in agreement with plan of care Yes   [facility] AM-PAC  \"6 Clicks\" Daily Activity Inpatient Short Form   1. Putting on and taking off regular lower body clothing? 2 - A Lot   2. Bathing (including washing, rinsing, drying)? 2 - A Lot   3. Toileting, which includes using toilet, bedpan or urinal? 3 - A Little   4. Putting on and taking off regular upper body clothing? 3 - A Little   5. Taking care of personal grooming such as brushing teeth? 3 - A Little   6. Eating meals? 4 - None   Daily Activity Raw Score (Score out of 24.Lower scores equate to lower levels of function) 17   Total Evaluation Time   Total Evaluation Time (Minutes) 5     "

## 2020-04-11 NOTE — PROVIDER NOTIFICATION
MD Notification    Notified Person: MD    Notified Person Name: Melanie    Notification Date/Time: 04/11/20 11:49 AM    Notification Interaction: Text page    Purpose of Notification: May we have an order for Tylenol for mild pain? Also, Lasix 40mg PO due, should I give with elevated creatinine?      Orders Received: Hold Lasix dose today. May order tylenol for pain.    Comments:

## 2020-04-11 NOTE — PROGRESS NOTES
04/11/20 1126   Quick Adds   Type of Visit Initial PT Evaluation   Living Environment   Lives With significant other   Living Arrangements house  (townhouse)   Home Accessibility stairs to enter home;stairs within home   Number of Stairs, Main Entrance 3   Stair Railings, Main Entrance railings safe and in good condition   Number of Stairs, Within Home, Primary   (15)   Stair Railings, Within Home, Primary railings safe and in good condition   Transportation Anticipated family or friend will provide;car, drives self   Living Environment Comment Pt transfer from outside hospital   Self-Care   Usual Activity Tolerance good   Current Activity Tolerance fair   Regular Exercise No   Equipment Currently Used at Home none   Activity/Exercise/Self-Care Comment Prior to admission pt independent without use of assistive device   Functional Level Prior   Ambulation 0-->independent   Transferring 0-->independent   Fall history within last six months no   Prior Functional Level Comment Pt reports prior to hospital admission he was independent and active without use of assistive device   General Information   Onset of Illness/Injury or Date of Surgery - Date 04/10/20   Referring Physician Lin Ordonez MD   Patient/Family Goals Statement To get better   Pertinent History of Current Problem (include personal factors and/or comorbidities that impact the POC) Pt is 74 year old male adm on [facility] on 4/10/2020 as a transfer from another hospital. Pt presented to other hospital on 3/27/2020 with SOB, a-fib with RVR, underwent failed cardioversion. Pt decompensated and required intubation. Pt was ultimately transferred to [facility] for further cardiac management and underwent successful AVN ablation and PPM placement on 4/10/2020.   Precautions/Limitations fall precautions   Cognitive Status Examination   Orientation orientation to person, place and time   Level of Consciousness alert   Pain Assessment   Patient Currently in Pain No  "  Posture    Posture Forward head position   Range of Motion (ROM)   ROM Comment B LE ROM WFL   Strength   Strength Comments Pt presents with general deconditioning, no focal weakness noted   Bed Mobility   Bed Mobility Comments Min assist   Transfer Skills   Transfer Comments Min assist with FWW   Gait   Gait Comments Min assist x10' with FWW   Balance   Balance Comments Pt needing min assist for balance throughout   General Therapy Interventions   Planned Therapy Interventions balance training;bed mobility training;gait training;strengthening;transfer training;risk factor education;home program guidelines;progressive activity/exercise   Clinical Impression   Criteria for Skilled Therapeutic Intervention yes, treatment indicated   PT Diagnosis Impaired gait   Influenced by the following impairments Decreased strength, decreased balance, decreased activity tolerance   Functional limitations due to impairments Decreased ability to safely participate in daily tasks   Clinical Presentation Evolving/Changing   Clinical Presentation Rationale Current presentation, PMH, complex hospital course   Clinical Decision Making (Complexity) Moderate complexity   Therapy Frequency Daily   Predicted Duration of Therapy Intervention (days/wks) 5 days   Anticipated Discharge Disposition Acute Rehabilitation Facility   Risk & Benefits of therapy have been explained Yes   Patient, Family & other staff in agreement with plan of care Yes   [facility] AM-PAC  \"6 Clicks\" V.2 Basic Mobility Inpatient Short Form   1. Turning from your back to your side while in a flat bed without using bedrails? 3 - A Little   2. Moving from lying on your back to sitting on the side of a flat bed without using bedrails? 3 - A Little   3. Moving to and from a bed to a chair (including a wheelchair)? 3 - A Little   4. Standing up from a chair using your arms (e.g., wheelchair, or bedside chair)? 3 - A Little   5. To walk in hospital room? 3 - A Little "   6. Climbing 3-5 steps with a railing? 2 - A Lot   Basic Mobility Raw Score (Score out of 24.Lower scores equate to lower levels of function) 17   Total Evaluation Time   Total Evaluation Time (Minutes) 10

## 2020-04-11 NOTE — CONSULTS
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 2+ proteinuria on dipstick but not estimated. Never seen a nephrologist before. CKD presumed to be d/t long standing DM2.     2 DEONDRE -  Recent major DEONDRE during this admission with Cr upto 5. Now back up sharply in setting of low BP. He has been extensively diuresed this admission and I wonder if he is hypovolemic at the moment. No other clear etiology for hypotension. No other nephrotoxin on board. . DEONDRE likely d/t hypoperfusion with ? Progression to ATN.     3 A.fib - s/p ablation     4 Cardiomyopathy - presumed related to A.fib. ALso has Gr III diastoic dyfunction and mod-severe MR.     Recc-   HOld Furosemide .   Hold Metoprolol   Vol challenge with ALbumin now.   Can repeat dose or give small boluses of crystalloids if BP drops again.   Urinalysis, FeNA.   Daily weight/ RFP    Thank you for the consult. Will continue to follow along with you .        Pipe Miguel MD  [facility] - Nephrology   [phone]        REASON FOR CONSULT: DEONDRE    HISTORY OF PRESENT ILLNESS:  Gene Pagan is a 74 year old male with PMHx of NIDDM and BPH who was admitted on 3/28/20 with c/o progressive shortness of breath ( 6-8 wk) , new finding of A.fib with rVR  At [facility]. He developed hypotension and progressive resp failure requiring intubation and pressor support on 3/28/20 -. Extubated 4/1/20. Had NESTOR cardioversion on 4/6 but revered back within a day. Other pertinent findings include-   B/l pleural effusion ( mod rt, small lt0   Echo - EF 20-25 %, Severe global hypokinesia, gr III diastolic dysfunction, mod-svere MR   Extensive diuresis with Bumex gTT - down 20 L since admission ( adm wt 204 lbs, now 187 lbs)    CXR  with heterogenous opacities . CoVID neg  US abd 3/27 - mild rt hydro. COncern for pyelonephritis. Rec'd 7 days of IV zosyn and 2 days of Vanco .   Transferred to [facility] on 4/10 for EP intervention , s/p ablation of VN and CRT-p Immplantaion.   Complicated by fever  with leukocytosis -> under treatment for possible pyelo . Now improving.     Had developed Venu with Cr peak of 5.7 ( 3/31). Improved to 2.2-2.4 over last few days. Baseline S.Cr of 2. Now rising again since yesterday -> 3.7 . Noted multiple low BP readings overnight, down to 70s. Not on pressors. Afebrile. Saturating well on 2 L NC. On Furosemide 40 mg daily ( held today). Hgb is stable.  ml in last shift.   No other clear inciting event. Has Mckenzie in place draining clear looking urine. No other nephrotoxin on board, no contrast.     On eval , he appears comfortable. No dyspnea, orthopnea, chest pain. BP is still soft. Afebrile.     PAST MEDICAL HISTORY:  Reviewed with patient on 04/11/2020  and is as listed in HPI.       MEDICATIONS:  PTA Meds  Prior to Admission medications    Medication Sig Last Dose Taking? Auth Provider   aspirin (ASA) 81 MG tablet Take 81 mg by mouth daily  4/4/2020 Yes Reported, Patient   glipiZIDE (GLUCOTROL XL) 5 MG 24 hr tablet Take 5 mg by mouth daily  3/27/2020 Yes Reported, Patient   metFORMIN (GLUCOPHAGE) 1000 MG tablet Take 1,000 mg by mouth 2 times daily (with meals)  3/26/2020 Yes Reported, Patient   tamsulosin (FLOMAX) 0.4 MG capsule Take 0.4 mg by mouth 3/26/2020 Yes Reported, Patient      Current Meds    apixaban ANTICOAGULANT  5 mg Oral BID     furosemide  40 mg Oral Daily     insulin aspart  1-7 Units Subcutaneous TID AC     insulin aspart  1-5 Units Subcutaneous At Bedtime     insulin glargine  16 Units Subcutaneous At Bedtime     piperacillin-tazobactam  2.25 g Intravenous Q6H     sodium chloride (PF)  3 mL Intracatheter Q8H     tamsulosin  0.4 mg Oral Daily     Infusion Meds    - MEDICATION INSTRUCTIONS -        ACE/ARB/ARNI NOT PRESCRIBED         ALLERGIES:    No Known Allergies    REVIEW OF SYSTEMS:  A comprehensive of systems was negative except as noted above.    SOCIAL HISTORY:   Reviewed with patient on 2020   Never smoker. .   FAMILY MEDICAL HISTORY:   Family History   Problem Relation Age of Onset     Diabetes Sister      Reviewed with patient on 2020     PHYSICAL EXAM:   Temp  Av.1  F (37.3  C)  Min: 95.9  F (35.5  C)  Max: 101.1  F (38.4  C)  Arterial Line BP  Min: 79/42  Max: 275/273  Arterial Line MAP (mmHg)  Av.9 mmHg  Min: 56 mmHg  Max: 273 mmHg      Pulse  Av.7  Min: 20  Max: 163 Resp  Av.5  Min: 0  Max: 41  FiO2 (%)  Av.3 %  Min: 30 %  Max: 100 %  SpO2  Av.9 %  Min: 43 %  Max: 100 %       BP (!) 97/95   Pulse 80   Temp 97.6  F (36.4  C) (Oral)   Resp 18   Wt 85.1 kg (187 lb 9.6 oz)   SpO2 99%   BMI 28.11 kg/m     Date 20 0700 - 20 0659   Shift 8328-9937 8685-8621 7885-0527 24 Hour Total   INTAKE   P.O. 120   120   I.V. 0   0   Shift Total(mL/kg) 120(1.41)   120(1.41)   OUTPUT   Urine 200   200   Shift Total(mL/kg) 200(2.35)   200(2.35)   Weight (kg) 85.09 85.09 85.09 85.09      Admit Weight: 88.6 kg (195 lb 6.4 oz)     GENERAL APPEARANCE: no distress,  awake  EYES: mp scleral icterus, pupils equal  HENT: NC/AT,  mouth  without ulcers or lesions  Lymphatics: no cervical or supraclavicular LAD  Endo: no moon facies, no goiter  Pulmonary: lungs clear to auscultation with equal breath sounds bilaterally, no clubbing  CV: regular rhythm, normal rate, no rub   - JVP -   - Edema-  GI: soft, nontender,   MS: no evidence of inflammation in joints  : + baker  SKIN: no rash, warm, dry, no cyanosis  NEURO: face symmetric, no asterixis     LABS:   CMP  Recent Labs   Lab 20  1315 20  0536 04/10/20  0655 20  0534 20  0533 20  1154  20  0544   * 129* 132* 133 133  --    < > 137   POTASSIUM 4.5 4.8 3.9 3.7 4.3  4.2   < > 3.6   CHLORIDE 98 98 101 102 101  --    < > 102   CO2 19* 21 23 24 24  --    < > 31   ANIONGAP 11 10 8 7 8  --    < > 5   * 217* 136* 117* 163*  --    < > 159*   BUN 82* 79* 64* 68* 68*  --    < > 88*   CR 3.70* 3.45* 2.48* 2.30* 2.25*  --    < > 2.95*   GFRESTIMATED 15* 16* 24* 26* 27*  --    < > 20*   GFRESTBLACK 17* 19* 28* 31* 31*  --    < > 23*   HARPER 8.9 9.1 9.4 9.2 9.5  --    < > 9.2   MAG  --   --  1.9 1.9  --  2.0  --   --    PHOS  --   --   --   --  3.5  --   --  4.1   PROTTOTAL  --  6.4*  --   --   --   --   --  6.3*   ALBUMIN  --  2.5*  --   --  2.6*  --   --  2.3*   BILITOTAL  --  0.6  --   --   --   --   --  0.7   ALKPHOS  --  117  --   --   --   --   --  210*   AST  --  49*  --   --   --   --   --  62*   ALT  --  107*  --   --   --   --   --  527*    < > = values in this interval not displayed.     CBC  Recent Labs   Lab 04/11/20  0536 04/10/20  0801 04/09/20  0534 04/05/20  0544   HGB 10.2* 10.0* 10.1* 11.1*   WBC 15.5* 14.7* 15.3* 14.0*   RBC 4.05* 3.96* 4.07* 4.51   HCT 32.6* 33.4* 33.8* 37.7*   MCV 81 84 83 84   MCH 25.2* 25.3* 24.8* 24.6*   MCHC 31.3* 29.9* 29.9* 29.4*   RDW 16.4* 16.4* 15.9* 15.6*   * 454* 383 302     INR  Recent Labs   Lab 04/06/20  1154   INR 1.17*     ABGNo lab results found in last 7 days.   URINE STUDIES  Recent Labs   Lab Test 03/28/20  0947   COLOR Dark Brown   APPEARANCE Cloudy   URINEGLC 200*   URINEBILI Negative   URINEKETONE Negative   SG 1.024   UBLD Large*   URINEPH 7.5*   PROTEIN 300*   NITRITE Negative   LEUKEST Moderate*   RBCU >182*   WBCU 179*     IRON STUDIES  Recent Labs   Lab Test 03/27/20  1348   IRON 27*      IRONSAT 8*   JAMI 49       IMAGING:  Personally reviewed the images and findings are as listed in HPI     Pipe Miguel MD

## 2020-04-11 NOTE — PLAN OF CARE
OT- Evaluation and treatment initiated.  Pt lives with his significant other in a town home. Prior pt independent all I/ADLs.   Discharge Planner OT   Patient plan for discharge: Home    Current status: Pt ambulated to the bathroom with minimum assist and walker and transferred to/from the toilet with minimum assist. Pericares completed with set up and CGA. Vitals monitored throughout session (see vitals flowsheet for details) At rest BP: 97/95, and with activity BP: 74/50, RN notified.   Barriers to return to prior living situation: Current level of assist for ADLs, decreased functional activity tolerance  Recommendations for discharge: ARU  Rationale for recommendations: Pt is below baseline in ADLs, IADLs and functional mobility. Skilled OT in IP and in ARU setting to improve activity tolerance and independence in I/ADLs. Anticipate pt will be able to tolerate the 3 hours of therapy, daily.        Entered by: Natanael Martinez 04/11/2020 3:46 PM

## 2020-04-11 NOTE — PROVIDER NOTIFICATION
MD Notification    Notified Person: MD    Notified Person Name: Eklof    Notification Date/Time: 04/11/20 3:46 PM 3:47 PM    Notification Interaction: Text message    Purpose of Notification: FYI pt ambulated to BR with OT,BP upon return 74/50. Recheck 85/57. Asymptomatic. Will monitor q 30 mins. Lactate for sepsis triggered.     Orders Received: MD to bedside. Administer albumin per order.    Comments:

## 2020-04-12 ENCOUNTER — APPOINTMENT (OUTPATIENT)
Dept: OCCUPATIONAL THERAPY | Facility: CLINIC | Age: 75
DRG: 242 | End: 2020-04-12
Attending: INTERNAL MEDICINE
Payer: COMMERCIAL

## 2020-04-12 ENCOUNTER — APPOINTMENT (OUTPATIENT)
Dept: PHYSICAL THERAPY | Facility: CLINIC | Age: 75
DRG: 242 | End: 2020-04-12
Attending: INTERNAL MEDICINE
Payer: COMMERCIAL

## 2020-04-12 LAB
ANION GAP SERPL CALCULATED.3IONS-SCNC: 13 MMOL/L (ref 3–14)
BACTERIA SPEC CULT: NORMAL
BUN SERPL-MCNC: 85 MG/DL (ref 7–30)
CALCIUM SERPL-MCNC: 8.6 MG/DL (ref 8.5–10.1)
CHLORIDE SERPL-SCNC: 97 MMOL/L (ref 94–109)
CO2 SERPL-SCNC: 19 MMOL/L (ref 20–32)
CREAT SERPL-MCNC: 3.82 MG/DL (ref 0.66–1.25)
ERYTHROCYTE [DISTWIDTH] IN BLOOD BY AUTOMATED COUNT: 16.3 % (ref 10–15)
GFR SERPL CREATININE-BSD FRML MDRD: 14 ML/MIN/{1.73_M2}
GLUCOSE BLDC GLUCOMTR-MCNC: 124 MG/DL (ref 70–99)
GLUCOSE BLDC GLUCOMTR-MCNC: 137 MG/DL (ref 70–99)
GLUCOSE BLDC GLUCOMTR-MCNC: 158 MG/DL (ref 70–99)
GLUCOSE BLDC GLUCOMTR-MCNC: 180 MG/DL (ref 70–99)
GLUCOSE BLDC GLUCOMTR-MCNC: 182 MG/DL (ref 70–99)
GLUCOSE SERPL-MCNC: 128 MG/DL (ref 70–99)
HCT VFR BLD AUTO: 29 % (ref 40–53)
HGB BLD-MCNC: 9.4 G/DL (ref 13.3–17.7)
Lab: NORMAL
MCH RBC QN AUTO: 25.8 PG (ref 26.5–33)
MCHC RBC AUTO-ENTMCNC: 32.4 G/DL (ref 31.5–36.5)
MCV RBC AUTO: 80 FL (ref 78–100)
PLATELET # BLD AUTO: 404 10E9/L (ref 150–450)
POTASSIUM SERPL-SCNC: 4.1 MMOL/L (ref 3.4–5.3)
PROCALCITONIN SERPL-MCNC: 2.85 NG/ML
RBC # BLD AUTO: 3.65 10E12/L (ref 4.4–5.9)
SODIUM SERPL-SCNC: 129 MMOL/L (ref 133–144)
SPECIMEN SOURCE: NORMAL
WBC # BLD AUTO: 14.4 10E9/L (ref 4–11)

## 2020-04-12 PROCEDURE — 99233 SBSQ HOSP IP/OBS HIGH 50: CPT | Performed by: HOSPITALIST

## 2020-04-12 PROCEDURE — 97535 SELF CARE MNGMENT TRAINING: CPT | Mod: GO | Performed by: OCCUPATIONAL THERAPY ASSISTANT

## 2020-04-12 PROCEDURE — 97116 GAIT TRAINING THERAPY: CPT | Mod: GP | Performed by: PHYSICAL THERAPIST

## 2020-04-12 PROCEDURE — 25000131 ZZH RX MED GY IP 250 OP 636 PS 637: Performed by: INTERNAL MEDICINE

## 2020-04-12 PROCEDURE — 25000128 H RX IP 250 OP 636: Performed by: INTERNAL MEDICINE

## 2020-04-12 PROCEDURE — 25000132 ZZH RX MED GY IP 250 OP 250 PS 637: Performed by: INTERNAL MEDICINE

## 2020-04-12 PROCEDURE — 36415 COLL VENOUS BLD VENIPUNCTURE: CPT | Performed by: HOSPITALIST

## 2020-04-12 PROCEDURE — 40000275 ZZH STATISTIC RCP TIME EA 10 MIN

## 2020-04-12 PROCEDURE — 84145 PROCALCITONIN (PCT): CPT | Performed by: HOSPITALIST

## 2020-04-12 PROCEDURE — 21000001 ZZH R&B HEART CARE

## 2020-04-12 PROCEDURE — 97530 THERAPEUTIC ACTIVITIES: CPT | Mod: GO | Performed by: OCCUPATIONAL THERAPY ASSISTANT

## 2020-04-12 PROCEDURE — 25800030 ZZH RX IP 258 OP 636: Performed by: HOSPITALIST

## 2020-04-12 PROCEDURE — 94660 CPAP INITIATION&MGMT: CPT

## 2020-04-12 PROCEDURE — 80048 BASIC METABOLIC PNL TOTAL CA: CPT | Performed by: HOSPITALIST

## 2020-04-12 PROCEDURE — 25800030 ZZH RX IP 258 OP 636: Performed by: INTERNAL MEDICINE

## 2020-04-12 PROCEDURE — 25000132 ZZH RX MED GY IP 250 OP 250 PS 637: Performed by: HOSPITALIST

## 2020-04-12 PROCEDURE — 85027 COMPLETE CBC AUTOMATED: CPT | Performed by: HOSPITALIST

## 2020-04-12 PROCEDURE — 00000146 ZZHCL STATISTIC GLUCOSE BY METER IP

## 2020-04-12 PROCEDURE — 99232 SBSQ HOSP IP/OBS MODERATE 35: CPT | Mod: 24 | Performed by: INTERNAL MEDICINE

## 2020-04-12 RX ORDER — GUAIFENESIN 600 MG/1
600 TABLET, EXTENDED RELEASE ORAL 2 TIMES DAILY PRN
Status: DISCONTINUED | OUTPATIENT
Start: 2020-04-12 | End: 2020-04-17 | Stop reason: HOSPADM

## 2020-04-12 RX ORDER — PIPERACILLIN SODIUM, TAZOBACTAM SODIUM 2; .25 G/10ML; G/10ML
2.25 INJECTION, POWDER, LYOPHILIZED, FOR SOLUTION INTRAVENOUS EVERY 8 HOURS SCHEDULED
Status: DISCONTINUED | OUTPATIENT
Start: 2020-04-12 | End: 2020-04-15

## 2020-04-12 RX ORDER — GLIPIZIDE 10 MG/1
2 TABLET ORAL
Status: DISCONTINUED | OUTPATIENT
Start: 2020-04-12 | End: 2020-04-17 | Stop reason: HOSPADM

## 2020-04-12 RX ORDER — SODIUM CHLORIDE 9 MG/ML
INJECTION, SOLUTION INTRAVENOUS CONTINUOUS
Status: ACTIVE | OUTPATIENT
Start: 2020-04-12 | End: 2020-04-12

## 2020-04-12 RX ADMIN — INSULIN ASPART 1 UNITS: 100 INJECTION, SOLUTION INTRAVENOUS; SUBCUTANEOUS at 13:10

## 2020-04-12 RX ADMIN — INSULIN ASPART 1 UNITS: 100 INJECTION, SOLUTION INTRAVENOUS; SUBCUTANEOUS at 18:26

## 2020-04-12 RX ADMIN — APIXABAN 5 MG: 5 TABLET, FILM COATED ORAL at 21:40

## 2020-04-12 RX ADMIN — PIPERACILLIN SODIUM AND TAZOBACTAM SODIUM 2.25 G: 2; .25 INJECTION, POWDER, LYOPHILIZED, FOR SOLUTION INTRAVENOUS at 05:21

## 2020-04-12 RX ADMIN — APIXABAN 5 MG: 5 TABLET, FILM COATED ORAL at 07:53

## 2020-04-12 RX ADMIN — ACETAMINOPHEN 650 MG: 325 TABLET, FILM COATED ORAL at 21:39

## 2020-04-12 RX ADMIN — SODIUM CHLORIDE: 9 INJECTION, SOLUTION INTRAVENOUS at 11:22

## 2020-04-12 RX ADMIN — INSULIN GLARGINE 16 UNITS: 100 INJECTION, SOLUTION SUBCUTANEOUS at 21:41

## 2020-04-12 RX ADMIN — PIPERACILLIN SODIUM AND TAZOBACTAM SODIUM 2.25 G: 2; .25 INJECTION, POWDER, LYOPHILIZED, FOR SOLUTION INTRAVENOUS at 21:43

## 2020-04-12 RX ADMIN — PIPERACILLIN SODIUM AND TAZOBACTAM SODIUM 2.25 G: 2; .25 INJECTION, POWDER, LYOPHILIZED, FOR SOLUTION INTRAVENOUS at 13:09

## 2020-04-12 RX ADMIN — SODIUM CHLORIDE 250 ML: 9 INJECTION, SOLUTION INTRAVENOUS at 06:46

## 2020-04-12 RX ADMIN — DEXTRAN 70, GLYCERIN, HYPROMELLOSE 2 DROP: 1; 2; 3 SOLUTION/ DROPS OPHTHALMIC at 07:55

## 2020-04-12 RX ADMIN — TAMSULOSIN HYDROCHLORIDE 0.4 MG: 0.4 CAPSULE ORAL at 07:53

## 2020-04-12 ASSESSMENT — ACTIVITIES OF DAILY LIVING (ADL)
ADLS_ACUITY_SCORE: 15
ADLS_ACUITY_SCORE: 14
ADLS_ACUITY_SCORE: 15

## 2020-04-12 NOTE — PROGRESS NOTES
[facility]    Electrophysiology Progress Note     Assessment & Plan     Gene Pagan is a 74 year old male who was admitted on 4/11/2020 who has PMHx of NIDDM and BPH here for AFIB with RVR and decompensated HF. She had ongoing a fib with RVR and failed cardioversion. Transferred to [facility] 4/10/2020 per Cardiology recommendation. 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 Amiodarone stopped and apixaban restarted. Cardiomyopathy thought to be related to AFIB. NSTEMI Type II with TN 0.439. Echocardiogram with EF 25-30%, no WMA, global hypokinesis and grade III diastolic dysfunction, severe MR.         Plan:  1. Continue apixaban, no aspirin  2. Stop amiodarone, metoprolol  3. Will need outpatient EP follow up, general cardiology with repeat echocardiogram, as well as [facility] clinic for CHF  4. Dry weight 186 lbs, would initiate maintenance diuresis as outpatient, monitor lytes and Cr   5. Outpatient coronary artery disease evaluation       Roro Chavez MD  Text Page  (Monday - Friday, 8 am- 5 pm)    Interval History   No events overnight. Patient doing well. Denies chest pain. Walking well with PT. Hypotensive overnight but asx.     Physical Exam   Temp: 98.2  F (36.8  C) Temp src: Oral BP: (!) 88/59 Pulse: 80 Heart Rate: 80 Resp: 20 SpO2: 100 % O2 Device: Nasal cannula Oxygen Delivery: 2 LPM  Vitals:    04/10/20 1900 04/11/20 0515 04/12/20 0459   Weight: 88.6 kg (195 lb 6.4 oz) 85.1 kg (187 lb 9.6 oz) 84.3 kg (185 lb 14.4 oz)     Vital Signs with Ranges  Temp:  [97.4  F (36.3  C)-98.2  F (36.8  C)] 98.2  F (36.8  C)  Pulse:  [80-89] 80  Heart Rate:  [79-86] 80  Resp:  [18-20] 20  BP: (68-98)/(44-95) 88/59  SpO2:  [93 %-100 %] 100 %  I/O last 3 completed shifts:  In: 1510 [P.O.:1010]  Out: 850 [Urine:850]  Patient Active Problem List   Diagnosis     Atrial fibrillation with rapid ventricular response (H)     CHF  (congestive heart failure) (H)       Constitutional: Awake, alert, cooperative, no apparent distress  Respiratory: Clear to auscultation bilaterally, no crackles or wheezing  Cardiovascular: Regular rate and rhythm, normal S1 and S2, and no murmur noted  GI: Normal bowel sounds, soft, non-distended, non-tender  Skin/Integumen: No rashes, no cyanosis, no edema  Other:      Medications     - MEDICATION INSTRUCTIONS -       ACE/ARB/ARNI NOT PRESCRIBED       sodium chloride 50 mL/hr at 04/12/20 1122       apixaban ANTICOAGULANT  5 mg Oral BID     insulin aspart  1-7 Units Subcutaneous TID AC     insulin aspart  1-5 Units Subcutaneous At Bedtime     insulin glargine  16 Units Subcutaneous At Bedtime     piperacillin-tazobactam  2.25 g Intravenous Q8H OLIVIER     sodium chloride (PF)  3 mL Intracatheter Q8H     tamsulosin  0.4 mg Oral Daily

## 2020-04-12 NOTE — PLAN OF CARE
Discharge Planner OT   Patient plan for discharge: rehab  Current status: pt completed supine to sit EOB Wale, Wale sit to stand, amb with FWW CGA to/from bathroom, SBA standing at sink for ADL task, Wale toilet transfer. Pt BP supine 94/61, sitting up to /65, post activity 98/58 pt sitting up into chair for lunch.   Barriers to return to prior living situation: Current level of assist for ADLs, decreased functional activity tolerance  Recommendations for discharge: ARU per plan established by the Occupational Therapist  Rationale for recommendations: Pt is below baseline in ADLs, IADLs and functional mobility. Skilled OT in IP and in ARU setting to improve activity tolerance and independence in I/ADLs. Anticipate pt will be able to tolerate the 3 hours of therapy, daily.        Entered by: Marti Andersen 04/12/2020 2:07 PM

## 2020-04-12 NOTE — PROGRESS NOTES
Pt denies specific complaints today. NS IV @ 50 /hr for soft pressures. B/P remain  systolic. Pt denies complaints. Up in chair numerous times. Cont to monitor.

## 2020-04-12 NOTE — PROGRESS NOTES
[facility]    Medicine Progress Note - Hospitalist Service       Date of Admission:  4/10/2020  Assessment & Plan   Gene Pagan is a 74 year old male with PMHx of NIDDM and BPH who was seen initially at [facility] 3/27/2020 for progressive SOB, ultimately transferred to the ED after found to be in atrial fibrillation with RVR (new diagnosis).  Admitted for further evaluation and treatment. Ultimately pt decompensated 3/28/2020 with more progressive SOB and hypotension requiring intubation and pressor support. Please refer to H&P by Dr. Sabillon from 3/27/2020 for complete details on initial presentation. Pt ultimately with ongoing a fib with RVR and failed cardioversion. Transferred to [facility] 4/10/2020 per Cardiology recommendation for EP intervention. Note Tmax 101.1 degrees fahrenheit overnight, persistent a fib with RVR in the 120s, softer BPs. 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 No ASA while on Eliquis per EP     Suspected acute pyelonephritis with chronic bladder outlet obstruction  Tmax 101.1 degrees fahrenheit overnight. WBC peak 20.8. Procal on admission 0.07. Covid negative, CRP 23.1, ferritin 49 (WNL). Strep pneumo negative, legionella negative, influenza and RSV PCR negative. Urine culture negative. BC NGTD. CXR on admission with heterogeneous opacities in the lung bases concerning for infectious or inflammatory process. CT C/A/P from 3/27/2020 with irregularity of the gallbladder and stranding around the gallbladder, stranding around bilateral kidneys and bilateral renal collecting system prominence including ureters and extrarenal pelvis could be secondary to chronic bladder outlet obstruction, pyelonephritis not excluded. US abdomen 3/27/2020 with mild gallbladder wall thickening, no other evidence of cholecystitis, no gallstones or biliary dilatation, mild right hydronephrosis. Pt  was treated with 7 days of IV Zosyn (3/27/2020 - 4/4/2020) and 2 days of IV Vancomycin (3/28/2020 - 3/29/2020).   - WBC trending down.  - Procalcitonin elevated at 2.85.  - UA suggestive of infection. Urine culture pending.  - Continue Zosyn for now for possible acute pyelonephritis.  - Discontinued vancomycin as nasal MRSA was negative and his renal function was worsening.  - Obtain blood culture if he develops recurrent fever.    Atrial fibrillation with RVR, new diagnosis  S/p AV sarah ablation and CRT by EP on 4/10/20  S/P NESTOR cardioversion (4/6/2020)  Presented with A fib with RVR in the setting of respiratory failure and acute on chronic renal failure. Initially treated with IV diltiazem and PO metoprolol, transitioned to IV amiodarone due to softer BPs and low EF. Anticoagulated with IV heparin since admission. Underwent successful NESTOR cardioversion 4/6/2020, but converted back to a fib the AM of 4/7/2020. Had remained in a fib with variable rate control since that time.   - Cardiology followed, recommended transfer to [facility] for cardioversion vs AVN ablation/biV pacer.  - EP consulted, s/p AV node ablation and CRT-P placement on 4/10/20.  - Discontinued amiodarone and Toprol XL per EP recommendations.  - Continue Eliquis per EP recommendations, may need to readdress depending on renal function.  - Continue to monitor on telemetry.    Acute diastolic and systolic CHF exacerbation (EF 20-25%)  Cardiomyopathy, suspect rate-related, new  Bilateral pleural effusions (moderate on left, small on right)  Grade III diastolic dysfunction   Moderate to severe mitral regurgitation  NSTEMI type II, demand ischemia in the setting of above  BNP 42519 on admission. Trop peak 0.439. EKG without evidence of ischemia. CT C/A/P from 3/27/2020 with moderate right and small left pleural fluid collections and associated compressive atelectasis in the bilateral posterior lungs. Groundglass densities in the lungs could reepresent  vascular congestion and the heart is enlarged indicating probable CHF. Echocardiogram 3/28/2020 with EF 20-25%, severe global hypokinesia of the left ventricle and grade III diastolic dysfunction. Also notes moderate to severe mitral regurgitation. Repeat limited echo on 4/5/2020 unchanged. Pt was diuresed with bumex gtt initially (down 20 L since admission).   - Cardiology following, planning outpatient evaluation for CAD in the future.  - Continue to hold lasix for now as he appears 'intravascularly dry.'  - Continue Toprol XL 25mg po daily   - Intake and output, daily weights    - Initial weight was around 204 pounds, down to 186 when discharged from [facility] on 4/10.   - Weight 185 pounds this morning.  - Needs close follow up with [facility] clinic on discharge. Will need cardiology follow-up as well with repeat echocardiogram.  - No aspirin while on Eliquis.    Cardiogenic shock, resolved  Acute hypoxic respiratory failure  Respiratory failure in the setting of CHF, cardiogenic shock and questionable infiltrate as above. Cardiac results as above. He did initially require norepinephrine which was weaned off as of 4/1/2020. Intubated 3/28/2020, extubated 4/1/2020.    - Patient is DNI but OK to do CPR as per patient.  - Saturating well on 1 lpm of supplemental oxygen, wean as able.  - Encourage pulmonary toilet with incentive spirometry.    Acute kidney injury  Chronic kidney disease, stage 3  Creatinine peaked 5.72, then trended down. Baseline creatinine seems to be about 2.  - Creatinine trending up again on 4/11.  - Nephrology consulted, appreciate their assistance.  - Hold diuretics for now.   - Will give a small amount of IV fluids today as blood pressure is still intermittently low.    Lactic acidosis  Peaked at 12.1.  - Improved to 1.6 when last checked on 4/11.    Elevated transaminases, improved  Peak ALT 7094, AST 20301. Thought to be ischemic hepatitis/shock liver/liver congestion in the setting of CHF.  Hepatitis B surface antigen nonreactive.  - LFTs continue to trend down as of 4/11/20, recheck in AM.    T2DM, non-insulin dependent  Hemoglobin A1C on 1/20/2020 was 6.0. A1C checked during admission 6.2.   [PTA medications: Metformin 1000 mg BID, Glipizide XL 5 mg po every day]  - Hold oral agents for now.   - Continue Lantus 16 U at bedtime (started during hospitalization).  - Continue accuchecks and medium dose sliding scale NovoLog.  - Monitor for hypoglycemia .  - Blood sugars fairly well controlled.    Enlarged prostate  Mild right hydronephrosis  Hx of BPH:   [Flomax 0.4 mg po every day]  - Continue PTA Flomax.  - Mckenzie catheter in place.  - Monitor I&Os.    Hyponatremia  Hypokalemia, resolved  Sodium borderline low on admission, improved with above intervention.   - Likely hypovolemic.  - Sodium 129 this morning.  - Nephrology consulted as noted above.    Iron deficiency anemia  No prior hemoglobin for comparison.  - Iron 27, , MIKI 8.   - Hemoglobin down slightly at 9.4 this morning.  - No signs of active bleeding.  - Recheck in AM, consider further work-up if continues to trend down.    Gross hematuria, resolved  Noted hematuria starting 4/1/2020, which has since resolved in the context of IV heparin use.   - Urology consulted for hematuria, three way urinary catheter initially placed for hematuria, removed 4/9/2020 after no return of hematuria despite re-initiation of anticoagulation.    Lung nodule, left  Noted on CT scan on 3/28/20, measuring up to 1 cm.   - Consider follow-up CT in 3 months, PET/CT and or tissue sampling; defer to outpatient provider upon discharge.       Diet: 2 Gram Sodium Diet No Caffeine for 24 hours (once tests completed, may have caffeine)    DVT Prophylaxis: Eliquis  Mckenzie Catheter: in place, indication: Retention  Code Status: DNI      Disposition Plan   Expected discharge: a least a few more days pending improvement in renal function, further clinical improvement, and  consultant recommendations  Entered: Jax Navarro MD 04/12/2020, 10:04 AM       The patient's care was discussed with the Bedside Nurse and Patient.    Jax Navarro MD  Hospitalist Service  [facility]    ______________________________________________________________________    Interval History   Gene Pagan feels OK this morning. No specific complaints/concerns for me. Denies fevers, lightheadedness, chest pain, shortness of breath, nausea, abdominal pain, diarrhea. Still has Mckenzie catheter in place, no blood in the urine.    Data reviewed today: I reviewed all medications, new labs and imaging results over the last 24 hours. I personally reviewed no images or EKG's today.    Physical Exam   Vital Signs: Temp: 97.9  F (36.6  C) Temp src: Oral BP: 95/65 Pulse: 80 Heart Rate: 86 Resp: 20 SpO2: 95 % O2 Device: None (Room air) Oxygen Delivery: 2 LPM  Weight: 185 lbs 14.4 oz  Constitutional: awake, alert, cooperative, no apparent distress, frail  Respiratory: diminished at the bases  Cardiovascular: regular rate and rhythm, normal S1 and S2, no murmur noted, dressing in place over pacemaker insertion site  GI: normal bowel sounds, soft, non-distended, non-tender  : Mckenzie catheter in place with clear yellow urine in the bag  Skin: warm, dry  Musculoskeletal: 1+ lower extremity pitting edema present  Neurologic: awake, alert, oriented    Data   Recent Labs   Lab 04/12/20  0538 04/11/20  1315 04/11/20  0536 04/10/20  0801  04/07/20  0533 04/06/20  1154   WBC 14.4*  --  15.5* 14.7*   < >  --   --    HGB 9.4*  --  10.2* 10.0*   < >  --   --    MCV 80  --  81 84   < >  --   --      --  476* 454*   < >  --   --    INR  --   --   --   --   --   --  1.17*   * 128* 129*  --    < > 133  --    POTASSIUM 4.1 4.5 4.8  --    < > 4.3 4.2   CHLORIDE 97 98 98  --    < > 101  --    CO2 19* 19* 21  --    < > 24  --    BUN 85* 82* 79*  --    < > 68*  --    CR 3.82* 3.70* 3.45*  --    < > 2.25*   --    ANIONGAP 13 11 10  --    < > 8  --    HARPER 8.6 8.9 9.1  --    < > 9.5  --    * 179* 217*  --    < > 163*  --    ALBUMIN  --   --  2.5*  --   --  2.6*  --    PROTTOTAL  --   --  6.4*  --   --   --   --    BILITOTAL  --   --  0.6  --   --   --   --    ALKPHOS  --   --  117  --   --   --   --    ALT  --   --  107*  --   --   --   --    AST  --   --  49*  --   --   --   --     < > = values in this interval not displayed.     Medications     - MEDICATION INSTRUCTIONS -       ACE/ARB/ARNI NOT PRESCRIBED         apixaban ANTICOAGULANT  5 mg Oral BID     insulin aspart  1-7 Units Subcutaneous TID AC     insulin aspart  1-5 Units Subcutaneous At Bedtime     insulin glargine  16 Units Subcutaneous At Bedtime     piperacillin-tazobactam  2.25 g Intravenous Q8H OLIVIER     sodium chloride (PF)  3 mL Intracatheter Q8H     tamsulosin  0.4 mg Oral Daily

## 2020-04-12 NOTE — PROGRESS NOTES
Hospitalist service cross-cover note:     Paged regarding low BP. Initially lower in chair, improved when in bed. Asymptomatic throughout. Similar occurrence 4/11 AM. Nephrology recommendations reviewed, will give 250 ml bolus and monitor.     Edenilson Catherine MD   Hospitalist  [phone]

## 2020-04-12 NOTE — PLAN OF CARE
Discharge Planner PT   Patient plan for discharge: None stated during session.  Current status: Pt agreeable to session, on 2 LPM throughout session. Pt performed bed mobility with SBA. Sit <> stand and ambulation of 20 feet with FWW and CGA, limited by fatigue. Pt requested to use restroom at end of session.  Barriers to return to prior living situation: Decreased activity tolerance, Level of assist, Generalized weakness  Recommendations for discharge: ARC  Rationale for recommendations: Pt will benefit from continued skilled PT intervention while IP and at [facility] in order to progress independence and safety with mobility. Anticipate pt will be able to tolerate 3 hours of therapy/day at time of discharge.       Entered by: Merle Dalton 04/12/2020 11:19 AM

## 2020-04-12 NOTE — PROVIDER NOTIFICATION
MD Notification    Notified Person: MD    Notified Person Name:Dr Catherine    Notification Date/Time:4/12/2020  0600    Notification Interaction:    Purpose of Notification:low BP while up in the chair, Pt is asymptomatic.    Orders Received:  NS bolus 250cc  Comments:will continue to monitor.

## 2020-04-12 NOTE — PLAN OF CARE
Pt denies complaints. B/p soft this am. Receiving NS 50/hr. Up in chair with assist. O2 sats 99% on room air but pt states he feels more comfortable with it on. Does not use it at home. Encouraged pt to not use it when sitting up in chair but ok to use when sleeping.

## 2020-04-12 NOTE — PROGRESS NOTES
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 2+ proteinuria on dipstick but not estimated. Never seen a nephrologist before. CKD presumed to be d/t long standing DM2.      2 DEONDRE -  Recent major DEONDRE during this admission with Cr upto 5. Now back up sharply in setting of low BP. He has been extensively diuresed this admission and I wonder if he is hypovolemic at the moment. Urine sodium is 12 and FeNA is 0.4. No other clear etiology for hypotension. No other nephrotoxin on board. . DEONDRE likely d/t hypoperfusion with ? Progression to ATN.      3 A.fib - s/p ablation      4 Cardiomyopathy - presumed related to A.fib. Slight improvement in EF to 25-30% post ablation.   ALso has Gr III diastoic dyfunction and mod-severe MRKyle Frank-   Continue to hold diuretics.   Gentle IVF challenge - agree with 50 ml/ hr NS today. Monitor closely for s/o vol overload and decompensation of CHF. Difficult to find balance in pt with EF of 25% and severe diastolic dysfunction.   Use ALbumin for fluid bolus if needed for hypotension.   Daily weight and RFP.       Pipe Miguel MD  [facility] - Nephrology   [phone]      Interval History :   Seen / examined.   BP intermittently low overnight.   Feels well. No dyspnea. No dizziness.   Cr rise slower . Got 25 gm of albumin. On IVF 50 ml/ hr now.   Urine sodium 12, FeNA 0.4      Review of Systems:   A 4 point review of systems was negative except as noted above.  Notably: + appetite. no nausea or vomiting or diarrhea.  no confusion,  no fever or chills    Physical Exam:   I/O last 3 completed shifts:  In: 1510 [P.O.:1010]  Out: 850 [Urine:850]    GENERAL APPEARANCE: alert and no distress  CV: regular rhythm, normal rate, no rub   - JVP -   - Edema-  GI: soft, nontender,   MS: no evidence of inflammation in joints  : + baker  SKIN: no rash, warm, dry, no cyanosis  NEURO: face symmetric, no asterixis      Labs:   All labs reviewed by me  Electrolytes/Renal -   Recent Labs   Lab Test 04/12/20  0538 04/11/20  1315 04/11/20  0536 04/10/20  0655 04/09/20  0534 04/07/20  0533 04/06/20  1154  04/05/20  0544 04/04/20  0544   * 128* 129* 132* 133 133  --    < > 137 135   POTASSIUM 4.1 4.5 4.8 3.9 3.7 4.3 4.2   < > 3.6 3.5   CHLORIDE 97 98 98 101 102 101  --    < > 102 98   CO2 19* 19* 21 23 24 24  --    < > 31 30   BUN 85* 82* 79* 64* 68* 68*  --    < > 88* 119*   CR 3.82* 3.70* 3.45* 2.48* 2.30* 2.25*  --    < > 2.95* 4.05*   * 179* 217* 136* 117* 163*  --    < > 159* 218*   HARPER 8.6 8.9 9.1 9.4 9.2 9.5  --    < > 9.2 8.9   MAG  --   --   --  1.9 1.9  --  2.0  --   --   --    PHOS  --   --   --   --   --  3.5  --   --  4.1 5.1*    < > = values in this interval not displayed.       CBC -   Recent Labs   Lab Test 04/12/20  0538 04/11/20  0536 04/10/20  0801   WBC 14.4* 15.5* 14.7*   HGB 9.4* 10.2* 10.0*    476* 454*       LFTs -   Recent Labs   Lab Test 04/11/20  0536 04/07/20  0533 04/05/20  0544 04/03/20  0604   ALKPHOS 117  --  210* 244*   BILITOTAL 0.6  --  0.7 0.7   *  --  527* 1,171*   AST 49*  --  62* 163*   PROTTOTAL 6.4*  --  6.3* 6.6*   ALBUMIN 2.5* 2.6* 2.3* 2.6*       Iron Panel -   Recent Labs   Lab Test 03/27/20  1348   IRON 27*   IRONSAT 8*   JAMI 49             Current Medications:    apixaban ANTICOAGULANT  5 mg Oral BID     insulin aspart  1-7 Units Subcutaneous TID AC     insulin aspart  1-5 Units Subcutaneous At Bedtime     insulin glargine  16 Units Subcutaneous At Bedtime     piperacillin-tazobactam  2.25 g Intravenous Q8H [facility]     sodium chloride (PF)  3 mL Intracatheter Q8H     tamsulosin  0.4 mg Oral Daily       - MEDICATION INSTRUCTIONS -       ACE/ARB/ARNI NOT PRESCRIBED       sodium chloride 50 mL/hr at 04/12/20 1122     Pipe Miguel MD

## 2020-04-12 NOTE — PLAN OF CARE
DATE & TIME: 4/11/2020 1900-2330    Cognitive Concerns/ Orientation : A+Ox4   BEHAVIOR & AGGRESSION TOOL COLOR: Green; calm & cooperative  CIWA SCORE: NA   ABNL VS/O2: 1L O2  MOBILITY: GB + Walker assist x1  PAIN MANAGMENT: Denies  DIET: 2gm no caffiene  BOWEL/BLADDER: Uses urinal  ABNL LAB/BG: CRT  DRAIN/DEVICES: Mckenzie  TELEMETRY RHYTHM: AV paced at 70  SKIN: Red coccyx  TESTS/PROCEDURES: None  D/C DAY/GOALS/PLACE: TBD  OTHER IMPORTANT INFO: NA

## 2020-04-12 NOTE — PLAN OF CARE
Pt continues to have hypotension this AM, on call MD informed and a NS bolus was ordered.  He is asymptomatic with low BP's.  Pt was awake most of the night and maybe only slept 1 hour.  No c/o of pain or SOB.  Urinary out put was 300cc.  PPM dressing c/d/I.  Pt has been 100% paced.  No new issues noted.

## 2020-04-13 ENCOUNTER — DOCUMENTATION ONLY (OUTPATIENT)
Dept: CARDIOLOGY | Facility: CLINIC | Age: 75
End: 2020-04-13

## 2020-04-13 ENCOUNTER — APPOINTMENT (OUTPATIENT)
Dept: PHYSICAL THERAPY | Facility: CLINIC | Age: 75
DRG: 242 | End: 2020-04-13
Attending: INTERNAL MEDICINE
Payer: COMMERCIAL

## 2020-04-13 ENCOUNTER — APPOINTMENT (OUTPATIENT)
Dept: OCCUPATIONAL THERAPY | Facility: CLINIC | Age: 75
DRG: 242 | End: 2020-04-13
Attending: INTERNAL MEDICINE
Payer: COMMERCIAL

## 2020-04-13 DIAGNOSIS — Z95.0 CARDIAC PACEMAKER IN SITU: ICD-10-CM

## 2020-04-13 DIAGNOSIS — I48.91 ATRIAL FIBRILLATION WITH RAPID VENTRICULAR RESPONSE (H): Primary | ICD-10-CM

## 2020-04-13 LAB
ALBUMIN SERPL-MCNC: 2.3 G/DL (ref 3.4–5)
ALP SERPL-CCNC: 99 U/L (ref 40–150)
ALT SERPL W P-5'-P-CCNC: 63 U/L (ref 0–70)
ANION GAP SERPL CALCULATED.3IONS-SCNC: 11 MMOL/L (ref 3–14)
AST SERPL W P-5'-P-CCNC: 54 U/L (ref 0–45)
BILIRUB SERPL-MCNC: 0.6 MG/DL (ref 0.2–1.3)
BUN SERPL-MCNC: 82 MG/DL (ref 7–30)
CALCIUM SERPL-MCNC: 8.5 MG/DL (ref 8.5–10.1)
CHLORIDE SERPL-SCNC: 99 MMOL/L (ref 94–109)
CO2 SERPL-SCNC: 19 MMOL/L (ref 20–32)
CREAT SERPL-MCNC: 3.67 MG/DL (ref 0.66–1.25)
ERYTHROCYTE [DISTWIDTH] IN BLOOD BY AUTOMATED COUNT: 16.1 % (ref 10–15)
GFR SERPL CREATININE-BSD FRML MDRD: 15 ML/MIN/{1.73_M2}
GLUCOSE BLDC GLUCOMTR-MCNC: 107 MG/DL (ref 70–99)
GLUCOSE BLDC GLUCOMTR-MCNC: 111 MG/DL (ref 70–99)
GLUCOSE BLDC GLUCOMTR-MCNC: 112 MG/DL (ref 70–99)
GLUCOSE BLDC GLUCOMTR-MCNC: 88 MG/DL (ref 70–99)
GLUCOSE BLDC GLUCOMTR-MCNC: 94 MG/DL (ref 70–99)
GLUCOSE SERPL-MCNC: 87 MG/DL (ref 70–99)
HCT VFR BLD AUTO: 28.1 % (ref 40–53)
HGB BLD-MCNC: 9 G/DL (ref 13.3–17.7)
MCH RBC QN AUTO: 25.4 PG (ref 26.5–33)
MCHC RBC AUTO-ENTMCNC: 32 G/DL (ref 31.5–36.5)
MCV RBC AUTO: 79 FL (ref 78–100)
PLATELET # BLD AUTO: 391 10E9/L (ref 150–450)
POTASSIUM SERPL-SCNC: 3.5 MMOL/L (ref 3.4–5.3)
PROT SERPL-MCNC: 5.7 G/DL (ref 6.8–8.8)
RBC # BLD AUTO: 3.54 10E12/L (ref 4.4–5.9)
SODIUM SERPL-SCNC: 129 MMOL/L (ref 133–144)
WBC # BLD AUTO: 12.6 10E9/L (ref 4–11)

## 2020-04-13 PROCEDURE — 80053 COMPREHEN METABOLIC PANEL: CPT | Performed by: HOSPITALIST

## 2020-04-13 PROCEDURE — 36415 COLL VENOUS BLD VENIPUNCTURE: CPT | Performed by: HOSPITALIST

## 2020-04-13 PROCEDURE — 25000132 ZZH RX MED GY IP 250 OP 250 PS 637: Performed by: INTERNAL MEDICINE

## 2020-04-13 PROCEDURE — 25000132 ZZH RX MED GY IP 250 OP 250 PS 637: Performed by: HOSPITALIST

## 2020-04-13 PROCEDURE — 97530 THERAPEUTIC ACTIVITIES: CPT | Mod: GP

## 2020-04-13 PROCEDURE — 25000131 ZZH RX MED GY IP 250 OP 636 PS 637: Performed by: INTERNAL MEDICINE

## 2020-04-13 PROCEDURE — 25000128 H RX IP 250 OP 636: Performed by: INTERNAL MEDICINE

## 2020-04-13 PROCEDURE — 97535 SELF CARE MNGMENT TRAINING: CPT | Mod: GO

## 2020-04-13 PROCEDURE — 99232 SBSQ HOSP IP/OBS MODERATE 35: CPT | Mod: 24 | Performed by: INTERNAL MEDICINE

## 2020-04-13 PROCEDURE — 40000275 ZZH STATISTIC RCP TIME EA 10 MIN

## 2020-04-13 PROCEDURE — 85027 COMPLETE CBC AUTOMATED: CPT | Performed by: HOSPITALIST

## 2020-04-13 PROCEDURE — 97116 GAIT TRAINING THERAPY: CPT | Mod: GP

## 2020-04-13 PROCEDURE — 00000146 ZZHCL STATISTIC GLUCOSE BY METER IP

## 2020-04-13 PROCEDURE — 21000001 ZZH R&B HEART CARE

## 2020-04-13 PROCEDURE — 99233 SBSQ HOSP IP/OBS HIGH 50: CPT | Performed by: HOSPITALIST

## 2020-04-13 RX ADMIN — TAMSULOSIN HYDROCHLORIDE 0.4 MG: 0.4 CAPSULE ORAL at 08:16

## 2020-04-13 RX ADMIN — PIPERACILLIN SODIUM AND TAZOBACTAM SODIUM 2.25 G: 2; .25 INJECTION, POWDER, LYOPHILIZED, FOR SOLUTION INTRAVENOUS at 13:40

## 2020-04-13 RX ADMIN — APIXABAN 5 MG: 5 TABLET, FILM COATED ORAL at 08:16

## 2020-04-13 RX ADMIN — APIXABAN 5 MG: 5 TABLET, FILM COATED ORAL at 21:35

## 2020-04-13 RX ADMIN — ACETAMINOPHEN 650 MG: 325 TABLET, FILM COATED ORAL at 21:35

## 2020-04-13 RX ADMIN — INSULIN GLARGINE 16 UNITS: 100 INJECTION, SOLUTION SUBCUTANEOUS at 21:42

## 2020-04-13 RX ADMIN — PIPERACILLIN SODIUM AND TAZOBACTAM SODIUM 2.25 G: 2; .25 INJECTION, POWDER, LYOPHILIZED, FOR SOLUTION INTRAVENOUS at 06:09

## 2020-04-13 RX ADMIN — PIPERACILLIN SODIUM AND TAZOBACTAM SODIUM 2.25 G: 2; .25 INJECTION, POWDER, LYOPHILIZED, FOR SOLUTION INTRAVENOUS at 21:36

## 2020-04-13 ASSESSMENT — ACTIVITIES OF DAILY LIVING (ADL)
ADLS_ACUITY_SCORE: 15
ADLS_ACUITY_SCORE: 14
ADLS_ACUITY_SCORE: 15
ADLS_ACUITY_SCORE: 14

## 2020-04-13 NOTE — PROGRESS NOTES
A&Ox4, VSS on 2L NC, Tele: 100% AV paced. Denies pain, chest pain and SOB. , 2g Na+ diet Mckenzie patent. Pacemaker site CDI.  R groin and R wrist unremarkable. Discharge pending.

## 2020-04-13 NOTE — PLAN OF CARE
Discharge Planner OT   Patient plan for discharge: Home   Current status: Pt completed clothing item retrieval with the walker and CGA. Pt completed LE dressing with moderate assist. Pt demonstrated increase in fatigue and SOB during session.   Completed the [facility] Cognitive Screen to assess cognitive skills and the implications on I/ADLs. Pt scored 21/30 indicating cognitive impairment. Pt demonstrated difficulty with delayed recall, sequencing, and executive function. Further cognitive testing warranted. Discussed discharge plans. Pt sternly declines rehab facility, and plans on returning home.     Barriers to return to prior living situation: Current level of A, cognition     Recommendations for discharge: Updated: Home with assist for all I/ADLs (including bathing, dressing, home management tasks, transportation etc) and home OT.     Rationale for recommendations: Pt refuses rehab facility upon discharge, and reported that his spouse will assist as needed once home. Continued skilled OT to address independence in I/ADLs and cognition. If recommended level of A is not available, then TCU should be considered.        Entered by: Natanael Martinez 04/13/2020 3:35 PM

## 2020-04-13 NOTE — PROGRESS NOTES
[facility]    Medicine Progress Note - Hospitalist Service       Date of Admission:  4/10/2020  Assessment & Plan   Gene Pagan is a 74 year old male with PMHx of NIDDM and BPH who was seen initially at [facility] 3/27/2020 for progressive SOB, ultimately transferred to the ED after found to be in atrial fibrillation with RVR (new diagnosis).  Admitted for further evaluation and treatment. Ultimately pt decompensated 3/28/2020 with more progressive SOB and hypotension requiring intubation and pressor support. Please refer to H&P by Dr. Sabillon from 3/27/2020 for complete details on initial presentation. Pt ultimately with ongoing a fib with RVR and failed cardioversion. Transferred to [facility] 4/10/2020 per Cardiology recommendation for EP intervention. Note Tmax 101.1 degrees fahrenheit overnight, persistent a fib with RVR in the 120s, softer BPs. 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 No ASA while on Eliquis per EP     Suspected acute pyelonephritis with chronic bladder outlet obstruction  Tmax 101.1 degrees fahrenheit overnight. WBC peak 20.8. Procal on admission 0.07. Covid negative, CRP 23.1, ferritin 49 (WNL). Strep pneumo negative, legionella negative, influenza and RSV PCR negative. Urine culture negative. BC NGTD. CXR on admission with heterogeneous opacities in the lung bases concerning for infectious or inflammatory process. CT C/A/P from 3/27/2020 with irregularity of the gallbladder and stranding around the gallbladder, stranding around bilateral kidneys and bilateral renal collecting system prominence including ureters and extrarenal pelvis could be secondary to chronic bladder outlet obstruction, pyelonephritis not excluded. US abdomen 3/27/2020 with mild gallbladder wall thickening, no other evidence of cholecystitis, no gallstones or biliary dilatation, mild right hydronephrosis. Pt  was treated with 7 days of IV Zosyn (3/27/2020 - 4/4/2020) and 2 days of IV Vancomycin (3/28/2020 - 3/29/2020).   - WBC trending down.  - Procalcitonin elevated at 2.85.  - UA suggestive of infection.  - Urine culture negative, however it was obtained after he was restarted on antibiotics.  - Continue Zosyn for now for possible acute pyelonephritis.  - Discontinued vancomycin as nasal MRSA was negative and his renal function was worsening.  - Obtain blood culture if he develops recurrent fever.   - Recheck labs including procalcitonin in AM.    Atrial fibrillation with RVR, new diagnosis  S/p AV sarah ablation and CRT by EP on 4/10/20  S/P NESTOR cardioversion (4/6/2020)  Presented with A fib with RVR in the setting of respiratory failure and acute on chronic renal failure. Initially treated with IV diltiazem and PO metoprolol, transitioned to IV amiodarone due to softer BPs and low EF. Anticoagulated with IV heparin since admission. Underwent successful NESTOR cardioversion 4/6/2020, but converted back to a fib the AM of 4/7/2020. Had remained in a fib with variable rate control since that time.   - Cardiology followed, recommended transfer to [facility] for cardioversion vs AVN ablation/biV pacer.  - EP consulted, s/p AV node ablation and CRT-P placement on 4/10/20.  - Discontinued amiodarone and Toprol XL per EP recommendations.  - Continue Eliquis per EP recommendations, may need to readdress depending on renal function.  - Continue to monitor on telemetry.    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 Trop peak 0.439. EKG without evidence of ischemia. CT C/A/P from 3/27/2020 with moderate right and small left pleural fluid collections and associated  compressive atelectasis in the bilateral posterior lungs. Groundglass densities in the lungs could reepresent vascular congestion and the heart is enlarged indicating probable CHF. Echocardiogram 3/28/2020 with EF 20-25%, severe global hypokinesia of the left ventricle and grade III diastolic dysfunction. Also notes moderate to severe mitral regurgitation. Repeat limited echo on 4/5/2020 unchanged. Pt was diuresed with bumex gtt initially (down 20 L since admission).   - Cardiology following, planning outpatient evaluation for CAD in the future.  - Continue to hold lasix for now as he appears 'intravascularly dry.'  - Continue Toprol XL 25mg po daily   - Intake and output, daily weights    - Initial weight was around 204 pounds, down to 186 when discharged from [facility] on 4/10.   - Weight still 185 pounds this morning.  - Needs close follow up with [facility] clinic on discharge. Will need cardiology follow-up as well with repeat echocardiogram.  - No aspirin while on Eliquis.    Cardiogenic shock, resolved  Acute hypoxic respiratory failure  Respiratory failure in the setting of CHF, cardiogenic shock and questionable infiltrate as above. Cardiac results as above. He did initially require norepinephrine which was weaned off as of 4/1/2020. Intubated 3/28/2020, extubated 4/1/2020.    - Patient is DNI but OK to do CPR as per patient.  - Saturating well on 1-2 lpm of supplemental oxygen, wean as able.  - Encourage pulmonary toilet with incentive spirometry.    Acute kidney injury  Chronic kidney disease, stage 3  Creatinine peaked 5.72, then trended down. Baseline creatinine seems to be about 2.  - Creatinine trended up again starting on 4/11, peaked at 3.82, now improved to 3.67 this morning.  - Nephrology consulted, appreciate their assistance.  - Hold diuretics for now.     Lactic acidosis  Peaked at 12.1.  - Improved to 1.6 when last checked on 4/11.    Elevated transaminases, improved  Peak ALT 7094, AST 52127. Thought  to be ischemic hepatitis/shock liver/liver congestion in the setting of CHF. Hepatitis B surface antigen nonreactive.  - LFTs improved.    T2DM, non-insulin dependent  Hemoglobin A1C on 1/20/2020 was 6.0. A1C checked during admission 6.2.   [PTA medications: Metformin 1000 mg BID, Glipizide XL 5 mg po every day]  - Hold oral agents for now.   - Continue Lantus 16 U at bedtime (started during hospitalization).  - Continue accuchecks and medium dose sliding scale NovoLog.  - Monitor for hypoglycemia.  - Blood sugars fairly well controlled.    Enlarged prostate  Mild right hydronephrosis  BPH  [Flomax 0.4 mg po every day]  - Continue PTA Flomax.  - Mckenzie catheter in place.  - Monitor I&Os.    Hyponatremia  Hypokalemia, resolved  Sodium borderline low on admission, improved with above intervention.   - Likely hypovolemic.  - Sodium 129 this morning.  - Nephrology consulted as noted above.  - Potassium within normal limits this morning. Recheck in AM. Discontinue electrolyte replacement protocol in setting of DEONDRE.    Iron deficiency anemia  No prior hemoglobin for comparison.  - Iron 27, , MIKI 8.   - Hemoglobin down slightly at 9.0 this morning.  - No signs of active bleeding.  - Start iron supplement.  - Recheck labs in AM.    Gross hematuria, resolved  Noted hematuria starting 4/1/2020, which has since resolved in the context of IV heparin use.   - Urology consulted for hematuria, three way urinary catheter initially placed for hematuria, removed 4/9/2020 after no return of hematuria despite re-initiation of anticoagulation.    Lung nodule, left  Noted on CT scan on 3/28/20, measuring up to 1 cm.   - Consider follow-up CT in 3 months, PET/CT and or tissue sampling; defer to outpatient provider upon discharge.        Diet: 2 Gram Sodium Diet No Caffeine for 24 hours (once tests completed, may have caffeine)    DVT Prophylaxis: Eliquis  Mckenzie Catheter: in place, indication: Retention  Code Status: DNI       Disposition Plan   Expected discharge: 2 - 3 days pending further improvement in renal function, consultant recommendations, and progress with therapies  Entered: Jax Navarro MD 04/13/2020, 12:13 PM       The patient's care was discussed with the Bedside Nurse and Patient.    Jax Navarro MD  Hospitalist Service  [facility]    ______________________________________________________________________    Interval History   Gene Pagan feels a little better today. Strength is improving, was able to walk farther with therapy today. Denies fevers, chest pain, shortness of breath at rest, nausea, abdominal pain. Mckenzie catheter in place.    Data reviewed today: I reviewed all medications, new labs and imaging results over the last 24 hours. I personally reviewed no images or EKG's today.    Physical Exam   Vital Signs: Temp: 96.8  F (36  C) Temp src: Axillary BP: 100/56 Pulse: 80 Heart Rate: 80 Resp: 16 SpO2: 93 % O2 Device: Nasal cannula Oxygen Delivery: 2 LPM  Weight: 185 lbs 8 oz  Constitutional: awake, alert, cooperative, no apparent distress  Respiratory: diminished at the bases  Cardiovascular: regular rate and rhythm, normal S1 and S2, no murmur noted, dressing in place over pacemaker insertion site  GI: normal bowel sounds, soft, non-distended, non-tender  Skin: warm, dry  Musculoskeletal: 1+ lower extremity pitting edema present  Neurologic: awake, alert, answers questions appropriately    Data   Recent Labs   Lab 04/13/20  0613 04/12/20  0538 04/11/20  1315 04/11/20  0536   WBC 12.6* 14.4*  --  15.5*   HGB 9.0* 9.4*  --  10.2*   MCV 79 80  --  81    404  --  476*   * 129* 128* 129*   POTASSIUM 3.5 4.1 4.5 4.8   CHLORIDE 99 97 98 98   CO2 19* 19* 19* 21   BUN 82* 85* 82* 79*   CR 3.67* 3.82* 3.70* 3.45*   ANIONGAP 11 13 11 10   HARPER 8.5 8.6 8.9 9.1   GLC 87 128* 179* 217*   ALBUMIN 2.3*  --   --  2.5*   PROTTOTAL 5.7*  --   --  6.4*   BILITOTAL 0.6  --   --  0.6    ALKPHOS 99  --   --  117   ALT 63  --   --  107*   AST 54*  --   --  49*     Medications     - MEDICATION INSTRUCTIONS -       ACE/ARB/ARNI NOT PRESCRIBED         apixaban ANTICOAGULANT  5 mg Oral BID     insulin aspart  1-7 Units Subcutaneous TID AC     insulin aspart  1-5 Units Subcutaneous At Bedtime     insulin glargine  16 Units Subcutaneous At Bedtime     metoprolol succinate ER  12.5 mg Oral Daily     piperacillin-tazobactam  2.25 g Intravenous Q8H OLIVIER     sodium chloride (PF)  3 mL Intracatheter Q8H     tamsulosin  0.4 mg Oral Daily

## 2020-04-13 NOTE — CONSULTS
[facility] evaluation referral received from Dr. Ordonez.  Patient is not currently established in the [facility]. Patient diagnosis: Cardiomyopathy, thought to be related to Afib, combined diastolic and systolic CHF.      [facility] appointment made for:  Future Appointments   Date Time Provider Department Center   4/22/2020  9:30 AM Marsha Noonan PA-C [facility]       Please call with questions.     Jada Zarate RN  [facility] RN Care Coordinator  [facility]  [phone]    [facility]: Cardiomyopathy, Optimization, Rehabilitation, Education   The [facility] is a heart failure specialty clinic within the [facility] where you will work with your cardiologist, nurse practitioners, physician assistants and registered nurses who specialize in heart failure care. They are dedicated to helping patients with heart failure to carefully adjust medications, receive education, and learn who and when to call if symptoms develop. They specialize in helping you better understand your condition, slow the progression of your disease, improve the length and quality of your life, help you detect future heart problems before they become life threatening, and avoid hospitalizations.

## 2020-04-13 NOTE — PROGRESS NOTES
"[facility]    EP Progress Note    Date of Service (when I saw the patient): 04/13/2020     Assessment & Plan   Gene Pagan is a 74 year old male with h/o DM2, CKD, BPH who was admitted to [facility] 3/27 for increasing GORE and tachycardia. COVID and influenza negative. Required intubation and pressure support due to respiratory failure and hypotension. Presented in rapid AFib, which was difficult to control Underwent NESTOR/DCCV but did not maintain SR and transferred to [facility] on 4/10/2020 for consideration of AVN ablation and BiV PPM    1. AFib with RVR; EF 20-25% -   - Newly dx'd in setting of respiratory failure and acute on chronic renal failure  - Placed on amiodarone and underwent NESTOR/DCCV 4/6, but converted back to AFib 4/7 AM    - Now s/p AVn ablation and BiV [employer] PPM (with atrial lead) 4/10/20 with Dr. Willams    - Device interrogation 4/11 showed 4% AP and 100% BiV Paced 80/130 bpm  - CXR 4/11 without PTX  - Tele showed BiV paced    - Has been placed on AC with Eliquis 5 mg BID. CHADSVASc is 4 (HF, DM, age)     PLAN:   * Continue Eliquis 5 mg BID   * Device RNs will set up EP follow-up   * EP will sign off. Pls let us know if general cardiology needs to follow   * RN aware OK to take dressing off    2. BiV Heart Failure -   - EF on echo 20-25% with mod-severe RV systolic dysfunction and 2+ MR. EF drop thought to be due to rapid AFib per notes  - Has been on Bumex gtt and down 20L since admission;   - Will start baby does metoprolol XL 12.5 mg daily with hold parameters for BP  - Weight down 4.5 kg from admit 4/10     PLAN:   * Continue nephrology/Hospitalist follow-up   * EP has signed off; pls let us know if general cardiology needed   * CORE follow-up is being arranged, along with outpatient follow-up with echo, etc    Ava Eldridge PA-C    Interval History   Feeling \"much better.\"  Minimal discomfort at PPM site    Physical Exam   Temp: 96.8  F (36  C) Temp src: " Axillary BP: 105/67 Pulse: 80 Heart Rate: 80 Resp: 16 SpO2: 91 % O2 Device: Nasal cannula Oxygen Delivery: 2 LPM  Vitals:    04/11/20 0515 04/12/20 0459 04/13/20 0500   Weight: 85.1 kg (187 lb 9.6 oz) 84.3 kg (185 lb 14.4 oz) 84.1 kg (185 lb 8 oz)     Vital Signs with Ranges  Temp:  [96.8  F (36  C)-98.2  F (36.8  C)] 96.8  F (36  C)  Pulse:  [79-87] 80  Heart Rate:  [80] 80  Resp:  [16-20] 16  BP: ()/(59-79) 105/67  SpO2:  [91 %-100 %] 91 %  I/O last 3 completed shifts:  In: 440 [P.O.:440]  Out: 1300 [Urine:1300]    Telemetry: BIV paced 80 bpm    Constitutional: Alert; up in chair and conversant on O2 via nc  Respiratory: Decreased BS L>R. No wheeze noted. Crackles bilaterally  Cardiovascular:Regular. PPM site with c/d bandage  Musculoskeletal: 1+ LE edema    Medications     - MEDICATION INSTRUCTIONS -       ACE/ARB/ARNI NOT PRESCRIBED         apixaban ANTICOAGULANT  5 mg Oral BID     insulin aspart  1-7 Units Subcutaneous TID AC     insulin aspart  1-5 Units Subcutaneous At Bedtime     insulin glargine  16 Units Subcutaneous At Bedtime     piperacillin-tazobactam  2.25 g Intravenous Q8H OLIVIER     sodium chloride (PF)  3 mL Intracatheter Q8H     tamsulosin  0.4 mg Oral Daily       Data   I personally reviewed no EKG today    Results for orders placed or performed during the hospital encounter of 04/10/20 (from the past 24 hour(s))   Glucose by meter   Result Value Ref Range    Glucose 158 (H) 70 - 99 mg/dL   Glucose by meter   Result Value Ref Range    Glucose 182 (H) 70 - 99 mg/dL   Glucose by meter   Result Value Ref Range    Glucose 180 (H) 70 - 99 mg/dL   Glucose by meter   Result Value Ref Range    Glucose 111 (H) 70 - 99 mg/dL   CBC with platelets   Result Value Ref Range    WBC 12.6 (H) 4.0 - 11.0 10e9/L    RBC Count 3.54 (L) 4.4 - 5.9 10e12/L    Hemoglobin 9.0 (L) 13.3 - 17.7 g/dL    Hematocrit 28.1 (L) 40.0 - 53.0 %    MCV 79 78 - 100 fl    MCH 25.4 (L) 26.5 - 33.0 pg    MCHC 32.0 31.5 - 36.5 g/dL     RDW 16.1 (H) 10.0 - 15.0 %    Platelet Count 391 150 - 450 10e9/L   Comprehensive metabolic panel   Result Value Ref Range    Sodium 129 (L) 133 - 144 mmol/L    Potassium 3.5 3.4 - 5.3 mmol/L    Chloride 99 94 - 109 mmol/L    Carbon Dioxide 19 (L) 20 - 32 mmol/L    Anion Gap 11 3 - 14 mmol/L    Glucose 87 70 - 99 mg/dL    Urea Nitrogen 82 (H) 7 - 30 mg/dL    Creatinine 3.67 (H) 0.66 - 1.25 mg/dL    GFR Estimate 15 (L) >60 mL/min/[1.73_m2]    GFR Estimate If Black 17 (L) >60 mL/min/[1.73_m2]    Calcium 8.5 8.5 - 10.1 mg/dL    Bilirubin Total 0.6 0.2 - 1.3 mg/dL    Albumin 2.3 (L) 3.4 - 5.0 g/dL    Protein Total 5.7 (L) 6.8 - 8.8 g/dL    Alkaline Phosphatase 99 40 - 150 U/L    ALT 63 0 - 70 U/L    AST 54 (H) 0 - 45 U/L   Glucose by meter   Result Value Ref Range    Glucose 88 70 - 99 mg/dL

## 2020-04-13 NOTE — PROGRESS NOTES
Renal Medicine Progress Note            Assessment/Plan:     # CKD III--IV: He had a serum creatinine of 2.32 mg/dl in January.     # Acute kidney injury: Scr peaked at 5.76 mg/dl. Better to stable in the last couple days.     # Afib with RVR s/p ablation and PM placement.     # BiV failure. LV EF of 25-30% with grade III DD and moderate decreased RV function. Severe MR.     # FEN: Mild edema only. Hyponatremia    Plan:  # Cont to hold diuretic. He has decent urine output without diuretics.   # Given his relative hypotension and he is paced, does he needs Toprol anymore?   # Daily renal panel            Interval History:     He denies worsening shortness of breath. He denies chest and abdominal pain. He says he feels great. No N/V .         Medications and Allergies:       apixaban ANTICOAGULANT  5 mg Oral BID     insulin aspart  1-7 Units Subcutaneous TID AC     insulin aspart  1-5 Units Subcutaneous At Bedtime     insulin glargine  16 Units Subcutaneous At Bedtime     metoprolol succinate ER  12.5 mg Oral Daily     piperacillin-tazobactam  2.25 g Intravenous Q8H OLIVIER     sodium chloride (PF)  3 mL Intracatheter Q8H     tamsulosin  0.4 mg Oral Daily      No Known Allergies         Physical Exam:   Vitals were reviewed  Heart Rate: 80, Blood pressure 105/67, pulse 80, temperature 97.7  F (36.5  C), temperature source Oral, resp. rate 15, weight 84.1 kg (185 lb 8 oz), SpO2 98 %.    Wt Readings from Last 3 Encounters:   04/13/20 84.1 kg (185 lb 8 oz)   04/10/20 84.6 kg (186 lb 6.4 oz)       Intake/Output Summary (Last 24 hours) at 4/13/2020 1343  Last data filed at 4/13/2020 1248  Gross per 24 hour   Intake 240 ml   Output 1750 ml   Net -1510 ml     Constitutional: awake, alert, cooperative, no apparent distress  Respiratory: diminished at the bases  Cardiovascular: regular rate and rhythm, normal S1 and S2, no murmur noted, dressing in place over pacemaker insertion site  GI: normal bowel sounds, soft, non-distended,  non-tender  Skin: warm, dry  Musculoskeletal: On trace peripheral edema.   Neurologic: awake, alert, answers questions appropriately            Data:     CBC RESULTS:     Recent Labs   Lab 04/13/20  0613 04/12/20  0538 04/11/20  0536 04/10/20  0801 04/09/20  0534   WBC 12.6* 14.4* 15.5* 14.7* 15.3*   RBC 3.54* 3.65* 4.05* 3.96* 4.07*   HGB 9.0* 9.4* 10.2* 10.0* 10.1*   HCT 28.1* 29.0* 32.6* 33.4* 33.8*    404 476* 454* 383       Basic Metabolic Panel:  Recent Labs   Lab 04/13/20  0613 04/12/20  0538 04/11/20  1315 04/11/20  0536 04/10/20  0655 04/09/20  0534   * 129* 128* 129* 132* 133   POTASSIUM 3.5 4.1 4.5 4.8 3.9 3.7   CHLORIDE 99 97 98 98 101 102   CO2 19* 19* 19* 21 23 24   BUN 82* 85* 82* 79* 64* 68*   CR 3.67* 3.82* 3.70* 3.45* 2.48* 2.30*   GLC 87 128* 179* 217* 136* 117*   HARPER 8.5 8.6 8.9 9.1 9.4 9.2       INRNo lab results found in last 7 days.   Attestation:   I have reviewed today's relevant vital signs, notes, medications, labs and imaging.    Afshin Kay MD  [facility] - Nephrology  Office phone :[phone]  Pager: [phone]

## 2020-04-13 NOTE — PLAN OF CARE
A&Ox4. Hypotensive (new metoprolol held); other VSS on 2LPM NC. Unable to wean O2 today; sats upper 80's on RA. Denies pain. Up with SB and walker. Blood glucose monitored; 2g sodium diet well tolerated. IV SL. Mckenzie patent. Creatinine (3.67) slightly improved from yesterday. Pacemaker site CDI with steri strips in place. Right groin and wrist sites unremarkable. Tele 100% AV paced. Continue to monitor.

## 2020-04-13 NOTE — PLAN OF CARE
"Discharge Planner PT   Patient plan for discharge: \"I think I can manage to home\"  Current status: Pt with improved mobility and endurance this date. Pt able to perform bed mobility and transfers with SBA. Pt tolerates ambulating up to 120' with FWW and CGA progressing to SBA. Pt on 2L O2 via NC, VSS throughout session.  Barriers to return to prior living situation: Stairs, decreased activity tolerance  Recommendations for discharge: Home with FWW and home health PT and assist from significant other for stairs.  Rationale for recommendations: Pt progressing well with physical therapy. Anticipate with further medical management and therapy, patient will be safe to discharge home with family assist as needed for stair management. Discharge recommendation changed to home with HHPT, social work/care coordinator team notified.       Entered by: Seda Mistry 04/13/2020 10:24 AM      "

## 2020-04-13 NOTE — PROGRESS NOTES
Dorian Valitude, CRT-P implanted 4-; admission for AF.RVR/CHF.  Pt remains in [facility]; [facility] referral today. Pt will eventually to a TCU but no date set    Order per Annalisa for outer dressing to be removed today. I believe Dorian has left a communicator (Mireya) with pt; this should go with pt to the TCU with instructions for nursing staff to call [phone] for set up  Instructions. .     I spoke with [place] (VALERY).  She will call [facility] with information re: when/where he will be transferred.  Order placed for 10 day remote and office phone visit.     SueLangenbrunnerRN

## 2020-04-13 NOTE — PLAN OF CARE
A&Ox4 w/ VSS. 2L of O2 via NC for comfort, otherwise sats well on RA. CPAP at HS. Tylenol given x1 for pain. Mckenzie in place. Continue POC.

## 2020-04-14 ENCOUNTER — APPOINTMENT (OUTPATIENT)
Dept: PHYSICAL THERAPY | Facility: CLINIC | Age: 75
DRG: 242 | End: 2020-04-14
Attending: INTERNAL MEDICINE
Payer: COMMERCIAL

## 2020-04-14 ENCOUNTER — APPOINTMENT (OUTPATIENT)
Dept: OCCUPATIONAL THERAPY | Facility: CLINIC | Age: 75
DRG: 242 | End: 2020-04-14
Attending: INTERNAL MEDICINE
Payer: COMMERCIAL

## 2020-04-14 LAB
ANION GAP SERPL CALCULATED.3IONS-SCNC: 10 MMOL/L (ref 3–14)
BUN SERPL-MCNC: 72 MG/DL (ref 7–30)
CALCIUM SERPL-MCNC: 8.7 MG/DL (ref 8.5–10.1)
CHLORIDE SERPL-SCNC: 102 MMOL/L (ref 94–109)
CO2 SERPL-SCNC: 20 MMOL/L (ref 20–32)
CREAT SERPL-MCNC: 3.56 MG/DL (ref 0.66–1.25)
ERYTHROCYTE [DISTWIDTH] IN BLOOD BY AUTOMATED COUNT: 16.2 % (ref 10–15)
GFR SERPL CREATININE-BSD FRML MDRD: 16 ML/MIN/{1.73_M2}
GLUCOSE BLDC GLUCOMTR-MCNC: 101 MG/DL (ref 70–99)
GLUCOSE BLDC GLUCOMTR-MCNC: 109 MG/DL (ref 70–99)
GLUCOSE BLDC GLUCOMTR-MCNC: 140 MG/DL (ref 70–99)
GLUCOSE BLDC GLUCOMTR-MCNC: 147 MG/DL (ref 70–99)
GLUCOSE BLDC GLUCOMTR-MCNC: 81 MG/DL (ref 70–99)
GLUCOSE SERPL-MCNC: 85 MG/DL (ref 70–99)
HCT VFR BLD AUTO: 29.2 % (ref 40–53)
HGB BLD-MCNC: 9.2 G/DL (ref 13.3–17.7)
MCH RBC QN AUTO: 25.1 PG (ref 26.5–33)
MCHC RBC AUTO-ENTMCNC: 31.5 G/DL (ref 31.5–36.5)
MCV RBC AUTO: 80 FL (ref 78–100)
PLATELET # BLD AUTO: 406 10E9/L (ref 150–450)
POTASSIUM SERPL-SCNC: 3.5 MMOL/L (ref 3.4–5.3)
PROCALCITONIN SERPL-MCNC: 0.85 NG/ML
RBC # BLD AUTO: 3.67 10E12/L (ref 4.4–5.9)
SODIUM SERPL-SCNC: 132 MMOL/L (ref 133–144)
WBC # BLD AUTO: 10.2 10E9/L (ref 4–11)

## 2020-04-14 PROCEDURE — 25000128 H RX IP 250 OP 636: Performed by: INTERNAL MEDICINE

## 2020-04-14 PROCEDURE — 85027 COMPLETE CBC AUTOMATED: CPT | Performed by: HOSPITALIST

## 2020-04-14 PROCEDURE — 00000146 ZZHCL STATISTIC GLUCOSE BY METER IP

## 2020-04-14 PROCEDURE — 21000001 ZZH R&B HEART CARE

## 2020-04-14 PROCEDURE — 99233 SBSQ HOSP IP/OBS HIGH 50: CPT | Performed by: HOSPITALIST

## 2020-04-14 PROCEDURE — 97535 SELF CARE MNGMENT TRAINING: CPT | Mod: GO | Performed by: OCCUPATIONAL THERAPIST

## 2020-04-14 PROCEDURE — 25000132 ZZH RX MED GY IP 250 OP 250 PS 637: Performed by: INTERNAL MEDICINE

## 2020-04-14 PROCEDURE — 80048 BASIC METABOLIC PNL TOTAL CA: CPT | Performed by: HOSPITALIST

## 2020-04-14 PROCEDURE — 97116 GAIT TRAINING THERAPY: CPT | Mod: GP

## 2020-04-14 PROCEDURE — 25000132 ZZH RX MED GY IP 250 OP 250 PS 637: Performed by: HOSPITALIST

## 2020-04-14 PROCEDURE — 25000131 ZZH RX MED GY IP 250 OP 636 PS 637: Performed by: INTERNAL MEDICINE

## 2020-04-14 PROCEDURE — 97110 THERAPEUTIC EXERCISES: CPT | Mod: GP

## 2020-04-14 PROCEDURE — 36415 COLL VENOUS BLD VENIPUNCTURE: CPT | Performed by: HOSPITALIST

## 2020-04-14 PROCEDURE — 84145 PROCALCITONIN (PCT): CPT | Performed by: HOSPITALIST

## 2020-04-14 RX ADMIN — INSULIN GLARGINE 16 UNITS: 100 INJECTION, SOLUTION SUBCUTANEOUS at 21:35

## 2020-04-14 RX ADMIN — PIPERACILLIN SODIUM AND TAZOBACTAM SODIUM 2.25 G: 2; .25 INJECTION, POWDER, LYOPHILIZED, FOR SOLUTION INTRAVENOUS at 05:40

## 2020-04-14 RX ADMIN — PIPERACILLIN SODIUM AND TAZOBACTAM SODIUM 2.25 G: 2; .25 INJECTION, POWDER, LYOPHILIZED, FOR SOLUTION INTRAVENOUS at 13:00

## 2020-04-14 RX ADMIN — Medication 1 SPRAY: at 08:09

## 2020-04-14 RX ADMIN — PIPERACILLIN SODIUM AND TAZOBACTAM SODIUM 2.25 G: 2; .25 INJECTION, POWDER, LYOPHILIZED, FOR SOLUTION INTRAVENOUS at 21:01

## 2020-04-14 RX ADMIN — APIXABAN 5 MG: 5 TABLET, FILM COATED ORAL at 21:01

## 2020-04-14 RX ADMIN — INSULIN ASPART 1 UNITS: 100 INJECTION, SOLUTION INTRAVENOUS; SUBCUTANEOUS at 11:44

## 2020-04-14 RX ADMIN — ACETAMINOPHEN 650 MG: 325 TABLET, FILM COATED ORAL at 19:24

## 2020-04-14 RX ADMIN — TAMSULOSIN HYDROCHLORIDE 0.4 MG: 0.4 CAPSULE ORAL at 08:10

## 2020-04-14 RX ADMIN — APIXABAN 5 MG: 5 TABLET, FILM COATED ORAL at 08:10

## 2020-04-14 ASSESSMENT — ACTIVITIES OF DAILY LIVING (ADL)
ADLS_ACUITY_SCORE: 12
ADLS_ACUITY_SCORE: 14
ADLS_ACUITY_SCORE: 12

## 2020-04-14 NOTE — PLAN OF CARE
VSS. No complaints of pain. Sating well on 2L per NC, refused CPap. HR VPaced. Pt up SBA w/ walker. Mckenzie in place.

## 2020-04-14 NOTE — PROGRESS NOTES
Renal Medicine Progress Note            Assessment/Plan:     # CKD III-->IV: He had a serum creatinine of 2.32 mg/dl in January.      # Acute kidney injury: Scr peaked at 5.76 mg/dl. Better to stable in the last couple days.      # Afib with RVR s/p ablation and PM placement.      # BiV failure. LV EF of 25-30% with grade III DD and moderate decreased RV function. Severe MR.      # FEN: Mild edema only. Hyponatremia    # Acidosis: No need for bicarb at this time.      Plan:  # Cont to hold diuretic. He has excellent urine output without diuretics.   # Daily renal panel   # Low Na and K diet  # Fluid restriction to 1200 mg daily  # If kidney function remains stable. He can be discharged in 1-2 if there are not other active issues.         Interval History:     Afebrile. BP is soft. Excellent urine output without diuretic. Kidney function seems stable. He says his breathing is better. No chest or abdominal pain. No N/V.             Medications and Allergies:       apixaban ANTICOAGULANT  5 mg Oral BID     insulin aspart  1-7 Units Subcutaneous TID AC     insulin aspart  1-5 Units Subcutaneous At Bedtime     insulin glargine  16 Units Subcutaneous At Bedtime     piperacillin-tazobactam  2.25 g Intravenous Q8H OLIVIER     sodium chloride (PF)  3 mL Intracatheter Q8H     tamsulosin  0.4 mg Oral Daily      No Known Allergies         Physical Exam:   Vitals were reviewed  Heart Rate: 83, Blood pressure 97/57, pulse 83, temperature 98.2  F (36.8  C), temperature source Axillary, resp. rate 18, weight 88.8 kg (195 lb 11.2 oz), SpO2 97 %.    Wt Readings from Last 3 Encounters:   04/14/20 88.8 kg (195 lb 11.2 oz)   04/10/20 84.6 kg (186 lb 6.4 oz)       Intake/Output Summary (Last 24 hours) at 4/14/2020 1127  Last data filed at 4/14/2020 0811  Gross per 24 hour   Intake 1293 ml   Output 2300 ml   Net -1007 ml     Constitutional: awake, alert, cooperative, no apparent distress  Respiratory: diminished at the  bases  Cardiovascular: regular rate and rhythm, normal S1 and S2, no murmur noted, dressing in place over pacemaker insertion site  GI: normal bowel sounds, soft, non-distended, non-tender  Skin: warm, dry  Musculoskeletal: 1+ edema RLE and <1+ edema LLE  Neurologic: awake, alert, answers questions appropriately         Data:     CBC RESULTS:     Recent Labs   Lab 04/14/20  0550 04/13/20  0613 04/12/20  0538 04/11/20  0536 04/10/20  0801 04/09/20  0534   WBC 10.2 12.6* 14.4* 15.5* 14.7* 15.3*   RBC 3.67* 3.54* 3.65* 4.05* 3.96* 4.07*   HGB 9.2* 9.0* 9.4* 10.2* 10.0* 10.1*   HCT 29.2* 28.1* 29.0* 32.6* 33.4* 33.8*    391 404 476* 454* 383       Basic Metabolic Panel:  Recent Labs   Lab 04/14/20  0550 04/13/20  0613 04/12/20  0538 04/11/20  1315 04/11/20  0536 04/10/20  0655   * 129* 129* 128* 129* 132*   POTASSIUM 3.5 3.5 4.1 4.5 4.8 3.9   CHLORIDE 102 99 97 98 98 101   CO2 20 19* 19* 19* 21 23   BUN 72* 82* 85* 82* 79* 64*   CR 3.56* 3.67* 3.82* 3.70* 3.45* 2.48*   GLC 85 87 128* 179* 217* 136*   HARPER 8.7 8.5 8.6 8.9 9.1 9.4       INRNo lab results found in last 7 days.   Attestation:   I have reviewed today's relevant vital signs, notes, medications, labs and imaging.    Afshin Kay MD  [facility] - Nephrology  Office phone :[phone]  Pager: [phone]

## 2020-04-14 NOTE — CONSULTS
"REASON FOR ASSESSMENT:  CHF Consult for 2 gm NA Diet Education    NUTRITION HISTORY:  Spoke with pt via phone this morning (COVID-19 pt contact restrictions)    Living situation:   Lives with wife    Grocery shopping/Meal preparation:  Pt states that he and his wife both do the shopping and cooking    Breakfast:  Eats ~10am  Oatmeal (canister) or cold cereal    Lunch:  Usually has something \"quick\" like a frozen meal    Dinner:   Fresh meat, frozen veggies/salad    Admits to using a sea  on some food items  Likes to drink gingerale with meals  Does not snack between meals    Previous diet instructions:  States that he has been on a low Na diet - \"this is not new for me\"      CURRENT DIET:  2gm Na    NUTRITION DIAGNOSIS:  No nutrition diagnosis a this time    INTERVENTIONS:    Nutrition Prescription:  2gm Na    Implementation:    Assessed learning needs, learning preferences, and willingness to learn    Nutrition Education (Content):  a) Provided handouts:  1) Tips for Low Na Diet  2) Label Reading  3) Low Na Foods/Drinks  4) Seasoning Your Food Without Salt  5) Low Na Recipe Booklet  b) Discussed rational for limiting Na for CHF and stressed importance of following 2 gm Na guidelines     Nutrition Education (Application):  a) Discussed current eating habits and recommended food choices    Anticipated good compliance    Diet Education - refer to Education Flowsheet    Goals:    Patient verbalizes understanding of diet     All of the above goals met during the education session    Follow Up:    Provided RD contact information for future questions.    Recommend Out-Patient Nutrition Referral, if further diet instructions are needed.          "

## 2020-04-14 NOTE — PLAN OF CARE
Assumed cares [id number]. A&Ox4. On 2L NC, refusing CPAP overnight. R) groin, R) wrist, & L) chest PPM site CDI, CMS intact. . Mckenzie in place & draining. Tele 100% paced. Continue to monitor.    /69 (BP Location: Left arm)   Pulse 82   Temp 97.3  F (36.3  C) (Oral)   Resp 18   Wt 84.1 kg (185 lb 8 oz)   SpO2 97%   BMI 27.79 kg/m      Sera Torres RN on 4/13/2020 at 10:38 PM

## 2020-04-14 NOTE — PROGRESS NOTES
[facility]    Medicine Progress Note - Hospitalist Service       Date of Admission:  4/10/2020  Assessment & Plan   Gene Pagan is a 74 year old male with PMHx of NIDDM and BPH who was seen initially at [facility] 3/27/2020 for progressive SOB, ultimately transferred to the ED after found to be in atrial fibrillation with RVR (new diagnosis).  Admitted for further evaluation and treatment. Ultimately pt decompensated 3/28/2020 with more progressive SOB and hypotension requiring intubation and pressor support. Please refer to H&P by Dr. Sabillon from 3/27/2020 for complete details on initial presentation. Pt ultimately with ongoing a fib with RVR and failed cardioversion. Transferred to [facility] 4/10/2020 per Cardiology recommendation for EP intervention. Note Tmax 101.1 degrees fahrenheit overnight, persistent a fib with RVR in the 120s, softer BPs. 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 No ASA while on Eliquis per EP     Suspected acute pyelonephritis with chronic bladder outlet obstruction  Tmax 101.1 degrees fahrenheit overnight. WBC peak 20.8. Procal on admission 0.07. Covid negative, CRP 23.1, ferritin 49 (WNL). Strep pneumo negative, legionella negative, influenza and RSV PCR negative. Urine culture negative. BC NGTD. CXR on admission with heterogeneous opacities in the lung bases concerning for infectious or inflammatory process. CT C/A/P from 3/27/2020 with irregularity of the gallbladder and stranding around the gallbladder, stranding around bilateral kidneys and bilateral renal collecting system prominence including ureters and extrarenal pelvis could be secondary to chronic bladder outlet obstruction, pyelonephritis not excluded. US abdomen 3/27/2020 with mild gallbladder wall thickening, no other evidence of cholecystitis, no gallstones or biliary dilatation, mild right hydronephrosis. Pt  was treated with 7 days of IV Zosyn (3/27/2020 - 4/4/2020) and 2 days of IV Vancomycin (3/28/2020 - 3/29/2020).   - WBC trending down.  - Procalcitonin increased up to 2.85, trended down to 0.85 this morning.  - UA suggestive of infection.  - Urine culture negative, however it was obtained after he was restarted on antibiotics.  - Continue Zosyn for now for suspected acute pyelonephritis, day #4/7.  - Discontinued vancomycin as nasal MRSA was negative and his renal function was worsening.  - Obtain blood culture if he develops recurrent fever.    Atrial fibrillation with RVR, new diagnosis  S/p AV sarah ablation and CRT by EP on 4/10/20  S/P NESTOR cardioversion (4/6/2020)  Presented with A fib with RVR in the setting of respiratory failure and acute on chronic renal failure. Initially treated with IV diltiazem and PO metoprolol, transitioned to IV amiodarone due to softer BPs and low EF. Anticoagulated with IV heparin since admission. Underwent successful NESTOR cardioversion 4/6/2020, but converted back to a fib the AM of 4/7/2020. Had remained in a fib with variable rate control since that time.   - Cardiology followed, recommended transfer to [facility] for cardioversion vs AVN ablation/biV pacer.  - EP consulted, s/p AV node ablation and CRT-P placement on 4/10/20.  - Discontinued amiodarone and Toprol XL per EP recommendations.  - Continue Eliquis per EP recommendations, may need to readdress depending on renal function.    - Wife has concerns that Eliquis will be too expensive as an outpatient. Will ask pharmacy to check price and then discuss further.  - Continue to monitor on telemetry.    Acute diastolic and systolic CHF exacerbation (EF 20-25%)  Cardiomyopathy, suspect rate-related, new  Bilateral pleural effusions (moderate on left, small on right)  Grade III diastolic dysfunction   Moderate to severe mitral regurgitation  NSTEMI type II, demand ischemia in the setting of above  BNP 44610 on admission. Trop peak  0.439. EKG without evidence of ischemia. CT C/A/P from 3/27/2020 with moderate right and small left pleural fluid collections and associated compressive atelectasis in the bilateral posterior lungs. Groundglass densities in the lungs could reepresent vascular congestion and the heart is enlarged indicating probable CHF. Echocardiogram 3/28/2020 with EF 20-25%, severe global hypokinesia of the left ventricle and grade III diastolic dysfunction. Also notes moderate to severe mitral regurgitation. Repeat limited echo on 4/5/2020 unchanged. Pt was diuresed with bumex gtt initially (down 20 L since admission).   - Cardiology following, planning outpatient evaluation for CAD in the future.  - Continue to hold lasix for now.  - Will discontinue Toprol XL for now due to lower blood pressures. It has been held the past two days due to low blood pressures.  - Intake and output, daily weights    - Initial weight was around 204 pounds, down to 186 when discharged from [facility] on 4/10.   - Weight up to 195 pounds today, ? Accuracy. I&O was negative 1,310 mL yesterday.  - Needs close follow up with [facility] clinic on discharge. Will need cardiology follow-up as well with repeat echocardiogram.  - No aspirin while on Eliquis.    Cardiogenic shock, resolved  Acute hypoxic respiratory failure  Respiratory failure in the setting of CHF, cardiogenic shock and questionable infiltrate as above. Cardiac results as above. He did initially require norepinephrine which was weaned off as of 4/1/2020. Intubated 3/28/2020, extubated 4/1/2020.    - Patient is DNI but OK to do CPR as per patient.  - O2 sats in the mid 90s today on room air.  - Encourage pulmonary toilet with incentive spirometry.    Acute kidney injury  Chronic kidney disease, stage 3  Creatinine peaked 5.72, then trended down. Baseline creatinine seems to be about 2.  - Creatinine trended up again starting on 4/11, peaked at 3.82, now improved to 3.56 this morning.  - Nephrology  consulted, appreciate their assistance.  - Continue to hold diuretics for now.     Lactic acidosis  Peaked at 12.1.  - Improved to 1.6 when last checked on 4/11.    Elevated transaminases, improved  Peak ALT 7094, AST 74307. Thought to be ischemic hepatitis/shock liver/liver congestion in the setting of CHF. Hepatitis B surface antigen nonreactive.  - LFTs much improved as of 4/13/20.    T2DM, non-insulin dependent  Hemoglobin A1C on 1/20/2020 was 6.0. A1C checked during admission 6.2.   [PTA medications: Metformin 1000 mg BID, Glipizide XL 5 mg po every day]  - Hold oral agents for now.   - Continue Lantus 16 U at bedtime (started during hospitalization).  - Continue accuchecks and medium dose sliding scale NovoLog.  - Monitor for hypoglycemia.  - Blood sugars fairly well controlled.  - Will need to address plan for blood sugar management upon discharge. Metformin won't be an option due to his kidney disease. Wife states that insulin is too expensive. Will ask pharmacy to check pricing of insulin and then discuss further with patient and his wife.    Enlarged prostate  Mild right hydronephrosis  BPH  [Flomax 0.4 mg po every day]  - Continue PTA Flomax.  - Mckenzie catheter in place, will be removed today.  - Monitor I&Os.    Hyponatremia  Hypokalemia, resolved  Sodium borderline low on admission, improved with above intervention.   - Likely hypovolemic.  - Sodium improved to 132 this morning.  - Nephrology consulted as noted above.  - Potassium within normal limits this morning. Recheck in AM. Discontinued electrolyte replacement protocol in setting of DEONDRE.    Iron deficiency anemia  No prior hemoglobin for comparison.  - Iron 27, , MIKI 8.   - Hemoglobin stable at 9.2 this morning.  - No signs of active bleeding.  - Continue iron supplement.  - Recheck labs in AM.    Gross hematuria, resolved  Noted hematuria starting 4/1/2020, which has since resolved in the context of IV heparin use.   - Urology consulted for  hematuria, three way urinary catheter initially placed for hematuria, removed 4/9/2020 after no return of hematuria despite re-initiation of anticoagulation. He subsequently had recurrent clots and a catheter was replaced.  - Urine has been clear for the past three days while taking Eliquis. Will remove Mckenzie catheter today.  - Needs outpatient follow-up with Urology to complete hematuria work-up.    Lung nodule, left  Noted on CT scan on 3/28/20, measuring up to 1 cm.   - Consider follow-up CT in 3 months, PET/CT and or tissue sampling; defer to outpatient provider upon discharge.    Suspected sleep apnea  - Recommend that he get an outpatient sleep study after he recovers from his acute medical issues.  - Consider overnight oximetry testing prior to discharge.       Diet: 2 Gram Sodium Diet No Caffeine for 24 hours (once tests completed, may have caffeine)    DVT Prophylaxis: Eliquis  Mckenzie Catheter: in place, indication: Strict 1-2 Hour I&O  Code Status: DNI      Disposition Plan   Expected discharge: 3-4 days pending improvement in blood pressure and renal function  Entered: Jax Navarro MD 04/14/2020, 11:11 AM       The patient's care was discussed with the Bedside Nurse, Care Coordinator/, Patient and Patient's Family.    Jax Navarro MD  Hospitalist Service  [facility]    ______________________________________________________________________    Interval History   Gene Pagan feels better today. Continues to gradually feel stronger. Denies fevers, chest pain, shortness of breath, nausea, abdominal pain. Mckenzie catheter in place, plan to remove today. Updated his wife by phone today, she has concerns about him being discharged from the hospital too early.    Data reviewed today: I reviewed all medications, new labs and imaging results over the last 24 hours. I personally reviewed no images or EKG's today.    Physical Exam   Vital Signs: Temp: 98.2  F (36.8  C) Temp  src: Axillary BP: 97/57 Pulse: 83 Heart Rate: 83 Resp: 18 SpO2: 97 % O2 Device: None (Room air) Oxygen Delivery: 2 LPM  Weight: 195 lbs 11.2 oz  Constitutional: awake, alert, cooperative, no apparent distress  Respiratory: diminished at the bases  Cardiovascular: regular rate and rhythm, normal S1 and S2, no murmur noted, dressing in place over pacemaker insertion site  GI: normal bowel sounds, soft, non-distended, non-tender  Skin: warm, dry  Musculoskeletal: 1+ lower extremity pitting edema present  Neurologic: awake, alert, oriented to name, place and time    Data   Recent Labs   Lab 04/14/20  0550 04/13/20  0613 04/12/20  0538  04/11/20  0536   WBC 10.2 12.6* 14.4*  --  15.5*   HGB 9.2* 9.0* 9.4*  --  10.2*   MCV 80 79 80  --  81    391 404  --  476*   * 129* 129*   < > 129*   POTASSIUM 3.5 3.5 4.1   < > 4.8   CHLORIDE 102 99 97   < > 98   CO2 20 19* 19*   < > 21   BUN 72* 82* 85*   < > 79*   CR 3.56* 3.67* 3.82*   < > 3.45*   ANIONGAP 10 11 13   < > 10   HARPER 8.7 8.5 8.6   < > 9.1   GLC 85 87 128*   < > 217*   ALBUMIN  --  2.3*  --   --  2.5*   PROTTOTAL  --  5.7*  --   --  6.4*   BILITOTAL  --  0.6  --   --  0.6   ALKPHOS  --  99  --   --  117   ALT  --  63  --   --  107*   AST  --  54*  --   --  49*    < > = values in this interval not displayed.     Medications     - MEDICATION INSTRUCTIONS -       ACE/ARB/ARNI NOT PRESCRIBED         apixaban ANTICOAGULANT  5 mg Oral BID     insulin aspart  1-7 Units Subcutaneous TID AC     insulin aspart  1-5 Units Subcutaneous At Bedtime     insulin glargine  16 Units Subcutaneous At Bedtime     piperacillin-tazobactam  2.25 g Intravenous Q8H OLIVIER     sodium chloride (PF)  3 mL Intracatheter Q8H     tamsulosin  0.4 mg Oral Daily

## 2020-04-14 NOTE — CONSULTS
Anticoagulation coverage check.  Patient has Medicare D through [facility] with $435 (of $435) unmet deductible.     Xarelto/Eliquis  April:  Upon receipt of RX, Discharge Pharmacy can dispense 1 month free.  May: $443  (fulfills $435 deductible)  June-Dec: $115/mo if non-preferred pharmacy ([facility]), $80 if preferred pharmacy ([facility]).     Jantoven (warfarin)  $6/mo    Insulin Glargine (Lantus is covered)  $95 every 3 months, roughly, after deductible if filled at a non-preferred pharmacy ([facility]), $67 if filled at a preferred pharmacy ([facility]).    Insulin Aspart (Novolog is covered)  $125 every 3 months, roughly, after deductible if filled at a non-preferred pharmacy ([facility]), $88 if filled at a preferred pharmacy ([facility]).    Marsha Amezcua, Tiffany  [facility]  Discharge Pharmacy Liaison  Liaison Cell: [phone]

## 2020-04-14 NOTE — PLAN OF CARE
Discharge Planner OT   Patient plan for discharge: home  Current status: pt able to retrieve clothes from closet with SBA with FWW. Able to cinthya/doff shirt and pants with SBA/MIN A with cues for compensation tech with PPM precautions and rest breaks due to SOB and fatigue. Cues for PLB, O2 sats WFL RA and vitals stable.   Barriers to return to prior living situation: none with family A with ADL/IADL's   Recommendations for discharge: home with family A with ADL/IADL's ie med mgmt, bathing, dressing, home management tasks, transportation etc) and home OT and RN.   Rationale for recommendations: Pt refuses rehab facility upon discharge, and reported that his spouse will assist as needed once home. Continued skilled OT to address independence in I/ADLs and cognition. If recommended level of A is not available, then TCU should be considered. Home OT for ADL/IADL's, home safety, cognition and home RN for Med mgmt. Home therapy warranted as pt would require a considerable taxing effort to leave the home and would require an assistive device and person.         Entered by: Elaine Flores 04/14/2020 12:38 PM

## 2020-04-14 NOTE — PLAN OF CARE
Patient is up with walker and stand by assist, tolerated food. Denies pain, need baker unable to void. Urology to see tomorrow.

## 2020-04-14 NOTE — PLAN OF CARE
Discharge Planner PT   Patient plan for discharge: Home  Current status: Pt with improved mobility and endurance this date. Pt able to perform bed mobility and transfers with SBA. Pt tolerates ambulating up to 120' with FWW and SBA. Pt on room air, VSS throughout session.  Barriers to return to prior living situation: Stairs, decreased activity tolerance  Recommendations for discharge: Home with FWW and home health PT and assist from significant other for stairs.  Rationale for recommendations: Pt progressing well with physical therapy. Anticipate with further medical management and therapy, patient will be safe to discharge home with family assist as needed for stair management.      Entered by: Seda Mistry 04/14/2020 2:02 PM

## 2020-04-15 ENCOUNTER — APPOINTMENT (OUTPATIENT)
Dept: OCCUPATIONAL THERAPY | Facility: CLINIC | Age: 75
DRG: 242 | End: 2020-04-15
Attending: INTERNAL MEDICINE
Payer: COMMERCIAL

## 2020-04-15 ENCOUNTER — APPOINTMENT (OUTPATIENT)
Dept: PHYSICAL THERAPY | Facility: CLINIC | Age: 75
DRG: 242 | End: 2020-04-15
Attending: INTERNAL MEDICINE
Payer: COMMERCIAL

## 2020-04-15 LAB
ANION GAP SERPL CALCULATED.3IONS-SCNC: 7 MMOL/L (ref 3–14)
BUN SERPL-MCNC: 60 MG/DL (ref 7–30)
CALCIUM SERPL-MCNC: 8.9 MG/DL (ref 8.5–10.1)
CHLORIDE SERPL-SCNC: 101 MMOL/L (ref 94–109)
CO2 SERPL-SCNC: 23 MMOL/L (ref 20–32)
CREAT SERPL-MCNC: 3.26 MG/DL (ref 0.66–1.25)
ERYTHROCYTE [DISTWIDTH] IN BLOOD BY AUTOMATED COUNT: 16.2 % (ref 10–15)
GFR SERPL CREATININE-BSD FRML MDRD: 17 ML/MIN/{1.73_M2}
GLUCOSE BLDC GLUCOMTR-MCNC: 108 MG/DL (ref 70–99)
GLUCOSE BLDC GLUCOMTR-MCNC: 119 MG/DL (ref 70–99)
GLUCOSE BLDC GLUCOMTR-MCNC: 140 MG/DL (ref 70–99)
GLUCOSE SERPL-MCNC: 80 MG/DL (ref 70–99)
HCT VFR BLD AUTO: 30.8 % (ref 40–53)
HGB BLD-MCNC: 9.7 G/DL (ref 13.3–17.7)
MCH RBC QN AUTO: 25.1 PG (ref 26.5–33)
MCHC RBC AUTO-ENTMCNC: 31.5 G/DL (ref 31.5–36.5)
MCV RBC AUTO: 80 FL (ref 78–100)
PLATELET # BLD AUTO: 467 10E9/L (ref 150–450)
POTASSIUM SERPL-SCNC: 3.6 MMOL/L (ref 3.4–5.3)
RBC # BLD AUTO: 3.86 10E12/L (ref 4.4–5.9)
SODIUM SERPL-SCNC: 131 MMOL/L (ref 133–144)
WBC # BLD AUTO: 11.3 10E9/L (ref 4–11)

## 2020-04-15 PROCEDURE — 25000131 ZZH RX MED GY IP 250 OP 636 PS 637: Performed by: INTERNAL MEDICINE

## 2020-04-15 PROCEDURE — 25000132 ZZH RX MED GY IP 250 OP 250 PS 637: Performed by: INTERNAL MEDICINE

## 2020-04-15 PROCEDURE — 99233 SBSQ HOSP IP/OBS HIGH 50: CPT | Performed by: HOSPITALIST

## 2020-04-15 PROCEDURE — 80048 BASIC METABOLIC PNL TOTAL CA: CPT | Performed by: HOSPITALIST

## 2020-04-15 PROCEDURE — 97535 SELF CARE MNGMENT TRAINING: CPT | Mod: GO

## 2020-04-15 PROCEDURE — 85027 COMPLETE CBC AUTOMATED: CPT | Performed by: HOSPITALIST

## 2020-04-15 PROCEDURE — 36415 COLL VENOUS BLD VENIPUNCTURE: CPT | Performed by: HOSPITALIST

## 2020-04-15 PROCEDURE — 97116 GAIT TRAINING THERAPY: CPT | Mod: GP | Performed by: PHYSICAL THERAPIST

## 2020-04-15 PROCEDURE — 97110 THERAPEUTIC EXERCISES: CPT | Mod: GP | Performed by: PHYSICAL THERAPIST

## 2020-04-15 PROCEDURE — 21000001 ZZH R&B HEART CARE

## 2020-04-15 PROCEDURE — 25000128 H RX IP 250 OP 636: Performed by: INTERNAL MEDICINE

## 2020-04-15 PROCEDURE — 99221 1ST HOSP IP/OBS SF/LOW 40: CPT | Performed by: UROLOGY

## 2020-04-15 PROCEDURE — 00000146 ZZHCL STATISTIC GLUCOSE BY METER IP

## 2020-04-15 RX ORDER — BUMETANIDE 1 MG/1
1 TABLET ORAL DAILY
Status: DISCONTINUED | OUTPATIENT
Start: 2020-04-15 | End: 2020-04-17 | Stop reason: HOSPADM

## 2020-04-15 RX ORDER — PIPERACILLIN SODIUM, TAZOBACTAM SODIUM 2; .25 G/10ML; G/10ML
2.25 INJECTION, POWDER, LYOPHILIZED, FOR SOLUTION INTRAVENOUS EVERY 6 HOURS
Status: DISCONTINUED | OUTPATIENT
Start: 2020-04-15 | End: 2020-04-16

## 2020-04-15 RX ADMIN — PIPERACILLIN SODIUM AND TAZOBACTAM SODIUM 2.25 G: 2; .25 INJECTION, POWDER, LYOPHILIZED, FOR SOLUTION INTRAVENOUS at 05:38

## 2020-04-15 RX ADMIN — INSULIN GLARGINE 16 UNITS: 100 INJECTION, SOLUTION SUBCUTANEOUS at 22:58

## 2020-04-15 RX ADMIN — APIXABAN 5 MG: 5 TABLET, FILM COATED ORAL at 08:35

## 2020-04-15 RX ADMIN — APIXABAN 5 MG: 5 TABLET, FILM COATED ORAL at 21:29

## 2020-04-15 RX ADMIN — PIPERACILLIN SODIUM AND TAZOBACTAM SODIUM 2.25 G: 2; .25 INJECTION, POWDER, LYOPHILIZED, FOR SOLUTION INTRAVENOUS at 11:28

## 2020-04-15 RX ADMIN — TAMSULOSIN HYDROCHLORIDE 0.4 MG: 0.4 CAPSULE ORAL at 08:34

## 2020-04-15 RX ADMIN — PIPERACILLIN SODIUM AND TAZOBACTAM SODIUM 2.25 G: 2; .25 INJECTION, POWDER, LYOPHILIZED, FOR SOLUTION INTRAVENOUS at 18:09

## 2020-04-15 RX ADMIN — BUMETANIDE 1 MG: 1 TABLET ORAL at 13:12

## 2020-04-15 ASSESSMENT — ACTIVITIES OF DAILY LIVING (ADL)
ADLS_ACUITY_SCORE: 12

## 2020-04-15 NOTE — CONSULTS
[facility] Urology Progress Note          Assessment and Plan:   Principal Problem:  Acute urinary retention  -Failed a trial of void yesterday and Mckenzie catheter was replaced without difficulty  -Given the persistent elevation of his serum creatinine, as well as the use of diuretics, I advised that we maintain this Mckenzie catheter to gravity drainage at this time  -Patient may follow-up in our office for an outpatient trial of void in about 2 weeks  -Continue tamsulosin    Cheikh Perez MD   [facility]  Office: [phone]               Interval History:   Urology reconsulted after a failed trial void yesterday and Mckenzie catheter replacement.  Mckenzie draining clear              Review of Systems:   The 5 point Review of Systems is negative other than noted in the HPI             Medications:     Current Facility-Administered Medications Ordered in Epic   Medication Dose Route Frequency Last Rate Last Dose     acetaminophen (TYLENOL) tablet 650 mg  650 mg Oral Q4H PRN   650 mg at 04/14/20 1924    Or     acetaminophen (TYLENOL) Suppository 650 mg  650 mg Rectal Q4H PRN         apixaban ANTICOAGULANT (ELIQUIS) tablet 5 mg  5 mg Oral BID   5 mg at 04/15/20 0835     artificial tears (GENTEAL) 0.1-0.2-0.3 % ophthalmic solution 2 drop  2 drop Ophthalmic Q1H PRN   2 drop at 04/12/20 0755     bisacodyl (DULCOLAX) Suppository 10 mg  10 mg Rectal Daily PRN         Continuing beta blocker from home medication list OR beta blocker order already placed during this visit   Does not apply DOES NOT GO TO MAR         glucose gel 15-30 g  15-30 g Oral Q15 Min PRN        Or     dextrose 50 % injection 25-50 mL  25-50 mL Intravenous Q15 Min PRN        Or     glucagon injection 1 mg  1 mg Subcutaneous Q15 Min PRN         guaiFENesin (MUCINEX) 12 hr tablet 600 mg  600 mg Oral BID PRN         HYDROcodone-acetaminophen (NORCO) 5-325 MG per tablet 1-2 tablet  1-2 tablet Oral Q4H PRN         HYDROmorphone (PF)  (DILAUDID) injection 0.3 mg  0.3 mg Intravenous Q2H PRN         insulin aspart (NovoLOG) injection (RAPID ACTING)  1-7 Units Subcutaneous TID AC   1 Units at 04/14/20 1144     insulin aspart (NovoLOG) injection (RAPID ACTING)  1-5 Units Subcutaneous At Bedtime   1 Units at 04/10/20 2141     insulin glargine (LANTUS PEN) injection 16 Units  16 Units Subcutaneous At Bedtime   16 Units at 04/14/20 2135     lidocaine (LMX4) cream   Topical Q1H PRN         lidocaine 1 % 0.1-1 mL  0.1-1 mL Other Q1H PRN         melatonin tablet 5 mg  5 mg Oral At Bedtime PRN   Stopped at 04/11/20 0005     naloxone (NARCAN) injection 0.1-0.4 mg  0.1-0.4 mg Intravenous Q2 Min PRN         ondansetron (ZOFRAN-ODT) ODT tab 4 mg  4 mg Oral Q6H PRN        Or     ondansetron (ZOFRAN) injection 4 mg  4 mg Intravenous Q6H PRN         Patient is already receiving anticoagulation with heparin, enoxaparin (LOVENOX), warfarin (COUMADIN)  or other anticoagulant medication   Does not apply Continuous PRN         piperacillin-tazobactam (ZOSYN) 2.25 g vial to attach to  ml bag  2.25 g Intravenous Q8H OLIVIER   2.25 g at 04/15/20 0538     polyethylene glycol (MIRALAX) Packet 17 g  17 g Oral Daily PRN         prochlorperazine (COMPAZINE) injection 5 mg  5 mg Intravenous Q6H PRN        Or     prochlorperazine (COMPAZINE) tablet 5 mg  5 mg Oral Q6H PRN        Or     prochlorperazine (COMPAZINE) Suppository 12.5 mg  12.5 mg Rectal Q12H PRN         Reason ACE/ARB/ARNI order not selected   Other DOES NOT GO TO MAR         sodium chloride (OCEAN) 0.65 % nasal spray 1 spray  1 spray Nasal Q1H PRN   1 spray at 04/14/20 0809     sodium chloride (PF) 0.9% PF flush 3 mL  3 mL Intracatheter q1 min prn   3 mL at 04/14/20 2101     sodium chloride (PF) 0.9% PF flush 3 mL  3 mL Intracatheter Q8H   3 mL at 04/15/20 0835     tamsulosin (FLOMAX) capsule 0.4 mg  0.4 mg Oral Daily   0.4 mg at 04/15/20 0834     No current [facility]-ordered outpatient medications on file.                   Physical Exam:   Vitals were reviewed  Patient Vitals for the past 8 hrs:   BP Temp Temp src Pulse Heart Rate Resp SpO2   04/15/20 0717 101/69 98.2  F (36.8  C) Axillary -- 78 18 98 %   04/15/20 0545 102/65 -- -- 80 -- 18 96 %   04/15/20 0543 -- 97.6  F (36.4  C) Axillary -- -- -- --     GEN: NAD, lying in bed  HEENT: EOMI  NECK: Supple  ABD: Obese, soft  EXT: Lower extremity edema bilaterally  : Mckenzie draining clear           Data:     Lab Results   Component Value Date    NTBNPI 19,387 (H) 03/27/2020     Lab Results   Component Value Date    WBC 11.3 (H) 04/15/2020    WBC 10.2 04/14/2020    WBC 12.6 (H) 04/13/2020    HGB 9.7 (L) 04/15/2020    HGB 9.2 (L) 04/14/2020    HGB 9.0 (L) 04/13/2020    HCT 30.8 (L) 04/15/2020    HCT 29.2 (L) 04/14/2020    HCT 28.1 (L) 04/13/2020    MCV 80 04/15/2020    MCV 80 04/14/2020    MCV 79 04/13/2020     (H) 04/15/2020     04/14/2020     04/13/2020     Lab Results   Component Value Date    INR 1.17 (H) 04/06/2020    INR 1.23 (H) 04/03/2020    INR 1.20 (H) 03/27/2020     Creatinine   Date Value Ref Range Status   04/15/2020 3.26 (H) 0.66 - 1.25 mg/dL Final

## 2020-04-15 NOTE — PLAN OF CARE
Assumed cares [id number]. Tele: 100% paced. Denies pain or SOB. L) chest PPM site CDI except ecchymotic, CMS intact. O2 sats high 90s on 1L NC, pt wants to take O2 home at discharge - unable to complete overnight oxymetry study overnight per RT due to lack of orders.  & 101. Mckenzie in place, patent. Plan for urology consult today. Continue to monitor.    /65 (BP Location: Right arm)   Pulse 80   Temp 97.6  F (36.4  C) (Axillary)   Resp 18   Wt 88.8 kg (195 lb 12.8 oz)   SpO2 96%   BMI 29.34 kg/m      Sera Torres RN on 4/15/2020 at 6:03 AM

## 2020-04-15 NOTE — PROGRESS NOTES
"Spiritual [facility]  Heart    SH contacted Pt per length of stay. Pt said that he is doing \"great\" right now. Pt has no additional SH needs at this time.     SH will remain available as needed.     Akanksha Loaiza  Chaplain Resident   "

## 2020-04-15 NOTE — PLAN OF CARE
Pt here for diuresis.   A&O.  VSS.   Cardiac/mod cho diet.  Up with sba and walker. Denies pain. Pt scoring green on the Aggression Stop Light Tool.

## 2020-04-15 NOTE — CONSULTS
Care Transition Initial Assessment - RN        Met with: Patient.  DATA   Principal Problem:    Atrial fibrillation with rapid ventricular response (H)  Active Problems:    CHF (congestive heart failure) (H)       Cognitive Status: awake and alert.        Contact information and PCP information verified: Yes  Lives With: significant other   Living Arrangements: house(town home)    Insurance concerns: No Insurance issues identified  ASSESSMENT  Patient currently receives the following services: none prior to admission  Identified issues/concerns regarding health management: Received order to assist with home care prior to discharge. Pt was admitted on 4/10 with and anticipated discharge date of 4/17.  Initial plans were for the pt to discharge to ARU. Pt has progressed and now plans on going home with home care. Met w/ patient re: home care orders. Pt/family was offered the Medicare Compare list for Home Care.  Discussed associated Medicare star ratings to assist with choice for referrals/discharge planning; Yes.  Education was given to pt/family that star ratings are updated/maintained by Medicare and can be reviewed by visiting www.medicare.gov; Yes.   Offered patient a choice of home care agencies. Pt would like to use [facility]. Pt understands they must be homebound. Pt informed of the plan and in agreement with the plan. Heads up sent to [facility] updating them plans for discharge.   Home Care phone number placed on discharge instructions.     PLAN  Financial costs for the patient include copays.  Patient given options and choices for discharge yes.  Patient/family is agreeable to the plan?  Yes:   Patient anticipates discharging to home with home care .        Patient anticipates needs for home equipment: No , states he has a walker at home he can use.  Transportation/person available to transport on day of discharge  is TBD and have they been notified/set up TBD  Plan/Disposition: Home    Appointments: TBD    Care  (CTS) will continue to follow as needed.    Loree Bhatti RN BAN  Inpatient Care Coordination  [address] [place] [address]  [email or web address]  [email or web address]   Office: [phone]  Fax: [phone]

## 2020-04-15 NOTE — PROGRESS NOTES
[facility]    Medicine Progress Note - Hospitalist Service       Date of Admission:  4/10/2020  Assessment & Plan   Gene Pagna is a 74 year old male with PMHx of NIDDM and BPH who was seen initially at [facility] 3/27/2020 for progressive SOB, ultimately transferred to the ED after found to be in atrial fibrillation with RVR (new diagnosis).  Admitted for further evaluation and treatment. Ultimately pt decompensated 3/28/2020 with more progressive SOB and hypotension requiring intubation and pressor support. Please refer to H&P by Dr. Sabillon from 3/27/2020 for complete details on initial presentation. Pt ultimately with ongoing a fib with RVR and failed cardioversion. Transferred to [facility] 4/10/2020 per Cardiology recommendation for EP intervention. Note Tmax 101.1 degrees fahrenheit overnight, persistent a fib with RVR in the 120s, softer BPs. 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 No ASA while on Eliquis per EP     Suspected acute pyelonephritis with chronic bladder outlet obstruction  Tmax 101.1 degrees fahrenheit overnight. WBC peak 20.8. Procal on admission 0.07. Covid negative, CRP 23.1, ferritin 49 (WNL). Strep pneumo negative, legionella negative, influenza and RSV PCR negative. Urine culture negative. BC NGTD. CXR on admission with heterogeneous opacities in the lung bases concerning for infectious or inflammatory process. CT C/A/P from 3/27/2020 with irregularity of the gallbladder and stranding around the gallbladder, stranding around bilateral kidneys and bilateral renal collecting system prominence including ureters and extrarenal pelvis could be secondary to chronic bladder outlet obstruction, pyelonephritis not excluded. US abdomen 3/27/2020 with mild gallbladder wall thickening, no other evidence of cholecystitis, no gallstones or biliary dilatation, mild right hydronephrosis. Pt  was treated with 7 days of IV Zosyn (3/27/2020 - 4/4/2020) and 2 days of IV Vancomycin (3/28/2020 - 3/29/2020).   - WBC improved.  - Procalcitonin increased up to 2.85, then trended down to 0.85.  - UA suggestive of infection.  - Urine culture negative, however it was obtained after he was restarted on antibiotics.  - Continue Zosyn for now for suspected acute pyelonephritis, day #5/7.  - Discontinued vancomycin as nasal MRSA was negative and his renal function was worsening.  - Obtain blood culture if he develops recurrent fever.    Atrial fibrillation with RVR, new diagnosis  S/p AV sarah ablation and CRT by EP on 4/10/20  S/P NESTOR cardioversion (4/6/2020)  Presented with A fib with RVR in the setting of respiratory failure and acute on chronic renal failure. Initially treated with IV diltiazem and PO metoprolol, transitioned to IV amiodarone due to softer BPs and low EF. Anticoagulated with IV heparin since admission. Underwent successful NESTOR cardioversion 4/6/2020, but converted back to a fib the AM of 4/7/2020. Had remained in a fib with variable rate control since that time.   - Cardiology followed, recommended transfer to [facility] for cardioversion vs AVN ablation/biV pacer.  - EP consulted, s/p AV node ablation and CRT-P placement on 4/10/20.  - Discontinued amiodarone and Toprol XL per EP recommendations.  - Continue Eliquis per EP recommendations, may need to readdress depending on renal function.    - After reviewing expected cost for Eliquis after discharge based on pharmacy liaison consult, the patient would like to stay on Eliquis.  - Continue to monitor on telemetry.    Acute diastolic and systolic CHF exacerbation (EF 20-25%)  Cardiomyopathy, suspect rate-related, new  Bilateral pleural effusions (moderate on left, small on right)  Grade III diastolic dysfunction   Moderate to severe mitral regurgitation  NSTEMI type II, demand ischemia in the setting of above  BNP 26918 on admission. Trop peak 0.439. EKG  without evidence of ischemia. CT C/A/P from 3/27/2020 with moderate right and small left pleural fluid collections and associated compressive atelectasis in the bilateral posterior lungs. Groundglass densities in the lungs could reepresent vascular congestion and the heart is enlarged indicating probable CHF. Echocardiogram 3/28/2020 with EF 20-25%, severe global hypokinesia of the left ventricle and grade III diastolic dysfunction. Also notes moderate to severe mitral regurgitation. Repeat limited echo on 4/5/2020 unchanged. Pt was diuresed with bumex gtt initially (down 20 L since admission).   - Cardiology following, planning outpatient evaluation for CAD in the future.  - Discontinued Toprol XL for now due to lower blood pressures. It has been held the past two days due to low blood pressures.  - Intake and output, daily weights    - Initial weight was around 204 pounds, down to 186 when discharged from [facility] on 4/10.   - Weight up to 195 pounds again today.  - Diuresis had been on hold due to rising creatinine. Oral bumex started on 4/15/20.  - Needs close follow up with [facility] on discharge. Will need cardiology follow-up as well with repeat echocardiogram.  - No aspirin while on Eliquis.    Cardiogenic shock, resolved  Acute hypoxic respiratory failure  Respiratory failure in the setting of CHF, cardiogenic shock and questionable infiltrate as above. Cardiac results as above. He did initially require norepinephrine which was weaned off as of 4/1/2020. Intubated 3/28/2020, extubated 4/1/2020.    - Patient is DNI but OK to do CPR as per patient.  - O2 sats in the mid 90s today on room air.  - Encourage pulmonary toilet with incentive spirometry.    Acute kidney injury  Chronic kidney disease, stage 3  Creatinine peaked 5.72, then trended down. Baseline creatinine seems to be about 2.  - Creatinine trended up again starting on 4/11, peaked at 3.82, now improved to 3.26 this morning.  - Nephrology consulted,  appreciate their assistance.  - Diuretic started by nephrology on 4/15.    Lactic acidosis  Peaked at 12.1.  - Improved to 1.6 when last checked on 4/11.    Elevated transaminases, improved  Peak ALT 7094, AST 30671. Thought to be ischemic hepatitis/shock liver/liver congestion in the setting of CHF. Hepatitis B surface antigen nonreactive.  - LFTs much improved as of 4/13/20.    T2DM, non-insulin dependent  Hemoglobin A1C on 1/20/2020 was 6.0. A1C checked during admission 6.2.   [PTA medications: Metformin 1000 mg BID, Glipizide XL 5 mg po every day]  - Hold oral agents for now.   - Continue Lantus 16 U at bedtime (started during hospitalization).  - Continue accuchecks and medium dose sliding scale NovoLog.  - Monitor for hypoglycemia.  - Blood sugars fairly well controlled.  - After discussing options/cost for treatment options at the time of discharge, he would like to go home on insulin. Will consult diabetic educator.    Enlarged prostate  Mild right hydronephrosis  BPH  Urinary retention  [Flomax 0.4 mg po every day]  - Continue PTA Flomax.  - Failed voiding trial on 4/14/20. Urology re-consulted.  - Plan to leave Mckenzie catheter in place at the time of discharge and follow-up with Urology in 2 weeks.    Gross hematuria, resolved  Noted hematuria starting 4/1/2020, which has since resolved in the context of IV heparin use.   - Urology consulted for hematuria, three way urinary catheter initially placed for hematuria, removed 4/9/2020 after no return of hematuria despite re-initiation of anticoagulation. He subsequently had recurrent clots and a catheter was replaced.  - Hematuria has resolved.  - Needs outpatient follow-up with Urology to complete hematuria work-up.    Hyponatremia  Hypokalemia, resolved  Sodium borderline low on admission, improved with above intervention.   - Likely hypovolemic.  - Sodium improved overall, 131 this morning.  - Nephrology consulted as noted above.  - Potassium within normal  limits this morning. Recheck in AM. Discontinued electrolyte replacement protocol in setting of DEONDRE.    Iron deficiency anemia  No prior hemoglobin for comparison.  - Iron 27, , MIKI 8.   - Hemoglobin stable at 9.2 this morning.  - No signs of active bleeding.  - Continue iron supplement.  - Recheck labs in AM.    Lung nodule, left  Noted on CT scan on 3/28/20, measuring up to 1 cm.   - Consider follow-up CT in 3 months, PET/CT and or tissue sampling; defer to outpatient provider upon discharge.    Suspected sleep apnea  - Recommend that he get an outpatient sleep study after he recovers from his acute medical issues.  - Plan overnight oximetry testing tomorrow night.       Diet: 2 Gram Sodium Diet No Caffeine for 24 hours (once tests completed, may have caffeine)  Fluid restriction 1200 ML FLUID    DVT Prophylaxis: Eliquis  Mckenzie Catheter: in place, indication: Retention, Strict 1-2 Hour I&O  Code Status: DNI      Disposition Plan   Expected discharge: expect discharge home on Friday  Entered: Jax Navarro MD 04/15/2020, 2:45 PM       The patient's care was discussed with the Bedside Nurse and Patient.    Jax Navarro MD  Hospitalist Service  [facility]    ______________________________________________________________________    Interval History   Gene Pagan was seen this afternoon. He feels OK, a little better than yesterday. Denies fevers, chest pain, shortness of breath, nausea, abdominal pain. Unable to void on his own after the Mckenzie catheter was removed yesterday, Mckenzie was subsequently replaced.    Data reviewed today: I reviewed all medications, new labs and imaging results over the last 24 hours. I personally reviewed no images or EKG's today.    Physical Exam   Vital Signs: Temp: 98.2  F (36.8  C) Temp src: Axillary BP: 107/78(after walking and doing stairs with CR) Pulse: 82 Heart Rate: 78 Resp: 18 SpO2: 98 % O2 Device: None (Room air) Oxygen Delivery: 1  LPM  Weight: 195 lbs 12.8 oz  Constitutional: awake, alert, cooperative, no apparent distress  Respiratory: diminished at the bases  Cardiovascular: regular rate and rhythm, normal S1 and S2, no murmur noted  GI: normal bowel sounds, soft, non-distended, non-tender  Skin: warm, dry  Musculoskeletal: 2+ bilateral lower extremity pitting edema present  Neurologic: awake, alert, oriented to name, place and time    Data   Recent Labs   Lab 04/15/20  0618 04/14/20  0550 04/13/20  0613  04/11/20  0536   WBC 11.3* 10.2 12.6*   < > 15.5*   HGB 9.7* 9.2* 9.0*   < > 10.2*   MCV 80 80 79   < > 81   * 406 391   < > 476*   * 132* 129*   < > 129*   POTASSIUM 3.6 3.5 3.5   < > 4.8   CHLORIDE 101 102 99   < > 98   CO2 23 20 19*   < > 21   BUN 60* 72* 82*   < > 79*   CR 3.26* 3.56* 3.67*   < > 3.45*   ANIONGAP 7 10 11   < > 10   HARPER 8.9 8.7 8.5   < > 9.1   GLC 80 85 87   < > 217*   ALBUMIN  --   --  2.3*  --  2.5*   PROTTOTAL  --   --  5.7*  --  6.4*   BILITOTAL  --   --  0.6  --  0.6   ALKPHOS  --   --  99  --  117   ALT  --   --  63  --  107*   AST  --   --  54*  --  49*    < > = values in this interval not displayed.     Medications     - MEDICATION INSTRUCTIONS -       ACE/ARB/ARNI NOT PRESCRIBED         apixaban ANTICOAGULANT  5 mg Oral BID     bumetanide  1 mg Oral Daily     insulin aspart  1-7 Units Subcutaneous TID AC     insulin aspart  1-5 Units Subcutaneous At Bedtime     insulin glargine  16 Units Subcutaneous At Bedtime     piperacillin-tazobactam  2.25 g Intravenous Q6H     sodium chloride (PF)  3 mL Intracatheter Q8H     tamsulosin  0.4 mg Oral Daily

## 2020-04-15 NOTE — PROGRESS NOTES
Renal Medicine Progress Note            Assessment/Plan:     # CKD III--IV: He had a serum creatinine of 2.32 mg/dl in January.      # Acute kidney injury: Scr peaked at 5.76 mg/dl. Improving.     # Afib with RVR s/p ablation and PM placement.      # BiV failure. LV EF of 25-30% with grade III DD and moderate decreased RV function. Severe MR.      # FEN: Hypervolemia with 1-2+pitting edema. Hyponatremia     Plan:  # Start Bumex 1 mg daily  # Limit fluid intake to 1200 ml daily  # See our NP/PA in 4-6 weeks after after discharge        Interval History:     Afebrile. BP is on the low side. Excellent urine output without diuretic, he is drinking too much fluid. No cardiopulmonary complaints, but LE appear more swollen.           Medications and Allergies:       apixaban ANTICOAGULANT  5 mg Oral BID     insulin aspart  1-7 Units Subcutaneous TID AC     insulin aspart  1-5 Units Subcutaneous At Bedtime     insulin glargine  16 Units Subcutaneous At Bedtime     piperacillin-tazobactam  2.25 g Intravenous Q6H     sodium chloride (PF)  3 mL Intracatheter Q8H     tamsulosin  0.4 mg Oral Daily      No Known Allergies         Physical Exam:   Vitals were reviewed  Heart Rate: 78, Blood pressure 107/78, pulse 82, temperature 98.2  F (36.8  C), temperature source Axillary, resp. rate 18, weight 88.8 kg (195 lb 12.8 oz), SpO2 98 %.    Wt Readings from Last 3 Encounters:   04/15/20 88.8 kg (195 lb 12.8 oz)   04/10/20 84.6 kg (186 lb 6.4 oz)       Intake/Output Summary (Last 24 hours) at 4/15/2020 1220  Last data filed at 4/15/2020 1100  Gross per 24 hour   Intake 2833 ml   Output 2205 ml   Net 628 ml     Constitutional: awake, alert, cooperative, no apparent distress  Respiratory: diminished at the bases  Cardiovascular: regular rate and rhythm, normal S1 and S2, no murmur noted  GI: obese, soft, NT  Skin: warm, dry  Musculoskeletal: 1-2+ pitting edema  Neurologic: awake, alert, answers questions appropriately           Data:      CBC RESULTS:     Recent Labs   Lab 04/15/20  0618 04/14/20  0550 04/13/20  0613 04/12/20  0538 04/11/20  0536 04/10/20  0801   WBC 11.3* 10.2 12.6* 14.4* 15.5* 14.7*   RBC 3.86* 3.67* 3.54* 3.65* 4.05* 3.96*   HGB 9.7* 9.2* 9.0* 9.4* 10.2* 10.0*   HCT 30.8* 29.2* 28.1* 29.0* 32.6* 33.4*   * 406 391 404 476* 454*       Basic Metabolic Panel:  Recent Labs   Lab 04/15/20  0618 04/14/20  0550 04/13/20  0613 04/12/20  0538 04/11/20  1315 04/11/20  0536   * 132* 129* 129* 128* 129*   POTASSIUM 3.6 3.5 3.5 4.1 4.5 4.8   CHLORIDE 101 102 99 97 98 98   CO2 23 20 19* 19* 19* 21   BUN 60* 72* 82* 85* 82* 79*   CR 3.26* 3.56* 3.67* 3.82* 3.70* 3.45*   GLC 80 85 87 128* 179* 217*   HARPER 8.9 8.7 8.5 8.6 8.9 9.1       INRNo lab results found in last 7 days.   Attestation:   I have reviewed today's relevant vital signs, notes, medications, labs and imaging.    Afshin Kay MD  [facility] - Nephrology  Office phone :[phone]  Pager: [phone]

## 2020-04-15 NOTE — PLAN OF CARE
Discharge Planner PT   Patient plan for discharge: return home with significant other  Current status: CR: Patient feeling well this am; O2 sats 97% with rest and 97-99% with activity; /64 and HR 82 prior to activity; /78 and HR 80 with activity; SBA for gait with rolling walker 150 feet x 2 with a seated rest between; able to go up and down 2 steps with rail and CGA; cues to take 1 step at a time  Barriers to return to prior living situation: stairs; decreased activity tolerance;   Recommendations for discharge: Home with FWW and home health PT and assist from significant other for stairs.  Rationale for recommendations: Pt progressing well with physical therapy. Anticipate with further medical management and therapy, patient will be safe to discharge home with family assist as needed for stair management       Entered by: Tonya Lorenzo 04/15/2020 9:45 AM

## 2020-04-15 NOTE — PLAN OF CARE
Patient is up with walker and stand by assist, Tolerated food, denies chest pain, VSS, Up in the chair for most off the shift, nephrology order 1200 fluid restriction, and started Bumex oral. Pace maker site is ecchymotic.

## 2020-04-15 NOTE — PLAN OF CARE
"Discharge Planner OT   Patient plan for discharge: Home   Current status: Pt completed graded medication management set up task with an accuracy of 3/5 medications accurately set up. Pt demonstrated ease with simple medication directions, however demonstrated difficulty with complex medication directions (including \"take as needed\" and take one tablet twice daily.\" Educated on compensatory techniques for medication management.    Barriers to return to prior living situation: current level of A for I/ADLs    Recommendations for discharge: home with family A with ADL/IADL's (ie med mgmt, bathing, dressing, home management tasks, transportation etc) and home OT and RN.     Rationale for recommendations: Continued skilled OT to address independence in I/ADLs and cognition. Home OT for ADL/IADL's, home safety, cognition, and home RN for Med mgmt. Home therapy warranted as pt would require a considerable taxing effort to leave the home and would require an assistive device and person.         Entered by: Natanael Martinez 04/15/2020 2:03 PM      "

## 2020-04-16 ENCOUNTER — APPOINTMENT (OUTPATIENT)
Dept: PHYSICAL THERAPY | Facility: CLINIC | Age: 75
DRG: 242 | End: 2020-04-16
Attending: INTERNAL MEDICINE
Payer: COMMERCIAL

## 2020-04-16 ENCOUNTER — APPOINTMENT (OUTPATIENT)
Dept: OCCUPATIONAL THERAPY | Facility: CLINIC | Age: 75
DRG: 242 | End: 2020-04-16
Attending: INTERNAL MEDICINE
Payer: COMMERCIAL

## 2020-04-16 LAB
ANION GAP SERPL CALCULATED.3IONS-SCNC: 9 MMOL/L (ref 3–14)
BUN SERPL-MCNC: 51 MG/DL (ref 7–30)
CALCIUM SERPL-MCNC: 8.8 MG/DL (ref 8.5–10.1)
CHLORIDE SERPL-SCNC: 104 MMOL/L (ref 94–109)
CO2 SERPL-SCNC: 21 MMOL/L (ref 20–32)
CREAT SERPL-MCNC: 3.28 MG/DL (ref 0.66–1.25)
ERYTHROCYTE [DISTWIDTH] IN BLOOD BY AUTOMATED COUNT: 16.4 % (ref 10–15)
GFR SERPL CREATININE-BSD FRML MDRD: 17 ML/MIN/{1.73_M2}
GLUCOSE BLDC GLUCOMTR-MCNC: 101 MG/DL (ref 70–99)
GLUCOSE BLDC GLUCOMTR-MCNC: 114 MG/DL (ref 70–99)
GLUCOSE SERPL-MCNC: 102 MG/DL (ref 70–99)
HCT VFR BLD AUTO: 29.5 % (ref 40–53)
HGB BLD-MCNC: 9.4 G/DL (ref 13.3–17.7)
MCH RBC QN AUTO: 25.4 PG (ref 26.5–33)
MCHC RBC AUTO-ENTMCNC: 31.9 G/DL (ref 31.5–36.5)
MCV RBC AUTO: 80 FL (ref 78–100)
PLATELET # BLD AUTO: 450 10E9/L (ref 150–450)
POTASSIUM SERPL-SCNC: 3.6 MMOL/L (ref 3.4–5.3)
RBC # BLD AUTO: 3.7 10E12/L (ref 4.4–5.9)
SODIUM SERPL-SCNC: 134 MMOL/L (ref 133–144)
WBC # BLD AUTO: 10 10E9/L (ref 4–11)

## 2020-04-16 PROCEDURE — 25000131 ZZH RX MED GY IP 250 OP 636 PS 637: Performed by: INTERNAL MEDICINE

## 2020-04-16 PROCEDURE — 36415 COLL VENOUS BLD VENIPUNCTURE: CPT | Performed by: HOSPITALIST

## 2020-04-16 PROCEDURE — 25000132 ZZH RX MED GY IP 250 OP 250 PS 637: Performed by: INTERNAL MEDICINE

## 2020-04-16 PROCEDURE — 99232 SBSQ HOSP IP/OBS MODERATE 35: CPT | Performed by: INTERNAL MEDICINE

## 2020-04-16 PROCEDURE — 25000128 H RX IP 250 OP 636: Performed by: INTERNAL MEDICINE

## 2020-04-16 PROCEDURE — 97535 SELF CARE MNGMENT TRAINING: CPT | Mod: GO

## 2020-04-16 PROCEDURE — 00000146 ZZHCL STATISTIC GLUCOSE BY METER IP

## 2020-04-16 PROCEDURE — 21000001 ZZH R&B HEART CARE

## 2020-04-16 PROCEDURE — 85027 COMPLETE CBC AUTOMATED: CPT | Performed by: HOSPITALIST

## 2020-04-16 PROCEDURE — 40000275 ZZH STATISTIC RCP TIME EA 10 MIN

## 2020-04-16 PROCEDURE — 80048 BASIC METABOLIC PNL TOTAL CA: CPT | Performed by: HOSPITALIST

## 2020-04-16 PROCEDURE — 97110 THERAPEUTIC EXERCISES: CPT | Mod: GP | Performed by: PHYSICAL THERAPIST

## 2020-04-16 RX ORDER — LANCETS
EACH MISCELLANEOUS
Qty: 100 EACH | Refills: 0 | Status: SHIPPED | OUTPATIENT
Start: 2020-04-16 | End: 2020-04-16

## 2020-04-16 RX ORDER — CEFDINIR 300 MG/1
300 CAPSULE ORAL DAILY
Status: DISCONTINUED | OUTPATIENT
Start: 2020-04-17 | End: 2020-04-17 | Stop reason: HOSPADM

## 2020-04-16 RX ORDER — GLUCOSAMINE HCL/CHONDROITIN SU 500-400 MG
CAPSULE ORAL
Qty: 100 EACH | Refills: 3 | Status: SHIPPED | OUTPATIENT
Start: 2020-04-16 | End: 2020-04-16

## 2020-04-16 RX ORDER — PIPERACILLIN SODIUM, TAZOBACTAM SODIUM 2; .25 G/10ML; G/10ML
2.25 INJECTION, POWDER, LYOPHILIZED, FOR SOLUTION INTRAVENOUS EVERY 6 HOURS
Status: COMPLETED | OUTPATIENT
Start: 2020-04-16 | End: 2020-04-17

## 2020-04-16 RX ADMIN — PIPERACILLIN SODIUM AND TAZOBACTAM SODIUM 2.25 G: 2; .25 INJECTION, POWDER, LYOPHILIZED, FOR SOLUTION INTRAVENOUS at 12:26

## 2020-04-16 RX ADMIN — PIPERACILLIN SODIUM AND TAZOBACTAM SODIUM 2.25 G: 2; .25 INJECTION, POWDER, LYOPHILIZED, FOR SOLUTION INTRAVENOUS at 23:56

## 2020-04-16 RX ADMIN — PIPERACILLIN SODIUM AND TAZOBACTAM SODIUM 2.25 G: 2; .25 INJECTION, POWDER, LYOPHILIZED, FOR SOLUTION INTRAVENOUS at 06:38

## 2020-04-16 RX ADMIN — PIPERACILLIN SODIUM AND TAZOBACTAM SODIUM 2.25 G: 2; .25 INJECTION, POWDER, LYOPHILIZED, FOR SOLUTION INTRAVENOUS at 01:28

## 2020-04-16 RX ADMIN — BUMETANIDE 1 MG: 1 TABLET ORAL at 08:02

## 2020-04-16 RX ADMIN — TAMSULOSIN HYDROCHLORIDE 0.4 MG: 0.4 CAPSULE ORAL at 08:02

## 2020-04-16 RX ADMIN — INSULIN GLARGINE 14 UNITS: 100 INJECTION, SOLUTION SUBCUTANEOUS at 21:44

## 2020-04-16 RX ADMIN — APIXABAN 5 MG: 5 TABLET, FILM COATED ORAL at 21:44

## 2020-04-16 RX ADMIN — APIXABAN 5 MG: 5 TABLET, FILM COATED ORAL at 08:02

## 2020-04-16 RX ADMIN — PIPERACILLIN SODIUM AND TAZOBACTAM SODIUM 2.25 G: 2; .25 INJECTION, POWDER, LYOPHILIZED, FOR SOLUTION INTRAVENOUS at 18:39

## 2020-04-16 ASSESSMENT — ACTIVITIES OF DAILY LIVING (ADL)
ADLS_ACUITY_SCORE: 12

## 2020-04-16 NOTE — PROGRESS NOTES
The following appointments and contact info has been added to the patient's AVS:    Apr20 CARDIAC DEVICE CHECK - REMOTE   Monday Apr 20, 2020 12:00 AM  [facility]   [address]   [place] [id number] [phone]    Apr22 Telephone Visit with Marsha Noonan PA-C   Wednesday Apr 22, 2020 9:30 AM   [facility]  Note: this is not an onsite visit; there is no need to come to the facility.  Please have a list of all current medications available for appointment.              Follow up with Nephrology in 4-6 weeks:  On Thursday, May 14th at 1:30pm with Dee Dee Rodarte.     [facility]   [facility] ([facility] - [facility] to [facility])   [address]   [place], [place] [address]   [phone]       Follow Up with Urology in 2 weeks. Clinic will contact you to schedule.     Cheikh Perez MD   [facility]   Office: [phone]     Follow up with Device Check RN in 7-10 days.  (SEE DETAILS BELOW)     Sleep Study recommended.  Below are sleep clinics you can contact.     [facility]:  [phone]   [facility]: [phone]         Lesvia Zuleta RN  Care Coordinator  [facility]  [phone]

## 2020-04-16 NOTE — PLAN OF CARE
Neuro- A&Ox4  Most Recent Vitals- Temp: 98.3  F (36.8  C) Temp src: Oral BP: 102/70 Pulse: 79 Heart Rate: 78 Resp: 16 SpO2: 92 % O2 Device: None (Room air)    Tele/Cardiac- 100% vpaced  Resp- diminished on room air  Activity- SBA  Pain- denies  Drips- none  Drains/Tubes- none  Skin- incision- PPM  GI/- foiding adequately through baker  Aggression Color- Green  Plan- O2 study on NOC tonight and possible discharge Friday or saturday  Misc-     Slime Almendarez RN

## 2020-04-16 NOTE — PLAN OF CARE
Patient is up in the room with SBA, tolerating food, RA VSS, Denies pain. Mckenzie. Plan to discharge tomorrow.

## 2020-04-16 NOTE — PROGRESS NOTES
[facility]    Hospitalist Progress Note      Assessment & Plan   Gene Pagan is a 74 year old male with PMHx of NIDDM and BPH who was seen initially at [facility] 3/27/2020 for progressive SOB, ultimately transferred to the ED after found to be in atrial fibrillation with RVR (new diagnosis).  Admitted for further evaluation and treatment. Ultimately pt decompensated 3/28/2020 with more progressive SOB and hypotension requiring intubation and pressor support. Please refer to H&P by Dr. Sabillon from 3/27/2020 for complete details on initial presentation. Pt ultimately with ongoing a fib with RVR and failed cardioversion. Transferred to [facility] 4/10/2020 per Cardiology recommendation for EP intervention. Note Tmax 101.1 degrees fahrenheit overnight, persistent a fib with RVR in the 120s, softer BPs. 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 No ASA while on Eliquis per EP      Suspected acute pyelonephritis with chronic bladder outlet obstruction  Tmax 101.1 degrees fahrenheit on 4/9. WBC 15.3 (4/10), but initial admission as high as 20K. Procal on admission 0.07. Covid negative, CRP 23.1, ferritin 49 (WNL). Strep pneumo negative, legionella negative, influenza and RSV PCR negative. Urine culture negative. BC NGTD. CXR on admission with heterogeneous opacities in the lung bases concerning for infectious or inflammatory process. CT C/A/P from 3/27/2020 with irregularity of the gallbladder and stranding around the gallbladder, stranding around bilateral kidneys and bilateral renal collecting system prominence including ureters and extrarenal pelvis could be secondary to chronic bladder outlet obstruction, pyelonephritis not excluded. US abdomen 3/27/2020 with mild gallbladder wall thickening, no other evidence of cholecystitis, no gallstones or biliary dilatation, mild right hydronephrosis. Pt was treated  with 7 days of IV Zosyn (3/27/2020 - 4/4/2020) and 2 days of IV Vancomycin (3/28/2020 - 3/29/2020).   - WBC has now normalized 10  - Procalcitonin increased up to 2.85, then trended down to 0.85.  - UA suggestive of infection  - Urine culture negative, however it was obtained after he was restarted on antibiotics.  - Continue Zosyn today for uspected acute pyelonephritis, day 6, transition to Cefdinir 300mg daily starting 4/17 to complete total 10 days course  - Obtain blood culture if he develops recurrent fever.     Atrial fibrillation with RVR, new diagnosis  S/p AV sarah ablation and CRT by EP on 4/10/20  S/P NESTOR cardioversion (4/6/2020)  Presented with A fib with RVR in the setting of respiratory failure and acute on chronic renal failure. Initially treated with IV diltiazem and PO metoprolol, transitioned to IV amiodarone due to softer BPs and low EF. Anticoagulated with IV heparin since admission. Underwent successful NESTOR cardioversion 4/6/2020, but converted back to a fib the AM of 4/7/2020. Had remained in a fib with variable rate control since that time.   - Cardiology followed, recommended transfer to [facility] for cardioversion vs AVN ablation/biV pacer.  - EP consulted, s/p AV node ablation and CRT-P placement on 4/10/20.  - Discontinued amiodarone and Toprol XL per EP recommendations.  - Continue Eliquis per EP recommendations, may need to readdress depending on renal function.                - After reviewing expected cost for Eliquis after discharge based on pharmacy liaison consult, the patient would like to stay on Eliquis.  - Continue to monitor on telemetry.     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 Trop peak 0.439. EKG  without evidence of ischemia. CT C/A/P from 3/27/2020 with moderate right and small left pleural fluid collections and associated compressive atelectasis in the bilateral posterior lungs. Groundglass densities in the lungs could reepresent vascular congestion and the heart is enlarged indicating probable CHF. Echocardiogram 3/28/2020 with EF 20-25%, severe global hypokinesia of the left ventricle and grade III diastolic dysfunction. Also notes moderate to severe mitral regurgitation. Repeat limited echo on 4/5/2020 unchanged. Pt was diuresed with bumex gtt initially (down 20 L since admission).   - Cardiology following, planning outpatient evaluation for CAD in the future.  - Discontinued Toprol XL for now due to lower blood pressures. It has been held the past two days due to low blood pressures.  - Intake and output, daily weights                - Initial weight was around 204 pounds, down to 186 when discharged from [facility] on 4/10.               - Weight up to 195 on 4/15 and down to 179 (? Accuracy of weight given significant fluctuation daily)  - Diuresis had been on hold due to rising creatinine. Oral bumex started on 4/15/20.  - Needs close follow up with [facility] on discharge. Will need cardiology follow-up as well with repeat echocardiogram.  - No aspirin while on Eliquis.     Cardiogenic shock, resolved  Acute hypoxic respiratory failure  Respiratory failure in the setting of CHF, cardiogenic shock and questionable infiltrate as above. Cardiac results as above. He did initially require norepinephrine which was weaned off as of 4/1/2020. Intubated 3/28/2020, extubated 4/1/2020.    - Patient is DNI but OK to do CPR as per patient.  - O2 sats in the mid 90s today on room air.  - Encourage pulmonary toilet with incentive spirometry.  - overnight pulse-oxymetry ordered     Acute kidney injury  Chronic kidney disease, stage 3  Creatinine peaked 5.72, then trended down. Baseline creatinine seems to be about  2.  - Creatinine trended up again starting on 4/11, peaked at 3.82, now stable around 3.3  - Nephrology consulted, appreciate their assistance.  - Diuretic started by nephrology on 4/15.  - f/u with nephrology in 4-6 weeks        Elevated transaminases, improved  Peak ALT 7094, AST 35561. Thought to be ischemic hepatitis/shock liver/liver congestion in the setting of CHF. Hepatitis B surface antigen nonreactive.  - LFTs much improved as of 4/13/20.     T2DM, non-insulin dependent  Hemoglobin A1C on 1/20/2020 was 6.0. A1C checked during admission 6.2.   [PTA medications: Metformin 1000 mg BID, Glipizide XL 5 mg po every day]  - Hold oral agents for now.   - decrease Lantus to 14 U at bedtime (started during hospitalization).  - Continue accuchecks and medium dose sliding scale NovoLog.  - Monitor for hypoglycemia.  - Blood sugars fairly well controlled.  - After discussing options/cost for treatment options at the time of discharge, he would like to go home on insulin. Will consult diabetic educator.     Enlarged prostate  Mild right hydronephrosis  BPH  Urinary retention  [Flomax 0.4 mg po every day]  - Continue PTA Flomax.  - Failed voiding trial on 4/14/20. Urology re-consulted.  - Plan to leave Mckenzie catheter in place at the time of discharge and follow-up with Urology in 2 weeks.     Gross hematuria, resolved  Noted hematuria starting 4/1/2020, which has since resolved in the context of IV heparin use.   - Urology consulted for hematuria, three way urinary catheter initially placed for hematuria, removed 4/9/2020 after no return of hematuria despite re-initiation of anticoagulation. He subsequently had recurrent clots and a catheter was replaced.  - Hematuria has resolved.  - Needs outpatient follow-up with Urology to complete hematuria work-up.     Hyponatremia  Hypokalemia, resolved  Sodium borderline low on admission, improved with above intervention.   - Likely hypovolemic.  - Sodium improved overall, 131  this morning.  - Nephrology consulted as noted above.  - Potassium within normal limits this morning. Recheck in AM. Discontinued electrolyte replacement protocol in setting of DEONDRE.     Iron deficiency anemia  No prior hemoglobin for comparison.  - Iron 27, , MIKI 8.   - Hemoglobin stable ~9  - No signs of active bleeding.  - Continue iron supplement.     Lung nodule, left  Noted on CT scan on 3/28/20, measuring up to 1 cm.   - Consider follow-up CT in 3 months, PET/CT and or tissue sampling; defer to outpatient provider upon discharge.     Suspected sleep apnea  - Recommend that he get an outpatient sleep study after he recovers from his acute medical issues.  - Plan overnight oximetry testing tonight     DVT Prophylaxis: Eliquis  Code Status: DNI    Disposition: Expected discharge tomorrow if remains stable.     Opal Carty MD  Text Page  (7am to 6pm)    Interval History   Doing well. Denies chest pain or shortness of breath.   He is afebrile.     -Data reviewed today: I reviewed all new labs and imaging results over the last 24 hours. I personally reviewed no images or EKG's today.    Physical Exam   Temp: 97.3  F (36.3  C) Temp src: Oral BP: 103/64 Pulse: 78   Resp: 16 SpO2: 97 % O2 Device: None (Room air)    Vitals:    04/14/20 0100 04/15/20 0102 04/16/20 0648   Weight: 88.8 kg (195 lb 11.2 oz) 88.8 kg (195 lb 12.8 oz) 81.5 kg (179 lb 11.2 oz)     Vital Signs with Ranges  Temp:  [97.3  F (36.3  C)-98.3  F (36.8  C)] 97.3  F (36.3  C)  Pulse:  [78-80] 78  Resp:  [16] 16  BP: (102-117)/(60-70) 103/64  SpO2:  [92 %-99 %] 97 %  I/O last 3 completed shifts:  In: 1097 [P.O.:994; I.V.:103]  Out: 2700 [Urine:2700]    Constitutional: Alert, awake and no apparent distress  Respiratory: Clear to auscultation bilaterally, no wheezing  Cardiovascular: regular rate and rhythm, +b/l LE edema  GI: soft and non-tender  Skin/Integumen: warm and dry      Medications     - MEDICATION INSTRUCTIONS -        ACE/ARB/ARNI NOT PRESCRIBED         apixaban ANTICOAGULANT  5 mg Oral BID     bumetanide  1 mg Oral Daily     insulin aspart  1-7 Units Subcutaneous TID AC     insulin aspart  1-5 Units Subcutaneous At Bedtime     insulin glargine  16 Units Subcutaneous At Bedtime     piperacillin-tazobactam  2.25 g Intravenous Q6H     sodium chloride (PF)  3 mL Intracatheter Q8H     tamsulosin  0.4 mg Oral Daily       Data   Recent Labs   Lab 04/16/20  0543 04/15/20  0618 04/14/20  0550 04/13/20  0613  04/11/20  0536   WBC 10.0 11.3* 10.2 12.6*   < > 15.5*   HGB 9.4* 9.7* 9.2* 9.0*   < > 10.2*   MCV 80 80 80 79   < > 81    467* 406 391   < > 476*    131* 132* 129*   < > 129*   POTASSIUM 3.6 3.6 3.5 3.5   < > 4.8   CHLORIDE 104 101 102 99   < > 98   CO2 21 23 20 19*   < > 21   BUN 51* 60* 72* 82*   < > 79*   CR 3.28* 3.26* 3.56* 3.67*   < > 3.45*   ANIONGAP 9 7 10 11   < > 10   HARPER 8.8 8.9 8.7 8.5   < > 9.1   * 80 85 87   < > 217*   ALBUMIN  --   --   --  2.3*  --  2.5*   PROTTOTAL  --   --   --  5.7*  --  6.4*   BILITOTAL  --   --   --  0.6  --  0.6   ALKPHOS  --   --   --  99  --  117   ALT  --   --   --  63  --  107*   AST  --   --   --  54*  --  49*    < > = values in this interval not displayed.       No results found for this or any previous visit (from the past 24 hour(s)).

## 2020-04-16 NOTE — PLAN OF CARE
Discharge Planner OT   Patient plan for discharge: Home   Current status: Pt completed item retrieval with SBA and walker. While standing at the sink with SBA, pt completed 3 grooming and hygiene tasks. No further IP OT warranted, Appropriate to defer further OT to next level of care.     Barriers to return to prior living situation: None if home with A for I/ADLs    Recommendations for discharge: home with family A with ADL/IADL's (ie med mgmt, bathing, dressing, home management tasks, transportation etc) and home OT and RN.     Rationale for recommendations: Home OT for independence in ADL/IADL's, home safety, cognition, and home RN for Med mgmt. Home therapy warranted as pt would require a considerable taxing effort to leave the home and would require an assistive device and person.        Occupational Therapy Discharge Summary    Reason for therapy discharge:    Family to assist in I/ADLs. No further IP OT warranted, Appropriate to defer further OT to next level of care.     Progress towards therapy goal(s). See goals on Care Plan in [facility] electronic health record for goal details.  Goals partially met     Therapy recommendation(s):    home with family A with ADL/IADL's (ie med mgmt, bathing, dressing, home management tasks, transportation etc) and home OT. Home OT to address ADL/IADL's, home safety, cognition.            Entered by: Natanael Martinez 04/16/2020 11:33 AM

## 2020-04-16 NOTE — PROGRESS NOTES
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      # Acute kidney injury: Scr peaked at 5.76 mg/dl. Improved and stable.     # Afib with RVR s/p ablation and PM placement.      # BiV failure. LV EF of 25-30% with grade III DD and moderate decreased RV function. Severe MR.      # FEN: Hypervolemia with 1-2+pitting edema. Hyponatremia improving.      Plan:  # Continue Bumex 1 mg daily  # Limit fluid intake to 1200 ml daily  # See our NP/PA in 4-6 weeks after after discharge        Interval History:     Afebrile. VSS. Good diuresis. Edema is better. Kidney function is stable. He denies worsening shortness of breath. No chest pain, N/V. \"I feel good,\" he says.           Medications and Allergies:       apixaban ANTICOAGULANT  5 mg Oral BID     bumetanide  1 mg Oral Daily     insulin aspart  1-7 Units Subcutaneous TID AC     insulin aspart  1-5 Units Subcutaneous At Bedtime     insulin glargine  16 Units Subcutaneous At Bedtime     piperacillin-tazobactam  2.25 g Intravenous Q6H     sodium chloride (PF)  3 mL Intracatheter Q8H     tamsulosin  0.4 mg Oral Daily      No Known Allergies         Physical Exam:   Vitals were reviewed  Heart Rate: 78, Blood pressure 109/60, pulse 80, temperature 97.3  F (36.3  C), temperature source Oral, resp. rate 16, weight 81.5 kg (179 lb 11.2 oz), SpO2 97 %.    Wt Readings from Last 3 Encounters:   04/16/20 81.5 kg (179 lb 11.2 oz)   04/10/20 84.6 kg (186 lb 6.4 oz)       Intake/Output Summary (Last 24 hours) at 4/16/2020 1152  Last data filed at 4/16/2020 0802  Gross per 24 hour   Intake 937 ml   Output 2700 ml   Net -1763 ml     Constitutional: awake, alert, cooperative, no apparent distress  Respiratory: diminished at the bases  Cardiovascular: regular rate and rhythm, normal S1 and S2, no murmur noted  GI: obese, soft, NT  Skin: warm, dry  Musculoskeletal: 1+ pitting edema, better.   Neurologic: awake, " alert, answers questions appropriately         Data:     CBC RESULTS:     Recent Labs   Lab 04/16/20  0543 04/15/20  0618 04/14/20  0550 04/13/20  0613 04/12/20  0538 04/11/20  0536   WBC 10.0 11.3* 10.2 12.6* 14.4* 15.5*   RBC 3.70* 3.86* 3.67* 3.54* 3.65* 4.05*   HGB 9.4* 9.7* 9.2* 9.0* 9.4* 10.2*   HCT 29.5* 30.8* 29.2* 28.1* 29.0* 32.6*    467* 406 391 404 476*       Basic Metabolic Panel:  Recent Labs   Lab 04/16/20  0543 04/15/20  0618 04/14/20  0550 04/13/20  0613 04/12/20  0538 04/11/20  1315    131* 132* 129* 129* 128*   POTASSIUM 3.6 3.6 3.5 3.5 4.1 4.5   CHLORIDE 104 101 102 99 97 98   CO2 21 23 20 19* 19* 19*   BUN 51* 60* 72* 82* 85* 82*   CR 3.28* 3.26* 3.56* 3.67* 3.82* 3.70*   * 80 85 87 128* 179*   HARPER 8.8 8.9 8.7 8.5 8.6 8.9       INRNo lab results found in last 7 days.   Attestation:   I have reviewed today's relevant vital signs, notes, medications, labs and imaging.    Afshin Kay MD  [facility] Nephrology  Office phone :[phone]  Pager: [phone]

## 2020-04-16 NOTE — PROVIDER NOTIFICATION
MD Notification    Notified Person: MD    Notified Person Name: Carloz    Notification Date/Time: 04/16/20 1600    Notification Interaction: Web paged with call back    Purpose of Notification: Notified regarding patient's own supply of glucometer and test strips and no longer needing them at discharge.      Orders Received: orders discontinued and discharge pharmacy notified.    Comments:

## 2020-04-16 NOTE — PLAN OF CARE
Discharge Planner PT   Patient plan for discharge: return home with significant other  Current status: CR: Pt reports feeling well this morning. VSS on RA with activity, BP 100s/60s, pt denies adverse symptoms. Pt ambulated 150' with FWW and SBA, 200' with 4WW and SBA; improved activity tolerance and gait speed with 4WW - pt considering purchasing after discharge; has FWW at home for use. Pt able to ascend/descend 5 stairs with rail and SBA, cues for step-to pattern for improved stability,  Barriers to return to prior living situation: None anticipated  Recommendations for discharge: Home, use of WW for mobility, supervision on stairs; Home PT  Rationale for recommendations: Pt progressing well with physical therapy. Anticipate with further medical management and therapy, patient will be safe to discharge home with family assist as needed for stair management. Pt will benefit from Home PT to progress functional activity tolerance, strength, balance, safety and IND with functional mobility. Pt appropriate for Home PT as he requires assistive person and significant taxing effort to leave the home.       Entered by: Ruthann Malik 04/16/2020 10:51 AM

## 2020-04-16 NOTE — PROGRESS NOTES
Met with patient to review medication costs as stated in pharmacy liaison note on 4/14.  Patient is agreeable to all costs.  All meds will need to be filled at [facility] in [place] in order for patient to receive lowest cost. Discharge pharmacy updated in [facility].  Writer will give patient free month coupon for Eliquis to take to [facility] when picks up prescriptions.  Care coordinator will continue to follow and assist with discharge needs.    Lesvia Zuleta RN  Care Coordinator  [facility]  [phone]

## 2020-04-17 VITALS
HEART RATE: 87 BPM | SYSTOLIC BLOOD PRESSURE: 115 MMHG | DIASTOLIC BLOOD PRESSURE: 69 MMHG | BODY MASS INDEX: 29.25 KG/M2 | RESPIRATION RATE: 16 BRPM | OXYGEN SATURATION: 98 % | TEMPERATURE: 97.5 F | WEIGHT: 195.2 LBS

## 2020-04-17 LAB
ANION GAP SERPL CALCULATED.3IONS-SCNC: 9 MMOL/L (ref 3–14)
BUN SERPL-MCNC: 47 MG/DL (ref 7–30)
CALCIUM SERPL-MCNC: 9.1 MG/DL (ref 8.5–10.1)
CHLORIDE SERPL-SCNC: 106 MMOL/L (ref 94–109)
CO2 SERPL-SCNC: 21 MMOL/L (ref 20–32)
CREAT SERPL-MCNC: 3.39 MG/DL (ref 0.66–1.25)
GFR SERPL CREATININE-BSD FRML MDRD: 17 ML/MIN/{1.73_M2}
GLUCOSE BLDC GLUCOMTR-MCNC: 116 MG/DL (ref 70–99)
GLUCOSE BLDC GLUCOMTR-MCNC: 118 MG/DL (ref 70–99)
GLUCOSE BLDC GLUCOMTR-MCNC: 129 MG/DL (ref 70–99)
GLUCOSE SERPL-MCNC: 84 MG/DL (ref 70–99)
POTASSIUM SERPL-SCNC: 3.6 MMOL/L (ref 3.4–5.3)
SODIUM SERPL-SCNC: 136 MMOL/L (ref 133–144)

## 2020-04-17 PROCEDURE — 94762 N-INVAS EAR/PLS OXIMTRY CONT: CPT

## 2020-04-17 PROCEDURE — 25000132 ZZH RX MED GY IP 250 OP 250 PS 637: Performed by: INTERNAL MEDICINE

## 2020-04-17 PROCEDURE — 00000146 ZZHCL STATISTIC GLUCOSE BY METER IP

## 2020-04-17 PROCEDURE — 99239 HOSP IP/OBS DSCHRG MGMT >30: CPT | Performed by: INTERNAL MEDICINE

## 2020-04-17 PROCEDURE — 80048 BASIC METABOLIC PNL TOTAL CA: CPT | Performed by: INTERNAL MEDICINE

## 2020-04-17 PROCEDURE — 25000132 ZZH RX MED GY IP 250 OP 250 PS 637: Performed by: HOSPITALIST

## 2020-04-17 PROCEDURE — 36415 COLL VENOUS BLD VENIPUNCTURE: CPT | Performed by: INTERNAL MEDICINE

## 2020-04-17 RX ORDER — CEFDINIR 300 MG/1
300 CAPSULE ORAL DAILY
Qty: 3 CAPSULE | Refills: 0 | Status: SHIPPED | OUTPATIENT
Start: 2020-04-18 | End: 2020-04-22

## 2020-04-17 RX ORDER — BUMETANIDE 1 MG/1
1 TABLET ORAL DAILY
Qty: 30 TABLET | Refills: 0 | Status: ON HOLD | OUTPATIENT
Start: 2020-04-17 | End: 2020-05-10

## 2020-04-17 RX ADMIN — TAMSULOSIN HYDROCHLORIDE 0.4 MG: 0.4 CAPSULE ORAL at 08:24

## 2020-04-17 RX ADMIN — ACETAMINOPHEN 650 MG: 325 TABLET, FILM COATED ORAL at 00:02

## 2020-04-17 RX ADMIN — BUMETANIDE 1 MG: 1 TABLET ORAL at 08:24

## 2020-04-17 RX ADMIN — CEFDINIR 300 MG: 300 CAPSULE ORAL at 08:24

## 2020-04-17 RX ADMIN — APIXABAN 5 MG: 5 TABLET, FILM COATED ORAL at 08:24

## 2020-04-17 ASSESSMENT — ACTIVITIES OF DAILY LIVING (ADL)
ADLS_ACUITY_SCORE: 12

## 2020-04-17 NOTE — DISCHARGE SUMMARY
[facility]  Hospitalist Discharge Summary      Date of Admission:  4/10/2020  Date of Discharge:  4/17/2020  Discharging Provider: Opal Craty MD      Discharge Diagnoses   Suspected acute pyelonephritis with chronic bladder outlet obstruction  Atrial fibrillation with RVR, new diagnosis s/p AV sarah ablation and CRT by EP on 4/10/20  Acute diastolic and systolic CHF exacerbation (EF 20-25%)  Bilateral pleural effusions (moderate on left, small on right)  Grade III diastolic dysfunction   Moderate to severe mitral regurgitation  NSTEMI type II, demand ischemia in the setting of above  Cardiogenic shock, resolved  Acute hypoxic respiratory failure: resolved  Acute kidney injury on CKD-III  Transaminase elevation, improved/resolved  Type II DM  Enlarged prostate  Mild right hydronephrosis  BPH  Urinary retention s/p baker catheter  Gross hematuria: resolved  Left lung nodule, incidental  Suspected sleep apnea    Follow-ups Needed After Discharge   Follow-up Appointments     Follow-up and recommended labs and tests       Follow up with primary care provider, Martínez Duarte, within 7-14   days for hospital follow- up.  The following labs/tests are recommended:   basic metabolic panel, which will be drawn by Homecare RN and reported to   Dr. Duarte.    PHONE APPOINTMENT WITH DR. DUARTE: Monday, April 27TH AT 11AM.    Recommend CT scan of the chest in 3 month to follow up on lung nodule   noted on CT scan in hospital on 3/28/20             Unresulted Labs Ordered in the Past 30 Days of this Admission     No orders found from 3/11/2020 to 4/11/2020.          Discharge Disposition   Discharged to home  Condition at discharge: Stable      Hospital Course   Gene Pagan is a 74 year old male with PMHx of NIDDM and BPH who was seen initially at [facility] 3/27/2020 for progressive SOB, ultimately transferred to the ED after found to be in atrial fibrillation with RVR (new  diagnosis).  Admitted for further evaluation and treatment. Ultimately pt decompensated 3/28/2020 with more progressive SOB and hypotension requiring intubation and pressor support. Please refer to H&P by Dr. Sabillon from 3/27/2020 for complete details on initial presentation. Pt ultimately with ongoing a fib with RVR and failed cardioversion. Transferred to [facility] 4/10/2020 per Cardiology recommendation for EP intervention. Note Tmax 101.1 degrees fahrenheit overnight, persistent a fib with RVR in the 120s, softer BPs. 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 No ASA while on Eliquis per EP      Suspected acute pyelonephritis with chronic bladder outlet obstruction  Tmax 101.1 degrees fahrenheit on 4/9. WBC 15.3 (4/10), but initial admission as high as 20K. Procal on admission 0.07. Covid negative, CRP 23.1, ferritin 49 (WNL). Strep pneumo negative, legionella negative, influenza and RSV PCR negative. Urine culture negative. BC NGTD. CXR on admission with heterogeneous opacities in the lung bases concerning for infectious or inflammatory process. CT C/A/P from 3/27/2020 with irregularity of the gallbladder and stranding around the gallbladder, stranding around bilateral kidneys and bilateral renal collecting system prominence including ureters and extrarenal pelvis could be secondary to chronic bladder outlet obstruction, pyelonephritis not excluded. US abdomen 3/27/2020 with mild gallbladder wall thickening, no other evidence of cholecystitis, no gallstones or biliary dilatation, mild right hydronephrosis. Pt was treated with 7 days of IV Zosyn (3/27/2020 - 4/4/2020) and 2 days of IV Vancomycin (3/28/2020 - 3/29/2020).   - WBC has now normalized 10  - Procalcitonin increased up to 2.85, then trended down to 0.85.  - UA suggestive of infection.    -  Treated with Zosyn for 6 days in hospital, transition to Cefdinir for 4 more days  to complete 10 days course. Has 3 more days of antibiotics left at time of discharge.     Atrial fibrillation with RVR, new diagnosis  S/p AV sarah ablation and CRT by EP on 4/10/20  S/P NESTOR cardioversion (4/6/2020)  Presented with A fib with RVR in the setting of respiratory failure and acute on chronic renal failure. Initially treated with IV diltiazem and PO metoprolol, transitioned to IV amiodarone due to softer BPs and low EF. Anticoagulated with IV heparin since admission. Underwent successful NESTOR cardioversion 4/6/2020, but converted back to a fib the AM of 4/7/2020. Had remained in a fib with variable rate control since that time.   - Cardiology followed, recommended transfer to [facility] for cardioversion vs AVN ablation/biV pacer.  - EP consulted, s/p AV node ablation and CRT-P placement on 4/10/20.  - Discontinued amiodarone and Toprol XL per EP recommendations.  - Continue Eliquis BID at discharge.  - No ASA while on Eliquis per EP       Acute diastolic and systolic CHF exacerbation (EF 20-25%)  Cardiomyopathy, suspect rate-related, new  Bilateral pleural effusions (moderate on left, small on right)  Grade III diastolic dysfunction   Moderate to severe mitral regurgitation  NSTEMI type II, demand ischemia in the setting of above  BNP 46913 on admission. Trop peak 0.439. EKG without evidence of ischemia. CT C/A/P from 3/27/2020 with moderate right and small left pleural fluid collections and associated compressive atelectasis in the bilateral posterior lungs. Groundglass densities in the lungs could reepresent vascular congestion and the heart is enlarged indicating probable CHF. Echocardiogram 3/28/2020 with EF 20-25%, severe global hypokinesia of the left ventricle and grade III diastolic dysfunction. Also notes moderate to severe mitral regurgitation. Repeat limited echo on 4/5/2020 unchanged. Pt was diuresed with bumex gtt initially (down 20 L since admission).   - Cardiology following, planning outpatient  evaluation for CAD in the future.  - Discontinued Toprol XL for now due to lower blood pressures. Not on ACEi due to renal insufficiency  - Bumex 1mg daily- started on 4/15  - weight has been fluctuating significantly in hospital, question accuracy  - follow with cardiology as OP for possible re-initiation of Metoprolol if BP remains stable     Cardiogenic shock, resolved  Acute hypoxic respiratory failure  Respiratory failure in the setting of CHF, cardiogenic shock and questionable infiltrate as above. Cardiac results as above. He did initially require norepinephrine which was weaned off as of 4/1/2020. Intubated 3/28/2020, extubated 4/1/2020.    - Patient is DNI but OK to do CPR as per patient.  - has been on RA for several days.     Acute kidney injury  Chronic kidney disease, stage 3  Creatinine peaked 5.72, then trended down. Baseline creatinine seems to be about 2.  - Creatinine trended up again starting on 4/11, peaked at 3.82, now stable around 3.3  - Nephrology consulted, appreciate their assistance.  - Diuretic started by nephrology on 4/15 as above.  - f/u with nephrology in 4-6 weeks  - BMP in 1 week     Elevated transaminases, improved  Peak ALT 7094, AST 12103. Thought to be ischemic hepatitis/shock liver/liver congestion in the setting of CHF. Hepatitis B surface antigen nonreactive.  - LFTs much improved as of 4/13/20.     T2DM, non-insulin dependent  Hemoglobin A1C on 1/20/2020 was 6.0. A1C checked during admission 6.2.   [PTA medications: Metformin 1000 mg BID, Glipizide XL 5 mg po every day]  - PTA orals discontinued due to renal insufficiency  - After discussing options/cost for treatment options at the time of discharge, he would like to go home on insulin  - will discharge on Lantus 9 units at bedtime. BG checks BID and prn for concerns of hypoglycemia  - advised to keep record of BG and follow up with PCP for adjustment of medications as needed     Enlarged prostate  Mild right  hydronephrosis  BPH  Urinary retention  [Flomax 0.4 mg po every day]  - Continue PTA Flomax.  - Failed voiding trial on 4/14/20. Urology re-consulted.  - Plan to leave Mckenzie catheter in place at the time of discharge and follow-up with Urology in 2 weeks.     Gross hematuria, resolved  Noted hematuria starting 4/1/2020, which has since resolved in the context of IV heparin use.   - Urology consulted for hematuria, three way urinary catheter initially placed for hematuria, removed 4/9/2020 after no return of hematuria despite re-initiation of anticoagulation. He subsequently had recurrent clots and a catheter was replaced.  - Hematuria has resolved.  - Needs outpatient follow-up with Urology to complete hematuria work-up.     Hyponatremia: resolved  Hypokalemia, resolved  Sodium borderline low on admission, improved with above intervention.   - Likely hypovolemic.    Iron deficiency anemia  No prior hemoglobin for comparison.  - Iron 27, , MIKI 8.   - Hemoglobin stable ~9  - No signs of active bleeding.     Lung nodule, left  Noted on CT scan on 3/28/20, measuring up to 1 cm.   - Consider follow-up CT in 3 months, PET/CT and or tissue sampling; defer to outpatient provider upon discharge.     Suspected sleep apnea  - Recommend that he get an outpatient sleep study after he recovers from his acute medical issues.  - attempted to do overnight oximetry, however patient did not sleep well     Consultations This Hospital Stay   CARDIOLOGY IP CONSULT  CORE CLINIC EVALUATION IP CONSULT  OCCUPATIONAL THERAPY ADULT IP CONSULT  NUTRITION SERVICES ADULT IP CONSULT  CARE COORDINATOR IP CONSULT  SOCIAL WORK IP CONSULT  CARDIAC REHAB IP CONSULT  PHYSICAL THERAPY ADULT IP CONSULT  SOCIAL WORK IP CONSULT  NEPHROLOGY IP CONSULT  PHARMACY LIAISON FOR MEDICATION COVERAGE CONSULT  PHARMACY LIAISON FOR MEDICATION COVERAGE CONSULT  PHARMACY LIAISON FOR MEDICATION COVERAGE CONSULT  UROLOGY IP CONSULT  CARE TRANSITION RN/SW IP  CONSULT  DIABETES EDUCATION IP CONSULT  PHARMACY LIAISON FOR MEDICATION COVERAGE CONSULT    Code Status   DNI    Time Spent on this Encounter   I, Opal Carty MD, personally saw the patient today and spent 40  minutes discharging this patient.       Opal Carty MD  [facility]  ______________________________________________________________________    Physical Exam   Vital Signs: Temp: 97.5  F (36.4  C) Temp src: Axillary BP: 115/69 Pulse: 87 Heart Rate: 80 Resp: 16 SpO2: 98 % O2 Device: None (Room air)    Weight: 195 lbs 3.2 oz  General Appearance: Alert, awake and no apparent distress  Respiratory: clear to auscultation bilaterally, no wheezing  Cardiovascular: regular rate and rhythm, 2+LE edema bilaterally  GI: soft and non-tender  Skin: warm and dry  Other: Mckenzie cathter in place       Primary Care Physician   Martínez Duarte    Discharge Orders      Home care nursing referral      Home Care PT Referral for Hospital Discharge      Home Care OT Referral for Hospital Discharge      Discharge Instructions - Keep incision dry for 72 hours    Keep incision dry for 72 hours (unless Derma Hampton was applied)     Discharge Instructions - Follow up with Device Check RN     Follow up with Nephrology in 4-6 weeks:  On Thursday, May 14th at 1:30pm with Dee Dee Rodarte.    [facility]  [facility] ([facility])  [address] [place] [address]  [phone]      Follow Up with Urology in 2 weeks. Clinic will contact you to schedule.    Cheikh Perez MD   [facility]  Office: [phone]    Follow up with Device Check RN in 7-10 days.  (SEE DETAILS BELOW)    Sleep Study recommended.  Below are sleep clinics you can contact.    [facility]:  [phone]  [facility]: [phone]     Activity    Your activity upon discharge: activity as tolerated     Monitor and record    blood  glucose 2 times a day, before breakfast and at bedtime. You may check as needed as well for any concerns of low blood glucose. Keep track of your blood sugar and discuss with your primary care doctor if adjustments are needed in your insulin.     Tubes and drains    You are going home with the following tubes or drains: baker catheter.  Tube cares per hospital or home care instructions     MD face to face encounter    Documentation of Face to Face and Certification for Home Health Services    I certify that patient: Gene Pagan is under my care and that I, or a nurse practitioner or physician's assistant working with me, had a face-to-face encounter that meets the physician face-to-face encounter requirements with this patient on: 4/17/2020.    This encounter with the patient was in whole, or in part, for the following medical condition, which is the primary reason for home health care: Congestive heart failure, generalized weakness/physical deconditioning.    I certify that, based on my findings, the following services are medically necessary home health services: Nursing, Occupational Therapy and Physical Therapy.    My clinical findings support the need for the above services because: Nurse is needed: To assess medication tolerance after changes in medications or other medical regimen.., Occupational Therapy Services are needed to assess and treat cognitive ability and address ADL safety due to impairment in improving independence in ADL, safety and assess cognition. and Physical Therapy Services are needed to assess and treat the following functional impairments: progress in functional activity tolerance, strength, balance, safety and independence with functional mobility    Further, I certify that my clinical findings support that this patient is homebound (i.e. absences from home require considerable and taxing effort and are for medical reasons or [employer] services or infrequently or of short duration  when for other reasons) because: Requires assistance of another person or specialized equipment to access medical services because patient: has significant generalized weakness and requires assistance of person and assistive device.    Based on the above findings. I certify that this patient is confined to the home and needs intermittent skilled nursing care, physical therapy and/or speech therapy.  The patient is under my care, and I have initiated the establishment of the plan of care.  This patient will be followed by a physician who will periodically review the plan of care.  Physician/Provider to provide follow up care: Martínez Duarte    Attending hospital physician (the Medicare certified [place] provider): Opal Carty MD  Physician Signature: See electronic signature associated with these discharge orders.  Date: 4/17/2020     Follow-up and recommended labs and tests     Follow up with primary care provider, Martínez Duarte, within 7-14 days for hospital follow- up.  The following labs/tests are recommended: basic metabolic panel, which will be drawn by Homecare RN and reported to Dr. Duarte.    PHONE APPOINTMENT WITH DR. DUARTE: Monday, April 27TH AT 11AM.    Recommend CT scan of the chest in 3 month to follow up on lung nodule noted on CT scan in hospital on 3/28/20     DNI     Diet    Follow this diet upon discharge: Orders Placed This Encounter      Fluid restriction 1200 ML FLUID      2 Gram Sodium Diet       Significant Results and Procedures   Most Recent 3 CBC's:  Recent Labs   Lab Test 04/16/20  0543 04/15/20  0618 04/14/20  0550   WBC 10.0 11.3* 10.2   HGB 9.4* 9.7* 9.2*   MCV 80 80 80    467* 406     Most Recent 3 BMP's:  Recent Labs   Lab Test 04/17/20  0609 04/16/20  0543 04/15/20  0618    134 131*   POTASSIUM 3.6 3.6 3.6   CHLORIDE 106 104 101   CO2 21 21 23   BUN 47* 51* 60*   CR 3.39* 3.28* 3.26*   ANIONGAP 9 9 7   HARPER 9.1 8.8 8.9   GLC 84 102* 80      Most Recent 2 LFT's:  Recent Labs   Lab Test 04/13/20  0613 04/11/20  0536   AST 54* 49*   ALT 63 107*   ALKPHOS 99 117   BILITOTAL 0.6 0.6   ,   Results for orders placed or performed during the hospital encounter of 04/10/20   X-ray Chest 2 vws*    Narrative    CHEST TWO VIEWS  4/11/2020 8:05 AM     HISTORY: Status post pacer/ICD.    COMPARISON: 3/28/2020.      Impression    IMPRESSION: Left chest pacemaker newly identified. No acute airspace  disease. Stable cardiomegaly. No pneumothorax.    ALEXIS MONCADA MD   EP Device    Narrative    PROCEDURES PERFORMED:   1. Ablation of atrioventricular node (AVN)  2. Implantation of a cardiac resynchronization therapy with pacemaker   (CRT-P)  3. Conscious sedation.   4. Cardiac fluoroscopy    INDICATION: Medical refractory AFib with cardiomyopathy    HISTORY OF PRESENT ILLNESS: This is a 74 year old year-old patient   presented with CHF decompensation along with severe cardiomyopathy noted   to be in AF with RVR refractory to medical therapy. Patient is referred   for a CRT-P and AVN ablation.    METHOD: The patient was prepped and draped in the usual manner.   Intravenous antibiotic was given. 1% lidocaine was infiltrated into the   left axillary vein area. This vein was access using the modified   Seldinger's technique. An incision was then made with a #15 blade. A   peelable sheath was then glide over the wire into the vein. A lead was   then advanced into the heart and was fixed into the right ventricular   apical area. Appropriate sensing and threshold were obtained. There was no   diaphragmatic stimulation with high output pacing. The sheath was then   peeled away. Another peelable sheath was then glide over the wire into the   vein.  A lead was then advanced into the heart and was fixed into the   right atrial wall. There was no diaphragmatic stimulation with high output   pacing. The sheath was then peeled away. The lead(s) was/were then secured   to the  pectoralis muscle fascia using O-Ethibond sutures.    A Coronary sinus (CS) sheath advanced over the guidewire into the right   atrium.  An EP catheter was then introduced into the CS sheath and   cannulated into the CS lumen. The EP catheter was exchanged for a   [place]-Kia catheter.  Occlusive venograms were performed with diluted   contrast in GAUTAM, RODRIGUEZ and AP position(s).  There was only one   posterolateral branch which was selected for placement of the left   ventricular lead. A subselecive sheath was then advanced into this branch.   The lead along with an angioplasty wire were then advanced into this   branch. Appropriate sensing and threshold were obtained. There was no   diaphragmatic stimulation with high output pacing. The CS sheath was then   cut away under fluoroscopic guidance. Using the splitter the subselective   sheath was split away, then the outer CS sheath was removed in similar   manner. The lead was then secured to the pectoralis muscle fascia using   O-Ethibond sutures.     The right groin was then prepped and draped the usual fashion. 1%   Lidocaine was infiltrated into the area. An 8-[place] sheath was placed in   the right femoral vein via the modified Seldinger's technique. A large   curve 5 mm ablation catheter was then advanced into the heart. Ablation   was then performed using an EPT generator. Both catheter and sheath were   then removed. Hemostasis was achieved by direct manual pressure. The   patient was then [facility] in [facility] condition.       ABLATION SUMMARY: Approximately 2 applications of radiofrequency ablation   were targeted at the AV node at 50 [place] and 60 degrees Celsius for 10-60   seconds after mapping of the AVN was done. Complete heart block was   achieved with a junctional escape rate of 30 beats per minute.    DEVICE INFORMATION:  Implant Name Type Inv. Item Serial No.  Lot No. LRB No. Used   Action   LEAD INGEVITY ACTIVE MRI 45  CM  LEAD INGEVITY ACTIVE MRI 45 CM [id number]   [employer] [id number]  1 Implanted   52CM INGEVITY MRI ACTIVE FIXATION PACING LEAD Leads  [id number] [employer] [id number]  1 Implanted   CRT-P VALITUDE X4 Pacemaker CRT-P VALITUDE X4 [id number] [employer]   [id number]  1 Implanted   86CM, ACUITY X4 LV PACING LEAD, MODEL [id number], TINES FIXATION Leads  [id number]   [employer] [id number]  1 Implanted     STIMULATION THRESHOLDS:  RA -  Sense:afib mV, Threshold:  V @ 0.5 ms, Impedance: 601 ohms  RV -  Sense:14 mV, Threshold: 0.4 V @ 0.5 ms, Impedance: 906 ohms  LV -  Sense:7.2 mV, Threshold: 0.8 V @ 0.5 ms, Impedance: 1260 ohms (1 to   3)    CONTRAST (mL) USED: 10    COMPLICATIONS: None.    CONCLUSION:  1. Uneventful implantation of a cardiac resynchronization therapy with   pacemaker and AVN ablation    Abhay Willams MD   Echocardiogram Limited    Narrative    [id number]  [id number]  [id number]  [id number]^MARILYN^KHADIJAH           [facility]  Echocardiography Laboratory  [address] [place] [address]        Name: MIKE DUNHAM  MRN: [id number]  : 1945  Study Date: 2020 08:58 AM  Age: 74 yrs  Gender: Male  Patient Location: [facility]  Reason For Study: Shock  Ordering Physician: KHADIJAH SANDERS  Referring Physician: KHADIJAH SANDERS  Performed By: LATASHA Armenta     BSA: 1.9 m2  Height: 68 in  Weight: 176 lb  HR: 80  BP: 93/76 mmHg  _____________________________________________________________________________  __        Procedure  Limited Portable Echo Adult. Optison (NDC #[id number]) given intravenously.  _____________________________________________________________________________  __        Interpretation Summary     Technically difficult imaging  The rhythm was atrial fibrillation with paced rhythm.  Left ventricular systolic function is severely reduced.  The visual ejection fraction is estimated at 25-30%.  The left ventricle is mildly dilated.  There is mild concentric  left ventricular hypertrophy.  There is severe global hypokinesia of the left ventricle.  The right ventricle is mild to moderately dilated.  Moderately decreased right ventricular systolic function  There is a pacemaker lead in the right ventricle.  There is moderate biatrial enlargement.  There is mild (1+) mitral regurgitation.  Mild aortic root dilatation.  The ascending aorta is Mildly dilated.     There has been interim placement of a dual chamber pacemaker. Estimated global  LV systolic performance, though diminished, appears sliightly better than on  previous studies ( was in 20 to 25^% range, now 25 to 30% range)  _____________________________________________________________________________  __        Left Ventricle  The left ventricle is mildly dilated. There is mild concentric left  ventricular hypertrophy. Left ventricular systolic function is severely  reduced. The visual ejection fraction is estimated at 25-30%. Left ventricular  diastolic function is indeterminate. Septal wall motion abnormality may  reflect pacemaker activation. There is severe global hypokinesia of the left  ventricle. There is no thrombus seen in the left ventricle.     Right Ventricle  The right ventricle is mild to moderately dilated. Moderately decreased right  ventricular systolic function. There is a pacemaker lead in the right  ventricle.     Atria  There is moderate biatrial enlargement. Pacer wire in right atrium. There is  no atrial shunt seen. The left atrial appendage is not well visualized.     Mitral Valve  The mitral valve leaflets are mildly thickened. There is mild (1+) mitral  regurgitation. There is no mitral valve stenosis.        Tricuspid Valve  Normal tricuspid valve. No tricuspid regurgitation. There is no tricuspid  stenosis.     Aortic Valve  The aortic valve is trileaflet. No aortic regurgitation is present. No aortic  stenosis is present.     Pulmonic Valve  The pulmonic valve is not well visualized.  There is no pulmonic valvular  regurgitation. There is no pulmonic valvular stenosis.     Vessels  Mild aortic root dilatation. The ascending aorta is Mildly dilated. Dilation  of the inferior vena cava is present with abnormal respiratory variation in  diameter. The pulmonary artery is normal size.     Pericardium  The pericardium appears normal. There is no pleural effusion.        Rhythm  The rhythm was atrial fibrillation with paced rhythm.  _____________________________________________________________________________  __  MMode/2D Measurements & Calculations  IVSd: 1.4 cm     LVIDd: 4.5 cm  LVIDs: 4.0 cm  LVPWd: 1.3 cm  FS: 11.8 %  LV mass(C)d: 233.6 grams  LV mass(C)dI: 120.7 grams/m2  Ao root diam: 4.2 cm  asc Aorta Diam: 4.0 cm  LVOT diam: 2.1 cm  LVOT area: 3.5 cm2  RWT: 0.57                 _____________________________________________________________________________  __           Report approved by: Dr. Joseph Castillo 04/11/2020 11:42 AM            Discharge Medications   Current Discharge Medication List      START taking these medications    Details   apixaban ANTICOAGULANT (ELIQUIS) 5 MG tablet Take 1 tablet (5 mg) by mouth 2 times daily  Qty: 60 tablet, Refills: 0    Comments: Future refills by PCP Dr. Martínez Duarte with phone number [phone].  Associated Diagnoses: Atrial fibrillation with rapid ventricular response (H)      bumetanide (BUMEX) 1 MG tablet Take 1 tablet (1 mg) by mouth daily  Qty: 30 tablet, Refills: 0    Comments: Future refills by PCP Dr. Martínez Duarte with phone number [phone].  Associated Diagnoses: Combined systolic and diastolic congestive heart failure, unspecified HF chronicity (H)      cefdinir (OMNICEF) 300 MG capsule Take 1 capsule (300 mg) by mouth daily for 3 days  Qty: 3 capsule, Refills: 0    Associated Diagnoses: Pyelonephritis      insulin glargine (LANTUS SOLOSTAR) 100 UNIT/ML pen Inject 9 Units Subcutaneous At Bedtime Future refills by PCP  Dr. Martínez Duarte with phone number [phone].  Qty: 15 mL, Refills: 0    Associated Diagnoses: Type 2 diabetes mellitus with other specified complication, unspecified whether long term insulin use (H)      insulin pen needle (31G X 8 MM) 31G X 8 MM miscellaneous 1 Box of 100 insulin pen needles to be dispensed with every insulin pen prescription  Qty: 100 each, Refills: 0    Comments: Future refills by PCP Dr. Martínez Duarte with phone number [phone].  Associated Diagnoses: Type 2 diabetes mellitus with other specified complication, unspecified whether long term insulin use (H)         CONTINUE these medications which have NOT CHANGED    Details   tamsulosin (FLOMAX) 0.4 MG capsule Take 0.4 mg by mouth         STOP taking these medications       aspirin (ASA) 81 MG tablet Comments:   Reason for Stopping:         glipiZIDE (GLUCOTROL XL) 5 MG 24 hr tablet Comments:   Reason for Stopping:         metFORMIN (GLUCOPHAGE) 1000 MG tablet Comments:   Reason for Stopping:             Allergies   No Known Allergies

## 2020-04-17 NOTE — PROGRESS NOTES
The following added to AVS:    Phone appointment scheduled with patient's PCP for 4/27 and [facility] to draw BMP labs on 4/23.      Lesvia Zuleta RN  Care Coordinator  [facility]  [phone]

## 2020-04-17 NOTE — PLAN OF CARE
Physical Therapy Discharge Summary    Reason for therapy discharge:    Discharged to home with home therapy.    Progress towards therapy goal(s). See goals on Care Plan in [facility] electronic health record for goal details.  Goals partially met.  Barriers to achieving goals:   discharge from facility.    Therapy recommendation(s):    Continued therapy is recommended.  Rationale/Recommendations:  cont PT in home setting to optimize independence and safety in home environment. Leaving home will require assist of another person, use of assistive device, and significant taxing effort.

## 2020-04-17 NOTE — PLAN OF CARE
Discharge home with wife with home care, up in the room independent tolerated food, denies pain. Patient has baker.

## 2020-04-17 NOTE — PLAN OF CARE
Pt here with A fib RVR. A&Ox4. VSS on RA. Tele 100% paced. 2g NA, no caffeine diet, 1200 mL fluid restriction. Up with A1GBW. Denies pain. Pt scoring green on the Aggression Stop Light Tool. Sleep study conducted tonight- pt slept minimally. Possible discharge home today

## 2020-04-17 NOTE — PROGRESS NOTES
Overnight oximetry study was setup for the night. Pt is currently on RA with SpO2 96%.  Will cont to monitor.  4/16/2020  Carol Mar RT

## 2020-04-17 NOTE — PLAN OF CARE
A/O x4. VSS. A/V and V paced with bundle branch block. Mckenzie patent with good output. Pacer site ecchymotic. Pt up in chair for meals. Overnight Oximetry study. Possible discharge tomorrow.

## 2020-04-18 LAB
GLUCOSE BLDC GLUCOMTR-MCNC: 83 MG/DL (ref 70–99)
GLUCOSE BLDC GLUCOMTR-MCNC: 88 MG/DL (ref 70–99)

## 2020-04-20 ENCOUNTER — ANCILLARY PROCEDURE (OUTPATIENT)
Dept: CARDIOLOGY | Facility: CLINIC | Age: 75
End: 2020-04-20
Attending: INTERNAL MEDICINE
Payer: COMMERCIAL

## 2020-04-20 ENCOUNTER — CARE COORDINATION (OUTPATIENT)
Dept: CARDIOLOGY | Facility: CLINIC | Age: 75
End: 2020-04-20

## 2020-04-20 ENCOUNTER — TELEPHONE (OUTPATIENT)
Dept: CARDIOLOGY | Facility: CLINIC | Age: 75
End: 2020-04-20

## 2020-04-20 DIAGNOSIS — I49.5 SSS (SICK SINUS SYNDROME) (H): Primary | ICD-10-CM

## 2020-04-20 DIAGNOSIS — I48.91 ATRIAL FIBRILLATION WITH RAPID VENTRICULAR RESPONSE (H): ICD-10-CM

## 2020-04-20 DIAGNOSIS — Z95.0 CARDIAC PACEMAKER IN SITU: ICD-10-CM

## 2020-04-20 LAB
MDC_IDC_EPISODE_DTM: NORMAL
MDC_IDC_EPISODE_ID: NORMAL
MDC_IDC_EPISODE_TYPE: NORMAL
MDC_IDC_LEAD_IMPLANT_DT: NORMAL
MDC_IDC_LEAD_LOCATION: NORMAL
MDC_IDC_LEAD_LOCATION_DETAIL_1: NORMAL
MDC_IDC_LEAD_MFG: NORMAL
MDC_IDC_LEAD_MODEL: NORMAL
MDC_IDC_LEAD_POLARITY_TYPE: NORMAL
MDC_IDC_LEAD_SERIAL: NORMAL
MDC_IDC_MSMT_BATTERY_DTM: NORMAL
MDC_IDC_MSMT_BATTERY_REMAINING_LONGEVITY: 108 MO
MDC_IDC_MSMT_BATTERY_REMAINING_PERCENTAGE: 100 %
MDC_IDC_MSMT_BATTERY_STATUS: NORMAL
MDC_IDC_MSMT_LEADCHNL_LV_IMPEDANCE_VALUE: 1089 OHM
MDC_IDC_MSMT_LEADCHNL_LV_LEAD_CHANNEL_STATUS: NORMAL
MDC_IDC_MSMT_LEADCHNL_LV_PACING_THRESHOLD_AMPLITUDE: 0.6 V
MDC_IDC_MSMT_LEADCHNL_LV_PACING_THRESHOLD_PULSEWIDTH: 0.5 MS
MDC_IDC_MSMT_LEADCHNL_RA_IMPEDANCE_VALUE: 785 OHM
MDC_IDC_MSMT_LEADCHNL_RV_IMPEDANCE_VALUE: 882 OHM
MDC_IDC_MSMT_LEADCHNL_RV_PACING_THRESHOLD_AMPLITUDE: 0.6 V
MDC_IDC_MSMT_LEADCHNL_RV_PACING_THRESHOLD_PULSEWIDTH: 0.4 MS
MDC_IDC_PG_IMPLANT_DTM: NORMAL
MDC_IDC_PG_MFG: NORMAL
MDC_IDC_PG_MODEL: NORMAL
MDC_IDC_PG_SERIAL: NORMAL
MDC_IDC_PG_TYPE: NORMAL
MDC_IDC_SESS_CLINIC_NAME: NORMAL
MDC_IDC_SESS_DTM: NORMAL
MDC_IDC_SESS_TYPE: NORMAL
MDC_IDC_SET_BRADY_AT_MODE_SWITCH_MODE: NORMAL
MDC_IDC_SET_BRADY_AT_MODE_SWITCH_RATE: 140 {BEATS}/MIN
MDC_IDC_SET_BRADY_LOWRATE: 80 {BEATS}/MIN
MDC_IDC_SET_BRADY_MAX_SENSOR_RATE: 130 {BEATS}/MIN
MDC_IDC_SET_BRADY_MAX_TRACKING_RATE: 130 {BEATS}/MIN
MDC_IDC_SET_BRADY_MODE: NORMAL
MDC_IDC_SET_BRADY_PAV_DELAY_LOW: 180 MS
MDC_IDC_SET_BRADY_SAV_DELAY_LOW: 120 MS
MDC_IDC_SET_CRT_LVRV_DELAY: 30 MS
MDC_IDC_SET_CRT_PACED_CHAMBERS: NORMAL
MDC_IDC_SET_LEADCHNL_LV_PACING_AMPLITUDE: 3.5 V
MDC_IDC_SET_LEADCHNL_LV_PACING_ANODE_ELECTRODE_1: NORMAL
MDC_IDC_SET_LEADCHNL_LV_PACING_ANODE_LOCATION_1: NORMAL
MDC_IDC_SET_LEADCHNL_LV_PACING_CATHODE_ELECTRODE_1: NORMAL
MDC_IDC_SET_LEADCHNL_LV_PACING_CATHODE_LOCATION_1: NORMAL
MDC_IDC_SET_LEADCHNL_LV_PACING_PULSEWIDTH: 0.5 MS
MDC_IDC_SET_LEADCHNL_LV_SENSING_ADAPTATION_MODE: NORMAL
MDC_IDC_SET_LEADCHNL_LV_SENSING_ANODE_ELECTRODE_1: NORMAL
MDC_IDC_SET_LEADCHNL_LV_SENSING_ANODE_LOCATION_1: NORMAL
MDC_IDC_SET_LEADCHNL_LV_SENSING_CATHODE_ELECTRODE_1: NORMAL
MDC_IDC_SET_LEADCHNL_LV_SENSING_CATHODE_LOCATION_1: NORMAL
MDC_IDC_SET_LEADCHNL_LV_SENSING_SENSITIVITY: 1.5 MV
MDC_IDC_SET_LEADCHNL_RA_PACING_AMPLITUDE: 3.5 V
MDC_IDC_SET_LEADCHNL_RA_PACING_CAPTURE_MODE: NORMAL
MDC_IDC_SET_LEADCHNL_RA_PACING_POLARITY: NORMAL
MDC_IDC_SET_LEADCHNL_RA_PACING_PULSEWIDTH: 0.4 MS
MDC_IDC_SET_LEADCHNL_RA_SENSING_ADAPTATION_MODE: NORMAL
MDC_IDC_SET_LEADCHNL_RA_SENSING_POLARITY: NORMAL
MDC_IDC_SET_LEADCHNL_RA_SENSING_SENSITIVITY: 0.25 MV
MDC_IDC_SET_LEADCHNL_RV_PACING_AMPLITUDE: 2 V
MDC_IDC_SET_LEADCHNL_RV_PACING_CAPTURE_MODE: NORMAL
MDC_IDC_SET_LEADCHNL_RV_PACING_POLARITY: NORMAL
MDC_IDC_SET_LEADCHNL_RV_PACING_PULSEWIDTH: 0.4 MS
MDC_IDC_SET_LEADCHNL_RV_SENSING_ADAPTATION_MODE: NORMAL
MDC_IDC_SET_LEADCHNL_RV_SENSING_POLARITY: NORMAL
MDC_IDC_SET_LEADCHNL_RV_SENSING_SENSITIVITY: 1.5 MV
MDC_IDC_SET_ZONE_DETECTION_INTERVAL: 375 MS
MDC_IDC_SET_ZONE_TYPE: NORMAL
MDC_IDC_SET_ZONE_VENDOR_TYPE: NORMAL
MDC_IDC_STAT_AT_BURDEN_PERCENT: 95 %
MDC_IDC_STAT_AT_DTM_END: NORMAL
MDC_IDC_STAT_AT_DTM_START: NORMAL
MDC_IDC_STAT_BRADY_DTM_END: NORMAL
MDC_IDC_STAT_BRADY_DTM_START: NORMAL
MDC_IDC_STAT_BRADY_RA_PERCENT_PACED: 1 %
MDC_IDC_STAT_BRADY_RV_PERCENT_PACED: 98 %
MDC_IDC_STAT_CRT_DTM_END: NORMAL
MDC_IDC_STAT_CRT_DTM_START: NORMAL
MDC_IDC_STAT_CRT_LV_PERCENT_PACED: 98 %
MDC_IDC_STAT_EPISODE_RECENT_COUNT: 0
MDC_IDC_STAT_EPISODE_RECENT_COUNT: 591
MDC_IDC_STAT_EPISODE_RECENT_COUNT_DTM_END: NORMAL
MDC_IDC_STAT_EPISODE_RECENT_COUNT_DTM_START: NORMAL
MDC_IDC_STAT_EPISODE_TYPE: NORMAL
MDC_IDC_STAT_EPISODE_VENDOR_TYPE: NORMAL

## 2020-04-20 PROCEDURE — 93296 REM INTERROG EVL PM/IDS: CPT | Performed by: INTERNAL MEDICINE

## 2020-04-20 PROCEDURE — 93294 REM INTERROG EVL PM/LDLS PM: CPT | Performed by: INTERNAL MEDICINE

## 2020-04-20 NOTE — TELEPHONE ENCOUNTER
Pt had an AVNA and [employer] CRT-P placed on 4/10/20.  7-day remote device check completed today do to COVID 19 (see pacemaker device results for full details).  Incision is CDI with no signs of infection per patient.  Patient will attempt to email a picture of his incision to the clinic tonight.     JIMMIE Bangura

## 2020-04-20 NOTE — PROGRESS NOTES
[facility]    Reviewed chart re: CORE enrollment 4/22 with Marsha Noonan PA-C. Pt discharged from the hospital on 4/17 with home care. Called FIDEL Downs at [facility] with lab orders: BMP, BNP, HGB, TSH. Requested labs be completed prior to 4/22 or at their earliest convenience.    CLAYTON HuertaN, RN, PHN, HNB-BC   4/20/2020 at 8:13 AM

## 2020-04-21 LAB
ANION GAP SERPL CALCULATED.3IONS-SCNC: 6 MMOL/L (ref 3–14)
BUN SERPL-MCNC: 31 MG/DL (ref 7–30)
CALCIUM SERPL-MCNC: 9.9 MG/DL (ref 8.5–10.1)
CHLORIDE SERPL-SCNC: 103 MMOL/L (ref 94–109)
CO2 SERPL-SCNC: 27 MMOL/L (ref 20–32)
CREAT SERPL-MCNC: 2.89 MG/DL (ref 0.66–1.25)
GFR SERPL CREATININE-BSD FRML MDRD: 20 ML/MIN/{1.73_M2}
GLUCOSE SERPL-MCNC: 110 MG/DL (ref 70–99)
HGB BLD-MCNC: 10.3 G/DL (ref 13.3–17.7)
NT-PROBNP SERPL-MCNC: ABNORMAL PG/ML (ref 0–450)
POTASSIUM SERPL-SCNC: 3.2 MMOL/L (ref 3.4–5.3)
SODIUM SERPL-SCNC: 136 MMOL/L (ref 133–144)
TSH SERPL DL<=0.005 MIU/L-ACNC: 4.74 MU/L (ref 0.4–4)

## 2020-04-21 PROCEDURE — 84443 ASSAY THYROID STIM HORMONE: CPT | Performed by: FAMILY MEDICINE

## 2020-04-21 PROCEDURE — 83880 ASSAY OF NATRIURETIC PEPTIDE: CPT | Performed by: FAMILY MEDICINE

## 2020-04-21 PROCEDURE — 85018 HEMOGLOBIN: CPT | Performed by: FAMILY MEDICINE

## 2020-04-21 PROCEDURE — 80048 BASIC METABOLIC PNL TOTAL CA: CPT | Performed by: FAMILY MEDICINE

## 2020-04-22 ENCOUNTER — CARE COORDINATION (OUTPATIENT)
Dept: CARDIOLOGY | Facility: CLINIC | Age: 75
End: 2020-04-22

## 2020-04-22 ENCOUNTER — VIRTUAL VISIT (OUTPATIENT)
Dept: CARDIOLOGY | Facility: CLINIC | Age: 75
End: 2020-04-22
Payer: COMMERCIAL

## 2020-04-22 VITALS
BODY MASS INDEX: 28.17 KG/M2 | SYSTOLIC BLOOD PRESSURE: 118 MMHG | HEART RATE: 80 BPM | DIASTOLIC BLOOD PRESSURE: 61 MMHG | WEIGHT: 188 LBS

## 2020-04-22 DIAGNOSIS — I50.23 ACUTE ON CHRONIC SYSTOLIC HEART FAILURE (H): ICD-10-CM

## 2020-04-22 DIAGNOSIS — I48.91 ATRIAL FIBRILLATION WITH RAPID VENTRICULAR RESPONSE (H): Primary | ICD-10-CM

## 2020-04-22 PROCEDURE — 99214 OFFICE O/P EST MOD 30 MIN: CPT | Mod: 95 | Performed by: PHYSICIAN ASSISTANT

## 2020-04-22 RX ORDER — METOPROLOL SUCCINATE 25 MG/1
12.5 TABLET, EXTENDED RELEASE ORAL DAILY
Qty: 15 TABLET | Refills: 1 | Status: SHIPPED | OUTPATIENT
Start: 2020-04-22 | End: 2020-05-06

## 2020-04-22 NOTE — PROGRESS NOTES
"Gene Pagan is a 74 year old male who is being evaluated via a billable video visit.  This visit is being conducted as a virtual visit due to the emphasis on mitigation of the COVID-19 virus pandemic. The clinician has decided that the risk of an in-office visit outweighs the benefit for this patient.     The patient has been notified of following:   \"This video visit will be conducted via a call between you and your physician/provider. We have found that certain health care needs can be provided without the need for an in-person physical exam.  This service lets us provide the care you need with a video conversation.  If a prescription is necessary we can send it directly to your pharmacy.  If lab work is needed we can place an order for that and you can then stop by our lab to have the test done at a later time.  If during the course of the call the physician/provider feels a video visit is not appropriate, you will not be charged for this service.\"     Patient has given verbal consent for Video visit? Yes    MA ROS for CORE enrollments:  Any difficulty breathing, including when trying to lie in bed at night?  Any leg swelling (?compression or wraps), abdominal bloating, reduction in appetite?  Any chest pain or palpitations?  Living situation? How do they get around? **If not at facility, contact info for who sets up and manages medications? Are they on consent to communicate?  Support system?  What is diet like? Mindful of sodium intake? Any ETOH use?  Weight trend over the past week?  O2 or CPAP use?  Do they have a pulse oximeter they can use to check O2 sat/HR?  Vitals:    How would you like to obtain your AVS? Cinda     Patient would like the video invitation sent by: Text to cell phone: [phone]     Will anyone else be joining your video visit? No        Spoke with pt on the phone; pt reported following:  BP: 118/61  Pulse: 80 bpm  Wt: 188 lbs     Review Of Systems  Skin: " NEGATIVE  Eyes:Ears/Nose/Throat: NEGATIVE  Respiratory: NEGATIVE  Cardiovascular:NEGATIVE  Gastrointestinal: NEGATIVE  Genitourinary:NEGATIVE   Musculoskeletal: NEGATIVE  Neurologic: NEGATIVE  Psychiatric: NEGATIVE  Hematologic/Lymphatic/Immunologic: NEGATIVE  Endocrine:  NEGATIVE     Reviewed 4/22/2020, YASMEEN Smyth    I have reviewed and updated the patient's Past Medical History, Social History, Family History and Medication List.    PROBLEM LIST  Patient Active Problem List   Diagnosis     Atrial fibrillation with rapid ventricular response (H)     CHF (congestive heart failure) (H)       ALLERGIES  Patient has no known allergies.    MEDICATIONS  Current Outpatient Medications   Medication Sig Dispense Refill     apixaban ANTICOAGULANT (ELIQUIS) 5 MG tablet Take 1 tablet (5 mg) by mouth 2 times daily 60 tablet 0     bumetanide (BUMEX) 1 MG tablet Take 1 tablet (1 mg) by mouth daily 30 tablet 0     insulin glargine (LANTUS SOLOSTAR) 100 UNIT/ML pen Inject 9 Units Subcutaneous At Bedtime Future refills by PCP Dr. Martínez Duarte with phone number [phone]. 15 mL 0     insulin pen needle (31G X 8 MM) 31G X 8 MM miscellaneous 1 Box of 100 insulin pen needles to be dispensed with every insulin pen prescription 100 each 0     tamsulosin (FLOMAX) 0.4 MG capsule Take 0.4 mg by mouth         Video Start Time: 9:45a    Gene Pagan presents for a CORE clinic enrollment.     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  He was in rapid atrial fibrillation and had a new cardiomyopathy, LVEF 20-25%, with severe diastolic dysfunction, RV dilation and dysfunction, and mod-severe MR.  It proved very difficult to rate control his arrhythmia.  NESTOR-guided cardioversion was only briefly  successful.  Because of recurrence of AF he was transferred to [facility] for AV node ablation with CRT pacemaker.  This was performed on Friday 4/10/2020 by Dr. Willams. Post-ablation TTE showed an EF of 25-30% (slight improvement) and MR was now only mild. His stay was complicated by newly diagnosed kidney disease / DEONDRE with a presenting creat of 2.4, felt secondary to long-standing diabetes, peaking at 5.7 in the setting of hypotension / suspected hypovolemia at a wt of 186 lbs. Diuretics were held and this had come down to 3.4 on discharge. Eliquis and bumex 1 mg daily were initiated. He was discharged on 4/17 at a weight of 196 lbs (down from 205 lbs on admit). His dry wt is estimated ~ 190 lbs.    Today, Gene tells me he's doing quite well. Nothing makes him dyspneic at this point. He is sleeping in a recliner currently however out of fear that he'll be uncomfortable lying flat in bed. He does state that he can lower the recliner so he's supine and he has no dyspnea with this. His ankles have some mild pitting edema. No abdominal bloating. Weight is 188 lbs. Today was the first time he weighed himself at home. He has an indwelling catheter currently and has a follow up with urology in two weeks. He lives with his significant other of 20 years. He is mindful of his sodium intake and it sounds like he is limiting appropriately.    Recent device check revealed 98% BiVP. Recent labs show continued improvement in renal function.     4/20/20 device check  [employer] Valitude (CRT-P) 7-10 day Post Pacemaker Remote Device Check - due to COVID 19, this was completed as a remote device check  AP: 1%  BiVP: 98%  Mode: DDDR    Presenting Rhythm: AFib with BiVP rhythm in the 80's (s/p AVNA on 4/10/20)  Heart Rate: Minimal heart rate variability.  HR's are primarily in the 80's with slight variation into the 90s.    Sensing: WNL   Pacing Threshold: RA: Not obtained, RV: WNL   Impedance: WNL   Battery  Status: Estimated at 9 years remaining   Incision: Per patient, incision is CDI with no redness, drainage, swelling, pain, or other signs of infection.  Patient instructed to remove steri-strips.    Atrial Arrhythmia: Hx AFib on Eliquis.  AT/AF burden since implant is 95% with average v-rates at 81.    Ventricular Arrhythmia: 0    Recent labs:  Component      Latest Ref Rng & Units 4/16/2020 4/17/2020 4/21/2020   Sodium      133 - 144 mmol/L 134 136 136   Potassium      3.4 - 5.3 mmol/L 3.6 3.6 3.2 (L)   Chloride      94 - 109 mmol/L 104 106 103   Carbon Dioxide      20 - 32 mmol/L 21 21 27   Anion Gap      3 - 14 mmol/L 9 9 6   Glucose      70 - 99 mg/dL 102 (H) 84 110 (H)   Urea Nitrogen      7 - 30 mg/dL 51 (H) 47 (H) 31 (H)   Creatinine      0.66 - 1.25 mg/dL 3.28 (H) 3.39 (H) 2.89 (H)   GFR Estimate      >60 mL/min/1.73:m2 17 (L) 17 (L) 20 (L)   GFR Estimate If Black      >60 mL/min/1.73:m2 20 (L) 19 (L) 23 (L)   Calcium      8.5 - 10.1 mg/dL 8.8 9.1 9.9   WBC      4.0 - 11.0 10e9/L 10.0     RBC Count      4.4 - 5.9 10e12/L 3.70 (L)     Hemoglobin      13.3 - 17.7 g/dL 9.4 (L)  10.3 (L)   Hematocrit      40.0 - 53.0 % 29.5 (L)     MCV      78 - 100 fl 80     MCH      26.5 - 33.0 pg 25.4 (L)     MCHC      31.5 - 36.5 g/dL 31.9     RDW      10.0 - 15.0 % 16.4 (H)     Platelet Count      150 - 450 10e9/L 450     N-Terminal Pro Bnp      0 - 450 pg/mL   11,784 (H)   TSH      0.40 - 4.00 mU/L   4.74 (H)       ROS:  12-pt ROS is negative except for as noted above.    EXAM:  A limited exam was conducted via video.  Constitutional: Patient is pleasant, alert, in no distress. Normal body habitus, upright.  ENT: Normal head shape, no evidence of injury or laceration.  EYES: No scleral icterus, normal conjunctivae. EOM's appear intact.   Neck: No evidence of thyromegaly.   Chest/Lungs: Appears to have normal respiratory effort. No audible wheezing, no cough, equal chest wall expansion.   Cardiovascular: No evidence of  elevated JVP. No evidence of pitting edema bilaterally.  Abdomen: No evidence of abdominal distention. No observed jaundice.  Extremities: Tr-1+ pitting edema bilateral ankles  Skin: No rashes or lesions appreciated on visualized skin, normal skin color.  Neurologic: Normal mentation. Normal arm motion bilaterally, no tremors. No evidence of focal defect.  Psychiatric: Appropriate affect. A&Ox3.    Assessment/Plan:  1. New systolic CMP / biventricular HF, likely tachy-mediated - EF 25-30%, FC II   - On Bumex 1 mg daily   - No CMP medications currently - start Toprol 12.5 mg nightly.   - Will need eventual ischemic w/u   - Counseled on daily weights, low sodium diet, when to call clinic with symptoms or rapid wt gain/loss.   2. Rapid AF s/p AVN ablation + CRT-P implant on 4/10 - 98% BiVP. Anticoagulated.  3. DEONDRE / CKD-III - creat downtrending, will need close monitoring.  4. Elevated TSH - was WNL 3 weeks ago. On AMIO only briefly as inpatient.    Follow-up:   - CORE video visit in 2 weeks, repeat labs (BMP, proBNP, TSH reflex) with home health RN day prior.   - Dr. Frank in [place] in 3 months with TTE prior.   - With device clinic/EP as recommended by EP.    Video-Visit Details  Type of service:  Video Visit  Video End Time (time video stopped): 10:06  Originating Location (pt. Location): Home  Distant Location (provider location):  [facility]   Mode of Communication:  Video Conference via Isaura Noonan PA-C  [facility]  Pager: [phone]  Text Page  (7:30am - 4pm M-F)

## 2020-04-22 NOTE — PROGRESS NOTES
[facility]    Call to [facility] re:lab orders for patients next appt w/Marsha Noonan. Spoke w/JIMMIE Perez who confirmed home care will be able to do these. Confirmed that BNP, BMP, TSH w/free T4 orders in system. Note sent to board to watch for these results.  Belen Souza RN on 4/22/2020 at 12:56 PM      Future Appointments   Date Time Provider Department Center   4/29/2020  1:00 PM Cheikh Perez MD UAURO UA DEACONY JULIO   5/6/2020  1:10 PM Marsha Noonan PA-C SUUMHT UMP PSA CLIN   6/17/2020  1:00 PM RU DCR2 RUCVCV P PSA CLIN

## 2020-04-22 NOTE — PATIENT INSTRUCTIONS
Today, we discussed the following:   - Results: Your kidney function is slowly improving. We will need to monitor this closely.  - Medication changes:  Please start the medication metoprolol (Toprol XL) 12.5 mg nightly. This will help to protect and hopefully improve your heart function.  - Follow up: Video visit with me + labs (home health RN to draw BMP, proBNP, TSH reflex) in two weeks.   Please let me know in the meantime if your weight goes up OR down (I think a good weight for you is around 188-190 lbs).    Please, remember to continue the followin.  Weigh yourself daily. Call if your weight is up > than 2 pounds in one day, or 5 pounds in one week; if you feel more short of breath or have worsening swelling in your legs or abdomen.  2.  Eat a low sodium diet (less than 2,000mg or 2g daily). If you eat less salt, you will retain less fluid.   3.  Avoid alcohol, as this can worsen heart failure.   4.  Avoid NSAIDs as able (For example, Ibuprofen / aleve / advil / naprosen / diclofenac).    Please call CORE nurse for any questions or concerns Mon-Fri 8am-4pm:   [phone]  For concerns after hours:   [phone] option 2   Scheduling phone number:   [phone]    Thank you for visiting with us today.   Marsha Noonan PA-C  ______________________________________________________________

## 2020-04-22 NOTE — TELEPHONE ENCOUNTER
Called patient and informed him that a picture of his incision had not been received.  He will attempt to send the picture again today.     JIMMIE Bangura

## 2020-04-22 NOTE — LETTER
4/22/2020      RE: Gene Pgaan  [address] [address] [id number]       Dear Colleague,    Thank you for the opportunity to participate in the care of your patient, Gene Pagan, at the [facility]. Please see a copy of my visit note below.    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  He was in rapid atrial fibrillation and had a new cardiomyopathy, LVEF 20-25%, with severe diastolic dysfunction, RV dilation and dysfunction, and mod-severe MR.  It proved very difficult to rate control his arrhythmia.  NESTOR-guided cardioversion was only briefly successful.  Because of recurrence of AF he was transferred to [facility] for AV node ablation with CRT pacemaker.  This was performed on Friday 4/10/2020 by Dr. Willams. Post-ablation TTE showed an EF of 25-30% (slight improvement) and MR was now only mild. His stay was complicated by newly diagnosed kidney disease / DEONDRE with a presenting creat of 2.4, felt secondary to long-standing diabetes, peaking at 5.7 in the setting of hypotension / suspected hypovolemia at a wt of 186 lbs. Diuretics were held and this had come down to 3.4 on discharge. Eliquis and bumex 1 mg daily were initiated. He was discharged on 4/17 at a weight of 196 lbs (down from 205 lbs on admit). His dry wt is estimated ~ 190 lbs.    Today, Gene tells me he's doing quite well. Nothing makes him dyspneic at this point. He is sleeping in a recliner currently however out of fear that he'll be uncomfortable lying flat in bed. He does state that he can lower the recliner so he's supine and he has no dyspnea with this. His ankles have some mild pitting edema. No abdominal  bloating. Weight is 188 lbs. Today was the first time he weighed himself at home. He has an indwelling catheter currently and has a follow up with urology in two weeks. He lives with his significant other of 20 years. He is mindful of his sodium intake and it sounds like he is limiting appropriately.    Recent device check revealed 98% BiVP. Recent labs show continued improvement in renal function.     4/20/20 device check  [employer] Valitude (CRT-P) 7-10 day Post Pacemaker Remote Device Check - due to COVID 19, this was completed as a remote device check  AP: 1%  BiVP: 98%  Mode: DDDR    Presenting Rhythm: AFib with BiVP rhythm in the 80's (s/p AVNA on 4/10/20)  Heart Rate: Minimal heart rate variability.  HR's are primarily in the 80's with slight variation into the 90s.    Sensing: WNL   Pacing Threshold: RA: Not obtained, RV: WNL   Impedance: WNL   Battery Status: Estimated at 9 years remaining   Incision: Per patient, incision is CDI with no redness, drainage, swelling, pain, or other signs of infection.  Patient instructed to remove steri-strips.    Atrial Arrhythmia: Hx AFib on Eliquis.  AT/AF burden since implant is 95% with average v-rates at 81.    Ventricular Arrhythmia: 0    Recent labs:  Component      Latest Ref Rng & Units 4/16/2020 4/17/2020 4/21/2020   Sodium      133 - 144 mmol/L 134 136 136   Potassium      3.4 - 5.3 mmol/L 3.6 3.6 3.2 (L)   Chloride      94 - 109 mmol/L 104 106 103   Carbon Dioxide      20 - 32 mmol/L 21 21 27   Anion Gap      3 - 14 mmol/L 9 9 6   Glucose      70 - 99 mg/dL 102 (H) 84 110 (H)   Urea Nitrogen      7 - 30 mg/dL 51 (H) 47 (H) 31 (H)   Creatinine      0.66 - 1.25 mg/dL 3.28 (H) 3.39 (H) 2.89 (H)   GFR Estimate      >60 mL/min/1.73:m2 17 (L) 17 (L) 20 (L)   GFR Estimate If Black      >60 mL/min/1.73:m2 20 (L) 19 (L) 23 (L)   Calcium      8.5 - 10.1 mg/dL 8.8 9.1 9.9   WBC      4.0 - 11.0 10e9/L 10.0     RBC Count      4.4 - 5.9 10e12/L 3.70 (L)      Hemoglobin      13.3 - 17.7 g/dL 9.4 (L)  10.3 (L)   Hematocrit      40.0 - 53.0 % 29.5 (L)     MCV      78 - 100 fl 80     MCH      26.5 - 33.0 pg 25.4 (L)     MCHC      31.5 - 36.5 g/dL 31.9     RDW      10.0 - 15.0 % 16.4 (H)     Platelet Count      150 - 450 10e9/L 450     N-Terminal Pro Bnp      0 - 450 pg/mL   11,784 (H)   TSH      0.40 - 4.00 mU/L   4.74 (H)       ROS:  12-pt ROS is negative except for as noted above.    EXAM:  A limited exam was conducted via video.  Constitutional: Patient is pleasant, alert, in no distress. Normal body habitus, upright.  ENT: Normal head shape, no evidence of injury or laceration.  EYES: No scleral icterus, normal conjunctivae. EOM's appear intact.   Neck: No evidence of thyromegaly.   Chest/Lungs: Appears to have normal respiratory effort. No audible wheezing, no cough, equal chest wall expansion.   Cardiovascular: No evidence of elevated JVP. No evidence of pitting edema bilaterally.  Abdomen: No evidence of abdominal distention. No observed jaundice.  Extremities: Tr-1+ pitting edema bilateral ankles  Skin: No rashes or lesions appreciated on visualized skin, normal skin color.  Neurologic: Normal mentation. Normal arm motion bilaterally, no tremors. No evidence of focal defect.  Psychiatric: Appropriate affect. A&Ox3.    Assessment/Plan:  1. New systolic CMP / biventricular HF, likely tachy-mediated - EF 25-30%, FC II   - On Bumex 1 mg daily   - No CMP medications currently - start Toprol 12.5 mg nightly.   - Will need eventual ischemic w/u   - Counseled on daily weights, low sodium diet, when to call clinic with symptoms or rapid wt gain/loss.   2. Rapid AF s/p AVN ablation + CRT-P implant on 4/10 - 98% BiVP. Anticoagulated.  3. DEONDRE / CKD-III - creat downtrending, will need close monitoring.  4. Elevated TSH - was WNL 3 weeks ago. On AMIO only briefly as inpatient.    Follow-up:   - CORE video visit in 2 weeks, repeat labs (BMP, proBNP, TSH reflex) with home health  RN day prior.   - Dr. Frank in [place] in 3 months with TTE prior.   - With device clinic/EP as recommended by EP.    Video-Visit Details  Type of service:  Video Visit  Video End Time (time video stopped): 10:06  Originating Location (pt. Location): Home  Distant Location (provider location):  [facility]   Mode of Communication:  Video Conference [email or web address]    Marsha Noonan PA-C  [facility]  Pager: [phone]  Text Page  (7:30am - 4pm M-F)     Please do not hesitate to contact me if you have any questions/concerns.     Sincerely,     Marsha Noonan PA-C

## 2020-04-22 NOTE — PROGRESS NOTES
"Gene Pagan is a 74 year old male who is being evaluated via a billable video visit.      The patient has been notified of following:     \"This video visit will be conducted via a call between you and your physician/provider. We have found that certain health care needs can be provided without the need for an in-person physical exam.  This service lets us provide the care you need with a video conversation.  If a prescription is necessary we can send it directly to your pharmacy.  If lab work is needed we can place an order for that and you can then stop by our lab to have the test done at a later time.    Video visits are billed at different rates depending on your insurance coverage.  Please reach out to your insurance provider with any questions.    If during the course of the call the physician/provider feels a video visit is not appropriate, you will not be charged for this service.\"    Patient has given verbal consent for Video visit? Yes    How would you like to obtain your AVS? [facility]    Patient would like the video invitation sent by: Text to cell phone: [phone]    Will anyone else be joining your video visit? No  {If patient encounters technical issues they should call [phone] :421711}     Spoke with pt on the phone; pt reported following:  BP: 118/61  Pulse: 80 bpm  Wt: 188 lbs     YASMEEN Smyth    Review Of Systems  Skin: NEGATIVE  Eyes:Ears/Nose/Throat: NEGATIVE  Respiratory: NEGATIVE  Cardiovascular:NEGATIVE  Gastrointestinal: NEGATIVE  Genitourinary:NEGATIVE   Musculoskeletal: NEGATIVE  Neurologic: NEGATIVE  Psychiatric: NEGATIVE  Hematologic/Lymphatic/Immunologic: NEGATIVE  Endocrine:  NEGATIVE    Reviewed 4/22/2020, YASMEEN Smyth      Video Start Time: {video visit start time:152948}      Video-Visit Details    Type of service:  Video Visit    Video End Time (time video stopped): ***    Originating Location (pt. Location): {video visit patient location:356883::\"Home\"}    Distant " Location (provider location):  [facility]     Mode of Communication:  Video Conference via Resource Data      {signature options:260828}

## 2020-04-24 ENCOUNTER — TELEPHONE (OUTPATIENT)
Dept: INTERNAL MEDICINE | Facility: CLINIC | Age: 75
End: 2020-04-24

## 2020-04-24 NOTE — TELEPHONE ENCOUNTER
Received below pictures of patient's incisions.  Incision looks slightly swollen with some healing ecchymosis extending down across the breast and toward the armpit area.  No visible redness, drainage, or signs of infection.    Patient called and instructed to call clinic if he notices increased swelling or discomfort around the site, redness, drainage or other signs of infection.  Pt also instructed that bruising should continue to fade over next couple of months.  Pt instructed to call clinic with any questions or concerns.       JIMMIE Bangura

## 2020-04-24 NOTE — TELEPHONE ENCOUNTER
Patient calling  He wants to start seeing Dr Lipscomb  His girlfriend and her family see Dr Lipscomb  Ok to call and  [phone]

## 2020-04-27 ENCOUNTER — CARE COORDINATION (OUTPATIENT)
Dept: CARDIOLOGY | Facility: CLINIC | Age: 75
End: 2020-04-27

## 2020-04-27 NOTE — PROGRESS NOTES
Messaged [facility] RN board to check for wt from chart on 4/29 at Urology visit.     CLAYTON GutierrezN, RN, CHFN  04/27/20 at 12:57 PM

## 2020-04-27 NOTE — PROGRESS NOTES
[facility]  Received VM from pt on Saturday 4/25 stating he lost 4# over night.     Pt last seen in clinic on 4/22/20 by ALPHONSO Kumar. Metoprolol succinate 12.5mg daily started. Goal weight per notes 188-190#.     Called pt, he states he actually lost 7# from Friday to Saturday and now is stablized again. HE is not sure why and feels previous weights not accurate. He is feeling well. He states he does not feel dehydated. Denies dizziness or lightheadedness. Denies SOB. Lymphedema therapist cam on Saturday 4/25 and started wrapping his legs. See weights below. Wt at home on 4/22 was 188#, and today 177#. Pt feels previous wt's must not be accurate. He verified he has a digital scale, he states it is on a hard surface. He moved it this am out so he could have better balance on the scale. Unclar if maybe he was reading it wrong and was in the 170's last week. Again pt states he is feeling well. Verified upcoming video visit again next week. Also plan for lans prior to 5/6 visit via home care per notes. Told pt to continue to weight daily, if weight continues to drop or develops symptoms, he should call back. I will send update to ALPHONSO Kumar, if she has changes I will call back. Pt in agreement with plan.     4/27 177  4/26 178  4/25 178    4/24 185  4/23 187  4/22 188    Future Appointments   Date Time Provider Department Center   4/29/2020  1:00 PM Cheikh Perez MD UA[facility]O UA PHY [facility]   5/6/2020  1:10 PM Marsha Noonan PA-C SUUMHT [facility]   6/17/2020  1:00 PM RU DCR2 RUCVCV [facility]     CLAYTON GutierrezN, RN, CHFN  04/27/20 at 8:40 AM

## 2020-04-28 ENCOUNTER — VIRTUAL VISIT (OUTPATIENT)
Dept: INTERNAL MEDICINE | Facility: CLINIC | Age: 75
End: 2020-04-28
Payer: COMMERCIAL

## 2020-04-28 DIAGNOSIS — R31.0 GROSS HEMATURIA: ICD-10-CM

## 2020-04-28 DIAGNOSIS — R91.1 LUNG NODULE: ICD-10-CM

## 2020-04-28 DIAGNOSIS — R57.0 CARDIOGENIC SHOCK (H): ICD-10-CM

## 2020-04-28 DIAGNOSIS — E11.22 TYPE 2 DIABETES MELLITUS WITH STAGE 3 CHRONIC KIDNEY DISEASE, UNSPECIFIED WHETHER LONG TERM INSULIN USE: ICD-10-CM

## 2020-04-28 DIAGNOSIS — I48.91 ATRIAL FIBRILLATION WITH RAPID VENTRICULAR RESPONSE (H): ICD-10-CM

## 2020-04-28 DIAGNOSIS — N18.30 ACUTE RENAL FAILURE WITH ACUTE TUBULAR NECROSIS SUPERIMPOSED ON STAGE 3 CHRONIC KIDNEY DISEASE (H): ICD-10-CM

## 2020-04-28 DIAGNOSIS — I50.23 ACUTE ON CHRONIC SYSTOLIC CONGESTIVE HEART FAILURE (H): ICD-10-CM

## 2020-04-28 DIAGNOSIS — N17.0 ACUTE RENAL FAILURE WITH ACUTE TUBULAR NECROSIS SUPERIMPOSED ON STAGE 3 CHRONIC KIDNEY DISEASE (H): ICD-10-CM

## 2020-04-28 DIAGNOSIS — N18.3 TYPE 2 DIABETES MELLITUS WITH STAGE 3 CHRONIC KIDNEY DISEASE, UNSPECIFIED WHETHER LONG TERM INSULIN USE: ICD-10-CM

## 2020-04-28 DIAGNOSIS — J96.01 ACUTE RESPIRATORY FAILURE WITH HYPOXIA (H): Primary | ICD-10-CM

## 2020-04-28 PROCEDURE — 99204 OFFICE O/P NEW MOD 45 MIN: CPT | Mod: 95 | Performed by: INTERNAL MEDICINE

## 2020-04-28 RX ORDER — TAMSULOSIN HYDROCHLORIDE 0.4 MG/1
0.4 CAPSULE ORAL AT BEDTIME
COMMUNITY
Start: 2020-04-27 | End: 2020-05-18

## 2020-04-28 RX ORDER — BUMETANIDE 1 MG/1
1 TABLET ORAL
COMMUNITY
Start: 2020-04-27 | End: 2020-04-28

## 2020-04-28 RX ORDER — METOPROLOL SUCCINATE 25 MG/1
25 TABLET, EXTENDED RELEASE ORAL
COMMUNITY
Start: 2020-04-27 | End: 2020-04-28

## 2020-04-28 NOTE — PROGRESS NOTES
"Gene Pagan is a 75 year old male who is being evaluated via a billable telephone visit.      The patient has been notified of following:     \"This telephone visit will be conducted via a call between you and your physician/provider. We have found that certain health care needs can be provided without the need for a physical exam.  This service lets us provide the care you need with a short phone conversation.  If a prescription is necessary we can send it directly to your pharmacy.  If lab work is needed we can place an order for that and you can then stop by our lab to have the test done at a later time.    Telephone visits are billed at different rates depending on your insurance coverage. During this emergency period, for some insurers they may be billed the same as an in-person visit.  Please reach out to your insurance provider with any questions.    If during the course of the call the physician/provider feels a telephone visit is not appropriate, you will not be charged for this service.\"    Patient has given verbal consent for Telephone visit?  Yes    How would you like to obtain your AVS? Mail a copy    Subjective     Gene Pagan is a 75 year old male who presents to clinic today for the following health issues:    HPI    Hospital Follow-up Visit:    Hospital/Nursing Home/IP Rehab Facility: [facility]  Date of Admission: 04/10/2020  Date of Discharge: 04/17/2020  Reason(s) for Admission: atrial fibrillation, CHF      Was your hospitalization related to COVID-19? No   Problems taking medications regularly:  None  Medication changes since discharge: ***  apixaban ANTICOAGULANT (ELIQUIS) 5 MG tablet Take 1 tablet (5 mg) by mouth 2 times daily  Qty: 60 tablet, Refills: 0     Comments: Future refills by PCP Dr. Martínez Duarte with phone number [phone].  Associated Diagnoses: Atrial fibrillation with rapid ventricular response (H)       bumetanide (BUMEX) 1 MG tablet Take 1 " "tablet (1 mg) by mouth daily  Qty: 30 tablet, Refills: 0     Comments: Future refills by PCP Dr. Martínez Duarte with phone number [phone].  Associated Diagnoses: Combined systolic and diastolic congestive heart failure, unspecified HF chronicity (H)       cefdinir (OMNICEF) 300 MG capsule Take 1 capsule (300 mg) by mouth daily for 3 days  Qty: 3 capsule, Refills: 0     Associated Diagnoses: Pyelonephritis       insulin glargine (LANTUS SOLOSTAR) 100 UNIT/ML pen Inject 9 Units Subcutaneous At Bedtime Future refills by PCP Dr. Martínez Duarte with phone number [phone].  Qty: 15 mL, Refills: 0     Associated Diagnoses: Type 2 diabetes mellitus with other specified complication, unspecified whether long term insulin use (H)       insulin pen needle (31G X 8 MM) 31G X 8 MM miscellaneous 1 Box of 100 insulin pen needles to be dispensed with every insulin pen prescription  Qty: 100 each, Refills: 0     Comments: Future refills by PCP Dr. Martínez Duarte with phone number [phone].  Associated Diagnoses: Type 2 diabetes mellitus with other specified complication, unspecified whether long term insulin use (H)       Problems adhering to non-medication therapy:  None    Summary of hospitalization:  {HOSPITAL DISCHARGE SUMMARY INFO:271805::\"[facility] hospital discharge summary reviewed\"}  Diagnostic Tests/Treatments reviewed.  Follow up needed: {NONE DEFAULTED:920370::\"none\"}  Other Healthcare Providers Involved in Patient s Care:         {those currently involved after discharge:158304::\"None\"}  Update since discharge: {IMPROVED DEFAULT:800340::\"improved.\"} {TIP  Include information from family/caregivers, SNF, Care Coordination :882429}      Post Discharge Medication Reconciliation: {ACO Med Rec (Provider):699567}.  Plan of care communicated with {Communicate Plan to:794987::\"patient\"}     {Reference  Coding guidelines- Moderate Complexity F2F/Video within 7 - 14 days of discharge 53119, High " "Complexity F2F/Video within 7 days 01742 or ueragr06 days 42002 :295683}         {PEDS Chronic and Acute Problems (Optional):436680}     {additonal problems for provider to add (Optional):628822}    {HIST REVIEW/ LINKS 2 (Optional):774886}    Reviewed and updated as needed this visit by Provider         Review of Systems   {ROS COMP (Optional):861794}       Objective   Reported vitals:  There were no vitals taken for this visit.   {GENERAL APPEARANCE:50::\"healthy\",\"alert\",\"no distress\"}  PSYCH: Alert and oriented times 3; coherent speech, normal   rate and volume, able to articulate logical thoughts, able   to abstract reason, no tangential thoughts, no hallucinations   or delusions  His affect is { :2225962::\"normal\"}  RESP: No cough, no audible wheezing, able to talk in full sentences  Remainder of exam unable to be completed due to telephone visits    {Diagnostic Test Results (Optional):281003::\"Diagnostic Test Results:\",\"Labs reviewed in Epic\"}        Assessment/Plan:  {Diagnosis, Associated Orders and Comment:984007}    No follow-ups on file.      Phone call duration:  *** minutes    {signature options:072187}        "

## 2020-04-28 NOTE — PROGRESS NOTES
"Gene Pagan is a 75 year old male who is being evaluated via a billable telephone visit.      The patient has been notified of following:     \"This telephone visit will be conducted via a call between you and your physician/provider. We have found that certain health care needs can be provided without the need for a physical exam.  This service lets us provide the care you need with a short phone conversation.  If a prescription is necessary we can send it directly to your pharmacy.  If lab work is needed we can place an order for that and you can then stop by our lab to have the test done at a later time.    Telephone visits are billed at different rates depending on your insurance coverage. During this emergency period, for some insurers they may be billed the same as an in-person visit.  Please reach out to your insurance provider with any questions.    If during the course of the call the physician/provider feels a telephone visit is not appropriate, you will not be charged for this service.\"    Patient has given verbal consent for Telephone visit?  Yes    How would you like to obtain your AVS? Mail a copy    Subjective     Gene Pagan is a 75 year old male who presents to clinic today for the following health issues:    HPI  Hypertension Follow-up      Do you check your blood pressure regularly outside of the clinic? { :309647}     Are you following a low salt diet? { :994625}    Are your blood pressures ever more than 140 on the top number (systolic) OR more   than 90 on the bottom number (diastolic), for example 140/90? { :993135}      How many servings of fruits and vegetables do you eat daily?  { :796573}    On average, how many sweetened beverages do you drink each day (Examples: soda, juice, sweet tea, etc.  Do NOT count diet or artificially sweetened beverages)?   { 1-11:580850}    How many days per week do you exercise enough to make your heart beat faster? { :167833}    How many minutes a day " "do you exercise enough to make your heart beat faster? { :814014}    How many days per week do you miss taking your medication? {0-7 :825533}    {PEDS Chronic and Acute Problems (Optional):748723}     {additonal problems for provider to add (Optional):163908}    {HIST REVIEW/ LINKS 2 (Optional):229910}    Reviewed and updated as needed this visit by Provider         Review of Systems   {ROS COMP (Optional):829835}       Objective   Reported vitals:  There were no vitals taken for this visit.   {GENERAL APPEARANCE:50::\"healthy\",\"alert\",\"no distress\"}  PSYCH: Alert and oriented times 3; coherent speech, normal   rate and volume, able to articulate logical thoughts, able   to abstract reason, no tangential thoughts, no hallucinations   or delusions  His affect is { :8352427::\"normal\"}  RESP: No cough, no audible wheezing, able to talk in full sentences  Remainder of exam unable to be completed due to telephone visits    {Diagnostic Test Results (Optional):933073::\"Diagnostic Test Results:\",\"Labs reviewed in Epic\"}        Assessment/Plan:  {Diagnosis, Associated Orders and Comment:743997}    No follow-ups on file.      Phone call duration:  *** minutes    {signature options:710301}        "

## 2020-04-29 ENCOUNTER — OFFICE VISIT (OUTPATIENT)
Dept: UROLOGY | Facility: CLINIC | Age: 75
End: 2020-04-29
Payer: COMMERCIAL

## 2020-04-29 VITALS
SYSTOLIC BLOOD PRESSURE: 102 MMHG | WEIGHT: 186 LBS | HEART RATE: 80 BPM | OXYGEN SATURATION: 98 % | DIASTOLIC BLOOD PRESSURE: 58 MMHG | HEIGHT: 69 IN | BODY MASS INDEX: 27.55 KG/M2

## 2020-04-29 DIAGNOSIS — R31.0 GROSS HEMATURIA: ICD-10-CM

## 2020-04-29 DIAGNOSIS — I48.91 ATRIAL FIBRILLATION WITH RAPID VENTRICULAR RESPONSE (H): ICD-10-CM

## 2020-04-29 DIAGNOSIS — R33.9 URINARY RETENTION: Primary | ICD-10-CM

## 2020-04-29 DIAGNOSIS — N13.8 BPH WITH OBSTRUCTION/LOWER URINARY TRACT SYMPTOMS: ICD-10-CM

## 2020-04-29 DIAGNOSIS — Z79.2 PROPHYLACTIC ANTIBIOTIC: ICD-10-CM

## 2020-04-29 DIAGNOSIS — N40.1 BPH WITH OBSTRUCTION/LOWER URINARY TRACT SYMPTOMS: ICD-10-CM

## 2020-04-29 PROCEDURE — 99205 OFFICE O/P NEW HI 60 MIN: CPT | Mod: 25 | Performed by: UROLOGY

## 2020-04-29 PROCEDURE — 51702 INSERT TEMP BLADDER CATH: CPT | Performed by: UROLOGY

## 2020-04-29 RX ORDER — CIPROFLOXACIN 500 MG/1
500 TABLET, FILM COATED ORAL ONCE
Qty: 2 TABLET | Refills: 0 | Status: SHIPPED | OUTPATIENT
Start: 2020-04-29 | End: 2020-05-06

## 2020-04-29 ASSESSMENT — MIFFLIN-ST. JEOR: SCORE: 1551.13

## 2020-04-29 ASSESSMENT — PAIN SCALES - GENERAL: PAINLEVEL: NO PAIN (0)

## 2020-04-29 NOTE — PROGRESS NOTES
[facility]   Name: Gene Pagan    MRN: [id number]   YOB: 1945       We were asked to see Gene Pagan at the request of Dr. Lipscomb for evaluation and treatment of acute urinary retention.        Chief Complaint:   Acute urinary retention     History is obtained from the patient and the medical record            History of Present Illness:   Gene Pagan is a 75 year old male who is being seen for evaluation of acute urinary retention.  He is being seen today for hospital follow-up for urologic issues of gross hematuria and urinary retention.    He was seen in the hospital recently after being admitted in the setting of shortness of breath, atrial fibrillation with RVR and acute kidney injury with work-up suggestive of recurrent cardiogenic shock.  He subsequently had a pacemaker placed on April 10.  His hematuria resolved while in the hospital however he continued to have urinary retention and failed a trial of void prior to discharge.    He reports having worsening symptoms of LUTS for the past several months to year prior to hospitalization and had been started on tamsulosin 0.4 mg daily about 1 month prior to hospitalization.  He also notes that he had a self-limited episode of gross hematuria in December.  No prior instances of gross hematuria and he denies any smoking history.             Past Medical History:     Past Medical History:   Diagnosis Date     BPH (benign prostatic hyperplasia)      Diabetes (H)      Palpitations             Past Surgical History:     Past Surgical History:   Procedure Laterality Date     ANESTHESIA CARDIOVERSION N/A 4/6/2020    Procedure: ANESTHESIA, FOR CARDIOVERSION;  Surgeon: GENERIC ANESTHESIA PROVIDER;  Location: [facility] OR     EP PACEMAKER N/A 4/10/2020    Procedure: EP Pacemaker;  Surgeon: Abhay Willams MD;  Location:  [facility]ATH LAB            Social History:     Social History     Tobacco Use     Smoking  status: Never Smoker     Smokeless tobacco: Never Used   Substance Use Topics     Alcohol use: Yes     Comment: Rare       History   Smoking Status     Never Smoker   Smokeless Tobacco     Never Used            Family History:     Family History   Problem Relation Age of Onset     Diabetes Sister               Allergies:   No Known Allergies         Medications:     Current Outpatient Medications   Medication Sig     apixaban ANTICOAGULANT (ELIQUIS) 5 MG tablet Take 1 tablet (5 mg) by mouth 2 times daily     bumetanide (BUMEX) 1 MG tablet Take 1 tablet (1 mg) by mouth daily     insulin glargine (LANTUS SOLOSTAR) 100 UNIT/ML pen Inject 9 Units Subcutaneous At Bedtime Future refills by PCP Dr. Martínez Duarte with phone number [phone].     insulin pen needle (31G X 8 MM) 31G X 8 MM miscellaneous 1 Box of 100 insulin pen needles to be dispensed with every insulin pen prescription     metoprolol succinate ER (TOPROL-XL) 25 MG 24 hr tablet Take 0.5 tablets (12.5 mg) by mouth daily     tamsulosin (FLOMAX) 0.4 MG capsule Take 0.4 mg by mouth     No current facility-administered medications for this visit.      Facility-Administered Medications Ordered in Other Visits   Medication     HYDROcodone-acetaminophen (NORCO) 5-325 MG per tablet 1-2 tablet     ondansetron (ZOFRAN-ODT) ODT tab 4 mg    Or     ondansetron (ZOFRAN) injection 4 mg     Patient is already receiving anticoagulation with heparin, enoxaparin (LOVENOX), warfarin (COUMADIN)  or other anticoagulant medication     polyethylene glycol (MIRALAX) Packet 17 g             Review of Systems:     Skin: negative  Eyes: negative  Ears/Nose/Throat: negative  Respiratory: No shortness of breath, dyspnea on exertion, cough, or hemoptysis  Cardiovascular: as above  Gastrointestinal: negative  Genitourinary: as above  Musculoskeletal: negative  Neurologic: negative  Psychiatric: negative  Hematologic/Lymphatic/Immunologic: negative  Endocrine: as above           "Physical Exam:     Patient Vitals for the past 24 hrs:   BP Pulse SpO2 Height Weight   04/29/20 1308 102/58 80 98 % 1.74 m (5' 8.5\") 84.4 kg (186 lb)     Body mass index is 27.87 kg/m .     General: age-appropriate appearing male in NAD  HEENT: Head AT/NC, EOMI, CN Grossly intact  Lungs: no respiratory distress, or pursed lip breathing  Heart: No obvious jugular venous distension present  Back: no bony midline tenderness, no CVAT bilaterally.  Abdomen: soft, non-distended, non-tender. No organomegaly  Lymph: no palpable inguinal lymphadenopathy.  LE: no edema.   Musculoskeltal: extremities normal, no peripheral edema  Skin: no suspicious lesions or rashes  Neuro: Alert, oriented, speech and mentation normal;  moving all 4 extremities equally.  Psych: affect and mood normal          Data:   All laboratory data reviewed:    UA RESULTS:  Recent Labs   Lab Test 04/11/20  1735   COLOR Yellow   APPEARANCE Slightly Cloudy   URINEGLC Negative   URINEBILI Negative   URINEKETONE Negative   SG 1.018   UBLD Moderate*   URINEPH 5.0   PROTEIN 30*   NITRITE Negative   LEUKEST Large*   RBCU 133*   WBCU 39*     Creatinine   Date Value Ref Range Status   04/21/2020 2.89 (H) 0.66 - 1.25 mg/dL Final   04/17/2020 3.39 (H) 0.66 - 1.25 mg/dL Final   04/16/2020 3.28 (H) 0.66 - 1.25 mg/dL Final   04/15/2020 3.26 (H) 0.66 - 1.25 mg/dL Final   04/14/2020 3.56 (H) 0.66 - 1.25 mg/dL Final       IMAGING:  All pertinent imaging reviewed:    All imaging studies reviewed by me.  I personally reviewed these imaging films.  A formal report from radiology will follow.    IMPRESSION:  1. Irregularity of the gallbladder and stranding around the  gallbladder could represent acute cholecystitis. Further evaluation  with gallbladder ultrasound is recommended as clinically indicated.  2. Stranding around bilateral kidneys and bilateral renal collecting  system prominence including ureters and extrarenal pelvis could be  secondary to chronic bladder outlet " obstruction. Pyelonephritis is not  excluded on this study. No evidence for urinary system calculus is  seen.  3. Enlarged prostate gland.  4. Small mammography fluid in pelvis.  5. Moderate right and small left pleural fluid collections and  associated compressive atelectasis in the bilateral posterior lungs.  Groundglass densities in the lungs could represent vascular congestion  and the heart is enlarged indicating probable congestive heart  failure.  6. Nodule left lung measuring up to 1 cm diameter could represent an  inflammatory or infectious process. This could also represent an  neoplastic nodule.   Recommendations for an incidental lung nodule > 8mm:    Low risk patients: Consider follow-up CT in 3 months, PET/CT, and/or  tissue sampling.         Impression and Plan:   Impression:   75-year-old man with recent prolonged hospitalization due to respiratory failure, cardiogenic shock, acute on chronic congestive heart failure with atrial fibrillation and RVR status post cardiac ablation and pacemaker placement, acute on chronic kidney disease, gross hematuria, urinary retention, diabetes type 2, and a lung nodule      Plan:   Urinary retention  -Trial of void was attempted in the office today, unfortunately he was unable to void following catheter removal  -We discussed short-term options of Mckenzie catheter replacement versus teaching of intermittent self-catheterization  -He was not interested in intermittent self-catheterization at this time and a Mckenzie catheter was replaced  -We will plan to see him back in 2 weeks for repeat trial of void and cystoscopy    Gross hematuria  -He did have a prior unrelated episode of spontaneous gross hematuria in December as well as intermittent gross hematuria during his hospitalization.  -We discussed the need for hematuria work-up which includes cystoscopy, urine cytology, and upper tract imaging.  -He had a CT chest abdomen pelvis while in the hospital on March 28, 2020  this was reviewed.  This was without contrast given his DEONDRE at the time.  There are no obvious kidney lesions, however he does have left-sided hydronephrosis.  His renal function has improved somewhat, though he does have baseline chronic kidney disease over the stage III  -Pending the findings on his cystoscopy, and stabilization of his serum creatinine, we discussed the possible need for repeat upper tract imaging versus retrograde pyelogram and possible diagnostic ureteroscopy.  If he fails a trial of void at his next visit and plan for a bladder outlet procedure as needed, this evaluation could take place at that same time     Thank you for the kind consultation.    Time spent: 60 minutes of which >50% was spent counseling.    Cheikh Perez MD   Urology  [facility]  [facility] Phone: [phone]  [facility] Phone: [phone]

## 2020-04-29 NOTE — LETTER
4/29/2020       RE: Gene Pagan  [address] [facility] [id number]     Dear Colleague,    Thank you for referring your patient, Gene Pagan, to the [facility] UROLOGY CLINIC [place] at [facility]. Please see a copy of my visit note below.    Urology Consult History and Physical  [facility]   Name: Gene Pagan    MRN: [id number]   YOB: 1945       We were asked to see Gene Pagan at the request of Dr. Lipscomb for evaluation and treatment of acute urinary retention.        Chief Complaint:   Acute urinary retention     History is obtained from the patient and the medical record            History of Present Illness:   Gene Pagan is a 75 year old male who is being seen for evaluation of acute urinary retention.  He is being seen today for hospital follow-up for urologic issues of gross hematuria and urinary retention.    He was seen in the hospital recently after being admitted in the setting of shortness of breath, atrial fibrillation with RVR and acute kidney injury with work-up suggestive of recurrent cardiogenic shock.  He subsequently had a pacemaker placed on April 10.  His hematuria resolved while in the hospital however he continued to have urinary retention and failed a trial of void prior to discharge.    He reports having worsening symptoms of LUTS for the past several months to year prior to hospitalization and had been started on tamsulosin 0.4 mg daily about 1 month prior to hospitalization.  He also notes that he had a self-limited episode of gross hematuria in December.  No prior instances of gross hematuria and he denies any smoking history.             Past Medical History:     Past Medical History:   Diagnosis Date     BPH (benign prostatic hyperplasia)      Diabetes (H)      Palpitations             Past Surgical History:     Past Surgical History:   Procedure Laterality Date     ANESTHESIA  CARDIOVERSION N/A 4/6/2020    Procedure: ANESTHESIA, FOR CARDIOVERSION;  Surgeon: GENERIC ANESTHESIA PROVIDER;  Location: [facility] OR     EP PACEMAKER N/A 4/10/2020    Procedure: EP Pacemaker;  Surgeon: Abhay Willams MD;  Location:  HEART CARDIAC CATH LAB            Social History:     Social History     Tobacco Use     Smoking status: Never Smoker     Smokeless tobacco: Never Used   Substance Use Topics     Alcohol use: Yes     Comment: Rare       History   Smoking Status     Never Smoker   Smokeless Tobacco     Never Used            Family History:     Family History   Problem Relation Age of Onset     Diabetes Sister               Allergies:   No Known Allergies         Medications:     Current Outpatient Medications   Medication Sig     apixaban ANTICOAGULANT (ELIQUIS) 5 MG tablet Take 1 tablet (5 mg) by mouth 2 times daily     bumetanide (BUMEX) 1 MG tablet Take 1 tablet (1 mg) by mouth daily     insulin glargine (LANTUS SOLOSTAR) 100 UNIT/ML pen Inject 9 Units Subcutaneous At Bedtime Future refills by PCP Dr. Martínez Duarte with phone number [phone].     insulin pen needle (31G X 8 MM) 31G X 8 MM miscellaneous 1 Box of 100 insulin pen needles to be dispensed with every insulin pen prescription     metoprolol succinate ER (TOPROL-XL) 25 MG 24 hr tablet Take 0.5 tablets (12.5 mg) by mouth daily     tamsulosin (FLOMAX) 0.4 MG capsule Take 0.4 mg by mouth     No current facility-administered medications for this visit.      Facility-Administered Medications Ordered in Other Visits   Medication     HYDROcodone-acetaminophen (NORCO) 5-325 MG per tablet 1-2 tablet     ondansetron (ZOFRAN-ODT) ODT tab 4 mg    Or     ondansetron (ZOFRAN) injection 4 mg     Patient is already receiving anticoagulation with heparin, enoxaparin (LOVENOX), warfarin (COUMADIN)  or other anticoagulant medication     polyethylene glycol (MIRALAX) Packet 17 g             Review of Systems:     Skin: negative  Eyes:  "negative  Ears/Nose/Throat: negative  Respiratory: No shortness of breath, dyspnea on exertion, cough, or hemoptysis  Cardiovascular: as above  Gastrointestinal: negative  Genitourinary: as above  Musculoskeletal: negative  Neurologic: negative  Psychiatric: negative  Hematologic/Lymphatic/Immunologic: negative  Endocrine: as above          Physical Exam:     Patient Vitals for the past 24 hrs:   BP Pulse SpO2 Height Weight   04/29/20 1308 102/58 80 98 % 1.74 m (5' 8.5\") 84.4 kg (186 lb)     Body mass index is 27.87 kg/m .     General: age-appropriate appearing male in NAD  HEENT: Head AT/NC, EOMI, CN Grossly intact  Lungs: no respiratory distress, or pursed lip breathing  Heart: No obvious jugular venous distension present  Back: no bony midline tenderness, no CVAT bilaterally.  Abdomen: soft, non-distended, non-tender. No organomegaly  Lymph: no palpable inguinal lymphadenopathy.  LE: no edema.   Musculoskeltal: extremities normal, no peripheral edema  Skin: no suspicious lesions or rashes  Neuro: Alert, oriented, speech and mentation normal;  moving all 4 extremities equally.  Psych: affect and mood normal          Data:   All laboratory data reviewed:    UA RESULTS:  Recent Labs   Lab Test 04/11/20  1735   COLOR Yellow   APPEARANCE Slightly Cloudy   URINEGLC Negative   URINEBILI Negative   URINEKETONE Negative   SG 1.018   UBLD Moderate*   URINEPH 5.0   PROTEIN 30*   NITRITE Negative   LEUKEST Large*   RBCU 133*   WBCU 39*     Creatinine   Date Value Ref Range Status   04/21/2020 2.89 (H) 0.66 - 1.25 mg/dL Final   04/17/2020 3.39 (H) 0.66 - 1.25 mg/dL Final   04/16/2020 3.28 (H) 0.66 - 1.25 mg/dL Final   04/15/2020 3.26 (H) 0.66 - 1.25 mg/dL Final   04/14/2020 3.56 (H) 0.66 - 1.25 mg/dL Final       IMAGING:  All pertinent imaging reviewed:    All imaging studies reviewed by me.  I personally reviewed these imaging films.  A formal report from radiology will follow.    IMPRESSION:  1. Irregularity of the " gallbladder and stranding around the  gallbladder could represent acute cholecystitis. Further evaluation  with gallbladder ultrasound is recommended as clinically indicated.  2. Stranding around bilateral kidneys and bilateral renal collecting  system prominence including ureters and extrarenal pelvis could be  secondary to chronic bladder outlet obstruction. Pyelonephritis is not  excluded on this study. No evidence for urinary system calculus is  seen.  3. Enlarged prostate gland.  4. Small mammography fluid in pelvis.  5. Moderate right and small left pleural fluid collections and  associated compressive atelectasis in the bilateral posterior lungs.  Groundglass densities in the lungs could represent vascular congestion  and the heart is enlarged indicating probable congestive heart  failure.  6. Nodule left lung measuring up to 1 cm diameter could represent an  inflammatory or infectious process. This could also represent an  neoplastic nodule.   Recommendations for an incidental lung nodule > 8mm:    Low risk patients: Consider follow-up CT in 3 months, PET/CT, and/or  tissue sampling.         Impression and Plan:   Impression:   75-year-old man with recent prolonged hospitalization due to respiratory failure, cardiogenic shock, acute on chronic congestive heart failure with atrial fibrillation and RVR status post cardiac ablation and pacemaker placement, acute on chronic kidney disease, gross hematuria, urinary retention, diabetes type 2, and a lung nodule      Plan:   Urinary retention  -Trial of void was attempted in the office today, unfortunately he was unable to void following catheter removal  -We discussed short-term options of Mckenzie catheter replacement versus teaching of intermittent self-catheterization  -He was not interested in intermittent self-catheterization at this time and a Mckenzie catheter was replaced  -We will plan to see him back in 2 weeks for repeat trial of void and cystoscopy    Gross  hematuria  -He did have a prior unrelated episode of spontaneous gross hematuria in December as well as intermittent gross hematuria during his hospitalization.  -We discussed the need for hematuria work-up which includes cystoscopy, urine cytology, and upper tract imaging.  -He had a CT chest abdomen pelvis while in the hospital on March 28, 2020 this was reviewed.  This was without contrast given his DEONDRE at the time.  There are no obvious kidney lesions, however he does have left-sided hydronephrosis.  His renal function has improved somewhat, though he does have baseline chronic kidney disease over the stage III  -Pending the findings on his cystoscopy, and stabilization of his serum creatinine, we discussed the possible need for repeat upper tract imaging versus retrograde pyelogram and possible diagnostic ureteroscopy.  If he fails a trial of void at his next visit and plan for a bladder outlet procedure as needed, this evaluation could take place at that same time     Thank you for the kind consultation.    Time spent: 60 minutes of which >50% was spent counseling.    Cheikh Perez MD   Urology  [facility]  [facility] Phone: [phone]  [facility] Phone: [phone]         Again, thank you for allowing me to participate in the care of your patient.      Sincerely,    Cheikh Perez MD

## 2020-04-29 NOTE — LETTER
Date:May 4, 2020      Patient was self referred, no letter generated. Do not send.        [facility] Health Information

## 2020-04-29 NOTE — Clinical Note
Noel Lipscomb,    I saw Gene on Wednesday for hospital follow-up and outpatient trial of void.  Unfortunately he failed his trial of void and Mckenzie catheter was replaced.  I will see him back in 2 weeks for repeat trial of void and cystoscopy.  He also has a history of gross hematuria which will require hematuria work-up so I will fully evaluate his bladder during the cystoscopy.  If he is unable to void again in 2 weeks, and has evidence of bladder outlet obstruction which would require a bladder outlet procedure, I will discuss that with him at that time and keep you updated with any treatment plan.    Thanks,   Cheikh Perez M.D.  Cell: [phone]

## 2020-04-29 NOTE — NURSING NOTE
Chief Complaint   Patient presents with     Urinary Retention     The patient came in today for Uroflow         Catheter removal documentation on 4/29/2020:    Gene Pagan presents to the clinic for catheter removal.  Reason for removal: Uroflow  Order has been verified. YES  Catheter successfully removed at 1:00 PM without immediate complication.  100 cc's of urine present in the catheter bag.  Urethral meatus is free of secretions and encrustation.  The patient is afebrile.  The patient tolerated the procedure and was instructed to return or call for pain, fever, leakage or decreased urine flow, watch for signs of infection and have another uroflow      Catheter insertion documentation on 4/29/2020:    Gene Pagan presents to the clinic for catheter insertion.  Reason for insertion: urinary retention  Order has been verified. YES  Catheter successfully inserted into the urethral meatus in the usual sterile fashion without immediate complication.  Type of catheter placed: 16 Tajik Coude catheter  Urine is yellow in color.  250 cc's of urine output returned.  Balloon was filled with 8cc's of normal saline.  Securement device placed for the catheter.  The patient tolerated the procedure and was instructed to monitor for catheter dysfunction, monitor for pain or discomfort, return or call for pain, fever, leakage or decreased urine flow, watch for signs of infection and FOLLOW UP CYSTOSCOPY AND UROFLOW IN TWO WEEK WITH DR MARIANA Marie KWADWO

## 2020-04-30 NOTE — PROGRESS NOTES
"[facility]  Checked weight in chart from Urology visit yesterday 4/29/20. Wt says \"reported as 186#\". Called pt, he states they did not weigh him in clinic. Wt has continued to slowly trend down. Wt was not 186#, see weights below. Pt reports legs are wrapped from lymphedema therapist and he is slowly noticing swelling is going down in legs. He continues to feel well. He also notes he is on a 1200mL fluid restriction per Nephrology since hospitalization, so he thinks that definitely is playing a part in weight loss. Pt has home care, already have plans for BMP, BNP, and TSH via home care next week prior to 5/6 telephone visit. Messaged update to ALPHONSO Kumar.     4/30 174  4/29 175  4/28 176  4/27     177  4/26     178  4/25     178    4/24     185  4/23     187  4/22     188    Future Appointments   Date Time Provider Department Center   5/6/2020  1:10 PM Marsha Noonan PA-C [facility] P PSA CLIN   5/20/2020  2:30 PM Cheikh Perez MD [facility]  [facility]   6/17/2020  1:00 PM RU DCR2 [facility] PSA CLIN     BOGDAN Gutierrez, RN, CHFN  04/30/20 at 8:56 AM   "

## 2020-05-01 ENCOUNTER — DOCUMENTATION ONLY (OUTPATIENT)
Dept: CARDIOLOGY | Facility: CLINIC | Age: 75
End: 2020-05-01

## 2020-05-01 PROBLEM — R31.0 GROSS HEMATURIA: Status: ACTIVE | Noted: 2020-05-01

## 2020-05-01 PROBLEM — R33.9 URINARY RETENTION: Status: ACTIVE | Noted: 2020-05-01

## 2020-05-01 NOTE — PROGRESS NOTES
Pt sent remote transmission due to feeling 'thumping' on his left side when he was sitting this am. It only occurred a few times this am. He wants to make sure it is not harmful. Informed him it does not effect the pacing but can be uncomfortable to him. He was offered an office ICD check to reprogram ICD to eliminate diaphragmatic stim. He wants to wait and see if it gets worse as he can tolerate it if it just once in awhile. He will call back if it worsens.

## 2020-05-05 ENCOUNTER — TELEPHONE (OUTPATIENT)
Dept: INTERNAL MEDICINE | Facility: CLINIC | Age: 75
End: 2020-05-05

## 2020-05-05 LAB
ANION GAP SERPL CALCULATED.3IONS-SCNC: 4 MMOL/L (ref 3–14)
BUN SERPL-MCNC: 38 MG/DL (ref 7–30)
CALCIUM SERPL-MCNC: 10.3 MG/DL (ref 8.5–10.1)
CHLORIDE SERPL-SCNC: 97 MMOL/L (ref 94–109)
CO2 SERPL-SCNC: 31 MMOL/L (ref 20–32)
CREAT SERPL-MCNC: 2.75 MG/DL (ref 0.66–1.25)
GFR SERPL CREATININE-BSD FRML MDRD: 21 ML/MIN/{1.73_M2}
GLUCOSE SERPL-MCNC: 229 MG/DL (ref 70–99)
NT-PROBNP SERPL-MCNC: 8025 PG/ML (ref 0–450)
POTASSIUM SERPL-SCNC: 3.7 MMOL/L (ref 3.4–5.3)
SODIUM SERPL-SCNC: 132 MMOL/L (ref 133–144)
T4 FREE SERPL-MCNC: 1.32 NG/DL (ref 0.76–1.46)
TSH SERPL DL<=0.005 MIU/L-ACNC: 1.96 MU/L (ref 0.4–4)

## 2020-05-05 PROCEDURE — 80048 BASIC METABOLIC PNL TOTAL CA: CPT | Performed by: INTERNAL MEDICINE

## 2020-05-05 PROCEDURE — 84443 ASSAY THYROID STIM HORMONE: CPT | Performed by: INTERNAL MEDICINE

## 2020-05-05 PROCEDURE — 84439 ASSAY OF FREE THYROXINE: CPT | Performed by: INTERNAL MEDICINE

## 2020-05-05 PROCEDURE — 83880 ASSAY OF NATRIURETIC PEPTIDE: CPT | Performed by: INTERNAL MEDICINE

## 2020-05-05 NOTE — TELEPHONE ENCOUNTER
[employer]  Discharge Appllication/Permanent Disability  Dr Lipscomb's yellow folder  email to address on form, mail original to pt

## 2020-05-06 ENCOUNTER — VIRTUAL VISIT (OUTPATIENT)
Dept: CARDIOLOGY | Facility: CLINIC | Age: 75
End: 2020-05-06
Attending: PHYSICIAN ASSISTANT
Payer: COMMERCIAL

## 2020-05-06 ENCOUNTER — TELEPHONE (OUTPATIENT)
Dept: INTERNAL MEDICINE | Facility: CLINIC | Age: 75
End: 2020-05-06

## 2020-05-06 ENCOUNTER — HOSPITAL ENCOUNTER (EMERGENCY)
Facility: CLINIC | Age: 75
Discharge: HOME | DRG: 194 | End: 2020-05-06
Attending: EMERGENCY MEDICINE | Admitting: EMERGENCY MEDICINE
Payer: COMMERCIAL

## 2020-05-06 ENCOUNTER — CARE COORDINATION (OUTPATIENT)
Dept: CARDIOLOGY | Facility: CLINIC | Age: 75
End: 2020-05-06

## 2020-05-06 ENCOUNTER — APPOINTMENT (OUTPATIENT)
Dept: GENERAL RADIOLOGY | Facility: CLINIC | Age: 75
DRG: 194 | End: 2020-05-06
Attending: EMERGENCY MEDICINE
Payer: COMMERCIAL

## 2020-05-06 VITALS
HEART RATE: 80 BPM | SYSTOLIC BLOOD PRESSURE: 110 MMHG | HEIGHT: 69 IN | WEIGHT: 174 LBS | DIASTOLIC BLOOD PRESSURE: 63 MMHG | BODY MASS INDEX: 25.77 KG/M2

## 2020-05-06 VITALS
HEIGHT: 69 IN | RESPIRATION RATE: 14 BRPM | HEART RATE: 80 BPM | SYSTOLIC BLOOD PRESSURE: 106 MMHG | TEMPERATURE: 98.3 F | BODY MASS INDEX: 25.92 KG/M2 | DIASTOLIC BLOOD PRESSURE: 59 MMHG | OXYGEN SATURATION: 91 % | WEIGHT: 175 LBS

## 2020-05-06 DIAGNOSIS — J20.8 ACUTE VIRAL BRONCHITIS: ICD-10-CM

## 2020-05-06 DIAGNOSIS — N18.9 CHRONIC RENAL IMPAIRMENT, UNSPECIFIED CKD STAGE: ICD-10-CM

## 2020-05-06 DIAGNOSIS — I50.32 CHRONIC DIASTOLIC CONGESTIVE HEART FAILURE (H): ICD-10-CM

## 2020-05-06 DIAGNOSIS — I50.23 ACUTE ON CHRONIC SYSTOLIC HEART FAILURE (H): ICD-10-CM

## 2020-05-06 LAB
ALBUMIN SERPL-MCNC: 2.7 G/DL (ref 3.4–5)
ALP SERPL-CCNC: 92 U/L (ref 40–150)
ALT SERPL W P-5'-P-CCNC: 14 U/L (ref 0–70)
ANION GAP SERPL CALCULATED.3IONS-SCNC: 5 MMOL/L (ref 3–14)
AST SERPL W P-5'-P-CCNC: 16 U/L (ref 0–45)
BASE EXCESS BLDV CALC-SCNC: 8.3 MMOL/L
BASOPHILS # BLD AUTO: 0.1 10E9/L (ref 0–0.2)
BASOPHILS NFR BLD AUTO: 0.5 %
BILIRUB SERPL-MCNC: 0.7 MG/DL (ref 0.2–1.3)
BUN SERPL-MCNC: 38 MG/DL (ref 7–30)
CALCIUM SERPL-MCNC: 10 MG/DL (ref 8.5–10.1)
CHLORIDE SERPL-SCNC: 95 MMOL/L (ref 94–109)
CO2 SERPL-SCNC: 30 MMOL/L (ref 20–32)
CREAT SERPL-MCNC: 2.71 MG/DL (ref 0.66–1.25)
DIFFERENTIAL METHOD BLD: ABNORMAL
EOSINOPHIL # BLD AUTO: 0.2 10E9/L (ref 0–0.7)
EOSINOPHIL NFR BLD AUTO: 1.5 %
ERYTHROCYTE [DISTWIDTH] IN BLOOD BY AUTOMATED COUNT: 16.3 % (ref 10–15)
GFR SERPL CREATININE-BSD FRML MDRD: 22 ML/MIN/{1.73_M2}
GLUCOSE SERPL-MCNC: 163 MG/DL (ref 70–99)
HCO3 BLDV-SCNC: 33 MMOL/L (ref 21–28)
HCT VFR BLD AUTO: 36.4 % (ref 40–53)
HGB BLD-MCNC: 10.7 G/DL (ref 13.3–17.7)
IMM GRANULOCYTES # BLD: 0.1 10E9/L (ref 0–0.4)
IMM GRANULOCYTES NFR BLD: 0.5 %
INTERPRETATION ECG - MUSE: NORMAL
LACTATE BLD-SCNC: 1 MMOL/L (ref 0.7–2)
LYMPHOCYTES # BLD AUTO: 2.1 10E9/L (ref 0.8–5.3)
LYMPHOCYTES NFR BLD AUTO: 14.3 %
MCH RBC QN AUTO: 24.5 PG (ref 26.5–33)
MCHC RBC AUTO-ENTMCNC: 29.4 G/DL (ref 31.5–36.5)
MCV RBC AUTO: 83 FL (ref 78–100)
MONOCYTES # BLD AUTO: 1.6 10E9/L (ref 0–1.3)
MONOCYTES NFR BLD AUTO: 10.6 %
NEUTROPHILS # BLD AUTO: 10.8 10E9/L (ref 1.6–8.3)
NEUTROPHILS NFR BLD AUTO: 72.6 %
NRBC # BLD AUTO: 0 10*3/UL
NRBC BLD AUTO-RTO: 0 /100
NT-PROBNP SERPL-MCNC: 8995 PG/ML (ref 0–1800)
O2/TOTAL GAS SETTING VFR VENT: ABNORMAL %
PCO2 BLDV: 47 MM HG (ref 40–50)
PH BLDV: 7.46 PH (ref 7.32–7.43)
PLATELET # BLD AUTO: 203 10E9/L (ref 150–450)
PO2 BLDV: 14 MM HG (ref 25–47)
POTASSIUM SERPL-SCNC: 3.7 MMOL/L (ref 3.4–5.3)
PROT SERPL-MCNC: 6.9 G/DL (ref 6.8–8.8)
RBC # BLD AUTO: 4.37 10E12/L (ref 4.4–5.9)
SARS-COV-2 PCR COMMENT: NORMAL
SARS-COV-2 RNA SPEC QL NAA+PROBE: NEGATIVE
SARS-COV-2 RNA SPEC QL NAA+PROBE: NORMAL
SODIUM SERPL-SCNC: 130 MMOL/L (ref 133–144)
SPECIMEN SOURCE: NORMAL
SPECIMEN SOURCE: NORMAL
TROPONIN I SERPL-MCNC: <0.015 UG/L (ref 0–0.04)
WBC # BLD AUTO: 14.9 10E9/L (ref 4–11)

## 2020-05-06 PROCEDURE — 83880 ASSAY OF NATRIURETIC PEPTIDE: CPT | Performed by: EMERGENCY MEDICINE

## 2020-05-06 PROCEDURE — 71045 X-RAY EXAM CHEST 1 VIEW: CPT

## 2020-05-06 PROCEDURE — 82803 BLOOD GASES ANY COMBINATION: CPT | Performed by: EMERGENCY MEDICINE

## 2020-05-06 PROCEDURE — 99285 EMERGENCY DEPT VISIT HI MDM: CPT | Mod: 25

## 2020-05-06 PROCEDURE — 99214 OFFICE O/P EST MOD 30 MIN: CPT | Mod: 95 | Performed by: PHYSICIAN ASSISTANT

## 2020-05-06 PROCEDURE — 84484 ASSAY OF TROPONIN QUANT: CPT | Performed by: EMERGENCY MEDICINE

## 2020-05-06 PROCEDURE — 93005 ELECTROCARDIOGRAM TRACING: CPT

## 2020-05-06 PROCEDURE — 83605 ASSAY OF LACTIC ACID: CPT | Performed by: EMERGENCY MEDICINE

## 2020-05-06 PROCEDURE — 87635 SARS-COV-2 COVID-19 AMP PRB: CPT | Performed by: EMERGENCY MEDICINE

## 2020-05-06 PROCEDURE — 85025 COMPLETE CBC W/AUTO DIFF WBC: CPT | Performed by: EMERGENCY MEDICINE

## 2020-05-06 PROCEDURE — 80053 COMPREHEN METABOLIC PANEL: CPT | Performed by: EMERGENCY MEDICINE

## 2020-05-06 RX ORDER — ACETAMINOPHEN 500 MG
1000 TABLET ORAL EVERY 8 HOURS
COMMUNITY
End: 2020-08-13

## 2020-05-06 RX ORDER — LIDOCAINE 40 MG/G
CREAM TOPICAL
Status: DISCONTINUED | OUTPATIENT
Start: 2020-05-06 | End: 2020-05-06 | Stop reason: HOSPADM

## 2020-05-06 RX ORDER — METOPROLOL SUCCINATE 25 MG/1
25 TABLET, EXTENDED RELEASE ORAL DAILY
Qty: 90 TABLET | Refills: 3 | Status: ON HOLD | OUTPATIENT
Start: 2020-05-06 | End: 2020-05-10

## 2020-05-06 ASSESSMENT — MIFFLIN-ST. JEOR
SCORE: 1509.17
SCORE: 1496.7

## 2020-05-06 NOTE — ED PROVIDER NOTES
History     Chief Complaint:  Shortness of Breath    HPI   Gene Pagan is a 75 year old, anticoagulated male with a recent hospitalization from 3/27-4/17 at which time he was found to be in atrial fibrillation with rapid ventricular response with pyelonephritis, as well as acute on chronic congestive heart failure. Patient was intubated for a cardioversion, however when that failed to convert him out of the abnormal rhythm, he underwent an AV node ablation, NESTOR procedure, and ICD pacemaker placement. Upon discharge, he was started on Eliquis and Bumex, as well as antibiotics for pyelonephritis. He reports doing well in the couple of weeks since discharge, however over the last few days he has had progressively worsening shortness of breath and myalgias. In the last day, he started noting a pain between his shoulder blades that would last for a couple minutes at a time before subsiding. This new pain with the progressive dyspnea prompted him to be seen in the ED. He does note a chronic cough, however states that it is unchanged from his baseline. He denies any fevers, vomiting, or diarrhea. He also notes that his weight has been stable at 175 pounds of late. Of note, his wife has also been ill with similar symptoms over the last week.     Allergies:  No Known Allergies     Medications:    Eliquis  Bumex  Insulin (Lantus)  Metoprolol  Flomax     Past Medical History:    Atrial fibrillation   BPH  Diastolic CHF  Chronic renal insufficiency   Type II diabetes    Past Surgical History:    Pacemaker implant  Cardioversion     Family History:    Diabetes    Social History:  Negative for tobacco use.  Positive for rare alcohol use.      Review of Systems   All other systems reviewed and are negative.      Physical Exam     Patient Vitals for the past 24 hrs:   BP Temp Temp src Heart Rate Resp SpO2 Height Weight   05/06/20 0300 106/59 -- -- 80 14 91 % -- --   05/06/20 0230 105/78 -- -- 80 10 96 % -- --   05/06/20 0200   "120/95 98.3  F (36.8  C) Oral 80 18 99 % 1.753 m (5' 9\") 79.4 kg (175 lb)     Orthostatic Vitals obtained @ 0230:   Lying Orthostatic BP: 97/68 (0231) Lying Orthostatic Pulse: 80 bpm  Sitting Orthostatic BP: 104/57 (0233) Sitting Orthostatic Pulse: 80 bpm  Standing Orthostatic BP: 106/58 (0234) Standing Orthostatic Pulse: 80 bpm     Physical Exam  Nursing note and vitals reviewed.  Constitutional: Cooperative.   HENT:   Mouth/Throat: Mucous membranes are normal.   Cardiovascular: Normal rate, regular rhythm and normal heart sounds.  No murmur.  Pulmonary/Chest: Effort normal. Faint crackles at lung bases bilaterally. No respiratory distress. No wheezes. No rales. pacemaker in place, left upper chest.   Abdominal: Soft. Normal appearance and bowel sounds are normal. No distension. There is no tenderness. There is no rigidity and no guarding.   Musculoskeletal: Slight bilateral lower extremity edema.   Neurological: Alert. Oriented x4  Skin: Skin is warm and dry. No rash noted.   Psychiatric: Normal mood and affect.     Emergency Department Course   ECG:  Indication: Shortness of Breath  Time: 0210  Vent. Rate 80 bpm. TX interval *. QRS duration 150. QT/QTc 424/489. P-R-T axis * 223 29.    Ventricular paced rhythm.   No significant change compared to prior EKG. Read time: 0215.    Imaging:  Radiographic findings were communicated with the patient who voiced understanding of the findings.  XR Chest Port 1 view:   Cardiomegaly without pulmonary edema. Left chest wall cardiac device. Minimal linear atelectasis or scarring left lower lung. No new focal air-space disease, as per radiology.     Laboratory:  CBC: WBC: 14.9 (H), HGB: 10.7 (L), PLT: 203  CMP: Glucose 163 (H), Na: 130 (L), BUN: 38 (H), Creatinine: 2.71 (H), GFR: 22 (L), Albumin: 2.7 (L), o/w WNL     0228 Lactic acid: 1.0    0214 Troponin: <0.015   BNP: 8995 (H)    Blood gas venous: pH: 7.46 (H), pO2: 14 (L), Bicarb: 33 (H), o/w WNL    COVID-19 Virus by PCR " swab: Pending     Emergency Department Course:  Nursing notes and vitals reviewed.   0205: I performed an exam of the patient as documented above.      IV was inserted and blood was drawn for laboratory testing, results above. Medicine administered as documented above.     EKG obtained in the ED, see results above.     The patient's nose was swabbed and this sample was sent for viral testing, findings pending.     Portable chest x-ray obtained in the ED, findings above.     0230: Orthostatic vitals were obtained in the ED, as documented above.     0317: I rechecked the patient and discussed the results of his workup thus far.     Findings and plan explained to the Patient. Patient discharged home with instructions regarding supportive care, medications, and reasons to return. The importance of close follow-up was reviewed.     I personally reviewed the laboratory and imaging results with the Patient and answered all related questions prior to discharge.    Impression & Plan    Covid-19  Gene Pagan was evaluated during a global COVID-19 pandemic, which necessitated consideration that the patient might be at risk for infection with the SARS-CoV-2 virus that causes COVID-19.   Applicable protocols for evaluation were followed during the patient's care.   COVID-19 was considered as part of the patient's evaluation. The plan for testing is: a test was obtained during this visit.    Medical Decision Making:  Gene Pagan is a 75 year old male who presents to the emergency department today with fever, cough, malaise, and symptoms of likely viral syndrome. Clinically, the patient is well appearing without increased work of breathing, respiratory distress, hypoxia, signs of ARDS or other serious decompensation or complication. Clinical signs and symptoms are not consistent with meningitis or sepsis. The patient does have high risk exposure for COVID-19 given recent hospitalization and comorbidities, so he was tested  for COVID-19. CXR was obtained and neg for acute findings. Lab work also obtained and was unremarkable compared to his baseline CHF and chronic renal insufficiency.     I recommended supportive care for treatment of likely underlying viral syndrome including possible COVID-19. Tylenol for fever control. Good oral fluid intake. Discussed the importance of home quarantine and CDC guidelines on self-quarantine were provided as part of discharge instructions. No indication for hospitalization at this time. Return precautions were discussed with patient. The patient's questions were answered and the patient was agreeable with discharge.    Diagnosis:    ICD-10-CM    1. Acute viral bronchitis - rule out COVID-19  J20.8    2. Chronic renal impairment, unspecified CKD stage  N18.9    3. Chronic diastolic congestive heart failure (H)  I50.32        Disposition:  discharged to home      Scribe Disclosure:  I, Marsha Rosa, am serving as a scribe on 5/6/2020 at 2:46 AM to personally document services performed by German Vo MD based on my observations and the provider's statements to me.      5/6/2020   [facility] EMERGENCY DEPARTMENT       German Vo MD  05/06/20 0403

## 2020-05-06 NOTE — PROGRESS NOTES
"Gene Pagan is a 75 year old male who is being evaluated via a billable video visit.      The patient has been notified of following:     \"This video visit will be conducted via a call between you and your physician/provider. We have found that certain health care needs can be provided without the need for an in-person physical exam.  This service lets us provide the care you need with a video conversation.  If a prescription is necessary we can send it directly to your pharmacy.  If lab work is needed we can place an order for that and you can then stop by our lab to have the test done at a later time.    Video visits are billed at different rates depending on your insurance coverage.  Please reach out to your insurance provider with any questions.    If during the course of the call the physician/provider feels a video visit is not appropriate, you will not be charged for this service.\"    Patient has given verbal consent for Video visit? Yes    How would you like to obtain your AVS? [facility]    Patient would like the video invitation sent by: Text to cell phone: [phone]    Will anyone else be joining your video visit? No       Review Of Systems  Skin: NEGATIVE  Eyes:Ears/Nose/Throat: wears glasses  Respiratory: NEGATIVE  Cardiovascular:NEGATIVE  Gastrointestinal: NEGATIVE  Genitourinary:wears catheter  Musculoskeletal: NEGATIVE  Neurologic: NEGATIVE  Psychiatric: NEGATIVE  Hematologic/Lymphatic/Immunologic: NEGATIVE  Endocrine:  diabetes  Vitals today are:  /63  Pulse 80  Weight 174.0 lb  DOXIMITY VIDEO VISIT-TEXT  [phone]  YASMEEN Brice    Gene Pagan is a 75 year old male who is being evaluated via a billable video visit.  This visit is being conducted as a virtual visit due to the emphasis on mitigation of the COVID-19 virus pandemic. The clinician has decided that the risk of an in-office visit outweighs the benefit for this patient.     The patient has been notified of " "following:   \"This video visit will be conducted via a call between you and your physician/provider. We have found that certain health care needs can be provided without the need for an in-person physical exam.  This service lets us provide the care you need with a video conversation.  If a prescription is necessary we can send it directly to your pharmacy.  If lab work is needed we can place an order for that and you can then stop by our lab to have the test done at a later time.  If during the course of the call the physician/provider feels a video visit is not appropriate, you will not be charged for this service.\"     FAITH RAMON for CORE follow-ups:  - Any difficulty breathing, including when trying to lie in bed at night? NO  - Any leg swelling (?compression or wraps), WEARS COMPRESSION SOCKS   abdominal bloating, NO reduction in appetite?  NO  - Any chest pain or palpitations? NO  - Weight trend over the past week?  HAS BEEN STABLE FROM 171-175 LBS  - O2 or CPAP use?  NO  - Do they have a pulse oximeter they can use to check O2 sat/HR?  NO  Today's Home Vitals:  /63  PULSE 80  WEIGHT  174.0 LB  Adrienne Graham KWADWO    I have reviewed and updated the patient's Past Medical History, Social History, Family History and Medication List.    PROBLEM LIST  Patient Active Problem List   Diagnosis     Atrial fibrillation with rapid ventricular response (H)     CHF (congestive heart failure) (H)     Urinary retention     Gross hematuria     BPH with obstruction/lower urinary tract symptoms       ALLERGIES  Patient has no known allergies.    MEDICATIONS  Current Outpatient Medications   Medication Sig Dispense Refill     apixaban ANTICOAGULANT (ELIQUIS) 5 MG tablet Take 1 tablet (5 mg) by mouth 2 times daily 60 tablet 0     bumetanide (BUMEX) 1 MG tablet Take 1 tablet (1 mg) by mouth daily 30 tablet 0     insulin glargine (LANTUS SOLOSTAR) 100 UNIT/ML pen Inject 9 Units Subcutaneous At Bedtime Future refills by PCP " Dr. Martínez Duarte with phone number [phone]. 15 mL 0     insulin pen needle (31G X 8 MM) 31G X 8 MM miscellaneous 1 Box of 100 insulin pen needles to be dispensed with every insulin pen prescription 100 each 0     metoprolol succinate ER (TOPROL-XL) 25 MG 24 hr tablet Take 0.5 tablets (12.5 mg) by mouth daily 15 tablet 1     tamsulosin (FLOMAX) 0.4 MG capsule Take 0.4 mg by mouth         Video Start Time: 1:10 PM    Gene Pagan presents for a CORE clinic follow up visit.     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  He was in rapid atrial fibrillation and had a new cardiomyopathy, LVEF 20-25%, with severe diastolic dysfunction, RV dilation and dysfunction, and mod-severe MR.  It proved very difficult to rate control his arrhythmia.  NESTOR-guided cardioversion was only briefly successful.  Because of recurrence of AF he was transferred to [facility] for AV node ablation with CRT pacemaker.  This was performed on Friday 4/10/2020 by Dr. Willams. Post-ablation TTE showed an EF of 25-30% (slight improvement) and MR was now only mild. His stay was complicated by newly diagnosed kidney disease / DEONDRE with a presenting creat of 2.4, felt secondary to long-standing diabetes, peaking at 5.7 in the setting of hypotension / suspected hypovolemia at a wt of 186 lbs. Diuretics were held and this had come down to 3.4 on discharge. Eliquis and bumex 1 mg daily were initiated. He was discharged on 4/17 at a weight of 196 lbs (down from 205 lbs on admit).      CORE enrollment 4/22: Gene tells me he's doing quite well. Nothing makes him dyspneic at this point. He is sleeping in a recliner currently however out of  "fear that he'll be uncomfortable lying flat in bed. He does state that he can lower the recliner so he's supine and he has no dyspnea with this. His ankles have some mild pitting edema. No abdominal bloating. Weight is 188 lbs. Today was the first time he weighed himself at home. He has an indwelling catheter currently and has a follow up with urology in two weeks. He lives with his significant other of 20 years. He is mindful of his sodium intake and it sounds like he is limiting appropriately.  Recent device check revealed 98% BiVP. Recent labs show continued improvement in renal function.   - He was on no CMP medications at that point. Toprol 12.5 was added to regimen.    Today, Gene presents for reassessment after I started Toprol two weeks ago. He was having body aches after going grocery shopping and a lot of exertion on Monday, which was making him take more shallow breaths. He went to the ED today for this reason and a COVID swab was obtained but not resulted yet. CXR was benign. EKG showed V-pace. Trop was negative. He states at this point he can draw a full breath comfortably and feels pretty much back to baseline now, after a lidocaine patch was placed on his back. Of note, his wife has been ill with URI symptoms over the last week. No fever or cough.  Other than that, he feels great.   He lost significant weight after having his legs wrapped and is down 10 lbs from our last visit. This plateued and has been stable for the past 3 days. He's wearing compression socks now and states his legs are \"back to normal.\"  Notices no change in urination (has indwelling catheter followed by urology).  He walks \"a lot\" without dyspnea. Some fatigue. Goes up/down stairs without significant dyspnea. Device incision healing very well. BP is adequate. HR paced at 80.  Labs today show ongoing slow improvement in his renal function, with mild hyponatremia. Hemoglobin is stable. proBNP is down significantly. TFT's " WNL.    Component      Latest Ref Rng & Units 4/21/2020 5/5/2020 5/6/2020   WBC      4.0 - 11.0 10e9/L   14.9 (H)   RBC Count      4.4 - 5.9 10e12/L   4.37 (L)   Hemoglobin      13.3 - 17.7 g/dL   10.7 (L)   Hematocrit      40.0 - 53.0 %   36.4 (L)   MCV      78 - 100 fl   83   MCH      26.5 - 33.0 pg   24.5 (L)   MCHC      31.5 - 36.5 g/dL   29.4 (L)   RDW      10.0 - 15.0 %   16.3 (H)   Platelet Count      150 - 450 10e9/L   203   Diff Method         Automated Method   % Neutrophils      %   72.6   % Lymphocytes      %   14.3   % Monocytes      %   10.6   % Eosinophils      %   1.5   % Basophils      %   0.5   % Immature Granulocytes      %   0.5   Nucleated RBCs      0 /100   0   Absolute Neutrophil      1.6 - 8.3 10e9/L   10.8 (H)   Absolute Lymphocytes      0.8 - 5.3 10e9/L   2.1   Absolute Monocytes      0.0 - 1.3 10e9/L   1.6 (H)   Absolute Eosinophils      0.0 - 0.7 10e9/L   0.2   Absolute Basophils      0.0 - 0.2 10e9/L   0.1   Abs Immature Granulocytes      0 - 0.4 10e9/L   0.1   Absolute Nucleated RBC         0.0   Sodium      133 - 144 mmol/L 136  130 (L)   Potassium      3.4 - 5.3 mmol/L 3.2 (L)  3.7   Chloride      94 - 109 mmol/L 103  95   Carbon Dioxide      20 - 32 mmol/L 27  30   Anion Gap      3 - 14 mmol/L 6  5   Glucose      70 - 99 mg/dL 110 (H)  163 (H)   Urea Nitrogen      7 - 30 mg/dL 31 (H)  38 (H)   Creatinine      0.66 - 1.25 mg/dL 2.89 (H)  2.71 (H)   GFR Estimate      >60 mL/min/1.73:m2 20 (L)  22 (L)   GFR Estimate If Black      >60 mL/min/1.73:m2 23 (L)  25 (L)   Calcium      8.5 - 10.1 mg/dL 9.9  10.0   Bilirubin Total      0.2 - 1.3 mg/dL   0.7   Albumin      3.4 - 5.0 g/dL   2.7 (L)   Protein Total      6.8 - 8.8 g/dL   6.9   Alkaline Phosphatase      40 - 150 U/L   92   ALT      0 - 70 U/L   14   AST      0 - 45 U/L   16   N-Terminal Pro Bnp      0 - 450 pg/mL 11,784 (H)     T4 Free      0.76 - 1.46 ng/dL  1.32    TSH      0.40 - 4.00 mU/L  1.96    Troponin I ES      0.000 - 0.045  ug/L   <0.015   N-Terminal Pro BNP Inpatient      0 - 1,800 pg/mL   8,995 (H)       ROS:  12-pt ROS is negative except for as noted above.    EXAM:  A limited exam was conducted via video.  Constitutional: Patient is pleasant, alert, in no distress. Normal body habitus, upright.  ENT: Membranes moist, no nasal discharge or bleeding gums. Normal head shape, no evidence of injury or laceration.  EYES: No scleral icterus, normal conjunctivae. EOM's appear intact.   Neck: No evidence of thyromegaly.   Chest/Lungs: Appears to have normal respiratory effort. No audible wheezing, no cough, equal chest wall expansion. L chest pacemaker site closed, well-healed, with minimal ecchymosis.  Cardiovascular: No evidence of elevated JVP. No evidence of pitting edema bilaterally.  Abdomen: No evidence of abdominal distention. No observed jaundice.  Extremities: Compression stockings in place.  Skin: No rashes or lesions appreciated on visualized skin, normal skin color.  Neurologic: Normal mentation. Normal arm motion bilaterally, no tremors. No evidence of focal defect.  Psychiatric: Appropriate affect. A&Ox3.    Assessment/Plan:  1. New systolic CMP / biventricular HF, likely tachy-mediated - EF 25-30%, FC II.           - Well-compensated on Bumex 1 mg daily at a wt of 174 lbs (of note - previously felt hypovolemic at a wt of 184 lbs due to bump in creat, however, renal fn continues to slowly improve today).   - On Toprol 12.5 mg nightly - increase to 25 mg daily. No ACE/ARB/[place] d/t ongoing renal dysfunction.          - Will need eventual ischemic w/u + RHC and sleep study (defer for now in light of COVID and stable symptoms)   - Cardiac rehab ([facility]).   - Advised to call me if his weight declines further.   - Labs at urology clinic on 5/20, video visit with me on 5/21 @ 10:10a.   - Nephrology referral.  2. Rapid AF s/p AVN ablation + CRT-P implant on 4/10 - 98% BiVP. Anticoagulated.  3. DEONDRE / CKD-III - creat  downtrending, will continue close monitoring.    Video-Visit Details  Type of service:  Video Visit  Video End Time (time video stopped): 1:33p  Originating Location (pt. Location): Home  Distant Location (provider location):  [facility]   Mode of Communication:  Video Conference via GamarNick Noonan PA-C  [facility]  Pager: [phone]  Text Page  (7:30am - 4pm M-F)

## 2020-05-06 NOTE — ED TRIAGE NOTES
Here for sob, chest pain, left lateral rib/left shoulder soreness started couple days ago, worsening today. Chronic cough. Has pacemaker and is currently taking eliquis. ABCs intact.

## 2020-05-06 NOTE — ED AVS SNAPSHOT
[facility] Emergency Department  [address]  [facility] [address]  Phone:  [phone]  Fax:  [phone]                                    Gene Pagan   MRN: [id number]    Department:  [facility] Emergency Department   Date of Visit:  5/6/2020           After Visit Summary Signature Page    I have received my discharge instructions, and my questions have been answered. I have discussed any challenges I see with this plan with the nurse or doctor.    ..........................................................................................................................................  Patient/Patient Representative Signature      ..........................................................................................................................................  Patient Representative Print Name and Relationship to Patient    ..................................................               ................................................  Date                                   Time    ..........................................................................................................................................  Reviewed by Signature/Title    ...................................................              ..............................................  Date                                               Time          22EPIC Rev 08/18

## 2020-05-06 NOTE — LETTER
5/6/2020      RE: Gene Pagan  [address] [facility] [address]       Dear Colleague,    Thank you for the opportunity to participate in the care of your patient, Gene Pagan, at the [facility]. Please see a copy of my visit note below.    Gene Pagan presents for a CORE clinic follow up visit.     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  He was in rapid atrial fibrillation and had a new cardiomyopathy, LVEF 20-25%, with severe diastolic dysfunction, RV dilation and dysfunction, and mod-severe MR.  It proved very difficult to rate control his arrhythmia.  NESTOR-guided cardioversion was only briefly successful.  Because of recurrence of AF he was transferred to [facility] for AV node ablation with CRT pacemaker.  This was performed on Friday 4/10/2020 by Dr. Willams. Post-ablation TTE showed an EF of 25-30% (slight improvement) and MR was now only mild. His stay was complicated by newly diagnosed kidney disease / DEONDRE with a presenting creat of 2.4, felt secondary to long-standing diabetes, peaking at 5.7 in the setting of hypotension / suspected hypovolemia at a wt of 186 lbs. Diuretics were held and this had come down to 3.4 on discharge. Eliquis and bumex 1 mg daily were initiated. He was discharged on 4/17 at a weight of 196 lbs (down from 205 lbs on admit).      CORE enrollment 4/22: Gene tells me he's doing quite well. Nothing makes him dyspneic at this point. He is sleeping in a recliner currently however out of fear that he'll be uncomfortable lying flat in bed. He does state that he can lower  "the recliner so he's supine and he has no dyspnea with this. His ankles have some mild pitting edema. No abdominal bloating. Weight is 188 lbs. Today was the first time he weighed himself at home. He has an indwelling catheter currently and has a follow up with urology in two weeks. He lives with his significant other of 20 years. He is mindful of his sodium intake and it sounds like he is limiting appropriately.  Recent device check revealed 98% BiVP. Recent labs show continued improvement in renal function.   - He was on no CMP medications at that point. Toprol 12.5 was added to regimen.    Today, Gene presents for reassessment after I started Toprol two weeks ago. He was having body aches after going grocery shopping and a lot of exertion on Monday, which was making him take more shallow breaths. He went to the ED today for this reason and a COVID swab was obtained but not resulted yet. CXR was benign. EKG showed V-pace. Trop was negative. He states at this point he can draw a full breath comfortably and feels pretty much back to baseline now, after a lidocaine patch was placed on his back. Of note, his wife has been ill with URI symptoms over the last week. No fever or cough.  Other than that, he feels great.   He lost significant weight after having his legs wrapped and is down 10 lbs from our last visit. This plateued and has been stable for the past 3 days. He's wearing compression socks now and states his legs are \"back to normal.\"  Notices no change in urination (has indwelling catheter followed by urology).  He walks \"a lot\" without dyspnea. Some fatigue. Goes up/down stairs without significant dyspnea. Device incision healing very well. BP is adequate. HR paced at 80.  Labs today show ongoing slow improvement in his renal function, with mild hyponatremia. Hemoglobin is stable. proBNP is down significantly. TFT's WNL.    Component      Latest Ref Rng & Units 4/21/2020 5/5/2020 5/6/2020   WBC      4.0 - " 11.0 10e9/L   14.9 (H)   RBC Count      4.4 - 5.9 10e12/L   4.37 (L)   Hemoglobin      13.3 - 17.7 g/dL   10.7 (L)   Hematocrit      40.0 - 53.0 %   36.4 (L)   MCV      78 - 100 fl   83   MCH      26.5 - 33.0 pg   24.5 (L)   MCHC      31.5 - 36.5 g/dL   29.4 (L)   RDW      10.0 - 15.0 %   16.3 (H)   Platelet Count      150 - 450 10e9/L   203   Diff Method         Automated Method   % Neutrophils      %   72.6   % Lymphocytes      %   14.3   % Monocytes      %   10.6   % Eosinophils      %   1.5   % Basophils      %   0.5   % Immature Granulocytes      %   0.5   Nucleated RBCs      0 /100   0   Absolute Neutrophil      1.6 - 8.3 10e9/L   10.8 (H)   Absolute Lymphocytes      0.8 - 5.3 10e9/L   2.1   Absolute Monocytes      0.0 - 1.3 10e9/L   1.6 (H)   Absolute Eosinophils      0.0 - 0.7 10e9/L   0.2   Absolute Basophils      0.0 - 0.2 10e9/L   0.1   Abs Immature Granulocytes      0 - 0.4 10e9/L   0.1   Absolute Nucleated RBC         0.0   Sodium      133 - 144 mmol/L 136  130 (L)   Potassium      3.4 - 5.3 mmol/L 3.2 (L)  3.7   Chloride      94 - 109 mmol/L 103  95   Carbon Dioxide      20 - 32 mmol/L 27  30   Anion Gap      3 - 14 mmol/L 6  5   Glucose      70 - 99 mg/dL 110 (H)  163 (H)   Urea Nitrogen      7 - 30 mg/dL 31 (H)  38 (H)   Creatinine      0.66 - 1.25 mg/dL 2.89 (H)  2.71 (H)   GFR Estimate      >60 mL/min/1.73:m2 20 (L)  22 (L)   GFR Estimate If Black      >60 mL/min/1.73:m2 23 (L)  25 (L)   Calcium      8.5 - 10.1 mg/dL 9.9  10.0   Bilirubin Total      0.2 - 1.3 mg/dL   0.7   Albumin      3.4 - 5.0 g/dL   2.7 (L)   Protein Total      6.8 - 8.8 g/dL   6.9   Alkaline Phosphatase      40 - 150 U/L   92   ALT      0 - 70 U/L   14   AST      0 - 45 U/L   16   N-Terminal Pro Bnp      0 - 450 pg/mL 11,784 (H)     T4 Free      0.76 - 1.46 ng/dL  1.32    TSH      0.40 - 4.00 mU/L  1.96    Troponin I ES      0.000 - 0.045 ug/L   <0.015   N-Terminal Pro BNP Inpatient      0 - 1,800 pg/mL   8,995 (H)        ROS:  12-pt ROS is negative except for as noted above.    EXAM:  A limited exam was conducted via video.  Constitutional: Patient is pleasant, alert, in no distress. Normal body habitus, upright.  ENT: Membranes moist, no nasal discharge or bleeding gums. Normal head shape, no evidence of injury or laceration.  EYES: No scleral icterus, normal conjunctivae. EOM's appear intact.   Neck: No evidence of thyromegaly.   Chest/Lungs: Appears to have normal respiratory effort. No audible wheezing, no cough, equal chest wall expansion. L chest pacemaker site closed, well-healed, with minimal ecchymosis.  Cardiovascular: No evidence of elevated JVP. No evidence of pitting edema bilaterally.  Abdomen: No evidence of abdominal distention. No observed jaundice.  Extremities: Compression stockings in place.  Skin: No rashes or lesions appreciated on visualized skin, normal skin color.  Neurologic: Normal mentation. Normal arm motion bilaterally, no tremors. No evidence of focal defect.  Psychiatric: Appropriate affect. A&Ox3.    Assessment/Plan:  1. New systolic CMP / biventricular HF, likely tachy-mediated - EF 25-30%, FC II.           - Well-compensated on Bumex 1 mg daily at a wt of 174 lbs (of note - previously felt hypovolemic at a wt of 184 lbs due to bump in creat, however, renal fn continues to slowly improve today).   - On Toprol 12.5 mg nightly - increase to 25 mg daily. No ACE/ARB/[place] d/t ongoing renal dysfunction.          - Will need eventual ischemic w/u + RHC and sleep study (defer for now in light of COVID and stable symptoms)   - Cardiac rehab ([facility]).   - Advised to call me if his weight declines further.   - Labs at urology clinic on 5/20, video visit with me on 5/21 @ 10:10a.   - Nephrology referral.  2. Rapid AF s/p AVN ablation + CRT-P implant on 4/10 - 98% BiVP. Anticoagulated.  3. DEONDRE / CKD-III - creat downtrending, will continue close monitoring.    Video-Visit Details  Type of  service:  Video Visit  Video End Time (time video stopped): 1:33p  Originating Location (pt. Location): Home  Distant Location (provider location):  [facility]   Mode of Communication:  Video Conference via Verosee    Marsha Noonan PA-C  [facility] - [facility]  Pager: [phone]  Text Page  (7:30am - 4pm M-F)     Please do not hesitate to contact me if you have any questions/concerns.     Sincerely,     Marsha Noonan PA-C

## 2020-05-06 NOTE — PROGRESS NOTES
[facility] - CORE Clinic    Patient had virtual visit earlier today w/Marsha Noonan. Per her request:  I have asked Gene to have a blood draw (BMP, proBNP) at his urology visit on 5/20. Can you please notify that clinic so they are aware to draw him that date? He will have a CORE video visit with me on 5/21 at 10:10. Patient is aware.     Called patients urology clinic to arrange labs however they currently do not have lab service d/t Covid 19.  Call to patient to make other arrangements, patient stating he currently has [facility]. Call to [facility] and they will draw labs(BNP, BMP) on 5/18 or 5/19. Reminder sent to board to watch for results.  Belen Souza RN on 5/6/2020 at 4:30 PM

## 2020-05-07 ENCOUNTER — HOSPITAL ENCOUNTER (INPATIENT)
Facility: CLINIC | Age: 75
LOS: 3 days | Discharge: HOME HEALTH | DRG: 194 | End: 2020-05-10
Attending: EMERGENCY MEDICINE | Admitting: INTERNAL MEDICINE
Payer: COMMERCIAL

## 2020-05-07 ENCOUNTER — APPOINTMENT (OUTPATIENT)
Dept: GENERAL RADIOLOGY | Facility: CLINIC | Age: 75
DRG: 194 | End: 2020-05-07
Attending: EMERGENCY MEDICINE
Payer: COMMERCIAL

## 2020-05-07 DIAGNOSIS — I50.40 COMBINED SYSTOLIC AND DIASTOLIC CONGESTIVE HEART FAILURE, UNSPECIFIED HF CHRONICITY (H): ICD-10-CM

## 2020-05-07 DIAGNOSIS — E87.1 HYPONATREMIA: ICD-10-CM

## 2020-05-07 DIAGNOSIS — I50.23 ACUTE ON CHRONIC SYSTOLIC HEART FAILURE (H): ICD-10-CM

## 2020-05-07 DIAGNOSIS — J18.9 PNEUMONIA OF LEFT LUNG DUE TO INFECTIOUS ORGANISM, UNSPECIFIED PART OF LUNG: ICD-10-CM

## 2020-05-07 DIAGNOSIS — J18.9 COMMUNITY ACQUIRED PNEUMONIA, UNSPECIFIED LATERALITY: Primary | ICD-10-CM

## 2020-05-07 LAB
ALBUMIN SERPL-MCNC: 2.7 G/DL (ref 3.4–5)
ALBUMIN UR-MCNC: 50 MG/DL
ALP SERPL-CCNC: 112 U/L (ref 40–150)
ALT SERPL W P-5'-P-CCNC: 19 U/L (ref 0–70)
ANION GAP SERPL CALCULATED.3IONS-SCNC: 5 MMOL/L (ref 3–14)
APPEARANCE UR: CLEAR
AST SERPL W P-5'-P-CCNC: 19 U/L (ref 0–45)
BACTERIA #/AREA URNS HPF: ABNORMAL /HPF
BASOPHILS # BLD AUTO: 0.1 10E9/L (ref 0–0.2)
BASOPHILS NFR BLD AUTO: 0.5 %
BILIRUB DIRECT SERPL-MCNC: 0.3 MG/DL (ref 0–0.2)
BILIRUB SERPL-MCNC: 0.7 MG/DL (ref 0.2–1.3)
BILIRUB UR QL STRIP: NEGATIVE
BUN SERPL-MCNC: 45 MG/DL (ref 7–30)
CALCIUM SERPL-MCNC: 10 MG/DL (ref 8.5–10.1)
CHLORIDE SERPL-SCNC: 92 MMOL/L (ref 94–109)
CK SERPL-CCNC: 22 U/L (ref 30–300)
CO2 SERPL-SCNC: 30 MMOL/L (ref 20–32)
COLOR UR AUTO: YELLOW
CREAT SERPL-MCNC: 2.61 MG/DL (ref 0.66–1.25)
CRP SERPL-MCNC: 291 MG/L (ref 0–8)
DIFFERENTIAL METHOD BLD: ABNORMAL
EOSINOPHIL # BLD AUTO: 0.3 10E9/L (ref 0–0.7)
EOSINOPHIL NFR BLD AUTO: 1.7 %
ERYTHROCYTE [DISTWIDTH] IN BLOOD BY AUTOMATED COUNT: 16.5 % (ref 10–15)
GFR SERPL CREATININE-BSD FRML MDRD: 23 ML/MIN/{1.73_M2}
GLUCOSE SERPL-MCNC: 153 MG/DL (ref 70–99)
GLUCOSE UR STRIP-MCNC: NEGATIVE MG/DL
HCT VFR BLD AUTO: 35.5 % (ref 40–53)
HGB BLD-MCNC: 10.6 G/DL (ref 13.3–17.7)
HGB UR QL STRIP: NEGATIVE
HYALINE CASTS #/AREA URNS LPF: 2 /LPF (ref 0–2)
IMM GRANULOCYTES # BLD: 0.1 10E9/L (ref 0–0.4)
IMM GRANULOCYTES NFR BLD: 0.4 %
KETONES UR STRIP-MCNC: NEGATIVE MG/DL
LACTATE BLD-SCNC: 0.9 MMOL/L (ref 0.7–2)
LEUKOCYTE ESTERASE UR QL STRIP: NEGATIVE
LYMPHOCYTES # BLD AUTO: 1.8 10E9/L (ref 0.8–5.3)
LYMPHOCYTES NFR BLD AUTO: 11.6 %
MCH RBC QN AUTO: 24.5 PG (ref 26.5–33)
MCHC RBC AUTO-ENTMCNC: 29.9 G/DL (ref 31.5–36.5)
MCV RBC AUTO: 82 FL (ref 78–100)
MONOCYTES # BLD AUTO: 1.6 10E9/L (ref 0–1.3)
MONOCYTES NFR BLD AUTO: 9.7 %
MUCOUS THREADS #/AREA URNS LPF: PRESENT /LPF
NEUTROPHILS # BLD AUTO: 12.1 10E9/L (ref 1.6–8.3)
NEUTROPHILS NFR BLD AUTO: 76.1 %
NITRATE UR QL: NEGATIVE
NRBC # BLD AUTO: 0 10*3/UL
NRBC BLD AUTO-RTO: 0 /100
NT-PROBNP SERPL-MCNC: 9653 PG/ML (ref 0–1800)
PH UR STRIP: 5.5 PH (ref 5–7)
PLATELET # BLD AUTO: 247 10E9/L (ref 150–450)
POTASSIUM SERPL-SCNC: 3.8 MMOL/L (ref 3.4–5.3)
PROT SERPL-MCNC: 7.5 G/DL (ref 6.8–8.8)
RBC # BLD AUTO: 4.33 10E12/L (ref 4.4–5.9)
RBC #/AREA URNS AUTO: 4 /HPF (ref 0–2)
SODIUM SERPL-SCNC: 127 MMOL/L (ref 133–144)
SOURCE: ABNORMAL
SP GR UR STRIP: 1.02 (ref 1–1.03)
SQUAMOUS #/AREA URNS AUTO: <1 /HPF (ref 0–1)
TROPONIN I SERPL-MCNC: <0.015 UG/L (ref 0–0.04)
UROBILINOGEN UR STRIP-MCNC: NORMAL MG/DL (ref 0–2)
WBC # BLD AUTO: 15.9 10E9/L (ref 4–11)
WBC #/AREA URNS AUTO: 7 /HPF (ref 0–5)

## 2020-05-07 PROCEDURE — 25000128 H RX IP 250 OP 636: Performed by: EMERGENCY MEDICINE

## 2020-05-07 PROCEDURE — 25800030 ZZH RX IP 258 OP 636: Performed by: EMERGENCY MEDICINE

## 2020-05-07 PROCEDURE — 99223 1ST HOSP IP/OBS HIGH 75: CPT | Mod: AI | Performed by: INTERNAL MEDICINE

## 2020-05-07 PROCEDURE — 80076 HEPATIC FUNCTION PANEL: CPT | Performed by: EMERGENCY MEDICINE

## 2020-05-07 PROCEDURE — 25000131 ZZH RX MED GY IP 250 OP 636 PS 637: Performed by: INTERNAL MEDICINE

## 2020-05-07 PROCEDURE — 25000132 ZZH RX MED GY IP 250 OP 250 PS 637: Performed by: INTERNAL MEDICINE

## 2020-05-07 PROCEDURE — 80048 BASIC METABOLIC PNL TOTAL CA: CPT | Performed by: EMERGENCY MEDICINE

## 2020-05-07 PROCEDURE — 99285 EMERGENCY DEPT VISIT HI MDM: CPT | Mod: 25

## 2020-05-07 PROCEDURE — 84484 ASSAY OF TROPONIN QUANT: CPT | Performed by: EMERGENCY MEDICINE

## 2020-05-07 PROCEDURE — 83605 ASSAY OF LACTIC ACID: CPT | Performed by: EMERGENCY MEDICINE

## 2020-05-07 PROCEDURE — 87040 BLOOD CULTURE FOR BACTERIA: CPT | Performed by: EMERGENCY MEDICINE

## 2020-05-07 PROCEDURE — 96365 THER/PROPH/DIAG IV INF INIT: CPT

## 2020-05-07 PROCEDURE — 84145 PROCALCITONIN (PCT): CPT | Performed by: EMERGENCY MEDICINE

## 2020-05-07 PROCEDURE — 25800030 ZZH RX IP 258 OP 636: Performed by: INTERNAL MEDICINE

## 2020-05-07 PROCEDURE — 82550 ASSAY OF CK (CPK): CPT | Performed by: EMERGENCY MEDICINE

## 2020-05-07 PROCEDURE — 86140 C-REACTIVE PROTEIN: CPT | Performed by: EMERGENCY MEDICINE

## 2020-05-07 PROCEDURE — 71045 X-RAY EXAM CHEST 1 VIEW: CPT

## 2020-05-07 PROCEDURE — 93005 ELECTROCARDIOGRAM TRACING: CPT

## 2020-05-07 PROCEDURE — 81001 URINALYSIS AUTO W/SCOPE: CPT | Performed by: EMERGENCY MEDICINE

## 2020-05-07 PROCEDURE — 96368 THER/DIAG CONCURRENT INF: CPT

## 2020-05-07 PROCEDURE — 85025 COMPLETE CBC W/AUTO DIFF WBC: CPT | Performed by: EMERGENCY MEDICINE

## 2020-05-07 PROCEDURE — 83880 ASSAY OF NATRIURETIC PEPTIDE: CPT | Performed by: EMERGENCY MEDICINE

## 2020-05-07 PROCEDURE — 12000000 ZZH R&B MED SURG/OB

## 2020-05-07 RX ORDER — SODIUM CHLORIDE 9 MG/ML
INJECTION, SOLUTION INTRAVENOUS CONTINUOUS
Status: ACTIVE | OUTPATIENT
Start: 2020-05-07 | End: 2020-05-08

## 2020-05-07 RX ORDER — TAMSULOSIN HYDROCHLORIDE 0.4 MG/1
0.4 CAPSULE ORAL DAILY
Status: DISCONTINUED | OUTPATIENT
Start: 2020-05-08 | End: 2020-05-10 | Stop reason: HOSPADM

## 2020-05-07 RX ORDER — ACETAMINOPHEN 325 MG/1
650 TABLET ORAL EVERY 4 HOURS PRN
Status: DISCONTINUED | OUTPATIENT
Start: 2020-05-07 | End: 2020-05-10 | Stop reason: HOSPADM

## 2020-05-07 RX ORDER — ONDANSETRON 2 MG/ML
4 INJECTION INTRAMUSCULAR; INTRAVENOUS EVERY 6 HOURS PRN
Status: DISCONTINUED | OUTPATIENT
Start: 2020-05-07 | End: 2020-05-10 | Stop reason: HOSPADM

## 2020-05-07 RX ORDER — LIDOCAINE 40 MG/G
CREAM TOPICAL
Status: DISCONTINUED | OUTPATIENT
Start: 2020-05-07 | End: 2020-05-10 | Stop reason: HOSPADM

## 2020-05-07 RX ORDER — AMOXICILLIN 250 MG
1 CAPSULE ORAL 2 TIMES DAILY PRN
Status: DISCONTINUED | OUTPATIENT
Start: 2020-05-07 | End: 2020-05-10 | Stop reason: HOSPADM

## 2020-05-07 RX ORDER — NICOTINE POLACRILEX 4 MG
15-30 LOZENGE BUCCAL
Status: DISCONTINUED | OUTPATIENT
Start: 2020-05-07 | End: 2020-05-10 | Stop reason: HOSPADM

## 2020-05-07 RX ORDER — AMOXICILLIN 250 MG
2 CAPSULE ORAL 2 TIMES DAILY PRN
Status: DISCONTINUED | OUTPATIENT
Start: 2020-05-07 | End: 2020-05-10 | Stop reason: HOSPADM

## 2020-05-07 RX ORDER — ONDANSETRON 2 MG/ML
4 INJECTION INTRAMUSCULAR; INTRAVENOUS EVERY 30 MIN PRN
Status: DISCONTINUED | OUTPATIENT
Start: 2020-05-07 | End: 2020-05-07

## 2020-05-07 RX ORDER — CEFTRIAXONE 2 G/1
2 INJECTION, POWDER, FOR SOLUTION INTRAMUSCULAR; INTRAVENOUS EVERY 24 HOURS
Status: DISCONTINUED | OUTPATIENT
Start: 2020-05-08 | End: 2020-05-10 | Stop reason: HOSPADM

## 2020-05-07 RX ORDER — ONDANSETRON 4 MG/1
4 TABLET, ORALLY DISINTEGRATING ORAL EVERY 6 HOURS PRN
Status: DISCONTINUED | OUTPATIENT
Start: 2020-05-07 | End: 2020-05-10 | Stop reason: HOSPADM

## 2020-05-07 RX ORDER — CEFTRIAXONE 1 G/1
1 INJECTION, POWDER, FOR SOLUTION INTRAMUSCULAR; INTRAVENOUS ONCE
Status: COMPLETED | OUTPATIENT
Start: 2020-05-07 | End: 2020-05-07

## 2020-05-07 RX ORDER — POLYETHYLENE GLYCOL 3350 17 G/17G
17 POWDER, FOR SOLUTION ORAL DAILY PRN
Status: DISCONTINUED | OUTPATIENT
Start: 2020-05-07 | End: 2020-05-10 | Stop reason: HOSPADM

## 2020-05-07 RX ORDER — NALOXONE HYDROCHLORIDE 0.4 MG/ML
.1-.4 INJECTION, SOLUTION INTRAMUSCULAR; INTRAVENOUS; SUBCUTANEOUS
Status: DISCONTINUED | OUTPATIENT
Start: 2020-05-07 | End: 2020-05-10 | Stop reason: HOSPADM

## 2020-05-07 RX ORDER — AZITHROMYCIN 250 MG/1
250 TABLET, FILM COATED ORAL EVERY 24 HOURS
Status: DISCONTINUED | OUTPATIENT
Start: 2020-05-08 | End: 2020-05-10 | Stop reason: HOSPADM

## 2020-05-07 RX ORDER — METOPROLOL SUCCINATE 25 MG/1
25 TABLET, EXTENDED RELEASE ORAL DAILY
Status: DISCONTINUED | OUTPATIENT
Start: 2020-05-08 | End: 2020-05-10 | Stop reason: HOSPADM

## 2020-05-07 RX ORDER — DEXTROSE MONOHYDRATE 25 G/50ML
25-50 INJECTION, SOLUTION INTRAVENOUS
Status: DISCONTINUED | OUTPATIENT
Start: 2020-05-07 | End: 2020-05-10 | Stop reason: HOSPADM

## 2020-05-07 RX ADMIN — AZITHROMYCIN MONOHYDRATE 500 MG: 500 INJECTION, POWDER, LYOPHILIZED, FOR SOLUTION INTRAVENOUS at 21:03

## 2020-05-07 RX ADMIN — SODIUM CHLORIDE: 9 INJECTION, SOLUTION INTRAVENOUS at 22:53

## 2020-05-07 RX ADMIN — APIXABAN 5 MG: 5 TABLET, FILM COATED ORAL at 22:37

## 2020-05-07 RX ADMIN — CEFTRIAXONE 1 G: 1 INJECTION, POWDER, FOR SOLUTION INTRAMUSCULAR; INTRAVENOUS at 20:21

## 2020-05-07 RX ADMIN — INSULIN GLARGINE 9 UNITS: 100 INJECTION, SOLUTION SUBCUTANEOUS at 22:54

## 2020-05-07 ASSESSMENT — ACTIVITIES OF DAILY LIVING (ADL)
SWALLOWING: 0-->SWALLOWS FOODS/LIQUIDS WITHOUT DIFFICULTY
AMBULATION: 0-->INDEPENDENT
DRESS: 0-->INDEPENDENT
TRANSFERRING: 0-->INDEPENDENT
BATHING: 0-->INDEPENDENT
RETIRED_EATING: 0-->INDEPENDENT
COGNITION: 0 - NO COGNITION ISSUES REPORTED
TOILETING: 0-->INDEPENDENT
FALL_HISTORY_WITHIN_LAST_SIX_MONTHS: NO
RETIRED_COMMUNICATION: 0-->UNDERSTANDS/COMMUNICATES WITHOUT DIFFICULTY

## 2020-05-07 ASSESSMENT — MIFFLIN-ST. JEOR: SCORE: 1509.17

## 2020-05-07 ASSESSMENT — ENCOUNTER SYMPTOMS
FEVER: 0
MYALGIAS: 1
SHORTNESS OF BREATH: 1

## 2020-05-07 NOTE — ED PROVIDER NOTES
History     Chief Complaint:  Shortness of Breath      HPI   Gene Pagan is a 75 year old anticoagulated male with a recent hospitalization from 3/27-4/17 at which time he was found to be in atrial fibrillation with RVR with pyelonephritis, as well as acute on chronic congestive heart failure. Patient was intubated for a cardioversion, however when that failed to convert him out of the abnormal rhythm, he underwent an AV node ablation, NESTOR procedure, and ICD pacemaker placement who presents with shortness of breath. Per chart review, the patient was seen in the ED yesterday morning for evaluation of shortness of breath as well. Patient reports he is having body soreness.went for a an walk for 2 to 3 hours which is abnormal for him.  He went for groceries on Saturday he has been having muscle soreness of his chest and his abdomen since then.  He also feels like it is hard to take a deep breath.  He feels like he can ambulate and it does not seem to make it worse.  He reports he has increased secretions but no cough.  He denies any fever.  He was here last night and had tested for COVID which was negative.    Allergies:  No Known Allergies      Medications:    Eliquis  Bumex  Insulin (Lantus)  Metoprolol  Flomax      Past Medical History:    Atrial fibrillation   BPH  Diastolic CHF  Chronic renal insufficiency   Type II diabetes     Past Surgical History:    Pacemaker implant  Cardioversion      Family History:    Diabetes     Social History:  Negative for tobacco use.  Positive for rare alcohol use.      Review of Systems   Constitutional: Negative for fever.   Respiratory: Positive for shortness of breath.    Musculoskeletal: Positive for myalgias.   All other systems reviewed and are negative.      Physical Exam     Patient Vitals for the past 24 hrs:   BP Temp Temp src Pulse Heart Rate Resp SpO2 Height Weight   05/07/20 1900 101/62 -- -- 80 80 -- 96 % -- --   05/07/20 1845 106/65 -- -- 80 80 -- 97 % -- --  "  05/07/20 1823 103/57 98.4  F (36.9  C) Oral -- 82 14 100 % 1.753 m (5' 9\") 79.4 kg (175 lb)       Physical Exam  General: Patient is alert and interactive when I enter the room  Head:  The scalp, face, and head appear normal  Eyes:  Conjunctivae are normal  ENT:    The nose is normal    Pinnae are normal    External acoustic canals are normal  Neck:  Trachea midline  CV:  Pulses are normal. RRR.    Resp:  No respiratory distress. CTAB.    Abdomen:      Soft, non-tender, non-distended  Musc:  Normal muscular tone    No major joint effusions    No asymmetric leg swelling  Skin:  No rash or lesions noted  Neuro:  Speech is normal and fluent. Face is symmetric.     Moving all extremities well.   Psych: Awake. Alert.  Normal affect.  Appropriate interactions.      Emergency Department Course   ECG:  Indication: Shortness of breath   Time: 1831  Vent. Rate 80 bpm. AL interval *. QRS duration 142. QT/QTc 420/484. P-R-T axis * 12 41.  Ventricular-paced rhythm. Abnormal ECG. No significant change compared to EKG dated 5/6/2020. Read time: 1836      Imaging:  Radiographic findings were communicated with the patient who voiced understanding of the findings.    XR Chest 1 view PORT:   Interval development of increased opacity left lung base suggestive of a underlying possible alveolar infiltrate or possible pneumonia. Recommend correlation with patient's clinical status. Linear areas of fibrotic change involving the left   costophrenic angle laterally remain stable. New minimal area of increased opacity right lateral lung base adjacent to the hemidiaphragm possibly relating to atelectasis or minimal amount of infiltrate. Both upper lungs are clear. Minimal cardiac   enlargement. Normal pulmonary vascularity. Atherosclerotic vascular calcification. Left-sided cardiac pacemaker. Degenerative changes in the spine and shoulders. Old healed left clavicular fracture, as per radiology.     Laboratory:  CBC: WBC: 15.9 (H), HGB: 10.6 " (L), PLT: 247  BMP: Glucose 153 (H), Na: 127 (L), Chloride: 92 (L), BUN: 45 (H), Creatinine: 2.61 (H), GFR: 23 (L), o/w WNL     BNP: 9653 (H)  CK Total: 22 (L)     1841 Troponin: <0.015  1928 Lactic acid: 0.9    Blood cultures (X2): pending      Interventions:  Medications   azithromycin (ZITHROMAX) 500 mg in sodium chloride 0.9 % 250 mL intermittent infusion (500 mg Intravenous New Bag 5/7/20 2103)   ondansetron (ZOFRAN) injection 4 mg (4 mg Intravenous Not Given 5/7/20 2105)   cefTRIAXone (ROCEPHIN) 1 g vial to attach to  mL bag for ADULTS or NS 50 mL bag for PEDS (0 g Intravenous ED Infusing on Admission/transfer 5/7/20 2100)   2021 Rocephin 1 g IV   Zithromax 500 mg IV   Zofran 4 mg IV     Emergency Department Course:  Nursing notes and vitals reviewed. (1835) I performed an exam of the patient as documented above.     IV inserted. Medicine administered as documented above. Blood drawn. This was sent to the lab for further testing, results above.    The patient was sent for a chest XR while in the emergency department, findings above.   EKG obtained in the ED, see results above.      (1945) I rechecked the patient and discussed the results of his workup thus far.     (2031)  I consulted with Dr. Sabillon of the hospitalist services. They are in agreement to accept the patient for admission.    Findings and plan explained to the Patient who consents to admission. Discussed the patient with Dr. Sabillon, who will admit the patient to a [facility]/Surg bed for further monitoring, evaluation, and treatment.    Impression & Plan    Covid-19  Gene Pagan was evaluated during a global COVID-19 pandemic, which necessitated consideration that the patient might be at risk for infection with the SARS-CoV-2 virus that causes COVID-19.   Applicable protocols for evaluation were followed during the patient's care.   COVID-19 was considered as part of the patient's evaluation. The plan for testing is:  a test was obtained at a  previous visit and reviewed & considered today.      Medical Decision Making:  Gene Pagan is a 76 yo M with history of atrial fibrillation with likely tachycardia induced cardiomyopathy who presents with shortness of breath and muscle soreness.  Patient was actually seen here last night had a negative work-up and discharged.  Patient symptoms have just progressed.  He is not hypoxic and nor in respiratory distress.  EKG showed a paced rhythm.  Chest x-ray here initially shows possible pneumonia.  He does have a leukocytosis which has been increasing since the last time he was here.  His CK was unremarkable.  BMP interestingly shows hyponatremia that is progressively been decreasing since the fifth.  It was 130 yesterday and now is 127.  I suspect this is related to diuresis.  He also follows a low-sodium diet.  With pneumonia he could have legionnaires.  This we will give him antibiotics given his white count and chest x-ray findings with shortness of breath.  He denies any fever or cough.  He did have COVID testing last night and after discussion with hospital we will keep him on isolation and repeat test in 72 hours.  Patient admitted for hyponatremia and pneumonia.    Diagnosis:    ICD-10-CM    1. Pneumonia of left lung due to infectious organism, unspecified part of lung  J18.9 UA with Microscopic     Lactic acid whole blood   2. Hyponatremia  E87.1        Disposition:  Admitted to Dr. Sabillon    Scribe Disclosure:  LUDMILA, Shaye Roberts, am serving as a scribe on 5/7/2020 at 6:38 PM to personally document services performed by Zeny Dia MD based on my observations and the provider's statements to me.     Shaye Roberts  5/7/2020   [facility] EMERGENCY DEPARTMENT       Zeny Dia MD  05/07/20 2036

## 2020-05-07 NOTE — ED TRIAGE NOTES
Pt presents for evaluation of shortness of breath, chest pain that radiates through to upper back and weakness starting on Monday. Seen in ED last night around 0300, advised to come back if worsening symptoms.

## 2020-05-07 NOTE — LETTER
Transition Communication Hand-off for Care Transitions to Next Level of Care Provider    Name: Gene Pagan  : 1945  MRN #: [id number]  Primary Care Provider: [facility]  Primary Care MD Name: Dr. Dm Lipscomb  Primary Clinic: [address] [place] [address]  Primary Care Clinic Name: [facility]  Reason for Hospitalization:  Hyponatremia [E87.1]  Pneumonia of left lung due to infectious organism, unspecified part of lung [J18.9]  Admit Date/Time: 2020  6:25 PM  Discharge Date: 5/10/2020  Payor Source: Payor: [facility] / Plan: [employer] MEDICARE / Product Type: HMO /     Readmission Assessment Measure (MAUREEN) Risk Score/category: Elevated        Reason for Communication Hand-off Referral: Admission diagnoses: CHF  Admission diagnoses: PN    Discharge Plan: Home with resumption of [facility].      Concern for non-adherence with plan of care: No  Discharge Needs Assessment:  Needs      Most Recent Value   Anticipated Changes Related to Illness  other (see comments) [doesnt know]        Follow-up specialty is recommended: Yes. He will schedule a f/u appt with [facility] on Monday, 20.     Follow-up plan:    Future Appointments   Date Time Provider Department Center   2020  2:30 PM Cheikh Perez MD [facility]   2020 10:10 AM Marsha Noonan PA-C [facility]   2020  1:00 PM RU DCR2 [facility]     Any outstanding tests or procedures:  No      Referrals     Future Labs/Procedures    Medication Therapy Management Referral     Process Instructions:        This referral will be filtered to a centralized scheduling office at [place] [facility] and the patient will receive a call to schedule an appointment at a [place] location most convenient for them.    Comments:    MTM referral reason            Patient has 5 PTA or Discharge Medications AND one of the following   diagnoses: DM,HF,COPD,AMI  DX,PULM HTN       This service is designed to help you get the most from your medications.  A specially trained pharmacist will work closely with you and your doctors  to solve any problems related to your medications and to help you get the   best results from taking them.      The Medication Therapy Management staff will call you to schedule an appointment.              Key Recommendations:  Patient was admitted for r/o COVID and PNA. Slight CHF exacerbation found incidentally. His EF has dropped to 20-25%.  He monitors his sodium intake of 1800mg/day. He is more restricted than the usual 2000mg/day. He has a scale and weighs himself daily. His significant other, [place], provides his care along with [facility]. He uses [facility] in [place] and has no issues getting his prescriptions.     CTS identifies patient as high risk due to elevated MAUREEN score. Currently admitted for PNA & CHF, this is his 2nd admission in 6 months. He has had 1 ED-only visit in 6 months. Per chart review, pt resides at home with significant other, [place]. Baseline mobility is independent. He is currently a standby assist.     His PMH that can effect his MAUREEN score includes anemia, Afib, BPH, CHF, CKD & DM2. His PTA medications that can effect his MAUREEN score include Eliquis & Lantus.     Ami Infante RN, BSN, CPHN, CM    AVS/Discharge Summary is the source of truth; this is a helpful guide for improved communication of patient story

## 2020-05-08 LAB
ANION GAP SERPL CALCULATED.3IONS-SCNC: 6 MMOL/L (ref 3–14)
BASOPHILS # BLD AUTO: 0.1 10E9/L (ref 0–0.2)
BASOPHILS NFR BLD AUTO: 0.6 %
BUN SERPL-MCNC: 44 MG/DL (ref 7–30)
CALCIUM SERPL-MCNC: 9 MG/DL (ref 8.5–10.1)
CHLORIDE SERPL-SCNC: 96 MMOL/L (ref 94–109)
CO2 SERPL-SCNC: 28 MMOL/L (ref 20–32)
CREAT SERPL-MCNC: 2.42 MG/DL (ref 0.66–1.25)
DIFFERENTIAL METHOD BLD: ABNORMAL
EOSINOPHIL # BLD AUTO: 0.4 10E9/L (ref 0–0.7)
EOSINOPHIL NFR BLD AUTO: 3.3 %
ERYTHROCYTE [DISTWIDTH] IN BLOOD BY AUTOMATED COUNT: 16.7 % (ref 10–15)
GFR SERPL CREATININE-BSD FRML MDRD: 25 ML/MIN/{1.73_M2}
GLUCOSE BLDC GLUCOMTR-MCNC: 186 MG/DL (ref 70–99)
GLUCOSE SERPL-MCNC: 122 MG/DL (ref 70–99)
GRAM STN SPEC: NORMAL
HCT VFR BLD AUTO: 32.1 % (ref 40–53)
HGB BLD-MCNC: 9.7 G/DL (ref 13.3–17.7)
IMM GRANULOCYTES # BLD: 0.1 10E9/L (ref 0–0.4)
IMM GRANULOCYTES NFR BLD: 0.6 %
INTERPRETATION ECG - MUSE: NORMAL
LYMPHOCYTES # BLD AUTO: 1.5 10E9/L (ref 0.8–5.3)
LYMPHOCYTES NFR BLD AUTO: 12.1 %
Lab: NORMAL
MCH RBC QN AUTO: 24.8 PG (ref 26.5–33)
MCHC RBC AUTO-ENTMCNC: 30.2 G/DL (ref 31.5–36.5)
MCV RBC AUTO: 82 FL (ref 78–100)
MONOCYTES # BLD AUTO: 1.3 10E9/L (ref 0–1.3)
MONOCYTES NFR BLD AUTO: 10.5 %
NEUTROPHILS # BLD AUTO: 9.2 10E9/L (ref 1.6–8.3)
NEUTROPHILS NFR BLD AUTO: 72.9 %
NRBC # BLD AUTO: 0 10*3/UL
NRBC BLD AUTO-RTO: 0 /100
PLATELET # BLD AUTO: 218 10E9/L (ref 150–450)
POTASSIUM SERPL-SCNC: 3.8 MMOL/L (ref 3.4–5.3)
PROCALCITONIN SERPL-MCNC: 0.8 NG/ML
RBC # BLD AUTO: 3.91 10E12/L (ref 4.4–5.9)
SODIUM SERPL-SCNC: 130 MMOL/L (ref 133–144)
SPECIMEN SOURCE: NORMAL
WBC # BLD AUTO: 12.6 10E9/L (ref 4–11)

## 2020-05-08 PROCEDURE — 25000131 ZZH RX MED GY IP 250 OP 636 PS 637: Performed by: INTERNAL MEDICINE

## 2020-05-08 PROCEDURE — 25000132 ZZH RX MED GY IP 250 OP 250 PS 637: Performed by: INTERNAL MEDICINE

## 2020-05-08 PROCEDURE — 00000146 ZZHCL STATISTIC GLUCOSE BY METER IP

## 2020-05-08 PROCEDURE — 80048 BASIC METABOLIC PNL TOTAL CA: CPT | Performed by: INTERNAL MEDICINE

## 2020-05-08 PROCEDURE — 87106 FUNGI IDENTIFICATION YEAST: CPT | Performed by: INTERNAL MEDICINE

## 2020-05-08 PROCEDURE — 99233 SBSQ HOSP IP/OBS HIGH 50: CPT | Performed by: INTERNAL MEDICINE

## 2020-05-08 PROCEDURE — 25000128 H RX IP 250 OP 636: Performed by: INTERNAL MEDICINE

## 2020-05-08 PROCEDURE — 87205 SMEAR GRAM STAIN: CPT | Performed by: INTERNAL MEDICINE

## 2020-05-08 PROCEDURE — 85025 COMPLETE CBC W/AUTO DIFF WBC: CPT | Performed by: INTERNAL MEDICINE

## 2020-05-08 PROCEDURE — 87070 CULTURE OTHR SPECIMN AEROBIC: CPT | Performed by: INTERNAL MEDICINE

## 2020-05-08 PROCEDURE — 12000000 ZZH R&B MED SURG/OB

## 2020-05-08 RX ADMIN — METOPROLOL SUCCINATE 25 MG: 25 TABLET, FILM COATED, EXTENDED RELEASE ORAL at 08:09

## 2020-05-08 RX ADMIN — INSULIN GLARGINE 9 UNITS: 100 INJECTION, SOLUTION SUBCUTANEOUS at 20:25

## 2020-05-08 RX ADMIN — ACETAMINOPHEN 650 MG: 325 TABLET, FILM COATED ORAL at 20:24

## 2020-05-08 RX ADMIN — AZITHROMYCIN MONOHYDRATE 250 MG: 250 TABLET ORAL at 20:24

## 2020-05-08 RX ADMIN — APIXABAN 5 MG: 5 TABLET, FILM COATED ORAL at 08:09

## 2020-05-08 RX ADMIN — CEFTRIAXONE 2 G: 2 INJECTION, POWDER, FOR SOLUTION INTRAMUSCULAR; INTRAVENOUS at 20:24

## 2020-05-08 RX ADMIN — TAMSULOSIN HYDROCHLORIDE 0.4 MG: 0.4 CAPSULE ORAL at 08:09

## 2020-05-08 RX ADMIN — APIXABAN 5 MG: 5 TABLET, FILM COATED ORAL at 20:24

## 2020-05-08 RX ADMIN — ACETAMINOPHEN 650 MG: 325 TABLET, FILM COATED ORAL at 05:43

## 2020-05-08 RX ADMIN — ACETAMINOPHEN 650 MG: 325 TABLET, FILM COATED ORAL at 15:45

## 2020-05-08 RX ADMIN — ACETAMINOPHEN 650 MG: 325 TABLET, FILM COATED ORAL at 11:45

## 2020-05-08 ASSESSMENT — ACTIVITIES OF DAILY LIVING (ADL)
ADLS_ACUITY_SCORE: 11

## 2020-05-08 NOTE — H&P
[facility]  Hospitalist Admission Note  Name: Gene Pagan    MRN: [id number]  YOB: 1945    Age: 75 year old  Date of admission: 5/7/2020  Primary care provider: Ken, [employer]    Chief Complaint: Shortness of breath     Assessment and Plan:   Suspect CAP, possible COVID19: Having some pleuritic sounding left lower chest pain and now upper central chest pain.  Pain is an ache and lasts a very short time with deep breaths preventing him from taking deep breath.  Doubt PE given he has not missed any doses of his therapeutic Eliquis.  Troponin is undetectable.  EKG shows a reticular paced rhythm.  Chest x-ray yesterday did not show any infiltrate, now repeat chest x-ray shows a subtle left lower lobe trait which may be bacterial pneumonia.  He does have leukocytosis at 15.9, but has not had fevers.  Approximately 30 hours ago in the ED he did test negative for COVID19, however  this remains in the differential given he is also having some myalgias and shortness of breath.  His CRP is quite elevated at 291 which would fit more with COVID19 although could be seen a sign bacterial pneumonia I suppose.  LFTs added on and not elevated.  Of note was not hypoxic in the ED.  -Continue with IV ceftriaxone azithromycin for now  -Add on procalcitonin  -Given myalgias and shortness of breath with elevated CRP continue isolation precautions for COVID19 PUI.  Would hold off on repeating test as his last one was 30 hours ago and negative, would be reasonable to retest at 72-hour cecile if requires ongoing hospitalization  -Continuous pulse ox and supplemental oxygen as needed, wean as able    Hyponatremia, at least CKD stage III: Sodium down to 127.  He was up to 136 then 131 recently.  Had a significant DEONDRE during his long hospitalization for cardiogenic shock and sepsis.  Previous CKD stage III now creatinine peak in the high 3 range during admission trending down to 2.6 today.  Appears  mildly intravascularly dry.  Is on Bumex 1 mg daily.  -Give 1 L of normal saline overnight over 10 hours and recheck BMP tomorrow, holding Bumex    Chronic systolic and diastolic CHF, severe MR: Admission for cardiogenic shock in setting of possible sepsis as well 3/27 through 4/17/2020 [facility] and then self still for pacemaker placement.  EF down to 20-25%.  Also has grade 3 diastolic dysfunction along with severe MR.  He was diuresed over 20 pounds during that admission.  Discharge weight was 186 pounds and he is now down to 175 pounds now.  Only trace lower extremity edema if any.  No respiratory symptoms from CHF.  His BNP is elevated at 9000 although it has been similar this high during admission.  He does have severe MR and there is left atrial enlargement.  His creatinine is slowly been improving since his admission and his sodium is now down to 127.   -Suspect he is mildly intravascularly dry, therefore will hold his 1 mg daily Bumex for now and give 1 L of normal saline overnight over 10 hours.  May just need an as needed dose of Bumex for weight gain on discharge with close follow-up in cardiology clinic  -Resume his metoprolol and Eliquis    A. fib: s/p AV node ablation and pacemaker placed on 4/10/2020.  He has not missed any doses of his Eliquis.  Is also on metoprolol succinate 25 mg daily that was just restarted as it was held previously for lower blood pressures.  EKG shows a ventricular paced rhythm.  -Continue PTA Eliquis and metoprolol succinate    Chronic anemia: Hemoglobin stable in 10 range.    Type II DM: Resume PTA Lantus 9 units at bedtime.    BPH, urinary retention: Resume PTA Flomax.  Has Mckenzie catheter in place as he failed a voiding trial this past week in urology clinic.  Continue Mckenzie catheter.    Suspected ANDRESSA: He is supposed to follow-up in pulmonary clinic for formal sleep study.      DVT Prophylaxis: eliquis  Code Status: Full Code, discussed with him on admission and he confirmed  he would like initial restorative measures attempted if he were to have a cardiac arrest  FEN: 2 g sodium restriction, 1 L of IV normal saline overnight over 10 hours  Discharge Dispo: Home  Estimated Disch Date / # of Days until Disch: Given his numerous comorbidities and uncertain diagnosis admit inpatient for possible pneumonia.  COVID19 remains in the differential and will be on isolation precautions.  Anticipate 2 night hospitalization.      History of Present Illness:  Gene Pagan is a 75 year old male with PMH including a complex medical history with IDDM type II, CKD stage III, BPH, anemia, diastolic and systolic CHF, and recent prolonged hospitalization from 3/17 through 4/17/2020 at [facility] and then self still with cardiogenic shock, sepsis likely from pyelonephritis, DEONDRE, new systolic CHF requiring diuresis and then A. fib requiring AV node ablation and pacemaker placement on 4/10/2020 who presented initially to the ED yesterday with some left-sided chest pain along with shortness of breath.  The pain initially was in the left lateral chest wall and described as sharp.  It would hurt when he would take a deep breath limiting how much he could breath in.  The pain only lasted a few seconds.  This made him feel short of breath.  He was seen in the emergency department had a chest x-ray did not show any infiltrate.  He was tested for COVID19 that was negative.  He had a slight leukocytosis.  He was discharged.  Now today the chest pain is more upper and substernal with some radiation to the shoulder blades.  It again is worse when he takes a deep breath but this time it feels more like an ache that again only last a few seconds.  This limits how much he can breathe so he feels short of breath.  He does have a very mild cough that is chronic and unchanged from previous.  He has not had any fevers or shaking chills.  He has had some generalized muscle soreness.  He does not feel more short of breath when  walking around.  He has been in the community since his discharge has been doing quite well.  He did go to the grocery store and this is when his chest pain was worse although again he says is not exertional.  He has continued to lose weight with his daily 1 mg Bumex at home.  His weight is now down 10 pounds since discharge.  His lower extremity swelling is gone.  He has not missed any doses of his Eliquis.  Denies any recent low blood sugars.  Has not any nausea, vomiting, diarrhea, abdominal pain, dysuria.  He did fail a voiding trial in urology clinic recently so his Mckenzie catheter was replaced.    History obtained from patient, medical record, and from Dr. Dia in the emergency department.  Blood pressure 100-1 19 systolic in the ED.  Heart rate is in the 80s.  Temperature is 98.4  F.  Oxygen is 93-96% on room air.  EKG shows a ventricular paced rhythm.  His sodium level is decreased down to 127 and creatinine is slightly lower at 2.61.  BUN is 45.  Bicarb is 30.  He does have a leukocytosis with white blood cell count of 15.9.  Hemoglobin stable at 10.6 and platelet count is 247.  Troponin is undetectable.  BNP is elevated at 9600 fairly similar to previous.  Lactic acid not elevated 0.9.  Urinalysis shows 7 WBCs and no LE.  2 blood cultures were obtained.  Repeat chest x-ray shows a subtle left lower lobe opacity which may be consistent with bacterial pneumonia therefore he was given IV ceftriaxone and azithromycin.  Still suspicious for possible COVID19 infection given shortness of breath and myalgias so we will keep in isolation initially as a PUI patient.  Anticipate retest at 72-hour cecile if remains hospitalized.  Monitor oxygen closely.  Continuing empiric antibiotics.     Past Medical History reviewed:  Past Medical History:   Diagnosis Date     Atrial fibrillation      BPH (benign prostatic hyperplasia)      Chronic diastolic CHF      CRI (chronic renal insufficiency)      Diabetes mellitus - type  2      Past Surgical History reviewed:  Past Surgical History:   Procedure Laterality Date     ANESTHESIA CARDIOVERSION N/A 4/6/2020    Procedure: ANESTHESIA, FOR CARDIOVERSION;  Surgeon: GENERIC ANESTHESIA PROVIDER;  Location: [facility] OR     EP PACEMAKER N/A 4/10/2020    Procedure: EP Pacemaker;  Surgeon: Abhay Willams MD;  Location:  [facility]     Social History reviewed:  Social History     Tobacco Use     Smoking status: Never Smoker     Smokeless tobacco: Never Used   Substance Use Topics     Alcohol use: Yes     Comment: Rare     Social History     Social History Narrative     Not on file     Family History reviewed:  Family History   Problem Relation Age of Onset     Diabetes Sister      Allergies:  No Known Allergies  Medications:  Prior to Admission medications    Medication Sig Last Dose Taking? Auth Provider   acetaminophen (TYLENOL) 500 MG tablet Take 500 mg by mouth every 8 hours   Reported, Patient   apixaban ANTICOAGULANT (ELIQUIS) 5 MG tablet Take 1 tablet (5 mg) by mouth 2 times daily   Opal Caryt MD   bumetanide (BUMEX) 1 MG tablet Take 1 tablet (1 mg) by mouth daily   Opal Carty MD   insulin glargine (LANTUS SOLOSTAR) 100 UNIT/ML pen Inject 9 Units Subcutaneous At Bedtime Future refills by PCP Dr. Martínez Duarte with phone number [phone].   Opal Carty MD   insulin pen needle (31G X 8 MM) 31G X 8 MM miscellaneous 1 Box of 100 insulin pen needles to be dispensed with every insulin pen prescription   Opal Carty MD   metoprolol succinate ER (TOPROL-XL) 25 MG 24 hr tablet Take 1 tablet (25 mg) by mouth daily   Marsha Noonan PA-C   tamsulosin (FLOMAX) 0.4 MG capsule Take 0.4 mg by mouth   Reported, Patient     Review of Systems:  A Comprehensive greater than 10 system review of systems was carried out.  Pertinent positives and negatives are noted above.  Otherwise negative.     Physical Exam:  Blood pressure 101/62, pulse 80,  "temperature 98.4  F (36.9  C), temperature source Oral, resp. rate 14, height 1.753 m (5' 9\"), weight 79.4 kg (175 lb), SpO2 96 %.  Wt Readings from Last 1 Encounters:   05/07/20 79.4 kg (175 lb)     Exam:  Constitutional: Awake, NAD   Eyes: sclera white   HEENT: atraumatic, dry mucous membranes  Respiratory: He does have a few crackles in the left lower lateral lung field, no wheeze, in no respiratory distress  Cardiovascular: Irregularly, irregular rhythm, regular rate, 3/6 systolic murmur  GI: non-tender, not distended, bowel sounds present  Genitourinary: Mckenzie catheter bag with clear yellow urine  Skin: no rash or lesions, acyanotic  Musculoskeletal/extremities: Trace bilateral lower extremity edema  Neurologic: A&O, speech clear, moves extremities equally  Psychiatric: calm, cooperative, normal affect    Lab and imaging data personally reviewed:  Labs:  Recent Labs   Lab 05/07/20 1841 05/06/20 0214   WBC 15.9* 14.9*   HGB 10.6* 10.7*   HCT 35.5* 36.4*   MCV 82 83    203     Recent Labs   Lab 05/07/20  1841 05/06/20  0214 05/05/20  1017   * 130* 132*   POTASSIUM 3.8 3.7 3.7   CHLORIDE 92* 95 97   CO2 30 30 31   ANIONGAP 5 5 4   * 163* 229*   BUN 45* 38* 38*   CR 2.61* 2.71* 2.75*   GFRESTIMATED 23* 22* 21*   GFRESTBLACK 26* 25* 25*   HARPER 10.0 10.0 10.3*     Recent Labs   Lab 05/07/20  1928 05/06/20  0214   LACT 0.9 1.0     Recent Labs   Lab 05/07/20  1841 05/06/20  0214   TROPI <0.015 <0.015     Recent Labs   Lab 05/07/20 1954   COLOR Yellow   APPEARANCE Clear   URINEGLC Negative   URINEBILI Negative   URINEKETONE Negative   SG 1.019   UBLD Negative   URINEPH 5.5   PROTEIN 50*   NITRITE Negative   LEUKEST Negative   RBCU 4*   WBCU 7*       EKG: Ventricularly paced rhythm    Imaging:  Recent Results (from the past 24 hour(s))   XR Chest Port 1 View    Narrative    EXAM: XR CHEST PORT 1 VW  LOCATION: [facility]  DATE/TIME: 5/7/2020 7:09 PM    INDICATION: Shortness of " breath  COMPARISON: 05/06/2020, 04/11/2020      Impression    IMPRESSION: Interval development of increased opacity left lung base suggestive of a underlying possible alveolar infiltrate or possible pneumonia. Recommend correlation with patient's clinical status. Linear areas of fibrotic change involving the left   costophrenic angle laterally remain stable. New minimal area of increased opacity right lateral lung base adjacent to the hemidiaphragm possibly relating to atelectasis or minimal amount of infiltrate. Both upper lungs are clear. Minimal cardiac   enlargement. Normal pulmonary vascularity. Atherosclerotic vascular calcification. Left-sided cardiac pacemaker. Degenerative changes in the spine and shoulders. Old healed left clavicular fracture.       Lazarus Sabillon MD  Hospitalist  [facility]

## 2020-05-08 NOTE — PROGRESS NOTES
Discharge Planner   Discharge Plans in progress: Yes.  Verified that patient is receiving home care RN/PT/OT/SW through [facility].   Barriers to discharge plan: Medical readiness.  Patient will need home care orders RN/PT/OT/SW for resumption of services at discharge.   Follow up plan: Care coordinator will continue to follow for discharge planning.        Entered by: Marsha Vieyra 05/08/2020 1:10 PM       Marsha Vieyra MA/RN Case Manager  Inpatient Care Coordination  [facility]   [phone]

## 2020-05-08 NOTE — ED NOTES
"[facility]  ED Nurse Handoff Report    Gene Pagna is a 75 year old male   ED Chief complaint: Shortness of Breath  . ED Diagnosis:   Final diagnoses:   Pneumonia of left lung due to infectious organism, unspecified part of lung   Hyponatremia     Allergies: No Known Allergies    Code Status: Full Code  Activity level - Baseline/Home:  Independent. Activity Level - Current:   Stand by Assist. Lift room needed: No. Bariatric: No   Needed: No   Isolation: No. Infection: Not Applicable.     Vital Signs:   Vitals:    05/07/20 1823 05/07/20 1845 05/07/20 1900   BP: 103/57 106/65 101/62   Pulse:  80 80   Resp: 14     Temp: 98.4  F (36.9  C)     TempSrc: Oral     SpO2: 100% 97% 96%   Weight: 79.4 kg (175 lb)     Height: 1.753 m (5' 9\")         Cardiac Rhythm:  ,      Pain level: 0-10 Pain Scale: 3  Patient confused: No. Patient Falls Risk: Yes.   Elimination Status: Has leg bag baker   Patient Report - Initial Complaint: Gene Pagan is a 75 year old anticoagulated male with a recent hospitalization from 3/27-4/17 at which time he was found to be in atrial fibrillation with RVR with pyelonephritis, as well as acute on chronic congestive heart failure. Patient was intubated for a cardioversion, however when that failed to convert him out of the abnormal rhythm, he underwent an AV node ablation, NESTOR procedure, and ICD pacemaker placement who presents with shortness of breath. Per chart review, the patient was seen in the ED yesterday morning for evaluation of shortness of breath as well. Patient reports he is having body soreness.went for a an walk for 2 to 3 hours which is abnormal for him.  He went for groceries on Saturday he has been having muscle soreness of his chest and his abdomen since then.  He also feels like it is hard to take a deep breath.  He feels like he can ambulate and it does not seem to make it worse.  He reports he has increased secretions but no cough.  He denies any " fever.  He was here last night and had tested for COVID which was negative. Focused Assessment:   Respiratory Respiratory - Respiratory WDL:  (Reports feeling he is unable to take a full breath. No cough. He also describes soreness in the muslces of his neck above his clavicle.)     Tests Performed: labs. Abnormal Results:   Labs Ordered and Resulted from Time of ED Arrival Up to the Time of Departure from the ED   CBC WITH PLATELETS DIFFERENTIAL - Abnormal; Notable for the following components:       Result Value    WBC 15.9 (*)     RBC Count 4.33 (*)     Hemoglobin 10.6 (*)     Hematocrit 35.5 (*)     MCH 24.5 (*)     MCHC 29.9 (*)     RDW 16.5 (*)     Absolute Neutrophil 12.1 (*)     Absolute Monocytes 1.6 (*)     All other components within normal limits   BASIC METABOLIC PANEL - Abnormal; Notable for the following components:    Sodium 127 (*)     Chloride 92 (*)     Glucose 153 (*)     Urea Nitrogen 45 (*)     Creatinine 2.61 (*)     GFR Estimate 23 (*)     GFR Estimate If Black 26 (*)     All other components within normal limits   NT PROBNP INPATIENT - Abnormal; Notable for the following components:    N-Terminal Pro BNP Inpatient 9,653 (*)     All other components within normal limits   CK TOTAL - Abnormal; Notable for the following components:    CK Total 22 (*)     All other components within normal limits   ROUTINE UA WITH MICROSCOPIC - Abnormal; Notable for the following components:    Protein Albumin Urine 50 (*)     WBC Urine 7 (*)     RBC Urine 4 (*)     Bacteria Urine Few (*)     Mucous Urine Present (*)     All other components within normal limits   TROPONIN I   LACTIC ACID WHOLE BLOOD   BLOOD CULTURE   BLOOD CULTURE   CXR Result: IMPRESSION: Interval development of increased opacity left lung base suggestive of a underlying possible alveolar infiltrate or possible pneumonia. Recommend correlation with patient's clinical status. Linear areas of fibrotic change involving the left   costophrenic  angle laterally remain stable. New minimal area of increased opacity right lateral lung base adjacent to the hemidiaphragm possibly relating to atelectasis or minimal amount of infiltrate. Both upper lungs are clear. Minimal cardiac   enlargement. Normal pulmonary vascularity. Atherosclerotic vascular calcification. Left-sided cardiac pacemaker. Degenerative changes in the spine and shoulders. Old healed left clavicular fracture.  Treatments provided: Fluids, IV antibiotics  Family Comments: NA  OBS brochure/video discussed/provided to patient:  N/A  ED Medications:   Medications   cefTRIAXone (ROCEPHIN) 1 g vial to attach to  mL bag for ADULTS or NS 50 mL bag for PEDS (1 g Intravenous New Bag 5/7/20 2021)   azithromycin (ZITHROMAX) 500 mg in sodium chloride 0.9 % 250 mL intermittent infusion (has no administration in time range)     Drips infusing:  Yes  For the majority of the shift, the patient's behavior Green. Interventions performed were NA.    Sepsis treatment initiated: No       ED Nurse Name/Phone Number: Niranjan Gan RN,   8:25 PM  RECEIVING UNIT ED HANDOFF REVIEW    Above ED Nurse Handoff Report was reviewed: Yes  Reviewed by: Laxmi Fernandez RN on May 7, 2020 at 9:11 PM

## 2020-05-08 NOTE — PLAN OF CARE
Orientation: A&ox4  Vss afebrile.  1lnc per pt request. 92-97%  Tele:vpaced  LS:clear, diminished. Prod cough with yellowish tan colored sputum. Sample sent to lab.  GI:bs+. Bm this afternoon.  : baker with adequate output  Skin:intact  Activity:up indep  Pain: c/o pain in between shoulders, increases while lying on his back or taking deep breaths. Tylenol given with adequate relief.  Plan: Iv/po abx. Covid r/o pending re-test tomorrow.

## 2020-05-08 NOTE — PLAN OF CARE
DX: CAP   Tele: Bi-V-Paced  A&O x4  Activity: Ind  Diet: 2 gram Na  VSS  O2: 1L O2 nc mid 90's  BG: na  PIV: NS infusing at 100mL.hr in RA  Pain: upper back between should blades 2 out 10 tylenol x1 with relief  GI/: baker for retention  Labs: WBS 12.6, Hgb 9.7, blood cultures show no growth, Covid Neg 5/6   Plan: Recheck Covid   Discharge: TBD continue plan of care.  Pt didn't have compression socks so  his Bilat LE are wrapped and he did  not want them taken off.  Pacemaker, Trop neg.

## 2020-05-08 NOTE — PHARMACY-ADMISSION MEDICATION HISTORY
Admission medication history interview status for this patient is complete. See [facility] admission navigator for allergy information, prior to admission medications and immunization status.     Medication history interview done via telephone during Covid-19 pandemic, indicate source(s): Patient  Medication history resources (including written lists, pill bottles, clinic record):None      Changes made to PTA medication list:  Added: flomax  Deleted: none  Changed: APAP    Actions taken by pharmacist (provider contacted, etc):None     Additional medication history information:None    Medication reconciliation/reorder completed by provider prior to medication history?  no   (Y/N)         Prior to Admission medications    Medication Sig Last Dose Taking? Auth Provider   acetaminophen (TYLENOL) 500 MG tablet Take 1,000 mg by mouth every 8 hours  5/7/2020 at 1800 Yes Reported, Patient   apixaban ANTICOAGULANT (ELIQUIS) 5 MG tablet Take 1 tablet (5 mg) by mouth 2 times daily 5/7/2020 at am Yes Opal Carty MD   bumetanide (BUMEX) 1 MG tablet Take 1 tablet (1 mg) by mouth daily 5/6/2020 at hs Yes Opal Carty MD   insulin glargine (LANTUS SOLOSTAR) 100 UNIT/ML pen Inject 9 Units Subcutaneous At Bedtime Future refills by PCP Dr. Martínez Duarte with phone number [phone]. 5/6/2020 at Unknown time Yes Opal Carty MD   metoprolol succinate ER (TOPROL-XL) 25 MG 24 hr tablet Take 1 tablet (25 mg) by mouth daily 5/7/2020 at Unknown time Yes Marsha Noonan PA-C   tamsulosin (FLOMAX) 0.4 MG capsule Take 0.4 mg by mouth At Bedtime  5/6/2020 at Unknown time Yes Reported, Patient   insulin pen needle (31G X 8 MM) 31G X 8 MM miscellaneous 1 Box of 100 insulin pen needles to be dispensed with every insulin pen prescription   Opal Carty MD

## 2020-05-08 NOTE — PROGRESS NOTES
[facility]    Medicine Progress Note - Hospitalist Service    Date of Admission:  5/7/2020  Date of Service: 05/08/2020    Assessment & Plan     75 year old male with a complex medical history with IDDM type II, CKD stage III, BPH, anemia, diastolic and systolic CHF, and recent prolonged hospitalization from 3/17 through 4/17/2020 with cardiogenic shock, sepsis likely from pyelonephritis, DEONDRE, new systolic CHF requiring diuresis and then A. fib requiring AV node ablation and pacemaker placement on 4/10/2020, who presented initially to the ED with some left-sided chest pain along with shortness of breath. His COVID testing and CXR was negative and discharged home. However, returned back to ER with persistent symptoms and repeat XR suggestive of PNA.     Suspect CAP  PUI for COVID   Having some pleuritic sounding left lower chest pain and now upper central chest pain.  Pain is an ache and lasts a very short time with deep breaths preventing him from taking deep breath.  Doubt PE given he has not missed any doses of his therapeutic Eliquis.  Troponin is undetectable.  EKG shows a paced rhythm. He does have leukocytosis at 15.9, but has not had fevers.  He did test negative for COVID19, however  this remains in the differential given he is also having some myalgias and shortness of breath.  His CRP is quite elevated at 291     -Continue with IV ceftriaxone and azithromycin, pt feeling better today with ABx, improvement in pain and SOB  -Add on procalcitonin  -Continue isolation precautions for COVID19 PUI.  repeat test tomorrow morning   -Continuous pulse ox and supplemental oxygen as needed, wean as able     Hyponatremia, at least CKD stage III: Sodium down to 127.  He was up to 136 then 131 recently.  Had a significant DEONDRE during his long hospitalization for cardiogenic shock and sepsis.  Previous CKD stage III now creatinine peak in the high 3 range during admission trending down to 2.6 today.  Appears  mildly intravascularly dry.  Is on Bumex 1 mg daily.  -Give 1 L of normal saline overnight on admission, Na better today, holding Bumex     Chronic systolic and diastolic CHF, severe MR: Admission for cardiogenic shock in setting of possible sepsis as well 3/27 through 4/17/2020 ridges and then self still for pacemaker placement.  EF down to 20-25%.  Also has grade 3 diastolic dysfunction along with severe MR.  He was diuresed over 20 pounds during that admission.  Discharge weight was 186 pounds and he is now down to 175 pounds now.  Only trace lower extremity edema if any.  No respiratory symptoms from CHF.  His BNP is elevated at 9000 although it has been similar this high during admission.  He does have severe MR and there is left atrial enlargement.  His creatinine is slowly been improving since his admission and his sodium is now down to 127.   -Suspect he is mildly intravascularly dry, therefore will hold his 1 mg daily Bumex for now.  May just need an as needed dose of Bumex for weight gain on discharge with close follow-up in cardiology clinic  -Resume his metoprolol and Eliquis     A. fib: s/p AV node ablation and pacemaker placed on 4/10/2020.  He has not missed any doses of his Eliquis.  Is also on metoprolol succinate 25 mg daily that was just restarted as it was held previously for lower blood pressures.  EKG shows a ventricular paced rhythm.  -Continue PTA Eliquis and metoprolol succinate    Chronic anemia: Hemoglobin stable in 10 range.    Type II DM: Resume PTA Lantus 9 units at bedtime.    BPH, urinary retention: Resume PTA Flomax.  Has Mckenzie catheter in place as he failed a voiding trial this past week in urology clinic.  Continue Mckenzie catheter.     Suspected ANDRESSA: He is supposed to follow-up in pulmonary clinic for formal sleep study.    Diet: Combination Diet 2 gm NA Diet    DVT Prophylaxis: Apixaban  Mckenzie Catheter: in place, indication: Retention  Code Status: Full Code      Disposition Plan    Expected discharge: 2 days, recommended to prior living arrangement once antibiotic plan established and infection treated.  Entered: Mikael Clifton MD 05/08/2020, 12:11 PM       The patient's care was discussed with the Bedside Nurse and Patient.    Mikael Clifton MD  Hospitalist Service  [facility]    ______________________________________________________________________    Interval History   Chart reviewed and patient seen. Case discussed with nursing staff.     Patient feels improved today, reports some pleuritic pain, more energy, SOB improving, No reports of abdominal pain or vomiting. No new complaints voiced otherwise.       Data reviewed today: I reviewed all medications, new labs and imaging results over the last 24 hours. I personally reviewed    Physical Exam   Vital Signs: Temp: 97.4  F (36.3  C) Temp src: Oral BP: 106/64 Pulse: 80 Heart Rate: 75 Resp: 20 SpO2: 99 % O2 Device: Nasal cannula Oxygen Delivery: 1 LPM  Weight: 175 lbs 0 oz    GENERAL:  Awake and alert, No acute distress.  PSYCH: appropriate affect, no acute agitation   HEENT:  Neck is Supple, trachea is midline, EOMI, conjunctiva clear  CARDIOVASCULAR: Regular rate and rhythm, Normal S1, S2   PULMONARY:  Clear sounds   GI: Abdomen is soft, non tender, non-distended  SKIN:  No cyanosis or clubbing  MSK: Extremities are warm and well perfused. No pitting edema   Neuro: Awake and oriented x 3    Data   Recent Labs   Lab 05/08/20  0550 05/07/20  1841 05/06/20  0214   WBC 12.6* 15.9* 14.9*   HGB 9.7* 10.6* 10.7*   MCV 82 82 83    247 203   * 127* 130*   POTASSIUM 3.8 3.8 3.7   CHLORIDE 96 92* 95   CO2 28 30 30   BUN 44* 45* 38*   CR 2.42* 2.61* 2.71*   ANIONGAP 6 5 5   HARPER 9.0 10.0 10.0   * 153* 163*   ALBUMIN  --  2.7* 2.7*   PROTTOTAL  --  7.5 6.9   BILITOTAL  --  0.7 0.7   ALKPHOS  --  112 92   ALT  --  19 14   AST  --  19 16   TROPI  --  <0.015 <0.015       Recent Results (from the past 24 hour(s))    XR Chest Port 1 View    Narrative    EXAM: XR CHEST PORT 1 VW  LOCATION: [facility]  DATE/TIME: 5/7/2020 7:09 PM    INDICATION: Shortness of breath  COMPARISON: 05/06/2020, 04/11/2020      Impression    IMPRESSION: Interval development of increased opacity left lung base suggestive of a underlying possible alveolar infiltrate or possible pneumonia. Recommend correlation with patient's clinical status. Linear areas of fibrotic change involving the left   costophrenic angle laterally remain stable. New minimal area of increased opacity right lateral lung base adjacent to the hemidiaphragm possibly relating to atelectasis or minimal amount of infiltrate. Both upper lungs are clear. Minimal cardiac   enlargement. Normal pulmonary vascularity. Atherosclerotic vascular calcification. Left-sided cardiac pacemaker. Degenerative changes in the spine and shoulders. Old healed left clavicular fracture.     Medications     - MEDICATION INSTRUCTIONS -         apixaban ANTICOAGULANT  5 mg Oral BID     azithromycin  250 mg Oral Q24H     cefTRIAXone  2 g Intravenous Q24H     insulin glargine  9 Units Subcutaneous At Bedtime     metoprolol succinate ER  25 mg Oral Daily     sodium chloride (PF)  3 mL Intracatheter Q8H     tamsulosin  0.4 mg Oral Daily

## 2020-05-08 NOTE — PLAN OF CARE
End of Shift Summary  For vital signs and complete assessments, please see documentation flowsheets.     Pertinent assessments: LS diminished, on 1-2L while sleeping (since O2 sat dips to below 90% while sleeping; pt reports he is meant to have an OP sleep study). Reports infrequent productive cough. Barrier cream to excoriation on buttocks.   Treatment Plan: IV rocephin, PO zithromax, tylenol for pain with cough

## 2020-05-09 LAB
ANION GAP SERPL CALCULATED.3IONS-SCNC: 6 MMOL/L (ref 3–14)
BUN SERPL-MCNC: 45 MG/DL (ref 7–30)
CALCIUM SERPL-MCNC: 8.8 MG/DL (ref 8.5–10.1)
CHLORIDE SERPL-SCNC: 98 MMOL/L (ref 94–109)
CO2 SERPL-SCNC: 27 MMOL/L (ref 20–32)
CREAT SERPL-MCNC: 2.22 MG/DL (ref 0.66–1.25)
ERYTHROCYTE [DISTWIDTH] IN BLOOD BY AUTOMATED COUNT: 16.5 % (ref 10–15)
GFR SERPL CREATININE-BSD FRML MDRD: 28 ML/MIN/{1.73_M2}
GLUCOSE BLDC GLUCOMTR-MCNC: 132 MG/DL (ref 70–99)
GLUCOSE SERPL-MCNC: 132 MG/DL (ref 70–99)
HCT VFR BLD AUTO: 33.2 % (ref 40–53)
HGB BLD-MCNC: 9.9 G/DL (ref 13.3–17.7)
MCH RBC QN AUTO: 24.3 PG (ref 26.5–33)
MCHC RBC AUTO-ENTMCNC: 29.8 G/DL (ref 31.5–36.5)
MCV RBC AUTO: 81 FL (ref 78–100)
PLATELET # BLD AUTO: 253 10E9/L (ref 150–450)
POTASSIUM SERPL-SCNC: 3.8 MMOL/L (ref 3.4–5.3)
RBC # BLD AUTO: 4.08 10E12/L (ref 4.4–5.9)
SARS-COV-2 PCR COMMENT: NORMAL
SARS-COV-2 RNA SPEC QL NAA+PROBE: NEGATIVE
SARS-COV-2 RNA SPEC QL NAA+PROBE: NORMAL
SODIUM SERPL-SCNC: 131 MMOL/L (ref 133–144)
SPECIMEN SOURCE: NORMAL
SPECIMEN SOURCE: NORMAL
WBC # BLD AUTO: 12.7 10E9/L (ref 4–11)

## 2020-05-09 PROCEDURE — 80048 BASIC METABOLIC PNL TOTAL CA: CPT | Performed by: INTERNAL MEDICINE

## 2020-05-09 PROCEDURE — 00000146 ZZHCL STATISTIC GLUCOSE BY METER IP

## 2020-05-09 PROCEDURE — 87635 SARS-COV-2 COVID-19 AMP PRB: CPT | Performed by: INTERNAL MEDICINE

## 2020-05-09 PROCEDURE — 99232 SBSQ HOSP IP/OBS MODERATE 35: CPT | Performed by: INTERNAL MEDICINE

## 2020-05-09 PROCEDURE — 25000131 ZZH RX MED GY IP 250 OP 636 PS 637: Performed by: INTERNAL MEDICINE

## 2020-05-09 PROCEDURE — 25000132 ZZH RX MED GY IP 250 OP 250 PS 637: Performed by: INTERNAL MEDICINE

## 2020-05-09 PROCEDURE — 85027 COMPLETE CBC AUTOMATED: CPT | Performed by: INTERNAL MEDICINE

## 2020-05-09 PROCEDURE — 12000000 ZZH R&B MED SURG/OB

## 2020-05-09 PROCEDURE — 25000128 H RX IP 250 OP 636: Performed by: INTERNAL MEDICINE

## 2020-05-09 RX ADMIN — AZITHROMYCIN MONOHYDRATE 250 MG: 250 TABLET ORAL at 20:49

## 2020-05-09 RX ADMIN — APIXABAN 5 MG: 5 TABLET, FILM COATED ORAL at 20:49

## 2020-05-09 RX ADMIN — CEFTRIAXONE 2 G: 2 INJECTION, POWDER, FOR SOLUTION INTRAMUSCULAR; INTRAVENOUS at 21:06

## 2020-05-09 RX ADMIN — ACETAMINOPHEN 650 MG: 325 TABLET, FILM COATED ORAL at 07:53

## 2020-05-09 RX ADMIN — ACETAMINOPHEN 650 MG: 325 TABLET, FILM COATED ORAL at 13:18

## 2020-05-09 RX ADMIN — ACETAMINOPHEN 650 MG: 325 TABLET, FILM COATED ORAL at 01:23

## 2020-05-09 RX ADMIN — TAMSULOSIN HYDROCHLORIDE 0.4 MG: 0.4 CAPSULE ORAL at 08:05

## 2020-05-09 RX ADMIN — APIXABAN 5 MG: 5 TABLET, FILM COATED ORAL at 07:53

## 2020-05-09 RX ADMIN — ACETAMINOPHEN 650 MG: 325 TABLET, FILM COATED ORAL at 18:11

## 2020-05-09 RX ADMIN — INSULIN GLARGINE 9 UNITS: 100 INJECTION, SOLUTION SUBCUTANEOUS at 21:32

## 2020-05-09 ASSESSMENT — MIFFLIN-ST. JEOR: SCORE: 1507.36

## 2020-05-09 ASSESSMENT — ACTIVITIES OF DAILY LIVING (ADL)
ADLS_ACUITY_SCORE: 11

## 2020-05-09 NOTE — PLAN OF CARE
Pt admitted for pain and SOB. Pt being treated for pneumonia. IV rocephin and po zithromax. Denies SOB. PRN tylenol for generalized pain. VSS. Possible discharge home tomorrow. Will continue to monitor.

## 2020-05-09 NOTE — PLAN OF CARE
End of Shift Summary  For vital signs and complete assessments, please see documentation flowsheets.     Pertinent assessments: LS diminished. No SOB reported.     Major Shift Events: Tylenol given for shoulder & back pain. COVID-19 negative. Report given to Dayan, transferred to 3rd floor with belongings (cell phone, , glasses x2, clothing, dentures).

## 2020-05-09 NOTE — PROGRESS NOTES
[facility]    Medicine Progress Note - Hospitalist Service    Date of Admission:  5/7/2020  Date of Service: 05/09/2020    Assessment & Plan     75 year old male with a complex medical history with IDDM type II, CKD stage III, BPH, anemia, diastolic and systolic CHF, and recent prolonged hospitalization from 3/17 through 4/17/2020 with cardiogenic shock, sepsis likely from pyelonephritis, DEONDRE, new systolic CHF requiring diuresis and then A. fib requiring AV node ablation and pacemaker placement on 4/10/2020, who presented initially to the ED with some left-sided chest pain along with shortness of breath. His COVID testing and CXR was negative and discharged home. However, returned back to ER with persistent symptoms and repeat XR suggestive of PNA.     Suspect CAP  PUI for COVID   Having some pleuritic sounding left lower chest pain and now upper central chest pain.  Pain is an ache and lasts a very short time with deep breaths preventing him from taking deep breath.  Doubt PE given he has not missed any doses of his therapeutic Eliquis.  Troponin is undetectable.  EKG shows a paced rhythm. He does have leukocytosis at 15.9, but has not had fevers.  He did test negative for COVID19, however  this remains in the differential given he is also having some myalgias and shortness of breath.  His CRP is quite elevated at 291     -Continue with IV ceftriaxone and azithromycin, pt feeling better today with ABx, improvement in pain and SOB  -Add on procalcitonin  -Continue isolation precautions for COVID19 PUI.  repeat test done and pending   -Continuous pulse ox and supplemental oxygen as needed, wean as able     Hyponatremia, at least CKD stage III: Sodium down to 127.  He was up to 136 then 131 recently.  Had a significant DEONDRE during his long hospitalization for cardiogenic shock and sepsis.  Previous CKD stage III now creatinine peak in the high 3 range during admission trending down to 2.6 today.  Appears  mildly intravascularly dry.  Is on Bumex 1 mg daily.  -Give 1 L of normal saline overnight on admission, Na better today, holding Bumex     Chronic systolic and diastolic CHF, severe MR: Admission for cardiogenic shock in setting of possible sepsis as well 3/27 through 4/17/2020 ridges and then self still for pacemaker placement.  EF down to 20-25%.  Also has grade 3 diastolic dysfunction along with severe MR.  He was diuresed over 20 pounds during that admission.  Discharge weight was 186 pounds and he is now down to 175 pounds now.  Only trace lower extremity edema if any.  No respiratory symptoms from CHF.  His BNP is elevated at 9000 although it has been similar this high during admission.  He does have severe MR and there is left atrial enlargement.  His creatinine is slowly been improving since his admission and his sodium is now down to 127.   -Suspect he is mildly intravascularly dry, therefore will hold his 1 mg daily Bumex for now.  May just need an as needed dose of Bumex for weight gain on discharge with close follow-up in cardiology clinic  -Resume his metoprolol and Eliquis     A. fib: s/p AV node ablation and pacemaker placed on 4/10/2020.  He has not missed any doses of his Eliquis.  Is also on metoprolol succinate 25 mg daily that was just restarted as it was held previously for lower blood pressures.  EKG shows a ventricular paced rhythm.  -Continue PTA Eliquis and metoprolol succinate    Chronic anemia: Hemoglobin stable in 10 range.    Type II DM: Resume PTA Lantus 9 units at bedtime.    BPH, urinary retention: Resume PTA Flomax.  Has Mckenzie catheter in place as he failed a voiding trial this past week in urology clinic.  Continue Mckenzie catheter.     Suspected ANDRESSA: He is supposed to follow-up in pulmonary clinic for formal sleep study.    Diet: Combination Diet 2 gm NA Diet    DVT Prophylaxis: Apixaban  Mckenzie Catheter: in place, indication: Retention  Code Status: Full Code      Disposition Plan    Expected discharge: Tomorrow, recommended to prior living arrangement once antibiotic plan established and infection treated.  Entered: Mikael Clifton MD 05/09/2020, 1:32 PM       The patient's care was discussed with the Bedside Nurse and Patient.    Mikael Clifton MD  Hospitalist Service  [facility]    ______________________________________________________________________    Interval History   Chart reviewed and patient seen. Case discussed with nursing staff.     Patient feels improved , reports pleuritic pain is going away and even better now, SOB improving, No reports of abdominal pain or vomiting. No new complaints voiced otherwise.       Data reviewed today: I reviewed all medications, new labs and imaging results over the last 24 hours. I personally reviewed    Physical Exam   Vital Signs: Temp: 97.7  F (36.5  C) Temp src: Oral BP: 120/67 Pulse: 80 Heart Rate: 83 Resp: 16 SpO2: 98 % O2 Device: None (Room air) Oxygen Delivery: 2 LPM  Weight: 174 lbs 9.6 oz    GENERAL:  Awake and alert, No acute distress.  PSYCH: appropriate affect, no acute agitation   HEENT:  Neck is Supple, trachea is midline, EOMI, conjunctiva clear  CARDIOVASCULAR: Regular rate and rhythm, Normal S1, S2   PULMONARY:  Clear sounds   GI: Abdomen is soft, non tender, non-distended  SKIN:  No cyanosis or clubbing  MSK: Extremities are warm and well perfused. No pitting edema   Neuro: Awake and oriented x 3    Data   Recent Labs   Lab 05/09/20  0530 05/08/20  0550 05/07/20  1841 05/06/20  0214   WBC 12.7* 12.6* 15.9* 14.9*   HGB 9.9* 9.7* 10.6* 10.7*   MCV 81 82 82 83    218 247 203   * 130* 127* 130*   POTASSIUM 3.8 3.8 3.8 3.7   CHLORIDE 98 96 92* 95   CO2 27 28 30 30   BUN 45* 44* 45* 38*   CR 2.22* 2.42* 2.61* 2.71*   ANIONGAP 6 6 5 5   HARPER 8.8 9.0 10.0 10.0   * 122* 153* 163*   ALBUMIN  --   --  2.7* 2.7*   PROTTOTAL  --   --  7.5 6.9   BILITOTAL  --   --  0.7 0.7   ALKPHOS  --   --  112 92    ALT  --   --  19 14   AST  --   --  19 16   TROPI  --   --  <0.015 <0.015       No results found for this or any previous visit (from the past 24 hour(s)).  Medications     - MEDICATION INSTRUCTIONS -         apixaban ANTICOAGULANT  5 mg Oral BID     azithromycin  250 mg Oral Q24H     cefTRIAXone  2 g Intravenous Q24H     insulin glargine  9 Units Subcutaneous At Bedtime     metoprolol succinate ER  25 mg Oral Daily     sodium chloride (PF)  3 mL Intracatheter Q8H     tamsulosin  0.4 mg Oral Daily

## 2020-05-09 NOTE — PROGRESS NOTES
End of Shift Summary  For vital signs and complete assessments, please see documentation flowsheets.     Pertinent assessments: LS diminished, on 2LPM overnight for desat. Reports infrequent productive cough. Barrier cream to excoriation on buttocks.   Treatment Plan: IV rocephin, PO zithromax, tylenol for pain    Discharge Readiness: Possible discharge  Expected Discharge Date: 5/10  Discharge Disposition: Home with Self care  Barriers/Criteria for discharge : Abx

## 2020-05-10 VITALS
WEIGHT: 174.4 LBS | HEART RATE: 80 BPM | OXYGEN SATURATION: 97 % | TEMPERATURE: 97.4 F | RESPIRATION RATE: 16 BRPM | BODY MASS INDEX: 25.83 KG/M2 | SYSTOLIC BLOOD PRESSURE: 109 MMHG | DIASTOLIC BLOOD PRESSURE: 70 MMHG | HEIGHT: 69 IN

## 2020-05-10 LAB
ANION GAP SERPL CALCULATED.3IONS-SCNC: 5 MMOL/L (ref 3–14)
BACTERIA SPEC CULT: ABNORMAL
BACTERIA SPEC CULT: ABNORMAL
BUN SERPL-MCNC: 43 MG/DL (ref 7–30)
CALCIUM SERPL-MCNC: 8.9 MG/DL (ref 8.5–10.1)
CHLORIDE SERPL-SCNC: 100 MMOL/L (ref 94–109)
CO2 SERPL-SCNC: 28 MMOL/L (ref 20–32)
CREAT SERPL-MCNC: 2.1 MG/DL (ref 0.66–1.25)
GFR SERPL CREATININE-BSD FRML MDRD: 29 ML/MIN/{1.73_M2}
GLUCOSE SERPL-MCNC: 118 MG/DL (ref 70–99)
POTASSIUM SERPL-SCNC: 4.1 MMOL/L (ref 3.4–5.3)
SODIUM SERPL-SCNC: 133 MMOL/L (ref 133–144)
SPECIMEN SOURCE: ABNORMAL

## 2020-05-10 PROCEDURE — 25000132 ZZH RX MED GY IP 250 OP 250 PS 637: Performed by: INTERNAL MEDICINE

## 2020-05-10 PROCEDURE — 80048 BASIC METABOLIC PNL TOTAL CA: CPT | Performed by: INTERNAL MEDICINE

## 2020-05-10 PROCEDURE — 99239 HOSP IP/OBS DSCHRG MGMT >30: CPT | Performed by: INTERNAL MEDICINE

## 2020-05-10 PROCEDURE — 36415 COLL VENOUS BLD VENIPUNCTURE: CPT | Performed by: INTERNAL MEDICINE

## 2020-05-10 RX ORDER — METOPROLOL SUCCINATE 25 MG/1
25 TABLET, EXTENDED RELEASE ORAL DAILY
COMMUNITY
Start: 2020-05-10 | End: 2021-04-16

## 2020-05-10 RX ORDER — CARBOXYMETHYLCELLULOSE SODIUM 5 MG/ML
2 SOLUTION/ DROPS OPHTHALMIC
Status: DISCONTINUED | OUTPATIENT
Start: 2020-05-10 | End: 2020-05-10 | Stop reason: HOSPADM

## 2020-05-10 RX ORDER — AZITHROMYCIN 250 MG/1
250 TABLET, FILM COATED ORAL EVERY EVENING
Qty: 2 TABLET | Refills: 0 | Status: SHIPPED | OUTPATIENT
Start: 2020-05-10 | End: 2020-05-14

## 2020-05-10 RX ORDER — CEFDINIR 300 MG/1
300 CAPSULE ORAL 2 TIMES DAILY
Qty: 10 CAPSULE | Refills: 0 | Status: SHIPPED | OUTPATIENT
Start: 2020-05-10 | End: 2020-08-13

## 2020-05-10 RX ORDER — BUMETANIDE 0.5 MG/1
0.5 TABLET ORAL DAILY
COMMUNITY
Start: 2020-05-10 | End: 2020-05-14

## 2020-05-10 RX ADMIN — ACETAMINOPHEN 650 MG: 325 TABLET, FILM COATED ORAL at 05:17

## 2020-05-10 RX ADMIN — TAMSULOSIN HYDROCHLORIDE 0.4 MG: 0.4 CAPSULE ORAL at 11:32

## 2020-05-10 RX ADMIN — METOPROLOL SUCCINATE 25 MG: 25 TABLET, FILM COATED, EXTENDED RELEASE ORAL at 09:59

## 2020-05-10 RX ADMIN — ACETAMINOPHEN 650 MG: 325 TABLET, FILM COATED ORAL at 00:21

## 2020-05-10 RX ADMIN — APIXABAN 5 MG: 5 TABLET, FILM COATED ORAL at 09:59

## 2020-05-10 ASSESSMENT — ACTIVITIES OF DAILY LIVING (ADL)
ADLS_ACUITY_SCORE: 11

## 2020-05-10 ASSESSMENT — MIFFLIN-ST. JEOR: SCORE: 1506.45

## 2020-05-10 NOTE — DISCHARGE SUMMARY
AVS reviewed with pt. All questions answered. Pt denies any further questions or concerns. PIV removed, no complications. Telemetry monitor removed. All belongings returned. Pt escorted to front door via WC by [facility] staff, home w/ wife.

## 2020-05-10 NOTE — CONSULTS
Care Transition Initial Assessment - RN    Met with: Patient.  DATA   Active Problems:    CAP (community acquired pneumonia)     PNA & CHF Action Plans discussed with patient at bedside.   Cognitive Status: awake, alert and oriented.  Primary Care Clinic Name: [facility], [facility]  Primary Care MD Name: Dr. Dm Lipscomb  Contact information and PCP information verified: Yes  Lives With: significant other      Description of Support System: Supportive, Involved   Who is your support system?: Significant Other       Insurance concerns: No Insurance issues identified  ASSESSMENT  Patient currently receives the following services:  Existing home care services of OT, PT, RN & SW through [facility]. Orders to resume services will be placed on discharge.         Identified issues/concerns regarding health management: Patient's EF has dropped to 20-25%. He was admitted for r/o COVID and PNA. Slight CHF exacerbation found incidentally. He monitors his sodium intake of 1800mg/day. He is more restricted than the usual 2000mg/day. He has a scale and weighs himself daily. His significant other, [place], provides his care along with [facility]. He uses [facility] in [place] and has no issues getting his prescriptions.     CTS identifies patient as high risk due to elevated MAUREEN score. Currently admitted for PNA & CHF, this is his 2nd admission in 6 months. He has had 1 ED-only visit in 6 months. Per chart review, pt resides at home with significant other, [place]. Baseline mobility is independent. He is currently a standby assist.     His PMH that can effect his MAUREEN score includes anemia, Afib, BPH, CHF, CKD & DM2. His PTA medications that can effect his MAUREEN score include Eliquis & Lantus.      PLAN  Financial costs for the patient were not discussed.  Patient given options and choices for discharge.  Patient/family is agreeable to the plan?  Yes  Patient anticipates discharging to home.    Patient anticipates needs for home  equipment: No, he currently has a cane & 4WW as needed.   Transportation/person available to transport on day of discharge is China.  She will be notified when he is ready for discharge.   Plan/Disposition: Home   Appointments: Patient will schedule an earlier f/u with the [facility] after discharge.     CM will continue to follow patient until discharge for any additional needs.     Ami Infante RN, BSN, CPHN, CM  Inpatient Care Coordination  [facility]  [phone]

## 2020-05-10 NOTE — PLAN OF CARE
"Presentation/Diagnosis: pneumonia  Vitals: /76 (BP Location: Right arm)   Pulse 80   Temp 98.5  F (36.9  C) (Oral)   Resp 18   Ht 1.753 m (5' 9\")   Wt 79.2 kg (174 lb 9.6 oz)   SpO2 97%   BMI 25.78 kg/m   Pt on RA. Tylenol given x2 for pain. Pt reports pain with deep breaths.  Cardiac: Pacemaker, apical pulse regular.  Telemetry: 100% bi-ventricular paced.  Respiratory: LS clear, pain with taking deep breaths.   Neuro: A&O x4  GI/: chronic baker in place, draining. BS normoactive.  Skin: WNL  LDAs: piv saline locked.  Plan: possible discharge today     "

## 2020-05-10 NOTE — DISCHARGE INSTRUCTIONS
You have a resumption order for [facility] for RN, OT, PT & SW services. They will be contacting you within 24-48 hours to set up resumption visit. If you have not heard from them after this time, please contact them at ([phone]).

## 2020-05-10 NOTE — PLAN OF CARE
A/O x 4. VSS on RA. Denied pain. Tele 100% bi-ventricular paced w/ BBB, denied CP . LS clear, no SOB reported. PIV to left intact, SL . Mckenzie patent and intact w/ good UOP; cath cares performed by pt per request. Up ad chelita in room, steady gait and denies dizziness. Adequate for discharge today, home w/ wife. Will continue POC.     Heart Failure Care Pathway  GOALS TO BE MET BEFORE DISCHARGE:    1. Decrease congestion and/or edema with diuretic therapy to achieve near      optimal volume status.            Dyspnea improved:  Yes            Edema improved:     Yes        Net I/O and Weights since admission:          04/10 1500 - 05/10 1459  In: 840 [P.O.:840]  Out: 4250 [Urine:4250]  Net: -3410            Vitals:    05/07/20 1823 05/09/20 0500 05/10/20 0613   Weight: 79.4 kg (175 lb) 79.2 kg (174 lb 9.6 oz) 79.1 kg (174 lb 6.4 oz)       2.  O2 sats > 92% on RA or at prior home O2 therapy level.          Current oxygenation status:       SpO2: 97 %         O2 Device: None (Room air),            Able to wean O2 this shift to keep sats > 92%:  Yes       Does patient use Home O2? No    3.  Tolerates ambulation and mobility near baseline: Yes        How many times did the patient ambulate with nursing staff this shift? In room     Please review the Heart Failure Care Pathway for additional HF goal outcomes.    Larisa Huang RN RN  5/10/2020

## 2020-05-11 ENCOUNTER — TELEPHONE (OUTPATIENT)
Dept: INTERNAL MEDICINE | Facility: CLINIC | Age: 75
End: 2020-05-11

## 2020-05-11 DIAGNOSIS — Z92.89 HISTORY OF RECENT HOSPITALIZATION: Primary | ICD-10-CM

## 2020-05-11 NOTE — TELEPHONE ENCOUNTER
"Hospital/TCU/ED for chronic condition Discharge Protocol    \"Hi, my name is Margret Burr RN, a registered nurse, and I am calling from [facility].  I am calling to follow up and see how things are going for you after your recent emergency visit/hospital/TCU stay.\"    Tell me how you are doing now that you are home?\" patient states he is doing very well.  No concerns/issues.      Discharge Instructions    \"Let's review your discharge instructions.  What is/are the follow-up recommendations?  Pt. Response: follow up with provider.    \"Has an appointment with your primary care provider been scheduled?\"   Yes. (confirm)    \"When you see the provider, I would recommend that you bring your medications with you.\"    Medications    \"Tell me what changed about your medicines when you discharged?\"    Changes to chronic meds?    2 or more - Epic MTM referral needed    \"What questions do you have about your medications?\"    None     New diagnoses of heart failure, COPD, diabetes, or MI?    No     On insulin: \"Did you start on insulin in the hospital or did you have your insulin dose changed?\"  No         Post Discharge Medication Reconciliation Status: discharge medications reconciled and changed, per note/orders (see AVS).  See discharge summary.    Was MTM referral placed (*Make sure to put transitions as reason for referral)?   No--one was place by hospitalist (see discharge summary).    Call Summary    \"What questions or concerns do you have about your recent visit and your follow-up care?\"     none    \"If you have questions or things don't continue to improve, we encourage you contact us through the main clinic number (give number).  Even if the clinic is not open, triage nurses are available 24/7 to help you.     We would like you to know that our clinic has extended hours (provide information).  We also have urgent care (provide details on closest location and hours/contact info)\"      \"Thank you for your time " "and take care!\"             "

## 2020-05-11 NOTE — TELEPHONE ENCOUNTER
[facility] now requests orders and shares plan of care/discharge summaries for some patients through [employer].  Please REPLY TO THIS MESSAGE OR ROUTE BACK TO THE AUTHOR in order to give authorization for orders when needed.  This is considered a verbal order, you will still receive a faxed copy of orders for signature.  Thank you for your assistance in improving collaboration for our patients.    ORDER  INGRIS orders,   SN 1 week 4, 3 prn. OT lymph eval, PT eval, SW assess    JIMMIE Contreras

## 2020-05-11 NOTE — TELEPHONE ENCOUNTER
IP F/U    Date: 5-10-20  Diagnosis: pneumonia of Left lung d/t infectious organism, community acquired pneumonia  Is patient active in care coordination? No  Was patient in TCU? No

## 2020-05-11 NOTE — TELEPHONE ENCOUNTER
Disposable incontinence supply order for underwear/pull-ups received via fax. Form in your mailbox to be signed.

## 2020-05-12 ENCOUNTER — TELEPHONE (OUTPATIENT)
Dept: INTERNAL MEDICINE | Facility: CLINIC | Age: 75
End: 2020-05-12

## 2020-05-12 ENCOUNTER — ALLIED HEALTH/NURSE VISIT (OUTPATIENT)
Dept: PHARMACY | Facility: CLINIC | Age: 75
End: 2020-05-12
Payer: COMMERCIAL

## 2020-05-12 DIAGNOSIS — G47.00 INSOMNIA, UNSPECIFIED TYPE: ICD-10-CM

## 2020-05-12 DIAGNOSIS — I50.42 CHRONIC COMBINED SYSTOLIC AND DIASTOLIC CONGESTIVE HEART FAILURE (H): ICD-10-CM

## 2020-05-12 DIAGNOSIS — E63.9 NUTRITIONAL DEFICIENCY: ICD-10-CM

## 2020-05-12 DIAGNOSIS — J18.9 COMMUNITY ACQUIRED PNEUMONIA, UNSPECIFIED LATERALITY: Primary | ICD-10-CM

## 2020-05-12 DIAGNOSIS — N13.8 BPH WITH OBSTRUCTION/LOWER URINARY TRACT SYMPTOMS: ICD-10-CM

## 2020-05-12 DIAGNOSIS — I48.91 ATRIAL FIBRILLATION WITH RAPID VENTRICULAR RESPONSE (H): ICD-10-CM

## 2020-05-12 DIAGNOSIS — N40.1 BPH WITH OBSTRUCTION/LOWER URINARY TRACT SYMPTOMS: ICD-10-CM

## 2020-05-12 PROCEDURE — 99607 MTMS BY PHARM ADDL 15 MIN: CPT | Performed by: PHARMACIST

## 2020-05-12 PROCEDURE — 99605 MTMS BY PHARM NP 15 MIN: CPT | Performed by: PHARMACIST

## 2020-05-12 RX ORDER — LANOLIN ALCOHOL/MO/W.PET/CERES
3 CREAM (GRAM) TOPICAL
COMMUNITY

## 2020-05-12 NOTE — DISCHARGE SUMMARY
[facility]  Hospitalist Discharge Summary       Date of Admission:  5/7/2020  Date of Discharge:  5/10/2020  2:00 PM  Discharging Provider: Mikael Clifton MD      Discharge Diagnoses   Community acquired pneumonia.   Ruled out for COVID-19  Hyponatremia. Pre-renal     Secondary diagnosis:  CKD stage III  Chronic systolic and diastolic CHF  A.Fib s/p AVN and PPM  Chronic anemia  T2DM  BPH, Urinary retention  Suspected ANDRESSA    Follow-ups Needed After Discharge   Follow-up Appointments     Follow-up and recommended labs and tests       Follow up with your cardiology clinic as scheduled. Monitor your weight   daily at home. Call the clinic for increasing weight, edema, SOB.     Follow up with your primary doctor in 2-4 weeks             Unresulted Labs Ordered in the Past 30 Days of this Admission     Date and Time Order Name Status Description    5/7/2020 1903 Blood culture Preliminary     5/7/2020 1903 Blood culture Preliminary           Hospital Course     75 year old male with a complex medical history with IDDM type II, CKD stage III, BPH, anemia, diastolic and systolic CHF, and recent prolonged hospitalization from 3/17 through 4/17/2020 with cardiogenic shock, sepsis likely from pyelonephritis, DEONDRE, new systolic CHF requiring diuresis and then A. fib requiring AV node ablation and pacemaker placement on 4/10/2020, who presented initially to the ED with some left-sided chest pain along with shortness of breath. His COVID testing and CXR was negative and discharged home. However, returned back to ER with persistent symptoms and repeat XR suggestive of PNA.      His procalcitonin was elevated at 0.8.  Overall, clinical presentation consistent with bacterial pneumonia.  He did respond to antibiotics with dramatic improvement in his symptoms.  He feels much better since the time of admission.  He reports that his pain is almost resolved.  Breathing a lot more easier.  Improvement in his energy and  strength.  Repeat COVID test was done which again came back negative ruling that out.  Feels ready for discharge home and appears stable for that.    Other hospital issues as follows   Hyponatremia, CKD at least stage III: Sodium down to 127.  He was up to 136 then 131 recently.  Had a significant DEONDRE during his long hospitalization for cardiogenic shock and sepsis.  Previous CKD stage III now creatinine peak in the high 3 range during admission trending down to 2.6 on admission.   --  Appeared mildly intravascularly dry.  Is on Bumex 1 mg daily.  The Bumex was held and was given gentle hydration with improvement in his sodium and renal function.  Creatinine is now down to 2.1.  His weight has been stable.  Discussed the different options with the patient and he feels more comfortable with just taking half dose of the Bumex for now and following up with his cardiologist which is scheduled coming shortly.  Advised to weigh himself every day at home and if his notices increasing weight then contact the cardiology clinic.     Chronic systolic and diastolic CHF, severe MR: Admission for cardiogenic shock in setting of possible sepsis as well 3/27 through 4/17/2020 ridges and then self still for pacemaker placement.  EF down to 20-25%.  Also has grade 3 diastolic dysfunction along with severe MR.  He was diuresed over 20 pounds during that admission.  Discharge weight was 186 pounds and he is now down to 175 pounds now.  Only trace lower extremity edema if any.  No respiratory symptoms from CHF.  His BNP is elevated at 9000 although it has been similar this high during admission.  He does have severe MR and there is left atrial enlargement. Resumed on his metoprolol and Eliquis.  His dose of metoprolol was increased to 25 mg daily by his outpatient providers a few days ago, although the patient feels more comfortable taking half the dose.  Advised to discuss with his outpatient cardiologist at his upcoming  appointment     A. fib: s/p AV node ablation and pacemaker placed on 4/10/2020.  He has not missed any doses of his Eliquis.  Is also on metoprolol succinate.   BPH, urinary retention: Resume PTA Flomax.  Has Mckenzie catheter in place as he failed a voiding trial this past week in urology clinic.  Continue Mckenzie catheter.  Has upcoming appointment for repeat trial of voiding  Suspected ANDRESSA: He is supposed to follow-up in pulmonary clinic for formal sleep study.  Chronic anemia.  Hemoglobin has been stable in the 9-10 range.  No signs of any active bleeding.  Suspect this is related to his chronic kidney disease and recent prolonged critical illness.  Further work-up as an outpatient      Consultations This Hospital Stay   CARE COORDINATOR IP CONSULT    Code Status   Prior    Time Spent on this Encounter   I, Mikael Clifton MD, personally saw the patient today and spent greater than 30 minutes discharging this patient.       Mikael Clifton MD  [facility]  ______________________________________________________________________    Physical Exam   Weight: 174 lbs 6.4 oz  Patient is awake and alert, no acute distress  Lungs are clear to auscultation, no significant edema       Primary Care Physician   [facility]    Discharge Disposition   Discharged to home  Condition at discharge: Stable        Discharge Orders      Medication Therapy Management Referral      Home care nursing referral      Reason for your hospital stay    Pneumonia     Follow-up and recommended labs and tests     Follow up with your cardiology clinic as scheduled. Monitor your weight daily at home. Call the clinic for increasing weight, edema, SOB.     Follow up with your primary doctor in 2-4 weeks     Diet    Follow this diet upon discharge: Orders Placed This Encounter      Combination Diet 2 gm NA Diet     Discharge Medications   Discharge Medication List as of 5/10/2020 12:50 PM      START taking these  medications    Details   azithromycin (ZITHROMAX) 250 MG tablet Take 1 tablet (250 mg) by mouth every evening for 2 days, Disp-2 tablet,R-0, E-Prescribe      cefdinir (OMNICEF) 300 MG capsule Take 1 capsule (300 mg) by mouth 2 times daily for 5 days, Disp-10 capsule,R-0, E-Prescribe         CONTINUE these medications which have CHANGED    Details   bumetanide (BUMEX) 0.5 MG tablet Take 1 tablet (0.5 mg) by mouth daily, Historical      metoprolol succinate ER (TOPROL-XL) 25 MG 24 hr tablet Take 0.5 tablets (12.5 mg) by mouth daily, Historical         CONTINUE these medications which have NOT CHANGED    Details   acetaminophen (TYLENOL) 500 MG tablet Take 1,000 mg by mouth every 8 hours , Historical      apixaban ANTICOAGULANT (ELIQUIS) 5 MG tablet Take 1 tablet (5 mg) by mouth 2 times daily, Disp-60 tablet,R-0, E-PrescribeFuture refills by PCP Dr. Martínez Duarte with phone number [phone].      insulin glargine (LANTUS SOLOSTAR) 100 UNIT/ML pen Inject 9 Units Subcutaneous At Bedtime Future refills by PCP Dr. Martínez Duarte with phone number [phone]., Disp-15 mL,R-0, E-Prescribe      insulin pen needle (31G X 8 MM) 31G X 8 MM miscellaneous 1 Box of 100 insulin pen needles to be dispensed with every insulin pen prescriptionDisp-100 each,[id number] refills by PCP Dr. Martínez Duarte with phone number [phone].      tamsulosin (FLOMAX) 0.4 MG capsule Take 0.4 mg by mouth At Bedtime , Historical           Allergies   No Known Allergies    Significant Results and Procedures   Results for orders placed or performed during the hospital encounter of 05/07/20   XR Chest Port 1 View    Narrative    EXAM: XR CHEST PORT 1 VW  LOCATION: [facility]  DATE/TIME: 5/7/2020 7:09 PM    INDICATION: Shortness of breath  COMPARISON: 05/06/2020, 04/11/2020      Impression    IMPRESSION: Interval development of increased opacity left lung base suggestive of a underlying possible alveolar  infiltrate or possible pneumonia. Recommend correlation with patient's clinical status. Linear areas of fibrotic change involving the left   costophrenic angle laterally remain stable. New minimal area of increased opacity right lateral lung base adjacent to the hemidiaphragm possibly relating to atelectasis or minimal amount of infiltrate. Both upper lungs are clear. Minimal cardiac   enlargement. Normal pulmonary vascularity. Atherosclerotic vascular calcification. Left-sided cardiac pacemaker. Degenerative changes in the spine and shoulders. Old healed left clavicular fracture.

## 2020-05-12 NOTE — PROGRESS NOTES
MTM ENCOUNTER  SUBJECTIVE/OBJECTIVE:                           Gene Pagan is a 75 year old male called for a transitions of care visit. He was discharged from [facility] on 5/10/20 for pneumonia.  Other recent discharge on 4/10 for Afib and seen in ED on 5/6 for viral bronchitis.    Patient consented to a telehealth visit: yes  Telemedicine Visit Details  Type of service:  Telephone visit  Start Time: 10:31 AM  End Time: 11:10 am  Originating Location (pt. Location): Home  Distant Location (provider location):  [facility]  Mode of Communication:  Telephone    Chief Complaint: no concerns - [facility] is [facility]    Allergies/ADRs: Reviewed in Epic  Tobacco:  reports that he has never smoked. He has never used smokeless tobacco.  Alcohol: not currently using  Caffeine: no caffeine  Activity: active around the home  PMH: Reviewed in Epic    Medication Adherence/Access: no issues reported, manages his own medication.  No missed doses since being home.    Pneumonia: Taking cefdinir BID (through Friday) and azithromycin (finishes today).  Some lung congestion but getting better.  No fever at this time.  Sleeping great.      Insomnia:  Melatonin 3 mg before bed.  Sleeping through night.  No concerns with side effects.    HFrEF: Current medications include metoprolol succinate 12.5 mg daily (decreased) and bumetanide 0.5 mg daily (dose decreased).  Patient reports no current medication side effects.     Blood pressure at home 104/60, 110/63, 113/60, 109/61.  ECHO:  Date 4/11/20, EF 25-30%  Pt is complaining of sx of HFof:  increased leg swelling - home care wraps legs for him and is scheduled to be out today.   Pt is measuring daily weights and reports 174 lbs, 176, 176.    Pt is following a low sodium diet, is not avoiding EtOH.  BP Readings from Last 3 Encounters:   05/10/20 109/70   05/06/20 110/63   05/06/20 106/59       Afib: S/p AVN ablation and CRT-p implant on 4/10.  Taking Eliquis 5 mg BID and  metoprolol 12.5 mg daily.  No concerns with bruising or bleeding.  Sensed a high, rapid vibration in his ear once since being home- questions if this was an episode of afib.  He has been noticing this for some time.    Followed by Cardiology and [facility].    Type 2 Diabetes:  Pt currently taking Lantus 9 units daily ( started while inpatient; . Pt is not experiencing side effects.  SMBG: one time(s) daily. Ranges (patient reported):  Fasting before admission: 110-120, 123, 135, 163, 171   Since being home: 113-135  Symptoms of low blood sugar? none  Symptoms of high blood sugar? none  Eye exam: up to date, has regular eye exams  Foot exam: due  Aspirin: Not taking due to being on Eliquis  Statin: No   ACEi/ARB: No.   Urine Albumin: No results found for: UMALCR     Lab Results   Component Value Date    A1C 6.2 03/27/2020         Last Comprehensive Metabolic Panel:  Sodium   Date Value Ref Range Status   05/10/2020 133 133 - 144 mmol/L Final     Potassium   Date Value Ref Range Status   05/10/2020 4.1 3.4 - 5.3 mmol/L Final     Chloride   Date Value Ref Range Status   05/10/2020 100 94 - 109 mmol/L Final     Carbon Dioxide   Date Value Ref Range Status   05/10/2020 28 20 - 32 mmol/L Final     Anion Gap   Date Value Ref Range Status   05/10/2020 5 3 - 14 mmol/L Final     Glucose   Date Value Ref Range Status   05/10/2020 118 (H) 70 - 99 mg/dL Final     Urea Nitrogen   Date Value Ref Range Status   05/10/2020 43 (H) 7 - 30 mg/dL Final     Creatinine   Date Value Ref Range Status   05/10/2020 2.10 (H) 0.66 - 1.25 mg/dL Final     GFR Estimate   Date Value Ref Range Status   05/10/2020 29 (L) >60 mL/min/[1.73_m2] Final     Comment:     Non  GFR Calc  Starting 12/18/2018, serum creatinine based estimated GFR (eGFR) will be   calculated using the Chronic Kidney Disease Epidemiology Collaboration   (CKD-EPI) equation.       Calcium   Date Value Ref Range Status   05/10/2020 8.9 8.5 - 10.1 mg/dL Final        BPH: Taking tamsulosin 0.4 mg daily.  Has been on catheter since admission in March.  No concerns with signs of infection.  Follow-up on May 20th with Urology.      Supplements: Currently taking MV daily and vitamin D 3000 units daily.      Today's Vitals: There were no vitals taken for this visit. phone visit      ASSESSMENT:                              Medication Adherence: excellent, no issues identified    Pneumonia: improved.  He will complete course of antibiotics as prescribed.  Follow-up scheduled with PCP this week.    Insomnia: stable    HFrEF: stable    Afib:  advised patient to log episodes of 'ear vibrations' with his vitals and contact Cardiology team if occurrence increases.    Type 2 Diabetes:  A1c at goal.  Reported home readings at goal.  Patient not interested in vaccines.    Per patient, he is up to date on eye exam but report may have been sent to previous PCP he will request future reports be sent to Dr. Lipscomb.  Unclear history of statin use, consider rechecking lipids.    BPH: follow-up with Urology as planned    Supplements: stable    PLAN:                          Post Discharge Medication Reconciliation Status: discharge medications reconciled and changed, per note/orders (see AVS).  1.  Keep log of sensation in your ears and contact Cardiology team if occurrence continues or increases  2.  Complete course of antibiotics as planned  3.  Asked patient to have future eye exam reports faxed to Dr. Lipscomb    Future Consideration  1.  Check lipids and consider starting statin      I spent 40 minutes with this patient today. All changes were made via collaborative practice agreement with PCP. A copy of the visit note was provided to the patient's primary care provider.    Will follow up in as needed.    The patient was sent via [employer] a summary of these recommendations.     Seda Sheets , Pharm D  [phone] (phone)  [phone] (pager)  Medication Therapy Management Pharmacist

## 2020-05-13 ENCOUNTER — PATIENT OUTREACH (OUTPATIENT)
Dept: CARE COORDINATION | Facility: CLINIC | Age: 75
End: 2020-05-13

## 2020-05-13 ENCOUNTER — TELEPHONE (OUTPATIENT)
Dept: CARDIOLOGY | Facility: CLINIC | Age: 75
End: 2020-05-13

## 2020-05-13 DIAGNOSIS — J18.9 COMMUNITY ACQUIRED PNEUMONIA, UNSPECIFIED LATERALITY: Primary | ICD-10-CM

## 2020-05-13 DIAGNOSIS — N18.9 CHRONIC KIDNEY DISEASE: Primary | ICD-10-CM

## 2020-05-13 LAB
BACTERIA SPEC CULT: NO GROWTH
BACTERIA SPEC CULT: NO GROWTH
SPECIMEN SOURCE: NORMAL
SPECIMEN SOURCE: NORMAL

## 2020-05-13 NOTE — TELEPHONE ENCOUNTER
Nephrology referral placed, messaged scheduling to fax orders.     CLAYTON GutierrezN, RN, CHFN  05/13/20 at 3:06 PM

## 2020-05-13 NOTE — PROGRESS NOTES
Clinic Care Coordination Contact  Community Health Worker Initial Outreach    CHW Initial Information Gathering:  Preferred Hospital: [facility] [place]  [phone]  Preferred Urgent Care: Other([facility])  Current living arrangement:: Other(Lives with roommate)  Type of residence:: Town home  Community Resources: Home Care, County Programs, County Worker,   Supplies used at home:: Incontinence Supplies, Compression Stockings  Equipment Currently Used at Home: cane, straight, walker, rolling, walker, standard  Informal Support system:: Friends  No PCP office visit in Past Year: No  Transportation means:: Regular car, Friend(Currently doesn't drive self.)    Patient accepts CC: Yes, scheduled with NICKY Quinones, on 05/19/20    Spoke to Gene,  He's been doing fine since he's been home, recovering well.  Patient states that he has Home Care RN, PT & OT.    CHW introduced self and roles with CC.  CHW inquired if patient felt he was in need of any other support or resources as patient has assistance already through [facility] programs and a [facility] worker along with Home care.  Patient shared that he is in a lot of credit card debt, he has been looking into consolidating some of his debt, and is unsure of who or where to turn to.  CHW offered follow up with NICKY LAROSE, to possible assess for support and provide resources.  Patient agreed to plan.    EDEN Madrid  Clinic Care Coordination  [facility] : [place], [place], [place], and [place]  Phone: [phone]

## 2020-05-13 NOTE — TELEPHONE ENCOUNTER
----- Message from Marsha Noonan PA-C sent at 5/13/2020  2:53 PM CDT -----  Regarding: RE: order for nephrology  Ok - let's do Dr. Miguel then. Thanks!  ----- Message -----  From: Jada Zarate RN  Sent: 5/11/2020  10:39 AM CDT  To: Marsha Noonan PA-C  Subject: FW: order for nephrology                         [facility] CORE RN said that [facility] usually does [facility] in [place]: Dr. Miguel or Maxim or their PA's.  There is no [facility] location that CORE refers to that they are aware of.     ----- Message -----  From: Jada Zarate RN  Sent: 5/11/2020   9:35 AM CDT  To: Sumi RUST Heart Core Nurse  Subject: FW: order for nephrology                           ----- Message -----  From: Marsha Noonan PA-C  Sent: 5/11/2020   8:00 AM CDT  To: Belen Souza RN  Subject: RE: order for nephrology                         I'm not sure who is near [place]. Can you find out for me?   ----- Message -----  From: Belen Souza RN  Sent: 5/6/2020   4:33 PM CDT  To: Marsha Noonan PA-C  Subject: order for nephrology                             Would you please put in the order for nephrology referral. Belen Schuster  ----- Message -----  From: Marsha Noonan PA-C  Sent: 5/6/2020   1:36 PM CDT  To: Sumi RUST Heart Core Nurse, #    Hi, I have asked Gene to have a blood draw (BMP, proBNP) at his urology visit on 5/20. Can you please notify that clinic so they are aware to draw him that date? He will have a CORE video visit with me on 5/21 at 10:10. Patient is aware.   Team - can we refer patient to whichever nephrologist has a location in [place]?   Thanks!

## 2020-05-13 NOTE — PROGRESS NOTES
Patient identified as high risk for readmission.  Patient meets criteria for care coordination outreach.  COVID 19 negative    Roshni Tomas RN  Care Coordination  Phone:  [phone]  Email: [email or web address] [place] [facility]

## 2020-05-14 ENCOUNTER — VIRTUAL VISIT (OUTPATIENT)
Dept: INTERNAL MEDICINE | Facility: CLINIC | Age: 75
End: 2020-05-14
Payer: COMMERCIAL

## 2020-05-14 DIAGNOSIS — N18.30 ACUTE RENAL FAILURE WITH ACUTE TUBULAR NECROSIS SUPERIMPOSED ON STAGE 3 CHRONIC KIDNEY DISEASE (H): ICD-10-CM

## 2020-05-14 DIAGNOSIS — R91.1 LUNG NODULE: ICD-10-CM

## 2020-05-14 DIAGNOSIS — J18.9 COMMUNITY ACQUIRED PNEUMONIA OF LEFT LOWER LOBE OF LUNG: Primary | ICD-10-CM

## 2020-05-14 DIAGNOSIS — N18.3 TYPE 2 DIABETES MELLITUS WITH STAGE 3 CHRONIC KIDNEY DISEASE, UNSPECIFIED WHETHER LONG TERM INSULIN USE: ICD-10-CM

## 2020-05-14 DIAGNOSIS — E11.22 TYPE 2 DIABETES MELLITUS WITH STAGE 3 CHRONIC KIDNEY DISEASE, UNSPECIFIED WHETHER LONG TERM INSULIN USE: ICD-10-CM

## 2020-05-14 DIAGNOSIS — N17.0 ACUTE RENAL FAILURE WITH ACUTE TUBULAR NECROSIS SUPERIMPOSED ON STAGE 3 CHRONIC KIDNEY DISEASE (H): ICD-10-CM

## 2020-05-14 DIAGNOSIS — I50.23 ACUTE ON CHRONIC SYSTOLIC CONGESTIVE HEART FAILURE (H): ICD-10-CM

## 2020-05-14 PROCEDURE — 99495 TRANSJ CARE MGMT MOD F2F 14D: CPT | Mod: 95 | Performed by: INTERNAL MEDICINE

## 2020-05-14 RX ORDER — CIPROFLOXACIN 500 MG/1
TABLET, FILM COATED ORAL
COMMUNITY
Start: 2020-04-29 | End: 2020-05-14

## 2020-05-14 RX ORDER — BUMETANIDE 1 MG/1
1 TABLET ORAL EVERY MORNING
COMMUNITY
Start: 2020-05-10 | End: 2020-05-14

## 2020-05-14 RX ORDER — BUMETANIDE 1 MG/1
TABLET ORAL
Qty: 30 TABLET | Refills: 0
Start: 2020-05-14 | End: 2020-09-21

## 2020-05-14 NOTE — PROGRESS NOTES
"Gene Pagan is a 75 year old male who is being evaluated via a billable video visit.      The patient has been notified of following:     \"This video visit will be conducted via a call between you and your physician/provider. We have found that certain health care needs can be provided without the need for an in-person physical exam.  This service lets us provide the care you need with a video conversation.  If a prescription is necessary we can send it directly to your pharmacy.  If lab work is needed we can place an order for that and you can then stop by our lab to have the test done at a later time.    Video visits are billed at different rates depending on your insurance coverage.  Please reach out to your insurance provider with any questions.    If during the course of the call the physician/provider feels a video visit is not appropriate, you will not be charged for this service.\"    Patient has given verbal consent for Video visit? Yes    How would you like to obtain your AVS? Mail a copy    Patient would like the video invitation sent by: Text to cell phone: [phone]    Will anyone else be joining your video visit? No      Subjective     Gene Pagan is a 75 year old male who presents today via video visit for the following health issues:    [facility]    Hospital Follow-up Visit:    Hospital/Nursing Home/IP Rehab Facility: [facility]  Date of Admission: 05/07/2020  Date of Discharge: 05/10/2020  Reason(s) for Admission: CAP      Was your hospitalization related to COVID-19? No   Problems taking medications regularly:  None  Medication changes since discharge:   START taking these medications     Details   azithromycin (ZITHROMAX) 250 MG tablet Take 1 tablet (250 mg) by mouth every evening for 2 days, Disp-2 tablet,R-0, E-Prescribe       cefdinir (OMNICEF) 300 MG capsule Take 1 capsule (300 mg) by mouth 2 times daily for 5 days, Disp-10 capsule,R-0, E-Prescribe               CONTINUE " "these medications which have CHANGED     Details   bumetanide (BUMEX) 0.5 MG tablet Take 1 tablet (0.5 mg) by mouth daily, Historical       metoprolol succinate ER (TOPROL-XL) 25 MG 24 hr tablet Take 0.5 tablets (12.5 mg) by mouth daily, Historical           Problems adhering to non-medication therapy:  None    Summary of hospitalization:  [facility] discharge summary reviewed  Diagnostic Tests/Treatments reviewed.  Follow up needed: none  Other Healthcare Providers Involved in Patient s Care:         Specialist appointment - Urology 5/20, Cardiology 5/21. CT scan chest late-July 2020.  Update since discharge: improved.       Post Discharge Medication Reconciliation: discharge medications reconciled and changed, per note/orders (see AVS).  Plan of care communicated with patient                   Video Start Time: 1639    The patient was hospitalized May 7-10 with pneumonia.  He had been feeling fine until he \"crashed\" the Monday prior to admission.  At that time he reports that he \"could only draw a short of breath\".  He presented for ED evaluation where he was released to home.  However, the next day on Tuesday he noted progressive dyspnea as well as pains about the left ribs and shoulder, later spreading to the interscapular and anterior chest region.  These were worse with taking in a breath.  He never recalls developing any increased cough or fevers.  Nonetheless, chest x-ray in the ED on the date of admission did show an acute infiltrate of the left lower lung.    The patient had some element element of acute on chronic kidney disease which responded to antibiotics and IV hydration.  He also received IV antibiotics and noted significant improvement during his brief hospitalization.  On the date of discharge he was feeling much better, and continues to feel better.    He has had a virtual visit with Dee Dee Rodarte of nephrology earlier today.  He has additional follow-up scheduled with urology, Dr. Brunner " "Chris on May 20, and with cardiology on May 21.  On April 29 he had a urinary catheter removed and replaced, and chart review shows plans for a repeat cystoscopy and uroflow study which will be presumedly pursued on May 20.    The patient has had no gross hematuria or dysuria.  No dyspnea at rest since hospital discharge.  He has \"very slight\" interscapular discomfort still when taking a deep breath.  No fevers, no anterior chest discomfort dizziness or palpitations.  He has a random productive cough with no purulent sputum or hemoptysis.    No fevers or chills.  No recent earache sore throat or sinus pain.    The patient requests a handicap parking sticker.  He is unable to walk 200 feet due to fatigue and dyspnea, and also uses either a walker or cane for ambulation.    Past medical, family and social histories as well as medications reviewed and updated as needed.    Reviewed and updated as needed this visit by Provider         Review of Systems   REVIEW OF SYSTEMS: The following systems have been completely reviewed and are negative except as noted above:   Constitutional, HEENT, respiratory, cardiovascular, genitourinary, musculoskeletal systems.        Objective    There were no vitals taken for this visit.  Estimated body mass index is 25.75 kg/m  as calculated from the following:    Height as of 5/7/20: 1.753 m (5' 9\").    Weight as of 5/10/20: 79.1 kg (174 lb 6.4 oz).  Physical Exam     GENERAL: Healthy, alert and no distress  EYES: Eyes grossly normal to inspection.  No discharge or erythema, or obvious scleral/conjunctival abnormalities.  RESP: No audible wheeze, cough, or visible cyanosis.  No visible retractions or increased work of breathing.    SKIN: Visible skin clear. No significant rash, abnormal pigmentation or lesions.  NEURO: Cranial nerves grossly intact.  Mentation and speech appropriate for age.  PSYCH: Mentation appears normal, affect normal/bright, judgement and insight intact, normal " "speech and appearance well-groomed.      Diagnostic Test Results:  Labs reviewed in Epic        Assessment & Plan     (J18.1) Community acquired pneumonia of left lower lobe of lung (H)  (primary encounter diagnosis)  Comment: Clinically greatly improved. Continue antibiotics to completion of prescribed course.     (I50.23) Acute on chronic systolic congestive heart failure (H)  Comment: Refilled Bumex at most recently adjusted dose. F/u with cardiology as scheduled.   Plan: bumetanide (BUMEX) 1 MG tablet         (R91.1) Lung nodule  Comment: Patient needs a repeat CT scan this summer--reordered as he has not heard from radiology to schedule.   Plan: CT Chest w/o Contrast          (N17.0,  N18.3) Acute renal failure with acute tubular necrosis superimposed on stage 3 chronic kidney disease (H)  Comment: Stable. Continue to follow with Nephrology.     (E11.22,  N18.3) Type 2 diabetes mellitus with stage 3 chronic kidney disease, unspecified whether long term insulin use (H)  Comment: Well-controlled as of most recent A1c (6.2 on 3/27/2020). Continue current measures      BMI:   Estimated body mass index is 25.75 kg/m  as calculated from the following:    Height as of 5/7/20: 1.753 m (5' 9\").    Weight as of 5/10/20: 79.1 kg (174 lb 6.4 oz).       Return in about 12 weeks (around 8/6/2020) for Routine Visit.    Dm Lipscomb MD,   [facility]      Video-Visit Details    Type of service:  Video Visit    Video End Time: 1652    Originating Location (pt. Location): Home    Distant Location (provider location):  [facility] --provider working from home  Platform used for Video Visit: Doximity    No follow-ups on file.       Dm Lipscomb MD,      "

## 2020-05-15 ENCOUNTER — TELEPHONE (OUTPATIENT)
Dept: INTERNAL MEDICINE | Facility: CLINIC | Age: 75
End: 2020-05-15

## 2020-05-17 ENCOUNTER — NURSE TRIAGE (OUTPATIENT)
Dept: NURSING | Facility: CLINIC | Age: 75
End: 2020-05-17

## 2020-05-17 DIAGNOSIS — N13.8 BPH WITH OBSTRUCTION/LOWER URINARY TRACT SYMPTOMS: Primary | ICD-10-CM

## 2020-05-17 DIAGNOSIS — N40.1 BPH WITH OBSTRUCTION/LOWER URINARY TRACT SYMPTOMS: Primary | ICD-10-CM

## 2020-05-17 NOTE — TELEPHONE ENCOUNTER
"Clinic Action Needed: Refill request- tamsulosin (FLOMAX) 0.4 MG capsule at bedtime     FNA Triage Call  Presenting Problem:  Patient reports he lost the bottle of Flomax and is requesting a refill. Preferred pharmacy is [facility] [place] [facility] [address].     Routed to: [facility] JIMMIE Nolen RN/M [facility] Nurse Advisors    Reason for Disposition    Caller requesting a NON-URGENT new prescription or refill and triager unable to refill per unit policy    Additional Information    Negative: Drug overdose and nurse unable to answer question    Negative: Caller requesting information not related to medicine    Negative: Caller requesting a prescription for Strep throat and has a positive culture result    Negative: Rash while taking a medication or within 3 days of stopping it    Negative: Immunization reaction suspected    Negative: [1] Asthma AND [2] having symptoms of asthma (cough, wheezing, etc)    Negative: MORE THAN A DOUBLE DOSE of a prescription or over-the-counter (OTC) drug    Negative: [1] DOUBLE DOSE (an extra dose or lesser amount) of over-the-counter (OTC) drug AND [2] any symptoms (e.g., dizziness, nausea, pain, sleepiness)    Negative: [1] DOUBLE DOSE (an extra dose or lesser amount) of prescription drug AND [2] any symptoms (e.g., dizziness, nausea, pain, sleepiness)    Negative: Took another person's prescription drug    Negative: [1] DOUBLE DOSE (an extra dose or lesser amount) of prescription drug AND [2] NO symptoms (Exception: a double dose of antibiotics)    Negative: Diabetes drug error or overdose (e.g., insulin or extra dose)    Negative: [1] Request for URGENT new prescription or refill of \"essential\" medication (i.e., likelihood of harm to patient if not taken) AND [2] triager unable to fill per unit policy    Negative: [1] Prescription not at pharmacy AND [2] was prescribed today by PCP    Negative: Pharmacy calling with prescription questions and " triager unable to answer question    Negative: Caller has urgent medication question about med that PCP prescribed and triager unable to answer question    Negative: Caller has NON-URGENT medication question about med that PCP prescribed and triager unable to answer question    Protocols used: MEDICATION QUESTION CALL-A-AH

## 2020-05-18 NOTE — TELEPHONE ENCOUNTER
Pending Prescriptions:                       Disp   Refills    tamsulosin (FLOMAX) 0.4 MG capsule        90 cap*             Sig: Take 1 capsule (0.4 mg) by mouth At Bedtime     Routing refill request to provider for review/approval because:  Medication is reported/historical

## 2020-05-19 ENCOUNTER — PATIENT OUTREACH (OUTPATIENT)
Dept: NURSING | Facility: CLINIC | Age: 75
End: 2020-05-19
Attending: INTERNAL MEDICINE
Payer: COMMERCIAL

## 2020-05-19 ENCOUNTER — TELEPHONE (OUTPATIENT)
Dept: UROLOGY | Facility: CLINIC | Age: 75
End: 2020-05-19

## 2020-05-19 DIAGNOSIS — R33.9 URINARY RETENTION: Primary | ICD-10-CM

## 2020-05-19 NOTE — LETTER
[place] [facility]ARE COORDINATION  [address]  [facility] [address]  May 21, 2020    Gene Pagan  [address] [address] [address]      Dear Gene,    I am a clinic care coordinator who works with Dm Lipscomb MD, at the [facility]. Thank you for speaking briefly with me earlier this week. Below is a description of clinic care coordination and how I can further assist you.      The clinic care coordination team is made up of a registered nurse,  and community health worker who understand the health care system. The goal of clinic care coordination is to help you manage your health and improve access to the health care system in the most efficient manner. The team can assist you in meeting your health care goals by providing education, coordinating services, strengthening the communication among your providers and supporting you with any resource needs.    As we discussed on the phone, I've included some information that may be helpful in addressing your concern:    -[facility] Financial Counseling:  Financial counseling with [facility] [employer] can help you with:  Making ends meet -- learn strategies to stretch, grow, and save your money   Getting out of debt -- develop a customized plan to reduce your debt   Improving your credit score -- identify strategies to improve your score or a counselor can assist you with disputes to the credit bureaus   Reporting identity theft -- review your risk factors, identify possible identity theft, and work with you to remedy any problems   Paying off student loans -- develop a repayment plan and educate yourself about various loan payment programs   Filing for bankruptcy -- discuss your current financial situation to decide if bankruptcy counseling is right for you   Applying for public assistance -- learn about assistance programs and get help navigating the application process   Filing  taxes -- learn about credits and get connected to free tax preparation services   Buying a house -- determine your readiness for purchasing a house, get help going through the process or get fast-tracked into one of [facility] [employer] s homeownership options.    Counseling services are free for residents of [facility]. Scheduling priority is given to residents who are experiencing a financial hardship. You can meet with counselors as many times as needed. There is typically a two to four week period between appointments, but this timeline is based on your needs.    Call [phone] or email [email or web address] to set up an appointment. Counseling sessions are conducted at the [facility]. The first session takes between 1 to 1.5 hours. Each additional session can take 30 minutes to 1 hour.  [email or web address]/[email or web address]-counseling.aspx    -[facility] Financial Counseling:  A Debt Management Plan (DMP) through [facility]ing can lower your payments and reduce your interest rates by consolidating your debt into one monthly payment.  Our advice is tailored to fit your goals. You review the recommendations and options and decide what next steps are right for you.  There are two ways to get started:  1. Complete a quick financial profile online, then a counselor will contact you.  In your financial profile, you will provide information about your income, monthly expenses, the amount owed to creditors, and anything else you believe would be helpful in understanding your financial circumstances. Information submitted will only be used by an [facility] certified credit counselor to review your specific financial situation and identify options that may be right for you.  2. Start by reviewing your financial situation with a counselor by phone or in person by calling [phone].  Schedule a phone appointment at a time convenient for you or an office  appointment at one of our [place] or [place] locations. A certified credit counselor will work with you to review your financial profile, discuss the available options and offer sound guidance. We provide suggestions on how you might develop a budget that works for you, and can help create a realistic action plan specific to your circumstances.      [email or web address]-qqbvelevv-[email or web address]/how-it-works    Please feel free to contact me at [phone] with any questions or concerns. We are focused on providing you with the highest-quality healthcare experience possible and that all starts with you.     Sincerely,     Jose Juan Infante, [facility]  Clinic Care Coordinator  Ph. [phone]  [email or web address]

## 2020-05-19 NOTE — TELEPHONE ENCOUNTER
Spoke with patient to conduct pre-visit Covid-19 screening. Informed him to come to appointment wearing mask, on time, and alone.  Charisma Bird CMA

## 2020-05-20 ENCOUNTER — CARE COORDINATION (OUTPATIENT)
Dept: CARDIOLOGY | Facility: CLINIC | Age: 75
End: 2020-05-20

## 2020-05-20 ENCOUNTER — OFFICE VISIT (OUTPATIENT)
Dept: UROLOGY | Facility: CLINIC | Age: 75
End: 2020-05-20
Payer: COMMERCIAL

## 2020-05-20 VITALS
WEIGHT: 172 LBS | BODY MASS INDEX: 25.48 KG/M2 | DIASTOLIC BLOOD PRESSURE: 64 MMHG | HEART RATE: 80 BPM | HEIGHT: 69 IN | SYSTOLIC BLOOD PRESSURE: 118 MMHG | OXYGEN SATURATION: 98 %

## 2020-05-20 DIAGNOSIS — R31.0 GROSS HEMATURIA: ICD-10-CM

## 2020-05-20 DIAGNOSIS — R33.9 URINARY RETENTION: Primary | ICD-10-CM

## 2020-05-20 LAB
ANION GAP SERPL CALCULATED.3IONS-SCNC: 8 MMOL/L (ref 3–14)
BUN SERPL-MCNC: 35 MG/DL (ref 7–30)
CALCIUM SERPL-MCNC: 9.3 MG/DL (ref 8.5–10.1)
CHLORIDE SERPL-SCNC: 104 MMOL/L (ref 94–109)
CO2 SERPL-SCNC: 24 MMOL/L (ref 20–32)
CREAT SERPL-MCNC: 2.12 MG/DL (ref 0.66–1.25)
GFR SERPL CREATININE-BSD FRML MDRD: 29 ML/MIN/{1.73_M2}
GLUCOSE SERPL-MCNC: 112 MG/DL (ref 70–99)
NT-PROBNP SERPL-MCNC: 6001 PG/ML (ref 0–450)
POTASSIUM SERPL-SCNC: 4.1 MMOL/L (ref 3.4–5.3)
SODIUM SERPL-SCNC: 136 MMOL/L (ref 133–144)

## 2020-05-20 PROCEDURE — 80048 BASIC METABOLIC PNL TOTAL CA: CPT

## 2020-05-20 PROCEDURE — 52000 CYSTOURETHROSCOPY: CPT | Performed by: UROLOGY

## 2020-05-20 PROCEDURE — 88112 CYTOPATH CELL ENHANCE TECH: CPT | Performed by: UROLOGY

## 2020-05-20 PROCEDURE — 83880 ASSAY OF NATRIURETIC PEPTIDE: CPT

## 2020-05-20 RX ORDER — TAMSULOSIN HYDROCHLORIDE 0.4 MG/1
0.4 CAPSULE ORAL AT BEDTIME
Qty: 90 CAPSULE | Refills: 0 | Status: SHIPPED | OUTPATIENT
Start: 2020-05-20 | End: 2020-10-21

## 2020-05-20 RX ORDER — LIDOCAINE HYDROCHLORIDE 20 MG/ML
JELLY TOPICAL ONCE
Status: COMPLETED | OUTPATIENT
Start: 2020-05-20 | End: 2020-05-20

## 2020-05-20 RX ADMIN — LIDOCAINE HYDROCHLORIDE: 20 JELLY TOPICAL at 14:15

## 2020-05-20 ASSESSMENT — MIFFLIN-ST. JEOR: SCORE: 1495.57

## 2020-05-20 ASSESSMENT — PAIN SCALES - GENERAL: PAINLEVEL: MILD PAIN (2)

## 2020-05-20 NOTE — LETTER
5/20/2020       RE: Gene Pagan  [address] [address] [facility] [address]     Dear Colleague,    Thank you for referring your patient, Gene Pagan, to the [facility] [place] [facility]. Please see a copy of my visit note below.    CYSTOSCOPY PROCEDURE NOTE:    Gene Pagan is a 75 year old male  who presents with urinary retention for cystoscopy.    Pt ID verified with patient: Yes     Procedure verified with patient: Yes     Procedure confirmed with physician and support staff: Yes     Consent form confirmed with physician and support staff.    Sign In  History and Physical Exam reviewed .  Informed Consent Discussed: Yes   Sign in Communication: Yes   Time Out:  Team Confirms the Correct Patient, Correct Procedure; Yes , Correct Site and Site Marking, Correct Position (if applicable).    Affirmation of Time Out: Yes   Sign Out:  Sign Out Discussion: Yes   Physician: Cheikh Perez MD    A urinalysis was performed revealing no evidence of infection.    The benefits, risks, alternatives of the cystoscopy procedure and personnel were discussed with the patient. The verbal consent was obtained and the patient agrees to proceed.      Description of procedure:   After fully informed, voluntary consent was obtained, the patient was brought into the procedure room, identified and placed in a supine position on the cystoscopy table.  The groin/scrotum were prepped with betadine and draped in a sterile fashion.  Urojet lidocaine gel was introduced.  A 15F flexible cystoscope was inserted into the urethra, and the bladder and urethra wereexamined in a systematic manner.  The patient tolerated the procedure well and there were no complications.      Cystoscopic findings:  The urethra was normal without strictures.  The prostate was 4cm long and demonstrated significant bilobar hypertrophy.  There was a small median lobe.  The external  sphincter coapted normally and the bladder neck was high due to the median lobe. The bladder was  entered and careful pan endoscopy was carried out. The posterior, superior and lateral walls and dome of the bladder were all well visualized and the scope was retroflexed upon itself..  There was mild trabeculation.  There were no neoplasms, stones, or diverticula identifed. There was posterior midline bladder wall edema secondary to the prior baker catheter.  The ureteric orifices were normal in position and number and effluxing clear urine.    Assessment/Plan:   75-year-old man with recent prolonged hospitalization due to respiratory failure, cardiogenic shock, acute on chronic congestive heart failure with atrial fibrillation and RVR status post cardiac ablation and pacemaker placement, acute on chronic kidney disease, gross hematuria, urinary retention, diabetes type 2, and a lung nodule    Urinary retention  -Cystoscopy today with evidence of significant bilobar prostatic hypertrophy with mild bladder trabeculation.  -He was again unable to void following cystoscopy  -We discussed options at this time for management of his urinary tract which include continued Baker catheterization with monthly changes, intermittent self-catheterization, or consideration for bladder outlet procedure  -We discussed several bladder outlet procedural options however he prefers to continue with Baker catheterization at this time  -We discussed that given his other medical comorbidities, and continuing to improve CKD, conservative management is appropriate at this time  -We will plan for monthly RN Baker catheter changes  -Follow-up with me in 3 months          Cheikh Perez MD

## 2020-05-20 NOTE — NURSING NOTE
Chief Complaint   Patient presents with     Cystoscopy     Pt here for cysto due to retention     Prior to the start of the procedure and with procedural staff participation, I verbally confirmed the patient s identity using two indicators, relevant allergies, that the procedure was appropriate and matched the consent or emergent situation, and that the correct equipment/implants were available. Immediately prior to starting the procedure I conducted the Time Out with the procedural staff and re-confirmed the patient s name, procedure, and site/side. I have wiped the patient off with the povidone-Iodine solution, draped them,  used Lidocaine hydrochloride jelly, and instilled sterile water into the bladder. (The Joint Commission universal protocol was followed.)  Yes    Sedation (Moderate or Deep): None  5mL 2% lidocaine hydrochloride Urojet instilled into urethra.    NDC# [address]-61  Lot #: [id number]  Expiration Date:  11/21    Catheter removal documentation on 5/20/2020:    Gene Pagan presents to the clinic for catheter removal.  Reason for removal: TOV  Order has been verified. yes  Catheter successfully removed at 4:23 PM without immediate complication.  20 cc's of urine present in the catheter bag.  Urethral meatus is free of secretions and encrustation.  The patient is afebrile.  The patient tolerated the procedure and was instructed to follow up with their PCP or consultant as planned.    Catheter insertion documentation on 5/20/2020:    Gene Pagan presents to the clinic for catheter insertion.  Reason for insertion: Failed TOV  Order has been verified. yes  Catheter successfully inserted into the urethral meatus in the usual sterile fashion without immediate complication.  Type of catheter placed: 16 [place] Coude catheter  Urine is clear in color.  300 cc's of urine output returned.  Balloon was filled with 10cc's of normal saline.  Securement device placed for the catheter.  The patient tolerated  the procedure and was instructed to monitor for catheter dysfunction, monitor for pain or discomfort, return or call for pain, fever, leakage or decreased urine flow and watch for signs of infection.    Charisma Bird, CMA

## 2020-05-20 NOTE — PROGRESS NOTES
CYSTOSCOPY PROCEDURE NOTE:    Gene aPgan is a 75 year old male  who presents with urinary retention for cystoscopy.    Pt ID verified with patient: Yes     Procedure verified with patient: Yes     Procedure confirmed with physician and support staff: Yes     Consent form confirmed with physician and support staff.    Sign In  History and Physical Exam reviewed .  Informed Consent Discussed: Yes   Sign in Communication: Yes   Time Out:  Team Confirms the Correct Patient, Correct Procedure; Yes , Correct Site and Site Marking, Correct Position (if applicable).    Affirmation of Time Out: Yes   Sign Out:  Sign Out Discussion: Yes   Physician: Cheikh Perez MD    A urinalysis was performed revealing no evidence of infection.    The benefits, risks, alternatives of the cystoscopy procedure and personnel were discussed with the patient. The verbal consent was obtained and the patient agrees to proceed.      Description of procedure:   After fully informed, voluntary consent was obtained, the patient was brought into the procedure room, identified and placed in a supine position on the cystoscopy table.  The groin/scrotum were prepped with betadine and draped in a sterile fashion.  Urojet lidocaine gel was introduced.  A 15F flexible cystoscope was inserted into the urethra, and the bladder and urethra wereexamined in a systematic manner.  The patient tolerated the procedure well and there were no complications.      Cystoscopic findings:  The urethra was normal without strictures.  The prostate was 4cm long and demonstrated significant bilobar hypertrophy.  There was a small median lobe.  The external sphincter coapted normally and the bladder neck was high due to the median lobe. The bladder was  entered and careful pan endoscopy was carried out. The posterior, superior and lateral walls and dome of the bladder were all well visualized and the scope was retroflexed upon itself..  There was mild trabeculation.   There were no neoplasms, stones, or diverticula identifed. There was posterior midline bladder wall edema secondary to the prior baker catheter.  The ureteric orifices were normal in position and number and effluxing clear urine.    Assessment/Plan:   75-year-old man with recent prolonged hospitalization due to respiratory failure, cardiogenic shock, acute on chronic congestive heart failure with atrial fibrillation and RVR status post cardiac ablation and pacemaker placement, acute on chronic kidney disease, gross hematuria, urinary retention, diabetes type 2, and a lung nodule    Urinary retention  -Cystoscopy today with evidence of significant bilobar prostatic hypertrophy with mild bladder trabeculation.  -He was again unable to void following cystoscopy  -We discussed options at this time for management of his urinary tract which include continued Baker catheterization with monthly changes, intermittent self-catheterization, or consideration for bladder outlet procedure  -We discussed several bladder outlet procedural options however he prefers to continue with Baker catheterization at this time  -We discussed that given his other medical comorbidities, and continuing to improve CKD, conservative management is appropriate at this time  -We will plan for monthly RN Baker catheter changes  -Follow-up with me in 3 months          Cheikh Perez MD

## 2020-05-20 NOTE — PROGRESS NOTES
[facility] - CORE Clinic    Epic review for lab results(BNP, BMP) to have been done for patients virtual appt w/kendall Noonan tomorrow(5/21). Results not in Epic - called [facility]. RN I spoke to verified that orders in system.  Patient seen by Dayanara Castillo RN yesterday but labs not done. Patients , Ana Moscoso will go out today. Will send note to board to watch for results tomorrow. Belen Souza RN on 5/20/2020 at 10:48 AM

## 2020-05-20 NOTE — PATIENT INSTRUCTIONS
"AFTER YOUR CYSTOSCOPY  ?  ?  You have just completed a cystoscopy, or \"cysto\", which allowed your physician to learn more about your bladder (or to remove a stent placed after surgery). We suggest that you continue to avoid caffeine, fruit juice, and alcohol for the next 24 hours, however, you are encouraged to return to your normal activities.  ?  ?  A few things that are considered normal after your cystoscopy:  ?  * small amount of bleeding (or spotting) that clears within the next 24 hours  ?  * slight burning sensation with urination  ?  * sensation of needing to void (urinate) more frequently  ?  * the feeling of \"air\" in your urine  ?  * mild discomfort that is relieved with Tylenol    * bladder spasms  ?  ?  ?  Please contact our office promptly if you:  ?  * develop a fever above 101 degrees  ?  * are unable to urinate  ?  * develop bright red blood that does not stop  ?  * experience severe pain or swelling  ?  ?  ?  And of course, please contact our office with any concerns or questions [phone]  ?    AFTER YOUR CYSTOSCOPY        You have just completed a cystoscopy, or \"cysto\", which allowed your physician to learn more about your bladder (or to remove a stent placed after surgery). We suggest that you continue to avoid caffeine, fruit juice, and alcohol for the next 24 hours, however, you are encouraged to return to your normal activities.         A few things that are considered normal after your cystoscopy:     * Small amount of bleeding (or spotting) that clears within the next 24 hours     * Slight burning sensation with urination     * Sensation to of needing to avoid more frequently     * The feeling of \"air\" in your urine     * Mild discomfort that is relieved with Tylenol        Please contact our office promptly if you:     * Develop a fever above 101 degrees     * Are unable to urinate     * Develop bright red blood that does not stop     * Severe pain or swelling         Please contact " our office with any concerns or questions @[facility].

## 2020-05-21 ENCOUNTER — VIRTUAL VISIT (OUTPATIENT)
Dept: CARDIOLOGY | Facility: CLINIC | Age: 75
End: 2020-05-21
Attending: PHYSICIAN ASSISTANT
Payer: COMMERCIAL

## 2020-05-21 VITALS
HEIGHT: 69 IN | SYSTOLIC BLOOD PRESSURE: 112 MMHG | WEIGHT: 172 LBS | HEART RATE: 80 BPM | DIASTOLIC BLOOD PRESSURE: 63 MMHG | BODY MASS INDEX: 25.48 KG/M2

## 2020-05-21 DIAGNOSIS — I50.23 ACUTE ON CHRONIC SYSTOLIC HEART FAILURE (H): ICD-10-CM

## 2020-05-21 PROCEDURE — 99214 OFFICE O/P EST MOD 30 MIN: CPT | Mod: 95 | Performed by: PHYSICIAN ASSISTANT

## 2020-05-21 RX ORDER — ISOSORBIDE MONONITRATE 30 MG/1
15 TABLET, EXTENDED RELEASE ORAL AT BEDTIME
Qty: 15 TABLET | Refills: 5 | Status: SHIPPED | OUTPATIENT
Start: 2020-05-21 | End: 2020-06-18 | Stop reason: SINTOL

## 2020-05-21 ASSESSMENT — MIFFLIN-ST. JEOR: SCORE: 1495.57

## 2020-05-21 NOTE — PROGRESS NOTES
Clinic Care Coordination Contact    Clinic Care Coordination Contact  OUTREACH    Referral Information: IP Report  Primary Diagnosis: Psychosocial    Chief Complaint   Patient presents with     Clinic Care Coordination - Initial     Resource navigation        Universal Utilization: Reviewed.  Difficulty keeping appointments: No  Compliance Concerns: No  No-Show Concerns: No  No PCP office visit in Past Year: No  Utilization    Last refreshed: 5/21/2020 10:51 AM:  Hospital Admissions 3           Last refreshed: 5/21/2020 10:51 AM:  ED Visits 3           Last refreshed: 5/21/2020 10:51 AM:  No Show Count (past year) 2              Current as of: 5/21/2020 10:51 AM            Clinical Concerns:  Current Medical Concerns:  Patient was recently hospitalized at [facility] from 5.7.2020 to 5.10.2020 with diagnoses of:  Community acquired pneumonia.   Ruled out for COVID-19  Hyponatremia. Pre-renal   Patient Active Problem List   Diagnosis     Atrial fibrillation with rapid ventricular response (H)     CHF (congestive heart failure) (H)     Urinary retention     Gross hematuria     BPH with obstruction/lower urinary tract symptoms     CAP (community acquired pneumonia)     Full assessment not completed as Pt is awaiting a [facility] RN visit.     Lifestyle & Psychosocial Needs:      Socioeconomic History     Marital status: Single     Spouse name: Not on file     Number of children: Not on file     Years of education: Not on file     Highest education level: Not on file     Tobacco Use     Smoking status: Never Smoker     Smokeless tobacco: Never Used   Substance and Sexual Activity     Alcohol use: Yes     Comment: Rare     Sexual activity: Not Currently       Pt is interested in addressing credit card debt that he is having difficulty managing since he retired. Pt is agreeable to Financial Services and Debt counseling offered through [facility] and [facility].     Resources and Interventions:  Financial Counseling  through [facility] and [facility].     Goals: No goal formed at this time.       Patient/Caregiver understanding: Pt reports understanding and denies any additional questions or concerns at this times. SW CC engaged in [employer] communication during encounter.    Outreach Frequency: weekly  Future Appointments              In 5 days 2, [place] [facility]    In 3 weeks Lei Santoyo MD [facility] Urology [facility],  PHY [place]    In 3 weeks RU DCR2 [facility]    In 2 months Dm Lipscomb MD [facility] [place]    In 3 months Cheikh Perez MD [facility] Urology [facility], [facility] PHY JULIO          Plan: [employer] message sent with the introduction to CC letter along with the above resources. SW CC will follow-up with Pt by phone next week to complete enrollment if Pt is still interested in working with Care Coordination at that time.    Jose Juan Infante, [facility]  Clinic Care Coordinator  Ph. [phone]  [email or web address]

## 2020-05-21 NOTE — LETTER
5/21/2020    Dm Lipscomb MD, MD  [address] [facility] [address]  [facility] [facility] [place] [address]    RE: Gene Pagan       Dear Colleague,    I had the pleasure of seeing Gene Pagan in the [facility].    Gene Pagan presents for a CORE clinic follow up visit.     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  He was in rapid atrial fibrillation and had a new cardiomyopathy, LVEF 20-25%, with severe diastolic dysfunction, RV dilation and dysfunction, and mod-severe MR.  It proved very difficult to rate control his arrhythmia.  NESTOR-guided cardioversion was only briefly successful.  Because of recurrence of AF he was transferred to [facility] for AV node ablation with CRT pacemaker.  This was performed on Friday 4/10/2020 by Dr. Willams. Post-ablation TTE showed an EF of 25-30% (slight improvement) and MR was now only mild. His stay was complicated by newly diagnosed kidney disease / DEONDRE with a presenting creat of 2.4, felt secondary to long-standing diabetes, peaking at 5.7 in the setting of hypotension / suspected hypovolemia at a wt of 186 lbs. Diuretics were held and this had come down to 3.4 on discharge. Eliquis and bumex 1 mg daily were initiated. He was discharged on 4/17 at a weight of 196 lbs (down from 205 lbs on admit).      CORE enrollment 4/22: Gene tells me he's doing quite well. Nothing makes him dyspneic at this point. He is sleeping in a recliner currently however out of fear that he'll be uncomfortable lying flat in bed. He does state that he can lower the recliner so he's supine and he has no dyspnea with this. His ankles have some mild pitting edema. No abdominal  "bloating. Weight is 188 lbs. Today was the first time he weighed himself at home. He has an indwelling catheter currently and has a follow up with urology in two weeks. He lives with his significant other of 20 years. He is mindful of his sodium intake and it sounds like he is limiting appropriately.  Recent device check revealed 98% BiVP. Recent labs show continued improvement in renal function.   - He was on no CMP medications at that point. Toprol 12.5 was added to regimen.     Last visit on 5/6, Gene had presented to the ED earlier that day due to shortness of breath. He personally attributed this to musculoskeletal discomfort after doing grocery shopping and a lot of exertion the previous day, but wanted to be sure. A COVID swab was obtained and negative, CXR was benign. EKG showed V-pace. Trop was negative. By the time he spoke with me for our visit he told me he felt back to baseline, and overall from a cardiovascular standpoint, felt \"great.\" He was happy to relay that he'd lost significant weight after having his legs wrapped and was down 10 lbs at 174 lbs. I increased Toprol to 25 mg daily. I also referred him to nephrology and placed an order for cardiac rehab.     Today, Gene presents following up-titration of metoprolol to 25 mg daily two weeks ago. His weight and vitals are stable. Labs performed yesterday are also stable. He tells me feels \"better every day.\" He walks \"a lot\" without dyspnea. Some fatigue. Goes up/down stairs without significant dyspnea. Device incision healing very well. BP is adequate. HR paced at 80. Still has indwelling baker, states urology plans to leave it in long-term, which he's okay with.    Labs obtained yesterday:  Component      Latest Ref Rng & Units 5/10/2020 5/20/2020   Sodium      133 - 144 mmol/L 133 136   Potassium      3.4 - 5.3 mmol/L 4.1 4.1   Chloride      94 - 109 mmol/L 100 104   Carbon Dioxide      20 - 32 mmol/L 28 24   Anion Gap      3 - 14 mmol/L 5 8 "   Glucose      70 - 99 mg/dL 118 (H) 112 (H)   Urea Nitrogen      7 - 30 mg/dL 43 (H) 35 (H)   Creatinine      0.66 - 1.25 mg/dL 2.10 (H) 2.12 (H)   GFR Estimate      >60 mL/min/1.73:m2 29 (L) 29 (L)   GFR Estimate If Black      >60 mL/min/1.73:m2 34 (L) 34 (L)   Calcium      8.5 - 10.1 mg/dL 8.9 9.3   N-Terminal Pro Bnp      0 - 450 pg/mL  6,001 (H)     ROS:  12-pt ROS is negative except for as noted above.    EXAM:  A limited exam was conducted via video.  Constitutional: Patient is pleasant, alert, in no distress. Normal body habitus, upright.  ENT: Membranes moist, no nasal discharge or bleeding gums. Normal head shape, no evidence of injury or laceration.  EYES: No scleral icterus, normal conjunctivae. EOM's appear intact.   Neck: No evidence of thyromegaly.   Chest/Lungs: Appears to have normal respiratory effort. No audible wheezing, no cough, equal chest wall expansion.   Cardiovascular: No evidence of elevated JVP. No evidence of pitting edema bilaterally.  Abdomen: No evidence of abdominal distention. No observed jaundice.  Extremities: No edema, erythema, cyanosis noted.  Skin: No rashes or lesions appreciated on visualized skin, normal skin color.  Neurologic: Normal mentation. Normal arm motion bilaterally, no tremors. No evidence of focal defect.  Psychiatric: Appropriate affect. A&Ox3.    Assessment/Plan:  1. New systolic CMP / biventricular HF, likely tachy-mediated - EF 25-30%, FC II.           - Well-compensated on Bumex 1 mg daily at a wt of 172 lbs (of note - previously felt hypovolemic at a wt of 184 lbs due to bump in creat, however, renal function now looks better at current weight).          - On Toprol 25 mg. No ACE/ARB/Hominy d/t ongoing renal dysfunction. Start Imdur 15 mg daily for some vasodilation.          - Card[facility] rehab ([facility]).    2. Rapid AF s/p AVN ablation + CRT-P implant on 4/10 - 98% BiVP. Anticoagulated.  3. DEONDRE / CKD-III - Nephrology referral ([facility]  consultants) has been placed.     Follow-up:    - Video visit with me in 2 weeks.   - Device check (in clinic) June 17th. Asked him to keep that appointment and come to [facility] if it comes to June and the [place] clinic is not yet open. He needs his LRL turned down and I don't want to push that out further.   - TTE and follow up with Dr. Frank in July. If EF remains reduced, would consider ischemic w/u + RHC and sleep study.       Video-Visit Details  Type of service:  Video Visit  Video End Time (time video stopped): 10:47  Originating Location (pt. Location): Home  Distant Location (provider location):  [facility]   Mode of Communication:  Video Conference via [employer]    Marsha Noonan PA-C  [facility]  Pager: [phone]  Text Page  (7:30am - 4pm M-F)     Thank you for allowing me to participate in the care of your patient.    Sincerely,     Marsha Noonan PA-C     [facility]

## 2020-05-21 NOTE — PROGRESS NOTES
"Gene Pagan is a 75 year old male who is being evaluated via a billable video visit.  This visit is being conducted as a virtual visit due to the emphasis on mitigation of the COVID-19 virus pandemic. The clinician has decided that the risk of an in-office visit outweighs the benefit for this patient.     The patient has been notified of following:   \"This video visit will be conducted via a call between you and your physician/provider. We have found that certain health care needs can be provided without the need for an in-person physical exam.  This service lets us provide the care you need with a video conversation.  If a prescription is necessary we can send it directly to your pharmacy.  If lab work is needed we can place an order for that and you can then stop by our lab to have the test done at a later time.  If during the course of the call the physician/provider feels a video visit is not appropriate, you will not be charged for this service.\"     Patient has given verbal consent for Video visit? Yes    Patient would like the video invitation sent by: Text to cell phone: [phone]    How would you like to obtain your AVS? Cinda RAMON for CORE follow-ups:  - Any difficulty breathing, including when trying to lie in bed at night? No  - Any leg swelling (?compression or wraps), abdominal bloating, reduction in appetite? Pt wears compression socks  - Any chest pain or palpitations? No  - Weight trend over the past week? Steady  - O2 or CPAP use? No  - Do they have a pulse oximeter they can use to check O2 sat/HR?No    Today's Home Vitals:  Bp: 112/63  Pulse: 80  Wt:172.0lb     Elva BELLE    I have reviewed and updated the patient's Past Medical History, Social History, Family History and Medication List.    PROBLEM LIST  Patient Active Problem List   Diagnosis     Atrial fibrillation with rapid ventricular response (H)     CHF (congestive heart failure) (H)     Urinary retention     Gross " hematuria     BPH with obstruction/lower urinary tract symptoms     CAP (community acquired pneumonia)       ALLERGIES  Patient has no known allergies.    MEDICATIONS  Current Outpatient Medications   Medication Sig Dispense Refill     acetaminophen (TYLENOL) 500 MG tablet Take 1,000 mg by mouth every 8 hours        apixaban ANTICOAGULANT (ELIQUIS) 5 MG tablet Take 1 tablet (5 mg) by mouth 2 times daily 60 tablet 0     bumetanide (BUMEX) 1 MG tablet One half tablet by mouth each AM. 30 tablet 0     Cholecalciferol (VITAMIN D3) 75 MCG (3000 UT) TABS Take 1 tablet by mouth daily       insulin glargine (LANTUS SOLOSTAR) 100 UNIT/ML pen Inject 9 Units Subcutaneous At Bedtime Future refills by PCP Dr. Martínez Duarte with phone number [phone]. 15 mL 0     insulin pen needle (31G X 8 MM) 31G X 8 MM miscellaneous 1 Box of 100 insulin pen needles to be dispensed with every insulin pen prescription 100 each 0     melatonin 3 MG tablet Take 3 mg by mouth At Bedtime       metoprolol succinate ER (TOPROL-XL) 25 MG 24 hr tablet Take 0.5 tablets (12.5 mg) by mouth daily       Multiple Vitamins-Minerals (MULTIVITAMIN ADULT PO) Take 1 tablet by mouth daily       tamsulosin (FLOMAX) 0.4 MG capsule Take 1 capsule (0.4 mg) by mouth At Bedtime 90 capsule 0       Video Start Time: 10:25 AM    Gene Pagan presents for a CORE clinic follow up visit.     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  He was in rapid atrial fibrillation and had a new cardiomyopathy, LVEF 20-25%, with severe diastolic dysfunction, RV dilation and dysfunction, and mod-severe MR.  It proved very difficult to rate control his arrhythmia.  NESTOR-guided  cardioversion was only briefly successful.  Because of recurrence of AF he was transferred to [facility] for AV node ablation with CRT pacemaker.  This was performed on Friday 4/10/2020 by Dr. Willams. Post-ablation TTE showed an EF of 25-30% (slight improvement) and MR was now only mild. His stay was complicated by newly diagnosed kidney disease / DEONDRE with a presenting creat of 2.4, felt secondary to long-standing diabetes, peaking at 5.7 in the setting of hypotension / suspected hypovolemia at a wt of 186 lbs. Diuretics were held and this had come down to 3.4 on discharge. Eliquis and bumex 1 mg daily were initiated. He was discharged on 4/17 at a weight of 196 lbs (down from 205 lbs on admit).      CORE enrollment 4/22: Gene tells me he's doing quite well. Nothing makes him dyspneic at this point. He is sleeping in a recliner currently however out of fear that he'll be uncomfortable lying flat in bed. He does state that he can lower the recliner so he's supine and he has no dyspnea with this. His ankles have some mild pitting edema. No abdominal bloating. Weight is 188 lbs. Today was the first time he weighed himself at home. He has an indwelling catheter currently and has a follow up with urology in two weeks. He lives with his significant other of 20 years. He is mindful of his sodium intake and it sounds like he is limiting appropriately.  Recent device check revealed 98% BiVP. Recent labs show continued improvement in renal function.   - He was on no CMP medications at that point. Toprol 12.5 was added to regimen.     Last visit on 5/6, Gene had presented to the ED earlier that day due to shortness of breath. He personally attributed this to musculoskeletal discomfort after doing grocery shopping and a lot of exertion the previous day, but wanted to be sure. A COVID swab was obtained and negative, CXR was benign. EKG showed V-pace. Trop was negative. By the time he spoke with me for our visit he told  "me he felt back to baseline, and overall from a cardiovascular standpoint, felt \"great.\" He was happy to relay that he'd lost significant weight after having his legs wrapped and was down 10 lbs at 174 lbs. I increased Toprol to 25 mg daily. I also referred him to nephrology and placed an order for cardiac rehab.     Today, Gene presents following up-titration of metoprolol to 25 mg daily two weeks ago. His weight and vitals are stable. Labs performed yesterday are also stable. He tells me feels \"better every day.\" He walks \"a lot\" without dyspnea. Some fatigue. Goes up/down stairs without significant dyspnea. Device incision healing very well. BP is adequate. HR paced at 80. Still has indwelling baker, states urology plans to leave it in long-term, which he's okay with.    Labs obtained yesterday:  Component      Latest Ref Rng & Units 5/10/2020 5/20/2020   Sodium      133 - 144 mmol/L 133 136   Potassium      3.4 - 5.3 mmol/L 4.1 4.1   Chloride      94 - 109 mmol/L 100 104   Carbon Dioxide      20 - 32 mmol/L 28 24   Anion Gap      3 - 14 mmol/L 5 8   Glucose      70 - 99 mg/dL 118 (H) 112 (H)   Urea Nitrogen      7 - 30 mg/dL 43 (H) 35 (H)   Creatinine      0.66 - 1.25 mg/dL 2.10 (H) 2.12 (H)   GFR Estimate      >60 mL/min/1.73:m2 29 (L) 29 (L)   GFR Estimate If Black      >60 mL/min/1.73:m2 34 (L) 34 (L)   Calcium      8.5 - 10.1 mg/dL 8.9 9.3   N-Terminal Pro Bnp      0 - 450 pg/mL  6,001 (H)     ROS:  12-pt ROS is negative except for as noted above.    EXAM:  A limited exam was conducted via video.  Constitutional: Patient is pleasant, alert, in no distress. Normal body habitus, upright.  ENT: Membranes moist, no nasal discharge or bleeding gums. Normal head shape, no evidence of injury or laceration.  EYES: No scleral icterus, normal conjunctivae. EOM's appear intact.   Neck: No evidence of thyromegaly.   Chest/Lungs: Appears to have normal respiratory effort. No audible wheezing, no cough, equal chest wall " expansion.   Cardiovascular: No evidence of elevated JVP. No evidence of pitting edema bilaterally.  Abdomen: No evidence of abdominal distention. No observed jaundice.  Extremities: No edema, erythema, cyanosis noted.  Skin: No rashes or lesions appreciated on visualized skin, normal skin color.  Neurologic: Normal mentation. Normal arm motion bilaterally, no tremors. No evidence of focal defect.  Psychiatric: Appropriate affect. A&Ox3.    Assessment/Plan:  1. New systolic CMP / biventricular HF, likely tachy-mediated - EF 25-30%, FC II.           - Well-compensated on Bumex 1 mg daily at a wt of 172 lbs (of note - previously felt hypovolemic at a wt of 184 lbs due to bump in creat, however, renal function now looks better at current weight).          - On Toprol 25 mg. No ACE/ARB/[place] d/t ongoing renal dysfunction. Start Imdur 15 mg daily for some vasodilation.          - Cardiac rehab ([facility]).    2. Rapid AF s/p AVN ablation + CRT-P implant on 4/10 - 98% BiVP. Anticoagulated.  3. DEONDRE / CKD-III - Nephrology referral ([facility]) has been placed.     Follow-up:    - Video visit with me in 2 weeks.   - Device check (in clinic) June 17th. Asked him to keep that appointment and come to [place] [facility] if it comes to June and the [place] clinic is not yet open. He needs his LRL turned down and I don't want to push that out further.   - TTE and follow up with Dr. Frank in July. If EF remains reduced, would consider ischemic w/u + RHC and sleep study.       Video-Visit Details  Type of service:  Video Visit  Video End Time (time video stopped): 10:47  Originating Location (pt. Location): Home  Distant Location (provider location):  [facility]   Mode of Communication:  Video Conference via Isaura Noonan PA-C  [facility]  Pager: [phone]  Text Page  (7:30am - 4pm M-F)

## 2020-05-22 LAB — COPATH REPORT: NORMAL

## 2020-05-24 ENCOUNTER — APPOINTMENT (OUTPATIENT)
Dept: GENERAL RADIOLOGY | Facility: CLINIC | Age: 75
End: 2020-05-24
Attending: EMERGENCY MEDICINE
Payer: COMMERCIAL

## 2020-05-24 ENCOUNTER — HOSPITAL ENCOUNTER (EMERGENCY)
Facility: CLINIC | Age: 75
Discharge: HOME | End: 2020-05-24
Attending: EMERGENCY MEDICINE | Admitting: EMERGENCY MEDICINE
Payer: COMMERCIAL

## 2020-05-24 VITALS
OXYGEN SATURATION: 97 % | TEMPERATURE: 97.1 F | DIASTOLIC BLOOD PRESSURE: 73 MMHG | RESPIRATION RATE: 16 BRPM | SYSTOLIC BLOOD PRESSURE: 125 MMHG | HEART RATE: 80 BPM

## 2020-05-24 DIAGNOSIS — R06.00 DYSPNEA, UNSPECIFIED TYPE: ICD-10-CM

## 2020-05-24 DIAGNOSIS — R53.83 FATIGUE, UNSPECIFIED TYPE: ICD-10-CM

## 2020-05-24 LAB
ANION GAP SERPL CALCULATED.3IONS-SCNC: 7 MMOL/L (ref 3–14)
BASOPHILS # BLD AUTO: 0.1 10E9/L (ref 0–0.2)
BASOPHILS NFR BLD AUTO: 0.6 %
BUN SERPL-MCNC: 38 MG/DL (ref 7–30)
CALCIUM SERPL-MCNC: 9.4 MG/DL (ref 8.5–10.1)
CHLORIDE SERPL-SCNC: 101 MMOL/L (ref 94–109)
CO2 SERPL-SCNC: 25 MMOL/L (ref 20–32)
CREAT SERPL-MCNC: 2.03 MG/DL (ref 0.66–1.25)
DIFFERENTIAL METHOD BLD: ABNORMAL
EOSINOPHIL # BLD AUTO: 0.2 10E9/L (ref 0–0.7)
EOSINOPHIL NFR BLD AUTO: 0.9 %
ERYTHROCYTE [DISTWIDTH] IN BLOOD BY AUTOMATED COUNT: 17.4 % (ref 10–15)
GFR SERPL CREATININE-BSD FRML MDRD: 31 ML/MIN/{1.73_M2}
GLUCOSE SERPL-MCNC: 132 MG/DL (ref 70–99)
HCT VFR BLD AUTO: 33.9 % (ref 40–53)
HGB BLD-MCNC: 10.1 G/DL (ref 13.3–17.7)
IMM GRANULOCYTES # BLD: 0.1 10E9/L (ref 0–0.4)
IMM GRANULOCYTES NFR BLD: 0.6 %
LYMPHOCYTES # BLD AUTO: 2.2 10E9/L (ref 0.8–5.3)
LYMPHOCYTES NFR BLD AUTO: 13.4 %
MCH RBC QN AUTO: 24.6 PG (ref 26.5–33)
MCHC RBC AUTO-ENTMCNC: 29.8 G/DL (ref 31.5–36.5)
MCV RBC AUTO: 83 FL (ref 78–100)
MONOCYTES # BLD AUTO: 1.3 10E9/L (ref 0–1.3)
MONOCYTES NFR BLD AUTO: 7.6 %
NEUTROPHILS # BLD AUTO: 12.8 10E9/L (ref 1.6–8.3)
NEUTROPHILS NFR BLD AUTO: 76.9 %
NRBC # BLD AUTO: 0 10*3/UL
NRBC BLD AUTO-RTO: 0 /100
NT-PROBNP SERPL-MCNC: 4059 PG/ML (ref 0–1800)
PLATELET # BLD AUTO: 436 10E9/L (ref 150–450)
POTASSIUM SERPL-SCNC: 4.2 MMOL/L (ref 3.4–5.3)
PROCALCITONIN SERPL-MCNC: 0.15 NG/ML
RBC # BLD AUTO: 4.11 10E12/L (ref 4.4–5.9)
SODIUM SERPL-SCNC: 133 MMOL/L (ref 133–144)
TROPONIN I SERPL-MCNC: <0.015 UG/L (ref 0–0.04)
WBC # BLD AUTO: 16.6 10E9/L (ref 4–11)

## 2020-05-24 PROCEDURE — 71045 X-RAY EXAM CHEST 1 VIEW: CPT

## 2020-05-24 PROCEDURE — 84145 PROCALCITONIN (PCT): CPT | Performed by: EMERGENCY MEDICINE

## 2020-05-24 PROCEDURE — 83880 ASSAY OF NATRIURETIC PEPTIDE: CPT | Performed by: EMERGENCY MEDICINE

## 2020-05-24 PROCEDURE — 85025 COMPLETE CBC W/AUTO DIFF WBC: CPT | Performed by: EMERGENCY MEDICINE

## 2020-05-24 PROCEDURE — 84484 ASSAY OF TROPONIN QUANT: CPT | Performed by: EMERGENCY MEDICINE

## 2020-05-24 PROCEDURE — 87635 SARS-COV-2 COVID-19 AMP PRB: CPT | Performed by: EMERGENCY MEDICINE

## 2020-05-24 PROCEDURE — 99285 EMERGENCY DEPT VISIT HI MDM: CPT | Mod: 25

## 2020-05-24 PROCEDURE — 93005 ELECTROCARDIOGRAM TRACING: CPT

## 2020-05-24 PROCEDURE — 80048 BASIC METABOLIC PNL TOTAL CA: CPT | Performed by: EMERGENCY MEDICINE

## 2020-05-24 ASSESSMENT — ENCOUNTER SYMPTOMS
VOMITING: 0
SHORTNESS OF BREATH: 1
NAUSEA: 0
ABDOMINAL PAIN: 0
CHILLS: 0
FEVER: 0
FATIGUE: 1
BACK PAIN: 1
SORE THROAT: 0
DIARRHEA: 0
COUGH: 1

## 2020-05-24 NOTE — ED AVS SNAPSHOT
[facility]  [address]  [facility] [address]  Phone:  [phone]  Fax:  [phone]                                    Gene Pagan   MRN: [id number]    Department:  [facility]   Date of Visit:  5/24/2020           After Visit Summary Signature Page    I have received my discharge instructions, and my questions have been answered. I have discussed any challenges I see with this plan with the nurse or doctor.    ..........................................................................................................................................  Patient/Patient Representative Signature      ..........................................................................................................................................  Patient Representative Print Name and Relationship to Patient    ..................................................               ................................................  Date                                   Time    ..........................................................................................................................................  Reviewed by Signature/Title    ...................................................              ..............................................  Date                                               Time          22EPIC Rev 08/18

## 2020-05-24 NOTE — ED PROVIDER NOTES
History     Chief Complaint:  Cough and Fatigue     HPI   Gene Pagan is a 75 year old male who presents with cough and recent fatigue.  Patient with recent admission on 5/7/2020 for a community-acquired pneumonia and a negative COVID.  Since going home he reports that he has been doing well until the past couple days when he has been having some dyspnea on exertion and increased fatigue.  He also notes ongoing cough.  He notes his symptoms are similar to when he was here previously for pneumonia.  He denies any significant nausea or vomiting.  No significant abdominal pain or diarrhea.  He notes that the swelling in his legs has been steadily improving and his weight has been steady.  Been compliant with his diuretic medications and has been able to follow-up with his cardiologist. He is currently on Eliquis and Bumex.  Patient also complains of some migratory chest and back pain that seems to be positional in nature.  He locates it in his upper back when he is sitting up and in his chest when he is lying down.  He does not complain of any chest pain on exertion.    Allergies:  The patient has no known drug allergies.     Medications:    Eliquis  Bumex  Imdur  Toprol  Guaifenesin    Past Medical History:    Atrial fibrillation  BPH  CHF  CKD  Diabetes mellitus  CAP  Benign prostatic hyperplasia  Morbid obesity    Past Surgical History:    Cardiac Pacemaker Placement    Family History:    Sister: Diabetes    Social History:  Denies tobacco use  Rare alcohol use  Denies drug use  Marital Status:  Single [1]    Review of Systems   Constitutional: Positive for fatigue. Negative for chills and fever.   HENT: Negative for sore throat.    Respiratory: Positive for cough and shortness of breath.    Cardiovascular: Positive for chest pain.   Gastrointestinal: Negative for abdominal pain, diarrhea, nausea and vomiting.   Musculoskeletal: Positive for back pain.   All other systems reviewed and are negative.    Physical  Exam   First Vitals:  BP: 134/65  Pulse: 80  Heart Rate: 80  Temp: 97.1  F (36.2  C)  Resp: 16  SpO2: 98 %    Physical Exam  General: Patient is awake, alert and interactive when I enter the room  Head: The scalp, face, and head appear normal  Eyes: The pupils are equal, round, and reactive to light. Conjunctivae and sclerae are normal  Neck: Normal range of motion. No anterior cervical lymphadenopathy noted  CV: Regular rate. S1/S2. No murmurs.   Resp: Lungs are clear without wheezes or rales. No respiratory distress.   GI: Abdomen is soft, no rigidity, guarding, or rebound. No distension. No tenderness to palpation in any quadrant.     MS: Normal tone. Joints grossly normal without effusions. No asymmetric leg swelling, calf or thigh tenderness.    Skin: No rash or lesions noted. Normal capillary refill noted  Neuro: Speech is normal and fluent. Face is symmetric. Moving all extremities.   Psych:  Normal affect.  Appropriate interactions.    Emergency Department Course   ECG:  Indication: Cough  Time: 1843  Ventricular paced rhythm   No significant change compared to EKG dated 5/7/20. Read time: 1843    Imaging:  XR Chest 1 view, portable:   IMPRESSION: Cardiomegaly. Pulmonary vascularity normal. Minimal atelectasis in the lung bases, otherwise the lungs are clear. Pacemaker leads unchanged in position. Old left clavicle fracture.  as per radiology.    Laboratory:  Procalcitonin: Pending    Symptomatic Covid-19 PCR: Pending    CBC: WBC: 16.6(H), HGB: 10.1(L), PLT: 436    BMP: Glucose 132(H), BUN: 38(H), GFR: 31(L), o/w WNL (Creatinine: 2.03(H))    1830 Troponin: <0.015    BNP: 4,059(H)    Emergency Department Course:  Nursing notes and vitals reviewed. (1833) I performed an exam of the patient as documented above.     IV inserted. Medicine administered as documented above. Blood drawn. This was sent to the lab for further testing, results above.     The patient was sent for a XR Chest 1 view portable while in the  emergency department, findings above.     2009 I rechecked the patient and discussed the results of his workup thus far.     Findings and plan explained to the Patient. Patient discharged home with instructions regarding supportive care, medications, and reasons to return. The importance of close follow-up was reviewed.    I personally reviewed the laboratory results with the Patient and answered all related questions prior to discharge.       Impression & Plan      Medical Decision Making:  Patient is a 75-year-old gentleman with recent hospitalization for community-acquired pneumonia who is COVID negative at that time presents emergency department today with cough ongoing fatigue and atypical chest pain.  Upon initial evaluation he is hemodynamically stable with no vital signs.  He is afebrile.  EKG reveals a paced rhythm without any's significant signs of ischemia.  Troponin is negative.  Chest x-ray was obtained which does not show any acute signs of pneumonia or pulmonary edema.  Patient's BNP is actually downtrending.  Creatinine is at his baseline.  The remainder of the patient's blood work is reassuring.  Overall clinically the patient actually looks quite well.  Does not display any significant increase work of breathing, tachypnea or respiratory distress.  Does not appear to be fluid overloaded today.  Additionally does not look septic and has no signs of fever or significant infectious type symptoms.  At this point believe it is safe for him to be discharged home and have close follow-up with his primary care physician.    Diagnosis:    ICD-10-CM    1. Dyspnea, unspecified type  R06.00    2. Fatigue, unspecified type  R53.83      Disposition:  discharged to home    Scribe Disclosure:  I, Osman Bond, am serving as a scribe on 5/24/2020 at 6:33 PM to personally document services performed by Zuhair Padron* based on my observations and the provider's statements to me.     Osman  Huber-Eneh  5/24/2020   [facility] EMERGENCY DEPARTMENT       Zuhair Padron MD  05/24/20 [id number]

## 2020-05-24 NOTE — ED TRIAGE NOTES
Pt presents with pain with deep inspiration in his chest, states that he was admitted for pneumonia recently and that this feels the same. C/o cough, denies fever or SOB. Pt alert, oriented x3 ABCs intact

## 2020-05-25 LAB
SARS-COV-2 RNA SPEC QL NAA+PROBE: NOT DETECTED
SPECIMEN SOURCE: NORMAL

## 2020-05-26 ENCOUNTER — HOSPITAL ENCOUNTER (OUTPATIENT)
Dept: CARDIAC REHAB | Facility: CLINIC | Age: 75
End: 2020-05-26
Attending: INTERNAL MEDICINE
Payer: COMMERCIAL

## 2020-05-26 DIAGNOSIS — I50.23 ACUTE ON CHRONIC SYSTOLIC HEART FAILURE (H): ICD-10-CM

## 2020-05-26 LAB — INTERPRETATION ECG - MUSE: NORMAL

## 2020-05-26 PROCEDURE — 40000116 ZZH STATISTIC OP CR VISIT: Performed by: REHABILITATION PRACTITIONER

## 2020-05-26 PROCEDURE — 93798 PHYS/QHP OP CAR RHAB W/ECG: CPT | Performed by: REHABILITATION PRACTITIONER

## 2020-05-26 PROCEDURE — 93797 PHYS/QHP OP CAR RHAB WO ECG: CPT | Performed by: REHABILITATION PRACTITIONER

## 2020-05-26 PROCEDURE — 40000575 ZZH STATISTIC OP CARDIAC VISIT #2: Performed by: REHABILITATION PRACTITIONER

## 2020-05-28 ENCOUNTER — VIRTUAL VISIT (OUTPATIENT)
Dept: CARDIOLOGY | Facility: CLINIC | Age: 75
End: 2020-05-28
Payer: COMMERCIAL

## 2020-05-28 VITALS — OXYGEN SATURATION: 97 % | WEIGHT: 172 LBS | BODY MASS INDEX: 25.4 KG/M2

## 2020-05-28 DIAGNOSIS — I50.23 ACUTE ON CHRONIC SYSTOLIC HEART FAILURE (H): ICD-10-CM

## 2020-05-28 PROCEDURE — 99215 OFFICE O/P EST HI 40 MIN: CPT | Mod: 95 | Performed by: PHYSICIAN ASSISTANT

## 2020-05-28 NOTE — LETTER
5/28/2020    Dm Lipscomb MD, MD  [address] [facility] [address]  [facility] [address]    RE: Gene Pagan       Dear Colleague,    I had the pleasure of seeing Gene Pagan in the [facility].    Gene Pagan is a 75 year old male who is being evaluated via a billable video visit.  This visit is being conducted as a virtual visit due to the emphasis on mitigation of the COVID-19 virus pandemic. The clinician has decided that the risk of an in-office visit outweighs the benefit for this patient.     Gene Pagan presents for a CORE clinic follow up visit.   Primary cardiologist will be Dr. Frank.  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  He was in rapid atrial fibrillation and had a new cardiomyopathy, LVEF 20-25%, with severe diastolic dysfunction, RV dilation and dysfunction, and mod-severe MR.  It proved very difficult to rate control his arrhythmia.  NESTOR-guided cardioversion was only briefly successful.  Because of recurrence of AF he was transferred to [facility] for AV node ablation with CRT pacemaker.  This was performed on Friday 4/10/2020 by Dr. Willams. Post-ablation TTE showed an EF of 25-30% (slight improvement) and MR was now only mild. His stay was complicated by newly diagnosed kidney disease / DEONDRE with a presenting creat of 2.4, felt secondary to long-standing diabetes, peaking at 5.7 in the setting of hypotension / suspected hypovolemia at a wt of 186 lbs. Diuretics were held and this had come down to 3.4 on discharge. Eliquis and bumex 1 mg daily were initiated. He was discharged on 4/17 at a weight of 196 lbs (down from 205 lbs on admit).      CORE  "enrollment 4/22: Gene tells me he's doing quite well. Nothing makes him dyspneic at this point. He is sleeping in a recliner currently however out of fear that he'll be uncomfortable lying flat in bed. He does state that he can lower the recliner so he's supine and he has no dyspnea with this. His ankles have some mild pitting edema. No abdominal bloating. Weight is 188 lbs. Today was the first time he weighed himself at home. He has an indwelling catheter currently and has a follow up with urology in two weeks. He lives with his significant other of 20 years. He is mindful of his sodium intake and it sounds like he is limiting appropriately.  Recent device check revealed 98% BiVP. Recent labs show continued improvement in renal function.   - He was on no CMP medications at that point. Toprol 12.5 was added to regimen.     On 5/6, Gene had presented to the ED earlier that day due to shortness of breath. He personally attributed this to musculoskeletal discomfort after doing grocery shopping and a lot of exertion the previous day, but wanted to be sure. A COVID swab was obtained and negative. EKG showed V-pace. Trop was negative. He was ultimately treated for pneumonia. By the time he spoke with me for our visit he told me he felt back to baseline, and overall from a cardiovascular standpoint, felt \"great.\" He was happy to relay that he'd lost significant weight after having his legs wrapped and was down 10 lbs at 174 lbs. I increased Toprol to 25 mg daily. I also referred him to nephrology and placed an order for cardiac rehab.     On 5/22, he reported doing well. Some fatigue, but no notable dyspnea. Felt he was in general, continuing to improve following his discharge. Weights and vitals were stable. I started low-dose Imdur for some vasodilation.     On 5/21, he returned to the ED for persistent chest / back pain / dyspnea that he had previously attributed to pneumonia and musculoskeletal discomfort. " "  \"Patient is a 75-year-old gentleman with recent hospitalization for community-acquired pneumonia who is COVID negative at that time presents emergency department today with cough ongoing fatigue and atypical chest pain.  Upon initial evaluation he is hemodynamically stable with no vital signs.  He is afebrile.  EKG reveals a paced rhythm without any's significant signs of ischemia.  Troponin is negative.  Chest x-ray was obtained which does not show any acute signs of pneumonia or pulmonary edema.  Patient's BNP is actually downtrending.  Creatinine is at his baseline.  The remainder of the patient's blood work is reassuring.  Overall clinically the patient actually looks quite well.  Does not display any significant increase work of breathing, tachypnea or respiratory distress.  Does not appear to be fluid overloaded today.  Additionally does not look septic and has no signs of fever or significant infectious type symptoms.  At this point believe it is safe for him to be discharged home and have close follow-up with his primary care physician.\"    Hgb was low but trending up. I note that his WBC is rising and this isn't commented on. COVID-19 negative.     Today, he's feeling better, but states the pleuritic discomfort is still there and about the same. States his breathing is \"normal\" but he still has some discomfort with deep breathing and with lying flat. Goes up/down stairs without significant dyspnea. He started cardiac rehab this week. His weight and vitals are stable. States his device incision continues to heal well. Still has indwelling baker, states urology plans to leave it in long-term, which he's okay with. Denies any recent urinary symptoms. He initially had some headaches and lightheadedness on Imdur but those have resolved.     Recent labs:  Component      Latest Ref Rng & Units 5/20/2020 5/24/2020   WBC      4.0 - 11.0 10e9/L  16.6 (H)   RBC Count      4.4 - 5.9 10e12/L  4.11 (L)   Hemoglobin     "  13.3 - 17.7 g/dL  10.1 (L)   Hematocrit      40.0 - 53.0 %  33.9 (L)   MCV      78 - 100 fl  83   MCH      26.5 - 33.0 pg  24.6 (L)   MCHC      31.5 - 36.5 g/dL  29.8 (L)   RDW      10.0 - 15.0 %  17.4 (H)   Platelet Count      150 - 450 10e9/L  436   Diff Method        Automated Method   % Neutrophils      %  76.9   % Lymphocytes      %  13.4   % Monocytes      %  7.6   % Eosinophils      %  0.9   % Basophils      %  0.6   % Immature Granulocytes      %  0.6   Nucleated RBCs      0 /100  0   Absolute Neutrophil      1.6 - 8.3 10e9/L  12.8 (H)   Absolute Lymphocytes      0.8 - 5.3 10e9/L  2.2   Absolute Monocytes      0.0 - 1.3 10e9/L  1.3   Absolute Eosinophils      0.0 - 0.7 10e9/L  0.2   Absolute Basophils      0.0 - 0.2 10e9/L  0.1   Abs Immature Granulocytes      0 - 0.4 10e9/L  0.1   Absolute Nucleated RBC        0.0   Sodium      133 - 144 mmol/L 136 133   Potassium      3.4 - 5.3 mmol/L 4.1 4.2   Chloride      94 - 109 mmol/L 104 101   Carbon Dioxide      20 - 32 mmol/L 24 25   Anion Gap      3 - 14 mmol/L 8 7   Glucose      70 - 99 mg/dL 112 (H) 132 (H)   Urea Nitrogen      7 - 30 mg/dL 35 (H) 38 (H)   Creatinine      0.66 - 1.25 mg/dL 2.12 (H) 2.03 (H)   GFR Estimate      >60 mL/min/1.73:m2 29 (L) 31 (L)   GFR Estimate If Black      >60 mL/min/1.73:m2 34 (L) 36 (L)   Calcium      8.5 - 10.1 mg/dL 9.3 9.4   N-Terminal Pro Bnp      0 - 450 pg/mL 6,001 (H)    Troponin I ES      0.000 - 0.045 ug/L  <0.015   N-Terminal Pro BNP Inpatient      0 - 1,800 pg/mL  4,059 (H)     ROS:  12-pt ROS is negative except for as noted above.    EXAM:  A limited exam was conducted via video.  Constitutional: Patient is pleasant, alert, in no distress. Normal body habitus, upright.  ENT: Membranes moist, no nasal discharge or bleeding gums. Normal head shape, no evidence of injury or laceration.  EYES: No scleral icterus, normal conjunctivae. EOM's appear intact.   Neck: No evidence of thyromegaly.   Chest/Lungs: Appears to  have normal respiratory effort. No audible wheezing, no cough, equal chest wall expansion.   Cardiovascular: No evidence of elevated JVP. No evidence of pitting edema bilaterally.  Abdomen: No evidence of abdominal distention. No observed jaundice.  Extremities: No edema, erythema, cyanosis noted.  Skin: No rashes or lesions appreciated on visualized skin, normal skin color.  Neurologic: Normal mentation. Normal arm motion bilaterally, no tremors. No evidence of focal defect.  Psychiatric: Appropriate affect. A&Ox3.    Assessment/Plan:  1. New systolic CMP / biventricular HF, likely tachy-mediated - EF 25-30%, FC II.            - Well-compensated on Bumex 1 mg daily at a wt of 172 lbs (of note - previously felt hypovolemic at a wt of 184 lbs due to bump in creat, however, renal function now looks better at current weight).           - On Toprol 25 mg, Imdur 15. No ACE/ARB/[place] d/t ongoing renal dysfunction.            - Cardiac rehab ([facility]). Just started.    2. Pleuritic chest discomfort, resulting in two recent ED visits, no improvement despite treatment for PNA, rising WBC  - Pleural effusion/Pericarditis needs to be ruled out. We have no ECHO availability until Tuesday of next week, so I will get him slotted in there, along with inflammatory markers and will also set him up for a device check to have his LRL turned down per protocol. In the meantime, I have asked him to be seen by his PCP tomorrow to investigate his rising white ct. He's agreeable.     3. Rapid AF s/p AVN ablation + CRT-P implant on 4/10 - 98% BiVP. Anticoagulated.  4. DEONDRE / CKD-III - Creatinine stable ~2. Now following with [facility].    Follow-up:    - Me in 2 weeks (I am rounding in the hospital next week, will touch base with him once I see results)   - TTE and follow up with Dr. Frank in July. If EF remains reduced, would consider ischemic w/u + RHC and sleep study.       Video-Visit Details  Type of service:   Video Visit  Video End Time (time video stopped): 8:05  Originating Location (pt. Location): Home  Distant Location (provider location):  [facility]   Mode of Communication:  Video Conference via [employer]    Marsha Noonan PA-C  [facility]  Pager: [phone]  Text Page  (7:30am - 4pm M-F)     Thank you for allowing me to participate in the care of your patient.    Sincerely,     Marsha Noonan PA-C     [facility]

## 2020-05-28 NOTE — PROGRESS NOTES
"Gene Pagan is a 75 year old male who is being evaluated via a billable video visit.  This visit is being conducted as a virtual visit due to the emphasis on mitigation of the COVID-19 virus pandemic. The clinician has decided that the risk of an in-office visit outweighs the benefit for this patient.     The patient has been notified of following:   \"This video visit will be conducted via a call between you and your physician/provider. We have found that certain health care needs can be provided without the need for an in-person physical exam.  This service lets us provide the care you need with a video conversation.  If a prescription is necessary we can send it directly to your pharmacy.  If lab work is needed we can place an order for that and you can then stop by our lab to have the test done at a later time.  If during the course of the call the physician/provider feels a video visit is not appropriate, you will not be charged for this service.\"     Patient has given verbal consent for Video visit? Yes    Patient would like the video invitation sent by: Text to cell phone: [phone]    How would you like to obtain your AVS? Cinda RAMON for CORE follow-ups:  Review Of Systems  Skin: NEGATIVE  Eyes:Ears/Nose/Throat: NEGATIVE  Respiratory: denies SOB  Cardiovascular:no change in pain located various upper chest back area, Lightheadedness with addition of imdur, none in past two days.  Gastrointestinal: NEGATIVE  Genitourinary:NEGATIVE   Musculoskeletal: NEGATIVE  Neurologic: New headaches with addition of imdur  Psychiatric: NEGATIVE  Hematologic/Lymphatic/Immunologic: NEGATIVE  Endocrine:  NEGATIVE  Patient reported weight 172lb  O2 sat taken by home health nurse on 5/27 was 97% room air.  Lorraine James LPN      I have reviewed and updated the patient's Past Medical History, Social History, Family History and Medication List.    PROBLEM LIST  Patient Active Problem List   Diagnosis     Atrial " fibrillation with rapid ventricular response (H)     CHF (congestive heart failure) (H)     Urinary retention     Gross hematuria     BPH with obstruction/lower urinary tract symptoms     CAP (community acquired pneumonia)       ALLERGIES  Patient has no known allergies.    MEDICATIONS  Current Outpatient Medications   Medication Sig Dispense Refill     acetaminophen (TYLENOL) 500 MG tablet Take 1,000 mg by mouth every 8 hours        apixaban ANTICOAGULANT (ELIQUIS) 5 MG tablet Take 1 tablet (5 mg) by mouth 2 times daily 60 tablet 0     bumetanide (BUMEX) 1 MG tablet One half tablet by mouth each AM. 30 tablet 0     Cholecalciferol (VITAMIN D3) 75 MCG (3000 UT) TABS Take 1 tablet by mouth daily       insulin glargine (LANTUS SOLOSTAR) 100 UNIT/ML pen Inject 9 Units Subcutaneous At Bedtime Future refills by PCP Dr. Martínez Duarte with phone number [phone]. 15 mL 0     insulin pen needle (31G X 8 MM) 31G X 8 MM miscellaneous 1 Box of 100 insulin pen needles to be dispensed with every insulin pen prescription 100 each 0     isosorbide mononitrate (IMDUR) 30 MG 24 hr tablet Take 0.5 tablets (15 mg) by mouth At Bedtime 15 tablet 5     melatonin 3 MG tablet Take 3 mg by mouth At Bedtime       metoprolol succinate ER (TOPROL-XL) 25 MG 24 hr tablet Take 0.5 tablets (12.5 mg) by mouth daily       Multiple Vitamins-Minerals (MULTIVITAMIN ADULT PO) Take 1 tablet by mouth daily       tamsulosin (FLOMAX) 0.4 MG capsule Take 1 capsule (0.4 mg) by mouth At Bedtime 90 capsule 0       Video Start Time: 7:45a    Gene Pagan presents for a CORE clinic follow up visit.   Primary cardiologist will be Dr. Frank.  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  He was in rapid atrial fibrillation and had a new cardiomyopathy, LVEF 20-25%, with severe diastolic dysfunction, RV dilation and dysfunction, and mod-severe MR.  It proved very difficult to rate control his arrhythmia.  NESTOR-guided cardioversion was only briefly successful.  Because of recurrence of AF he was transferred to [facility] for AV node ablation with CRT pacemaker.  This was performed on Friday 4/10/2020 by Dr. Willams. Post-ablation TTE showed an EF of 25-30% (slight improvement) and MR was now only mild. His stay was complicated by newly diagnosed kidney disease / DEONDRE with a presenting creat of 2.4, felt secondary to long-standing diabetes, peaking at 5.7 in the setting of hypotension / suspected hypovolemia at a wt of 186 lbs. Diuretics were held and this had come down to 3.4 on discharge. Eliquis and bumex 1 mg daily were initiated. He was discharged on 4/17 at a weight of 196 lbs (down from 205 lbs on admit).      CORE enrollment 4/22: Gene tells me he's doing quite well. Nothing makes him dyspneic at this point. He is sleeping in a recliner currently however out of fear that he'll be uncomfortable lying flat in bed. He does state that he can lower the recliner so he's supine and he has no dyspnea with this. His ankles have some mild pitting edema. No abdominal bloating. Weight is 188 lbs. Today was the first time he weighed himself at home. He has an indwelling catheter currently and has a follow up with urology in two weeks. He lives with his significant other of 20 years. He is mindful of his sodium intake and it sounds like he is limiting appropriately.  Recent device check revealed 98% BiVP. Recent labs show continued improvement in renal function.   - He was on no CMP medications at that point. Toprol 12.5 was added to regimen.     On 5/6, Gene had presented to the ED earlier that day due to shortness of breath. He personally attributed this to  "musculoskeletal discomfort after doing grocery shopping and a lot of exertion the previous day, but wanted to be sure. A COVID swab was obtained and negative. EKG showed V-pace. Trop was negative. He was ultimately treated for pneumonia. By the time he spoke with me for our visit he told me he felt back to baseline, and overall from a cardiovascular standpoint, felt \"great.\" He was happy to relay that he'd lost significant weight after having his legs wrapped and was down 10 lbs at 174 lbs. I increased Toprol to 25 mg daily. I also referred him to nephrology and placed an order for cardiac rehab.     On 5/22, he reported doing well. Some fatigue, but no notable dyspnea. Felt he was in general, continuing to improve following his discharge. Weights and vitals were stable. I started low-dose Imdur for some vasodilation.     On 5/21, he returned to the ED for persistent chest / back pain / dyspnea that he had previously attributed to pneumonia and musculoskeletal discomfort.   \"Patient is a 75-year-old gentleman with recent hospitalization for community-acquired pneumonia who is COVID negative at that time presents emergency department today with cough ongoing fatigue and atypical chest pain.  Upon initial evaluation he is hemodynamically stable with no vital signs.  He is afebrile.  EKG reveals a paced rhythm without any's significant signs of ischemia.  Troponin is negative.  Chest x-ray was obtained which does not show any acute signs of pneumonia or pulmonary edema.  Patient's BNP is actually downtrending.  Creatinine is at his baseline.  The remainder of the patient's blood work is reassuring.  Overall clinically the patient actually looks quite well.  Does not display any significant increase work of breathing, tachypnea or respiratory distress.  Does not appear to be fluid overloaded today.  Additionally does not look septic and has no signs of fever or significant infectious type symptoms.  At this point " "believe it is safe for him to be discharged home and have close follow-up with his primary care physician.\"    Hgb was low but trending up. I note that his WBC is rising and this isn't commented on. COVID-19 negative.     Today, he's feeling better, but states the pleuritic discomfort is still there and about the same. States his breathing is \"normal\" but he still has some discomfort with deep breathing and with lying flat. Goes up/down stairs without significant dyspnea. He started cardiac rehab this week. His weight and vitals are stable. States his device incision continues to heal well. Still has indwelling baker, states urology plans to leave it in long-term, which he's okay with. Denies any recent urinary symptoms. He initially had some headaches and lightheadedness on Imdur but those have resolved.     Recent labs:  Component      Latest Ref Rng & Units 5/20/2020 5/24/2020   WBC      4.0 - 11.0 10e9/L  16.6 (H)   RBC Count      4.4 - 5.9 10e12/L  4.11 (L)   Hemoglobin      13.3 - 17.7 g/dL  10.1 (L)   Hematocrit      40.0 - 53.0 %  33.9 (L)   MCV      78 - 100 fl  83   MCH      26.5 - 33.0 pg  24.6 (L)   MCHC      31.5 - 36.5 g/dL  29.8 (L)   RDW      10.0 - 15.0 %  17.4 (H)   Platelet Count      150 - 450 10e9/L  436   Diff Method        Automated Method   % Neutrophils      %  76.9   % Lymphocytes      %  13.4   % Monocytes      %  7.6   % Eosinophils      %  0.9   % Basophils      %  0.6   % Immature Granulocytes      %  0.6   Nucleated RBCs      0 /100  0   Absolute Neutrophil      1.6 - 8.3 10e9/L  12.8 (H)   Absolute Lymphocytes      0.8 - 5.3 10e9/L  2.2   Absolute Monocytes      0.0 - 1.3 10e9/L  1.3   Absolute Eosinophils      0.0 - 0.7 10e9/L  0.2   Absolute Basophils      0.0 - 0.2 10e9/L  0.1   Abs Immature Granulocytes      0 - 0.4 10e9/L  0.1   Absolute Nucleated RBC        0.0   Sodium      133 - 144 mmol/L 136 133   Potassium      3.4 - 5.3 mmol/L 4.1 4.2   Chloride      94 - 109 mmol/L 104 " 101   Carbon Dioxide      20 - 32 mmol/L 24 25   Anion Gap      3 - 14 mmol/L 8 7   Glucose      70 - 99 mg/dL 112 (H) 132 (H)   Urea Nitrogen      7 - 30 mg/dL 35 (H) 38 (H)   Creatinine      0.66 - 1.25 mg/dL 2.12 (H) 2.03 (H)   GFR Estimate      >60 mL/min/1.73:m2 29 (L) 31 (L)   GFR Estimate If Black      >60 mL/min/1.73:m2 34 (L) 36 (L)   Calcium      8.5 - 10.1 mg/dL 9.3 9.4   N-Terminal Pro Bnp      0 - 450 pg/mL 6,001 (H)    Troponin I ES      0.000 - 0.045 ug/L  <0.015   N-Terminal Pro BNP Inpatient      0 - 1,800 pg/mL  4,059 (H)     ROS:  12-pt ROS is negative except for as noted above.    EXAM:  A limited exam was conducted via video.  Constitutional: Patient is pleasant, alert, in no distress. Normal body habitus, upright.  ENT: Membranes moist, no nasal discharge or bleeding gums. Normal head shape, no evidence of injury or laceration.  EYES: No scleral icterus, normal conjunctivae. EOM's appear intact.   Neck: No evidence of thyromegaly.   Chest/Lungs: Appears to have normal respiratory effort. No audible wheezing, no cough, equal chest wall expansion.   Cardiovascular: No evidence of elevated JVP. No evidence of pitting edema bilaterally.  Abdomen: No evidence of abdominal distention. No observed jaundice.  Extremities: No edema, erythema, cyanosis noted.  Skin: No rashes or lesions appreciated on visualized skin, normal skin color.  Neurologic: Normal mentation. Normal arm motion bilaterally, no tremors. No evidence of focal defect.  Psychiatric: Appropriate affect. A&Ox3.    Assessment/Plan:  1. New systolic CMP / biventricular HF, likely tachy-mediated - EF 25-30%, FC II.            - Well-compensated on Bumex 1 mg daily at a wt of 172 lbs (of note - previously felt hypovolemic at a wt of 184 lbs due to bump in creat, however, renal function now looks better at current weight).           - On Toprol 25 mg, Imdur 15. No ACE/ARB/[place] d/t ongoing renal dysfunction.            - Cardiac rehab  ([place] location). Just started.    2. Pleuritic chest discomfort, resulting in two recent ED visits, no improvement despite treatment for PNA, rising WBC  - Pleural effusion/Pericarditis needs to be ruled out. We have no ECHO availability until Tuesday of next week, so I will get him slotted in there, along with inflammatory markers and will also set him up for a device check to have his LRL turned down per protocol. In the meantime, I have asked him to be seen by his PCP tomorrow to investigate his rising white ct. He's agreeable.     3. Rapid AF s/p AVN ablation + CRT-P implant on 4/10 - 98% BiVP. Anticoagulated.  4. DEONDRE / CKD-III - Creatinine stable ~2. Now following with [facility].    Follow-up:    - Me in 2 weeks (I am rounding in the hospital next week, will touch base with him once I see results)   - TTE and follow up with Dr. Frank in July. If EF remains reduced, would consider ischemic w/u + RHC and sleep study.       Video-Visit Details  Type of service:  Video Visit  Video End Time (time video stopped): 8:05  Originating Location (pt. Location): Home  Distant Location (provider location):  [facility]   Mode of Communication:  Video Conference via [employer] [facility] [facility] - [facility]  Pager: [phone]  Text Page  (7:30am - 4pm M-F)

## 2020-05-29 ENCOUNTER — VIRTUAL VISIT (OUTPATIENT)
Dept: INTERNAL MEDICINE | Facility: CLINIC | Age: 75
End: 2020-05-29
Payer: COMMERCIAL

## 2020-05-29 ENCOUNTER — PATIENT OUTREACH (OUTPATIENT)
Dept: CARE COORDINATION | Facility: CLINIC | Age: 75
End: 2020-05-29

## 2020-05-29 DIAGNOSIS — R50.9 FEVER, UNSPECIFIED FEVER CAUSE: Primary | ICD-10-CM

## 2020-05-29 DIAGNOSIS — Z20.822 SUSPECTED COVID-19 VIRUS INFECTION: Primary | ICD-10-CM

## 2020-05-29 DIAGNOSIS — Z96.0 URINARY CATHETER IN PLACE: ICD-10-CM

## 2020-05-29 DIAGNOSIS — D72.829 LEUKOCYTOSIS, UNSPECIFIED TYPE: ICD-10-CM

## 2020-05-29 DIAGNOSIS — N18.30 CKD (CHRONIC KIDNEY DISEASE) STAGE 3, GFR 30-59 ML/MIN (H): ICD-10-CM

## 2020-05-29 DIAGNOSIS — R50.9 FEVER, UNSPECIFIED FEVER CAUSE: ICD-10-CM

## 2020-05-29 LAB
ALBUMIN UR-MCNC: NEGATIVE MG/DL
ANION GAP SERPL CALCULATED.3IONS-SCNC: 6 MMOL/L (ref 3–14)
APPEARANCE UR: CLEAR
BASOPHILS # BLD AUTO: 0.1 10E9/L (ref 0–0.2)
BASOPHILS NFR BLD AUTO: 0.6 %
BILIRUB UR QL STRIP: NEGATIVE
BUN SERPL-MCNC: 46 MG/DL (ref 7–30)
CALCIUM SERPL-MCNC: 9.5 MG/DL (ref 8.5–10.1)
CHLORIDE SERPL-SCNC: 99 MMOL/L (ref 94–109)
CO2 SERPL-SCNC: 26 MMOL/L (ref 20–32)
COLOR UR AUTO: YELLOW
CREAT SERPL-MCNC: 1.97 MG/DL (ref 0.66–1.25)
CRP SERPL-MCNC: 190 MG/L (ref 0–8)
DIFFERENTIAL METHOD BLD: ABNORMAL
EOSINOPHIL # BLD AUTO: 0.5 10E9/L (ref 0–0.7)
EOSINOPHIL NFR BLD AUTO: 3.9 %
ERYTHROCYTE [DISTWIDTH] IN BLOOD BY AUTOMATED COUNT: 18.1 % (ref 10–15)
ERYTHROCYTE [SEDIMENTATION RATE] IN BLOOD BY WESTERGREN METHOD: 97 MM/H (ref 0–20)
GFR SERPL CREATININE-BSD FRML MDRD: 32 ML/MIN/{1.73_M2}
GLUCOSE SERPL-MCNC: 155 MG/DL (ref 70–99)
GLUCOSE UR STRIP-MCNC: NEGATIVE MG/DL
HCT VFR BLD AUTO: 30.4 % (ref 40–53)
HGB BLD-MCNC: 9.3 G/DL (ref 13.3–17.7)
HGB UR QL STRIP: ABNORMAL
KETONES UR STRIP-MCNC: NEGATIVE MG/DL
LEUKOCYTE ESTERASE UR QL STRIP: NEGATIVE
LYMPHOCYTES # BLD AUTO: 1.9 10E9/L (ref 0.8–5.3)
LYMPHOCYTES NFR BLD AUTO: 15 %
MCH RBC QN AUTO: 24.4 PG (ref 26.5–33)
MCHC RBC AUTO-ENTMCNC: 30.6 G/DL (ref 31.5–36.5)
MCV RBC AUTO: 80 FL (ref 78–100)
MONOCYTES # BLD AUTO: 1.1 10E9/L (ref 0–1.3)
MONOCYTES NFR BLD AUTO: 8.6 %
NEUTROPHILS # BLD AUTO: 9.1 10E9/L (ref 1.6–8.3)
NEUTROPHILS NFR BLD AUTO: 71.9 %
NITRATE UR QL: NEGATIVE
PH UR STRIP: 5.5 PH (ref 5–7)
PLATELET # BLD AUTO: 467 10E9/L (ref 150–450)
POTASSIUM SERPL-SCNC: 3.8 MMOL/L (ref 3.4–5.3)
RBC # BLD AUTO: 3.81 10E12/L (ref 4.4–5.9)
RBC #/AREA URNS AUTO: ABNORMAL /HPF
SODIUM SERPL-SCNC: 131 MMOL/L (ref 133–144)
SOURCE: ABNORMAL
SP GR UR STRIP: 1.01 (ref 1–1.03)
UROBILINOGEN UR STRIP-ACNC: 0.2 EU/DL (ref 0.2–1)
WBC # BLD AUTO: 12.7 10E9/L (ref 4–11)
WBC #/AREA URNS AUTO: ABNORMAL /HPF

## 2020-05-29 PROCEDURE — 80048 BASIC METABOLIC PNL TOTAL CA: CPT | Performed by: INTERNAL MEDICINE

## 2020-05-29 PROCEDURE — 85025 COMPLETE CBC W/AUTO DIFF WBC: CPT | Performed by: INTERNAL MEDICINE

## 2020-05-29 PROCEDURE — 85652 RBC SED RATE AUTOMATED: CPT | Performed by: INTERNAL MEDICINE

## 2020-05-29 PROCEDURE — 36415 COLL VENOUS BLD VENIPUNCTURE: CPT | Performed by: INTERNAL MEDICINE

## 2020-05-29 PROCEDURE — 99214 OFFICE O/P EST MOD 30 MIN: CPT | Mod: 95 | Performed by: INTERNAL MEDICINE

## 2020-05-29 PROCEDURE — 86140 C-REACTIVE PROTEIN: CPT | Performed by: INTERNAL MEDICINE

## 2020-05-29 PROCEDURE — 81001 URINALYSIS AUTO W/SCOPE: CPT | Performed by: INTERNAL MEDICINE

## 2020-05-29 NOTE — PROGRESS NOTES
Clinic Care Coordination Contact    Clinic Care Coordination Contact  OUTREACH    Referral Information: IP Report  Primary Diagnosis: Psychosocial    Chief Complaint   Patient presents with     Clinic Care Coordination - Follow-up     Resource navigation        Universal Utilization: Utilization reviewed.   Difficulty keeping appointments: No  Compliance Concerns: No  No-Show Concerns: No  No PCP office visit in Past Year: No  Utilization    Last refreshed: 5/29/2020  1:35 PM:  Hospital Admissions 3           Last refreshed: 5/29/2020  1:35 PM:  ED Visits 4           Last refreshed: 5/29/2020  1:35 PM:  No Show Count (past year) 2              Current as of: 5/29/2020  1:35 PM              Clinical Concerns:  Current Medical Concerns:    Patient Active Problem List   Diagnosis     Atrial fibrillation with rapid ventricular response (H)     CHF (congestive heart failure) (H)     Urinary retention     Gross hematuria     BPH with obstruction/lower urinary tract symptoms     CAP (community acquired pneumonia)       Current Behavioral Concerns: None noted at this time.   Education Provided to patient: Role of  CC and reason for calling.       Health Maintenance Reviewed: Not assessed  Clinical Pathway: None    Medication Management:  Independent.      Functional Status:  Bed or wheelchair confined: No  Mobility Status: Independent    Living Situation:  Current living arrangement: Other(Lives with roommate)  Type of residence: [facility]    Lifestyle & Psychosocial Needs:  Inadequate nutrition : No  Tube Feeding: No  Inadequate activity/exercise : No  Significant changes in sleep pattern : No     Moravian or spiritual beliefs that impact treatment: No  Mental health DX: No  Mental health management concern : No  Informal Support system: Friends   Socioeconomic History     Marital status: Single     Spouse name: Not on file     Number of children: Not on file     Years of education: Not on file     Highest education  level: Not on file     Tobacco Use     Smoking status: Never Smoker     Smokeless tobacco: Never Used   Substance and Sexual Activity     Alcohol use: Yes     Comment: Rare     Sexual activity: Not Currently       Patient is interested in addressing credit card debt. LACHELLE CC presented [facility] Financial Counseling as well as [facility] Financial Counseling as options. We discussed that they are both free programs. Pt is agreeable to these resources, and endorses no other needs or concerns at this time.      Resources and Interventions:  List of home care services: Skilled Nursing  Community Resources: Home Care, County Programs, County Worker,   Supplies used at home: Incontinence Supplies, Compression Stockings  Equipment Currently Used at Home: cane, straight, walker, rolling, walker, standard    Advance Care Plan/Directive  Advanced Care Plans/Directives on file: No  Advanced Care Plan/Directive Status: Considering Options    Patient/Caregiver understanding: Pt reports understanding and denies any additional questions or concerns at this times. LACHELLE CC engaged in AIDET communication during encounter.       Future Appointments              In 3 days 2,  Cardiac Rehab [facility] [place] [facility]    In 4 days [facility] LAB [facility] [place], P PSA CLIN    In 4 days [id number] [facility] CV Echocardiography, CVIMG    In 4 days SANTIAGO DCRN [facility], UMP PSA CLIN    In 5 days 2,  Cardiac Rehab [facility], [place] [facility]    In 1 week 1,  Cardiac Rehab [facility] [place] [facility]    In 1 week 2,  Cardiac Rehab [facility] [place] [facility]    In 1 week 1,  Cardiac Rehab [facility] [place] [facility]    In 1 week Marsha Noonan PA-C [facility], UMP PSA CLIN    In 2 weeks 1,  Cardiac [facility],  [place] [facility]    In 2 weeks 1, Rh Cardiac Rehab [facility], [place] [facility]    In 2 weeks Lei Santoyo MD [facility],  PHY [place]    In 3 weeks 1, Rh Cardiac Rehab [facility], [place] [facility]    In 3 weeks 1, Rh Cardiac Rehab [facility], [place] [facility]    In 3 weeks 1, Rh Cardiac Rehab [facility], [place] [facility]    In 4 weeks 1, Rh Cardiac Rehab [facility], [place] [facility]    In 1 month 1, Rh Cardiac Rehab [facility], [place] [facility]    In 1 month 1, Rh Cardiac Rehab [facility], [place] [facility]    In 1 month 1, Rh Cardiac Rehab [facility], [place] [facility]    In 1 month 1, Rh Cardiac Rehab [facility], [place] [facility]    In 1 month 1, Rh Cardiac Rehab [facility], [place] [facility]    In 1 month 1, Rh Cardiac Rehab [facility], [place] [facility]    In 1 month 1, Rh Cardiac Rehab [facility], [place] [facility]    In 1 month 1, Rh Cardiac Rehab [facility], [place] [facility]    In 1 month [id number] [facility], RS[facility]    In 1 month Sushila Frank,  [facility] PSA CLIN    In 2 months Dm Lipscomb MD [place], [place]    In 2 months Cheikh Perez MD [facility] Urology [facility], UA PHY JULIO          Plan: Patient declines further Care Coordination outreaches at this time, stating that he would not benefit from follow-up. No further outreaches will be made at this time unless a new referral is made or a change in the pt's status occurs. Patient was provided with this writer's contact information and encouraged to call with any questions or concerns.      Jose Juan Infante, [facility]  Clinic Care Coordinator  Ph. [phone]  [email or web address]

## 2020-05-29 NOTE — PROGRESS NOTES
"Gene Pagan is a 75 year old male who is being evaluated via a billable video visit.      The patient has been notified of following:     \"This video visit will be conducted via a call between you and your physician/provider. We have found that certain health care needs can be provided without the need for an in-person physical exam.  This service lets us provide the care you need with a video conversation.  If a prescription is necessary we can send it directly to your pharmacy.  If lab work is needed we can place an order for that and you can then stop by our lab to have the test done at a later time.    Video visits are billed at different rates depending on your insurance coverage.  Please reach out to your insurance provider with any questions.    If during the course of the call the physician/provider feels a video visit is not appropriate, you will not be charged for this service.\"    Patient has given verbal consent for Video visit? Yes    How would you like to obtain your AVS? Mail a copy    Patient would like the video invitation sent by: Text to cell phone: [phone]    Will anyone else be joining your video visit? No    Subjective     Gene Pagan is a 75 year old male who presents today via video visit for the following health issues:    HPI  Diabetes Follow-up    How often are you checking your blood sugar? Two times daily  Blood sugar testing frequency justification:  Uncontrolled diabetes  What time of day are you checking your blood sugars (select all that apply)?  After meals  Have you had any blood sugars above 200?  Yes, 228 last night  Have you had any blood sugars below 70?  No    What symptoms do you notice when your blood sugar is low?  None    What concerns do you have today about your diabetes? Elevated blood sugars     Do you have any of these symptoms? (Select all that apply)  No numbness or tingling in feet.  No redness, sores or blisters on feet.  No complaints of excessive " thirst.  No reports of blurry vision.  No significant changes to weight.    Have you had a diabetic eye exam in the last 12 months? No        BP Readings from Last 2 Encounters:   05/24/20 125/73   05/21/20 112/63     Hemoglobin A1C (%)   Date Value   03/27/2020 6.2 (H)         Hypertension Follow-up      Do you check your blood pressure regularly outside of the clinic? Yes     Are you following a low salt diet? Yes    Are your blood pressures ever more than 140 on the top number (systolic) OR more   than 90 on the bottom number (diastolic), for example 140/90? No      How many servings of fruits and vegetables do you eat daily?  0-1    On average, how many sweetened beverages do you drink each day (Examples: soda, juice, sweet tea, etc.  Do NOT count diet or artificially sweetened beverages)?   1    How many days per week do you exercise enough to make your heart beat faster? 3 or less    How many minutes a day do you exercise enough to make your heart beat faster? 9 or less    How many days per week do you miss taking your medication? 0         Video Start Time: 1304    The patient reports being referred by cardiology back to his primary care provider to help evaluate problems with elevated glucose and white blood cell count.    We reviewed recent history.  He was hospitalized in April for heart failure and atrial fibrillation with rapid ventricular rate.  He underwent AV node ablation and CRT-P placement on April 10.    He was also admitted for pneumonia in early May.    More recently, the patient was seen in the ED on May 24 for cough and fatigue.  Although no acute infectious etiology was identified, he was noted to have an elevated white blood cell count.    Yesterday the patient was seen by cardiology and was felt to have a stable cardiac status.  They expressed concern about his recently elevated white blood cell count and his glucose elevations.    Due to his fevers and elelvated WBC count, they ordered a  "future CRP and ESR.     The patient reports being bothered by recent pains, variably located about the chest including between the shoulder blades, and the left rib cage, or above his left collarbone.  He reports having a fever up to 101.7 on Tuesday evening, which had been associated with glucoses in the 150-180 range in that context.  He has not had further fevers and his glucoses are settling back into the 100-130 range since then.  He reports having an occasional cough producing a slight yellow sputum, but no dyspnea either at rest or with activity.  He participated in cardiac rehab last week at which time he was able to walk for up to 6 minutes at a time.  No recent chest pressure or tightness, dizziness or palpitations.  His weight has been stable for about a week in the low 170s.    The patient has noted some \"stinging\" around his urinary catheter, without dysuria or hematuria.  He has occasional loose stools, no rectal bleeding.  No nausea vomiting or abdominal pain.  He is due to see Dr. Brown of urology in a few months but maintains a chronic indwelling urinary catheter due to poor bladder emptying.    Past medical, family and social histories as well as medications reviewed and updated as needed.    Reviewed and updated as needed this visit by Provider         Review of Systems   REVIEW OF SYSTEMS: The following systems have been completely reviewed and are negative except as noted above:   Constitutional, HEENT, respiratory, cardiovascular, gastrointestinal, genitourinary, musculoskeletal, hematologic, endocrine systems.        Objective    There were no vitals taken for this visit.  Estimated body mass index is 25.4 kg/m  as calculated from the following:    Height as of 5/21/20: 1.753 m (5' 9\").    Weight as of 5/28/20: 78 kg (172 lb).  Physical Exam     GENERAL: Healthy, alert and no distress  EYES: Eyes grossly normal to inspection.  No discharge or erythema, or obvious scleral/conjunctival " "abnormalities.  RESP: No audible wheeze, cough, or visible cyanosis.  No visible retractions or increased work of breathing.    SKIN: Visible skin clear. No significant rash, abnormal pigmentation or lesions.  NEURO: Cranial nerves grossly intact.  Mentation and speech appropriate for age.  PSYCH: Mentation appears normal, affect normal/bright, judgement and insight intact, normal speech and appearance well-groomed.      Diagnostic Test Results:  Labs reviewed in Epic        Assessment & Plan     (R50.9) Fever, unspecified fever cause  (primary encounter diagnosis)  (D72.829) Leukocytosis, unspecified type  Comment: See lab workup, ordered due to elevated temperatures and WBC count.   Also ordered CRP and ESR for today as cardiology had ordered these future labs for next week.   Plan: UA with Microscopic reflex to Culture, CBC with        platelets and differential, **ESR FUTURE         anytime, CRP, inflammation          (Z96.0) Urinary catheter in place  Comment: Check UA as noted above. F/u with Urology as they advise.   NOTE: contacted patient by phone later today after visit--advised him that UA, WBC count stable.   Plan: UA with Microscopic reflex to Culture          (N18.3) CKD (chronic kidney disease) stage 3, GFR 30-59 ml/min (H)  Comment: Will update BMP along with other labs checked today. Creatinine has recently been stable on current diuretic/medication regimen.   Plan: Basic metabolic panel  (Ca, Cl, CO2, Creat,         Gluc, K, Na, BUN)            BMI:   Estimated body mass index is 25.4 kg/m  as calculated from the following:    Height as of 5/21/20: 1.753 m (5' 9\").    Weight as of 5/28/20: 78 kg (172 lb).       F/u with cardiology next week as planned.  F/u with me on 8/13/2020 as scheduled.   No follow-ups on file.    Dm Lipscomb MD  [facility]      Video-Visit Details    Type of service:  Video Visit    Video End Time: 1324    Originating Location (pt. Location): " Home    Distant Location (provider location):  [facility] --provider working from home office    Platform used for Video Visit: Bartolome    No follow-ups on file.       Dm Lipscomb MD

## 2020-05-29 NOTE — LETTER
[facility] CARE COORDINATION  [address]  [facility] [place] [address]  May 21, 2020    Gene Pagan  [address] [place] [facility] [address]      Dear Gene,    I am a clinic care coordinator who works with Dm Lipscomb MD, at the [facility]. Thank you for speaking briefly with me earlier this week. Below is a description of clinic care coordination and how I can further assist you.      The clinic care coordination team is made up of a registered nurse,  and community health worker who understand the health care system. The goal of clinic care coordination is to help you manage your health and improve access to the health care system in the most efficient manner. The team can assist you in meeting your health care goals by providing education, coordinating services, strengthening the communication among your providers and supporting you with any resource needs.    As we discussed on the phone, I've included some information that may be helpful in addressing your concern:    -[facility] Financial Counseling:  Financial counseling with [facility] [employer] can help you with:  Making ends meet -- learn strategies to stretch, grow, and save your money   Getting out of debt -- develop a customized plan to reduce your debt   Improving your credit score -- identify strategies to improve your score or a counselor can assist you with disputes to the credit bureaus   Reporting identity theft -- review your risk factors, identify possible identity theft, and work with you to remedy any problems   Paying off student loans -- develop a repayment plan and educate yourself about various loan payment programs   Filing for bankruptcy -- discuss your current financial situation to decide if bankruptcy counseling is right for you   Applying for public assistance -- learn about assistance programs and get help navigating the application process   Filing  taxes -- learn about credits and get connected to free tax preparation services   Buying a house -- determine your readiness for purchasing a house, get help going through the process or get fast-tracked into one of [facility] [employer] s homeownership options.    Counseling services are free for residents of [facility]. Scheduling priority is given to residents who are experiencing a financial hardship. You can meet with counselors as many times as needed. There is typically a two to four week period between appointments, but this timeline is based on your needs.    Call [phone] or email [email or web address] to set up an appointment. Counseling sessions are conducted at the [facility]. The first session takes between 1 to 1.5 hours. Each additional session can take 30 minutes to 1 hour.  [email or web address].aspx    -[facility] Financial Counseling:  A Debt Management Plan (DMP) through [facility] Financial Counseling can lower your payments and reduce your interest rates by consolidating your debt into one monthly payment.  Our advice is tailored to fit your goals. You review the recommendations and options and decide what next steps are right for you.  There are two ways to get started:  1. Complete a quick financial profile online, then a counselor will contact you.  In your financial profile, you will provide information about your income, monthly expenses, the amount owed to creditors, and anything else you believe would be helpful in understanding your financial circumstances. Information submitted will only be used by an [facility] certified credit counselor to review your specific financial situation and identify options that may be right for you.  2. Start by reviewing your financial situation with a counselor by phone or in person by calling [phone].  Schedule a phone appointment at a time convenient for you or an office  appointment at one of our [place] or [place] locations. A certified credit counselor will work with you to review your financial profile, discuss the available options and offer sound guidance. We provide suggestions on how you might develop a budget that works for you, and can help create a realistic action plan specific to your circumstances.      [email or web address]/how-it-works    Please feel free to contact me at [phone] with any questions or concerns. We are focused on providing you with the highest-quality healthcare experience possible and that all starts with you.     Sincerely,     Jose Juan Infante, [facility]  Clinic Care Coordinator  Ph. [phone]  [email or web address]

## 2020-06-01 ENCOUNTER — HOSPITAL ENCOUNTER (OUTPATIENT)
Dept: CARDIAC REHAB | Facility: CLINIC | Age: 75
End: 2020-06-01
Attending: INTERNAL MEDICINE
Payer: COMMERCIAL

## 2020-06-01 ENCOUNTER — TELEPHONE (OUTPATIENT)
Dept: CARDIOLOGY | Facility: CLINIC | Age: 75
End: 2020-06-01

## 2020-06-01 PROCEDURE — 40000116 ZZH STATISTIC OP CR VISIT

## 2020-06-01 PROCEDURE — 93798 PHYS/QHP OP CAR RHAB W/ECG: CPT

## 2020-06-01 NOTE — TELEPHONE ENCOUNTER
PATIENT WELLNESS TELEPHONE SCREENING     Step 1: Answer all screening questions.     1. In the past 3 weeks, have you been exposed to someone with a known positive illness below?  COVID-19 (known or suspected): No  Chickenpox: No   Measles: No  Pertussis: No    2. Do you have any of the following new symptoms or symptoms that have started within the past 14 days?   Fever (subjective or >100.0)?: No   A new cough: No   Shortness of breath: No   Chills?No   New loss of taste or smell?No   Generalized body aches?No   New persistent headache?No   New sore throat?No   Nausea, vomiting or diarrhea: No    Step 2: If the patient is positive for symptoms, consult the ordering provider/consult IP to determine if the procedure is deemed necessary and inform the patient you will call them back.     Step 3 . If no symptoms, patient informed of the no visitor policy in place. Yes      Step 4. If positive new symptoms, and the procedure is deemed necessary. Notify your manager/supervisor. The patient must be informed to call the procedural department.  A team member with the appropriate PPE will bring the mask to the patient at door 2. The patient will be brought to the procedural department and registered over the phone.

## 2020-06-01 NOTE — TELEPHONE ENCOUNTER

## 2020-06-02 ENCOUNTER — ANCILLARY PROCEDURE (OUTPATIENT)
Dept: CARDIOLOGY | Facility: CLINIC | Age: 75
End: 2020-06-02
Attending: PHYSICIAN ASSISTANT
Payer: COMMERCIAL

## 2020-06-02 ENCOUNTER — HOSPITAL ENCOUNTER (OUTPATIENT)
Dept: CARDIOLOGY | Facility: CLINIC | Age: 75
Discharge: HOME | End: 2020-06-02
Attending: PHYSICIAN ASSISTANT | Admitting: PHYSICIAN ASSISTANT
Payer: COMMERCIAL

## 2020-06-02 ENCOUNTER — CARE COORDINATION (OUTPATIENT)
Dept: CARDIOLOGY | Facility: CLINIC | Age: 75
End: 2020-06-02

## 2020-06-02 DIAGNOSIS — I50.23 ACUTE ON CHRONIC SYSTOLIC HEART FAILURE (H): ICD-10-CM

## 2020-06-02 DIAGNOSIS — Z95.0 CARDIAC PACEMAKER IN SITU: ICD-10-CM

## 2020-06-02 DIAGNOSIS — I49.5 SSS (SICK SINUS SYNDROME) (H): Primary | ICD-10-CM

## 2020-06-02 LAB
ANION GAP SERPL CALCULATED.3IONS-SCNC: 12.3 MMOL/L (ref 6–17)
BUN SERPL-MCNC: 38 MG/DL (ref 7–30)
CALCIUM SERPL-MCNC: 9.7 MG/DL (ref 8.5–10.5)
CHLORIDE SERPL-SCNC: 104 MMOL/L (ref 98–107)
CO2 SERPL-SCNC: 25 MMOL/L (ref 23–29)
CREAT SERPL-MCNC: 1.91 MG/DL (ref 0.7–1.3)
CRP SERPL-MCNC: 34.9 MG/L (ref 0–8)
ERYTHROCYTE [SEDIMENTATION RATE] IN BLOOD BY WESTERGREN METHOD: 85 MM/H (ref 0–20)
GFR SERPL CREATININE-BSD FRML MDRD: 34 ML/MIN/{1.73_M2}
GLUCOSE SERPL-MCNC: 131 MG/DL (ref 70–105)
NT-PROBNP SERPL-MCNC: 4465 PG/ML (ref 0–450)
POTASSIUM SERPL-SCNC: 4.3 MMOL/L (ref 3.5–5.1)
SODIUM SERPL-SCNC: 137 MMOL/L (ref 136–145)
TSH SERPL DL<=0.005 MIU/L-ACNC: 1.33 MU/L (ref 0.4–4)

## 2020-06-02 PROCEDURE — 93325 DOPPLER ECHO COLOR FLOW MAPG: CPT | Mod: 26 | Performed by: INTERNAL MEDICINE

## 2020-06-02 PROCEDURE — 93308 TTE F-UP OR LMTD: CPT | Mod: 26 | Performed by: INTERNAL MEDICINE

## 2020-06-02 PROCEDURE — 25500064 ZZH RX 255 OP 636: Performed by: PHYSICIAN ASSISTANT

## 2020-06-02 PROCEDURE — 80048 BASIC METABOLIC PNL TOTAL CA: CPT | Performed by: PHYSICIAN ASSISTANT

## 2020-06-02 PROCEDURE — 93325 DOPPLER ECHO COLOR FLOW MAPG: CPT

## 2020-06-02 PROCEDURE — 83880 ASSAY OF NATRIURETIC PEPTIDE: CPT | Performed by: PHYSICIAN ASSISTANT

## 2020-06-02 PROCEDURE — 93321 DOPPLER ECHO F-UP/LMTD STD: CPT | Mod: 26 | Performed by: INTERNAL MEDICINE

## 2020-06-02 PROCEDURE — 84443 ASSAY THYROID STIM HORMONE: CPT | Performed by: PHYSICIAN ASSISTANT

## 2020-06-02 PROCEDURE — 85652 RBC SED RATE AUTOMATED: CPT | Performed by: PHYSICIAN ASSISTANT

## 2020-06-02 PROCEDURE — 93284 PRGRMG EVAL IMPLANTABLE DFB: CPT | Performed by: INTERNAL MEDICINE

## 2020-06-02 PROCEDURE — 36415 COLL VENOUS BLD VENIPUNCTURE: CPT | Performed by: PHYSICIAN ASSISTANT

## 2020-06-02 PROCEDURE — 86140 C-REACTIVE PROTEIN: CPT | Performed by: PHYSICIAN ASSISTANT

## 2020-06-02 RX ADMIN — HUMAN ALBUMIN MICROSPHERES AND PERFLUTREN 9 ML: 10; .22 INJECTION, SOLUTION INTRAVENOUS at 09:31

## 2020-06-02 NOTE — PROGRESS NOTES
"Reviewed recent tests -   At my virtual visit on 5/28 I ordered inflammatory markers and TTE out of suspicion he had developed pericarditis. Given his elevated white ct and reported low-grade fevers I asked him to see his PCP also for infectious work-up. He had a virtual visit with his PCP on 5/29 and a urinalysis was obtained that was \"stable\" and white ct was declining from 16.6 --> 12.7. He also obtained inflammatory markers which were markedly elevated (I did not see those results until reviewing his chart today). No changes were made.   Today, Gene had repeat inflammatory markers which are trending down but still elevated. TTE does not show a pericardial effusion. LVEF has improved from 20-25% --> 45%.     Would consider initiating colchicine however his creatinine is 1.9. Therefore would like to run this by his primary cardiologist. Routing to Dr. Frank - appreciate your review.     Component      Latest Ref Rng & Units 5/29/2020 6/2/2020   CRP Inflammation      0.0 - 8.0 mg/L 190.0 (H) 34.9 (H)   Sed Rate      0 - 20 mm/h 97 (H) 85 (H)       Marsha Noonan PA-C  [facility]  Pager: [phone]  Text Page  (7:30am - 4pm M-F)       "

## 2020-06-03 ENCOUNTER — HOSPITAL ENCOUNTER (OUTPATIENT)
Dept: CARDIAC REHAB | Facility: CLINIC | Age: 75
End: 2020-06-03
Attending: INTERNAL MEDICINE
Payer: COMMERCIAL

## 2020-06-03 PROCEDURE — 40000116 ZZH STATISTIC OP CR VISIT

## 2020-06-03 PROCEDURE — 93798 PHYS/QHP OP CAR RHAB W/ECG: CPT

## 2020-06-04 ENCOUNTER — TELEPHONE (OUTPATIENT)
Dept: INTERNAL MEDICINE | Facility: CLINIC | Age: 75
End: 2020-06-04

## 2020-06-04 NOTE — TELEPHONE ENCOUNTER
Supply order for catheter baker/ insertion tray/ jordyn II cath-mate/ urinary leg bag/drain bag received via fax. Form in your mailbox to be signed.

## 2020-06-05 ENCOUNTER — HOSPITAL ENCOUNTER (OUTPATIENT)
Dept: CARDIAC REHAB | Facility: CLINIC | Age: 75
End: 2020-06-05
Attending: INTERNAL MEDICINE
Payer: COMMERCIAL

## 2020-06-05 PROCEDURE — 93798 PHYS/QHP OP CAR RHAB W/ECG: CPT | Performed by: REHABILITATION PRACTITIONER

## 2020-06-05 PROCEDURE — 40000116 ZZH STATISTIC OP CR VISIT: Performed by: REHABILITATION PRACTITIONER

## 2020-06-08 ENCOUNTER — HOSPITAL ENCOUNTER (OUTPATIENT)
Dept: CARDIAC REHAB | Facility: CLINIC | Age: 75
End: 2020-06-08
Attending: INTERNAL MEDICINE
Payer: COMMERCIAL

## 2020-06-08 PROCEDURE — 40000116 ZZH STATISTIC OP CR VISIT

## 2020-06-08 PROCEDURE — 93798 PHYS/QHP OP CAR RHAB W/ECG: CPT

## 2020-06-10 ENCOUNTER — HOSPITAL ENCOUNTER (OUTPATIENT)
Dept: CARDIAC REHAB | Facility: CLINIC | Age: 75
End: 2020-06-10
Attending: INTERNAL MEDICINE
Payer: COMMERCIAL

## 2020-06-10 PROCEDURE — 40000116 ZZH STATISTIC OP CR VISIT

## 2020-06-10 PROCEDURE — 93798 PHYS/QHP OP CAR RHAB W/ECG: CPT

## 2020-06-10 NOTE — PROGRESS NOTES
"Message received from Dr. Frank:   \"This patient has been hospitalized 2-3 more times since I saw him in March, and see multiple other cardiologists.  I do not know what the management has been, after reviewing notes, I would not treat for pericarditis at this time. NO pericardial effusion on echo 6-2-20, just continue HF treatment and follow-up in CORE.\"    I will review this with Gene at our scheduled CORE visit tomorrow.   "

## 2020-06-11 ENCOUNTER — VIRTUAL VISIT (OUTPATIENT)
Dept: CARDIOLOGY | Facility: CLINIC | Age: 75
End: 2020-06-11
Attending: PHYSICIAN ASSISTANT
Payer: COMMERCIAL

## 2020-06-11 DIAGNOSIS — Z95.0 CARDIAC PACEMAKER IN SITU: ICD-10-CM

## 2020-06-11 DIAGNOSIS — I50.23 ACUTE ON CHRONIC SYSTOLIC HEART FAILURE (H): ICD-10-CM

## 2020-06-11 DIAGNOSIS — I49.5 SSS (SICK SINUS SYNDROME) (H): Primary | ICD-10-CM

## 2020-06-11 PROCEDURE — 99214 OFFICE O/P EST MOD 30 MIN: CPT | Mod: 24 | Performed by: PHYSICIAN ASSISTANT

## 2020-06-11 NOTE — LETTER
"6/11/2020    Dm Lipscomb MD, MD  [address] [facility] [address]  [facility] [address]    RE: Gene Pagan       Dear Colleague,    I had the pleasure of seeing Gene Pagan in the [facility].    Gene Pagan is a 75 year old male who is being evaluated via a billable video visit.        Gene Pagan presents for a CORE clinic follow up visit.    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    In brief,   I have been following Gene gu in the CORE clinic since his diagnosis of new systolic CMP in April. He's been doing quite well, save for the development of atypical, pleuritic chest discomfort which had persisted despite being treated for pneumonia. This had resulted in multiple ED visits. At my virtual visit on 5/28 I ordered inflammatory markers and TTE out of suspicion he had developed pericarditis. Given his elevated white ct and reported low-grade fevers I asked him to see his PCP also for infectious work-up. He had a virtual visit with his PCP on 5/29 and a urinalysis was obtained that was \"stable\" and white ct was declining from 16.6 --> 12.7. He also obtained inflammatory markers which were markedly elevated (I did not see those results). No changes were made.   On 6/2, Gene had repeat inflammatory markers which were trending down but still elevated. TTE did not show a pericardial effusion. LVEF has improved from 20-25% --> 45%.   I reviewed this with his primary cardiologist Dr. Frank, who felt it was not necessary to treat him for pericarditis with colchicine, given " no effusion was noted on TTE.   He also had a device check on 6/2 which showed 99% BiVP. His LRL was reduced from 80 --> 70 per protocol.    Today, Gene tells me he's feeling much better. His pleuritic discomfort has essentially resolved. He is participating in cardiac rehab and feels it is going well. Despite a persistently elevated proBNP, he denies symptoms of CHF. He had a little lightheadedness with positional changes last night and this morning, not severe. BP is marginal.    Recent labs:  Component      Latest Ref Rng & Units 5/29/2020 6/2/2020   CRP Inflammation      0.0 - 8.0 mg/L 190.0 (H) 34.9 (H)   Sed Rate      0 - 20 mm/h 97 (H) 85 (H)      Component      Latest Ref Rng & Units 5/24/2020 6/2/2020   Sodium      136 - 145 mmol/L  137   Potassium      3.5 - 5.1 mmol/L  4.3   Chloride      98 - 107 mmol/L  104   Carbon Dioxide      23 - 29 mmol/L  25   Anion Gap      6 - 17 mmol/L  12.3   Glucose      70 - 105 mg/dL  131 (H)   Urea Nitrogen      7 - 30 mg/dL  38 (H)   Creatinine      0.70 - 1.30 mg/dL  1.91 (H)   GFR Estimate      >60 mL/min/1.73:m2  34 (L)   GFR Estimate If Black      >60 mL/min/1.73:m2  42 (L)   Calcium      8.5 - 10.5 mg/dL  9.7   N-Terminal Pro BNP Inpatient      0 - 1,800 pg/mL 4,059 (H)    N-Terminal Pro Bnp      0 - 450 pg/mL  4,465 (H)   TSH      0.40 - 4.00 mU/L  1.33     ROS:  12-pt ROS is negative except for as noted above.    EXAM:  A limited exam was conducted via video.  Constitutional: Patient is pleasant, alert, in no distress. Normal body habitus, upright.  ENT: Normal head shape, no evidence of injury or laceration.  EYES: No scleral icterus, normal conjunctivae. EOM's appear intact.   Neck: No evidence of thyromegaly.   Chest/Lungs: Appears to have normal respiratory effort. No audible wheezing, no cough, equal chest wall expansion.   Cardiovascular: No evidence of elevated JVP. No evidence of pitting edema bilaterally.  Abdomen: No evidence of abdominal distention. No  observed jaundice.  Extremities: No edema, erythema, cyanosis noted.  Skin: No rashes or lesions appreciated on visualized skin, normal skin color.  Neurologic: Normal mentation. Normal arm motion bilaterally, no tremors. No evidence of focal defect.  Psychiatric: Appropriate affect. A&Ox3.    Assessment/Plan:  1. New systolic CMP / biventricular HF, likely tachy-mediated - EF 25% --> 45%, FC II.   - Well-compensated on Bumex 0.5 mg daily at a wt of 172 lbs (of note - previously felt hypovolemic at a wt of 184 lbs due to bump in creat, however, renal function now looks better at current weight).   - On Toprol 12.5 mg, Imdur 15. Some orthostatic symptoms today. Advised to monitor and let me know if this persists, would discontinue Imdur in that case. No ACE/ARB/Hernesto d/t ongoing renal dysfunction.   - Continue Cardiac rehab.      2. Pleuritic chest discomfort - suspect pericarditis vs discomfort from pacemaker leads. Now essentially resolved.     3. Rapid AF s/p AVN ablation + CRT-P implant on 4/10 - 99% BiVP. Anticoagulated.    4. DEONDRE / CKD-III - Creatinine stable ~2. Now following with [facility].    Follow-up: Dr. Frank on 7/24/20, limited TTE prior.    Marsha Noonan PA-C  [facility] - [facility]  Pager: [phone]  Text Page  (7:30am - 4pm M-F)       Thank you for allowing me to participate in the care of your patient.    Sincerely,     Marsha Noonan PA-C     [facility]

## 2020-06-11 NOTE — PROGRESS NOTES
"Gene Pagan is a 75 year old male who is being evaluated via a billable video visit.      The patient has been notified of following:     \"This video visit will be conducted via a call between you and your physician/provider. We have found that certain health care needs can be provided without the need for an in-person physical exam.  This service lets us provide the care you need with a video conversation.  If a prescription is necessary we can send it directly to your pharmacy.  If lab work is needed we can place an order for that and you can then stop by our lab to have the test done at a later time.    Video visits are billed at different rates depending on your insurance coverage.  Please reach out to your insurance provider with any questions.    If during the course of the call the physician/provider feels a video visit is not appropriate, you will not be charged for this service.\"    BP: 102/60 yesterday  HR: 70  Weight: 172lb  O2: N/A  Review Of Systems  Skin: NEGATIVE  Eyes:Ears/Nose/Throat: NEGATIVE  Respiratory: cough  Cardiovascular lightheadedness this morning and last night, energy level good  Gastrointestinal: NEGATIVE  Genitourinary:NEGATIVE   Musculoskeletal: NEGATIVE  Neurologic: NEGATIVE  Psychiatric: NEGATIVE  Hematologic/Lymphatic/Immunologic: NEGATIVE  Endocrine:  diabetes    Sophia Perez LPN    Patient has given verbal consent for Video visit? Yes  Text [phone]    Will anyone else be joining your video visit? No        Video-Visit Details    Type of service:  Video Visit    Video Start Time: 7:42 AM  Video End Time: 7:57 AM    Originating Location (pt. Location): Home    Distant Location (provider location):  [facility]     Platform used for Video Visit: DoxrealPOW    This visit is being conducted as a virtual visit due to the emphasis on mitigation of the COVID-19 virus pandemic. The clinician has decided that the risk of an in-office visit " outweighs the benefit for this patient.     I have reviewed and updated the patient's Past Medical History, Social History, Family History and Medication List.    PROBLEM LIST  Patient Active Problem List   Diagnosis     Atrial fibrillation with rapid ventricular response (H)     CHF (congestive heart failure) (H)     Urinary retention     Gross hematuria     BPH with obstruction/lower urinary tract symptoms     CAP (community acquired pneumonia)       ALLERGIES  Patient has no known allergies.    MEDICATIONS  Current Outpatient Medications   Medication Sig Dispense Refill     acetaminophen (TYLENOL) 500 MG tablet Take 1,000 mg by mouth every 8 hours PRN       apixaban ANTICOAGULANT (ELIQUIS) 5 MG tablet Take 1 tablet (5 mg) by mouth 2 times daily 60 tablet 0     bumetanide (BUMEX) 1 MG tablet One half tablet by mouth each AM. 30 tablet 0     Cholecalciferol (VITAMIN D3) 75 MCG (3000 UT) TABS Take 1 tablet by mouth daily       insulin glargine (LANTUS SOLOSTAR) 100 UNIT/ML pen Inject 9 Units Subcutaneous At Bedtime Future refills by PCP Dr. Martínez Duarte with phone number [phone]. 15 mL 0     insulin pen needle (31G X 8 MM) 31G X 8 MM miscellaneous 1 Box of 100 insulin pen needles to be dispensed with every insulin pen prescription 100 each 0     isosorbide mononitrate (IMDUR) 30 MG 24 hr tablet Take 0.5 tablets (15 mg) by mouth At Bedtime 15 tablet 5     melatonin 3 MG tablet Take 3 mg by mouth At Bedtime       metoprolol succinate ER (TOPROL-XL) 25 MG 24 hr tablet Take 0.5 tablets (12.5 mg) by mouth daily       Multiple Vitamins-Minerals (MULTIVITAMIN ADULT PO) Take 1 tablet by mouth daily       tamsulosin (FLOMAX) 0.4 MG capsule Take 1 capsule (0.4 mg) by mouth At Bedtime 90 capsule Raisa Pagan presents for a CORE clinic follow up visit.    HPI: (Please see my note from 5/28/20 for the patient's detailed history)  The patient has a pertinent cardiac history of the following -   # New  "systolic CMP / biventricular HF, EF 25-30% diagnosed 4/2020 - likely tachy-mediated  # Rapid AF s/p AVN ablation + CRT-P on 4/10  # DMII  # CKD-III  # Urinary retention, BPH, with indwelling baker  # Atypical, Pleuritic chest discomfort resulting in multiple ED visits without defining etiology.    In brief,   I have been following Gene virtually in the CORE clinic since his diagnosis of new systolic CMP in April. He's been doing quite well, save for the development of atypical, pleuritic chest discomfort which had persisted despite being treated for pneumonia. This had resulted in multiple ED visits. At my virtual visit on 5/28 I ordered inflammatory markers and TTE out of suspicion he had developed pericarditis. Given his elevated white ct and reported low-grade fevers I asked him to see his PCP also for infectious work-up. He had a virtual visit with his PCP on 5/29 and a urinalysis was obtained that was \"stable\" and white ct was declining from 16.6 --> 12.7. He also obtained inflammatory markers which were markedly elevated (I did not see those results). No changes were made.   On 6/2, Gene had repeat inflammatory markers which were trending down but still elevated. TTE did not show a pericardial effusion. LVEF has improved from 20-25% --> 45%.   I reviewed this with his primary cardiologist Dr. Frank, who felt it was not necessary to treat him for pericarditis with colchicine, given no effusion was noted on TTE.   He also had a device check on 6/2 which showed 99% BiVP. His LRL was reduced from 80 --> 70 per protocol.    Today, Gene tells me he's feeling much better. His pleuritic discomfort has essentially resolved. He is participating in cardiac rehab and feels it is going well. Despite a persistently elevated proBNP, he denies symptoms of CHF. He had a little lightheadedness with positional changes last night and this morning, not severe. BP is marginal.    Recent labs:  Component      Latest Ref Rng & " Units 5/29/2020 6/2/2020   CRP Inflammation      0.0 - 8.0 mg/L 190.0 (H) 34.9 (H)   Sed Rate      0 - 20 mm/h 97 (H) 85 (H)      Component      Latest Ref Rng & Units 5/24/2020 6/2/2020   Sodium      136 - 145 mmol/L  137   Potassium      3.5 - 5.1 mmol/L  4.3   Chloride      98 - 107 mmol/L  104   Carbon Dioxide      23 - 29 mmol/L  25   Anion Gap      6 - 17 mmol/L  12.3   Glucose      70 - 105 mg/dL  131 (H)   Urea Nitrogen      7 - 30 mg/dL  38 (H)   Creatinine      0.70 - 1.30 mg/dL  1.91 (H)   GFR Estimate      >60 mL/min/1.73:m2  34 (L)   GFR Estimate If Black      >60 mL/min/1.73:m2  42 (L)   Calcium      8.5 - 10.5 mg/dL  9.7   N-Terminal Pro BNP Inpatient      0 - 1,800 pg/mL 4,059 (H)    N-Terminal Pro Bnp      0 - 450 pg/mL  4,465 (H)   TSH      0.40 - 4.00 mU/L  1.33     ROS:  12-pt ROS is negative except for as noted above.    EXAM:  A limited exam was conducted via video.  Constitutional: Patient is pleasant, alert, in no distress. Normal body habitus, upright.  ENT: Normal head shape, no evidence of injury or laceration.  EYES: No scleral icterus, normal conjunctivae. EOM's appear intact.   Neck: No evidence of thyromegaly.   Chest/Lungs: Appears to have normal respiratory effort. No audible wheezing, no cough, equal chest wall expansion.   Cardiovascular: No evidence of elevated JVP. No evidence of pitting edema bilaterally.  Abdomen: No evidence of abdominal distention. No observed jaundice.  Extremities: No edema, erythema, cyanosis noted.  Skin: No rashes or lesions appreciated on visualized skin, normal skin color.  Neurologic: Normal mentation. Normal arm motion bilaterally, no tremors. No evidence of focal defect.  Psychiatric: Appropriate affect. A&Ox3.    Assessment/Plan:  1. New systolic CMP / biventricular HF, likely tachy-mediated - EF 25% --> 45%, FC II.   - Well-compensated on Bumex 0.5 mg daily at a wt of 172 lbs (of note - previously felt hypovolemic at a wt of 184 lbs due to bump in  creat, however, renal function now looks better at current weight).   - On Toprol 12.5 mg, Imdur 15. Some orthostatic symptoms today. Advised to monitor and let me know if this persists, would discontinue Imdur in that case. No ACE/ARB/Hernesto d/t ongoing renal dysfunction.   - Continue Cardiac rehab.      2. Pleuritic chest discomfort - suspect pericarditis vs discomfort from pacemaker leads. Now essentially resolved.     3. Rapid AF s/p AVN ablation + CRT-P implant on 4/10 - 99% BiVP. Anticoagulated.    4. DEONDRE / CKD-III - Creatinine stable ~2. Now following with [facility].    Follow-up: Dr. Frank on 7/24/20, limited TTE prior.    Marsha Noonan PA-C  [facility] - [facility]  Pager: [phone]  Text Page  (7:30am - 4pm M-F)

## 2020-06-12 ENCOUNTER — HOSPITAL ENCOUNTER (OUTPATIENT)
Dept: CARDIAC REHAB | Facility: CLINIC | Age: 75
End: 2020-06-12
Attending: INTERNAL MEDICINE
Payer: COMMERCIAL

## 2020-06-12 PROCEDURE — 40000116 ZZH STATISTIC OP CR VISIT

## 2020-06-12 PROCEDURE — 93798 PHYS/QHP OP CAR RHAB W/ECG: CPT

## 2020-06-15 ENCOUNTER — HOSPITAL ENCOUNTER (OUTPATIENT)
Dept: CARDIAC REHAB | Facility: CLINIC | Age: 75
End: 2020-06-15
Attending: INTERNAL MEDICINE
Payer: COMMERCIAL

## 2020-06-15 ENCOUNTER — TELEPHONE (OUTPATIENT)
Dept: UROLOGY | Facility: CLINIC | Age: 75
End: 2020-06-15

## 2020-06-15 PROCEDURE — 40000116 ZZH STATISTIC OP CR VISIT: Performed by: REHABILITATION PRACTITIONER

## 2020-06-15 PROCEDURE — 93798 PHYS/QHP OP CAR RHAB W/ECG: CPT | Performed by: REHABILITATION PRACTITIONER

## 2020-06-15 NOTE — TELEPHONE ENCOUNTER
Called to screen for CODVID-19 symptoms prior to tomorrow's visit.  He does not have any symptoms at this time.    Aydee Thakur, EMT

## 2020-06-16 ENCOUNTER — ALLIED HEALTH/NURSE VISIT (OUTPATIENT)
Dept: UROLOGY | Facility: CLINIC | Age: 75
End: 2020-06-16
Payer: COMMERCIAL

## 2020-06-16 DIAGNOSIS — R33.9 URINARY RETENTION: Primary | ICD-10-CM

## 2020-06-16 PROCEDURE — 99207 ZZC NO CHARGE NURSE ONLY: CPT

## 2020-06-16 NOTE — PROGRESS NOTES
Gene Pagan comes into clinic today at the request of Dr. Perez Ordering Provider for Catheter Change.    The patient is here for a Mckenzie catheter change.  Patient's catheter was disconnected from the leg bag and the balloon deflated. The catheter was removed with no complaints offered by the patient and the procedure was tolerated well.      Using sterile field technique a sterile, well lubricated 16 Grenadian Mckenzie coude catheter was gently inserted with ease x 1 attempt.  The balloon was filled with 10 ml sterile water.  No discomfort was voiced by the patient.  Clear, yellow urine return from the catheter was noted.  Sterile tubing and drainage bag were attached to the catheter and secured to the thigh. Patient to follow up in approximately one month as planned.    This service provided today was under the supervising provider of the day Dr. Santoyo, who was available if needed.    Aydee Thakur, EMT

## 2020-06-17 ENCOUNTER — HOSPITAL ENCOUNTER (OUTPATIENT)
Dept: CARDIAC REHAB | Facility: CLINIC | Age: 75
End: 2020-06-17
Attending: INTERNAL MEDICINE
Payer: COMMERCIAL

## 2020-06-17 PROCEDURE — 93798 PHYS/QHP OP CAR RHAB W/ECG: CPT | Performed by: REHABILITATION PRACTITIONER

## 2020-06-17 PROCEDURE — 40000116 ZZH STATISTIC OP CR VISIT: Performed by: REHABILITATION PRACTITIONER

## 2020-06-18 ENCOUNTER — CARE COORDINATION (OUTPATIENT)
Dept: CARDIOLOGY | Facility: CLINIC | Age: 75
End: 2020-06-18

## 2020-06-18 NOTE — PROGRESS NOTES
[facility] - CORE Clinic    Called patient w/instructions to discontinue imdur. He verbalizwed understanding and had not other questions/concerns. He will call back if the dizziness persists. Med list updated. Belen Souza RN on 6/18/2020 at 2:28 PM

## 2020-06-18 NOTE — PROGRESS NOTES
[facility] - CORE Clinic    Incoming call from patient w/concerns about dizziness. Patient stating sx only occurs w/position change and only lasts seconds, but the other day it lasted ~1min. He is also concerns as this now occurs every day whereas it used to be only occasionally. He does not check his BP w/these occurrences but is checking it 1X/day:    Recent BP's:   6/18 124/61   6/17 109/57   6/16 119/62      6/11 110/53   6/10 102/60    Patient denies any new onset/increased swelling and also denies any SOB. His weight remains stable at ~172#. He continues to participate in cardiac rehab without difficulty. Patient was last seen by Marsha Noonan on 6/11. Per her clinic notes:  On Toprol 12.5 mg, Imdur 15. Some orthostatic symptoms today. Advised to monitor and let me know if this persists, would discontinue Imdur in that case.    Will forward to SD for review.  Belen Souza RN on 6/18/2020 at 11:38 AM

## 2020-06-19 ENCOUNTER — HOSPITAL ENCOUNTER (OUTPATIENT)
Dept: CARDIAC REHAB | Facility: CLINIC | Age: 75
End: 2020-06-19
Attending: INTERNAL MEDICINE
Payer: COMMERCIAL

## 2020-06-19 PROCEDURE — 93798 PHYS/QHP OP CAR RHAB W/ECG: CPT

## 2020-06-19 PROCEDURE — 40000116 ZZH STATISTIC OP CR VISIT

## 2020-06-22 ENCOUNTER — HOSPITAL ENCOUNTER (OUTPATIENT)
Dept: CARDIAC REHAB | Facility: CLINIC | Age: 75
End: 2020-06-22
Attending: INTERNAL MEDICINE
Payer: COMMERCIAL

## 2020-06-22 LAB
MDC_IDC_LEAD_IMPLANT_DT: NORMAL
MDC_IDC_LEAD_LOCATION: NORMAL
MDC_IDC_LEAD_LOCATION_DETAIL_1: NORMAL
MDC_IDC_LEAD_MFG: NORMAL
MDC_IDC_LEAD_MODEL: NORMAL
MDC_IDC_LEAD_POLARITY_TYPE: NORMAL
MDC_IDC_LEAD_SERIAL: NORMAL
MDC_IDC_MSMT_BATTERY_STATUS: NORMAL
MDC_IDC_MSMT_LEADCHNL_LV_IMPEDANCE_VALUE: 924 OHM
MDC_IDC_MSMT_LEADCHNL_LV_PACING_THRESHOLD_AMPLITUDE: 1 V
MDC_IDC_MSMT_LEADCHNL_LV_PACING_THRESHOLD_PULSEWIDTH: 1 MS
MDC_IDC_MSMT_LEADCHNL_LV_SENSING_INTR_AMPL: 11.2 MV
MDC_IDC_MSMT_LEADCHNL_RA_IMPEDANCE_VALUE: 707 OHM
MDC_IDC_MSMT_LEADCHNL_RA_SENSING_INTR_AMPL: 0.5 MV
MDC_IDC_MSMT_LEADCHNL_RV_IMPEDANCE_VALUE: 846 OHM
MDC_IDC_MSMT_LEADCHNL_RV_PACING_THRESHOLD_AMPLITUDE: 0.8 V
MDC_IDC_MSMT_LEADCHNL_RV_PACING_THRESHOLD_PULSEWIDTH: 0.4 MS
MDC_IDC_MSMT_LEADCHNL_RV_SENSING_INTR_AMPL: 6.7 MV
MDC_IDC_PG_IMPLANT_DTM: NORMAL
MDC_IDC_PG_MFG: NORMAL
MDC_IDC_PG_MODEL: NORMAL
MDC_IDC_PG_SERIAL: NORMAL
MDC_IDC_PG_TYPE: NORMAL
MDC_IDC_SESS_CLINIC_NAME: NORMAL
MDC_IDC_SESS_DTM: NORMAL
MDC_IDC_SESS_TYPE: NORMAL
MDC_IDC_SET_BRADY_AT_MODE_SWITCH_MODE: NORMAL
MDC_IDC_SET_BRADY_AT_MODE_SWITCH_RATE: 140 {BEATS}/MIN
MDC_IDC_SET_BRADY_LOWRATE: 70 {BEATS}/MIN
MDC_IDC_SET_BRADY_MAX_SENSOR_RATE: 130 {BEATS}/MIN
MDC_IDC_SET_BRADY_MAX_TRACKING_RATE: 130 {BEATS}/MIN
MDC_IDC_SET_BRADY_MODE: NORMAL
MDC_IDC_SET_BRADY_PAV_DELAY_HIGH: 180 MS
MDC_IDC_SET_BRADY_PAV_DELAY_LOW: 180 MS
MDC_IDC_SET_BRADY_SAV_DELAY_HIGH: 120 MS
MDC_IDC_SET_BRADY_SAV_DELAY_LOW: 120 MS
MDC_IDC_SET_CRT_LVRV_DELAY: 30 MS
MDC_IDC_SET_CRT_PACED_CHAMBERS: NORMAL
MDC_IDC_SET_LEADCHNL_LV_PACING_AMPLITUDE: 2.2 V
MDC_IDC_SET_LEADCHNL_LV_PACING_CAPTURE_MODE: NORMAL
MDC_IDC_SET_LEADCHNL_LV_PACING_POLARITY: NORMAL
MDC_IDC_SET_LEADCHNL_LV_PACING_PULSEWIDTH: 0.5 MS
MDC_IDC_SET_LEADCHNL_LV_SENSING_ADAPTATION_MODE: NORMAL
MDC_IDC_SET_LEADCHNL_LV_SENSING_POLARITY: NORMAL
MDC_IDC_SET_LEADCHNL_LV_SENSING_SENSITIVITY: 1.5 MV
MDC_IDC_SET_LEADCHNL_RA_PACING_AMPLITUDE: 3.5 V
MDC_IDC_SET_LEADCHNL_RA_PACING_CAPTURE_MODE: NORMAL
MDC_IDC_SET_LEADCHNL_RA_PACING_POLARITY: NORMAL
MDC_IDC_SET_LEADCHNL_RA_PACING_PULSEWIDTH: 0.4 MS
MDC_IDC_SET_LEADCHNL_RA_SENSING_ADAPTATION_MODE: NORMAL
MDC_IDC_SET_LEADCHNL_RA_SENSING_POLARITY: NORMAL
MDC_IDC_SET_LEADCHNL_RA_SENSING_SENSITIVITY: 0.25 MV
MDC_IDC_SET_LEADCHNL_RV_PACING_AMPLITUDE: 2 V
MDC_IDC_SET_LEADCHNL_RV_PACING_CAPTURE_MODE: NORMAL
MDC_IDC_SET_LEADCHNL_RV_PACING_POLARITY: NORMAL
MDC_IDC_SET_LEADCHNL_RV_PACING_PULSEWIDTH: 0.4 MS
MDC_IDC_SET_LEADCHNL_RV_SENSING_ADAPTATION_MODE: NORMAL
MDC_IDC_SET_LEADCHNL_RV_SENSING_POLARITY: NORMAL
MDC_IDC_SET_LEADCHNL_RV_SENSING_SENSITIVITY: 1.5 MV
MDC_IDC_SET_ZONE_DETECTION_INTERVAL: 375 MS
MDC_IDC_SET_ZONE_TYPE: NORMAL
MDC_IDC_SET_ZONE_VENDOR_TYPE: NORMAL
MDC_IDC_STAT_EPISODE_RECENT_COUNT: 0
MDC_IDC_STAT_EPISODE_RECENT_COUNT_DTM_END: NORMAL
MDC_IDC_STAT_EPISODE_RECENT_COUNT_DTM_START: NORMAL
MDC_IDC_STAT_EPISODE_TOTAL_COUNT: 0
MDC_IDC_STAT_EPISODE_TOTAL_COUNT_DTM_END: NORMAL
MDC_IDC_STAT_EPISODE_TYPE: NORMAL
MDC_IDC_STAT_EPISODE_TYPE: NORMAL
MDC_IDC_STAT_EPISODE_VENDOR_TYPE: NORMAL
MDC_IDC_STAT_EPISODE_VENDOR_TYPE: NORMAL

## 2020-06-22 PROCEDURE — 93798 PHYS/QHP OP CAR RHAB W/ECG: CPT | Performed by: REHABILITATION PRACTITIONER

## 2020-06-22 PROCEDURE — 40000116 ZZH STATISTIC OP CR VISIT: Performed by: REHABILITATION PRACTITIONER

## 2020-06-24 ENCOUNTER — HOSPITAL ENCOUNTER (OUTPATIENT)
Dept: CARDIAC REHAB | Facility: CLINIC | Age: 75
End: 2020-06-24
Attending: INTERNAL MEDICINE
Payer: COMMERCIAL

## 2020-06-24 PROCEDURE — 40000116 ZZH STATISTIC OP CR VISIT

## 2020-06-24 PROCEDURE — 93798 PHYS/QHP OP CAR RHAB W/ECG: CPT

## 2020-06-26 ENCOUNTER — HOSPITAL ENCOUNTER (OUTPATIENT)
Dept: CARDIAC REHAB | Facility: CLINIC | Age: 75
End: 2020-06-26
Attending: INTERNAL MEDICINE
Payer: COMMERCIAL

## 2020-06-26 PROCEDURE — 93798 PHYS/QHP OP CAR RHAB W/ECG: CPT | Performed by: REHABILITATION PRACTITIONER

## 2020-06-26 PROCEDURE — 40000116 ZZH STATISTIC OP CR VISIT: Performed by: REHABILITATION PRACTITIONER

## 2020-06-29 ENCOUNTER — HOSPITAL ENCOUNTER (OUTPATIENT)
Dept: CARDIAC REHAB | Facility: CLINIC | Age: 75
End: 2020-06-29
Attending: INTERNAL MEDICINE
Payer: COMMERCIAL

## 2020-06-29 PROCEDURE — 93798 PHYS/QHP OP CAR RHAB W/ECG: CPT

## 2020-06-29 PROCEDURE — 40000116 ZZH STATISTIC OP CR VISIT

## 2020-06-30 ENCOUNTER — TELEPHONE (OUTPATIENT)
Dept: INTERNAL MEDICINE | Facility: CLINIC | Age: 75
End: 2020-06-30

## 2020-07-01 ENCOUNTER — HOSPITAL ENCOUNTER (OUTPATIENT)
Dept: CARDIAC REHAB | Facility: CLINIC | Age: 75
End: 2020-07-01
Attending: INTERNAL MEDICINE
Payer: COMMERCIAL

## 2020-07-01 PROCEDURE — 93798 PHYS/QHP OP CAR RHAB W/ECG: CPT

## 2020-07-01 PROCEDURE — 40000116 ZZH STATISTIC OP CR VISIT

## 2020-07-03 ENCOUNTER — HOSPITAL ENCOUNTER (OUTPATIENT)
Dept: CARDIAC REHAB | Facility: CLINIC | Age: 75
End: 2020-07-03
Attending: INTERNAL MEDICINE
Payer: COMMERCIAL

## 2020-07-03 PROCEDURE — 40000116 ZZH STATISTIC OP CR VISIT: Performed by: REHABILITATION PRACTITIONER

## 2020-07-03 PROCEDURE — 93798 PHYS/QHP OP CAR RHAB W/ECG: CPT | Performed by: REHABILITATION PRACTITIONER

## 2020-07-06 ENCOUNTER — HOSPITAL ENCOUNTER (OUTPATIENT)
Dept: CARDIAC REHAB | Facility: CLINIC | Age: 75
End: 2020-07-06
Attending: INTERNAL MEDICINE
Payer: COMMERCIAL

## 2020-07-06 ENCOUNTER — TELEPHONE (OUTPATIENT)
Dept: INTERNAL MEDICINE | Facility: CLINIC | Age: 75
End: 2020-07-06

## 2020-07-06 DIAGNOSIS — E11.69 TYPE 2 DIABETES MELLITUS WITH OTHER SPECIFIED COMPLICATION, UNSPECIFIED WHETHER LONG TERM INSULIN USE (H): Primary | ICD-10-CM

## 2020-07-06 DIAGNOSIS — E11.69 TYPE 2 DIABETES MELLITUS WITH OTHER SPECIFIED COMPLICATION, UNSPECIFIED WHETHER LONG TERM INSULIN USE (H): ICD-10-CM

## 2020-07-06 PROCEDURE — 40000116 ZZH STATISTIC OP CR VISIT

## 2020-07-06 PROCEDURE — 93798 PHYS/QHP OP CAR RHAB W/ECG: CPT

## 2020-07-06 RX ORDER — BLOOD SUGAR DIAGNOSTIC
STRIP MISCELLANEOUS
Qty: 200 STRIP | Refills: 1 | Status: SHIPPED | OUTPATIENT
Start: 2020-07-06 | End: 2021-04-06

## 2020-07-06 NOTE — TELEPHONE ENCOUNTER
Patient called requesting a new RX for One Touch Test strips indicating that he tests 2 times daily to be sent to [facility] pharmacy in [place].  Per orders he received at the hospital, he now needs to test 2 times daily instead on once. RX was written by his previous provider.

## 2020-07-07 NOTE — TELEPHONE ENCOUNTER
Pending Prescriptions:                       Disp   Refills    insulin pen needle (31G X 8 MM) 31G X 8 M*100 ea*1            Si Box of 100 insulin pen needles to be dispensed           with every insulin pen prescription    Prescription approved per [facility] Refill Protocol.

## 2020-07-08 ENCOUNTER — HOSPITAL ENCOUNTER (OUTPATIENT)
Dept: CARDIAC REHAB | Facility: CLINIC | Age: 75
End: 2020-07-08
Attending: INTERNAL MEDICINE
Payer: COMMERCIAL

## 2020-07-08 PROCEDURE — 40000116 ZZH STATISTIC OP CR VISIT

## 2020-07-08 PROCEDURE — 93798 PHYS/QHP OP CAR RHAB W/ECG: CPT

## 2020-07-10 ENCOUNTER — HOSPITAL ENCOUNTER (OUTPATIENT)
Dept: CARDIAC REHAB | Facility: CLINIC | Age: 75
End: 2020-07-10
Attending: INTERNAL MEDICINE
Payer: COMMERCIAL

## 2020-07-10 PROCEDURE — 93798 PHYS/QHP OP CAR RHAB W/ECG: CPT

## 2020-07-10 PROCEDURE — 40000116 ZZH STATISTIC OP CR VISIT

## 2020-07-13 ENCOUNTER — HOSPITAL ENCOUNTER (OUTPATIENT)
Dept: CARDIAC REHAB | Facility: CLINIC | Age: 75
End: 2020-07-13
Attending: INTERNAL MEDICINE
Payer: COMMERCIAL

## 2020-07-13 ENCOUNTER — CARE COORDINATION (OUTPATIENT)
Dept: CARDIOLOGY | Facility: CLINIC | Age: 75
End: 2020-07-13

## 2020-07-13 PROCEDURE — 93798 PHYS/QHP OP CAR RHAB W/ECG: CPT

## 2020-07-13 PROCEDURE — 40000116 ZZH STATISTIC OP CR VISIT

## 2020-07-13 NOTE — PROGRESS NOTES
[facility] Clinic    Incoming VM from pt re: 6-7# wt gain in the past week.      Called pt back to discuss. Wt this #. Endorses swelling in abdomen. Denies SOB, orthopnea. No change in UOP. States cardiac rehab is going well. Confirmed compliance with Bumex 0.5 mg daily    He notes that although he is really diligent about tracking salt, he has tried some new recipes this week which were perhaps higher in sodium than his usual foods.     Marsha Noonan PA-C and Dr. Frank out of the office. Will route to another CORE provider to review.     CLAYTON HuertaN, RN, PHN, HNB-BC   7/13/2020 at 10:42 AM

## 2020-07-13 NOTE — PROGRESS NOTES
[facility] Clinic    Incoming VM from pt, states he was mistaken in that he is NOT taking Bumex 0.5 mg daily, he is actually taking 1 mg daily.     Routing to Caroline.     Jessica Mckeon, CLAYTONN, RN, PHN, HNB-BC   7/13/2020 at 11:06 AM

## 2020-07-13 NOTE — PROGRESS NOTES
[facility]    Reviewed with pt. Pt agrees to take extra 0.5 mg (half tab) of Bumex today, tomorrow, and Wednesday at lunchtime. He agrees to call back if sx worsen or if wt does not come down.     Jessica Mckeon, CLAYTONN, RN, PHN, HNB-BC   7/13/2020 at 11:22 AM

## 2020-07-13 NOTE — PROGRESS NOTES
Would have him increase Bumex to 1 mg daily for the next 2 - 3 days until weight back down. Careful with diet. Thanks. Caroline

## 2020-07-15 ENCOUNTER — HOSPITAL ENCOUNTER (OUTPATIENT)
Dept: CARDIAC REHAB | Facility: CLINIC | Age: 75
End: 2020-07-15
Attending: INTERNAL MEDICINE
Payer: COMMERCIAL

## 2020-07-15 ENCOUNTER — ALLIED HEALTH/NURSE VISIT (OUTPATIENT)
Dept: UROLOGY | Facility: CLINIC | Age: 75
End: 2020-07-15
Payer: COMMERCIAL

## 2020-07-15 DIAGNOSIS — R33.9 URINARY RETENTION: Primary | ICD-10-CM

## 2020-07-15 PROCEDURE — 99207 ZZC NO CHARGE NURSE ONLY: CPT

## 2020-07-15 PROCEDURE — 93798 PHYS/QHP OP CAR RHAB W/ECG: CPT

## 2020-07-15 PROCEDURE — 40000116 ZZH STATISTIC OP CR VISIT

## 2020-07-15 RX ORDER — LIDOCAINE HYDROCHLORIDE 20 MG/ML
JELLY TOPICAL ONCE
Status: COMPLETED | OUTPATIENT
Start: 2020-07-15 | End: 2020-07-15

## 2020-07-15 RX ADMIN — LIDOCAINE HYDROCHLORIDE: 20 JELLY TOPICAL at 09:20

## 2020-07-15 NOTE — PROGRESS NOTES
Gene Pagan comes into clinic today at the request of Dr. Perez Ordering Provider for Catheter Change.    The patient is here for a Mckenzie catheter change.  Patient's catheter was disconnected from the leg bag and the balloon deflated. The catheter was removed with no complaints offered by the patient and the procedure was tolerated well.      5mL 2% lidocaine hydrochloride Urojet instilled into urethra.    NDC# [address]-18  Lot #: [id number]  Expiration Date:  01/22    Using sterile field technique a sterile, well lubricated 16 Somali Mckenzie coude catheter was gently inserted with ease x 1 attempt.  The balloon was filled with 6 ml sterile water.  No discomfort was voiced by the patient.  Clear, yellow urine return from the catheter was noted.  Sterile tubing and drainage bag were attached to the catheter and secured to the thigh. Patient to follow up in approximately one month as planned.    This service provided today was under the supervising provider of the day Dr. Santoyo, who was available if needed.    Aydee Thakur, EMT

## 2020-07-17 ENCOUNTER — HOSPITAL ENCOUNTER (OUTPATIENT)
Dept: CARDIAC REHAB | Facility: CLINIC | Age: 75
End: 2020-07-17
Attending: INTERNAL MEDICINE
Payer: COMMERCIAL

## 2020-07-17 PROCEDURE — 40000116 ZZH STATISTIC OP CR VISIT

## 2020-07-17 PROCEDURE — 93798 PHYS/QHP OP CAR RHAB W/ECG: CPT

## 2020-07-22 ENCOUNTER — HOSPITAL ENCOUNTER (OUTPATIENT)
Dept: CARDIAC REHAB | Facility: CLINIC | Age: 75
End: 2020-07-22
Attending: INTERNAL MEDICINE
Payer: COMMERCIAL

## 2020-07-22 PROCEDURE — 40000116 ZZH STATISTIC OP CR VISIT

## 2020-07-22 PROCEDURE — 93798 PHYS/QHP OP CAR RHAB W/ECG: CPT

## 2020-07-24 ENCOUNTER — HOSPITAL ENCOUNTER (OUTPATIENT)
Dept: CARDIAC REHAB | Facility: CLINIC | Age: 75
End: 2020-07-24
Attending: INTERNAL MEDICINE
Payer: COMMERCIAL

## 2020-07-24 PROCEDURE — 40000116 ZZH STATISTIC OP CR VISIT: Performed by: REHABILITATION PRACTITIONER

## 2020-07-24 PROCEDURE — 93798 PHYS/QHP OP CAR RHAB W/ECG: CPT | Performed by: REHABILITATION PRACTITIONER

## 2020-07-29 ENCOUNTER — HOSPITAL ENCOUNTER (OUTPATIENT)
Dept: CARDIOLOGY | Facility: CLINIC | Age: 75
Discharge: HOME | End: 2020-07-29
Attending: PHYSICIAN ASSISTANT | Admitting: PHYSICIAN ASSISTANT
Payer: COMMERCIAL

## 2020-07-29 ENCOUNTER — HOSPITAL ENCOUNTER (OUTPATIENT)
Dept: CARDIAC REHAB | Facility: CLINIC | Age: 75
End: 2020-07-29
Attending: INTERNAL MEDICINE
Payer: COMMERCIAL

## 2020-07-29 DIAGNOSIS — I50.23 ACUTE ON CHRONIC SYSTOLIC HEART FAILURE (H): ICD-10-CM

## 2020-07-29 PROCEDURE — 93308 TTE F-UP OR LMTD: CPT | Mod: 26 | Performed by: INTERNAL MEDICINE

## 2020-07-29 PROCEDURE — 40000116 ZZH STATISTIC OP CR VISIT

## 2020-07-29 PROCEDURE — 93325 DOPPLER ECHO COLOR FLOW MAPG: CPT | Mod: 26 | Performed by: INTERNAL MEDICINE

## 2020-07-29 PROCEDURE — 93321 DOPPLER ECHO F-UP/LMTD STD: CPT

## 2020-07-29 PROCEDURE — 93798 PHYS/QHP OP CAR RHAB W/ECG: CPT

## 2020-07-29 PROCEDURE — 93321 DOPPLER ECHO F-UP/LMTD STD: CPT | Mod: 26 | Performed by: INTERNAL MEDICINE

## 2020-07-31 ENCOUNTER — HOSPITAL ENCOUNTER (OUTPATIENT)
Dept: CARDIAC REHAB | Facility: CLINIC | Age: 75
End: 2020-07-31
Attending: INTERNAL MEDICINE
Payer: COMMERCIAL

## 2020-07-31 PROCEDURE — 93798 PHYS/QHP OP CAR RHAB W/ECG: CPT

## 2020-07-31 PROCEDURE — 40000116 ZZH STATISTIC OP CR VISIT

## 2020-08-03 ENCOUNTER — HOSPITAL ENCOUNTER (OUTPATIENT)
Dept: CARDIAC REHAB | Facility: CLINIC | Age: 75
End: 2020-08-03
Attending: INTERNAL MEDICINE
Payer: COMMERCIAL

## 2020-08-03 PROCEDURE — 40000116 ZZH STATISTIC OP CR VISIT

## 2020-08-03 PROCEDURE — 93798 PHYS/QHP OP CAR RHAB W/ECG: CPT

## 2020-08-05 ENCOUNTER — HOSPITAL ENCOUNTER (OUTPATIENT)
Dept: CARDIAC REHAB | Facility: CLINIC | Age: 75
End: 2020-08-05
Attending: INTERNAL MEDICINE
Payer: COMMERCIAL

## 2020-08-05 PROCEDURE — 93798 PHYS/QHP OP CAR RHAB W/ECG: CPT

## 2020-08-05 PROCEDURE — 40000116 ZZH STATISTIC OP CR VISIT

## 2020-08-06 ENCOUNTER — VIRTUAL VISIT (OUTPATIENT)
Dept: CARDIOLOGY | Facility: CLINIC | Age: 75
End: 2020-08-06
Attending: PHYSICIAN ASSISTANT
Payer: COMMERCIAL

## 2020-08-06 DIAGNOSIS — N18.4 STAGE 4 CHRONIC KIDNEY DISEASE (H): ICD-10-CM

## 2020-08-06 DIAGNOSIS — I49.5 SSS (SICK SINUS SYNDROME) (H): Primary | ICD-10-CM

## 2020-08-06 DIAGNOSIS — E11.69 TYPE 2 DIABETES MELLITUS WITH OTHER SPECIFIED COMPLICATION, UNSPECIFIED WHETHER LONG TERM INSULIN USE (H): ICD-10-CM

## 2020-08-06 DIAGNOSIS — I50.23 ACUTE ON CHRONIC SYSTOLIC HEART FAILURE (H): ICD-10-CM

## 2020-08-06 DIAGNOSIS — Z95.0 CARDIAC PACEMAKER IN SITU: ICD-10-CM

## 2020-08-06 PROCEDURE — 99214 OFFICE O/P EST MOD 30 MIN: CPT | Mod: 95 | Performed by: INTERNAL MEDICINE

## 2020-08-06 NOTE — LETTER
8/6/2020    Dm Lipscomb MD, MD  [address] [facility] [address]  [facility] [address]    RE: Gene Pagan       Dear Colleague,    I had the pleasure of seeing Gene Pagan in the [facility].    Gene Pagan is a 75 year old male who is being evaluated via a billable video visit.      General:  no apparent distress, normal body habitus, sitting upright.  ENT/Mouth:  membranes moist, no nasal discharge.  Normal head shape, no apparent injury or laceration.  Eyes:  no scleral icterus, normal conjunctivae.  No observed jaundice.  Neck:  no apparent neck swelling.   Chest/Lungs:  No breathing difficulty while speaking.  No audible wheezing.  No cough during conversation.  Cardiovascular:  No obviously elevated jugular venous pressure.  No apparent edema bilaterally in LE.   Abdomen:  no obvious abdominal distention.   Extremities:  no apparent cyanosis.  Skin:  no xanthelasma.  No facial lacerations.  Neurologic:  Normal arm motion bilateral, no tremors.    Psychiatric:  Alert and oriented x3, calm demeanor    The rest of the comprehensive physical examination is deferred due to public health emergency video visit restrictions.      Service Date: 08/06/2020      REFERRING PHYSICIAN:  Dr. Dm Lipscomb.      HISTORY OF PRESENT ILLNESS:  Mr. Pagan is a very pleasant 75-year-old male who I initially saw back in March at [facility] for cardiogenic shock.  He was quite ill at that time.  He had evidence of a urinary infection, pyelonephritis, cardiogenic shock with combined biventricular systolic congestive heart failure, rapid atrial fibrillation and acute renal failure.  He ultimately underwent an AV sarah ablation procedure with biventricular pacing device.  He was readmitted for a community-acquired pneumonia.  In fact, he had several admissions following the initial in March.  He had had a lot of issues with upper back discomfort and chest discomfort.  He was treated  for pleuritis and pericarditis.  He was scheduled to follow in our [facility] and has been seeing Marsha Noonan on a regular basis for heart failure management.  His last blood work was drawn in June showing improvement in his kidney function.  His creatinine was measured at 1.91.  His last hemoglobin measurement in May 9.3.  He had a repeat limited echocardiogram on 07/29 showing significant improvement in his overall heart function.  His EF, which was 20%-25% back in March, has now improved to EF of 45%-50%.  He continues to have mild RV dysfunction as well.  There with significant mitral insufficiency noted back in March on his echocardiogram, and this has resolved.  Trace mitral insufficiency remains.  His last device check was in June showing underlying atrial fibrillation with complete heart block and permanent pacemaker with 99% biventricular pacing.  He is on Eliquis for CVA prophylaxis, and he has not had any bleeding complications related to this.  He did have an injury to his pacemaker a few weeks ago where he bumped it with a car door.  Since then he was having some stimulus of his upper abdomen from his pacemaker.  In the last week or so, he has not noticed it.  The upper back and chest discomfort that he had complained of in May and June has also resolved.  He denies any symptoms suggestive of orthopnea or PND or peripheral edema.  He does note that his weight is still up a little bit from what is considered his dry weight of 175.  He is on 1 mg of Bumex and fluid restriction of 1250 mL per day.  He is hoping to relax this restriction a bit.      PHYSICAL EXAMINATION:  On exam, he appears eupneic and without elevated jugular venous distention or peripheral edema.  He reported his blood pressure at 135/56, a heart rate of 70 and a weight of 177 today.      SUMMARY:  Mr. Pagan is a very pleasant 75-year-old gentleman with a history of cardiogenic shock with biventricular systolic heart failure,  likely tachycardia-induced from atrial fibrillation.  He has undergone AV sarah ablation with biventricular pacing device with significant improvement in his heart function.  His kidney function has markedly improved as well.  He has been followed in the [facility] and appears compensated, both by history and exam.  He has done remarkably well considering how ill he was back in March.  I would like to have him continue in the [facility] over the course of the next year, every 3 months or so, to continue to monitor for heart failure symptoms.  He also needs repeat blood work to evaluate his blood counts and kidney function and electrolytes.  I would like to set him up to see the [facility], Marsha, in 3 months with a basic metabolic panel and CBC.  I am hoping with the improvement in his LV and RV function that we may be able to withdraw his diuretics at some point due to the fact that this medication is hard on his kidneys.  I will also recommend Nephrology followup.  Please feel free to contact me with any questions you have in regards to his care.  I will also recommend         Thank you for allowing me to participate in the care of your patient.    Sincerely,     Sushila Frank, DO     [facility]

## 2020-08-06 NOTE — PROGRESS NOTES
"Gene Pagan is a 75 year old male who is being evaluated via a billable video visit.      The patient has been notified of following:     \"This video visit will be conducted via a call between you and your physician/provider. We have found that certain health care needs can be provided without the need for an in-person physical exam.  This service lets us provide the care you need with a video conversation.  If a prescription is necessary we can send it directly to your pharmacy.  If lab work is needed we can place an order for that and you can then stop by our lab to have the test done at a later time.    Video visits are billed at different rates depending on your insurance coverage.  Please reach out to your insurance provider with any questions.    If during the course of the call the physician/provider feels a video visit is not appropriate, you will not be charged for this service.\"    Patient has given verbal consent for Video visit? Yes  How would you like to obtain your AVS? Mail a copy  If you are dropped from the video visit, the video invite should be resent to: Text to cell phone: [phone]  Will anyone else be joining your video visit? No      General:  no apparent distress, normal body habitus, sitting upright.  ENT/Mouth:  membranes moist, no nasal discharge.  Normal head shape, no apparent injury or laceration.  Eyes:  no scleral icterus, normal conjunctivae.  No observed jaundice.  Neck:  no apparent neck swelling.   Chest/Lungs:  No breathing difficulty while speaking.  No audible wheezing.  No cough during conversation.  Cardiovascular:  No obviously elevated jugular venous pressure.  No apparent edema bilaterally in LE.   Abdomen:  no obvious abdominal distention.   Extremities:  no apparent cyanosis.  Skin:  no xanthelasma.  No facial lacerations.  Neurologic:  Normal arm motion bilateral, no tremors.    Psychiatric:  Alert and oriented x3, calm demeanor    The rest of the comprehensive " physical examination is deferred due to public health emergency video visit restrictions.    Video-Visit Details    Type of service:  Video Visit    Video Start Time: 11:30A  Video End Time: 11:52A    Originating Location (pt. Location): Home    Distant Location (provider location):  [facility]     Platform used for Video Visit: Bartolome GONZALEZ DO          Patient reported vitals:  BP:135/56  Heart rate:70  Weight:177.1    Review Of Systems  Skin: negative  Eyes: negative  Ears/Nose/Throat: negative  Respiratory: No shortness of breath, dyspnea on exertion, cough, or hemoptysis  Cardiovascular: negative  Gastrointestinal: edema stomach  Genitourinary: negative  Musculoskeletal: negative  Neurologic: negative  Psychiatric: negative  Hematologic/Lymphatic/Immunologic: negative  Endocrine: diabetes    ANDREA Chand

## 2020-08-06 NOTE — PROGRESS NOTES
Service Date: 08/06/2020      REFERRING PHYSICIAN:  Dr. Dm Lipscomb.      HISTORY OF PRESENT ILLNESS:  Mr. Pagan is a very pleasant 75-year-old male who I initially saw back in March at [facility] for cardiogenic shock.  He was quite ill at that time.  He had evidence of a urinary infection, pyelonephritis, cardiogenic shock with combined biventricular systolic congestive heart failure, rapid atrial fibrillation and acute renal failure.  He ultimately underwent an AV sarah ablation procedure with biventricular pacing device.  He was readmitted for a community-acquired pneumonia.  In fact, he had several admissions following the initial in March.  He had had a lot of issues with upper back discomfort and chest discomfort.  He was treated for pleuritis and pericarditis.  He was scheduled to follow in our [facility] and has been seeing Marsha Noonan on a regular basis for heart failure management.  His last blood work was drawn in June showing improvement in his kidney function.  His creatinine was measured at 1.91.  His last hemoglobin measurement in May 9.3.  He had a repeat limited echocardiogram on 07/29 showing significant improvement in his overall heart function.  His EF, which was 20%-25% back in March, has now improved to EF of 45%-50%.  He continues to have mild RV dysfunction as well.  There with significant mitral insufficiency noted back in March on his echocardiogram, and this has resolved.  Trace mitral insufficiency remains.  His last device check was in June showing underlying atrial fibrillation with complete heart block and permanent pacemaker with 99% biventricular pacing.  He is on Eliquis for CVA prophylaxis, and he has not had any bleeding complications related to this.  He did have an injury to his pacemaker a few weeks ago where he bumped it with a car door.  Since then he was having some stimulus of his upper abdomen from his pacemaker.  In the last week or so, he has not  noticed it.  The upper back and chest discomfort that he had complained of in May and June has also resolved.  He denies any symptoms suggestive of orthopnea or PND or peripheral edema.  He does note that his weight is still up a little bit from what is considered his dry weight of 175.  He is on 1 mg of Bumex and fluid restriction of 1250 mL per day.  He is hoping to relax this restriction a bit.      PHYSICAL EXAMINATION:  On exam, he appears eupneic and without elevated jugular venous distention or peripheral edema.  He reported his blood pressure at 135/56, a heart rate of 70 and a weight of 177 today.      SUMMARY:  Mr. Pagan is a very pleasant 75-year-old gentleman with a history of cardiogenic shock with biventricular systolic heart failure, likely tachycardia-induced from atrial fibrillation.  He has undergone AV sarah ablation with biventricular pacing device with significant improvement in his heart function.  His kidney function has markedly improved as well.  He has been followed in the [facility] and appears compensated, both by history and exam.  He has done remarkably well considering how ill he was back in March.  I would like to have him continue in the [facility] over the course of the next year, every 3 months or so, to continue to monitor for heart failure symptoms.  He also needs repeat blood work to evaluate his blood counts and kidney function and electrolytes.  I would like to set him up to see the [facility], Marsha, in 3 months with a basic metabolic panel and CBC.  I am hoping with the improvement in his LV and RV function that we may be able to withdraw his diuretics at some point due to the fact that this medication is hard on his kidneys.  I will also recommend Nephrology followup.  Please feel free to contact me with any questions you have in regards to his care.  I will also recommend       cc:   Dm Lipscomb MD    [facility]    [address],  [address]    [place]  [address]         FABIANO OROZCO DO             D: 2020   T: 2020   MT: SHANIKA      Name:     MIKE DUNHAM   MRN:      [id number]        Account:      [id number]   :      1945           Service Date: 2020      Document: [id number]

## 2020-08-07 ENCOUNTER — HOSPITAL ENCOUNTER (OUTPATIENT)
Dept: CARDIAC REHAB | Facility: CLINIC | Age: 75
End: 2020-08-07
Attending: INTERNAL MEDICINE
Payer: COMMERCIAL

## 2020-08-07 ENCOUNTER — HOSPITAL ENCOUNTER (OUTPATIENT)
Dept: CT IMAGING | Facility: CLINIC | Age: 75
Discharge: HOME | End: 2020-08-07
Attending: INTERNAL MEDICINE | Admitting: INTERNAL MEDICINE
Payer: COMMERCIAL

## 2020-08-07 DIAGNOSIS — R91.1 LUNG NODULE: ICD-10-CM

## 2020-08-07 PROCEDURE — 93798 PHYS/QHP OP CAR RHAB W/ECG: CPT

## 2020-08-07 PROCEDURE — 71250 CT THORAX DX C-: CPT

## 2020-08-07 PROCEDURE — 40000116 ZZH STATISTIC OP CR VISIT

## 2020-08-10 ENCOUNTER — HOSPITAL ENCOUNTER (OUTPATIENT)
Dept: CARDIAC REHAB | Facility: CLINIC | Age: 75
End: 2020-08-10
Attending: INTERNAL MEDICINE
Payer: COMMERCIAL

## 2020-08-10 PROCEDURE — 93798 PHYS/QHP OP CAR RHAB W/ECG: CPT

## 2020-08-10 PROCEDURE — 40000116 ZZH STATISTIC OP CR VISIT

## 2020-08-12 ENCOUNTER — HOSPITAL ENCOUNTER (OUTPATIENT)
Dept: CARDIAC REHAB | Facility: CLINIC | Age: 75
End: 2020-08-12
Attending: INTERNAL MEDICINE
Payer: COMMERCIAL

## 2020-08-12 PROCEDURE — 40000116 ZZH STATISTIC OP CR VISIT

## 2020-08-12 PROCEDURE — 93798 PHYS/QHP OP CAR RHAB W/ECG: CPT

## 2020-08-13 ENCOUNTER — VIRTUAL VISIT (OUTPATIENT)
Dept: INTERNAL MEDICINE | Facility: CLINIC | Age: 75
End: 2020-08-13
Payer: COMMERCIAL

## 2020-08-13 DIAGNOSIS — N40.1 BPH WITH OBSTRUCTION/LOWER URINARY TRACT SYMPTOMS: ICD-10-CM

## 2020-08-13 DIAGNOSIS — I48.91 ATRIAL FIBRILLATION WITH RAPID VENTRICULAR RESPONSE (H): ICD-10-CM

## 2020-08-13 DIAGNOSIS — R33.9 URINARY RETENTION: ICD-10-CM

## 2020-08-13 DIAGNOSIS — E11.69 TYPE 2 DIABETES MELLITUS WITH OTHER SPECIFIED COMPLICATION, UNSPECIFIED WHETHER LONG TERM INSULIN USE (H): Primary | ICD-10-CM

## 2020-08-13 DIAGNOSIS — I50.20 SYSTOLIC CONGESTIVE HEART FAILURE, UNSPECIFIED HF CHRONICITY (H): ICD-10-CM

## 2020-08-13 DIAGNOSIS — N13.8 BPH WITH OBSTRUCTION/LOWER URINARY TRACT SYMPTOMS: ICD-10-CM

## 2020-08-13 PROCEDURE — 99214 OFFICE O/P EST MOD 30 MIN: CPT | Mod: 95 | Performed by: INTERNAL MEDICINE

## 2020-08-13 NOTE — PROGRESS NOTES
"Gene Pagan is a 75 year old male who is being evaluated via a billable video visit.      The patient has been notified of following:     \"This video visit will be conducted via a call between you and your physician/provider. We have found that certain health care needs can be provided without the need for an in-person physical exam.  This service lets us provide the care you need with a video conversation.  If a prescription is necessary we can send it directly to your pharmacy.  If lab work is needed we can place an order for that and you can then stop by our lab to have the test done at a later time.    Video visits are billed at different rates depending on your insurance coverage.  Please reach out to your insurance provider with any questions.    If during the course of the call the physician/provider feels a video visit is not appropriate, you will not be charged for this service.\"    Patient has given verbal consent for Video visit? Yes  How would you like to obtain your AVS? Mail a copy  If you are dropped from the video visit, the video invite should be resent to: Text to cell phone: [phone]  Will anyone else be joining your video visit? No    Subjective     Gene Pagan is a 75 year old male who presents today via video visit for the following health issues:  Patient is being seen for a follow up for CHF and Diabetes.  HPI    Diabetes Follow-up    How often are you checking your blood sugar? Two times daily  Blood sugar testing frequency justification:  Uncontrolled diabetes  What time of day are you checking your blood sugars (select all that apply)?  Before and after meals  Have you had any blood sugars above 200?  Yes occasionally in the evening.  Have you had any blood sugars below 70?  No    What symptoms do you notice when your blood sugar is low?  None    What concerns do you have today about your diabetes? None     Do you have any of these symptoms? (Select all that apply)  No numbness " or tingling in feet.  No redness, sores or blisters on feet.  No complaints of excessive thirst.  No reports of blurry vision.  No significant changes to weight.PATIENT STATES SHE  HAS GAINED 5-6 LBS OF FLUID WITHIN THE LAST WEEK.    Have you had a diabetic eye exam in the last 12 months? No        BP Readings from Last 2 Encounters:   05/24/20 125/73   05/21/20 112/63     Hemoglobin A1C (%)   Date Value   03/27/2020 6.2 (H)         Hypertension Follow-up      Do you check your blood pressure regularly outside of the clinic? Yes     Are you following a low salt diet? Yes    Are your blood pressures ever more than 140 on the top number (systolic) OR more   than 90 on the bottom number (diastolic), for example 140/90? No      How many servings of fruits and vegetables do you eat daily?  4 or more    On average, how many sweetened beverages do you drink each day (Examples: soda, juice, sweet tea, etc.  Do NOT count diet or artificially sweetened beverages)?   0    How many days per week do you exercise enough to make your heart beat faster? 3 or less    How many minutes a day do you exercise enough to make your heart beat faster? 9 or less    How many days per week do you miss taking your medication? 0         Video Start Time: 1614    Morning glucoses running around 120, post dinner glucoses around 150-190.     He is feeling much stronger. He has three more sessions of cardiac rehab. 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 This will require some moving but not lifing boxes up to 50 lb in weight     He still wears a continuous indwelling urinary catheter, but will see Urology next week to look into surgical options to allow removal.    Occasional sinus drainage. No recent fever or chills.   Past medical, family and social histories as well as medications reviewed and updated as needed.    Reviewed and updated as needed this visit by Provider         Review of Systems   No dyspnea or  "cough. No chest discomfort, dizziness or palpitations. No diarrhea, abdominal pain or rectal bleeding.   No acute problems with vision or speech, lateralizing weakness or paresthesias.    ROS: as above or negative for Respiratory, CV, GI, endocrine, neuro systems.       Objective           Vitals:  No vitals were obtained today due to virtual visit.    Physical Exam     GENERAL: Healthy, alert and no distress  EYES: Eyes grossly normal to inspection.  No discharge or erythema, or obvious scleral/conjunctival abnormalities.  RESP: No audible wheeze, cough, or visible cyanosis.  No visible retractions or increased work of breathing.    SKIN: Visible skin clear. No significant rash, abnormal pigmentation or lesions.  NEURO: Cranial nerves grossly intact.  Mentation and speech appropriate for age.  PSYCH: Mentation appears normal, affect normal/bright, judgement and insight intact, normal speech and appearance well-groomed.      Diagnostic Test Results:  Labs reviewed in Epic        Assessment & Plan     (E11.69) Type 2 diabetes mellitus with other specified complication, unspecified whether long term insulin use (H)  (primary encounter diagnosis)  Comment: Suspect good control. Return soon for A1c. .Continue current meds.   Plan: **A1C FUTURE 3mo    (I48.91) Atrial fibrillation with rapid ventricular response. Continue current meds.  F/u with cardiology. onse (H)    (I50.20) Systolic congestive heart failure, unspecified HF chronicity (H)  Comment: Stable. Continue current meds.     (R33.9) Urinary retention  (N40.1,  N13.8) BPH with obstruction/lower urinary tract symptoms  Comment: F/u with Urology as planned.     BMI:   Estimated body mass index is 25.4 kg/m  as calculated from the following:    Height as of 5/21/20: 1.753 m (5' 9\").    Weight as of 5/28/20: 78 kg (172 lb).               Labs prior to follow up video visit with me in three months.     Dm Lipscomb MD,   [facility] " [facility]IC[facility]E      Video-Visit Details    Type of service:  Video Visit    Video End Time: 0881    Originating Location (pt. Location): Home    Distant Location (provider location):  [facility] ----provider working from home office.     Platform used for Video Visit: [email or web address]

## 2020-08-14 ENCOUNTER — HOSPITAL ENCOUNTER (OUTPATIENT)
Dept: CARDIAC REHAB | Facility: CLINIC | Age: 75
End: 2020-08-14
Attending: INTERNAL MEDICINE
Payer: COMMERCIAL

## 2020-08-14 PROCEDURE — 93798 PHYS/QHP OP CAR RHAB W/ECG: CPT

## 2020-08-14 PROCEDURE — 40000116 ZZH STATISTIC OP CR VISIT

## 2020-08-17 ENCOUNTER — HOSPITAL ENCOUNTER (OUTPATIENT)
Dept: CARDIAC REHAB | Facility: CLINIC | Age: 75
End: 2020-08-17
Attending: INTERNAL MEDICINE
Payer: COMMERCIAL

## 2020-08-17 PROCEDURE — 93798 PHYS/QHP OP CAR RHAB W/ECG: CPT | Performed by: REHABILITATION PRACTITIONER

## 2020-08-17 PROCEDURE — 40000116 ZZH STATISTIC OP CR VISIT: Performed by: REHABILITATION PRACTITIONER

## 2020-08-18 DIAGNOSIS — R33.9 URINARY RETENTION: Primary | ICD-10-CM

## 2020-08-19 ENCOUNTER — OFFICE VISIT (OUTPATIENT)
Dept: UROLOGY | Facility: CLINIC | Age: 75
End: 2020-08-19
Payer: COMMERCIAL

## 2020-08-19 VITALS
DIASTOLIC BLOOD PRESSURE: 60 MMHG | BODY MASS INDEX: 26.51 KG/M2 | OXYGEN SATURATION: 98 % | WEIGHT: 179 LBS | HEART RATE: 70 BPM | HEIGHT: 69 IN | SYSTOLIC BLOOD PRESSURE: 116 MMHG

## 2020-08-19 DIAGNOSIS — R33.9 URINARY RETENTION: Primary | ICD-10-CM

## 2020-08-19 PROCEDURE — 99213 OFFICE O/P EST LOW 20 MIN: CPT | Mod: 25 | Performed by: UROLOGY

## 2020-08-19 PROCEDURE — 51702 INSERT TEMP BLADDER CATH: CPT | Performed by: UROLOGY

## 2020-08-19 ASSESSMENT — MIFFLIN-ST. JEOR: SCORE: 1527.32

## 2020-08-19 ASSESSMENT — PAIN SCALES - GENERAL: PAINLEVEL: NO PAIN (0)

## 2020-08-19 NOTE — PROGRESS NOTES
"[facility]  CHIEF COMPLAINT   It was my pleasure to see Gene Pagan who is a 75 year old male for follow-up of urinary retention.      HPI   Gene Pagan is a very pleasant 75 year old male     Initially seen 4/29/20:  Gene Pagan is a 75 year old male who is being seen for evaluation of acute urinary retention.  He is being seen today for hospital follow-up for urologic issues of gross hematuria and urinary retention.     He was seen in the hospital recently after being admitted in the setting of shortness of breath, atrial fibrillation with RVR and acute kidney injury with work-up suggestive of recurrent cardiogenic shock.  He subsequently had a pacemaker placed on April 10.  His hematuria resolved while in the hospital however he continued to have urinary retention and failed a trial of void prior to discharge.     He reports having worsening symptoms of LUTS for the past several months to year prior to hospitalization and had been started on tamsulosin 0.4 mg daily about 1 month prior to hospitalization.  He also notes that he had a self-limited episode of gross hematuria in December.  No prior instances of gross hematuria and he denies any smoking history.    He failed his Trial of void     5/20/20:  Return for cystoscopy and Trial of void   Cystoscopy with 4cm long prostate and significant bilobar hypertrophy with a small median lobe and mild trabeculation.   Failed Trial of void and baker catheter replaced    TODAY:  Returns for baker catheter change and discussion of bladder outlet procedural options  He has been doing very well with regards to his return of strength and his cardiogenic rehab  He is hoping to return to his part-time work at [employer] in the next coming weeks    PHYSICAL EXAM  Patient is a 75 year old  male   Vitals: Blood pressure 116/60, pulse 70, height 1.753 m (5' 9\"), weight 81.2 kg (179 lb), SpO2 98 %.  Body mass index is 26.43 kg/m .  General Appearance Adult:   Alert, no acute " distress, oriented  HENT: throat/mouth:normal, good dentition  Lungs: no respiratory distress, or pursed lip breathing  Heart: No obvious jugular venous distension present  Abdomen: soft, nontender, no organomegaly or masses  Musculoskeltal: extremities normal, no peripheral edema  Skin: no suspicious lesions or rashes  Neuro: Alert, oriented, speech and mentation normal  Psych: affect and mood normal  Gait: Normal    UA RESULTS:  Recent Labs   Lab Test 05/29/20  1540   COLOR Yellow   APPEARANCE Clear   URINEGLC Negative   URINEBILI Negative   URINEKETONE Negative   SG 1.015   UBLD Trace*   URINEPH 5.5   PROTEIN Negative   UROBILINOGEN 0.2   NITRITE Negative   LEUKEST Negative   RBCU O - 2   WBCU 0 - 5         ASSESSMENT and PLAN  75-year-old man with recent prolonged hospitalization due to respiratory failure, cardiogenic shock, acute on chronic congestive heart failure with atrial fibrillation and RVR status post cardiac ablation and pacemaker placement, acute on chronic kidney disease, gross hematuria, urinary retention, diabetes type 2, and a lung nodule    Urinary retention  -We discussed bladder outlet procedural options which include an office-based procedure such as REZUM versus operative procedures such as a TURP or a Greenlight photovaporization of the prostate (PVP)   -We discussed the risks and benefits of each option and I think he would be a good candidate for either a REZUM or Greenlight photovaporization of the prostate (PVP)   -We discussed that with a REZUM procedure, this could be done in the office and would not require general anesthesia or hospital exposure.  He would need to be off his Eliquis  -For a Greenlight photovaporization of the prostate (PVP) he would also ideally be off his Eliquis however this would require general anesthetic  -He would like to proceed with REZUM scheduling  -I will reach out to his primary care provider as well as his cardiologist regarding the feasibility of  stopping his Eliquis for a 3-5 days  -Mckenzie catheter was exchanged by my nurse today without incident    I spent over 15 minutes with the patient.  Over half this time was spent on counseling regarding the above.    Cheikh Perez MD   Urology  [facility]  [facility] Phone: [phone]  [facility] Phone: [phone]

## 2020-08-19 NOTE — Clinical Note
Hi Dr. Lipscomb and Dr. Frank,    I saw Gene back today for follow-up of his urinary retention.  We discussed bladder outlet procedural options and he would like to proceed with an office-based procedure to avoid general anesthesia.  For this procedure, he would need to be off his Eliquis for a few days prior and a few days following the procedure.  Would stopping his Eliquis for about 5 days be feasible?    Thanks,   Cheikh Perez M.D.  Cell: [phone]

## 2020-08-19 NOTE — LETTER
8/19/2020       RE: Gene Pagan  [address] [facility] [id number]     Dear Colleague,    Thank you for referring your patient, Gene Pagan, to the [facility] at [facility] Please see a copy of my visit note below.    SOUTHDABHANU  CHIEF COMPLAINT   It was my pleasure to see Gene Pagan who is a 75 year old male for follow-up of urinary retention.      HPI   Gene Pagan is a very pleasant 75 year old male     Initially seen 4/29/20:  Geen Pagan is a 75 year old male who is being seen for evaluation of acute urinary retention.  He is being seen today for hospital follow-up for urologic issues of gross hematuria and urinary retention.     He was seen in the hospital recently after being admitted in the setting of shortness of breath, atrial fibrillation with RVR and acute kidney injury with work-up suggestive of recurrent cardiogenic shock.  He subsequently had a pacemaker placed on April 10.  His hematuria resolved while in the hospital however he continued to have urinary retention and failed a trial of void prior to discharge.     He reports having worsening symptoms of LUTS for the past several months to year prior to hospitalization and had been started on tamsulosin 0.4 mg daily about 1 month prior to hospitalization.  He also notes that he had a self-limited episode of gross hematuria in December.  No prior instances of gross hematuria and he denies any smoking history.    He failed his Trial of void     5/20/20:  Return for cystoscopy and Trial of void   Cystoscopy with 4cm long prostate and significant bilobar hypertrophy with a small median lobe and mild trabeculation.   Failed Trial of void and baker catheter replaced    TODAY:  Returns for baker catheter change and discussion of bladder outlet procedural options  He has been doing very well with regards to his return of strength and his cardiogenic  "rehab  He is hoping to return to his part-time work at [employer] in the next coming weeks    PHYSICAL EXAM  Patient is a 75 year old  male   Vitals: Blood pressure 116/60, pulse 70, height 1.753 m (5' 9\"), weight 81.2 kg (179 lb), SpO2 98 %.  Body mass index is 26.43 kg/m .  General Appearance Adult:   Alert, no acute distress, oriented  HENT: throat/mouth:normal, good dentition  Lungs: no respiratory distress, or pursed lip breathing  Heart: No obvious jugular venous distension present  Abdomen: soft, nontender, no organomegaly or masses  Musculoskeltal: extremities normal, no peripheral edema  Skin: no suspicious lesions or rashes  Neuro: Alert, oriented, speech and mentation normal  Psych: affect and mood normal  Gait: Normal    UA RESULTS:  Recent Labs   Lab Test 05/29/20  1540   COLOR Yellow   APPEARANCE Clear   URINEGLC Negative   URINEBILI Negative   URINEKETONE Negative   SG 1.015   UBLD Trace*   URINEPH 5.5   PROTEIN Negative   UROBILINOGEN 0.2   NITRITE Negative   LEUKEST Negative   RBCU O - 2   WBCU 0 - 5         ASSESSMENT and PLAN  75-year-old man with recent prolonged hospitalization due to respiratory failure, cardiogenic shock, acute on chronic congestive heart failure with atrial fibrillation and RVR status post cardiac ablation and pacemaker placement, acute on chronic kidney disease, gross hematuria, urinary retention, diabetes type 2, and a lung nodule    Urinary retention  -We discussed bladder outlet procedural options which include an office-based procedure such as REZUM versus operative procedures such as a TURP or a Greenlight photovaporization of the prostate (PVP)   -We discussed the risks and benefits of each option and I think he would be a good candidate for either a REZUM or Greenlight photovaporization of the prostate (PVP)   -We discussed that with a REZUM procedure, this could be done in the office and would not require general anesthesia or hospital exposure.  He would need to be off " his Eliquis  -For a Greenlight photovaporization of the prostate (PVP) he would also ideally be off his Eliquis however this would require general anesthetic  -He would like to proceed with REZUM scheduling  -I will reach out to his primary care provider as well as his cardiologist regarding the feasibility of stopping his Eliquis for a 3-5 days  -Mckenzie catheter was exchanged by my nurse today without incident    I spent over 15 minutes with the patient.  Over half this time was spent on counseling regarding the above.    Cheikh Perez MD   Urology  [facility]  [facility] Phone: [phone]  [facility] Phone: [phone]

## 2020-08-19 NOTE — NURSING NOTE
Chief Complaint   Patient presents with     Urinary Retention     patient is here for 3 month follow up.        Catheter removal documentation on 8/19/2020:    Gene Pagan presents to the clinic for catheter removal.  Reason for removal: replacement  Order has been verified. yes  Catheter successfully removed at 2:32 PM without immediate complication.  150 cc's of urine present in the catheter bag.  Urethral meatus is free of secretions and encrustation.    Catheter insertion documentation on 8/19/2020:    Gene Pagan presents to the clinic for catheter insertion.  Reason for insertion: urinary retention  Order has been verified. yes  Catheter successfully inserted into the urethral meatus in the usual sterile fashion without immediate complication.  Type of catheter placed: 16 Slovenian indwelling catheter  Urine is yellow in color.  50 cc's of urine output returned.  Balloon was filled with 9cc's of normal saline.  Securement device placed for the catheter.  The patient tolerated the procedure and was instructed to monitor for catheter dysfunction, monitor for pain or discomfort, return or call for pain, fever, leakage or decreased urine flow and watch for signs of infection    Yue Hickey CMA

## 2020-08-21 ENCOUNTER — HOSPITAL ENCOUNTER (OUTPATIENT)
Dept: CARDIAC REHAB | Facility: CLINIC | Age: 75
End: 2020-08-21
Attending: INTERNAL MEDICINE
Payer: COMMERCIAL

## 2020-08-21 DIAGNOSIS — N40.1 BPH WITH OBSTRUCTION/LOWER URINARY TRACT SYMPTOMS: Primary | ICD-10-CM

## 2020-08-21 DIAGNOSIS — N13.8 BPH WITH OBSTRUCTION/LOWER URINARY TRACT SYMPTOMS: Primary | ICD-10-CM

## 2020-08-21 PROCEDURE — 40000116 ZZH STATISTIC OP CR VISIT

## 2020-08-21 PROCEDURE — 93798 PHYS/QHP OP CAR RHAB W/ECG: CPT

## 2020-08-21 RX ORDER — DIAZEPAM 5 MG
5 TABLET ORAL EVERY 6 HOURS PRN
Qty: 1 TABLET | Refills: 0 | Status: SHIPPED | OUTPATIENT
Start: 2020-08-21 | End: 2020-11-17

## 2020-08-21 RX ORDER — OXYCODONE HYDROCHLORIDE 5 MG/1
5 TABLET ORAL EVERY 6 HOURS PRN
Qty: 5 TABLET | Refills: 0 | Status: SHIPPED | OUTPATIENT
Start: 2020-08-21 | End: 2020-11-17

## 2020-08-21 RX ORDER — CIPROFLOXACIN 500 MG/1
500 TABLET, FILM COATED ORAL 2 TIMES DAILY
Qty: 6 TABLET | Refills: 0 | Status: SHIPPED | OUTPATIENT
Start: 2020-08-21 | End: 2020-11-17

## 2020-08-24 ENCOUNTER — HOSPITAL ENCOUNTER (OUTPATIENT)
Dept: CARDIAC REHAB | Facility: CLINIC | Age: 75
End: 2020-08-24
Attending: INTERNAL MEDICINE
Payer: COMMERCIAL

## 2020-08-24 PROCEDURE — 40000116 ZZH STATISTIC OP CR VISIT: Performed by: REHABILITATION PRACTITIONER

## 2020-08-24 PROCEDURE — 93798 PHYS/QHP OP CAR RHAB W/ECG: CPT | Performed by: REHABILITATION PRACTITIONER

## 2020-08-25 ENCOUNTER — HOSPITAL ENCOUNTER (OUTPATIENT)
Dept: CARDIAC REHAB | Facility: CLINIC | Age: 75
End: 2020-08-25
Attending: INTERNAL MEDICINE
Payer: COMMERCIAL

## 2020-08-25 PROCEDURE — 93797 PHYS/QHP OP CAR RHAB WO ECG: CPT

## 2020-08-25 PROCEDURE — 93798 PHYS/QHP OP CAR RHAB W/ECG: CPT

## 2020-08-25 PROCEDURE — 40000575 ZZH STATISTIC OP CARDIAC VISIT #2

## 2020-08-25 PROCEDURE — 40000116 ZZH STATISTIC OP CR VISIT

## 2020-09-08 ENCOUNTER — ANCILLARY PROCEDURE (OUTPATIENT)
Dept: CARDIOLOGY | Facility: CLINIC | Age: 75
End: 2020-09-08
Attending: INTERNAL MEDICINE
Payer: COMMERCIAL

## 2020-09-08 ENCOUNTER — TELEPHONE (OUTPATIENT)
Dept: CARDIOLOGY | Facility: CLINIC | Age: 75
End: 2020-09-08

## 2020-09-08 DIAGNOSIS — Z95.0 CARDIAC PACEMAKER IN SITU: ICD-10-CM

## 2020-09-08 DIAGNOSIS — I50.40 COMBINED SYSTOLIC AND DIASTOLIC CONGESTIVE HEART FAILURE, UNSPECIFIED HF CHRONICITY (H): Primary | ICD-10-CM

## 2020-09-08 DIAGNOSIS — I49.5 SSS (SICK SINUS SYNDROME) (H): ICD-10-CM

## 2020-09-08 DIAGNOSIS — I47.10 SVT (SUPRAVENTRICULAR TACHYCARDIA) (H): ICD-10-CM

## 2020-09-08 DIAGNOSIS — I50.23 ACUTE ON CHRONIC SYSTOLIC HEART FAILURE (H): ICD-10-CM

## 2020-09-08 PROCEDURE — 93294 REM INTERROG EVL PM/LDLS PM: CPT | Performed by: INTERNAL MEDICINE

## 2020-09-08 PROCEDURE — 93296 REM INTERROG EVL PM/IDS: CPT | Performed by: INTERNAL MEDICINE

## 2020-09-08 NOTE — TELEPHONE ENCOUNTER
Dr. Frank,    SALLY: Your patient had an episode of NSVT on today's remote transmission. Episode lasted 14 seconds, rates 140-180bpm. EF 45-50% (2020). This is the first documented episode since implant.         GutCheck Valitude CRT-P Remote PPM Device Check  AP: 0%  BIVP: 99%  Mode: DDDR  Presenting Rhythm: AFib with BIVP  Heart Rate: Rates mostly in the 70s per histogram  Sensing: stable  Pacing Threshold: stable  Impedance: stable  Battery Status: 10 years  Atrial Arrhythmia: Patient in mode switch 100% of the time. Ventricular rates controlled. Taking Eliqius.  Ventricular Arrhythmia: 1 ventricular high rate. EGM shows AFib with VS events for NSVT (AVNA) lasting 14 seconds, rates 140-180bpm. EF 45-50% (2020). Will notify Dr Frank of findings.     Care Plan: F/u PPM Latitude q 3 months. LM with results. JOSE Guerra

## 2020-09-09 NOTE — TELEPHONE ENCOUNTER
Sushila Frank DO Haley, Leandra K, RN    Caller: Unspecified (Yesterday,  2:08 PM)               Please set patient up for lexiscan MPI and follow-up with NP to titrate BB and go over lonnie results.

## 2020-09-09 NOTE — TELEPHONE ENCOUNTER
Left voicemail for pt to call back to discuss Dr. Frank's recommendations. Left team 1's call back number.

## 2020-09-09 NOTE — TELEPHONE ENCOUNTER
Spoke with pt about Dr. Frank's recommendations and he gave verbal understanding.   Pt was transferred to scheduling to set up lexiscan in [place] and then a virtual visit with Marsha Noonan.

## 2020-09-10 ENCOUNTER — TELEPHONE (OUTPATIENT)
Dept: CARDIOLOGY | Facility: CLINIC | Age: 75
End: 2020-09-10

## 2020-09-10 NOTE — TELEPHONE ENCOUNTER
PATIENT WELLNESS TELEPHONE SCREENING     Step 1 Screening Questions    In the past 3 weeks, have you been exposed to someone with a suspected or known illness?  COVID-19? No  Chickenpox? No   Measles? No  Pertussis? No    Do you have one of the following NEW symptoms?   Fever/Chills? No   Cough? No   Shortness of breath? No   New loss of taste or smell? No  Sore throat? No  Muscle or body aches? No  Headaches? No  Fatigue? No  Vomiting or diarrhea? No    Have you had a positive COVID-19 diagnostic test (nasal swab test) in the last 14 days or are you currently on self-quarantine restrictions (i.e. travel restrictions, exposures, etc.)?No    Step 2 Screening Results (Skip if the patient is negative for symptoms), If Yes:    Due to your current symptoms, for our safety we need to cancel your appointment. We are advising that you consult with your ordering provider or [facility] at [phone].    Step 3 Review Visitor Policy  Patient informed of the updated visitor policy   1 visitor allowed per patient   Visitor must screen negative for COVID symptoms   Visitor must wear a mask

## 2020-09-11 ENCOUNTER — HOSPITAL ENCOUNTER (OUTPATIENT)
Dept: CARDIOLOGY | Facility: CLINIC | Age: 75
End: 2020-09-11
Attending: INTERNAL MEDICINE
Payer: COMMERCIAL

## 2020-09-11 VITALS
DIASTOLIC BLOOD PRESSURE: 68 MMHG | SYSTOLIC BLOOD PRESSURE: 118 MMHG | HEART RATE: 70 BPM | OXYGEN SATURATION: 99 % | BODY MASS INDEX: 27.4 KG/M2 | HEIGHT: 69 IN | WEIGHT: 185 LBS

## 2020-09-11 DIAGNOSIS — I47.10 SVT (SUPRAVENTRICULAR TACHYCARDIA) (H): ICD-10-CM

## 2020-09-11 DIAGNOSIS — I50.23 ACUTE ON CHRONIC SYSTOLIC HEART FAILURE (H): ICD-10-CM

## 2020-09-11 DIAGNOSIS — I50.40 COMBINED SYSTOLIC AND DIASTOLIC CONGESTIVE HEART FAILURE, UNSPECIFIED HF CHRONICITY (H): ICD-10-CM

## 2020-09-11 LAB
CV STRESS MAX HR HE: 70
NUC STRESS EJECTION FRACTION: 36 %
RATE PRESSURE PRODUCT: 7840
STRESS ECHO BASELINE DIASTOLIC HE: 68
STRESS ECHO BASELINE HR: 70
STRESS ECHO BASELINE SYSTOLIC BP: 118
STRESS ECHO CALCULATED PERCENT HR: 49 %
STRESS ECHO LAST STRESS DIASTOLIC BP: 50
STRESS ECHO LAST STRESS SYSTOLIC BP: 112
STRESS ECHO TARGET HR: 143
STRESS/REST PERFUSION RATIO: 1.03

## 2020-09-11 PROCEDURE — 93017 CV STRESS TEST TRACING ONLY: CPT

## 2020-09-11 PROCEDURE — 78452 HT MUSCLE IMAGE SPECT MULT: CPT | Mod: 26 | Performed by: INTERNAL MEDICINE

## 2020-09-11 PROCEDURE — 25000128 H RX IP 250 OP 636: Performed by: INTERNAL MEDICINE

## 2020-09-11 PROCEDURE — 93018 CV STRESS TEST I&R ONLY: CPT | Performed by: INTERNAL MEDICINE

## 2020-09-11 PROCEDURE — 34300033 ZZH RX 343: Performed by: INTERNAL MEDICINE

## 2020-09-11 PROCEDURE — A9502 TC99M TETROFOSMIN: HCPCS | Performed by: INTERNAL MEDICINE

## 2020-09-11 PROCEDURE — 93016 CV STRESS TEST SUPVJ ONLY: CPT | Performed by: INTERNAL MEDICINE

## 2020-09-11 RX ORDER — REGADENOSON 0.08 MG/ML
0.4 INJECTION, SOLUTION INTRAVENOUS ONCE
Status: COMPLETED | OUTPATIENT
Start: 2020-09-11 | End: 2020-09-11

## 2020-09-11 RX ORDER — ACYCLOVIR 200 MG/1
0-1 CAPSULE ORAL
Status: DISCONTINUED | OUTPATIENT
Start: 2020-09-11 | End: 2020-09-12 | Stop reason: HOSPADM

## 2020-09-11 RX ORDER — AMINOPHYLLINE 25 MG/ML
50-100 INJECTION, SOLUTION INTRAVENOUS
Status: DISCONTINUED | OUTPATIENT
Start: 2020-09-11 | End: 2020-09-12 | Stop reason: HOSPADM

## 2020-09-11 RX ORDER — ALBUTEROL SULFATE 90 UG/1
2 AEROSOL, METERED RESPIRATORY (INHALATION) EVERY 5 MIN PRN
Status: DISCONTINUED | OUTPATIENT
Start: 2020-09-11 | End: 2020-09-12 | Stop reason: HOSPADM

## 2020-09-11 RX ADMIN — TETROFOSMIN 3.41 MCI.: 1.38 INJECTION, POWDER, LYOPHILIZED, FOR SOLUTION INTRAVENOUS at 13:34

## 2020-09-11 RX ADMIN — TETROFOSMIN 9.31 MCI.: 1.38 INJECTION, POWDER, LYOPHILIZED, FOR SOLUTION INTRAVENOUS at 14:51

## 2020-09-11 RX ADMIN — REGADENOSON 0.4 MG: 0.08 INJECTION, SOLUTION INTRAVENOUS at 14:47

## 2020-09-11 ASSESSMENT — MIFFLIN-ST. JEOR: SCORE: 1554.53

## 2020-09-18 LAB
MDC_IDC_EPISODE_DTM: NORMAL
MDC_IDC_EPISODE_DURATION: 16 S
MDC_IDC_EPISODE_ID: NORMAL
MDC_IDC_EPISODE_TYPE: NORMAL
MDC_IDC_LEAD_IMPLANT_DT: NORMAL
MDC_IDC_LEAD_LOCATION: NORMAL
MDC_IDC_LEAD_LOCATION_DETAIL_1: NORMAL
MDC_IDC_LEAD_MFG: NORMAL
MDC_IDC_LEAD_MODEL: NORMAL
MDC_IDC_LEAD_POLARITY_TYPE: NORMAL
MDC_IDC_LEAD_SERIAL: NORMAL
MDC_IDC_MSMT_BATTERY_DTM: NORMAL
MDC_IDC_MSMT_BATTERY_REMAINING_LONGEVITY: 120 MO
MDC_IDC_MSMT_BATTERY_REMAINING_PERCENTAGE: 100 %
MDC_IDC_MSMT_BATTERY_STATUS: NORMAL
MDC_IDC_MSMT_LEADCHNL_LV_IMPEDANCE_VALUE: 1094 OHM
MDC_IDC_MSMT_LEADCHNL_LV_PACING_THRESHOLD_AMPLITUDE: 1 V
MDC_IDC_MSMT_LEADCHNL_LV_PACING_THRESHOLD_PULSEWIDTH: 1 MS
MDC_IDC_MSMT_LEADCHNL_RA_IMPEDANCE_VALUE: 774 OHM
MDC_IDC_MSMT_LEADCHNL_RV_IMPEDANCE_VALUE: 794 OHM
MDC_IDC_MSMT_LEADCHNL_RV_PACING_THRESHOLD_AMPLITUDE: 0.7 V
MDC_IDC_MSMT_LEADCHNL_RV_PACING_THRESHOLD_PULSEWIDTH: 0.4 MS
MDC_IDC_PG_IMPLANT_DTM: NORMAL
MDC_IDC_PG_MFG: NORMAL
MDC_IDC_PG_MODEL: NORMAL
MDC_IDC_PG_SERIAL: NORMAL
MDC_IDC_PG_TYPE: NORMAL
MDC_IDC_SESS_CLINIC_NAME: NORMAL
MDC_IDC_SESS_DTM: NORMAL
MDC_IDC_SESS_TYPE: NORMAL
MDC_IDC_SET_BRADY_AT_MODE_SWITCH_MODE: NORMAL
MDC_IDC_SET_BRADY_AT_MODE_SWITCH_RATE: 140 {BEATS}/MIN
MDC_IDC_SET_BRADY_LOWRATE: 70 {BEATS}/MIN
MDC_IDC_SET_BRADY_MAX_SENSOR_RATE: 130 {BEATS}/MIN
MDC_IDC_SET_BRADY_MAX_TRACKING_RATE: 130 {BEATS}/MIN
MDC_IDC_SET_BRADY_MODE: NORMAL
MDC_IDC_SET_BRADY_PAV_DELAY_LOW: 180 MS
MDC_IDC_SET_BRADY_SAV_DELAY_LOW: 120 MS
MDC_IDC_SET_CRT_LVRV_DELAY: 30 MS
MDC_IDC_SET_CRT_PACED_CHAMBERS: NORMAL
MDC_IDC_SET_LEADCHNL_LV_PACING_AMPLITUDE: 2.2 V
MDC_IDC_SET_LEADCHNL_LV_PACING_PULSEWIDTH: 0.5 MS
MDC_IDC_SET_LEADCHNL_LV_SENSING_ADAPTATION_MODE: NORMAL
MDC_IDC_SET_LEADCHNL_LV_SENSING_SENSITIVITY: 1.5 MV
MDC_IDC_SET_LEADCHNL_RA_PACING_AMPLITUDE: 3.5 V
MDC_IDC_SET_LEADCHNL_RA_PACING_CAPTURE_MODE: NORMAL
MDC_IDC_SET_LEADCHNL_RA_PACING_POLARITY: NORMAL
MDC_IDC_SET_LEADCHNL_RA_PACING_PULSEWIDTH: 0.4 MS
MDC_IDC_SET_LEADCHNL_RA_SENSING_ADAPTATION_MODE: NORMAL
MDC_IDC_SET_LEADCHNL_RA_SENSING_POLARITY: NORMAL
MDC_IDC_SET_LEADCHNL_RA_SENSING_SENSITIVITY: 0.25 MV
MDC_IDC_SET_LEADCHNL_RV_PACING_AMPLITUDE: 2 V
MDC_IDC_SET_LEADCHNL_RV_PACING_CAPTURE_MODE: NORMAL
MDC_IDC_SET_LEADCHNL_RV_PACING_POLARITY: NORMAL
MDC_IDC_SET_LEADCHNL_RV_PACING_PULSEWIDTH: 0.4 MS
MDC_IDC_SET_LEADCHNL_RV_SENSING_ADAPTATION_MODE: NORMAL
MDC_IDC_SET_LEADCHNL_RV_SENSING_POLARITY: NORMAL
MDC_IDC_SET_LEADCHNL_RV_SENSING_SENSITIVITY: 1.5 MV
MDC_IDC_SET_ZONE_DETECTION_INTERVAL: 375 MS
MDC_IDC_SET_ZONE_TYPE: NORMAL
MDC_IDC_SET_ZONE_VENDOR_TYPE: NORMAL
MDC_IDC_STAT_AT_BURDEN_PERCENT: 100 %
MDC_IDC_STAT_AT_DTM_END: NORMAL
MDC_IDC_STAT_AT_DTM_START: NORMAL
MDC_IDC_STAT_BRADY_DTM_END: NORMAL
MDC_IDC_STAT_BRADY_DTM_START: NORMAL
MDC_IDC_STAT_BRADY_RA_PERCENT_PACED: 0 %
MDC_IDC_STAT_BRADY_RV_PERCENT_PACED: 99 %
MDC_IDC_STAT_CRT_DTM_END: NORMAL
MDC_IDC_STAT_CRT_DTM_START: NORMAL
MDC_IDC_STAT_CRT_LV_PERCENT_PACED: 99 %
MDC_IDC_STAT_EPISODE_RECENT_COUNT: 0
MDC_IDC_STAT_EPISODE_RECENT_COUNT: 1
MDC_IDC_STAT_EPISODE_RECENT_COUNT_DTM_END: NORMAL
MDC_IDC_STAT_EPISODE_RECENT_COUNT_DTM_START: NORMAL
MDC_IDC_STAT_EPISODE_TYPE: NORMAL
MDC_IDC_STAT_EPISODE_VENDOR_TYPE: NORMAL

## 2020-09-21 ENCOUNTER — VIRTUAL VISIT (OUTPATIENT)
Dept: CARDIOLOGY | Facility: CLINIC | Age: 75
End: 2020-09-21
Payer: COMMERCIAL

## 2020-09-21 DIAGNOSIS — I50.22 CHRONIC SYSTOLIC CONGESTIVE HEART FAILURE (H): Primary | ICD-10-CM

## 2020-09-21 DIAGNOSIS — I50.23 ACUTE ON CHRONIC SYSTOLIC CONGESTIVE HEART FAILURE (H): ICD-10-CM

## 2020-09-21 PROCEDURE — 99213 OFFICE O/P EST LOW 20 MIN: CPT | Mod: 95 | Performed by: PHYSICIAN ASSISTANT

## 2020-09-21 RX ORDER — BUMETANIDE 1 MG/1
1 TABLET ORAL DAILY
Qty: 90 TABLET | Refills: 3 | Status: SHIPPED | OUTPATIENT
Start: 2020-09-21 | End: 2020-11-17

## 2020-09-21 NOTE — LETTER
"9/21/2020    Dm Lipscomb MD, MD  [address] [facility] [address]  [facility] [address]    RE: Gene Pagan       Dear Colleague,    I had the pleasure of seeing Gene Pagan in the [facility].    Gene Pagan is a 75 year old male who is being evaluated via a billable video visit.      The patient has been notified of following:     \"This video visit will be conducted via a call between you and your physician/provider. We have found that certain health care needs can be provided without the need for an in-person physical exam.  This service lets us provide the care you need with a video conversation.  If a prescription is necessary we can send it directly to your pharmacy.  If lab work is needed we can place an order for that and you can then stop by our lab to have the test done at a later time.    Video visits are billed at different rates depending on your insurance coverage.  Please reach out to your insurance provider with any questions.    If during the course of the call the physician/provider feels a video visit is not appropriate, you will not be charged for this service.\"    Patient has given verbal consent for Video visit? Yes  How would you like to obtain your AVS? MyChart  If you are dropped from the video visit, the video invite should be resent to: Text to cell phone: [phone]  Will anyone else be joining your video visit? No    Unabl;e to assess vitals  Review Of Systems  Skin: NEGATIVE  Eyes:Ears/Nose/Throat: NEGATIVE  Respiratory: NEGATIVE  Cardiovascular:NEGATIVE  Gastrointestinal: NEGATIVE  Genitourinary:NEGATIVE   Musculoskeletal: NEGATIVE  Neurologic: NEGATIVE  Psychiatric: NEGATIVE  Hematologic/Lymphatic/Immunologic: NEGATIVE  Endocrine:  Diabetes    Video-Visit Details    Type of service:  Video Visit    Video Start Time: 10:15 AM  Video End Time: 10:31 AM    Originating Location (pt. Location): Home    Distant Location (provider location):  [facility]     Platform used for Video Visit: Doxrolan     Wt 185 lbs  BP on Friday was 121/60 mmHg.    I have reviewed and updated the patient's Past Medical History, Social History, Family History and Medication List.    PROBLEM LIST  Patient Active Problem List   Diagnosis     Atrial fibrillation with rapid ventricular response (H)     CHF (congestive heart failure) (H)     Urinary retention     Gross hematuria     BPH with obstruction/lower urinary tract symptoms     CAP (community acquired pneumonia)       ALLERGIES  Patient has no known allergies.    MEDICATIONS  Current Outpatient Medications   Medication Sig Dispense Refill     apixaban ANTICOAGULANT (ELIQUIS) 5 MG tablet Take 1 tablet (5 mg) by mouth 2 times daily 60 tablet 0     blood glucose (ONETOUCH ULTRA) test strip Use to test blood sugar 2 times daily or as directed. 200 strip 1     bumetanide (BUMEX) 1 MG tablet One half tablet by mouth each AM. 30 tablet 0     Cholecalciferol (VITAMIN D3) 75 MCG (3000 UT) TABS Take 1 tablet by mouth daily       insulin glargine (LANTUS SOLOSTAR) 100 UNIT/ML pen Inject 9 Units Subcutaneous At Bedtime Future refills by PCP Dr. Martínez Duarte with phone number [phone]. 15 mL 0     insulin pen needle (31G X 8 MM) 31G X 8 MM miscellaneous 1 Box of 100 insulin pen needles to be dispensed with every insulin pen prescription 100 each 1     melatonin 3 MG tablet Take 3 mg by mouth At Bedtime       metoprolol succinate ER (TOPROL-XL) 25 MG 24 hr tablet Take 25 mg by mouth daily        tamsulosin (FLOMAX) 0.4 MG capsule Take 1 capsule (0.4 mg) by mouth At Bedtime 90 capsule 0     UNABLE TO FIND Iron 65 mg daily       ciprofloxacin (CIPRO) 500 MG tablet Take 1 tablet (500 mg) by mouth 2 times daily Start taking the day before your prostate REZUM procedure. (Patient not taking: Reported on 9/21/2020) 6 tablet 0     diazepam (VALIUM) 5 MG tablet Take 1 tablet (5 mg) by mouth every 6 hours as  needed for anxiety Bring with to take prior to your REZUM procedure (Patient not taking: Reported on 9/21/2020) 1 tablet 0     Multiple Vitamins-Minerals (MULTIVITAMIN ADULT PO) Take 1 tablet by mouth daily       oxyCODONE (ROXICODONE) 5 MG tablet Take 1 tablet (5 mg) by mouth every 6 hours as needed for breakthrough pain (Patient not taking: Reported on 9/21/2020) 5 tablet 0       Gene Pagan presents for a [facility] clinic follow up visit.    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    In brief,   Mr. Pagan is a very pleasant 75-year-old gentleman with a history of cardiogenic shock with biventricular systolic heart failure, likely tachycardia-induced from atrial fibrillation.  He has undergone AV sarah ablation with biventricular pacing device with significant improvement in his heart function.  His kidney function has markedly improved as well.  He has been followed closely by me in the [facility] and presents for a routine follow up today.   Pleased to say that he's feeling well. 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 He's looking forward to this. He does voice some concern with recent weight gain - 10-15 lbs over the past couple of months. States he has some swelling in his abdomen but otherwise denies CHF sxs. No orthostasis or chest pain.     (Prior note:  I have been following Gene gu in the [facility] clinic since his diagnosis of new systolic CMP in April. He's been doing quite well, save for the development of atypical, pleuritic chest discomfort which had persisted despite  "being treated for pneumonia. This had resulted in multiple ED visits. At my virtual visit on 5/28 I ordered inflammatory markers and TTE out of suspicion he had developed pericarditis. Given his elevated white ct and reported low-grade fevers I asked him to see his PCP also for infectious work-up. He had a virtual visit with his PCP on 5/29 and a urinalysis was obtained that was \"stable\" and white ct was declining from 16.6 --> 12.7. He also obtained inflammatory markers which were markedly elevated (I did not see those results). No changes were made.   On 6/2, Gene had repeat inflammatory markers which were trending down but still elevated. TTE did not show a pericardial effusion. LVEF has improved from 20-25% --> 45%.   I reviewed this with his primary cardiologist Dr. Frank, who felt it was not necessary to treat him for pericarditis with colchicine, given no effusion was noted on TTE.   He also had a device check on 6/2 which showed 99% BiVP. His LRL was reduced from 80 --> 70 per protocol.)    Recent labs:  Component      Latest Ref Rng & Units 5/29/2020 6/2/2020   CRP Inflammation      0.0 - 8.0 mg/L 190.0 (H) 34.9 (H)   Sed Rate      0 - 20 mm/h 97 (H) 85 (H)      Component      Latest Ref Rng & Units 5/24/2020 6/2/2020   Sodium      136 - 145 mmol/L  137   Potassium      3.5 - 5.1 mmol/L  4.3   Chloride      98 - 107 mmol/L  104   Carbon Dioxide      23 - 29 mmol/L  25   Anion Gap      6 - 17 mmol/L  12.3   Glucose      70 - 105 mg/dL  131 (H)   Urea Nitrogen      7 - 30 mg/dL  38 (H)   Creatinine      0.70 - 1.30 mg/dL  1.91 (H)   GFR Estimate      >60 mL/min/1.73:m2  34 (L)   GFR Estimate If Black      >60 mL/min/1.73:m2  42 (L)   Calcium      8.5 - 10.5 mg/dL  9.7   N-Terminal Pro BNP Inpatient      0 - 1,800 pg/mL 4,059 (H)    N-Terminal Pro Bnp      0 - 450 pg/mL  4,465 (H)   TSH      0.40 - 4.00 mU/L  1.33     ROS:  12-pt ROS is negative except for as noted above.    EXAM:  A limited exam was " conducted via video.  Constitutional: Patient is pleasant, alert, in no distress. Normal body habitus, upright.  ENT: Normal head shape, no evidence of injury or laceration.  EYES: No scleral icterus, normal conjunctivae. EOM's appear intact.   Neck: No evidence of thyromegaly.   Chest/Lungs: Appears to have normal respiratory effort. No audible wheezing, no cough, equal chest wall expansion.   Cardiovascular: No evidence of elevated JVP. No evidence of pitting edema bilaterally.  Abdomen: No evidence of abdominal distention. No observed jaundice.  Extremities: No edema, erythema, cyanosis noted.  Skin: No rashes or lesions appreciated on visualized skin, normal skin color.  Neurologic: Normal mentation. Normal arm motion bilaterally, no tremors. No evidence of focal defect.  Psychiatric: Appropriate affect. A&Ox3.    Assessment/Plan:  1. New systolic CMP / biventricular HF, likely tachy-mediated - EF 25% --> 45%, FC II.   - Very mild CHF sxs with what I suspect is caloric + some fluid wt gain. Will increase bumex from 0.5 to 1 mg daily.   - Continue Toprol 12.5 mg, Imdur 15. No ACE/ARB/[place] d/t ongoing renal dysfunction.   - Completed cardiac rehab.  - He is having his routine labs drawn tomorrow, will let him know of any significant abnormalities.  - Will see him back in 1 month with labs prior.    2. Pleuritic chest discomfort - suspect pericarditis vs discomfort from pacemaker leads. Resolved.     3. Rapid AF s/p AVN ablation + CRT-P implant on 4/10 - 99% BiVP. Anticoagulated.    4. NSVT noted on device check - asymptomatic. Follow up stress test normal.    5. DEONDRE / CKD-III - Creatinine stable ~2. Now following with [facility]. Encouraged to arrange follow up.      Marsha Noonan PA-C  [facility] - [facility]  Pager: [phone]  Text Page  (7:30am - 4pm M-F)         Thank you for allowing me to participate in the care of your patient.    Sincerely,     Marsha Noonan PA-C     [facility]  [facility]

## 2020-09-21 NOTE — PROGRESS NOTES
"Gene Pagan is a 75 year old male who is being evaluated via a billable video visit.      The patient has been notified of following:     \"This video visit will be conducted via a call between you and your physician/provider. We have found that certain health care needs can be provided without the need for an in-person physical exam.  This service lets us provide the care you need with a video conversation.  If a prescription is necessary we can send it directly to your pharmacy.  If lab work is needed we can place an order for that and you can then stop by our lab to have the test done at a later time.    Video visits are billed at different rates depending on your insurance coverage.  Please reach out to your insurance provider with any questions.    If during the course of the call the physician/provider feels a video visit is not appropriate, you will not be charged for this service.\"    Patient has given verbal consent for Video visit? Yes  How would you like to obtain your AVS? MyChart  If you are dropped from the video visit, the video invite should be resent to: Text to cell phone: [phone]  Will anyone else be joining your video visit? No    Unabl;e to assess vitals  Review Of Systems  Skin: NEGATIVE  Eyes:Ears/Nose/Throat: NEGATIVE  Respiratory: NEGATIVE  Cardiovascular:NEGATIVE  Gastrointestinal: NEGATIVE  Genitourinary:NEGATIVE   Musculoskeletal: NEGATIVE  Neurologic: NEGATIVE  Psychiatric: NEGATIVE  Hematologic/Lymphatic/Immunologic: NEGATIVE  Endocrine:  Diabetes    Video-Visit Details    Type of service:  Video Visit    Video Start Time: 10:15 AM  Video End Time: 10:31 AM    Originating Location (pt. Location): Home    Distant Location (provider location):  [facility]     Platform used for Video Visit: Doximity     Wt 185 lbs  BP on Friday was 121/60 mmHg.    I have reviewed and updated the patient's Past Medical History, Social History, Family History and " Medication List.    PROBLEM LIST  Patient Active Problem List   Diagnosis     Atrial fibrillation with rapid ventricular response (H)     CHF (congestive heart failure) (H)     Urinary retention     Gross hematuria     BPH with obstruction/lower urinary tract symptoms     CAP (community acquired pneumonia)       ALLERGIES  Patient has no known allergies.    MEDICATIONS  Current Outpatient Medications   Medication Sig Dispense Refill     apixaban ANTICOAGULANT (ELIQUIS) 5 MG tablet Take 1 tablet (5 mg) by mouth 2 times daily 60 tablet 0     blood glucose (ONETOUCH ULTRA) test strip Use to test blood sugar 2 times daily or as directed. 200 strip 1     bumetanide (BUMEX) 1 MG tablet One half tablet by mouth each AM. 30 tablet 0     Cholecalciferol (VITAMIN D3) 75 MCG (3000 UT) TABS Take 1 tablet by mouth daily       insulin glargine (LANTUS SOLOSTAR) 100 UNIT/ML pen Inject 9 Units Subcutaneous At Bedtime Future refills by PCP Dr. Martínez Duarte with phone number [phone]. 15 mL 0     insulin pen needle (31G X 8 MM) 31G X 8 MM miscellaneous 1 Box of 100 insulin pen needles to be dispensed with every insulin pen prescription 100 each 1     melatonin 3 MG tablet Take 3 mg by mouth At Bedtime       metoprolol succinate ER (TOPROL-XL) 25 MG 24 hr tablet Take 25 mg by mouth daily        tamsulosin (FLOMAX) 0.4 MG capsule Take 1 capsule (0.4 mg) by mouth At Bedtime 90 capsule 0     UNABLE TO FIND Iron 65 mg daily       ciprofloxacin (CIPRO) 500 MG tablet Take 1 tablet (500 mg) by mouth 2 times daily Start taking the day before your prostate REZUM procedure. (Patient not taking: Reported on 9/21/2020) 6 tablet 0     diazepam (VALIUM) 5 MG tablet Take 1 tablet (5 mg) by mouth every 6 hours as needed for anxiety Bring with to take prior to your REZUM procedure (Patient not taking: Reported on 9/21/2020) 1 tablet 0     Multiple Vitamins-Minerals (MULTIVITAMIN ADULT PO) Take 1 tablet by mouth daily       oxyCODONE  (ROXICODONE) 5 MG tablet Take 1 tablet (5 mg) by mouth every 6 hours as needed for breakthrough pain (Patient not taking: Reported on 9/21/2020) 5 tablet 0       Gene Pagan presents for a [facility] clinic follow up visit.    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    In brief,   Mr. Pagan is a very pleasant 75-year-old gentleman with a history of cardiogenic shock with biventricular systolic heart failure, likely tachycardia-induced from atrial fibrillation.  He has undergone AV sarah ablation with biventricular pacing device with significant improvement in his heart function.  His kidney function has markedly improved as well.  He has been followed closely by me in the [facility] and presents for a routine follow up today.   Pleased to say that he's feeling well. 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 He's looking forward to this. He does voice some concern with recent weight gain - 10-15 lbs over the past couple of months. States he has some swelling in his abdomen but otherwise denies CHF sxs. No orthostasis or chest pain.     (Prior note:  I have been following Gene virtually in the [facility] clinic since his diagnosis of new systolic CMP in April. He's been doing quite well, save for the development of atypical, pleuritic chest discomfort which had persisted despite being treated for pneumonia. This had resulted in multiple ED visits. At my virtual visit on 5/28 I ordered inflammatory markers and TTE out of suspicion he had developed pericarditis. Given his elevated white ct and reported  "low-grade fevers I asked him to see his PCP also for infectious work-up. He had a virtual visit with his PCP on 5/29 and a urinalysis was obtained that was \"stable\" and white ct was declining from 16.6 --> 12.7. He also obtained inflammatory markers which were markedly elevated (I did not see those results). No changes were made.   On 6/2, Gene had repeat inflammatory markers which were trending down but still elevated. TTE did not show a pericardial effusion. LVEF has improved from 20-25% --> 45%.   I reviewed this with his primary cardiologist Dr. Frank, who felt it was not necessary to treat him for pericarditis with colchicine, given no effusion was noted on TTE.   He also had a device check on 6/2 which showed 99% BiVP. His LRL was reduced from 80 --> 70 per protocol.)    Recent labs:  Component      Latest Ref Rng & Units 5/29/2020 6/2/2020   CRP Inflammation      0.0 - 8.0 mg/L 190.0 (H) 34.9 (H)   Sed Rate      0 - 20 mm/h 97 (H) 85 (H)      Component      Latest Ref Rng & Units 5/24/2020 6/2/2020   Sodium      136 - 145 mmol/L  137   Potassium      3.5 - 5.1 mmol/L  4.3   Chloride      98 - 107 mmol/L  104   Carbon Dioxide      23 - 29 mmol/L  25   Anion Gap      6 - 17 mmol/L  12.3   Glucose      70 - 105 mg/dL  131 (H)   Urea Nitrogen      7 - 30 mg/dL  38 (H)   Creatinine      0.70 - 1.30 mg/dL  1.91 (H)   GFR Estimate      >60 mL/min/1.73:m2  34 (L)   GFR Estimate If Black      >60 mL/min/1.73:m2  42 (L)   Calcium      8.5 - 10.5 mg/dL  9.7   N-Terminal Pro BNP Inpatient      0 - 1,800 pg/mL 4,059 (H)    N-Terminal Pro Bnp      0 - 450 pg/mL  4,465 (H)   TSH      0.40 - 4.00 mU/L  1.33     ROS:  12-pt ROS is negative except for as noted above.    EXAM:  A limited exam was conducted via video.  Constitutional: Patient is pleasant, alert, in no distress. Normal body habitus, upright.  ENT: Normal head shape, no evidence of injury or laceration.  EYES: No scleral icterus, normal conjunctivae. EOM's " appear intact.   Neck: No evidence of thyromegaly.   Chest/Lungs: Appears to have normal respiratory effort. No audible wheezing, no cough, equal chest wall expansion.   Cardiovascular: No evidence of elevated JVP. No evidence of pitting edema bilaterally.  Abdomen: No evidence of abdominal distention. No observed jaundice.  Extremities: No edema, erythema, cyanosis noted.  Skin: No rashes or lesions appreciated on visualized skin, normal skin color.  Neurologic: Normal mentation. Normal arm motion bilaterally, no tremors. No evidence of focal defect.  Psychiatric: Appropriate affect. A&Ox3.    Assessment/Plan:  1. New systolic CMP / biventricular HF, likely tachy-mediated - EF 25% --> 45%, FC II.   - Very mild CHF sxs with what I suspect is caloric + some fluid wt gain. Will increase bumex from 0.5 to 1 mg daily.   - Continue Toprol 12.5 mg, Imdur 15. No ACE/ARB/Hern[place] d/t ongoing renal dysfunction.   - Completed cardiac rehab.  - He is having his routine labs drawn tomorrow, will let him know of any significant abnormalities.  - Will see him back in 1 month with labs prior.    2. Pleuritic chest discomfort - suspect pericarditis vs discomfort from pacemaker leads. Resolved.     3. Rapid AF s/p AVN ablation + CRT-P implant on 4/10 - 99% BiVP. Anticoagulated.    4. NSVT noted on device check - asymptomatic. Follow up stress test normal.    5. DEONDRE / CKD-III - Creatinine stable ~2. Now following with [facility]. Encouraged to arrange follow up.      Marsha Noonan PA-C  [facility]  Pager: [phone]  Text Page  (7:30am - 4pm M-F)

## 2020-09-22 DIAGNOSIS — Z95.0 CARDIAC PACEMAKER IN SITU: ICD-10-CM

## 2020-09-22 DIAGNOSIS — I50.23 ACUTE ON CHRONIC SYSTOLIC HEART FAILURE (H): ICD-10-CM

## 2020-09-22 DIAGNOSIS — N18.4 STAGE 4 CHRONIC KIDNEY DISEASE (H): ICD-10-CM

## 2020-09-22 DIAGNOSIS — E11.69 TYPE 2 DIABETES MELLITUS WITH OTHER SPECIFIED COMPLICATION, UNSPECIFIED WHETHER LONG TERM INSULIN USE (H): ICD-10-CM

## 2020-09-22 DIAGNOSIS — I49.5 SSS (SICK SINUS SYNDROME) (H): ICD-10-CM

## 2020-09-22 LAB
ERYTHROCYTE [DISTWIDTH] IN BLOOD BY AUTOMATED COUNT: 15.2 % (ref 10–15)
HCT VFR BLD AUTO: 35.7 % (ref 40–53)
HGB BLD-MCNC: 11.1 G/DL (ref 13.3–17.7)
MCH RBC QN AUTO: 27.9 PG (ref 26.5–33)
MCHC RBC AUTO-ENTMCNC: 31.1 G/DL (ref 31.5–36.5)
MCV RBC AUTO: 90 FL (ref 78–100)
PLATELET # BLD AUTO: 210 10E9/L (ref 150–450)
RBC # BLD AUTO: 3.98 10E12/L (ref 4.4–5.9)
WBC # BLD AUTO: 8 10E9/L (ref 4–11)

## 2020-09-22 PROCEDURE — 85027 COMPLETE CBC AUTOMATED: CPT | Performed by: INTERNAL MEDICINE

## 2020-09-22 PROCEDURE — 80048 BASIC METABOLIC PNL TOTAL CA: CPT | Performed by: INTERNAL MEDICINE

## 2020-09-22 PROCEDURE — 36415 COLL VENOUS BLD VENIPUNCTURE: CPT | Performed by: INTERNAL MEDICINE

## 2020-09-23 LAB
ANION GAP SERPL CALCULATED.3IONS-SCNC: 8 MMOL/L (ref 3–14)
BUN SERPL-MCNC: 43 MG/DL (ref 7–30)
CALCIUM SERPL-MCNC: 9.3 MG/DL (ref 8.5–10.1)
CHLORIDE SERPL-SCNC: 107 MMOL/L (ref 94–109)
CO2 SERPL-SCNC: 23 MMOL/L (ref 20–32)
CREAT SERPL-MCNC: 2.17 MG/DL (ref 0.66–1.25)
GFR SERPL CREATININE-BSD FRML MDRD: 28 ML/MIN/{1.73_M2}
GLUCOSE SERPL-MCNC: 83 MG/DL (ref 70–99)
POTASSIUM SERPL-SCNC: 4.3 MMOL/L (ref 3.4–5.3)
SODIUM SERPL-SCNC: 138 MMOL/L (ref 133–144)

## 2020-09-24 ENCOUNTER — PRE VISIT (OUTPATIENT)
Dept: UROLOGY | Facility: CLINIC | Age: 75
End: 2020-09-24

## 2020-09-24 NOTE — TELEPHONE ENCOUNTER
Chief Complaint   Patient presents with     Previsit     Patient will have a Rezum tomorrow with Dr Chris Marie, A

## 2020-09-25 ENCOUNTER — OFFICE VISIT (OUTPATIENT)
Dept: UROLOGY | Facility: CLINIC | Age: 75
End: 2020-09-25
Payer: COMMERCIAL

## 2020-09-25 VITALS
OXYGEN SATURATION: 98 % | SYSTOLIC BLOOD PRESSURE: 112 MMHG | BODY MASS INDEX: 27.16 KG/M2 | WEIGHT: 183.4 LBS | HEIGHT: 69 IN | DIASTOLIC BLOOD PRESSURE: 60 MMHG | HEART RATE: 71 BPM

## 2020-09-25 DIAGNOSIS — N40.1 BPH WITH OBSTRUCTION/LOWER URINARY TRACT SYMPTOMS: Primary | ICD-10-CM

## 2020-09-25 DIAGNOSIS — N13.8 BPH WITH OBSTRUCTION/LOWER URINARY TRACT SYMPTOMS: Primary | ICD-10-CM

## 2020-09-25 DIAGNOSIS — R33.9 URINARY RETENTION: ICD-10-CM

## 2020-09-25 DIAGNOSIS — N18.30 CKD (CHRONIC KIDNEY DISEASE) STAGE 3, GFR 30-59 ML/MIN (H): ICD-10-CM

## 2020-09-25 LAB
ALBUMIN UR-MCNC: 30 MG/DL
APPEARANCE UR: CLEAR
BILIRUB UR QL STRIP: NEGATIVE
COLOR UR AUTO: YELLOW
GLUCOSE UR STRIP-MCNC: NEGATIVE MG/DL
HGB UR QL STRIP: ABNORMAL
KETONES UR STRIP-MCNC: NEGATIVE MG/DL
LEUKOCYTE ESTERASE UR QL STRIP: ABNORMAL
NITRATE UR QL: POSITIVE
PH UR STRIP: 5.5 PH (ref 5–7)
SOURCE: ABNORMAL
SP GR UR STRIP: 1.02 (ref 1–1.03)
UROBILINOGEN UR STRIP-ACNC: 0.2 EU/DL (ref 0.2–1)

## 2020-09-25 PROCEDURE — 96372 THER/PROPH/DIAG INJ SC/IM: CPT | Mod: 59 | Performed by: UROLOGY

## 2020-09-25 PROCEDURE — 81003 URINALYSIS AUTO W/O SCOPE: CPT | Performed by: UROLOGY

## 2020-09-25 PROCEDURE — 53852 PROSTATIC RF THERMOTX: CPT | Performed by: UROLOGY

## 2020-09-25 ASSESSMENT — PAIN SCALES - GENERAL
PAINLEVEL: MILD PAIN (2)
PAINLEVEL: NO PAIN (0)

## 2020-09-25 ASSESSMENT — MIFFLIN-ST. JEOR: SCORE: 1539.34

## 2020-09-25 NOTE — PROGRESS NOTES
REZUM PROCEDURE    Gene Pagan is a 75 year old male who presents with urinary retention for a REZUM procedure.    75-year-old man with recent prolonged hospitalization due to respiratory failure, cardiogenic shock, acute on chronic congestive heart failure with atrial fibrillation and RVR status post cardiac ablation and pacemaker placement, acute on chronic kidney disease, gross hematuria, urinary retention, diabetes type 2, and a lung nodule    Pt ID verified with patient: Yes     Procedure verified with patient: Yes     Procedure confirmed with physician and support staff: Yes     Consent confirmed with physician and support staff.    Sign In:  History and Physical Exam reviewed   Primary Diagnosis: Urinary retention  Informed Consent Discussed: Yes   Sign in Communication: Yes   Time Out:  Team Confirms the Correct Patient, Correct Procedure; Yes , Correct Site and Site Marking, Correct Position (if applicable).  Affirmation of Time Out: Yes   Sign Out:  Sign Out Discussion: Yes   Physician: Cheikh Perez MD  Indications for procedure:     Indications: Gene Pagan is a 75 year old male with a history of bothersome LUTS refractory to medical therapy who presents for convective radiofrequency thermal ablation of the prostate.  The patient was advised of the risks of the procedure along with the risks/benefits of alternative procedures.  He exhibited an understanding of these considerations and opted to proceed.    Procedure: Convective radiofrequency thermal ablation of the prostate     Notable Findings: Unremarkable ablation of the prostate     Procedure:   We initially inserted a transrectal ultrasound probe and assessed prostate volume and dimensions. A nerve block was then performed with 20ml lidocaine. The prostate was measured at 83.2 cc  The transrectal ultrasound probe was then removed.  Convective radiofrequency was then used to create thermal energy to selectively ablate prostate tissue. 8  targeted treatments delivered into the prostate utilizing radiofrequency power to form sterile water vapor. 1 treatment was applied to the bladder neck. 4 treatments in left lateral lobe and 3 treatments in the right lateral lobe  by 1 cm were applied.       A baker catheter was placed after the procedure was completed.      Trial of void on 9/28/20 in [facility] office.     Follow up with me in 2 weeks in [facility].     Cheikh Perez M.D.

## 2020-09-25 NOTE — NURSING NOTE
Chief Complaint   Patient presents with     Urinary Retention     Patient here today for Rezum       Catheter removal documentation on 9/25/2020:    Gene Pagan presents to the clinic for catheter removal.  Reason for removal: urinary retention  Order has been verified. YES  Catheter successfully removed at 8:00AM without immediate complication.  50 cc's of urine present in the catheter bag.  Urethral meatus is free of secretions and encrustation.  The patient is afebrile.  The patient tolerated the procedure and was instructed to follow up on Monday for Cathter removal.          Patient's identity was confirmed prior to the procedure using two forms of ID.  Procedure was explained to pt prior to performing said procedure and the following information was obtained. The patient consent form  was explained and all questions were answered prior to the procedure. Any pre-procedural antibiotics were given according to the performing physicians recommendation. Pt's information was confirmed prior to procedure.     Consent read and signed: Yes   No Known Allergies  Pre-operative antibiotics taken: Yes  Aspirin or other blood thinning medications not taken in 3 days:  Yes  Time of Fleet's enema: YES 5;00am  Pt took Valium and Oxycodone 30 min prior to procedure: Yes  Patient given Gentamicin intramuscular injection 30 minutes prior to procedure Yes  Performing physician: Dr. Chris Isaac equipment was prepped prior to procedure according to co[employer]melly. Pt was prepped, and draped according to company policy, then a lidocaine urojet was used in the pt's urethra as appropriate.  cath was placed in the patient after the procedure and pt was sent home with a leg bag. Instructions for cath care was explained to the pt. and any additional individuals as desired by the pt. Instructions were also sent with the pt. Cath was placed sterilely after the rezum was performed according to company policy by performing physician.      The following medication was given:     MEDICATION:  GENTAMICIN  ROUTE: IM  SITE: LUQ - Gluteus  DOSE: 80MG  LOT #: [id number]  : [employer]  EXPIRATION DATE: 01/21  NDC#: 87852-667-19  Was there drug waste? No  Multi-dose vial: No    The following medication was given:     MEDICATION:  Lidocaine 2% Soln  ROUTE: RECTAL  SITE: PROSTATE  DOSE: 20ML  LOT #: [id number]  : [employer]  EXPIRATION DATE: 07/2022  NDC#: 08326-996-89  Was there drug waste? Yes  Amount of drug waste (mL): 10ML.  Reason for waste:  Single use vial  Multi-dose vial: No      Catheter insertion documentation on 9/25/2020:    Gene Pagan presents to the clinic for catheter insertion.  Reason for insertion: urinary retention  Order has been verified. yes  Catheter successfully inserted by Dr Perez into the urethral meatus in the usual sterile fashion without immediate complication.  Type of catheter placed: 16 Yi Coude catheter  Urine is pink in color.  150 cc's of urine output returned.  Balloon was filled with 10cc's of normal saline.  Securement device placed for the catheter.  The patient tolerated the procedure and was instructed to monitor for catheter dysfunction, monitor for pain or discomfort, return or call for pain, fever, leakage or decreased urine flow, watch for signs of infection and follow up Monday for catheter removal.          YASMEEN Johnston

## 2020-09-25 NOTE — PATIENT INSTRUCTIONS
Rezum Post-procedure Information    What are some of the short term side effects that may occur with Rezum?    As a minimally invasive procedure, Rezum has demonstrated fewer side effects compared to those typically seen with surgical therapies, but as with any interventional procedure, some of the following side effects may temporarily occur:    .  Painful urination  .  Blood in urine  .  Blood in semen  .  Frequent urination  .  Inability to urinate or completely empty the bladder  .  Need for short-term catheterization    Most of these resolve within 3 weeks of the procedure, but there is a possibility some of these effects may be prolonged.  Please talk with your physician about ways to potentially minimize the effects of these risks.  Patients have found that the following options may help relieve discomfort during the short term healing process:    .  Take a mild pain medication such as Tylenol  .  Try a warm bath or sitting on a hot water bottle  .  Eliminate caffeine, chocolate, and alcohol from your diet    Symptoms that may be serious:    YOUR SHOULD CONTACT YOUR DOCTOR IMMEDIATELY IF ANY OF THE FOLLOWING OCCUR    .  Fever or chills.  If your temperature is greater than or equal to 101, this may indicate that you have an infection that needs to be evaluated quickly.  Call your doctor.    .  Inability to urinate.  This may indicate that swelling or a blood clot is blocking the urinary passage.  If not treated this can become more and more painful.  If you cannot urinate, do not drink any more fluids.  Call your doctor.    Severe or uncontrollable diarrhea.  This may indicate an infection or complication from the treatment.  Call your doctor.    Severe pain.  While there may be some pain related to the procedure, it is usually not severe.  If you are experiencing severe pain or any condition you think may be serious, call your doctor.    The number to call if you are having a problem is  [phone].

## 2020-09-25 NOTE — LETTER
9/25/2020       RE: Gene Pagan  [id number] [address] [facility] [id number]     Dear Colleague,    Thank you for referring your patient, Gene Pagan, to the [facility] [place] at [facility]. Please see a copy of my visit note below.    REZUM PROCEDURE    Gene Pagan is a 75 year old male who presents with urinary retention for a REZUM procedure.    75-year-old man with recent prolonged hospitalization due to respiratory failure, cardiogenic shock, acute on chronic congestive heart failure with atrial fibrillation and RVR status post cardiac ablation and pacemaker placement, acute on chronic kidney disease, gross hematuria, urinary retention, diabetes type 2, and a lung nodule    Pt ID verified with patient: Yes     Procedure verified with patient: Yes     Procedure confirmed with physician and support staff: Yes     Consent confirmed with physician and support staff.    Sign In:  History and Physical Exam reviewed   Primary Diagnosis: Urinary retention  Informed Consent Discussed: Yes   Sign in Communication: Yes   Time Out:  Team Confirms the Correct Patient, Correct Procedure; Yes , Correct Site and Site Marking, Correct Position (if applicable).  Affirmation of Time Out: Yes   Sign Out:  Sign Out Discussion: Yes   Physician: Cheikh Perez MD  Indications for procedure:     Indications: Gene Pagan is a 75 year old male with a history of bothersome LUTS refractory to medical therapy who presents for convective radiofrequency thermal ablation of the prostate.  The patient was advised of the risks of the procedure along with the risks/benefits of alternative procedures.  He exhibited an understanding of these considerations and opted to proceed.    Procedure: Convective radiofrequency thermal ablation of the prostate     Notable Findings: Unremarkable ablation of the prostate     Procedure:   We initially inserted a transrectal  ultrasound probe and assessed prostate volume and dimensions. A nerve block was then performed with 20ml lidocaine. The prostate was measured at 83.2 cc  The transrectal ultrasound probe was then removed.  Convective radiofrequency was then used to create thermal energy to selectively ablate prostate tissue. 8 targeted treatments delivered into the prostate utilizing radiofrequency power to form sterile water vapor. 1 treatment was applied to the bladder neck. 4 treatments in left lateral lobe and 3 treatments in the right lateral lobe  by 1 cm were applied.       A baker catheter was placed after the procedure was completed.      Trial of void on 9/28/20 in [facility] office.     Follow up with me in 2 weeks in [place] office.     Cheikh Perez M.D.

## 2020-09-28 ENCOUNTER — ALLIED HEALTH/NURSE VISIT (OUTPATIENT)
Dept: UROLOGY | Facility: CLINIC | Age: 75
End: 2020-09-28
Payer: COMMERCIAL

## 2020-09-28 ENCOUNTER — HOSPITAL ENCOUNTER (EMERGENCY)
Facility: CLINIC | Age: 75
Discharge: HOME | End: 2020-09-28
Attending: PHYSICIAN ASSISTANT | Admitting: PHYSICIAN ASSISTANT
Payer: COMMERCIAL

## 2020-09-28 VITALS
HEART RATE: 71 BPM | OXYGEN SATURATION: 100 % | DIASTOLIC BLOOD PRESSURE: 104 MMHG | RESPIRATION RATE: 30 BRPM | TEMPERATURE: 97.1 F | SYSTOLIC BLOOD PRESSURE: 169 MMHG

## 2020-09-28 DIAGNOSIS — R33.9 URINARY RETENTION: ICD-10-CM

## 2020-09-28 DIAGNOSIS — R33.9 URINARY RETENTION: Primary | ICD-10-CM

## 2020-09-28 DIAGNOSIS — N40.0 BPH (BENIGN PROSTATIC HYPERPLASIA): ICD-10-CM

## 2020-09-28 PROCEDURE — 51702 INSERT TEMP BLADDER CATH: CPT

## 2020-09-28 PROCEDURE — 99283 EMERGENCY DEPT VISIT LOW MDM: CPT

## 2020-09-28 PROCEDURE — 99211 OFF/OP EST MAY X REQ PHY/QHP: CPT

## 2020-09-28 ASSESSMENT — ENCOUNTER SYMPTOMS
CONSTITUTIONAL NEGATIVE: 1
ABDOMINAL PAIN: 1
RESPIRATORY NEGATIVE: 1

## 2020-09-28 NOTE — ED PROVIDER NOTES
History   Chief Complaint  Urinary Retention    HPI   Gene Pagan is a 75 year old male with a history of atrial fibrillation on Eliquis, CHF, CKD3, diabetes mellitus, type 2, and BPH who presents for evaluation of urinary retention.  Gene states he has had a catheter in place since March which was placed after an inability to urinate after a cardiac procedure. These have been changed intermittently.  He notes that he had his catheter replaced most recently, last week, after getting a biopsy of his prostate. This morning he was seen at his urologist where it was decided to remove the catheter to see if he could void independently.  Patient notes this was previously tried in the past, and he failed trial of spontaneous voiding and had to have catheter replaced again.  He denies any other symptoms and states he was feeling normal before the removal of his catheter.  He is on flomax for BPH.  No fever, vomiting, flank pain, numbness, weakness, or trauma.  No hematuria.    Allergies  NKDA    Medications  Eliquis  Bumex  Valium  Insulin glargine  Melatonin  Toprol-XL  Flomax    Past Medical History  Anemia  Atrial fibrillation  BPH  CHF  CKD3  Diabetes mellitus type 2    Past Surgical History  Cardioversion  Cystoscopy  Pacemaker  Rezum    Family History  diabetes    Social History  Tobacco use: never smoker  Alcohol use: yes  Drug use: no  Marital Status: single    Review of Systems   Constitutional: Negative.    Respiratory: Negative.    Gastrointestinal: Positive for abdominal pain (suprapubic pain).   Genitourinary: Positive for decreased urine volume.   All other systems reviewed and are negative.    Physical Exam     Patient Vitals for the past 24 hrs:   BP Temp Pulse Resp SpO2   09/28/20 1706 (!) 169/104 97.1  F (36.2  C) 71 30 100 %       Physical Exam  General: Alert and cooperative with exam. Uncomfortable appearing  Head:  Scalp is NC/AT  Eyes:  No scleral icterus, PERRL  ENT:  The external nose and  ears are normal.   Neck:  Normal range of motion without rigidity.  CV:  Regular rate and rhythm    No pathologic murmur, rubs, or gallops.  Resp:  Breath sounds are clear bilaterally.  No crackles, wheezes, rhonchi, stridor.    Non-labored, no retractions or accessory muscle use  Abdomen: Suprapubic tenderness and fullness.  Abdomen otherwise non-tender.  No CVA tenderness.ss  MS:  No lower extremity edema or asymmetric calf swelling. Normal ROM in all joints without effusions.    No midline cervical, thoracic, or lumbar tenderness  Skin:  Warm and dry, No rash or lesions noted. 2+ peripheral pulses in all extremities  Neuro:  Oriented. No gross motor deficits. GCS 15    Strength and sensation grossly intact in all 4 extremities.  Cranial nerves 2-12 intact.Gait normal  Psych: Awake. Alert. Normal affect. Appropriate interactions.      Emergency Department Course     Emergency Department Course:  Past medical records, nursing notes, and vitals reviewed.    1709 I physically examined the patient as documented above.    1712 ERT placed a 16 fr. Catheter.     1730 I rechecked the patient. Gene states he is feeling much better after one liter of urine was emptied.     Findings and plan explained to the Patient. Patient discharged home with instructions regarding supportive care, medications, and reasons to return. The importance of close follow-up was reviewed.     I personally reviewed and answered all related questions with the Patient prior to discharge    Impression & Plan   Medical Decision Makin yo male with long history of bph and indwelling baker presents after being unable to void today after having his baker catheter removed.  New baker was placed with release of approximately 1000 ml of urine and relief of symptoms.  No symptoms of UTI, prostatitis, sepsis.  Already of flomax.  Neurologic exam is normal and clear etiology for the patient's symptoms and no concern for spinal cord pathology.  Return  precautions given for fever, flank pain, vomiting, etc.  Will call his urologist tomorrow morning to arrange to be seen.    Diagnosis:    ICD-10-CM    1. Urinary retention  R33.9    2. BPH (benign prostatic hyperplasia)  N40.0        Disposition:  Discharged to home.    Scribe Disclosure:  LUDMILA, Jordin Markham, am serving as a scribe at 5:09 PM on 9/28/2020 to document services personally performed by Eriberto Mcpherson PA-C based on my observations and the provider's statements to me.      Eriberto Mcpherson PA-C  09/28/20 1753

## 2020-09-28 NOTE — ED AVS SNAPSHOT
[facility]  [address]  [facility] [address]  Phone:  [phone]  Fax:  [phone]                                    Gene Pagan   MRN: [id number]    Department:  [facility]   Date of Visit:  9/28/2020           After Visit Summary Signature Page    I have received my discharge instructions, and my questions have been answered. I have discussed any challenges I see with this plan with the nurse or doctor.    ..........................................................................................................................................  Patient/Patient Representative Signature      ..........................................................................................................................................  Patient Representative Print Name and Relationship to Patient    ..................................................               ................................................  Date                                   Time    ..........................................................................................................................................  Reviewed by Signature/Title    ...................................................              ..............................................  Date                                               Time          22EPIC Rev 08/18

## 2020-09-28 NOTE — ED TRIAGE NOTES
Patient presents to the ED with urinary retention. States has been unable to void since having his catheter removed this morning.

## 2020-09-29 ENCOUNTER — TELEPHONE (OUTPATIENT)
Dept: UROLOGY | Facility: CLINIC | Age: 75
End: 2020-09-29

## 2020-09-29 NOTE — TELEPHONE ENCOUNTER
[facility] Call Center    Phone Message    May a detailed message be left on voicemail: yes     Reason for Call: Other: Gene calling to speak with a member of Dr. Perez's care team. Gene says that he had a catheter removed in clinic yesterday (9/28/20), and he ended up going to the ED later that night. Gene had a new catheter placed at the ED, because he was having total urinary retention. Gene says he is doing much better, but wanted to follow up with the care team. Please give Gene a call back at your earliest convenience.     Action Taken: Message routed to:  Other: [facility]    Travel Screening: Not Applicable

## 2020-10-08 ENCOUNTER — ALLIED HEALTH/NURSE VISIT (OUTPATIENT)
Dept: UROLOGY | Facility: CLINIC | Age: 75
End: 2020-10-08
Payer: COMMERCIAL

## 2020-10-08 DIAGNOSIS — Z79.2 PROPHYLACTIC ANTIBIOTIC: Primary | ICD-10-CM

## 2020-10-08 PROCEDURE — 51700 IRRIGATION OF BLADDER: CPT

## 2020-10-08 RX ORDER — CIPROFLOXACIN 500 MG/1
500 TABLET, FILM COATED ORAL ONCE
Qty: 1 TABLET | Refills: 0 | Status: SHIPPED | OUTPATIENT
Start: 2020-10-08 | End: 2020-10-08

## 2020-10-08 NOTE — PROGRESS NOTES
Chief Complaint   Patient presents with     Urinary Retention     Patient here today for TOV and self Cath teach     Comes into clinic today at the request of Dr Perez. provider found for TOV / catheter removal.    Catheter removal documentation on 10/8/2020:    This service provided today was under the direct supervision of Dr Vides, who was available if needed.    presented today for a trial of void.  Approximately 360 mL of normal saline instilled into bladder via catheter.Gene Pagan presents to the clinic for catheter removal.Reason for removal: urinary retention and Trial of Void  Order has been verified. YES  Catheter successfully removed at 1:10 PM without immediate complication.250 cc's of urine present in the catheter bag.  Urethral meatus is free of secretions and encrustation.  The patient is afebrile.    Patient stated he had urge to urinate and catheter was removed without difficulty.  Patient was given a Cylinder to measure urine output.  Patient voided approximately 0 mL of clear urine.:      Patient did tolerate procedure well.The patient learn Self-Cath and was  Successful and went home with sample if he not able to void on his own.Teaching done with patient verbally on self-Cath,Patient was told  to call or our clinic or go if pain, fever to ED, or unable to urinate post catheter removal.    The patient tolerated the procedure and was instructed to monitor  for pain or discomfort, return to office or call for pain, fever, leakage or decreased urine flow, watch for signs of infection and follow up with Dr Chris Son 500 given per protocol: Yes    Self-Cath 16FR Coude Curved Tip  Speedicath Flex Coude Pro  Length 44cm/17.32 in  Grey package      Zaira Marie [facility]

## 2020-10-08 NOTE — NURSING NOTE
Chief Complaint   Patient presents with     Urinary Retention     Patient here today for TOV and self Cath teach

## 2020-10-10 ENCOUNTER — HOSPITAL ENCOUNTER (EMERGENCY)
Facility: CLINIC | Age: 75
Discharge: HOME | End: 2020-10-10
Attending: PHYSICIAN ASSISTANT | Admitting: PHYSICIAN ASSISTANT
Payer: COMMERCIAL

## 2020-10-10 VITALS
DIASTOLIC BLOOD PRESSURE: 88 MMHG | OXYGEN SATURATION: 100 % | SYSTOLIC BLOOD PRESSURE: 159 MMHG | TEMPERATURE: 97.4 F | HEART RATE: 71 BPM | RESPIRATION RATE: 22 BRPM

## 2020-10-10 DIAGNOSIS — R79.89 ELEVATED SERUM CREATININE: ICD-10-CM

## 2020-10-10 DIAGNOSIS — R33.9 URINARY RETENTION: ICD-10-CM

## 2020-10-10 LAB
ALBUMIN UR-MCNC: 30 MG/DL
ANION GAP SERPL CALCULATED.3IONS-SCNC: 10 MMOL/L (ref 3–14)
APPEARANCE UR: ABNORMAL
BACTERIA #/AREA URNS HPF: ABNORMAL /HPF
BILIRUB UR QL STRIP: NEGATIVE
BUN SERPL-MCNC: 53 MG/DL (ref 7–30)
CALCIUM SERPL-MCNC: 9.3 MG/DL (ref 8.5–10.1)
CHLORIDE SERPL-SCNC: 106 MMOL/L (ref 94–109)
CO2 SERPL-SCNC: 22 MMOL/L (ref 20–32)
COLOR UR AUTO: ABNORMAL
CREAT SERPL-MCNC: 2.43 MG/DL (ref 0.66–1.25)
GFR SERPL CREATININE-BSD FRML MDRD: 25 ML/MIN/{1.73_M2}
GLUCOSE SERPL-MCNC: 115 MG/DL (ref 70–99)
GLUCOSE UR STRIP-MCNC: NEGATIVE MG/DL
HGB UR QL STRIP: ABNORMAL
KETONES UR STRIP-MCNC: NEGATIVE MG/DL
LEUKOCYTE ESTERASE UR QL STRIP: ABNORMAL
MUCOUS THREADS #/AREA URNS LPF: PRESENT /LPF
NITRATE UR QL: NEGATIVE
PH UR STRIP: 6 PH (ref 5–7)
POTASSIUM SERPL-SCNC: 4.3 MMOL/L (ref 3.4–5.3)
RBC #/AREA URNS AUTO: >182 /HPF (ref 0–2)
SODIUM SERPL-SCNC: 138 MMOL/L (ref 133–144)
SOURCE: ABNORMAL
SP GR UR STRIP: 1.02 (ref 1–1.03)
UROBILINOGEN UR STRIP-MCNC: NORMAL MG/DL (ref 0–2)
WBC #/AREA URNS AUTO: 80 /HPF (ref 0–5)
WBC CLUMPS #/AREA URNS HPF: PRESENT /HPF
YEAST #/AREA URNS HPF: ABNORMAL /HPF

## 2020-10-10 PROCEDURE — 51702 INSERT TEMP BLADDER CATH: CPT

## 2020-10-10 PROCEDURE — 87086 URINE CULTURE/COLONY COUNT: CPT | Performed by: PHYSICIAN ASSISTANT

## 2020-10-10 PROCEDURE — 51798 US URINE CAPACITY MEASURE: CPT

## 2020-10-10 PROCEDURE — 80048 BASIC METABOLIC PNL TOTAL CA: CPT | Performed by: PHYSICIAN ASSISTANT

## 2020-10-10 PROCEDURE — 87106 FUNGI IDENTIFICATION YEAST: CPT | Performed by: PHYSICIAN ASSISTANT

## 2020-10-10 PROCEDURE — 99284 EMERGENCY DEPT VISIT MOD MDM: CPT | Mod: 25

## 2020-10-10 PROCEDURE — 81001 URINALYSIS AUTO W/SCOPE: CPT | Performed by: PHYSICIAN ASSISTANT

## 2020-10-10 ASSESSMENT — ENCOUNTER SYMPTOMS
VOMITING: 0
FEVER: 0

## 2020-10-10 NOTE — ED PROVIDER NOTES
History     Chief Complaint:  Catheter Problem    MANNY Pagan is a 75 year old male with a history of urinary retention, benign prostatic hyperplasia, and chronic kidney disease who presents for evaluation of catheter problem. The patient reports that he has intermittent catheters that started being placed in march 2/2 prostate problems. His recently learned to self-cath and his baker was removed on 10/8.  He has been unable to self cath since 11pm last night and presents given urinary retention. He is followed by Dr. Brown with urology. He denies any fever nausea or vomiting. He notes bladder fullness.     Allergies:  No Known Drug Allergies     Medications:   Bumex  Cipro  Valium   Lantus solostar  Melatonin   Toprol   Flomax     Medical History:   Anemia   Atrial fibrillation   AV node ablation and pacemaker placement   Benign prostatic hyperplasia   Chronic diastolic CHF   Chronic kidney disease, stage III    Diabetes mellitus - type 2   Palpitations   Systolic CHF    Urinary retention  Gross hematuria     Surgical History   Anesthesia cardioversion  Cytoscopy  Ep pacemaker  Rezum    Family History:   Sister: diabetes    Social History:  The patient presents alone.  Smoking Status: Never Smoker  Smokeless Tobacco: Never Used  Alcohol Use: Positive  Drug Use: Negative  PCP: Dm Lipscomb     Review of Systems   Constitutional: Negative for fever.   Gastrointestinal: Negative for vomiting.   Genitourinary:        Urine retention   All other systems reviewed and are negative.    Physical Exam     Patient Vitals for the past 24 hrs:   BP Temp Temp src Pulse Resp SpO2   10/10/20 1223 (!) 159/88 97.4  F (36.3  C) Oral 71 22 100 %        Physical Exam  General: Resting comfortably.  Alert and oriented. Uncomfortable.  Head:  The scalp, face, and head appear normal   Eyes:  Conjunctivae and sclerae are normal    Neck:  No lymphadenopathy  CV:  Regular rate and rhythm     Normal S1/S2  Resp:  Lungs are clear to  auscultation    Non-labored    No rales or wheezing   GI:  Abdomen is soft, non-distended    Suprapubic tenderness and fullness.    MS:  Normal muscular tone   Skin:  No rash or acute skin lesions noted   Neuro: Speech is normal and fluent.     Emergency Department Course   Laboratory:  Laboratory findings were communicated with the patient who voiced understanding of the findings.    BMP: glucose 115 (H), bun 2.43 (H), GFR estimate 25 (L), creatinine 2.43 (H) o/w WNL   UA with micro: urine blood large (!), protein albumin urine 30 (!), leukocyte esterase urine moderate (!), wbc/hpf 80 (H), rbc/hpf >182 (H), wbc/clumps present (!), yeast urine few (!) mucous present (!) o/w negative     Emergency Department Course:  Nursing notes and vitals reviewed. I performed an exam of the patient as documented above.     1305 IV was inserted and blood was drawn for laboratory testing, results above.    1305 The patient provided a urine sample here in the emergency department. This was sent for laboratory testing, findings above.      Findings and plan explained to the Patient. Patient discharged home with instructions regarding supportive care, medications, and reasons to return. The importance of close follow-up was reviewed.     Impression & Plan   Medical Decision Making:  Gene Pagan is a 75 year old male presents for evaluation urinary retention.  Please refer to the HPI for full details.  Patient has been dealing with problems with urinary retention since March and is now had a Mckenzie catheter in place and most recently had this removed 2 days ago by urology and was taught to self cath.  He was able to do it clinic, but self cath has not been successful since 11 PM last night and patient has been unable to void, prompting his evaluation.  On exam, he is extremely uncomfortable, bladder scan is positive for over 500 mL of urine in the bladder.  Mckenzie catheter was inserted and 1 L of urine was present.  UA was sent off.   This does appear contaminated.  Patient has no symptoms suggestive infection clinically and I did not feel treatment is emergently indicated.  Urine culture sent and pending.  BMP does demonstrate an elevated creatinine.  Patient's baseline is high at is high, at 2.43.  Creatinine is elevated from baseline, but not significantly.  I think this is likely due to post renal secondary to urinary retension and I feel will resolve.  Catheter is functioning well here in the ED and recommended he follow-up with urology for ongoing management.  Also suggested that he follow-up with his primary care doctor for recheck of creatinine in the next couple of days to ensure this is improving.  No indication for medication at this time.  Red flag symptoms, reasons for return discussed and understood.  All questions answered prior to discharge.  Patient understands and agrees with plan.    Diagnosis:     ICD-10-CM    1. Urinary retention  R33.9    2. Elevated serum creatinine  R79.89       Disposition:  Discharged to home.    Discharge Medications:  New Prescriptions    No medications on file       Scribe Disclosure:  I, Silvia Ennis, am serving as a scribe at 12:47 PM on 10/10/2020 to document services personally performed by Kari Suarez based on my observations and the provider's statements to me.     Kari Torres PA-C  10/10/20 2728

## 2020-10-10 NOTE — LETTER
October 10, 2020      To Whom It May Concern:      Gene Pagan was seen in our Emergency Department today, 10/10/20. His condition has improved and he may return to work.    Sincerely,        Priti Mcneill RN

## 2020-10-10 NOTE — ED AVS SNAPSHOT
[facility]  [address]  [facility] [address]  Phone: [phone]  Fax: [phone]                                    Gene Pagan   MRN: [id number]    Department: [facility]   Date of Visit: 10/10/2020           After Visit Summary Signature Page    I have received my discharge instructions, and my questions have been answered. I have discussed any challenges I see with this plan with the nurse or doctor.    ..........................................................................................................................................  Patient/Patient Representative Signature      ..........................................................................................................................................  Patient Representative Print Name and Relationship to Patient    ..................................................               ................................................  Date                                   Time    ..........................................................................................................................................  Reviewed by Signature/Title    ...................................................              ..............................................  Date                                               Time          22EPIC Rev 08/18

## 2020-10-12 LAB
BACTERIA SPEC CULT: ABNORMAL
Lab: ABNORMAL
SPECIMEN SOURCE: ABNORMAL

## 2020-10-12 NOTE — RESULT ENCOUNTER NOTE
Final urine culture report is NEGATIVE per [facility] ED Lab Result protocol.    If NEGATIVE result, no change in treatment, per [facility] ED Lab Result protocol.

## 2020-10-15 ENCOUNTER — OFFICE VISIT (OUTPATIENT)
Dept: UROLOGY | Facility: CLINIC | Age: 75
End: 2020-10-15
Payer: COMMERCIAL

## 2020-10-15 DIAGNOSIS — R39.9 LOWER URINARY TRACT SYMPTOMS (LUTS): ICD-10-CM

## 2020-10-15 DIAGNOSIS — R33.9 URINARY RETENTION: Primary | ICD-10-CM

## 2020-10-15 PROCEDURE — 99024 POSTOP FOLLOW-UP VISIT: CPT | Performed by: UROLOGY

## 2020-10-15 RX ORDER — CIPROFLOXACIN 500 MG/1
500 TABLET, FILM COATED ORAL ONCE
Status: COMPLETED | OUTPATIENT
Start: 2020-10-15 | End: 2020-10-15

## 2020-10-15 RX ADMIN — CIPROFLOXACIN 500 MG: 500 TABLET, FILM COATED ORAL at 12:30

## 2020-10-15 NOTE — PATIENT INSTRUCTIONS
-Please complete clean intermittent catheterization 4-5 times per day using a 16 Indonesian coude catheter. You will be contacted by the supply company    - Schedule a 3 month follow up visit with Dr. Perez. Call [phone] to schedule at [facility]

## 2020-10-15 NOTE — PROGRESS NOTES
[facility]   CHIEF COMPLAINT   It was my pleasure to see Gene Pagan who is a 75 year old male .      HPI   Gene Pagan is a very pleasant 75 year old male      Initially seen 4/29/20:  Gene Pagan is a 75 year old male who is being seen for evaluation of acute urinary retention.  He is being seen today for hospital follow-up for urologic issues of gross hematuria and urinary retention.     He was seen in the hospital recently after being admitted in the setting of shortness of breath, atrial fibrillation with RVR and acute kidney injury with work-up suggestive of recurrent cardiogenic shock.  He subsequently had a pacemaker placed on April 10.  His hematuria resolved while in the hospital however he continued to have urinary retention and failed a trial of void prior to discharge.     He reports having worsening symptoms of LUTS for the past several months to year prior to hospitalization and had been started on tamsulosin 0.4 mg daily about 1 month prior to hospitalization.  He also notes that he had a self-limited episode of gross hematuria in December.  No prior instances of gross hematuria and he denies any smoking history.     He failed his Trial of void      5/20/20:  Return for cystoscopy and Trial of void   Cystoscopy with 4cm long prostate and significant bilobar hypertrophy with a small median lobe and mild trabeculation.   Failed Trial of void and baker catheter replaced     8/19/20:  Returns for baker catheter change and discussion of bladder outlet procedural options  He has been doing very well with regards to his return of strength and his cardiogenic rehab  He is hoping to return to his part-time work at [employer] in the next coming weeks    9/25/20:  REZUM procedure: The prostate was measured at 83.2 cc  The transrectal ultrasound probe was then removed.  8 targeted treatments delivered into the prostate utilizing radiofrequency power to form sterile water vapor. 1 treatment was applied  to the bladder neck. 4 treatments in left lateral lobe and 3 treatments in the right lateral lobe  by 1 cm were applied.    TODAY:   Failed Trial of void and instructed on intermittent self catheterization on 9/28/2020 and on 10/8/2020.  On 10/10/2020 he presented to the emergency room due to room difficulty with intermittent self-catheterization and a Mckenzie catheter was replaced  He returns today for Mckenzie removal and reconstruction with intermittent self-catheterization    PHYSICAL EXAM  Patient is a 75 year old  male   Vitals: There were no vitals taken for this visit.  There is no height or weight on file to calculate BMI.  General Appearance Adult:   Alert, no acute distress, oriented  HENT: throat/mouth:normal, good dentition  Lungs: no respiratory distress, or pursed lip breathing  Heart: No obvious jugular venous distension present  Abdomen: soft, nontender, no organomegaly or masses  Musculoskeltal: extremities normal, no peripheral edema  Skin: no suspicious lesions or rashes  Neuro: Alert, oriented, speech and mentation normal  Psych: affect and mood normal  Gait: Normal    UA RESULTS:  Recent Labs   Lab Test 10/10/20  1305 09/25/20  0757   COLOR Light Yellow Yellow   APPEARANCE Slightly Cloudy Clear   URINEGLC Negative Negative   URINEBILI Negative Negative   URINEKETONE Negative Negative   SG 1.016 1.020   UBLD Large* Moderate*   URINEPH 6.0 5.5   PROTEIN 30* 30*   UROBILINOGEN  --  0.2   NITRITE Negative Positive*   LEUKEST Moderate* Small*   RBCU >182*  --    WBCU 80*  --           ASSESSMENT and PLAN  75-year-old man with recent prolonged hospitalization due to respiratory failure, cardiogenic shock, acute on chronic congestive heart failure with atrial fibrillation and RVR status post cardiac ablation and pacemaker placement, acute on chronic kidney disease, gross hematuria, urinary retention, diabetes type 2, and a lung nodule    Urinary retention  -He is status post a REZUM procedure on  9/25/2020.  He is continued to have issues with urinary retention requiring intermittent self-catheterization  -We discussed that the treatment effect time after the procedure can take up to 4 to 12 weeks  -He was reinstructed on the intermittent self-catheterization  -We will plan for follow-up in about 3 months to assess his symptom response    Chart documentation with Dragon Voice recognition Software. Although reviewed after completion, some words and grammatical errors may remain.     I spent over 15 minutes with the patient.  Over half this time was spent on counseling regarding the above.    Cheikh Perez MD   Urology  [facility]  [facility] Phone: [phone]  [facility] Phone: [phone]

## 2020-10-15 NOTE — NURSING NOTE
One time dose of ciprofloxacin given per protocol. See MAR.    Per Dr. Perez, fill bladder with approximately 200cc's, remove catheter, and re-teach CIC.     With written and verbal instructions, patient educated on self-intermittent catheterization. Per Dr. Perez, patient to complete CIC 4-5 times per day with use of 16 [place]oran coude catheter for chronic urinary retention. Per Dr. Perez, patient requires coude catheter as unable to pass straight catheter.    Instilled 200 ml of normal saline via Mckenzie catheter. 10cc balloon deflated, catheter removed without difficulty. Patient successfully completed CIC independently with 200 ml's of pink tinged urine output. Patient aware to complete CIC 4-5 times per day and that he will be contacted by the supply company.    Fernanda Yoo RN, BSN

## 2020-10-15 NOTE — NURSING NOTE
Gene Pagan's goals for this visit include:   Chief Complaint   Patient presents with     Follow Up     two week follow up post Rezum       He requests these members of his care team be copied on today's visit information:     PCP: Dm Lipscomb    Referring Provider:  No referring provider defined for this encounter.    There were no vitals taken for this visit.    Do you need any medication refills at today's visit? No    Tasia Santamaria LPN

## 2020-10-17 ENCOUNTER — HOSPITAL ENCOUNTER (EMERGENCY)
Facility: CLINIC | Age: 75
Discharge: HOME | End: 2020-10-17
Attending: EMERGENCY MEDICINE | Admitting: EMERGENCY MEDICINE
Payer: COMMERCIAL

## 2020-10-17 VITALS
RESPIRATION RATE: 16 BRPM | SYSTOLIC BLOOD PRESSURE: 138 MMHG | TEMPERATURE: 97.5 F | OXYGEN SATURATION: 98 % | DIASTOLIC BLOOD PRESSURE: 73 MMHG | HEART RATE: 70 BPM

## 2020-10-17 DIAGNOSIS — N40.1 BENIGN PROSTATIC HYPERPLASIA WITH INCOMPLETE BLADDER EMPTYING: ICD-10-CM

## 2020-10-17 DIAGNOSIS — R39.14 BENIGN PROSTATIC HYPERPLASIA WITH INCOMPLETE BLADDER EMPTYING: ICD-10-CM

## 2020-10-17 DIAGNOSIS — R33.9 URINARY RETENTION: ICD-10-CM

## 2020-10-17 LAB
ALBUMIN UR-MCNC: 10 MG/DL
ALBUMIN UR-MCNC: 70 MG/DL
AMORPH CRY #/AREA URNS HPF: ABNORMAL /HPF
ANION GAP SERPL CALCULATED.3IONS-SCNC: 12 MMOL/L (ref 3–14)
APPEARANCE UR: ABNORMAL
APPEARANCE UR: ABNORMAL
BACTERIA #/AREA URNS HPF: ABNORMAL /HPF
BACTERIA #/AREA URNS HPF: ABNORMAL /HPF
BILIRUB UR QL STRIP: NEGATIVE
BILIRUB UR QL STRIP: NEGATIVE
BUN SERPL-MCNC: 59 MG/DL (ref 7–30)
CALCIUM SERPL-MCNC: 9.4 MG/DL (ref 8.5–10.1)
CHLORIDE SERPL-SCNC: 105 MMOL/L (ref 94–109)
CO2 SERPL-SCNC: 21 MMOL/L (ref 20–32)
COLOR UR AUTO: ABNORMAL
COLOR UR AUTO: ABNORMAL
CREAT SERPL-MCNC: 2.61 MG/DL (ref 0.66–1.25)
GFR SERPL CREATININE-BSD FRML MDRD: 23 ML/MIN/{1.73_M2}
GLUCOSE SERPL-MCNC: 172 MG/DL (ref 70–99)
GLUCOSE UR STRIP-MCNC: NEGATIVE MG/DL
GLUCOSE UR STRIP-MCNC: NEGATIVE MG/DL
HGB UR QL STRIP: ABNORMAL
HGB UR QL STRIP: ABNORMAL
HYALINE CASTS #/AREA URNS LPF: 1 /LPF (ref 0–2)
KETONES UR STRIP-MCNC: NEGATIVE MG/DL
KETONES UR STRIP-MCNC: NEGATIVE MG/DL
LEUKOCYTE ESTERASE UR QL STRIP: ABNORMAL
LEUKOCYTE ESTERASE UR QL STRIP: ABNORMAL
MUCOUS THREADS #/AREA URNS LPF: PRESENT /LPF
NITRATE UR QL: NEGATIVE
NITRATE UR QL: NEGATIVE
PH UR STRIP: 5 PH (ref 5–7)
PH UR STRIP: 5.5 PH (ref 5–7)
POTASSIUM SERPL-SCNC: 3.8 MMOL/L (ref 3.4–5.3)
RBC #/AREA URNS AUTO: >182 /HPF (ref 0–2)
RBC #/AREA URNS AUTO: >182 /HPF (ref 0–2)
SODIUM SERPL-SCNC: 138 MMOL/L (ref 133–144)
SOURCE: ABNORMAL
SOURCE: ABNORMAL
SP GR UR STRIP: 1.01 (ref 1–1.03)
SP GR UR STRIP: 1.01 (ref 1–1.03)
UROBILINOGEN UR STRIP-MCNC: NORMAL MG/DL (ref 0–2)
UROBILINOGEN UR STRIP-MCNC: NORMAL MG/DL (ref 0–2)
WBC #/AREA URNS AUTO: 26 /HPF (ref 0–5)
WBC #/AREA URNS AUTO: >182 /HPF (ref 0–5)
WBC CLUMPS #/AREA URNS HPF: PRESENT /HPF
YEAST #/AREA URNS HPF: ABNORMAL /HPF
YEAST #/AREA URNS HPF: ABNORMAL /HPF

## 2020-10-17 PROCEDURE — 80048 BASIC METABOLIC PNL TOTAL CA: CPT | Performed by: EMERGENCY MEDICINE

## 2020-10-17 PROCEDURE — 81001 URINALYSIS AUTO W/SCOPE: CPT | Performed by: EMERGENCY MEDICINE

## 2020-10-17 PROCEDURE — 51702 INSERT TEMP BLADDER CATH: CPT

## 2020-10-17 PROCEDURE — 51798 US URINE CAPACITY MEASURE: CPT

## 2020-10-17 PROCEDURE — 87106 FUNGI IDENTIFICATION YEAST: CPT | Performed by: EMERGENCY MEDICINE

## 2020-10-17 PROCEDURE — 99284 EMERGENCY DEPT VISIT MOD MDM: CPT | Mod: 25

## 2020-10-17 PROCEDURE — 87086 URINE CULTURE/COLONY COUNT: CPT | Performed by: EMERGENCY MEDICINE

## 2020-10-17 RX ORDER — LIDOCAINE HYDROCHLORIDE 20 MG/ML
6 JELLY TOPICAL ONCE
Status: DISCONTINUED | OUTPATIENT
Start: 2020-10-17 | End: 2020-10-17 | Stop reason: HOSPADM

## 2020-10-17 ASSESSMENT — ENCOUNTER SYMPTOMS: DIFFICULTY URINATING: 1

## 2020-10-17 NOTE — DISCHARGE INSTRUCTIONS
You should make an appointment to follow-up with your urologist, you should continue to take your medications as previously prescribed.  I do not suspect urinary tract infection however urine culture was sent and we will call you if this turns positive.  You should return to the ER should you feel like the catheter is not draining, you develop fever or severe back pain.

## 2020-10-17 NOTE — LETTER
October 17, 2020      To Whom It May Concern:      Gene Pagan was seen in our Emergency Department today, 10/17/20 from 1145 to 1530.  I expect his condition to improve over the next *** days.  He may return to work/school when improved.    Sincerely,        Sarah Almendarez RN

## 2020-10-17 NOTE — LETTER
October 17, 2020      To Whom It May Concern:      Gene Pagan was seen in our Emergency Department today, 10/17/20 from 1145 to 1530.  I expect his condition to improve over the next 1-2 days.  He may return to work/school when improved.    Sincerely,        Sarah Almendarez RN

## 2020-10-17 NOTE — ED AVS SNAPSHOT
[facility] Emergency Dept  [address]  [facility] [address]  Phone: [phone]  Fax: [phone]                                    Gene Pagan   MRN: [id number]    Department: [facility] Emergency Dept   Date of Visit: 10/17/2020           After Visit Summary Signature Page    I have received my discharge instructions, and my questions have been answered. I have discussed any challenges I see with this plan with the nurse or doctor.    ..........................................................................................................................................  Patient/Patient Representative Signature      ..........................................................................................................................................  Patient Representative Print Name and Relationship to Patient    ..................................................               ................................................  Date                                   Time    ..........................................................................................................................................  Reviewed by Signature/Title    ...................................................              ..............................................  Date                                               Time          22EPIC Rev 08/18

## 2020-10-17 NOTE — ED PROVIDER NOTES
History     Chief Complaint:  Urinary Retention       HPI   Gene Pagan is a 75 year old male who presents with urinary retention. The patient has been self cathing himself since having a prostate procedure 3 weeks ago. The patient reports that he has been urinating every 10 minutes yesterday and was able to produce small streams when he urinated. However, he has been only dribbling out urine this morning and became concerned that he is retaining urine. The patient states that he has not self-cath in a couple days. He self-cathed himself at home this morning but did not get much urine out.     Allergies:  No Known Drug Allergies      Medications:   Bumex  Cipro  Valium   Lantus solostar  Melatonin   Toprol   Flomax      Medical History:   Anemia              Atrial fibrillation             AV node ablation and pacemaker placement   Benign prostatic hyperplasia    Chronic diastolic CHF   Chronic kidney disease, stage III         Diabetes mellitus - type 2         Palpitations       Systolic CHF     Urinary retention  Gross hematuria      Surgical History   Anesthesia cardioversion  Cytoscopy  Ep pacemaker  Rezum     Family History:   Sister: diabetes     Social History:  The patient presents alone.  Smoking Status: Never Smoker  Smokeless Tobacco: Never Used  Alcohol Use: Positive  Drug Use: Negative  PCP: Dm Lipscomb       Review of Systems   Genitourinary: Positive for decreased urine volume and difficulty urinating.   All other systems reviewed and are negative.      Physical Exam     Patient Vitals for the past 24 hrs:   BP Temp Temp src Pulse Resp SpO2   10/17/20 1330 -- -- -- -- -- 98 %   10/17/20 1315 -- -- -- -- -- 100 %   10/17/20 1300 138/73 -- -- 70 -- --   10/17/20 1150 -- 97.5  F (36.4  C) Oral -- -- --   10/17/20 1149 104/79 -- Oral 77 16 99 %        Physical Exam  Constitutional: Alert, attentive, GCS 15  HENT:    Nose: Nose normal.    Mouth/Throat: Oropharynx is clear, mucous membranes are  moist.  Eyes: EOM are normal, anicteric, conjugate gaze  CV: regular rate and rhythm; no murmurs  Chest: Effort normal and breath sounds clear without wheezing or rales, symmetric bilaterally   GI:  No distension. No guarding or rebound. Suprapubic tenderness.  MSK: No LE edema, no tenderness to palpation of BLE.  Neurological: Alert, attentive, moving all extremities equally.   Skin: Skin is warm and dry.     Emergency Department Course     Laboratory:  Laboratory findings were communicated with the patient who voiced understanding of the findings.    UA with micro (1257): Urine Blood large (A) Protein Albumin Urine 10 (A) Leukocyte esterase urine moderate (A) Bacteria few (A) WBC/HPF 26 (H) RBC/HPF >182 (H) Mucous urine present (A) yeast few (A) amorphous crystals few (A)  o/w wnl/negative       UA with micro (1200): Urine Blood large (A) Protein Albumin Urine 70 (A) Leukocyte esterase urine large (A) Bacteria few (A) WBC/HPF >182 (H) RBC/HPF >182 (H) wbc clumps present (A) yeast few (A) o/w wnl/negative       Urine culture: pending     BMP: Glucose 172 (H), bun 59 (H), GFR 23 (L), o/w WNL (Creatinine: 2.61 (H))    Emergency Department Course:  Past medical records, nursing notes, and vitals reviewed.    1202 I performed an exam of the patient as documented above.    IV was inserted and blood was drawn for laboratory testing, results above.     The patient provided a urine sample here in the emergency department. This was sent for laboratory testing, findings above.      1340 Patient rechecked and updated.       Findings and plan explained to the Patient. Patient discharged home with instructions regarding supportive care, medications, and reasons to return. The importance of close follow-up was reviewed.     Impression & Plan     Medical Decision Making:  Gene Pagan is a 75 year old male who is now 2 weeks status post Rezum procedure for his BPH presenting for evaluation of recurrent urinary retention.  He  had indwelling Mckenzie until doing intermittent straight cathing then switching to daily cathing after another bout of retention for which she follow-up with urology earlier this week presenting for difficulty voiding.  Bedside ultrasound showed significant urinary retention after bladder scan was inconclusive and Mckenzie catheter was placed with almost 1500 cc of urine returned.  I did give the patient the option of single straight cath and then him resuming his intermittent cathing and he preferred indwelling Mckenzie.  UA not suggestive of UTI with downtrending WBCs, urine culture sent.  His kidney function is only mildly elevated from baseline and I suspect this is likely due to post void obstruction.  I recommended adequate hydration at home and follow-up in urology.  Return precautions and Mckenzie care were reviewed.      Discharge Diagnosis:    ICD-10-CM    1. Urinary retention  R33.9 Urine Culture Aerobic Bacterial   2. Benign prostatic hyperplasia with incomplete bladder emptying  N40.1     R39.14        Disposition:  The patient is discharged to home.    German Roque MD  [employer]  6:25 PM 10/17/20     Scribe Disclosure:  I, Richard Lambert, am serving as a scribe at 12:02 PM on 10/17/2020 to document services personally performed by German Roque MD based on my observations and the provider's statements to me.      10/17/2020   German Roque MD Dunbar, John Forrest, MD  10/17/20 1805

## 2020-10-17 NOTE — LETTER
October 17, 2020      To Whom It May Concern:      Gene Pagan was seen in our Emergency Department today, 10/17/20.  I expect his condition to improve over the next 1-2 days.  He may return to work/school when improved.    Sincerely,        Sarah Almendarez RN

## 2020-10-18 LAB
BACTERIA SPEC CULT: ABNORMAL
SPECIMEN SOURCE: ABNORMAL

## 2020-10-20 ENCOUNTER — TELEPHONE (OUTPATIENT)
Dept: UROLOGY | Facility: CLINIC | Age: 75
End: 2020-10-20

## 2020-10-20 DIAGNOSIS — N40.1 BPH WITH OBSTRUCTION/LOWER URINARY TRACT SYMPTOMS: ICD-10-CM

## 2020-10-20 DIAGNOSIS — N13.8 BPH WITH OBSTRUCTION/LOWER URINARY TRACT SYMPTOMS: ICD-10-CM

## 2020-10-20 NOTE — TELEPHONE ENCOUNTER
"Called and spoke to patient who reports that after leaving the urology clinic on Thursday he started dribbling small amounts and didn't use the catheter at all. Patient stated, \"That was the first time I had a good amount of dribbling since March. It didn't feel like it was building up.\" Per patient, he tried to complete self-catheterization on Saturday but did not have much success so he went to the ER. Per patient, baker catheter is in place and functioning well for him at this time. Patient would like to discuss a plan with Dr. Perez. Offered for patient to schedule a telephone visit with Dr. Perez to discuss further and patient would like to do this. Telephone visit scheduled for 10/22/20 at 4:00pm.    Fernanda Yoo RN, BSN    "

## 2020-10-20 NOTE — TELEPHONE ENCOUNTER
[facility] Call Center    Phone Message    May a detailed message be left on voicemail: yes     Reason for Call: Other: Pt requesting call back to discuss switching Catheter to Mckenzie bag     Action Taken: Message routed to:  [facility] uro    Travel Screening: Not Applicable

## 2020-10-20 NOTE — Clinical Note
Noel Lipscomb,    I saw Gene last week for his cystoscopy.  Unfortunately he was still unable to void following this procedure.  He does have evidence of prostatic hyperplasia.  We discussed options and he preferred to avoid surgery at this time and continue with Mckenzie catheterization.  I will plan to see him back in about 3 months.    Thanks,   Cheikh Perez M.D.  Cell: [phone]  Rhofade Counseling: Rhofade is a topical medication which can decrease superficial blood flow where applied. Side effects are uncommon and include stinging, redness and allergic reactions.

## 2020-10-21 RX ORDER — TAMSULOSIN HYDROCHLORIDE 0.4 MG/1
0.4 CAPSULE ORAL AT BEDTIME
Qty: 90 CAPSULE | Refills: 2 | Status: SHIPPED | OUTPATIENT
Start: 2020-10-21 | End: 2021-04-16

## 2020-10-22 ENCOUNTER — VIRTUAL VISIT (OUTPATIENT)
Dept: UROLOGY | Facility: CLINIC | Age: 75
End: 2020-10-22
Payer: COMMERCIAL

## 2020-10-22 ENCOUNTER — TRANSFERRED RECORDS (OUTPATIENT)
Dept: HEALTH INFORMATION MANAGEMENT | Facility: CLINIC | Age: 75
End: 2020-10-22

## 2020-10-22 DIAGNOSIS — R33.9 URINARY RETENTION: Primary | ICD-10-CM

## 2020-10-22 DIAGNOSIS — N13.8 BPH WITH OBSTRUCTION/LOWER URINARY TRACT SYMPTOMS: ICD-10-CM

## 2020-10-22 DIAGNOSIS — N40.1 BPH WITH OBSTRUCTION/LOWER URINARY TRACT SYMPTOMS: ICD-10-CM

## 2020-10-22 PROCEDURE — 99024 POSTOP FOLLOW-UP VISIT: CPT | Mod: 95 | Performed by: UROLOGY

## 2020-10-22 NOTE — PROGRESS NOTES
"Gene Pagan is a 75 year old male who is being evaluated via a billable telephone visit.      The patient has been notified of following:     \"This telephone visit will be conducted via a call between you and your physician/provider. We have found that certain health care needs can be provided without the need for a physical exam.  This service lets us provide the care you need with a short phone conversation.  If a prescription is necessary we can send it directly to your pharmacy.  If lab work is needed we can place an order for that and you can then stop by our lab to have the test done at a later time.    Telephone visits are billed at different rates depending on your insurance coverage. During this emergency period, for some insurers they may be billed the same as an in-person visit.  Please reach out to your insurance provider with any questions.    If during the course of the call the physician/provider feels a telephone visit is not appropriate, you will not be charged for this service.\"    Patient has given verbal consent for Telephone visit?  Yes    What phone number would you like to be contacted at? [phone]    How would you like to obtain your AVS? Cinda Santamaria LPN on 10/22/2020 at 2:18 PM      "

## 2020-10-22 NOTE — PROGRESS NOTES
[facility]   CHIEF COMPLAINT   It was my pleasure to see Gene Pagan who is a 75 year old male .      HPI   Gene Pagan is a very pleasant 75 year old male      Initially seen 4/29/20:  Gene Pagan is a 75 year old male who is being seen for evaluation of acute urinary retention.  He is being seen today for hospital follow-up for urologic issues of gross hematuria and urinary retention.     He was seen in the hospital recently after being admitted in the setting of shortness of breath, atrial fibrillation with RVR and acute kidney injury with work-up suggestive of recurrent cardiogenic shock.  He subsequently had a pacemaker placed on April 10.  His hematuria resolved while in the hospital however he continued to have urinary retention and failed a trial of void prior to discharge.     He reports having worsening symptoms of LUTS for the past several months to year prior to hospitalization and had been started on tamsulosin 0.4 mg daily about 1 month prior to hospitalization.  He also notes that he had a self-limited episode of gross hematuria in December.  No prior instances of gross hematuria and he denies any smoking history.     He failed his Trial of void      5/20/20:  Return for cystoscopy and Trial of void   Cystoscopy with 4cm long prostate and significant bilobar hypertrophy with a small median lobe and mild trabeculation.   Failed Trial of void and baker catheter replaced     8/19/20:  Returns for baker catheter change and discussion of bladder outlet procedural options  He has been doing very well with regards to his return of strength and his cardiogenic rehab  He is hoping to return to his part-time work at [employer] in the next coming weeks    9/25/20:  REZUM procedure: The prostate was measured at 83.2 cc  The transrectal ultrasound probe was then removed.  8 targeted treatments delivered into the prostate utilizing radiofrequency power to form sterile water vapor. 1 treatment was applied  to the bladder neck. 4 treatments in left lateral lobe and 3 treatments in the right lateral lobe  by 1 cm were applied.    10/15/20:   Failed Trial of void and instructed on intermittent self catheterization on 9/28/2020 and on 10/8/2020.  On 10/10/2020 he presented to the emergency room due to room difficulty with intermittent self-catheterization and a Mckenzie catheter was replaced  He returns today for Mckenzie removal and reconstruction with intermittent self-catheterization    TODAY:  He initially did ok on intermittent self catheterization, however then developed urinary retention and presented to the ER on 10/17/20    PHYSICAL EXAM  Patient is a 75 year old  male   Vitals: There were no vitals taken for this visit.  There is no height or weight on file to calculate BMI.  General Appearance Adult:   Alert, no acute distress, oriented  HENT: throat/mouth:normal, good dentition  Lungs: no respiratory distress, or pursed lip breathing  Heart: No obvious jugular venous distension present  Abdomen: soft, nontender, no organomegaly or masses  Musculoskeltal: extremities normal, no peripheral edema  Skin: no suspicious lesions or rashes  Neuro: Alert, oriented, speech and mentation normal  Psych: affect and mood normal  Gait: Normal    UA RESULTS:  Recent Labs   Lab Test 10/10/20  1305 09/25/20  0757   COLOR Light Yellow Yellow   APPEARANCE Slightly Cloudy Clear   URINEGLC Negative Negative   URINEBILI Negative Negative   URINEKETONE Negative Negative   SG 1.016 1.020   UBLD Large* Moderate*   URINEPH 6.0 5.5   PROTEIN 30* 30*   UROBILINOGEN  --  0.2   NITRITE Negative Positive*   LEUKEST Moderate* Small*   RBCU >182*  --    WBCU 80*  --           ASSESSMENT and PLAN  75-year-old man with recent prolonged hospitalization due to respiratory failure, cardiogenic shock, acute on chronic congestive heart failure with atrial fibrillation and RVR status post cardiac ablation and pacemaker placement, acute on chronic  kidney disease, gross hematuria, urinary retention, diabetes type 2, and a lung nodule    Urinary retention  -He is status post a REZUM procedure on 9/25/2020.  He is continued to have issues with urinary retention requiring intermittent self-catheterization  -We discussed that the treatment effect time after the procedure can take up to 4 to 12 weeks  - F/U next week for baker removal and intermittent self catheterization re-teaching    Chart documentation with Dragon Voice recognition Software. Although reviewed after completion, some words and grammatical errors may remain.     Telephone time: 11 minutes    Cheikh Perez MD   Urology  [facility]  [facility] Phone: [phone]  [facility] Phone: [phone]

## 2020-10-23 ENCOUNTER — OFFICE VISIT (OUTPATIENT)
Dept: INTERNAL MEDICINE | Facility: CLINIC | Age: 75
End: 2020-10-23
Payer: COMMERCIAL

## 2020-10-23 VITALS
HEART RATE: 77 BPM | SYSTOLIC BLOOD PRESSURE: 125 MMHG | HEIGHT: 69 IN | OXYGEN SATURATION: 98 % | BODY MASS INDEX: 27.31 KG/M2 | TEMPERATURE: 97.5 F | DIASTOLIC BLOOD PRESSURE: 69 MMHG | WEIGHT: 184.4 LBS | RESPIRATION RATE: 18 BRPM

## 2020-10-23 DIAGNOSIS — I48.91 ATRIAL FIBRILLATION, UNSPECIFIED TYPE (H): ICD-10-CM

## 2020-10-23 DIAGNOSIS — E11.69 TYPE 2 DIABETES MELLITUS WITH OTHER SPECIFIED COMPLICATION, UNSPECIFIED WHETHER LONG TERM INSULIN USE (H): ICD-10-CM

## 2020-10-23 DIAGNOSIS — I50.20 SYSTOLIC CONGESTIVE HEART FAILURE, UNSPECIFIED HF CHRONICITY (H): ICD-10-CM

## 2020-10-23 DIAGNOSIS — I50.22 CHRONIC SYSTOLIC CONGESTIVE HEART FAILURE (H): ICD-10-CM

## 2020-10-23 DIAGNOSIS — E78.5 HYPERLIPIDEMIA LDL GOAL <100: ICD-10-CM

## 2020-10-23 DIAGNOSIS — Z79.4 TYPE 2 DIABETES MELLITUS WITH STAGE 3A CHRONIC KIDNEY DISEASE, WITH LONG-TERM CURRENT USE OF INSULIN (H): Primary | ICD-10-CM

## 2020-10-23 DIAGNOSIS — N18.31 TYPE 2 DIABETES MELLITUS WITH STAGE 3A CHRONIC KIDNEY DISEASE, WITH LONG-TERM CURRENT USE OF INSULIN (H): Primary | ICD-10-CM

## 2020-10-23 DIAGNOSIS — E11.22 TYPE 2 DIABETES MELLITUS WITH STAGE 3A CHRONIC KIDNEY DISEASE, WITH LONG-TERM CURRENT USE OF INSULIN (H): Primary | ICD-10-CM

## 2020-10-23 PROBLEM — E11.9 DIABETES MELLITUS, TYPE 2 (H): Status: ACTIVE | Noted: 2020-10-23

## 2020-10-23 LAB
HBA1C MFR BLD: 6.8 % (ref 0–5.6)
HGB BLD-MCNC: 12.6 G/DL (ref 13.3–17.7)
NT-PROBNP SERPL-MCNC: 1839 PG/ML (ref 0–450)

## 2020-10-23 PROCEDURE — 99214 OFFICE O/P EST MOD 30 MIN: CPT | Performed by: INTERNAL MEDICINE

## 2020-10-23 PROCEDURE — 84443 ASSAY THYROID STIM HORMONE: CPT | Performed by: PHYSICIAN ASSISTANT

## 2020-10-23 PROCEDURE — 36415 COLL VENOUS BLD VENIPUNCTURE: CPT | Performed by: PHYSICIAN ASSISTANT

## 2020-10-23 PROCEDURE — 85018 HEMOGLOBIN: CPT | Performed by: PHYSICIAN ASSISTANT

## 2020-10-23 PROCEDURE — 80048 BASIC METABOLIC PNL TOTAL CA: CPT | Performed by: PHYSICIAN ASSISTANT

## 2020-10-23 PROCEDURE — 83036 HEMOGLOBIN GLYCOSYLATED A1C: CPT | Performed by: INTERNAL MEDICINE

## 2020-10-23 PROCEDURE — 83880 ASSAY OF NATRIURETIC PEPTIDE: CPT | Performed by: PHYSICIAN ASSISTANT

## 2020-10-23 PROCEDURE — 80061 LIPID PANEL: CPT | Performed by: INTERNAL MEDICINE

## 2020-10-23 ASSESSMENT — MIFFLIN-ST. JEOR: SCORE: 1543.87

## 2020-10-23 NOTE — PATIENT INSTRUCTIONS
Stop by the lab ([address]) to have cholesterol drawn.     Schedule a video visit with me in about three months. Call sooner if needed.

## 2020-10-23 NOTE — PROGRESS NOTES
Subjective     Gene Pagan is a 75 year old male who presents to clinic today for the following health issues:  Patient is being seen for an follow up ( hypertension and Diabetes).  HPI         Diabetes Follow-up    How often are you checking your blood sugar? One time daily  What time of day are you checking your blood sugars (select all that apply)?  Before and after meals  Have you had any blood sugars above 200?  Yes , 4 times this month in the evening after meals  Have you had any blood sugars below 70?  No    What symptoms do you notice when your blood sugar is low?  None    What concerns do you have today about your diabetes? None     Do you have any of these symptoms? (Select all that apply)  No numbness or tingling in feet.  No redness, sores or blisters on feet.  No complaints of excessive thirst.  No reports of blurry vision.  No significant changes to weight.    Have you had a diabetic eye exam in the last 12 months? No        BP Readings from Last 2 Encounters:   10/17/20 138/73   10/10/20 (!) 159/88     Hemoglobin A1C (%)   Date Value   10/23/2020 6.8 (H)   03/27/2020 6.2 (H)       Hypertension Follow-up      Do you check your blood pressure regularly outside of the clinic? Yes     Are you following a low salt diet? Yes    Are your blood pressures ever more than 140 on the top number (systolic) OR more   than 90 on the bottom number (diastolic), for example 140/90? Yes      How many servings of fruits and vegetables do you eat daily?  4 or more    On average, how many sweetened beverages do you drink each day (Examples: soda, juice, sweet tea, etc.  Do NOT count diet or artificially sweetened beverages)?   0    How many days per week do you exercise enough to make your heart beat faster? 4    How many minutes a day do you exercise enough to make your heart beat faster? 30 - 60    How many days per week do you miss taking your medication? 0        The patient is taking Lantus at 10 units at bedtime.  "AM glucoses are running in the 110-130 range.     Past medical, family and social histories as well as medications reviewed and updated as needed.    Review of Systems   No dyspnea or cough. No chest discomfort, dizziness or palpitations. No diarrhea, abdominal pain or rectal bleeding.   No acute problems with vision or speech, lateralizing weakness or paresthesias.    ROS: as above or negative for Respiratory, CV, GI, endocrine, neuro systems.     Vitals: /69   Pulse 77   Temp 97.5  F (36.4  C) (Oral)   Resp 18   Ht 1.74 m (5' 8.5\")   Wt 83.6 kg (184 lb 6.4 oz)   SpO2 98%   BMI 27.63 kg/m    BMI= Body mass index is 27.63 kg/m .     Physical Exam   GENERAL: healthy, alert and no distress  RESP: lungs clear to auscultation - no rales, rhonchi or wheezes  CV: regular rate and rhythm, normal S1 S2, no S3 or S4, no murmur, click or rub, no peripheral edema and peripheral pulses strong  MS: no gross musculoskeletal defects noted, no edema    Results for orders placed or performed in visit on 10/23/20   Lipid panel reflex to direct LDL Fasting     Status: Abnormal   Result Value Ref Range    Cholesterol 166 <200 mg/dL    Triglycerides 174 (H) <150 mg/dL    HDL Cholesterol 33 (L) >39 mg/dL    LDL Cholesterol Calculated 98 <100 mg/dL    Non HDL Cholesterol 133 (H) <130 mg/dL   Results for orders placed or performed in visit on 10/23/20   Basic metabolic panel     Status: Abnormal   Result Value Ref Range    Sodium 137 133 - 144 mmol/L    Potassium 4.9 3.4 - 5.3 mmol/L    Chloride 104 94 - 109 mmol/L    Carbon Dioxide 27 20 - 32 mmol/L    Anion Gap 6 3 - 14 mmol/L    Glucose 120 (H) 70 - 99 mg/dL    Urea Nitrogen 48 (H) 7 - 30 mg/dL    Creatinine 2.14 (H) 0.66 - 1.25 mg/dL    GFR Estimate 29 (L) >60 mL/min/[1.73_m2]    GFR Estimate If Black 33 (L) >60 mL/min/[1.73_m2]    Calcium 9.3 8.5 - 10.1 mg/dL   N terminal pro BNP outpatient     Status: Abnormal   Result Value Ref Range    N-Terminal Pro Bnp 1,839 (H) 0 - " 450 pg/mL   Hemoglobin     Status: Abnormal   Result Value Ref Range    Hemoglobin 12.6 (L) 13.3 - 17.7 g/dL   TSH with free T4 reflex     Status: None   Result Value Ref Range    TSH 1.37 0.40 - 4.00 mU/L   **A1C FUTURE 3mo     Status: Abnormal   Result Value Ref Range    Hemoglobin A1C 6.8 (H) 0 - 5.6 %           Assessment & Plan   (E11.21,  N18.31,  Z79.4) Type 2 diabetes mellitus with stage 3a chronic kidney disease, with long-term current use of insulin (H)  (primary encounter diagnosis)  Comment: A1c at target. Continue current measures.   Plan: Lipid panel reflex to direct LDL Fasting          (E11.69) Type 2 diabetes mellitus with other specified complication, unspecified whether long term insulin use (H)  Plan: Lipid panel reflex to direct LDL Fasting,         CANCELED: Lipid panel reflex to direct LDL         Non-fasting    (I50.20) Systolic congestive heart failure, unspecified HF chronicity (H)  (I48.91) Atrial fibrillation, unspecified type (H)  Comment: Continue to follow with cardiology. Continue Eliquis.     (E78.5) Hyperlipidemia LDL goal <100  Comment: LDL at target. Continue current measures.       Patient Instructions   Stop by the lab ([address]) to have cholesterol drawn.     Schedule a video visit with me in about three months. Call sooner if needed.       Return in about 3 months (around 1/23/2021) for Diabetes Recheck.    Dm Lipscomb MD,   [facility]

## 2020-10-24 LAB
ANION GAP SERPL CALCULATED.3IONS-SCNC: 6 MMOL/L (ref 3–14)
BUN SERPL-MCNC: 48 MG/DL (ref 7–30)
CALCIUM SERPL-MCNC: 9.3 MG/DL (ref 8.5–10.1)
CHLORIDE SERPL-SCNC: 104 MMOL/L (ref 94–109)
CHOLEST SERPL-MCNC: 166 MG/DL
CO2 SERPL-SCNC: 27 MMOL/L (ref 20–32)
CREAT SERPL-MCNC: 2.14 MG/DL (ref 0.66–1.25)
GFR SERPL CREATININE-BSD FRML MDRD: 29 ML/MIN/{1.73_M2}
GLUCOSE SERPL-MCNC: 120 MG/DL (ref 70–99)
HDLC SERPL-MCNC: 33 MG/DL
LDLC SERPL CALC-MCNC: 98 MG/DL
NONHDLC SERPL-MCNC: 133 MG/DL
POTASSIUM SERPL-SCNC: 4.9 MMOL/L (ref 3.4–5.3)
SODIUM SERPL-SCNC: 137 MMOL/L (ref 133–144)
TRIGL SERPL-MCNC: 174 MG/DL
TSH SERPL DL<=0.005 MIU/L-ACNC: 1.37 MU/L (ref 0.4–4)

## 2020-10-27 ENCOUNTER — DOCUMENTATION ONLY (OUTPATIENT)
Dept: CARDIOLOGY | Facility: CLINIC | Age: 75
End: 2020-10-27

## 2020-10-27 ENCOUNTER — VIRTUAL VISIT (OUTPATIENT)
Dept: CARDIOLOGY | Facility: CLINIC | Age: 75
End: 2020-10-27
Attending: PHYSICIAN ASSISTANT
Payer: COMMERCIAL

## 2020-10-27 DIAGNOSIS — I50.22 CHRONIC SYSTOLIC CONGESTIVE HEART FAILURE (H): ICD-10-CM

## 2020-10-27 DIAGNOSIS — I50.9 CHF (CONGESTIVE HEART FAILURE) (H): Primary | ICD-10-CM

## 2020-10-27 DIAGNOSIS — I49.5 SSS (SICK SINUS SYNDROME) (H): ICD-10-CM

## 2020-10-27 DIAGNOSIS — Z95.0 CARDIAC PACEMAKER IN SITU: ICD-10-CM

## 2020-10-27 DIAGNOSIS — I42.9 CARDIOMYOPATHY (H): ICD-10-CM

## 2020-10-27 PROCEDURE — 99214 OFFICE O/P EST MOD 30 MIN: CPT | Mod: 95 | Performed by: PHYSICIAN ASSISTANT

## 2020-10-27 NOTE — PROGRESS NOTES
"Copied on below request from Marsha Noonan PA-C that was sent to scheduling:     \"Gene has a remote device check scheduled for Dec 8th. However, I am wondering if we can instead do an in-clinic check, as he's complaining of diaphragmatic stim symptoms. I would like to coordinate that with a CORE clinic visit with me, same day. CC'ing the EP nurses on this to help coordinate. Thank you!\"    Device schedule currently has several openings on or around 12/8/20.  Message sent back to scheduling to confirm that it is okay to use any open slots.  Orders for CORE clinic follow-up with Marsha and for device check entered.      JIMMIE Bangura   "

## 2020-10-27 NOTE — LETTER
"10/27/2020    Dm Lipscomb MD, MD  [address] [facility] [address]  [facility] [address]    RE: Gene Pagan       Dear Colleague,    I had the pleasure of seeing Gene Pagan in the [facility].    Gene Pagan is a 75 year old male who is being evaluated via a billable video visit.      The patient has been notified of following:     \"This video visit will be conducted via a call between you and your physician/provider. We have found that certain health care needs can be provided without the need for an in-person physical exam.  This service lets us provide the care you need with a video conversation.  If a prescription is necessary we can send it directly to your pharmacy.  If lab work is needed we can place an order for that and you can then stop by our lab to have the test done at a later time.    Video visits are billed at different rates depending on your insurance coverage.  Please reach out to your insurance provider with any questions.    If during the course of the call the physician/provider feels a video visit is not appropriate, you will not be charged for this service.\"    Patient has given verbal consent for Video visit? Yes  How would you like to obtain your AVS? MyChart  If you are dropped from the video visit, the video invite should be resent to: [phone]  Will anyone else be joining your video visit?       Vitals - Patient Reported  Systolic (Patient Reported): 131  Diastolic (Patient Reported): 65  Weight (Patient Reported): 81.6 kg (180 lb)  Pulse (Patient Reported): 70    Review Of Systems  Skin: NEGATIVE  Eyes:Ears/Nose/Throat: NEGATIVE  Respiratory: NEGATIVE  Cardiovascular:NEGATIVE  Gastrointestinal: NEGATIVE  Genitourinary:NEGATIVE   Musculoskeletal: NEGATIVE  Neurologic: NEGATIVE  Psychiatric: NEGATIVE  Hematologic/Lymphatic/Immunologic: NEGATIVE  Endocrine:  NEGATIVE      Video-Visit Details    Type of service:  Video Visit    Video Start Time: 1:53 " PM  Video End Time: 2:03 PM    Originating Location (pt. Location): Home    Distant Location (provider location):  [facility]     Platform used for Video Visit: Bartolome Pagan is a 75 year old male who is being evaluated via a billable video visit.      I have reviewed and updated the patient's Past Medical History, Social History, Family History and Medication List.    PROBLEM LIST  Patient Active Problem List   Diagnosis     Atrial fibrillation with rapid ventricular response (H)     CHF (congestive heart failure) (H)     Urinary retention     Gross hematuria     BPH with obstruction/lower urinary tract symptoms     CAP (community acquired pneumonia)     CKD (chronic kidney disease) stage 3, GFR 30-59 ml/min     Diabetes mellitus, type 2 (H)       ALLERGIES  Patient has no known allergies.    MEDICATIONS  Current Outpatient Medications   Medication Sig Dispense Refill     apixaban ANTICOAGULANT (ELIQUIS) 5 MG tablet Take 1 tablet (5 mg) by mouth 2 times daily 60 tablet 0     blood glucose (ONETOUCH ULTRA) test strip Use to test blood sugar 2 times daily or as directed. 200 strip 1     bumetanide (BUMEX) 1 MG tablet Take 1 tablet (1 mg) by mouth daily (Patient taking differently: Take 1 mg by mouth 2 times daily ) 90 tablet 3     Cholecalciferol (VITAMIN D3) 75 MCG (3000 UT) TABS Take 1 tablet by mouth daily       ciprofloxacin (CIPRO) 500 MG tablet Take 1 tablet (500 mg) by mouth 2 times daily Start taking the day before your prostate REZUM procedure. 6 tablet 0     diazepam (VALIUM) 5 MG tablet Take 1 tablet (5 mg) by mouth every 6 hours as needed for anxiety Bring with to take prior to your REZUM procedure 1 tablet 0     insulin glargine (LANTUS SOLOSTAR) 100 UNIT/ML pen Inject 9 Units Subcutaneous At Bedtime Future refills by PCP Dr. Martínez Duarte with phone number [phone]. 15 mL 0     insulin pen needle (31G X 8 MM) 31G X 8 MM miscellaneous 1 Box of 100  insulin pen needles to be dispensed with every insulin pen prescription 100 each 1     melatonin 3 MG tablet Take 3 mg by mouth At Bedtime       metoprolol succinate ER (TOPROL-XL) 25 MG 24 hr tablet Take 25 mg by mouth daily        Multiple Vitamins-Minerals (MULTIVITAMIN ADULT PO) Take 1 tablet by mouth daily       oxyCODONE (ROXICODONE) 5 MG tablet Take 1 tablet (5 mg) by mouth every 6 hours as needed for breakthrough pain 5 tablet 0     tamsulosin (FLOMAX) 0.4 MG capsule Take 1 capsule (0.4 mg) by mouth At Bedtime 90 capsule 2     UNABLE TO FIND Iron 65 mg daily         Gene Pagan presents for a CORE clinic follow up visit.    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 Improved to 40-45%  # Rapid AF s/p AVN ablation + CRT-P on 4/10  # DMII  # CKD-III  # Urinary retention, BPH, with indwelling baker    Mr. Pagan is a very pleasant 75-year-old gentleman with a history of cardiogenic shock with biventricular systolic heart failure, likely tachycardia-induced from atrial fibrillation.  He has undergone AV sarah ablation with biventricular pacing device with significant improvement in his heart function and functional class. He has been followed closely by me in the [facility] and presents for a routine follow up today.     At his last visit, he relayed some mild CHF symptoms and slow progressive weight gain. I increased his bumex from 0.5 to 1 mg daily at that time.    Today, he's feeling better. His weight came down about 5 lbs. His abdominal bloating has improved with that. He was suffering from acute urine retention recently and in that setting, his creatinine amber to 2.6 It has now come back down to baseline at 2.1. He denies dyspnea and chest pain. However, he does note symptoms consistent diaphragmatic  stimulation which have been prominent ever since he hit his pacemaker site with the car door a little over a month ago. This is most noticeable when trying to sleep on his R side at night.     ROS:  12-pt ROS is negative except for as noted above.     EXAM:  A limited exam was conducted via video.  Constitutional: Patient is pleasant, alert, in no distress. Normal body habitus, upright.  ENT: Normal head shape, no evidence of injury or laceration.  EYES: No scleral icterus, normal conjunctivae. EOM's appear intact.   Neck: No evidence of thyromegaly.   Chest/Lungs: Appears to have normal respiratory effort. No audible wheezing, no cough, equal chest wall expansion.   Cardiovascular: No evidence of elevated JVP. No evidence of pitting edema bilaterally.  Abdomen: No evidence of abdominal distention. No observed jaundice.  Extremities: No edema, erythema, cyanosis noted.  Skin: No rashes or lesions appreciated on visualized skin, normal skin color.  Neurologic: Normal mentation. Normal arm motion bilaterally, no tremors. No evidence of focal defect.  Psychiatric: Appropriate affect. A&Ox3.    Assessment/Plan:  # New systolic CMP / biventricular HF, likely tachy-mediated - EF 25% --> 45%, FC II.    - symptoms improved following bumex increase. Creatinine is stable. Will continue at current dose.    - Continue Toprol 12.5 mg, Imdur 15. No ACE/ARB/Hernesto d/t ongoing renal dysfunction.    - Completed cardiac rehab.  # Rapid AF s/p AVN ablation + CRT-P implant on 4/10 - 99% BiVP. Anticoagulated.  # NSVT noted on device check - asymptomatic. Follow up stress test normal.  # DEONDRE / CKD-III - Creatinine stable ~2. Now following with [facility]. Encouraged to arrange follow up.  # Symptoms consistent with diaphragmatic stimulation after hitting pacemaker with car door    - Follow up with me the week after his upcoming device check in December. Will change device check from remote to in-clinic in case adjustments can be  made for his complaint of diaphragmatic stimulation. If his symptoms become more bothersome, he will need to come in for this sooner. I will also ask the device nurses if they think any imaging should be obtained in this case.     Marsha Noonan PA-C  [facility]  Pager: [phone]  Text Page  (7:30am - 4pm M-F)       Thank you for allowing me to participate in the care of your patient.    Sincerely,     Marsha Noonan PA-C     [facility]

## 2020-10-27 NOTE — PROGRESS NOTES
"Gene Pagan is a 75 year old male who is being evaluated via a billable video visit.      The patient has been notified of following:     \"This video visit will be conducted via a call between you and your physician/provider. We have found that certain health care needs can be provided without the need for an in-person physical exam.  This service lets us provide the care you need with a video conversation.  If a prescription is necessary we can send it directly to your pharmacy.  If lab work is needed we can place an order for that and you can then stop by our lab to have the test done at a later time.    Video visits are billed at different rates depending on your insurance coverage.  Please reach out to your insurance provider with any questions.    If during the course of the call the physician/provider feels a video visit is not appropriate, you will not be charged for this service.\"    Patient has given verbal consent for Video visit? Yes  How would you like to obtain your AVS? MyChart  If you are dropped from the video visit, the video invite should be resent to: [phone]  Will anyone else be joining your video visit?       Vitals - Patient Reported  Systolic (Patient Reported): 131  Diastolic (Patient Reported): 65  Weight (Patient Reported): 81.6 kg (180 lb)  Pulse (Patient Reported): 70    Review Of Systems  Skin: NEGATIVE  Eyes:Ears/Nose/Throat: NEGATIVE  Respiratory: NEGATIVE  Cardiovascular:NEGATIVE  Gastrointestinal: NEGATIVE  Genitourinary:NEGATIVE   Musculoskeletal: NEGATIVE  Neurologic: NEGATIVE  Psychiatric: NEGATIVE  Hematologic/Lymphatic/Immunologic: NEGATIVE  Endocrine:  NEGATIVE      Video-Visit Details    Type of service:  Video Visit    Video Start Time: 1:53 PM  Video End Time: 2:03 PM    Originating Location (pt. Location): Home    Distant Location (provider location):  [facility]     Platform used for Video Visit: Bartolome    Gene Pagan is a 75 year " old male who is being evaluated via a billable video visit.      I have reviewed and updated the patient's Past Medical History, Social History, Family History and Medication List.    PROBLEM LIST  Patient Active Problem List   Diagnosis     Atrial fibrillation with rapid ventricular response (H)     CHF (congestive heart failure) (H)     Urinary retention     Gross hematuria     BPH with obstruction/lower urinary tract symptoms     CAP (community acquired pneumonia)     CKD (chronic kidney disease) stage 3, GFR 30-59 ml/min     Diabetes mellitus, type 2 (H)       ALLERGIES  Patient has no known allergies.    MEDICATIONS  Current Outpatient Medications   Medication Sig Dispense Refill     apixaban ANTICOAGULANT (ELIQUIS) 5 MG tablet Take 1 tablet (5 mg) by mouth 2 times daily 60 tablet 0     blood glucose (ONETOUCH ULTRA) test strip Use to test blood sugar 2 times daily or as directed. 200 strip 1     bumetanide (BUMEX) 1 MG tablet Take 1 tablet (1 mg) by mouth daily (Patient taking differently: Take 1 mg by mouth 2 times daily ) 90 tablet 3     Cholecalciferol (VITAMIN D3) 75 MCG (3000 UT) TABS Take 1 tablet by mouth daily       ciprofloxacin (CIPRO) 500 MG tablet Take 1 tablet (500 mg) by mouth 2 times daily Start taking the day before your prostate REZUM procedure. 6 tablet 0     diazepam (VALIUM) 5 MG tablet Take 1 tablet (5 mg) by mouth every 6 hours as needed for anxiety Bring with to take prior to your REZUM procedure 1 tablet 0     insulin glargine (LANTUS SOLOSTAR) 100 UNIT/ML pen Inject 9 Units Subcutaneous At Bedtime Future refills by PCP Dr. Martínez Duarte with phone number [phone]. 15 mL 0     insulin pen needle (31G X 8 MM) 31G X 8 MM miscellaneous 1 Box of 100 insulin pen needles to be dispensed with every insulin pen prescription 100 each 1     melatonin 3 MG tablet Take 3 mg by mouth At Bedtime       metoprolol succinate ER (TOPROL-XL) 25 MG 24 hr tablet Take 25 mg by mouth daily         Multiple Vitamins-Minerals (MULTIVITAMIN ADULT PO) Take 1 tablet by mouth daily       oxyCODONE (ROXICODONE) 5 MG tablet Take 1 tablet (5 mg) by mouth every 6 hours as needed for breakthrough pain 5 tablet 0     tamsulosin (FLOMAX) 0.4 MG capsule Take 1 capsule (0.4 mg) by mouth At Bedtime 90 capsule 2     UNABLE TO FIND Iron 65 mg daily         Gene Pagan presents for a CORE clinic follow up visit.    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 Improved to 40-45%  # Rapid AF s/p AVN ablation + CRT-P on 4/10  # DMII  # CKD-III  # Urinary retention, BPH, with indwelling baker    Mr. Pagan is a very pleasant 75-year-old gentleman with a history of cardiogenic shock with biventricular systolic heart failure, likely tachycardia-induced from atrial fibrillation.  He has undergone AV sarah ablation with biventricular pacing device with significant improvement in his heart function and functional class. He has been followed closely by me in the [facility] and presents for a routine follow up today.     At his last visit, he relayed some mild CHF symptoms and slow progressive weight gain. I increased his bumex from 0.5 to 1 mg daily at that time.    Today, he's feeling better. His weight came down about 5 lbs. His abdominal bloating has improved with that. He was suffering from acute urine retention recently and in that setting, his creatinine amber to 2.6 It has now come back down to baseline at 2.1. He denies dyspnea and chest pain. However, he does note symptoms consistent diaphragmatic stimulation which have been prominent ever since he hit his pacemaker site with the car door a little over a month ago. This is most noticeable when trying to sleep on his R side at night.     ROS:  12-pt ROS is negative except for as noted  above.     EXAM:  A limited exam was conducted via video.  Constitutional: Patient is pleasant, alert, in no distress. Normal body habitus, upright.  ENT: Normal head shape, no evidence of injury or laceration.  EYES: No scleral icterus, normal conjunctivae. EOM's appear intact.   Neck: No evidence of thyromegaly.   Chest/Lungs: Appears to have normal respiratory effort. No audible wheezing, no cough, equal chest wall expansion.   Cardiovascular: No evidence of elevated JVP. No evidence of pitting edema bilaterally.  Abdomen: No evidence of abdominal distention. No observed jaundice.  Extremities: No edema, erythema, cyanosis noted.  Skin: No rashes or lesions appreciated on visualized skin, normal skin color.  Neurologic: Normal mentation. Normal arm motion bilaterally, no tremors. No evidence of focal defect.  Psychiatric: Appropriate affect. A&Ox3.    Assessment/Plan:  # New systolic CMP / biventricular HF, likely tachy-mediated - EF 25% --> 45%, FC II.    - symptoms improved following bumex increase. Creatinine is stable. Will continue at current dose.    - Continue Toprol 12.5 mg, Imdur 15. No ACE/ARB/Hernesto d/t ongoing renal dysfunction.    - Completed cardiac rehab.  # Rapid AF s/p AVN ablation + CRT-P implant on 4/10 - 99% BiVP. Anticoagulated.  # NSVT noted on device check - asymptomatic. Follow up stress test normal.  # DEONDRE / CKD-III - Creatinine stable ~2. Now following with [facility]. Encouraged to arrange follow up.  # Symptoms consistent with diaphragmatic stimulation after hitting pacemaker with car door    - Follow up with me the week after his upcoming device check in December. Will change device check from remote to in-clinic in case adjustments can be made for his complaint of diaphragmatic stimulation. If his symptoms become more bothersome, he will need to come in for this sooner. I will also ask the device nurses if they think any imaging should be obtained in this case.     Marsha BROOKS  ZOE Noonan  [facility] - [facility]  Pager: [phone]  Text Page  (7:30am - 4pm M-F)

## 2020-10-28 DIAGNOSIS — I49.5 SSS (SICK SINUS SYNDROME) (H): Primary | ICD-10-CM

## 2020-10-28 DIAGNOSIS — Z95.0 CARDIAC PACEMAKER IN SITU: ICD-10-CM

## 2020-10-29 ENCOUNTER — TELEPHONE (OUTPATIENT)
Dept: UROLOGY | Facility: CLINIC | Age: 75
End: 2020-10-29

## 2020-10-29 NOTE — TELEPHONE ENCOUNTER
Returned call to patient and assisted in scheduling nurse only appointment for 11/5.    Tasia Santamaria LPN

## 2020-11-05 ENCOUNTER — ANCILLARY PROCEDURE (OUTPATIENT)
Dept: CARDIOLOGY | Facility: CLINIC | Age: 75
End: 2020-11-05
Attending: PHYSICIAN ASSISTANT
Payer: COMMERCIAL

## 2020-11-05 DIAGNOSIS — I49.5 SSS (SICK SINUS SYNDROME) (H): ICD-10-CM

## 2020-11-05 DIAGNOSIS — Z95.0 CARDIAC PACEMAKER IN SITU: ICD-10-CM

## 2020-11-05 DIAGNOSIS — I42.9 CARDIOMYOPATHY (H): ICD-10-CM

## 2020-11-05 PROCEDURE — 93281 PM DEVICE PROGR EVAL MULTI: CPT | Performed by: INTERNAL MEDICINE

## 2020-11-05 NOTE — PROGRESS NOTES
[email or web address] Valitude CRT-PPacemaker Device Check  Patient seen in clinic for device evaluation and iterative programming.   AP: 0 %    BiVP: 99 %    Mode: DDDR     Underlying Rhythm: Afib with CHB achieved by AVNA with juctional escape at VVI 30   Heart Rate: Stable with adequate variability, patient denies activity intolerance    Sensing: WNL     Pacing Threshold: RA unable to obtain, patient in Afib. RV/LV stable    Impedance: Stable     Battery Status: 10 years     Device Site: Well healed    Atrial Arrhythmia: Patient has been in Afib 100% of the time since AVNA on 4/10/2020. Patient has history of Afib and takes Eliquis.    Ventricular Arrhythmia: 0    Setting Change: LV pace configuration changed from LVRing1>>LVRing3 to LVRing2>>LVRing3. LV amplitude changed from 2.2V@0.5ms to 2.4V@0.5ms based on today's testing per clinic protocol while maintaining an adequate safety margin.  Care Plan: Patient seen in device clinic to assess diaphragmatic stimulation. Per patient stimulation is noted consistently when slouching, if inhaling deeply and when sleeping on right side. Patient stated the stimulation is intermittent during the day depending on the position, most notable while sleeping. RV threshold completed and patient did not notice any stimulation. Patient was able to slouch in office recliner and stimulation was constant at current LV lead configuration. Please see LV lead configuration vector testing with threshold tests below.     LVRing1>>LVRing 3- PNS constant, threshold of 1.1@0.5ms (currently programm    I have reviewed and interpreted the device interrogation, settings, programming and nurse's summary. The device is functioning within normal device parameters. I agree with the current findings, assessment and plan.

## 2020-11-09 LAB
MDC_IDC_LEAD_IMPLANT_DT: NORMAL
MDC_IDC_LEAD_LOCATION: NORMAL
MDC_IDC_LEAD_LOCATION_DETAIL_1: NORMAL
MDC_IDC_LEAD_MFG: NORMAL
MDC_IDC_LEAD_MODEL: NORMAL
MDC_IDC_LEAD_POLARITY_TYPE: NORMAL
MDC_IDC_LEAD_SERIAL: NORMAL
MDC_IDC_MSMT_BATTERY_STATUS: NORMAL
MDC_IDC_MSMT_LEADCHNL_LV_IMPEDANCE_VALUE: 1195 OHM
MDC_IDC_MSMT_LEADCHNL_LV_PACING_THRESHOLD_AMPLITUDE: 1.4 V
MDC_IDC_MSMT_LEADCHNL_LV_PACING_THRESHOLD_PULSEWIDTH: 0.5 MS
MDC_IDC_MSMT_LEADCHNL_LV_SENSING_INTR_AMPL: 19 MV
MDC_IDC_MSMT_LEADCHNL_RA_IMPEDANCE_VALUE: 777 OHM
MDC_IDC_MSMT_LEADCHNL_RA_SENSING_INTR_AMPL: 1 MV
MDC_IDC_MSMT_LEADCHNL_RV_IMPEDANCE_VALUE: 783 OHM
MDC_IDC_MSMT_LEADCHNL_RV_PACING_THRESHOLD_AMPLITUDE: 0.8 V
MDC_IDC_MSMT_LEADCHNL_RV_PACING_THRESHOLD_PULSEWIDTH: 0.4 MS
MDC_IDC_MSMT_LEADCHNL_RV_SENSING_INTR_AMPL: 7.1 MV
MDC_IDC_PG_IMPLANT_DTM: NORMAL
MDC_IDC_PG_MFG: NORMAL
MDC_IDC_PG_MODEL: NORMAL
MDC_IDC_PG_SERIAL: NORMAL
MDC_IDC_PG_TYPE: NORMAL
MDC_IDC_SESS_CLINIC_NAME: NORMAL
MDC_IDC_SESS_DTM: NORMAL
MDC_IDC_SESS_TYPE: NORMAL
MDC_IDC_SET_BRADY_AT_MODE_SWITCH_MODE: NORMAL
MDC_IDC_SET_BRADY_AT_MODE_SWITCH_RATE: 140 {BEATS}/MIN
MDC_IDC_SET_BRADY_LOWRATE: 70 {BEATS}/MIN
MDC_IDC_SET_BRADY_MAX_SENSOR_RATE: 130 {BEATS}/MIN
MDC_IDC_SET_BRADY_MAX_TRACKING_RATE: 130 {BEATS}/MIN
MDC_IDC_SET_BRADY_MODE: NORMAL
MDC_IDC_SET_BRADY_PAV_DELAY_HIGH: 180 MS
MDC_IDC_SET_BRADY_PAV_DELAY_LOW: 180 MS
MDC_IDC_SET_BRADY_SAV_DELAY_HIGH: 120 MS
MDC_IDC_SET_BRADY_SAV_DELAY_LOW: 120 MS
MDC_IDC_SET_CRT_LVRV_DELAY: 30 MS
MDC_IDC_SET_CRT_PACED_CHAMBERS: NORMAL
MDC_IDC_SET_LEADCHNL_LV_PACING_AMPLITUDE: 2.4 V
MDC_IDC_SET_LEADCHNL_LV_PACING_CAPTURE_MODE: NORMAL
MDC_IDC_SET_LEADCHNL_LV_PACING_POLARITY: NORMAL
MDC_IDC_SET_LEADCHNL_LV_PACING_PULSEWIDTH: 0.5 MS
MDC_IDC_SET_LEADCHNL_LV_SENSING_ADAPTATION_MODE: NORMAL
MDC_IDC_SET_LEADCHNL_LV_SENSING_POLARITY: NORMAL
MDC_IDC_SET_LEADCHNL_LV_SENSING_SENSITIVITY: 1.5 MV
MDC_IDC_SET_LEADCHNL_RA_PACING_AMPLITUDE: 3.5 V
MDC_IDC_SET_LEADCHNL_RA_PACING_CAPTURE_MODE: NORMAL
MDC_IDC_SET_LEADCHNL_RA_PACING_POLARITY: NORMAL
MDC_IDC_SET_LEADCHNL_RA_PACING_PULSEWIDTH: 0.4 MS
MDC_IDC_SET_LEADCHNL_RA_SENSING_ADAPTATION_MODE: NORMAL
MDC_IDC_SET_LEADCHNL_RA_SENSING_POLARITY: NORMAL
MDC_IDC_SET_LEADCHNL_RA_SENSING_SENSITIVITY: 0.25 MV
MDC_IDC_SET_LEADCHNL_RV_PACING_AMPLITUDE: 2 V
MDC_IDC_SET_LEADCHNL_RV_PACING_CAPTURE_MODE: NORMAL
MDC_IDC_SET_LEADCHNL_RV_PACING_POLARITY: NORMAL
MDC_IDC_SET_LEADCHNL_RV_PACING_PULSEWIDTH: 0.4 MS
MDC_IDC_SET_LEADCHNL_RV_SENSING_ADAPTATION_MODE: NORMAL
MDC_IDC_SET_LEADCHNL_RV_SENSING_POLARITY: NORMAL
MDC_IDC_SET_LEADCHNL_RV_SENSING_SENSITIVITY: 1.5 MV
MDC_IDC_SET_ZONE_DETECTION_INTERVAL: 375 MS
MDC_IDC_SET_ZONE_TYPE: NORMAL
MDC_IDC_SET_ZONE_VENDOR_TYPE: NORMAL
MDC_IDC_STAT_EPISODE_RECENT_COUNT: 1
MDC_IDC_STAT_EPISODE_RECENT_COUNT_DTM_END: NORMAL
MDC_IDC_STAT_EPISODE_RECENT_COUNT_DTM_START: NORMAL
MDC_IDC_STAT_EPISODE_TOTAL_COUNT: 1
MDC_IDC_STAT_EPISODE_TOTAL_COUNT_DTM_END: NORMAL
MDC_IDC_STAT_EPISODE_TYPE: NORMAL
MDC_IDC_STAT_EPISODE_TYPE: NORMAL
MDC_IDC_STAT_EPISODE_VENDOR_TYPE: NORMAL
MDC_IDC_STAT_EPISODE_VENDOR_TYPE: NORMAL

## 2020-11-11 ENCOUNTER — CARE COORDINATION (OUTPATIENT)
Dept: CARDIOLOGY | Facility: CLINIC | Age: 75
End: 2020-11-11

## 2020-11-11 NOTE — PROGRESS NOTES
[facility]    Incoming call from patient requesting med review. Patient reports that at his nephrology appt yesterday they discovered a discrepancy in his diuretic dose. Patient is taking bumex, but thought the dose was increased to 2mg/d be Marsha Noonan. He has been taking this dose since 9/21/20.     Per record review, patient had virtual visit w/Indira on 9/21 and at that time she increased his bumex from 0.5mg ->1mg/d. At a subsequent visit on 10/20 she instructed him to continue taking the 1mg/d. However patient misunderstood and has been taking 2mg/d.     Patient stated he initially lost ~5# on the 2mg/d but has gradually gained all back. His weight this am = 185.2#  His breathing is stable - he denied any SOB, orthopnea/PND. He is feeling some abdominal bloating that resolved when his weight 180 - 181#. He denied any other swelling.     Future Appointments   Date Time Provider Department Center   11/12/2020  1:30 PM [facility] NURSE RENETTA KIM   11/17/2020 10:50 AM [facility] No[facility]an [facility]   12/8/2020 12:00 AM SANTIAGO TECHEvan [facility]   1/26/2021 10:00 AM Dm Lipscomb MD \[facility]\""     Assessment/Plan:  Nephrologist requesting Indira to address diuretic dose. Labs not done at nephrology appt yesterday, will need to do these prior to appt on 11/17. Patient prefers to go to [facility] for labs. I will insure orders in system, have messaged scheduling to call patient and arrange. Will forward to Indira. Patient verbalized understanding and had no additional questions.  Belen Souza, RN on 11/11/2020 at 10:46 AM

## 2020-11-12 ENCOUNTER — HOSPITAL ENCOUNTER (OUTPATIENT)
Dept: LAB | Facility: CLINIC | Age: 75
Discharge: HOME | End: 2020-11-12
Attending: PHYSICIAN ASSISTANT | Admitting: PHYSICIAN ASSISTANT
Payer: COMMERCIAL

## 2020-11-12 DIAGNOSIS — I50.23 ACUTE ON CHRONIC SYSTOLIC HEART FAILURE (H): ICD-10-CM

## 2020-11-12 LAB
ANION GAP SERPL CALCULATED.3IONS-SCNC: 3 MMOL/L (ref 3–14)
BUN SERPL-MCNC: 54 MG/DL (ref 7–30)
CALCIUM SERPL-MCNC: 8.7 MG/DL (ref 8.5–10.1)
CHLORIDE SERPL-SCNC: 107 MMOL/L (ref 94–109)
CO2 SERPL-SCNC: 30 MMOL/L (ref 20–32)
CREAT SERPL-MCNC: 2.27 MG/DL (ref 0.66–1.25)
GFR SERPL CREATININE-BSD FRML MDRD: 27 ML/MIN/{1.73_M2}
GLUCOSE SERPL-MCNC: 112 MG/DL (ref 70–99)
NT-PROBNP SERPL-MCNC: 1825 PG/ML (ref 0–450)
POTASSIUM SERPL-SCNC: 4.9 MMOL/L (ref 3.4–5.3)
SODIUM SERPL-SCNC: 140 MMOL/L (ref 133–144)

## 2020-11-12 PROCEDURE — 36415 COLL VENOUS BLD VENIPUNCTURE: CPT | Performed by: PHYSICIAN ASSISTANT

## 2020-11-12 PROCEDURE — 83880 ASSAY OF NATRIURETIC PEPTIDE: CPT | Performed by: PHYSICIAN ASSISTANT

## 2020-11-12 PROCEDURE — 80048 BASIC METABOLIC PNL TOTAL CA: CPT | Performed by: PHYSICIAN ASSISTANT

## 2020-11-12 NOTE — PROGRESS NOTES
[facility]    Called patient to review bumex dosing. Left detailed message w/instructions to continue taking bumex 2mg every day as he has been doing. Will watch for lab results.  Belen Souza RN on 11/12/2020 at 3:39 PM

## 2020-11-17 ENCOUNTER — OFFICE VISIT (OUTPATIENT)
Dept: CARDIOLOGY | Facility: CLINIC | Age: 75
End: 2020-11-17
Attending: PHYSICIAN ASSISTANT
Payer: COMMERCIAL

## 2020-11-17 VITALS
WEIGHT: 191.8 LBS | OXYGEN SATURATION: 97 % | HEART RATE: 70 BPM | BODY MASS INDEX: 28.41 KG/M2 | SYSTOLIC BLOOD PRESSURE: 127 MMHG | HEIGHT: 69 IN | DIASTOLIC BLOOD PRESSURE: 71 MMHG

## 2020-11-17 DIAGNOSIS — I50.23 ACUTE ON CHRONIC SYSTOLIC CONGESTIVE HEART FAILURE (H): ICD-10-CM

## 2020-11-17 DIAGNOSIS — I50.42 CHRONIC COMBINED SYSTOLIC AND DIASTOLIC HEART FAILURE (H): Primary | ICD-10-CM

## 2020-11-17 DIAGNOSIS — I50.22 CHRONIC SYSTOLIC CONGESTIVE HEART FAILURE (H): ICD-10-CM

## 2020-11-17 PROCEDURE — 99215 OFFICE O/P EST HI 40 MIN: CPT | Performed by: PHYSICIAN ASSISTANT

## 2020-11-17 RX ORDER — PNV NO.95/FERROUS FUM/FOLIC AC 28MG-0.8MG
1 TABLET ORAL DAILY
COMMUNITY

## 2020-11-17 RX ORDER — BUMETANIDE 0.5 MG/1
0.5 TABLET ORAL DAILY
Qty: 90 TABLET | Refills: 3 | Status: SHIPPED | OUTPATIENT
Start: 2020-11-17 | End: 2021-11-04

## 2020-11-17 RX ORDER — BUMETANIDE 1 MG/1
1 TABLET ORAL DAILY
Qty: 90 TABLET | Refills: 3 | Status: SHIPPED | OUTPATIENT
Start: 2020-11-17 | End: 2021-10-26

## 2020-11-17 ASSESSMENT — MIFFLIN-ST. JEOR: SCORE: 1577.44

## 2020-11-17 NOTE — PROGRESS NOTES
Cardiology Clinic Progress Note    Gene Pagan MRN# [id number]   YOB: 1945 Age: 75 year old   Primary cardiologist: Dr. Frank         Assessment and Plan:     In summary, Gene Pagan presents today for a core clinic follow-up visit.    1. 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 No ACE/ARB/Harrison d/t ongoing renal dysfunction.  Prior orthostasis with low-dose Imdur.  Appears well compensated with FC I-II symptoms, however notes reduced urine output and some mild abdominal bloating since reducing his Bumex from 2 to 1 mg daily.  2. CRT-P with adequate BiV pacing and recent adjustments for diaphragmatic stimulation.  3. Chronic kidney disease with a baseline creatinine around 2.3, followed by Dr. Arrington.  4. HTN, controlled in clinic, slightly uncontrolled at home (but using BP cuff incorrectly).     Plan:  - Increase Bumex from 1 to 1.5 mg daily.  - Encouraged him to call me if his SBP is consistently >130 mmHg with repositioning of his cuff. In that case, would up-titrate his metoprolol from 25 daily to BID.   - I have ordered a sleep study to be completed at his convenience.    Follow-up:  -With myself in 3 months in clinic.  Labs including BMP, proBNP and hemoglobin prior.  -With Dr. Frank in 6 months.        History of Presenting Illness:      Gene Pagan is a pleasant 75 year old patient who presents today for a core clinic follow-up visit.    The patient has a history of the following -   # History of probable tachycardia mediated CMP / biventricular HF, diagnosed April 2020 with EF 25-30% .  Now improved to 40-45% per echocardiogram in July 2020.  # CAF s/p AVN ablation + CRT-P on 4/10, anticoagulated with Eliquis.  # Asymptomatic nonsustained VT noted on device check. Nuclear stress test negative for ischemia September 2020.  # DMII  # CKD-III  # Urinary retention, BPH, with indwelling baker  # History of pyelonephritis sepsis    In brief,  Gene was admitted to [facility] 3/27/2020 from Urgent Care for acute onset hypoxic respiratory failure requiring intubation in the setting of acute heart failure and cardiogenic shock.  He was also found to be in rapid atrial fibrillation, and septic in the setting of acute pyelonephritis with acute on chronic bladder outlet obstruction resulting in acute on chronic renal failure and requiring placement of a chronic indwelling Mckenzie. Cardioversion was not successful and he was ultimately transferred to [facility] on 4/10 for AVN ablation and CRT-P.  Since that time, he is followed closely in the core clinic, and has demonstrated significant improvement in his heart function and functional class.  His most recent echocardiogram in July of this year showed an ejection fraction of 40 to 45%.    Recently, Gene had noted some mild progressive weight gain associated with abdominal bloating and leg swelling, and for this reason I increased his Bumex from 0.5 to 1 mg daily.  At his last visit, his weight was down 5 pounds and his symptoms had improved.  I made no changes to his regimen.  However, he also noted symptoms that were very consistent with diaphragmatic stimulation, after he accidentally hit his chest and in the area of his pacemaker site with his car door about a month prior.  This was most notable when trying to sleep on his right side at night.  I had him come in for device check and adjustment.  That took place on November 5, and his LV amplitude was adjusted with improvement in symptoms.  Also of note, on that check, he continued to have very adequate biventricular pacing at 99%, no ventricular arrhythmias, and a battery life of 10 years.    Gene was recently seen by his nephrologist Dr. Arrington on November 10.  They realized at that appointment, that instead of taking Bumex 1 mg daily, he had been taking 2 mg daily.  Despite this, his weight had actually gone back up 5 pounds (to 185 pounds at home, 188  pounds in clinic), and he again was noticing some mild abdominal swelling.  I unfortunately cannot access the notes from that visit, however Gene tells me that he was told by them to reduce to 1 mg daily based on my prior recommendations.  There were no labs drawn at that appointment.    He therefore had labs drawn at our clinic on November 12 (two days later), which showed a creatinine of 2.27, BUN of 54, a potassium of 4.9 and a sodium of 140.  The proBNP was also elevated at 1800.  Compared to his labs drawn last month, the proBNP is stable, and the creatinine has risen by 0.1.  Overall, since we met Gene in March, his creatinine has come down from a peak of nearly 6, and now appears to have settled at a new baseline around 2.3, according to nephrology notes.  This is felt mostly related to history of uncontrolled diabetes.  It is for this reason, the morning has not been on ACE inhibitor, ARB, or spironolactone.  We have had him on low-dose Imdur in the past, however he developed orthostatic symptoms with this and it was stopped.    Today, is actually the first time that I am meeting Gene in person in clinic.  He is doing pretty well overall.  His PNS symptoms have completely resolved.  He denies chest pain, shortness of breath, PND, orthopnea, edema, claudication, palpitations, near syncope or syncope.  However, he thinks he was doing a little better on the 2 mg bumex daily. His weight has remained stable at 185 lbs daily since he reduced it, however he has noticed reduced urine output and a little more abdominal bloating. He's walking 3 miles a day 4x per week without problem. 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  He keeps a log of his daily weights, blood pressures, blood sugars.  His weights have really only gone up by 3 pounds over the past 2 weeks, and his blood sugars look great.  His most recent A1c was 6.8%.  However, his blood pressures at home are all greater than 130 mmHg systolic.   "This is in contrast his blood pressure in clinic, which is very well controlled.  He tells me that his cuff is an arm cuff, but he has been using it on his forearm to avoid adjusting the size because his wife also uses it.         Review of Systems:     12-pt ROS is negative except for as noted in the HPI.          Physical Exam:     Vitals: /71   Pulse 70   Ht 1.74 m (5' 8.5\")   Wt 87 kg (191 lb 12.8 oz)   SpO2 97%   BMI 28.74 kg/m    Wt Readings from Last 10 Encounters:   11/17/20 87 kg (191 lb 12.8 oz)   10/23/20 83.6 kg (184 lb 6.4 oz)   09/25/20 83.2 kg (183 lb 6.4 oz)   09/11/20 83.9 kg (185 lb)   08/19/20 81.2 kg (179 lb)   05/28/20 78 kg (172 lb)   05/21/20 78 kg (172 lb)   05/20/20 78 kg (172 lb)   05/10/20 79.1 kg (174 lb 6.4 oz)   05/06/20 78.9 kg (174 lb)       Constitutional:  Patient is pleasant, alert, cooperative, and in NAD.  HEENT:  NCAT. PERRLA. EOM's intact.   Neck:  CVP appears normal. No carotid bruits.   Pulmonary: Normal respiratory effort. CTAB.   Cardiac: RRR, normal S1/S2, no S3/S4, no murmur or rub.   Abdomen:  Non-tender abdomen, no hepatosplenomegaly appreciated.   Vascular: Pulses in the upper and lower extremities are 2+ and equal bilaterally.  Extremities: No edema, erythema, cyanosis or tenderness appreciated. Mckenzie catheter in place.   Skin:  No rashes or lesions appreciated.   Neurological:  No gross motor or sensory deficits.   Psych: Appropriate affect.        Data:     Labs reviewed:  Recent Labs   Lab Test 11/12/20  0905 10/23/20  1032 10/23/20  1031 06/02/20  0809 05/05/20  1017 05/05/20  1017 03/27/20  1348 03/27/20  1348   LDL  --  98  --   --   --   --   --   --    HDL  --  33*  --   --   --   --   --   --    NHDL  --  133*  --   --   --   --   --   --    CHOL  --  166  --   --   --   --   --   --    TRIG  --  174*  --   --   --   --   --   --    TSH  --   --  1.37 1.33  --  1.96   < > 2.62   NTBNP 1,825*  --  1,839* 4,465*   < > 8,025*   < >  --    IRON  --   " --   --   --   --   --   --  27*   FEB  --   --   --   --   --   --   --  345   IRONSAT  --   --   --   --   --   --   --  8*   JAMI  --   --   --   --   --   --   --  49    < > = values in this interval not displayed.       Lab Results   Component Value Date    WBC 8.0 09/22/2020    RBC 3.98 (L) 09/22/2020    HGB 12.6 (L) 10/23/2020    HCT 35.7 (L) 09/22/2020    MCV 90 09/22/2020    MCH 27.9 09/22/2020    MCHC 31.1 (L) 09/22/2020    RDW 15.2 (H) 09/22/2020     09/22/2020       Lab Results   Component Value Date     11/12/2020    POTASSIUM 4.9 11/12/2020    CHLORIDE 107 11/12/2020    CO2 30 11/12/2020    ANIONGAP 3 11/12/2020     (H) 11/12/2020    BUN 54 (H) 11/12/2020    CR 2.27 (H) 11/12/2020    GFRESTIMATED 27 (L) 11/12/2020    GFRESTBLACK 31 (L) 11/12/2020    HARPER 8.7 11/12/2020      Lab Results   Component Value Date    AST 19 05/07/2020    ALT 19 05/07/2020       Lab Results   Component Value Date    A1C 6.8 (H) 10/23/2020       Lab Results   Component Value Date    INR 1.17 (H) 04/06/2020    INR 1.23 (H) 04/03/2020           Problem List:     Patient Active Problem List   Diagnosis     Atrial fibrillation with rapid ventricular response (H)     CHF (congestive heart failure) (H)     Urinary retention     Gross hematuria     BPH with obstruction/lower urinary tract symptoms     CAP (community acquired pneumonia)     CKD (chronic kidney disease) stage 3, GFR 30-59 ml/min     Diabetes mellitus, type 2 (H)           Medications:     Current Outpatient Medications   Medication Sig Dispense Refill     apixaban ANTICOAGULANT (ELIQUIS) 5 MG tablet Take 1 tablet (5 mg) by mouth 2 times daily 60 tablet 0     bumetanide (BUMEX) 1 MG tablet Take 1 tablet (1 mg) by mouth daily 90 tablet 3     Cholecalciferol (VITAMIN D3) 75 MCG (3000 UT) TABS Take 1 tablet by mouth daily       Ferrous Sulfate (IRON) 325 (65 Fe) MG tablet Take 1 tablet by mouth daily       insulin glargine (LANTUS SOLOSTAR) 100 UNIT/ML  pen Inject 9 Units Subcutaneous At Bedtime Future refills by PCP Dr. Martínez Duarte with phone number [phone]. 15 mL 0     melatonin 3 MG tablet Take 3 mg by mouth At Bedtime       metoprolol succinate ER (TOPROL-XL) 25 MG 24 hr tablet Take 25 mg by mouth daily        Multiple Vitamins-Minerals (MULTIVITAMIN ADULT PO) Take 1 tablet by mouth daily       tamsulosin (FLOMAX) 0.4 MG capsule Take 1 capsule (0.4 mg) by mouth At Bedtime 90 capsule 2     blood glucose (ONETOUCH ULTRA) test strip Use to test blood sugar 2 times daily or as directed. 200 strip 1     insulin pen needle (31G X 8 MM) 31G X 8 MM miscellaneous 1 Box of 100 insulin pen needles to be dispensed with every insulin pen prescription 100 each 1     oxyCODONE (ROXICODONE) 5 MG tablet Take 1 tablet (5 mg) by mouth every 6 hours as needed for breakthrough pain 5 tablet 0           Past Medical History:     Past Medical History:   Diagnosis Date     Anemia      Atrial fibrillation     AV node ablation and pacemaker placement 4/10/2020     BPH (benign prostatic hyperplasia)      Chronic diastolic CHF      Chronic kidney disease (CKD), stage III (moderate)      Diabetes mellitus - type 2      Palpitations      Systolic CHF (H)      Past Surgical History:   Procedure Laterality Date     ANESTHESIA CARDIOVERSION N/A 4/6/2020    Procedure: ANESTHESIA, FOR CARDIOVERSION;  Surgeon: GENERIC ANESTHESIA PROVIDER;  Location: [facility] OR     CYSTOSCOPY       EP PACEMAKER N/A 4/10/2020    Procedure: EP Pacemaker;  Surgeon: Abhay Willams MD;  Location: [facility] CARDIAC CATH LAB     [facility]  09/25/2020    Done in Urology office with Dr Perez     Family History   Problem Relation Age of Onset     Diabetes Sister      Social History     Socioeconomic History     Marital status: Single     Spouse name: Not on file     Number of children: Not on file     Years of education: Not on file     Highest education level: Not on file   Occupational History     Not on file    Social Needs     Financial resource strain: Not on file     Food insecurity     Worry: Not on file     Inability: Not on file     Transportation needs     Medical: Not on file     Non-medical: Not on file   Tobacco Use     Smoking status: Never Smoker     Smokeless tobacco: Never Used   Substance and Sexual Activity     Alcohol use: Yes     Comment: Rare     Drug use: Not on file     Sexual activity: Not Currently   Lifestyle     Physical activity     Days per week: Not on file     Minutes per session: Not on file     Stress: Not on file   Relationships     Social connections     Talks on phone: Not on file     Gets together: Not on file     Attends Mandaen service: Not on file     Active member of club or organization: Not on file     Attends meetings of clubs or organizations: Not on file     Relationship status: Not on file     Intimate partner violence     Fear of current or ex partner: Not on file     Emotionally abused: Not on file     Physically abused: Not on file     Forced sexual activity: Not on file   Other Topics Concern     Parent/sibling w/ CABG, MI or angioplasty before 65F 55M? Not Asked   Social History Narrative     Not on file           Allergies:   Patient has no known allergies.      Marsha Noonan PA-C  [facility]  Pager: [phone]

## 2020-11-17 NOTE — LETTER
11/17/2020    Dm Lipscomb MD, MD  [address]  [facility] [address]    RE: Gene Pagan       Dear Colleague,    I had the pleasure of seeing Gene Pagan in the [facility].    Cardiology Clinic Progress Note    Gene Pagan MRN# [id number]   YOB: 1945 Age: 75 year old   Primary cardiologist: Dr. Frank         Assessment and Plan:     In summary, Gene Pagan presents today for a core clinic follow-up visit.    1. 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 No ACE/ARB/[place] d/t ongoing renal dysfunction.  Prior orthostasis with low-dose Imdur.  Appears well compensated with FC I-II symptoms, however notes reduced urine output and some mild abdominal bloating since reducing his Bumex from 2 to 1 mg daily.  2. CRT-P with adequate BiV pacing and recent adjustments for diaphragmatic stimulation.  3. Chronic kidney disease with a baseline creatinine around 2.3, followed by Dr. Arrington.  4. HTN, controlled in clinic, slightly uncontrolled at home (but using BP cuff incorrectly).     Plan:  - Increase Bumex from 1 to 1.5 mg daily.  - Encouraged him to call me if his SBP is consistently >130 mmHg with repositioning of his cuff. In that case, would up-titrate his metoprolol from 25 daily to BID.   - I have ordered a sleep study to be completed at his convenience.    Follow-up:  -With myself in 3 months in clinic.  Labs including BMP, proBNP and hemoglobin prior.  -With Dr. Frank in 6 months.        History of Presenting Illness:      Gene Pagan is a pleasant 75 year old patient who presents today for a core clinic follow-up visit.    The patient has a history of the following -   # History of probable tachycardia mediated CMP / biventricular HF, diagnosed April 2020 with EF 25-30% .  Now improved to 40-45% per echocardiogram in July 2020.  # CAF s/p AVN ablation + CRT-P on 4/10, anticoagulated with  Eliquis.  # Asymptomatic nonsustained VT noted on device check. Nuclear stress test negative for ischemia September 2020.  # DMII  # CKD-III  # Urinary retention, BPH, with indwelling baker  # History of pyelonephritis sepsis    In brief, Gene was admitted to [facility] 3/27/2020 from Urgent Care for acute onset hypoxic respiratory failure requiring intubation in the setting of acute heart failure and cardiogenic shock.  He was also found to be in rapid atrial fibrillation, and septic in the setting of acute pyelonephritis with acute on chronic bladder outlet obstruction resulting in acute on chronic renal failure and requiring placement of a chronic indwelling Baker. Cardioversion was not successful and he was ultimately transferred to [facility] on 4/10 for AVN ablation and CRT-P.  Since that time, he is followed closely in the core clinic, and has demonstrated significant improvement in his heart function and functional class.  His most recent echocardiogram in July of this year showed an ejection fraction of 40 to 45%.    Recently, Gene had noted some mild progressive weight gain associated with abdominal bloating and leg swelling, and for this reason I increased his Bumex from 0.5 to 1 mg daily.  At his last visit, his weight was down 5 pounds and his symptoms had improved.  I made no changes to his regimen.  However, he also noted symptoms that were very consistent with diaphragmatic stimulation, after he accidentally hit his chest and in the area of his pacemaker site with his car door about a month prior.  This was most notable when trying to sleep on his right side at night.  I had him come in for device check and adjustment.  That took place on November 5, and his LV amplitude was adjusted with improvement in symptoms.  Also of note, on that check, he continued to have very adequate biventricular pacing at 99%, no ventricular arrhythmias, and a battery life of 10 years.    Gene was recently seen by his  nephrologist Dr. Arrington on November 10.  They realized at that appointment, that instead of taking Bumex 1 mg daily, he had been taking 2 mg daily.  Despite this, his weight had actually gone back up 5 pounds (to 185 pounds at home, 188 pounds in clinic), and he again was noticing some mild abdominal swelling.  I unfortunately cannot access the notes from that visit, however Gene tells me that he was told by them to reduce to 1 mg daily based on my prior recommendations.  There were no labs drawn at that appointment.    He therefore had labs drawn at our clinic on November 12 (two days later), which showed a creatinine of 2.27, BUN of 54, a potassium of 4.9 and a sodium of 140.  The proBNP was also elevated at 1800.  Compared to his labs drawn last month, the proBNP is stable, and the creatinine has risen by 0.1.  Overall, since we met Gene in March, his creatinine has come down from a peak of nearly 6, and now appears to have settled at a new baseline around 2.3, according to nephrology notes.  This is felt mostly related to history of uncontrolled diabetes.  It is for this reason, the morning has not been on ACE inhibitor, ARB, or spironolactone.  We have had him on low-dose Imdur in the past, however he developed orthostatic symptoms with this and it was stopped.    Today, is actually the first time that I am meeting Gene in person in clinic.  He is doing pretty well overall.  His PNS symptoms have completely resolved.  He denies chest pain, shortness of breath, PND, orthopnea, edema, claudication, palpitations, near syncope or syncope.  However, he thinks he was doing a little better on the 2 mg bumex daily. His weight has remained stable at 185 lbs daily since he reduced it, however he has noticed reduced urine output and a little more abdominal bloating. He's walking 3 miles a day 4x per week without problem. 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  He keeps a log of his daily weights, blood  "pressures, blood sugars.  His weights have really only gone up by 3 pounds over the past 2 weeks, and his blood sugars look great.  His most recent A1c was 6.8%.  However, his blood pressures at home are all greater than 130 mmHg systolic.  This is in contrast his blood pressure in clinic, which is very well controlled.  He tells me that his cuff is an arm cuff, but he has been using it on his forearm to avoid adjusting the size because his wife also uses it.         Review of Systems:     12-pt ROS is negative except for as noted in the HPI.          Physical Exam:     Vitals: /71   Pulse 70   Ht 1.74 m (5' 8.5\")   Wt 87 kg (191 lb 12.8 oz)   SpO2 97%   BMI 28.74 kg/m    Wt Readings from Last 10 Encounters:   11/17/20 87 kg (191 lb 12.8 oz)   10/23/20 83.6 kg (184 lb 6.4 oz)   09/25/20 83.2 kg (183 lb 6.4 oz)   09/11/20 83.9 kg (185 lb)   08/19/20 81.2 kg (179 lb)   05/28/20 78 kg (172 lb)   05/21/20 78 kg (172 lb)   05/20/20 78 kg (172 lb)   05/10/20 79.1 kg (174 lb 6.4 oz)   05/06/20 78.9 kg (174 lb)       Constitutional:  Patient is pleasant, alert, cooperative, and in NAD.  HEENT:  NCAT. PERRLA. EOM's intact.   Neck:  CVP appears normal. No carotid bruits.   Pulmonary: Normal respiratory effort. CTAB.   Cardiac: RRR, normal S1/S2, no S3/S4, no murmur or rub.   Abdomen:  Non-tender abdomen, no hepatosplenomegaly appreciated.   Vascular: Pulses in the upper and lower extremities are 2+ and equal bilaterally.  Extremities: No edema, erythema, cyanosis or tenderness appreciated. Mckenzie catheter in place.   Skin:  No rashes or lesions appreciated.   Neurological:  No gross motor or sensory deficits.   Psych: Appropriate affect.        Data:     Labs reviewed:  Recent Labs   Lab Test 11/12/20  0905 10/23/20  1032 10/23/20  1031 06/02/20  0809 05/05/20  1017 05/05/20  1017 03/27/20  1348 03/27/20  1348   LDL  --  98  --   --   --   --   --   --    HDL  --  33*  --   --   --   --   --   --    NHDL  --  133*  " --   --   --   --   --   --    CHOL  --  166  --   --   --   --   --   --    TRIG  --  174*  --   --   --   --   --   --    TSH  --   --  1.37 1.33  --  1.96   < > 2.62   NTBNP 1,825*  --  1,839* 4,465*   < > 8,025*   < >  --    IRON  --   --   --   --   --   --   --  27*   FEB  --   --   --   --   --   --   --  345   IRONSAT  --   --   --   --   --   --   --  8*   JAMI  --   --   --   --   --   --   --  49    < > = values in this interval not displayed.       Lab Results   Component Value Date    WBC 8.0 09/22/2020    RBC 3.98 (L) 09/22/2020    HGB 12.6 (L) 10/23/2020    HCT 35.7 (L) 09/22/2020    MCV 90 09/22/2020    MCH 27.9 09/22/2020    MCHC 31.1 (L) 09/22/2020    RDW 15.2 (H) 09/22/2020     09/22/2020       Lab Results   Component Value Date     11/12/2020    POTASSIUM 4.9 11/12/2020    CHLORIDE 107 11/12/2020    CO2 30 11/12/2020    ANIONGAP 3 11/12/2020     (H) 11/12/2020    BUN 54 (H) 11/12/2020    CR 2.27 (H) 11/12/2020    GFRESTIMATED 27 (L) 11/12/2020    GFRESTBLACK 31 (L) 11/12/2020    HARPER 8.7 11/12/2020      Lab Results   Component Value Date    AST 19 05/07/2020    ALT 19 05/07/2020       Lab Results   Component Value Date    A1C 6.8 (H) 10/23/2020       Lab Results   Component Value Date    INR 1.17 (H) 04/06/2020    INR 1.23 (H) 04/03/2020           Problem List:     Patient Active Problem List   Diagnosis     Atrial fibrillation with rapid ventricular response (H)     CHF (congestive heart failure) (H)     Urinary retention     Gross hematuria     BPH with obstruction/lower urinary tract symptoms     CAP (community acquired pneumonia)     CKD (chronic kidney disease) stage 3, GFR 30-59 ml/min     Diabetes mellitus, type 2 (H)           Medications:     Current Outpatient Medications   Medication Sig Dispense Refill     apixaban ANTICOAGULANT (ELIQUIS) 5 MG tablet Take 1 tablet (5 mg) by mouth 2 times daily 60 tablet 0     bumetanide (BUMEX) 1 MG tablet Take 1 tablet (1 mg) by  mouth daily 90 tablet 3     Cholecalciferol (VITAMIN D3) 75 MCG (3000 UT) TABS Take 1 tablet by mouth daily       Ferrous Sulfate (IRON) 325 (65 Fe) MG tablet Take 1 tablet by mouth daily       insulin glargine (LANTUS SOLOSTAR) 100 UNIT/ML pen Inject 9 Units Subcutaneous At Bedtime Future refills by PCP Dr. Martínez Duarte with phone number [phone]. 15 mL 0     melatonin 3 MG tablet Take 3 mg by mouth At Bedtime       metoprolol succinate ER (TOPROL-XL) 25 MG 24 hr tablet Take 25 mg by mouth daily        Multiple Vitamins-Minerals (MULTIVITAMIN ADULT PO) Take 1 tablet by mouth daily       tamsulosin (FLOMAX) 0.4 MG capsule Take 1 capsule (0.4 mg) by mouth At Bedtime 90 capsule 2     blood glucose (ONETOUCH ULTRA) test strip Use to test blood sugar 2 times daily or as directed. 200 strip 1     insulin pen needle (31G X 8 MM) 31G X 8 MM miscellaneous 1 Box of 100 insulin pen needles to be dispensed with every insulin pen prescription 100 each 1     oxyCODONE (ROXICODONE) 5 MG tablet Take 1 tablet (5 mg) by mouth every 6 hours as needed for breakthrough pain 5 tablet 0           Past Medical History:     Past Medical History:   Diagnosis Date     Anemia      Atrial fibrillation     AV node ablation and pacemaker placement 4/10/2020     BPH (benign prostatic hyperplasia)      Chronic diastolic CHF      Chronic kidney disease (CKD), stage III (moderate)      Diabetes mellitus - type 2      Palpitations      Systolic CHF (H)      Past Surgical History:   Procedure Laterality Date     ANESTHESIA CARDIOVERSION N/A 4/6/2020    Procedure: ANESTHESIA, FOR CARDIOVERSION;  Surgeon: GENERIC ANESTHESIA PROVIDER;  Location: [facility] OR     CYSTOSCOPY       EP PACEMAKER N/A 4/10/2020    Procedure: EP Pacemaker;  Surgeon: Abhay Willams MD;  Location:  HEART CARDIAC CATH LAB     [facility]  09/25/2020    Done in Urology office with Dr Perez     Family History   Problem Relation Age of Onset     Diabetes Sister      Social History      Socioeconomic History     Marital status: Single     Spouse name: Not on file     Number of children: Not on file     Years of education: Not on file     Highest education level: Not on file   Occupational History     Not on file   Social Needs     Financial resource strain: Not on file     Food insecurity     Worry: Not on file     Inability: Not on file     Transportation needs     Medical: Not on file     Non-medical: Not on file   Tobacco Use     Smoking status: Never Smoker     Smokeless tobacco: Never Used   Substance and Sexual Activity     Alcohol use: Yes     Comment: Rare     Drug use: Not on file     Sexual activity: Not Currently   Lifestyle     Physical activity     Days per week: Not on file     Minutes per session: Not on file     Stress: Not on file   Relationships     Social connections     Talks on phone: Not on file     Gets together: Not on file     Attends Spiritism service: Not on file     Active member of club or organization: Not on file     Attends meetings of clubs or organizations: Not on file     Relationship status: Not on file     Intimate partner violence     Fear of current or ex partner: Not on file     Emotionally abused: Not on file     Physically abused: Not on file     Forced sexual activity: Not on file   Other Topics Concern     Parent/sibling w/ CABG, MI or angioplasty before 65F 55M? Not Asked   Social History Narrative     Not on file           Allergies:   Patient has no known allergies.      Marsha Noonan PA-C  [facility]  Pager: [phone]        Thank you for allowing me to participate in the care of your patient.    Sincerely,     Marsha Noonan PA-C     [facility]

## 2020-11-17 NOTE — PATIENT INSTRUCTIONS
Today, we discussed the following:   - Results: Your labs show that your creatinine is near baseline at 2.3, your labs are otherwise suggestive of some mild dehydration. However, we will increase the bumex just slightly to 1.5 mg daily since you are noticing reduced urine output and a little abdominal bloating again.  - Follow up: With me in 3 months. With Dr. Frank in 6 months in [place].    In the meantime, please call me if your blood pressure is sitting >130 consistently with the cuff on your bicep (instead of forearm).    Please also see sleep medicine in consultation for sleep study prior to your follow up with Dr. Frank.  Someone from the clinic should contact you.    Please, remember to continue the followin.  Weigh yourself daily. Call if your weight is up > than 2 pounds in one day, or 5 pounds in one week; if you feel more short of breath or have worsening swelling in your legs or abdomen.  2.  Eat a low sodium diet (less than 2,000mg or 2g daily). If you eat less salt, you will retain less fluid.   3.  Avoid alcohol, as this can worsen heart failure.   4.  Avoid NSAIDs as able (For example, Ibuprofen / aleve / advil / naprosen / diclofenac).    Please call CORE nurse for any questions or concerns Mon-Fri 8am-4pm:   [phone]  For concerns after hours:   [phone] option 2   Scheduling phone number:   [phone]    Thank you for visiting with us today.   Marsha Noonan PA-C  ______________________________________________________________

## 2020-11-19 ENCOUNTER — ALLIED HEALTH/NURSE VISIT (OUTPATIENT)
Dept: UROLOGY | Facility: CLINIC | Age: 75
End: 2020-11-19
Payer: COMMERCIAL

## 2020-11-19 DIAGNOSIS — R33.9 URINARY RETENTION: Primary | ICD-10-CM

## 2020-11-19 PROCEDURE — 99211 OFF/OP EST MAY X REQ PHY/QHP: CPT

## 2020-11-19 NOTE — PROGRESS NOTES
Gene Pagan comes into clinic today at the request of Dr. Perez Ordering Provider for Catheter Removal/ TOV/ SIC re-teach.      Patient presents to clinic for a trial of void per MD order. Patient properly identified and procedure explained to patient. Patient's leg-bag emptied, 250cc's clear-yellow urine drained from patient's catheter.Catheter detached from leg-bag and sterile cysto tubing inserted into catheter opening. Via gravity 250cc's sterile water was gently instilled with brief pauses into bladder. Patient tolerated fairly well and then 8cc's was removed from balloon. 16 Fr. Mckenzie catheter was removed from bladder. Patient was not able to successfully void following procedure. Patient was then re-taught SIC. He demonstrated full knowledge or procedure. Patient had a little bit of blood with catheter removal after he completely emptied his bladder. Patient will monitor. Patient stated that he is also set-up for supplies, but will call our office if he needs supplies. Patient was taught today using a 14 Romanian coude-tip.     This service provided today was under the supervising provider of the day Dr. Vides, who was available if needed.    Mariella Garcia LPN

## 2020-12-03 ENCOUNTER — VIRTUAL VISIT (OUTPATIENT)
Dept: SLEEP MEDICINE | Facility: CLINIC | Age: 75
End: 2020-12-03
Payer: COMMERCIAL

## 2020-12-03 DIAGNOSIS — E11.9 TYPE 2 DIABETES MELLITUS WITHOUT COMPLICATION, WITH LONG-TERM CURRENT USE OF INSULIN (H): ICD-10-CM

## 2020-12-03 DIAGNOSIS — Z79.4 TYPE 2 DIABETES MELLITUS WITHOUT COMPLICATION, WITH LONG-TERM CURRENT USE OF INSULIN (H): ICD-10-CM

## 2020-12-03 DIAGNOSIS — I50.42 CHRONIC COMBINED SYSTOLIC AND DIASTOLIC HEART FAILURE (H): ICD-10-CM

## 2020-12-03 DIAGNOSIS — R06.83 SNORING: Primary | ICD-10-CM

## 2020-12-03 DIAGNOSIS — I10 ESSENTIAL HYPERTENSION: ICD-10-CM

## 2020-12-03 PROCEDURE — 99204 OFFICE O/P NEW MOD 45 MIN: CPT | Mod: 95 | Performed by: PEDIATRICS

## 2020-12-03 NOTE — PROGRESS NOTES
"Gene Pagan is a 75 year old male who is being evaluated via a billable video visit.      The patient has been notified of following:     \"This video visit will be conducted via a call between you and your physician/provider. We have found that certain health care needs can be provided without the need for an in-person physical exam.  This service lets us provide the care you need with a video conversation.  If a prescription is necessary we can send it directly to your pharmacy.  If lab work is needed we can place an order for that and you can then stop by our lab to have the test done at a later time.    Video visits are billed at different rates depending on your insurance coverage.  Please reach out to your insurance provider with any questions.    If during the course of the call the physician/provider feels a video visit is not appropriate, you will not be charged for this service.\"    Patient has given verbal consent for Video visit? Yes  How would you like to obtain your AVS? MyChart  If you are dropped from the video visit, the video invite should be resent to:   Will anyone else be joining your video visit? No        Video-Visit Details    Type of service:  Video Visit    Video Start Time: 1:00 PM  Video End Time: 1:28 PM    Originating Location (pt. Location): Home    Distant Location (provider location):  [facility]     Platform used for Video Visit: Bartolome Ray MD            "

## 2020-12-03 NOTE — PROGRESS NOTES
[facility]  Outpatient Sleep Medicine Consultation, New Patient      Name: Gene Pagan MRN# [id number]   Age: 75 year old YOB: 1945     Date of Consultation: December 3, 2020  Consultation is requested by: Marsha Noonan PA-C  [address]  [place] [address]  Primary care provider: Dm Lipscomb           Assessment and Plan:     1. Snoring  Patient is being evaluated for Obstructive Sleep Apnea (ANDRESSA), Central Sleep Apnea (CSA) and mixed sleep apnea.  Risk factors for ANDRESSA include:  snoring, witnessed apneas, high blood pressure, age > 50, and male gender.  Although an attended polysomnogram study would be ideal for diagnostic purposes, I felt that the risk in the COVID environment did not outweigh the benefit for him coming into the sleep lab.  Additionally, his sleep schedule is a bit atypical which may result in too little sleep captured on the overnight sleep study.  Thus, I recommended a home sleep apnea test.  The patient is agreeable to this and wishes to proceed.  He will follow up with me in approximately two weeks after his sleep study has been competed to review the results and discuss plan of care.    2. Essential hypertension  ANDRESSA is an independent risk factor for hypertension; risk for hypertension is related to severity of sleep apnea and episodic nocturnal hypoxemia.  Untreated ANDRESSA can cause loss of blood pressure dipping during nighttime sleep.  PAP therapy can improve blood pressure control in individuals with co-existing ANDRESSA and hypertension.    3. Chronic combined systolic and diastolic heart failure (H)  I discussed the negative and bidirectional interactions of untreated sleep apnea with heart conditions, including atrial fibrillation, congestive heart failure, and risk for myocardial infarction and stroke.  There is increased prevalence of sleep-disordered breathing (ANDRESSA, central sleep apnea, and Cheyne-Carmona  "Respirations) in patients with congestive heart failure.  ANDRESSA may contribute to worsening left ventricular function    4. Type 2 diabetes mellitus without complication, with long-term current use of insulin (H)  Untreated sleep apnea can interfere with diabetes management; once sleep apnea is treated, HbA1c may improve.           Chief Complaint/Reason for Consult:     \"Recommended by my doctor\"         History of Present Illness:     Gene Pagan is a 75 year old male whose visit was conducted via video today.  Review of his medical record shows a complex medical history which includes chronic combined systolic and diastolic heart failure, chronic atrial fibrillation status post AV node ablation and placement of a biventricular pacemaker, stage III chronic kidney disease, suboptimally controlled diabetes mellitus, and hypertension.  He was hospitalized in March of this year for approximately 2 months with respiratory failure, cardiogenic shock, acute on chronic heart failure, and atrial fibrillation with rapid ventricular rate.  He reports that during his hospitalization he had normal oxyhemoglobin saturations during sleep and did not set off the pulse oximeter alarm.  He slept at a slight incline, perhaps 5 to 10 degrees.  Since his hospitalization he has lost approximately 30 pounds which she attributes to diuresis.    I noted that his ejection fraction was in the 20s at the time of his acute illness but recovered to 40 to 45% demonstrated on echocardiogram in July 2020.  He reports that recent blood pressures at home have been 115-1 20 systolic over 58-65 diastolic when using the cuff on his upper arm.  His most recent A1c was 6.8%.  Bicarbonate level on November 12, 2020 was 30.    Patient reports no troubles with insomnia.  It is his habit to take melatonin, 3 mg nightly due to advanced sleep phase.  He accumulates 6 to 7 hours of sleep overnight and takes a nap during the day for 30 to 60 minutes.  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  He feels refreshed upon awakening.  He notes that snoring has been observed when he is sleeping in his recliner but he does not believe he snores when he is sleeping in bed.  He mostly sleeps on his sides.  His roommate has observed apneic events during his sleep from time to time.  He denies excessive daytime sleepiness and drowsy driving.  There is no family history of sleep problems, particularly sleep disordered breathing.  He does not describe any parasomnias or leg movement disorders.  He walks about 3 miles per day 4 days a week and has feels that he has recovered his health over the past year.           Medications:     Current Outpatient Medications   Medication Sig     apixaban ANTICOAGULANT (ELIQUIS) 5 MG tablet Take 1 tablet (5 mg) by mouth 2 times daily     blood glucose (ONETOUCH ULTRA) test strip Use to test blood sugar 2 times daily or as directed.     bumetanide (BUMEX) 0.5 MG tablet Take 1 tablet (0.5 mg) by mouth daily (+ additional 1 mg to = 1.5 mg daily).     bumetanide (BUMEX) 1 MG tablet Take 1 tablet (1 mg) by mouth daily (+ additional 0.5 mg to = 1.5 mg daily).     Cholecalciferol (VITAMIN D3) 75 MCG (3000 UT) TABS Take 1 tablet by mouth daily     Ferrous Sulfate (IRON) 325 (65 Fe) MG tablet Take 1 tablet by mouth daily     insulin glargine (LANTUS SOLOSTAR) 100 UNIT/ML pen Inject 9 Units Subcutaneous At Bedtime Future refills by PCP Dr. Martínez Duarte with phone number [phone].     insulin pen needle (31G X 8 MM) 31G X 8 MM miscellaneous 1 Box of 100 insulin pen needles to be dispensed with every insulin pen prescription     melatonin 3 MG tablet Take 3 mg by mouth At Bedtime     metoprolol succinate ER (TOPROL-XL) 25 MG 24 hr tablet Take 25 mg by mouth daily      Multiple Vitamins-Minerals (MULTIVITAMIN ADULT PO) Take 1 tablet by mouth daily     tamsulosin (FLOMAX) 0.4 MG capsule Take  1 capsule (0.4 mg) by mouth At Bedtime     No current facility-administered medications for this visit.      Facility-Administered Medications Ordered in Other Visits   Medication     HYDROcodone-acetaminophen (NORCO) 5-325 MG per tablet 1-2 tablet     Patient is already receiving anticoagulation with heparin, enoxaparin (LOVENOX), warfarin (COUMADIN)  or other anticoagulant medication     polyethylene glycol (MIRALAX) Packet 17 g        No Known Allergies         Past Medical History:     Past Medical History:   Diagnosis Date     Anemia      Atrial fibrillation     AV node ablation and pacemaker placement 4/10/2020     BPH (benign prostatic hyperplasia)      Chronic diastolic CHF      Chronic kidney disease (CKD), stage III (moderate)      Diabetes mellitus - type 2      Palpitations      Systolic CHF (H)              Past Surgical History:      Past Surgical History:   Procedure Laterality Date     ANESTHESIA CARDIOVERSION N/A 4/6/2020    Procedure: ANESTHESIA, FOR CARDIOVERSION;  Surgeon: GENERIC ANESTHESIA PROVIDER;  Location: [facility] OR     CYSTOSCOPY       EP PACEMAKER N/A 4/10/2020    Procedure: EP Pacemaker;  Surgeon: Abhay Willams MD;  Location:  HEART CARDIAC CATH LAB     [facility]  09/25/2020    Done in Urology office with Dr Perez            Social History:     Social History     Tobacco Use     Smoking status: Never Smoker     Smokeless tobacco: Never Used   Substance Use Topics     Alcohol use: Yes     Comment: Rare            Family History:     Family History   Problem Relation Age of Onset     Diabetes Sister              Review of Systems:     A complete 10 point review of systems was negative other than HPI or as commented below.         Physical Examination:   There were no vitals taken for this visit.     Constitutional:  Awake, alert, cooperative, in no apparent distress  Eyes: No icterus appreciated  Neck: Neck circumference does not appear enlarged  Pulmonary:  Respirations appear  comfortable  Skin:  No rash or significant lesions visible  Neurologic: Alert, oriented, no focal neurological deficit  Psychological: euthymic; affect appropriate            Data: All pertinent previous laboratory data reviewed     Lab Results   Component Value Date    PH 7.44 04/01/2020    PH 7.44 03/31/2020    PO2 104 04/01/2020    PO2 111 (H) 03/31/2020    PCO2 38 04/01/2020    PCO2 31 (L) 03/31/2020    HCO3 26 04/01/2020    HCO3 21 03/31/2020    JONH 1.8 04/01/2020     Lab Results   Component Value Date    TSH 1.37 10/23/2020    TSH 1.33 06/02/2020     Lab Results   Component Value Date     (H) 11/12/2020     (H) 10/23/2020     Lab Results   Component Value Date    HGB 12.6 (L) 10/23/2020    HGB 11.1 (L) 09/22/2020     Lab Results   Component Value Date    BUN 54 (H) 11/12/2020    BUN 48 (H) 10/23/2020    CR 2.27 (H) 11/12/2020    CR 2.14 (H) 10/23/2020     Lab Results   Component Value Date    AST 19 05/07/2020    AST 16 05/06/2020    ALT 19 05/07/2020    ALT 14 05/06/2020    ALKPHOS 112 05/07/2020    ALKPHOS 92 05/06/2020    BILITOTAL 0.7 05/07/2020    BILITOTAL 0.7 05/06/2020     No results found for: UAMP, UBARB, BENZODIAZEUR, UCANN, UCOC, OPIT, UPCP    Tory Ray MD   12/3/2020   [address], [address]  [place] [address]   [phone]    Copy to: Dm Lipscomb

## 2020-12-03 NOTE — PROGRESS NOTES
Gene has been schedule 1/14/21 for HST , and follow up with Dr. Yasir Ray is scheduled 1/28/21.        Kiya UNDERWOOD CMA [facility]

## 2020-12-06 ENCOUNTER — HEALTH MAINTENANCE LETTER (OUTPATIENT)
Age: 75
End: 2020-12-06

## 2020-12-08 ENCOUNTER — ANCILLARY PROCEDURE (OUTPATIENT)
Dept: CARDIOLOGY | Facility: CLINIC | Age: 75
End: 2020-12-08
Attending: INTERNAL MEDICINE
Payer: COMMERCIAL

## 2020-12-08 DIAGNOSIS — Z95.0 CARDIAC PACEMAKER IN SITU: ICD-10-CM

## 2020-12-08 LAB
MDC_IDC_EPISODE_DTM: NORMAL
MDC_IDC_EPISODE_DTM: NORMAL
MDC_IDC_EPISODE_ID: NORMAL
MDC_IDC_EPISODE_ID: NORMAL
MDC_IDC_EPISODE_TYPE: NORMAL
MDC_IDC_EPISODE_TYPE: NORMAL
MDC_IDC_LEAD_IMPLANT_DT: NORMAL
MDC_IDC_LEAD_LOCATION: NORMAL
MDC_IDC_LEAD_LOCATION_DETAIL_1: NORMAL
MDC_IDC_LEAD_MFG: NORMAL
MDC_IDC_LEAD_MODEL: NORMAL
MDC_IDC_LEAD_POLARITY_TYPE: NORMAL
MDC_IDC_LEAD_SERIAL: NORMAL
MDC_IDC_MSMT_BATTERY_DTM: NORMAL
MDC_IDC_MSMT_BATTERY_REMAINING_LONGEVITY: 114 MO
MDC_IDC_MSMT_BATTERY_REMAINING_PERCENTAGE: 100 %
MDC_IDC_MSMT_BATTERY_STATUS: NORMAL
MDC_IDC_MSMT_LEADCHNL_LV_IMPEDANCE_VALUE: 1010 OHM
MDC_IDC_MSMT_LEADCHNL_LV_PACING_THRESHOLD_AMPLITUDE: 1.4 V
MDC_IDC_MSMT_LEADCHNL_LV_PACING_THRESHOLD_PULSEWIDTH: 0.5 MS
MDC_IDC_MSMT_LEADCHNL_RA_IMPEDANCE_VALUE: 691 OHM
MDC_IDC_MSMT_LEADCHNL_RV_IMPEDANCE_VALUE: 771 OHM
MDC_IDC_MSMT_LEADCHNL_RV_PACING_THRESHOLD_AMPLITUDE: 0.8 V
MDC_IDC_MSMT_LEADCHNL_RV_PACING_THRESHOLD_PULSEWIDTH: 0.4 MS
MDC_IDC_PG_IMPLANT_DTM: NORMAL
MDC_IDC_PG_MFG: NORMAL
MDC_IDC_PG_MODEL: NORMAL
MDC_IDC_PG_SERIAL: NORMAL
MDC_IDC_PG_TYPE: NORMAL
MDC_IDC_SESS_CLINIC_NAME: NORMAL
MDC_IDC_SESS_DTM: NORMAL
MDC_IDC_SESS_TYPE: NORMAL
MDC_IDC_SET_BRADY_AT_MODE_SWITCH_MODE: NORMAL
MDC_IDC_SET_BRADY_AT_MODE_SWITCH_RATE: 140 {BEATS}/MIN
MDC_IDC_SET_BRADY_LOWRATE: 70 {BEATS}/MIN
MDC_IDC_SET_BRADY_MAX_SENSOR_RATE: 130 {BEATS}/MIN
MDC_IDC_SET_BRADY_MAX_TRACKING_RATE: 130 {BEATS}/MIN
MDC_IDC_SET_BRADY_MODE: NORMAL
MDC_IDC_SET_BRADY_PAV_DELAY_LOW: 180 MS
MDC_IDC_SET_BRADY_SAV_DELAY_LOW: 120 MS
MDC_IDC_SET_CRT_LVRV_DELAY: 30 MS
MDC_IDC_SET_CRT_PACED_CHAMBERS: NORMAL
MDC_IDC_SET_LEADCHNL_LV_PACING_AMPLITUDE: 2.4 V
MDC_IDC_SET_LEADCHNL_LV_PACING_PULSEWIDTH: 0.5 MS
MDC_IDC_SET_LEADCHNL_LV_SENSING_ADAPTATION_MODE: NORMAL
MDC_IDC_SET_LEADCHNL_LV_SENSING_SENSITIVITY: 1.5 MV
MDC_IDC_SET_LEADCHNL_RA_PACING_AMPLITUDE: 3.5 V
MDC_IDC_SET_LEADCHNL_RA_PACING_CAPTURE_MODE: NORMAL
MDC_IDC_SET_LEADCHNL_RA_PACING_POLARITY: NORMAL
MDC_IDC_SET_LEADCHNL_RA_PACING_PULSEWIDTH: 0.4 MS
MDC_IDC_SET_LEADCHNL_RA_SENSING_ADAPTATION_MODE: NORMAL
MDC_IDC_SET_LEADCHNL_RA_SENSING_POLARITY: NORMAL
MDC_IDC_SET_LEADCHNL_RA_SENSING_SENSITIVITY: 0.25 MV
MDC_IDC_SET_LEADCHNL_RV_PACING_AMPLITUDE: 2 V
MDC_IDC_SET_LEADCHNL_RV_PACING_CAPTURE_MODE: NORMAL
MDC_IDC_SET_LEADCHNL_RV_PACING_POLARITY: NORMAL
MDC_IDC_SET_LEADCHNL_RV_PACING_PULSEWIDTH: 0.4 MS
MDC_IDC_SET_LEADCHNL_RV_SENSING_ADAPTATION_MODE: NORMAL
MDC_IDC_SET_LEADCHNL_RV_SENSING_POLARITY: NORMAL
MDC_IDC_SET_LEADCHNL_RV_SENSING_SENSITIVITY: 1.5 MV
MDC_IDC_SET_ZONE_DETECTION_INTERVAL: 375 MS
MDC_IDC_SET_ZONE_TYPE: NORMAL
MDC_IDC_SET_ZONE_VENDOR_TYPE: NORMAL
MDC_IDC_STAT_AT_BURDEN_PERCENT: 100 %
MDC_IDC_STAT_AT_DTM_END: NORMAL
MDC_IDC_STAT_AT_DTM_START: NORMAL
MDC_IDC_STAT_BRADY_DTM_END: NORMAL
MDC_IDC_STAT_BRADY_DTM_START: NORMAL
MDC_IDC_STAT_BRADY_RA_PERCENT_PACED: 0 %
MDC_IDC_STAT_BRADY_RV_PERCENT_PACED: 100 %
MDC_IDC_STAT_CRT_DTM_END: NORMAL
MDC_IDC_STAT_CRT_DTM_START: NORMAL
MDC_IDC_STAT_CRT_LV_PERCENT_PACED: 99 %
MDC_IDC_STAT_EPISODE_RECENT_COUNT: 0
MDC_IDC_STAT_EPISODE_RECENT_COUNT_DTM_END: NORMAL
MDC_IDC_STAT_EPISODE_RECENT_COUNT_DTM_START: NORMAL
MDC_IDC_STAT_EPISODE_TYPE: NORMAL
MDC_IDC_STAT_EPISODE_VENDOR_TYPE: NORMAL

## 2020-12-08 PROCEDURE — 93296 REM INTERROG EVL PM/IDS: CPT | Performed by: INTERNAL MEDICINE

## 2020-12-08 PROCEDURE — 93294 REM INTERROG EVL PM/LDLS PM: CPT | Performed by: INTERNAL MEDICINE

## 2020-12-16 ENCOUNTER — TELEPHONE (OUTPATIENT)
Dept: INTERNAL MEDICINE | Facility: CLINIC | Age: 75
End: 2020-12-16

## 2020-12-17 ENCOUNTER — MEDICAL CORRESPONDENCE (OUTPATIENT)
Dept: HEALTH INFORMATION MANAGEMENT | Facility: CLINIC | Age: 75
End: 2020-12-17

## 2020-12-17 DIAGNOSIS — D64.9 ANEMIA, UNSPECIFIED: ICD-10-CM

## 2020-12-17 DIAGNOSIS — N18.30 CHRONIC KIDNEY DISEASE, STAGE III (MODERATE) (H): Primary | ICD-10-CM

## 2020-12-17 DIAGNOSIS — R30.0 DYSURIA: ICD-10-CM

## 2020-12-21 ENCOUNTER — HOSPITAL ENCOUNTER (OUTPATIENT)
Dept: LAB | Facility: CLINIC | Age: 75
Discharge: HOME | End: 2020-12-21
Attending: NURSE PRACTITIONER | Admitting: NURSE PRACTITIONER
Payer: COMMERCIAL

## 2020-12-21 DIAGNOSIS — D64.9 ANEMIA, UNSPECIFIED: ICD-10-CM

## 2020-12-21 DIAGNOSIS — N18.30 CHRONIC KIDNEY DISEASE, STAGE III (MODERATE) (H): ICD-10-CM

## 2020-12-21 DIAGNOSIS — R30.0 DYSURIA: ICD-10-CM

## 2020-12-21 LAB
ALBUMIN SERPL-MCNC: 3.7 G/DL (ref 3.4–5)
ALBUMIN UR-MCNC: 70 MG/DL
ANION GAP SERPL CALCULATED.3IONS-SCNC: 4 MMOL/L (ref 3–14)
APPEARANCE UR: ABNORMAL
BACTERIA #/AREA URNS HPF: ABNORMAL /HPF
BILIRUB UR QL STRIP: NEGATIVE
BUN SERPL-MCNC: 52 MG/DL (ref 7–30)
CALCIUM SERPL-MCNC: 9 MG/DL (ref 8.5–10.1)
CHLORIDE SERPL-SCNC: 102 MMOL/L (ref 94–109)
CO2 SERPL-SCNC: 30 MMOL/L (ref 20–32)
COLOR UR AUTO: YELLOW
CREAT SERPL-MCNC: 2.27 MG/DL (ref 0.66–1.25)
FERRITIN SERPL-MCNC: 88 NG/ML (ref 26–388)
GFR SERPL CREATININE-BSD FRML MDRD: 27 ML/MIN/{1.73_M2}
GLUCOSE SERPL-MCNC: 123 MG/DL (ref 70–99)
GLUCOSE UR STRIP-MCNC: NEGATIVE MG/DL
HGB UR QL STRIP: ABNORMAL
IRON SATN MFR SERPL: 23 % (ref 15–46)
IRON SERPL-MCNC: 82 UG/DL (ref 35–180)
KETONES UR STRIP-MCNC: NEGATIVE MG/DL
LEUKOCYTE ESTERASE UR QL STRIP: ABNORMAL
NITRATE UR QL: POSITIVE
PH UR STRIP: 6 PH (ref 5–7)
PHOSPHATE SERPL-MCNC: 4.2 MG/DL (ref 2.5–4.5)
POTASSIUM SERPL-SCNC: 3.9 MMOL/L (ref 3.4–5.3)
RBC #/AREA URNS AUTO: 58 /HPF (ref 0–2)
SODIUM SERPL-SCNC: 136 MMOL/L (ref 133–144)
SOURCE: ABNORMAL
SP GR UR STRIP: 1.01 (ref 1–1.03)
TIBC SERPL-MCNC: 355 UG/DL (ref 240–430)
TRANS CELLS #/AREA URNS HPF: 16 /HPF (ref 0–1)
UROBILINOGEN UR STRIP-MCNC: NORMAL MG/DL (ref 0–2)
WBC #/AREA URNS AUTO: >182 /HPF (ref 0–5)
WBC CLUMPS #/AREA URNS HPF: PRESENT /HPF

## 2020-12-21 PROCEDURE — 83540 ASSAY OF IRON: CPT | Performed by: NURSE PRACTITIONER

## 2020-12-21 PROCEDURE — 80069 RENAL FUNCTION PANEL: CPT | Performed by: NURSE PRACTITIONER

## 2020-12-21 PROCEDURE — 82728 ASSAY OF FERRITIN: CPT | Performed by: NURSE PRACTITIONER

## 2020-12-21 PROCEDURE — 83550 IRON BINDING TEST: CPT | Performed by: NURSE PRACTITIONER

## 2020-12-21 PROCEDURE — 87186 SC STD MICRODIL/AGAR DIL: CPT | Performed by: NURSE PRACTITIONER

## 2020-12-21 PROCEDURE — 87086 URINE CULTURE/COLONY COUNT: CPT | Performed by: NURSE PRACTITIONER

## 2020-12-21 PROCEDURE — 36415 COLL VENOUS BLD VENIPUNCTURE: CPT | Performed by: NURSE PRACTITIONER

## 2020-12-21 PROCEDURE — 87088 URINE BACTERIA CULTURE: CPT | Performed by: NURSE PRACTITIONER

## 2020-12-21 PROCEDURE — 81001 URINALYSIS AUTO W/SCOPE: CPT | Performed by: NURSE PRACTITIONER

## 2020-12-24 LAB
BACTERIA SPEC CULT: ABNORMAL
BACTERIA SPEC CULT: ABNORMAL
Lab: ABNORMAL
SPECIMEN SOURCE: ABNORMAL

## 2020-12-28 ENCOUNTER — MEDICAL CORRESPONDENCE (OUTPATIENT)
Dept: HEALTH INFORMATION MANAGEMENT | Facility: CLINIC | Age: 75
End: 2020-12-28

## 2020-12-29 DIAGNOSIS — N39.0 URINARY TRACT INFECTION, SITE NOT SPECIFIED: Primary | ICD-10-CM

## 2021-01-21 ENCOUNTER — OFFICE VISIT (OUTPATIENT)
Dept: SLEEP MEDICINE | Facility: CLINIC | Age: 76
End: 2021-01-21
Payer: COMMERCIAL

## 2021-01-21 ENCOUNTER — HOSPITAL ENCOUNTER (OUTPATIENT)
Dept: LAB | Facility: CLINIC | Age: 76
Discharge: HOME | End: 2021-01-21
Attending: INTERNAL MEDICINE | Admitting: NURSE PRACTITIONER
Payer: COMMERCIAL

## 2021-01-21 ENCOUNTER — DOCUMENTATION ONLY (OUTPATIENT)
Dept: SLEEP MEDICINE | Facility: CLINIC | Age: 76
End: 2021-01-21

## 2021-01-21 DIAGNOSIS — N39.0 URINARY TRACT INFECTION, SITE NOT SPECIFIED: ICD-10-CM

## 2021-01-21 DIAGNOSIS — G47.33 OSA (OBSTRUCTIVE SLEEP APNEA): Primary | ICD-10-CM

## 2021-01-21 LAB
ALBUMIN UR-MCNC: 50 MG/DL
APPEARANCE UR: ABNORMAL
BILIRUB UR QL STRIP: NEGATIVE
COLOR UR AUTO: YELLOW
GLUCOSE UR STRIP-MCNC: NEGATIVE MG/DL
HGB UR QL STRIP: ABNORMAL
KETONES UR STRIP-MCNC: NEGATIVE MG/DL
LEUKOCYTE ESTERASE UR QL STRIP: ABNORMAL
NITRATE UR QL: NEGATIVE
PH UR STRIP: 6 PH (ref 5–7)
RBC #/AREA URNS AUTO: 23 /HPF (ref 0–2)
SOURCE: ABNORMAL
SP GR UR STRIP: 1.01 (ref 1–1.03)
UROBILINOGEN UR STRIP-MCNC: NORMAL MG/DL (ref 0–2)
WBC #/AREA URNS AUTO: >182 /HPF (ref 0–5)

## 2021-01-21 PROCEDURE — 87088 URINE BACTERIA CULTURE: CPT | Performed by: NURSE PRACTITIONER

## 2021-01-21 PROCEDURE — 87086 URINE CULTURE/COLONY COUNT: CPT | Performed by: NURSE PRACTITIONER

## 2021-01-21 PROCEDURE — 87186 SC STD MICRODIL/AGAR DIL: CPT | Performed by: NURSE PRACTITIONER

## 2021-01-21 PROCEDURE — G0399 HOME SLEEP TEST/TYPE 3 PORTA: HCPCS | Performed by: PEDIATRICS

## 2021-01-21 PROCEDURE — 81001 URINALYSIS AUTO W/SCOPE: CPT | Performed by: NURSE PRACTITIONER

## 2021-01-22 ENCOUNTER — DOCUMENTATION ONLY (OUTPATIENT)
Dept: SLEEP MEDICINE | Facility: CLINIC | Age: 76
End: 2021-01-22
Payer: COMMERCIAL

## 2021-01-22 NOTE — PROGRESS NOTES
HST POST-STUDY QUESTIONNAIRE    1. What time did you go to bed?  1145 pm  2. How long do you think it took to fall asleep?  Few minutes  3. What time did you wake up to start the day?  0730  4. Did you get up during the night at all?  yes  5. If you woke up, do you remember approximately what time(s)? 1230, 0130, 0330, 0530  6. Did you have any difficulty with the equipment?  No  7. Did you us any type of treatment with this study?  None  8. Was the head of the bed elevated? No  9. Did you sleep in a recliner?  No  10. Did you stop using CPAP at least 3 days before this test?  NA  11. Any other information you'd like us to know? Normally will get up 3x during the night to use bathroom.

## 2021-01-22 NOTE — PROGRESS NOTES
This HSAT was performed using a Noxturnal T3 device which recorded snore, sound, movement activity, body position, nasal pressure, oronasal thermal airflow, pulse, oximetry and both chest and abdominal respiratory effort. HSAT data was restricted to the time patient states they were in bed.     HSAT was scored using 1B 4% hypopnea rule.     AHI: 22.3.Snoring was reported as loud.  Time with SpO2 below 89% was 10.9 minutes.   Overall signal quality was good     Pt will follow up with sleep provider to determine appropriate therapy.     Ordering Provider, Tory Ray MD Charles O. BA, [facility], RST System Clinical Specialist 1/22/2021

## 2021-01-23 LAB
BACTERIA SPEC CULT: ABNORMAL
Lab: ABNORMAL
SPECIMEN SOURCE: ABNORMAL

## 2021-01-25 ENCOUNTER — TRANSFERRED RECORDS (OUTPATIENT)
Dept: HEALTH INFORMATION MANAGEMENT | Facility: CLINIC | Age: 76
End: 2021-01-25

## 2021-01-26 ENCOUNTER — VIRTUAL VISIT (OUTPATIENT)
Dept: INTERNAL MEDICINE | Facility: CLINIC | Age: 76
End: 2021-01-26
Payer: COMMERCIAL

## 2021-01-26 VITALS — HEIGHT: 69 IN | BODY MASS INDEX: 28.74 KG/M2

## 2021-01-26 DIAGNOSIS — E78.5 HYPERLIPIDEMIA LDL GOAL <100: ICD-10-CM

## 2021-01-26 DIAGNOSIS — I77.810 THORACIC AORTIC ECTASIA (H): ICD-10-CM

## 2021-01-26 DIAGNOSIS — I50.42 CHRONIC COMBINED SYSTOLIC AND DIASTOLIC HEART FAILURE (H): ICD-10-CM

## 2021-01-26 DIAGNOSIS — N18.4 STAGE 4 CHRONIC KIDNEY DISEASE (H): ICD-10-CM

## 2021-01-26 DIAGNOSIS — Z79.4 TYPE 2 DIABETES MELLITUS WITH STAGE 4 CHRONIC KIDNEY DISEASE, WITH LONG-TERM CURRENT USE OF INSULIN (H): Primary | ICD-10-CM

## 2021-01-26 DIAGNOSIS — E11.69 TYPE 2 DIABETES MELLITUS WITH OTHER SPECIFIED COMPLICATION, UNSPECIFIED WHETHER LONG TERM INSULIN USE (H): ICD-10-CM

## 2021-01-26 DIAGNOSIS — N18.4 TYPE 2 DIABETES MELLITUS WITH STAGE 4 CHRONIC KIDNEY DISEASE, WITH LONG-TERM CURRENT USE OF INSULIN (H): Primary | ICD-10-CM

## 2021-01-26 DIAGNOSIS — I48.91 ATRIAL FIBRILLATION, UNSPECIFIED TYPE (H): ICD-10-CM

## 2021-01-26 DIAGNOSIS — E11.22 TYPE 2 DIABETES MELLITUS WITH STAGE 4 CHRONIC KIDNEY DISEASE, WITH LONG-TERM CURRENT USE OF INSULIN (H): Primary | ICD-10-CM

## 2021-01-26 PROCEDURE — 99214 OFFICE O/P EST MOD 30 MIN: CPT | Mod: 95 | Performed by: INTERNAL MEDICINE

## 2021-01-26 NOTE — PROCEDURES
"HOME SLEEP STUDY INTERPRETATION    Patient: Gene Pagan  MRN: [id number]  YOB: 1945  Study Date: 2021  Referring Provider: mD Lipscomb  Ordering Provider: Tory Ray MD     Indications for Home Study: Gene Pagan is a 75 year old male with a history of chronic combined systolic and diastolic heart failure, chronic atrial fibrillation status post AV node ablation and placement of a biventricular pacemaker, stage III chronic kidney disease, suboptimally controlled diabetes mellitus, and hypertension who presents with symptoms suggestive of obstructive sleep apnea.    Estimated body mass index is 28.74 kg/m  as calculated from the following:    Height as of 21: 1.74 m (5' 8.5\").    Weight as of 20: 87 kg (191 lb 12.8 oz).  No data recorded  STOP-BAN/8    Data: A full night home sleep study was performed recording the standard physiologic parameters including body position, movement, sound, nasal pressure, thermal oral airflow, chest and abdominal movements with respiratory inductance plethysmography, and oxygen saturation by pulse oximetry. Pulse rate was estimated by oximetry recording. This study was considered adequate based on > 4 hours of quality oximetry and respiratory recording. As specified by the AASM Manual for the Scoring of Sleep and Associated events, version 2.3, Rule VIII.D 1B, 4% oxygen desaturation scoring for hypopneas is used as a standard of care on all home sleep apnea testing.    Analysis Time: 457.8 minutes    Respiration:   Sleep Associated Hypoxemia: sustained hypoxemia was present. Baseline oxygen saturation was 99.5%.  Time with saturation less than or equal to 88% was 5.8 minutes. The lowest oxygen saturation was 80%.   Snoring: Snoring was present.  Respiratory events: The home study revealed a presence of 61 obstructive apneas and 54 mixed and central apneas. There were 55 hypopneas resulting in a combined apnea/hypopnea index " [AHI] of 22.3 events per hour.  AHI was 69.4 per hour supine, 13.4 per hour on left side, and 10.5 per hour on right side.   Pattern: Excluding events noted above, respiratory rate and pattern was Normal.    Position: Percent of time spent: supine -18.7%, prone -0%, on left -26.5%, on right -54.8%.    Heart Rate: By pulse oximetry normal rate was noted.     Assessment:   Moderate obstructive sleep apnea.  Sleep associated hypoxemia was present.    Recommendations:  Consider auto-CPAP at 5-15 cmH2O.  Suggest optimizing sleep hygiene and avoiding sleep deprivation.  Weight management.    Diagnosis Code(s): Obstructive Sleep Apnea G47.33, Hypoxemia G47.36    Tory Ray MD, January 26, 2021   Diplomate, American Board of Pediatrics, Sleep Medicine

## 2021-01-26 NOTE — PROGRESS NOTES
Gene is a 75 year old who is being evaluated via a billable video visit.      How would you like to obtain your AVS? Mail a copy  If the video visit is dropped, the invitation should be resent by: Text to cell phone: [phone]  Will anyone else be joining your video visit? No      Video Start Time: 1101    Assessment & Plan   (E11.22,  N18.4,  Z79.4) Type 2 diabetes mellitus with stage 4 chronic kidney disease, with long-term current use of insulin (H)  (primary encounter diagnosis)  (E11.69) Type 2 diabetes mellitus with other specified complication, unspecified whether long term insulin use (H)  Comment: Raise dose of Lantus by one unit each day. Return for labs in three months.     (I50.42) Chronic combined systolic and diastolic heart failure (H)  (I48.91) Atrial fibrillation, unspecified type (H)  (I77.810) Thoracic aortic ectasia (H)  Comment: F/u with cardiology as scheduled. Completed form for student loan deferment.     (N18.4) Stage 4 chronic kidney disease (H)  Comment: F/u with nephrology as they advise.     (E78.5) Hyperlipidemia LDL goal <100  Check lipids with next lab draw.     Video visit advised in three months with labs in advance.     Dm Lipscomb MD,   [facility]    Yessica Mills is a 75 year old who presents to clinic today for the following health issues   Patient is being seen for an follow up diabetes.  HPI       Diabetes Follow-up    How often are you checking your blood sugar? Two times daily  Blood sugar testing frequency justification:  Uncontrolled diabetes  What time of day are you checking your blood sugars (select all that apply)?  Before meals  Have you had any blood sugars above 200?  Yes   Have you had any blood sugars below 70?  No    What symptoms do you notice when your blood sugar is low?  None    What concerns do you have today about your diabetes? None     Do you have any of these symptoms? (Select all that apply)  No numbness or  "tingling in feet.  No redness, sores or blisters on feet.  No complaints of excessive thirst.  No reports of blurry vision.  No significant changes to weight.    Have you had a diabetic eye exam in the last 12 months? No        BP Readings from Last 2 Encounters:   11/17/20 127/71   10/23/20 125/69     Hemoglobin A1C (%)   Date Value   10/23/2020 6.8 (H)   03/27/2020 6.2 (H)     LDL Cholesterol Calculated (mg/dL)   Date Value   10/23/2020 98         How many servings of fruits and vegetables do you eat daily?  2-3    On average, how many sweetened beverages do you drink each day (Examples: soda, juice, sweet tea, etc.  Do NOT count diet or artificially sweetened beverages)?   0    How many days per week do you exercise enough to make your heart beat faster? 4    How many minutes a day do you exercise enough to make your heart beat faster? 30 - 60    How many days per week do you miss taking your medication? 0    The patient checks glucoses twice daily. AM glucoses usually 120-130's, as high as 160-190's before or after dinner. Taking Lantus at 10 units daily--discussed option of raising dose by one unit.   Need to update A1c in about three months.     He saw Dr Christine of nephrology on 1/18/2021--no med changes and follow up in 4 months advised.     He had a home sleep study on 1/22/2021 and awaits a virtual visit with Dr Ray of Sleep Clinic on 1/28 to review results.     Past medical, family and social histories as well as medications reviewed and updated as needed.    Review of Systems   Occasional cough \"from my sinuses\", with no purulent sputum.   No dyspnea. No chest discomfort, dizziness or palpitations. No diarrhea, abdominal pain or rectal bleeding.   No acute problems with vision or speech, lateralizing weakness or paresthesias.    ROS: as above or negative for Respiratory, CV, GI, endocrine, neuro systems.       Objective         Vitals:  No vitals were obtained today due to virtual visit.    Physical " Exam   GENERAL: Healthy, alert and no distress  EYES: Eyes grossly normal to inspection.  No discharge or erythema, or obvious scleral/conjunctival abnormalities.  RESP: No audible wheeze, cough, or visible cyanosis.  No visible retractions or increased work of breathing.    SKIN: Visible skin clear. No significant rash, abnormal pigmentation or lesions.  NEURO: Cranial nerves grossly intact.  Mentation and speech appropriate for age.  PSYCH: Mentation appears normal, affect normal/bright, judgement and insight intact, normal speech and appearance well-groomed.           Video-Visit Details    Type of service:  Video Visit    Video End Time: 1110    Originating Location (pt. Location): Home    Distant Location (provider location):  [facility]     Platform used for Video Visit: [employer]

## 2021-01-28 ENCOUNTER — TELEPHONE (OUTPATIENT)
Dept: INTERNAL MEDICINE | Facility: CLINIC | Age: 76
End: 2021-01-28

## 2021-01-28 ENCOUNTER — VIRTUAL VISIT (OUTPATIENT)
Dept: SLEEP MEDICINE | Facility: CLINIC | Age: 76
End: 2021-01-28
Payer: COMMERCIAL

## 2021-01-28 VITALS — BODY MASS INDEX: 26.66 KG/M2 | WEIGHT: 180 LBS | HEIGHT: 69 IN

## 2021-01-28 DIAGNOSIS — G47.33 OSA (OBSTRUCTIVE SLEEP APNEA): Primary | ICD-10-CM

## 2021-01-28 DIAGNOSIS — R09.02 HYPOXEMIA: ICD-10-CM

## 2021-01-28 DIAGNOSIS — M79.10 MUSCLE PAIN: Primary | ICD-10-CM

## 2021-01-28 DIAGNOSIS — I50.20 SYSTOLIC CONGESTIVE HEART FAILURE, UNSPECIFIED HF CHRONICITY (H): ICD-10-CM

## 2021-01-28 PROCEDURE — 99214 OFFICE O/P EST MOD 30 MIN: CPT | Mod: 95 | Performed by: PEDIATRICS

## 2021-01-28 ASSESSMENT — MIFFLIN-ST. JEOR: SCORE: 1523.91

## 2021-01-28 NOTE — TELEPHONE ENCOUNTER
Patient started working at [employer] in Sept. 2020 and occasionally has what he calls muscle pain in low back and shoulders.  Tylenol usually helps but in those instances when it does not relieve his pain he would like a prescription for Tramadol.   Stage 3 kidney disease, cannot use Ibuprofen.  TIFFANIE Holder R.N.

## 2021-01-28 NOTE — PATIENT INSTRUCTIONS
Referral for eval and management of ANDRESSA with OAT/MAD.     device for sleep apnea.    RTC after fit and adjustment for home testing for confirmation of treatment efficacy.    Oral Appliance Therapy (OAT) for Sleep Apnea  Oral appliances are custom dental mouth pieces that fit on the upper and lower teeth. They are used to treat snoring  and obstructive sleep apnea . The device prevents the airway from collapsing by holding the jaw in a forward position. Oral appliance therapy (OAT) involves the customization, selection, fabrication, fitting, adjustments and long-term follow-up care by an experienced dentist.    Custom made oral appliances are proven to be more effective than over-the-counter devices. Therefore, the over-the-counter devices are recommended not to be used as a screening tool nor as a therapeutic option.    Who gets a dental device?  Oral appliance therapy can be used as an alternative to  CPAP therapy for the treatment of mild sleep apnea and for those patients who prefer OAT to CPAP. Oral appliance therapy is a first line therapy for the treatment of primary snoring. Additionally, OAT is an option for those that cannot tolerate CPAP as therapy or who have experienced insufficient surgical results.      Possible side effects?  Frequent but minor side effects include: excessive salivation, dry mouth, discomfort of teeth and jaw and temporary changes in the patient's bite.  Potential complications include: jaw pain, permanent occlusal changes and TMJ symptoms.  The above mentioned side effects and complications can be recognized and managed by dentists trained in dental sleep medicine.    Finding a dentist that practices dental sleep medicine  Specific training is available through the American Academy of Dental Sleep Medicine for dentists interested in working in the field of sleep. To find a dentist who is educated in the field of sleep and the use of oral appliances, near you, visit the Web site  "of the American Academy of Dental Sleep Medicine; also see http://www.accpstorage.org/newOrganization/patients/oralAppliances.pdf      Some of the local dentists that construct custom dental appliances for sleep apnea or snoring are listed below.    [facility]  [employer] of [employer]istry  Bryan Ewing DDS, MS, PhD  [phone]    Dr. Luís Cleaning [facility], MSD  [employer]  [address] [place] [address]  Tel: [phone]  Fax: [phone]    Dr. Martine Serrato DDS, PhD  [facility]  [email or web address]  [address] [place] [address] [phone]    [address]  [address]  [place] [address] [phone]      Useful information about the process.    - This is a short list of dentists that gave us their contact information and have expressed interest in this area of dental sleep medicine; your own dentist may be able to provide more contacts if needed.     - Not all insurance companies cover dental devices for the treatment of sleep apnea or snoring. You should check your coverage policy to know if a dental device is covered or not.    -Some dental devices can be bought \"over-the-counter\" and do not require a prescription or an evaluation to get the device. Using these types of devices is not recommended without the supervision of a qualified dental provider.    -Depending on the dental appliance, it may take several weeks from the time you start using the appliance to achieve the final position.      -It is important to verify improvement of your sleep apnea at the end of that process; please contact your sleep medicine provider once you are acclimated to your OAT.     "

## 2021-01-28 NOTE — PROGRESS NOTES
Gene is a 75 year old who is being evaluated via a billable video visit.      How would you like to obtain your AVS? MyChart  If the video visit is dropped, the invitation should be resent by: Text to cell phone: [phone]  Will anyone else be joining your video visit? Kaitlin Roberson CMA          Video Start Time: 2:00 PM  Video-Visit Details    Type of service:  Video Visit    Video End Time:2:19 PM    Originating Location (pt. Location): Home    Distant Location (provider location):  [facility]     Platform used for Video Visit: [employer]     [facility]  Outpatient Sleep Medicine Encounter, Established Patient      Name: Gene Pagan MRN# [id number]   Age: 75 year old YOB: 1945     Date of Visit: January 28, 2021  Primary care provider: mD Lipscomb    Assessment and Plan:   1. ANDRESSA (obstructive sleep apnea)  Today I submitted a prescription for AutoPAP therapy, 5 to 15 cm water pressure, patient's choice of PAP mask, and all associated consumable supplies.  I counseled the patient to use the treatment nightly and throughout the entire sleep period.  The Sleep Therapy Management (STM) group will contact the patient as per protocol for support and troubleshooting.  Patient will be asked to schedule a follow-up appointment in 8 weeks to determine clinical progress with the therapy and assess adherence.    I also included information in the patient's after visit summary about dentist experienced in constructing oral appliance devices for sleep apnea.    2. Hypoxemia  Patient demonstrated nocturnal hypoxemia on the home sleep apnea test.  However, desaturations were episodic related to untreated sleep apnea.  I would expect that with adequate use of Pap therapy saturations would normalize.  Further testing would be indicated if persistent hypoxemia was suspected.    3. Systolic congestive heart failure, unspecified HF  chronicity (H)  I discussed the negative and bidirectional interactions of untreated sleep apnea with heart conditions, including atrial fibrillation, congestive heart failure, and risk for myocardial infarction and stroke.      History:Jude Pagan is a 75 year old male with a history of chronic combined systolic and diastolic heart failure, chronic atrial fibrillation status post AV node ablation and placement of a biventricular pacemaker, stage III chronic kidney disease, suboptimally controlled diabetes mellitus, and hypertension who presents with symptoms suggestive of obstructive sleep apnea.    He had a home sleep apnea test on January 21, 2021 (weight 191 pounds, BMI 28.7).  I reviewed the results of the study: The respiratory event index was 22.3/h of recording time.  There was worsening in the supine position with 69.4 airway obstructions per hour of supine sleep.  The average oxyhemoglobin saturation was 99.5%.  There were 5.8 minutes with saturations less than or equal to 88%.    I discussed treatment options at length with the patient.  He reports that he is not terribly enthusiastic about proceeding with CPAP treatment but understands its relevant in the context of his medical history.  He would also like to explore the possibility of oral appliance therapy.    Medications:     Current Outpatient Medications   Medication Sig     apixaban ANTICOAGULANT (ELIQUIS) 5 MG tablet Take 1 tablet (5 mg) by mouth 2 times daily     blood glucose (ONETOUCH ULTRA) test strip Use to test blood sugar 2 times daily or as directed.     bumetanide (BUMEX) 0.5 MG tablet Take 1 tablet (0.5 mg) by mouth daily (+ additional 1 mg to = 1.5 mg daily).     bumetanide (BUMEX) 1 MG tablet Take 1 tablet (1 mg) by mouth daily (+ additional 0.5 mg to = 1.5 mg daily).     Cholecalciferol (VITAMIN D3) 75 MCG (3000 UT) TABS Take 1 tablet by mouth daily     Ferrous Sulfate (IRON) 325 (65 Fe) MG tablet Take 1 tablet by mouth  "daily     insulin glargine (LANTUS SOLOSTAR) 100 UNIT/ML pen Inject 9 Units Subcutaneous At Bedtime Future refills by PCP Dr. Martínez Duarte with phone number [phone]. (Patient taking differently: Inject 11 Units Subcutaneous At Bedtime Future refills by PCP Dr. Martínez Duarte with phone number [phone].)     insulin pen needle (31G X 8 MM) 31G X 8 MM miscellaneous 1 Box of 100 insulin pen needles to be dispensed with every insulin pen prescription     melatonin 3 MG tablet Take 3 mg by mouth At Bedtime     metoprolol succinate ER (TOPROL-XL) 25 MG 24 hr tablet Take 25 mg by mouth daily      Multiple Vitamins-Minerals (MULTIVITAMIN ADULT PO) Take 1 tablet by mouth daily     tamsulosin (FLOMAX) 0.4 MG capsule Take 1 capsule (0.4 mg) by mouth At Bedtime     No current facility-administered medications for this visit.      Facility-Administered Medications Ordered in Other Visits   Medication     HYDROcodone-acetaminophen (NORCO) 5-325 MG per tablet 1-2 tablet     Patient is already receiving anticoagulation with heparin, enoxaparin (LOVENOX), warfarin (COUMADIN)  or other anticoagulant medication     polyethylene glycol (MIRALAX) Packet 17 g        No Known Allergies    Past Medical History:     Past Medical History:   Diagnosis Date     Anemia      Atrial fibrillation     AV node ablation and pacemaker placement 4/10/2020     BPH (benign prostatic hyperplasia)      Chronic diastolic CHF      Chronic kidney disease (CKD), stage III (moderate)      Diabetes mellitus - type 2      Palpitations      Systolic CHF (H)         Physical Examination:   Ht 1.74 m (5' 8.5\")   Wt 81.6 kg (180 lb)   BMI 26.97 kg/m                 Tory Ray MD   1/28/2021   [address], [address]  [place] [address] [phone]    Copy to: Dm Lipscomb "

## 2021-01-28 NOTE — TELEPHONE ENCOUNTER
RN--please contact patient.   I've just completed a form for student loan deferment that basically stated he was incapable of doing any physical work--what is his job description for [employer]?

## 2021-02-04 RX ORDER — TRAMADOL HYDROCHLORIDE 50 MG/1
50 TABLET ORAL EVERY 6 HOURS PRN
Qty: 10 TABLET | Refills: 0 | Status: SHIPPED | OUTPATIENT
Start: 2021-02-04 | End: 2021-02-07

## 2021-02-20 ENCOUNTER — HEALTH MAINTENANCE LETTER (OUTPATIENT)
Age: 76
End: 2021-02-20

## 2021-03-04 ENCOUNTER — DOCUMENTATION ONLY (OUTPATIENT)
Dept: HOME HEALTH SERVICES | Facility: CLINIC | Age: 76
End: 2021-03-04

## 2021-03-04 NOTE — PROGRESS NOTES
Patient came to [facility] today with a Respironics System One machine that was donated to him.  I set it to his prescribed pressures of 5-15 with a ramp of 5 for 30 minutes, showed him how to adjust comfort settings and how to attach and detach everything and gave him instructions on cleaning and how often his insurance will cover replacement supplies.

## 2021-03-30 ENCOUNTER — ANCILLARY PROCEDURE (OUTPATIENT)
Dept: CARDIOLOGY | Facility: CLINIC | Age: 76
End: 2021-03-30
Attending: INTERNAL MEDICINE
Payer: COMMERCIAL

## 2021-03-30 DIAGNOSIS — Z95.0 CARDIAC PACEMAKER IN SITU: ICD-10-CM

## 2021-03-30 DIAGNOSIS — Z98.890 S/P AV NODAL ABLATION: Primary | ICD-10-CM

## 2021-03-30 PROCEDURE — 93296 REM INTERROG EVL PM/IDS: CPT | Performed by: INTERNAL MEDICINE

## 2021-03-30 PROCEDURE — 93294 REM INTERROG EVL PM/LDLS PM: CPT | Performed by: INTERNAL MEDICINE

## 2021-04-05 DIAGNOSIS — E11.69 TYPE 2 DIABETES MELLITUS WITH OTHER SPECIFIED COMPLICATION, UNSPECIFIED WHETHER LONG TERM INSULIN USE (H): ICD-10-CM

## 2021-04-06 ENCOUNTER — HOSPITAL ENCOUNTER (OUTPATIENT)
Dept: LAB | Facility: CLINIC | Age: 76
Discharge: HOME | End: 2021-04-06
Attending: INTERNAL MEDICINE | Admitting: INTERNAL MEDICINE
Payer: COMMERCIAL

## 2021-04-06 DIAGNOSIS — Z79.4 TYPE 2 DIABETES MELLITUS WITH STAGE 4 CHRONIC KIDNEY DISEASE, WITH LONG-TERM CURRENT USE OF INSULIN (H): ICD-10-CM

## 2021-04-06 DIAGNOSIS — N18.4 TYPE 2 DIABETES MELLITUS WITH STAGE 4 CHRONIC KIDNEY DISEASE, WITH LONG-TERM CURRENT USE OF INSULIN (H): ICD-10-CM

## 2021-04-06 DIAGNOSIS — E78.5 HYPERLIPIDEMIA LDL GOAL <100: ICD-10-CM

## 2021-04-06 DIAGNOSIS — I50.42 CHRONIC COMBINED SYSTOLIC AND DIASTOLIC HEART FAILURE (H): ICD-10-CM

## 2021-04-06 DIAGNOSIS — E11.22 TYPE 2 DIABETES MELLITUS WITH STAGE 4 CHRONIC KIDNEY DISEASE, WITH LONG-TERM CURRENT USE OF INSULIN (H): ICD-10-CM

## 2021-04-06 LAB
ALBUMIN SERPL-MCNC: 3.5 G/DL (ref 3.4–5)
ALP SERPL-CCNC: 89 U/L (ref 40–150)
ALT SERPL W P-5'-P-CCNC: 21 U/L (ref 0–70)
ANION GAP SERPL CALCULATED.3IONS-SCNC: 4 MMOL/L (ref 3–14)
AST SERPL W P-5'-P-CCNC: 16 U/L (ref 0–45)
BILIRUB SERPL-MCNC: 1.1 MG/DL (ref 0.2–1.3)
BUN SERPL-MCNC: 48 MG/DL (ref 7–30)
CALCIUM SERPL-MCNC: 8.9 MG/DL (ref 8.5–10.1)
CHLORIDE SERPL-SCNC: 106 MMOL/L (ref 94–109)
CHOLEST SERPL-MCNC: 136 MG/DL
CO2 SERPL-SCNC: 29 MMOL/L (ref 20–32)
CREAT SERPL-MCNC: 2.3 MG/DL (ref 0.66–1.25)
GFR SERPL CREATININE-BSD FRML MDRD: 26 ML/MIN/{1.73_M2}
GLUCOSE SERPL-MCNC: 105 MG/DL (ref 70–99)
HBA1C MFR BLD: 6.4 % (ref 0–5.6)
HDLC SERPL-MCNC: 37 MG/DL
HGB BLD-MCNC: 12.3 G/DL (ref 13.3–17.7)
LDLC SERPL CALC-MCNC: 76 MG/DL
NONHDLC SERPL-MCNC: 99 MG/DL
NT-PROBNP SERPL-MCNC: 1985 PG/ML (ref 0–450)
POTASSIUM SERPL-SCNC: 4 MMOL/L (ref 3.4–5.3)
PROT SERPL-MCNC: 7.4 G/DL (ref 6.8–8.8)
SODIUM SERPL-SCNC: 139 MMOL/L (ref 133–144)
TRIGL SERPL-MCNC: 117 MG/DL
TSH SERPL DL<=0.005 MIU/L-ACNC: 1.28 MU/L (ref 0.4–4)

## 2021-04-06 PROCEDURE — 80053 COMPREHEN METABOLIC PANEL: CPT | Performed by: INTERNAL MEDICINE

## 2021-04-06 PROCEDURE — 85018 HEMOGLOBIN: CPT | Performed by: INTERNAL MEDICINE

## 2021-04-06 PROCEDURE — 36415 COLL VENOUS BLD VENIPUNCTURE: CPT | Performed by: INTERNAL MEDICINE

## 2021-04-06 PROCEDURE — 80061 LIPID PANEL: CPT | Performed by: INTERNAL MEDICINE

## 2021-04-06 PROCEDURE — 84443 ASSAY THYROID STIM HORMONE: CPT | Performed by: INTERNAL MEDICINE

## 2021-04-06 PROCEDURE — 83036 HEMOGLOBIN GLYCOSYLATED A1C: CPT | Performed by: INTERNAL MEDICINE

## 2021-04-06 PROCEDURE — 83880 ASSAY OF NATRIURETIC PEPTIDE: CPT | Performed by: INTERNAL MEDICINE

## 2021-04-06 RX ORDER — BLOOD SUGAR DIAGNOSTIC
STRIP MISCELLANEOUS
Qty: 200 STRIP | Refills: 1 | Status: SHIPPED | OUTPATIENT
Start: 2021-04-06 | End: 2021-10-28

## 2021-04-13 LAB
MDC_IDC_EPISODE_DTM: NORMAL
MDC_IDC_EPISODE_DURATION: 13 S
MDC_IDC_EPISODE_ID: NORMAL
MDC_IDC_EPISODE_TYPE: NORMAL
MDC_IDC_LEAD_IMPLANT_DT: NORMAL
MDC_IDC_LEAD_LOCATION: NORMAL
MDC_IDC_LEAD_LOCATION_DETAIL_1: NORMAL
MDC_IDC_LEAD_MFG: NORMAL
MDC_IDC_LEAD_MODEL: NORMAL
MDC_IDC_LEAD_POLARITY_TYPE: NORMAL
MDC_IDC_LEAD_SERIAL: NORMAL
MDC_IDC_MSMT_BATTERY_DTM: NORMAL
MDC_IDC_MSMT_BATTERY_REMAINING_LONGEVITY: 114 MO
MDC_IDC_MSMT_BATTERY_REMAINING_PERCENTAGE: 100 %
MDC_IDC_MSMT_BATTERY_STATUS: NORMAL
MDC_IDC_MSMT_LEADCHNL_LV_IMPEDANCE_VALUE: 955 OHM
MDC_IDC_MSMT_LEADCHNL_LV_PACING_THRESHOLD_AMPLITUDE: 1.4 V
MDC_IDC_MSMT_LEADCHNL_LV_PACING_THRESHOLD_PULSEWIDTH: 0.5 MS
MDC_IDC_MSMT_LEADCHNL_RA_IMPEDANCE_VALUE: 779 OHM
MDC_IDC_MSMT_LEADCHNL_RA_LEAD_CHANNEL_STATUS: NORMAL
MDC_IDC_MSMT_LEADCHNL_RV_IMPEDANCE_VALUE: 831 OHM
MDC_IDC_MSMT_LEADCHNL_RV_PACING_THRESHOLD_AMPLITUDE: 0.8 V
MDC_IDC_MSMT_LEADCHNL_RV_PACING_THRESHOLD_PULSEWIDTH: 0.4 MS
MDC_IDC_PG_IMPLANT_DTM: NORMAL
MDC_IDC_PG_MFG: NORMAL
MDC_IDC_PG_MODEL: NORMAL
MDC_IDC_PG_SERIAL: NORMAL
MDC_IDC_PG_TYPE: NORMAL
MDC_IDC_SESS_CLINIC_NAME: NORMAL
MDC_IDC_SESS_DTM: NORMAL
MDC_IDC_SESS_TYPE: NORMAL
MDC_IDC_SET_BRADY_AT_MODE_SWITCH_MODE: NORMAL
MDC_IDC_SET_BRADY_AT_MODE_SWITCH_RATE: 140 {BEATS}/MIN
MDC_IDC_SET_BRADY_LOWRATE: 70 {BEATS}/MIN
MDC_IDC_SET_BRADY_MAX_SENSOR_RATE: 130 {BEATS}/MIN
MDC_IDC_SET_BRADY_MAX_TRACKING_RATE: 130 {BEATS}/MIN
MDC_IDC_SET_BRADY_MODE: NORMAL
MDC_IDC_SET_BRADY_PAV_DELAY_LOW: 180 MS
MDC_IDC_SET_BRADY_SAV_DELAY_LOW: 120 MS
MDC_IDC_SET_CRT_LVRV_DELAY: 30 MS
MDC_IDC_SET_CRT_PACED_CHAMBERS: NORMAL
MDC_IDC_SET_LEADCHNL_LV_PACING_AMPLITUDE: 2.4 V
MDC_IDC_SET_LEADCHNL_LV_PACING_PULSEWIDTH: 0.5 MS
MDC_IDC_SET_LEADCHNL_LV_SENSING_ADAPTATION_MODE: NORMAL
MDC_IDC_SET_LEADCHNL_LV_SENSING_SENSITIVITY: 1.5 MV
MDC_IDC_SET_LEADCHNL_RA_PACING_AMPLITUDE: 3.5 V
MDC_IDC_SET_LEADCHNL_RA_PACING_CAPTURE_MODE: NORMAL
MDC_IDC_SET_LEADCHNL_RA_PACING_POLARITY: NORMAL
MDC_IDC_SET_LEADCHNL_RA_PACING_PULSEWIDTH: 0.4 MS
MDC_IDC_SET_LEADCHNL_RA_SENSING_ADAPTATION_MODE: NORMAL
MDC_IDC_SET_LEADCHNL_RA_SENSING_POLARITY: NORMAL
MDC_IDC_SET_LEADCHNL_RA_SENSING_SENSITIVITY: 0.25 MV
MDC_IDC_SET_LEADCHNL_RV_PACING_AMPLITUDE: 2 V
MDC_IDC_SET_LEADCHNL_RV_PACING_CAPTURE_MODE: NORMAL
MDC_IDC_SET_LEADCHNL_RV_PACING_POLARITY: NORMAL
MDC_IDC_SET_LEADCHNL_RV_PACING_PULSEWIDTH: 0.4 MS
MDC_IDC_SET_LEADCHNL_RV_SENSING_ADAPTATION_MODE: NORMAL
MDC_IDC_SET_LEADCHNL_RV_SENSING_POLARITY: NORMAL
MDC_IDC_SET_LEADCHNL_RV_SENSING_SENSITIVITY: 1.5 MV
MDC_IDC_SET_ZONE_DETECTION_INTERVAL: 375 MS
MDC_IDC_SET_ZONE_TYPE: NORMAL
MDC_IDC_SET_ZONE_VENDOR_TYPE: NORMAL
MDC_IDC_STAT_AT_BURDEN_PERCENT: 100 %
MDC_IDC_STAT_AT_DTM_END: NORMAL
MDC_IDC_STAT_AT_DTM_START: NORMAL
MDC_IDC_STAT_BRADY_DTM_END: NORMAL
MDC_IDC_STAT_BRADY_DTM_START: NORMAL
MDC_IDC_STAT_BRADY_RA_PERCENT_PACED: 0 %
MDC_IDC_STAT_BRADY_RV_PERCENT_PACED: 99 %
MDC_IDC_STAT_CRT_DTM_END: NORMAL
MDC_IDC_STAT_CRT_DTM_START: NORMAL
MDC_IDC_STAT_CRT_LV_PERCENT_PACED: 99 %
MDC_IDC_STAT_EPISODE_RECENT_COUNT: 0
MDC_IDC_STAT_EPISODE_RECENT_COUNT: 1
MDC_IDC_STAT_EPISODE_RECENT_COUNT_DTM_END: NORMAL
MDC_IDC_STAT_EPISODE_RECENT_COUNT_DTM_START: NORMAL
MDC_IDC_STAT_EPISODE_TYPE: NORMAL
MDC_IDC_STAT_EPISODE_VENDOR_TYPE: NORMAL

## 2021-04-16 ENCOUNTER — TELEPHONE (OUTPATIENT)
Dept: INTERNAL MEDICINE | Facility: CLINIC | Age: 76
End: 2021-04-16

## 2021-04-16 ENCOUNTER — VIRTUAL VISIT (OUTPATIENT)
Dept: INTERNAL MEDICINE | Facility: CLINIC | Age: 76
End: 2021-04-16
Payer: COMMERCIAL

## 2021-04-16 DIAGNOSIS — E11.69 TYPE 2 DIABETES MELLITUS WITH OTHER SPECIFIED COMPLICATION, UNSPECIFIED WHETHER LONG TERM INSULIN USE (H): ICD-10-CM

## 2021-04-16 DIAGNOSIS — E11.22 TYPE 2 DIABETES MELLITUS WITH STAGE 4 CHRONIC KIDNEY DISEASE, WITH LONG-TERM CURRENT USE OF INSULIN (H): Primary | ICD-10-CM

## 2021-04-16 DIAGNOSIS — I50.42 CHRONIC COMBINED SYSTOLIC AND DIASTOLIC HEART FAILURE (H): ICD-10-CM

## 2021-04-16 DIAGNOSIS — N13.8 BPH WITH OBSTRUCTION/LOWER URINARY TRACT SYMPTOMS: ICD-10-CM

## 2021-04-16 DIAGNOSIS — N40.1 BPH WITH OBSTRUCTION/LOWER URINARY TRACT SYMPTOMS: ICD-10-CM

## 2021-04-16 DIAGNOSIS — R32 URINARY INCONTINENCE, UNSPECIFIED TYPE: Primary | ICD-10-CM

## 2021-04-16 DIAGNOSIS — N18.4 TYPE 2 DIABETES MELLITUS WITH STAGE 4 CHRONIC KIDNEY DISEASE, WITH LONG-TERM CURRENT USE OF INSULIN (H): Primary | ICD-10-CM

## 2021-04-16 DIAGNOSIS — G47.33 OSA (OBSTRUCTIVE SLEEP APNEA): ICD-10-CM

## 2021-04-16 DIAGNOSIS — Z79.4 TYPE 2 DIABETES MELLITUS WITH STAGE 4 CHRONIC KIDNEY DISEASE, WITH LONG-TERM CURRENT USE OF INSULIN (H): Primary | ICD-10-CM

## 2021-04-16 PROBLEM — E66.01 MORBID (SEVERE) OBESITY DUE TO EXCESS CALORIES (H): Status: ACTIVE | Noted: 2017-09-26

## 2021-04-16 PROBLEM — H52.03 HYPEROPIA OF BOTH EYES WITH ASTIGMATISM AND PRESBYOPIA: Status: ACTIVE | Noted: 2019-11-18

## 2021-04-16 PROBLEM — I50.32 CHRONIC DIASTOLIC HEART FAILURE (H): Status: ACTIVE | Noted: 2020-04-27

## 2021-04-16 PROBLEM — Z00.00 ROUTINE HEALTH MAINTENANCE: Status: ACTIVE | Noted: 2017-04-01

## 2021-04-16 PROBLEM — H52.4 HYPEROPIA OF BOTH EYES WITH ASTIGMATISM AND PRESBYOPIA: Status: ACTIVE | Noted: 2019-11-18

## 2021-04-16 PROBLEM — E66.01 MORBID (SEVERE) OBESITY DUE TO EXCESS CALORIES (H): Status: RESOLVED | Noted: 2017-09-26 | Resolved: 2021-04-16

## 2021-04-16 PROBLEM — H52.203 HYPEROPIA OF BOTH EYES WITH ASTIGMATISM AND PRESBYOPIA: Status: ACTIVE | Noted: 2019-11-18

## 2021-04-16 PROBLEM — E11.21 DIABETIC NEPHROPATHY (H): Status: ACTIVE | Noted: 2021-01-18

## 2021-04-16 PROBLEM — H25.13 NUCLEAR SENILE CATARACT OF BOTH EYES: Status: ACTIVE | Noted: 2017-10-09

## 2021-04-16 PROCEDURE — 99214 OFFICE O/P EST MOD 30 MIN: CPT | Mod: 95 | Performed by: INTERNAL MEDICINE

## 2021-04-16 RX ORDER — BUMETANIDE 1 MG/1
1 TABLET ORAL DAILY
Qty: 90 TABLET | Refills: 3 | Status: CANCELLED | OUTPATIENT
Start: 2021-04-16

## 2021-04-16 RX ORDER — TAMSULOSIN HYDROCHLORIDE 0.4 MG/1
0.4 CAPSULE ORAL AT BEDTIME
Qty: 90 CAPSULE | Refills: 3 | Status: SHIPPED | OUTPATIENT
Start: 2021-04-16 | End: 2022-02-17

## 2021-04-16 RX ORDER — BUMETANIDE 0.5 MG/1
0.5 TABLET ORAL DAILY
Qty: 90 TABLET | Refills: 3 | Status: CANCELLED | OUTPATIENT
Start: 2021-04-16

## 2021-04-16 RX ORDER — METOPROLOL SUCCINATE 25 MG/1
25 TABLET, EXTENDED RELEASE ORAL DAILY
Qty: 90 TABLET | Refills: 3 | Status: SHIPPED | OUTPATIENT
Start: 2021-04-16 | End: 2022-02-17

## 2021-04-16 NOTE — PATIENT INSTRUCTIONS
"Diabetes, LDL-cholesterol, and heart failure all appear under control.   I've sent refills to your pharmacy of meds that appeared to require refills .    The sleep apnea may be harmful to your heart and lung status even without causing obvious symptoms. If unable to use the CPAP, the sleep specialist strongly recommends that you pursue getting an \"oral airway appliance\" (prescription mouthguard made especially for your mouth) to wear at night.     See me for a face-to-face office visit in six months--we can update your diabetes test at that time.             This is some information provided by the sleep specialist about pursuing the oral airway appliance:    Finding a dentist that practices dental sleep medicine  Specific training is available through the American Academy of Dental Sleep Medicine for dentists interested in working in the field of sleep. To find a dentist who is educated in the field of sleep and the use of oral appliances, near you, visit the Web site of the American Academy of Dental Sleep Medicine; also see http://www.accpstorage.org/newOrganization/patients/oralAppliances.pdf        Some of the local dentists that construct custom dental appliances for sleep apnea or snoring are listed below.     [facility]  School of Dentistry  Bryan Ewing DDS, MS, PhD  [phone]     Dr. Luís Cleaning [facility]  [employer]  [address] [place] [address]  Tel: [phone]  Fax: [phone]     Dr. Martine Serrato DDS, PhD  [facility]  [email or web address]  [address]  [place], [place] [address] [phone]     [address]  [address]  [place] [address] [phone]         "

## 2021-04-16 NOTE — PROGRESS NOTES
"Gene is a 75 year old who is being evaluated via a billable video visit.      How would you like to obtain your AVS? Mail a copy  If the video visit is dropped, the invitation should be resent by: Text to cell phone: [phone]  Will anyone else be joining your video visit? No    Video Start Time: 0911    Assessment & Plan   (E11.22,  N18.4,  Z79.4) Type 2 diabetes mellitus with stage 4 chronic kidney disease, with long-term current use of insulin (H)  (primary encounter diagnosis)  Comment: A1c at target. Continue current measures and medications.   Plan: Albumin Random Urine Quantitative with Creat         Ratio, **A1C FUTURE 6mo          (E11.69) Type 2 diabetes mellitus with other specified complication, unspecified whether long term insulin use (H)  Comment: Refilled Lantus.   Plan: insulin glargine (LANTUS SOLOSTAR) 100 UNIT/ML         pen          (G47.33) ANDRESSA (obstructive sleep apnea)  Comment: Urged investigating use of a dental appliance. See pt instructions and epic orders.     (I50.42) Chronic combined systolic and diastolic heart failure (H)  Comment: Continue current meds. F/u with cardiology as planned.     (N40.1,  N13.8) BPH with obstruction/lower urinary tract symptoms  Comment: Refilled Flomax.   Plan: tamsulosin (FLOMAX) 0.4 MG capsule            Patient Instructions   Diabetes, LDL-cholesterol, and heart failure all appear under control.   I've sent refills to your pharmacy of meds that appeared to require refills .    The sleep apnea may be harmful to your heart and lung status even without causing obvious symptoms. If unable to use the CPAP, the sleep specialist strongly recommends that you pursue getting an \"oral airway appliance\" (prescription mouthguard made especially for your mouth) to wear at night.     See me for a face-to-face office visit in six months--we can update your diabetes test at that time.             This is some information provided by the sleep specialist about " pursuing the oral airway appliance:    Finding a dentist that practices dental sleep medicine  Specific training is available through the American Academy of Dental Sleep Medicine for dentists interested in working in the field of sleep. To find a dentist who is educated in the field of sleep and the use of oral appliances, near you, visit the Web site of the American Academy of Dental Sleep Medicine; also see http://www.accpstorage.org/newOrganization/patients/oralAppliances.pdf        Some of the local dentists that construct custom dental appliances for sleep apnea or snoring are listed below.     [facility]  School of Dentistry  Bryan Ewing DDS, MS, PhD  [phone]     Dr. Luís Cleaning [facility], [facility]  [employer]  [address] [place] [address]  Tel: [phone]  Fax: [phone]     Dr. Martine Serrato DDS, PhD  [facility]  [email or web address]  [address] [place] [address] [phone]     [address]  [address]  [place] [address] [phone]             Follow-up with me in six months with labs in advance, call or schedule a follow-up appointment sooner if any problems.     Dm Lipscomb MD,   [facility] VICENTE Mills is a 75 year old who presents for the following health issues     Patient is being seen for an follow up.  HPI     Diabetes Follow-up    How often are you checking your blood sugar? Two times daily  Blood sugar testing frequency justification:  Uncontrolled diabetes  What time of day are you checking your blood sugars (select all that apply)?  After meals and Before  Have you had any blood sugars above 200?  Yes   Have you had any blood sugars below 70?  No    What symptoms do you notice when your blood sugar is low?  None    What concerns do you have today about your diabetes? None     Do you have any of these symptoms? (Select all that apply)  No numbness or tingling in feet.  No  redness, sores or blisters on feet.  No complaints of excessive thirst.  No reports of blurry vision.  No significant changes to weight.    Have you had a diabetic eye exam in the last 12 months? No        BP Readings from Last 2 Encounters:   11/17/20 127/71   10/23/20 125/69     Hemoglobin A1C (%)   Date Value   04/06/2021 6.4 (H)   10/23/2020 6.8 (H)     LDL Cholesterol Calculated (mg/dL)   Date Value   04/06/2021 76   10/23/2020 98           How many servings of fruits and vegetables do you eat daily?  2-3    On average, how many sweetened beverages do you drink each day (Examples: soda, juice, sweet tea, etc.  Do NOT count diet or artificially sweetened beverages)?   1    How many days per week do you exercise enough to make your heart beat faster? 4    How many minutes a day do you exercise enough to make your heart beat faster? 60 or more    How many days per week do you miss taking your medication? 0    Hypertension Follow-up      Do you check your blood pressure regularly outside of the clinic? No     Are you following a low salt diet? Yes    Are your blood pressures ever more than 140 on the top number (systolic) OR more   than 90 on the bottom number (diastolic), for example 140/90? No       Reviewed recent labs, showing favorable LDL-Cholesterol and A1c.     The patient reports not wearing his CPAP at night. He is advised that, if intolerant of CPAP, he should begin investigating use of an oral airway appliance.     He has an appointment with cardiology next month. His breathing has been fine at rest. Home weights have been steady at around 179-180 lb. No orthopnea, PND or significant edema. No chest discomfort, dizziness or palpitations.   Occasional cough, no purulent sputum production.     Previous problems with constipation have improved. No abdominal pain or other stool changes.     Past medical, family and social histories as well as medications reviewed and updated as needed.    Review of Systems    REVIEW OF SYSTEMS: The following systems have been completely reviewed and are negative except as noted above:   Constitutional, HEENT, respiratory, cardiovascular, gastrointestinal, genitourinary, hematologic, endocrine, and neurologic systems.        Objective           Vitals:  No vitals were obtained today due to virtual visit.    Physical Exam   GENERAL: Healthy, alert and no distress  EYES: Eyes grossly normal to inspection.  No discharge or erythema, or obvious scleral/conjunctival abnormalities.  RESP: No audible wheeze, cough, or visible cyanosis.  No visible retractions or increased work of breathing.    SKIN: Visible skin clear. No significant rash, abnormal pigmentation or lesions.  NEURO: Cranial nerves grossly intact.  Mentation and speech appropriate for age.  PSYCH: Mentation appears normal, affect normal/bright, judgement and insight intact, normal speech and appearance well-groomed.            Video-Visit Details    Type of service:  Video Visit    Video End Time: 0924    Originating Location (pt. Location): Home    Distant Location (provider location):  [facility]     Platform used for Video Visit: FOODITY

## 2021-05-09 NOTE — TELEPHONE ENCOUNTER
Health Call Center    Phone Message    May a detailed message be left on voicemail: yes     Reason for Call: Other: Gene calling to get clarification on his next steps.  He is unsure if his next appointment is to be with Dr. Perez or just to have his catheter removed.  Please call him back to discuss     Action Taken: Message routed to:  Other: [facility]    Travel Screening: Not Applicable                                                                       [address] [facility], 1947, [id number], 5/9/2021    Discharge Recommendation: Home with initial 24 hour supervision/assistance, PRN assist.     History:  [place]:  The primary encounter diagnosis was Syncope and collapse. Diagnoses of Dyspnea, unspecified type, Chest pain, unspecified type, Nonintractable headache, unspecified chronicity pattern, unspecified headache type, Bradycardia, Troponin level elevated, and Elevated brain natriuretic peptide (BNP) level were also pertinent to this visit. Subjective:  Patient states: Agreeable to therapy. Pain: Pt denied pain this date  Communication with other providers: PT, RN  Restrictions: General Precautions, Fall Risk    Home Setup/Prior level of function:  Social/Functional History  Lives With: Son  Type of Home: House  Home Layout: One level  Home Access: Level entry  Bathroom Shower/Tub: Walk-in shower, Tub/Shower unit, Shower chair with back  Bathroom Toilet: [address]: Cane, Rolling walker  ADL Assistance: Independent  Homemaking Assistance: Independent  Homemaking Responsibilities: Yes  Ambulation Assistance: Independent  Transfer Assistance: Independent  Active : Yes  Mode of Transportation: Car  Additional Comments: Pt reports 1 fall in the last 6 months and reports she passed out. Examination:  · Observation: Supine in bed upon arrival  · [place] [employer] [place]  · Hearing: [facility] [place]  · Vitals: Stable vitals throughout session    Body Systems and functions:  · ROM: WFL   · Strength: 4/5 BUE shoulder flexion. · Sensation: WFL  · Tone: Normal  · Coordination: WFL  · Perception: WNL    Activities of Daily Living (ADLs):  · Feeding: Jocelyne  setup and increased time. · Grooming: SBA anticipate pt could complete in standing at sink with SBA for safety d/t pt reports feeling slightly weaker this date.    · UB bathing: SUP recommend pt complete in seated with setup, increased time, VC.   · LB bathing: SUP recommend pt complete in seated with setup, increased time, VC.  · UB dressing: SUP recommend pt complete in seated with setup, increased time, VC.  · LB dressing: SUP pt donned socks in seated this date with setup, increased time, VC. · Toileting: SBA-CGA anticipate pt could complete with SBA/CGA for toilet transfers and while balancing to complete pedro care and LB clothing manipulation in standing. Cognitive and Psychosocial Functioning:  · Overall cognitive status: Oriented to time, date, person, place, city  · Affect: Normal     Balance:   · Sitting: SUP (pt sat EOB demo good dynamic sitting balance). · Standing: SBA-CGA (pt stood without use of AD. Demo good dynamic standing balance). Functional Mobility:   · Bed Mobility: SBA (pt performed supine to seated bed mobility with HOB slightly elevated, increased time, VC for sequencing). · Transfers:   · CGA  (pt performed STS from EOB without use of AD. VC throughout for sequencing and increased time). · [place] (pt performed stand to sit to chair with assist for controlled descend and VC throughout for sequencing). · Ambulation: SBA-CGA (pt ambulated approx 300ft without use of AD. Demo fair pace throughout and 0 LOB). AM-PAC 6 click short form for inpatient daily activity:   How much help from another person does the patient currently need. .. Unable  Dep A Lot  Max A A Lot   Mod A A Little  Min A A Little   CGA  SBA None   Mod I  Indep  Sup   1. Putting on and taking off regular lower body clothing? [] 1    [] 2   [] 2   [] 3   [] 3   [x] 4      2. Bathing (including washing, rinsing, drying)? [] 1   [] 2   [] 2 [] 3 [] 3 [x] 4   3. Toileting, which includes using toilet, bedpan, or urinal? [] 1    [] 2   [] 2   [] 3   [x] 3   [] 4     4. Putting on and taking off regular upper body clothing? [] 1   [] 2   [] 2   [] 3   [] 3    [x] 4      5. Taking care of personal grooming such as brushing teeth?  [] 1   [] 2    [] 2 [] 3    [x] 3   [] 4      6. Eating meals? [] 1   [] 2   [] 2   [] 3   [] 3   [x] 4      Raw Score:  22     [24=0% impaired(CH), 23=1-19%(CI), 20-22=20-39%(CJ), 15-19=40-59%(CK), 10-14=60-79%(CL), 7-9=80-99%(CM), 6=100%(CN)]    Treatment:  Therapeutic Activity Training:   Therapeutic activity training was instructed today. Cues were given for safety, sequence, UE/LE placement, awareness, and balance. Activities performed today included bed mobility training, sup-sit, sit-stand, ambulation. Safety Measures: Gait belt used, Left in chair, Alarm in place    Assessment:  Assessment  Treatment Diagnosis: syncope and collapse  Prognosis: Good  Decision Making: Medium Complexity  REQUIRES OT FOLLOW UP: No  Discharge Recommendations: Home with assist PRN(Initial 24 hour SUP/assist.)    Pt is a 74year old F admitted for syncope and collapse. Pt reports that prior to admission she was IND in ADLs, IADLs, and functional mobility. This date, pt demo good overall functional mobility for ADL status and pt reports, \"I just feel a little slower. \" Pt does not require acute OT services at this time and OT recommend d/c home with initial 24 hour SUP/assist, PRN assist.    Time:   Time in: 948  Time out: 1004  Timed treatment minutes: 8  Total time: 16      Electronically signed by:       TANISHA Granado/L, [address] [facility], [id number]

## 2021-05-27 ENCOUNTER — OFFICE VISIT (OUTPATIENT)
Dept: CARDIOLOGY | Facility: CLINIC | Age: 76
End: 2021-05-27
Attending: PHYSICIAN ASSISTANT
Payer: COMMERCIAL

## 2021-05-27 VITALS
SYSTOLIC BLOOD PRESSURE: 114 MMHG | OXYGEN SATURATION: 98 % | BODY MASS INDEX: 27.59 KG/M2 | WEIGHT: 186.3 LBS | HEIGHT: 69 IN | DIASTOLIC BLOOD PRESSURE: 66 MMHG | HEART RATE: 70 BPM

## 2021-05-27 DIAGNOSIS — Z95.0 CARDIAC PACEMAKER IN SITU: ICD-10-CM

## 2021-05-27 DIAGNOSIS — I50.22 CHRONIC SYSTOLIC CONGESTIVE HEART FAILURE (H): Primary | ICD-10-CM

## 2021-05-27 DIAGNOSIS — I50.42 CHRONIC COMBINED SYSTOLIC AND DIASTOLIC HEART FAILURE (H): ICD-10-CM

## 2021-05-27 PROCEDURE — 99214 OFFICE O/P EST MOD 30 MIN: CPT | Performed by: INTERNAL MEDICINE

## 2021-05-27 ASSESSMENT — MIFFLIN-ST. JEOR: SCORE: 1547.49

## 2021-05-27 NOTE — PROGRESS NOTES
HPI and Plan:   See dictation    Orders Placed This Encounter   Procedures     Follow-Up with Cardiac Advanced Practice Provider       No orders of the defined types were placed in this encounter.      There are no discontinued medications.      Encounter Diagnoses   Name Primary?     Chronic combined systolic and diastolic heart failure (H)      Chronic systolic congestive heart failure (H) Yes     Cardiac pacemaker in situ        CURRENT MEDICATIONS:  Current Outpatient Medications   Medication Sig Dispense Refill     apixaban ANTICOAGULANT (ELIQUIS) 5 MG tablet Take 1 tablet (5 mg) by mouth 2 times daily 60 tablet 0     bumetanide (BUMEX) 0.5 MG tablet Take 1 tablet (0.5 mg) by mouth daily (+ additional 1 mg to = 1.5 mg daily). 90 tablet 3     bumetanide (BUMEX) 1 MG tablet Take 1 tablet (1 mg) by mouth daily (+ additional 0.5 mg to = 1.5 mg daily). 90 tablet 3     Cholecalciferol (VITAMIN D3) 75 MCG (3000 UT) TABS Take 1 tablet by mouth daily       Ferrous Sulfate (IRON) 325 (65 Fe) MG tablet Take 1 tablet by mouth daily       insulin glargine (LANTUS SOLOSTAR) 100 UNIT/ML pen Inject 11 Units Subcutaneous At Bedtime 15 mL 1     insulin pen needle (ULTICARE SHORT) 31G X 8 MM miscellaneous use 1 pen daily for injection 100 each 3     melatonin 3 MG tablet Take 3 mg by mouth At Bedtime       metoprolol succinate ER (TOPROL-XL) 25 MG 24 hr tablet Take 1 tablet (25 mg) by mouth daily 90 tablet 3     Multiple Vitamins-Minerals (MULTIVITAMIN ADULT PO) Take 1 tablet by mouth daily       ONETOUCH ULTRA test strip Use to test blood sugar 2 times daily or as directed. 200 strip 1     tamsulosin (FLOMAX) 0.4 MG capsule Take 1 capsule (0.4 mg) by mouth At Bedtime 90 capsule 3       ALLERGIES   No Known Allergies    PAST MEDICAL HISTORY:  Past Medical History:   Diagnosis Date     Anemia      Atrial fibrillation     AV node ablation and pacemaker placement 4/10/2020     BPH (benign prostatic hyperplasia)      Chronic  diastolic CHF      Chronic kidney disease (CKD), stage III (moderate)      Diabetes mellitus - type 2      Palpitations      Systolic CHF (H)        PAST SURGICAL HISTORY:  Past Surgical History:   Procedure Laterality Date     ANESTHESIA CARDIOVERSION N/A 4/6/2020    Procedure: ANESTHESIA, FOR CARDIOVERSION;  Surgeon: GENERIC ANESTHESIA PROVIDER;  Location: [facility] OR     CYSTOSCOPY       EP PACEMAKER N/A 4/10/2020    Procedure: EP Pacemaker;  Surgeon: Abhay Willams MD;  Location:  HEART CARDIAC CATH LAB     [facility]  09/25/2020    Done in Urology office with Dr Perez       FAMILY HISTORY:  Family History   Problem Relation Age of Onset     Diabetes Sister        SOCIAL HISTORY:  Social History     Socioeconomic History     Marital status: Single     Spouse name: None     Number of children: None     Years of education: None     Highest education level: None   Occupational History     None   Social Needs     Financial resource strain: None     Food insecurity     Worry: None     Inability: None     Transportation needs     Medical: None     Non-medical: None   Tobacco Use     Smoking status: Never Smoker     Smokeless tobacco: Never Used   Substance and Sexual Activity     Alcohol use: Yes     Comment: Rare     Drug use: None     Sexual activity: Not Currently   Lifestyle     Physical activity     Days per week: None     Minutes per session: None     Stress: None   Relationships     Social connections     Talks on phone: None     Gets together: None     Attends Lutheran service: None     Active member of club or organization: None     Attends meetings of clubs or organizations: None     Relationship status: None     Intimate partner violence     Fear of current or ex partner: None     Emotionally abused: None     Physically abused: None     Forced sexual activity: None   Other Topics Concern     Parent/sibling w/ CABG, MI or angioplasty before 65F 55M? Not Asked   Social History Narrative     None       Review of  "Systems:  Skin:  Negative       Eyes:  Positive for glasses    ENT:  Negative      Respiratory:  Negative       Cardiovascular:  Negative      Gastroenterology: Negative      Genitourinary:  Negative      Musculoskeletal:  Negative      Neurologic:  Negative      Psychiatric:  Negative      Heme/Lymph/Imm:  Negative      Endocrine:  Positive for diabetes      Physical Exam:  Vitals: /66 (BP Location: Right arm, Patient Position: Sitting, Cuff Size: Adult Large)   Pulse 70   Ht 1.74 m (5' 8.5\")   Wt 84.5 kg (186 lb 4.8 oz)   SpO2 98%   BMI 27.91 kg/m      Constitutional:  cooperative        Skin:  warm and dry to the touch          Head:  normocephalic        Eyes:  pupils equal and round        Lymph:      ENT:    pallor      Neck:  no carotid bruit        Respiratory:  clear to auscultation;normal symmetry    diminished BS throughout    Cardiac: regular rhythm;no murmurs, gallops or rubs detected                  pulses below the femoral arteries are diminished                                      GI:  abdomen soft;no bruits        Extremities and Muscular Skeletal:  no edema;no deformities, clubbing, cyanosis, erythema observed              Neurological:  no gross motor deficits;affect appropriate        Psych:  Alert and Oriented x 3          CC  Marsha Noonan PA-C  [address]  [place] [id number]                  "

## 2021-05-27 NOTE — LETTER
5/27/2021    Dm Lipscomb MD, MD  [address]  [facility] [address]    RE: Gene Pagan       Dear Colleague,    I had the pleasure of seeing Gene Pagan in the [facility].    HPI and Plan:   See dictation    Orders Placed This Encounter   Procedures     Follow-Up with Cardiac Advanced Practice Provider       No orders of the defined types were placed in this encounter.      There are no discontinued medications.      Encounter Diagnoses   Name Primary?     Chronic combined systolic and diastolic heart failure (H)      Chronic systolic congestive heart failure (H) Yes     Cardiac pacemaker in situ        CURRENT MEDICATIONS:  Current Outpatient Medications   Medication Sig Dispense Refill     apixaban ANTICOAGULANT (ELIQUIS) 5 MG tablet Take 1 tablet (5 mg) by mouth 2 times daily 60 tablet 0     bumetanide (BUMEX) 0.5 MG tablet Take 1 tablet (0.5 mg) by mouth daily (+ additional 1 mg to = 1.5 mg daily). 90 tablet 3     bumetanide (BUMEX) 1 MG tablet Take 1 tablet (1 mg) by mouth daily (+ additional 0.5 mg to = 1.5 mg daily). 90 tablet 3     Cholecalciferol (VITAMIN D3) 75 MCG (3000 UT) TABS Take 1 tablet by mouth daily       Ferrous Sulfate (IRON) 325 (65 Fe) MG tablet Take 1 tablet by mouth daily       insulin glargine (LANTUS SOLOSTAR) 100 UNIT/ML pen Inject 11 Units Subcutaneous At Bedtime 15 mL 1     insulin pen needle (ULTICARE SHORT) 31G X 8 MM miscellaneous use 1 pen daily for injection 100 each 3     melatonin 3 MG tablet Take 3 mg by mouth At Bedtime       metoprolol succinate ER (TOPROL-XL) 25 MG 24 hr tablet Take 1 tablet (25 mg) by mouth daily 90 tablet 3     Multiple Vitamins-Minerals (MULTIVITAMIN ADULT PO) Take 1 tablet by mouth daily       ONETOUCH ULTRA test strip Use to test blood sugar 2 times daily or as directed. 200 strip 1     tamsulosin (FLOMAX) 0.4 MG capsule Take 1 capsule (0.4 mg) by mouth At Bedtime 90  capsule 3       ALLERGIES   No Known Allergies    PAST MEDICAL HISTORY:  Past Medical History:   Diagnosis Date     Anemia      Atrial fibrillation     AV node ablation and pacemaker placement 4/10/2020     BPH (benign prostatic hyperplasia)      Chronic diastolic CHF      Chronic kidney disease (CKD), stage III (moderate)      Diabetes mellitus - type 2      Palpitations      Systolic CHF (H)        PAST SURGICAL HISTORY:  Past Surgical History:   Procedure Laterality Date     ANESTHESIA CARDIOVERSION N/A 4/6/2020    Procedure: ANESTHESIA, FOR CARDIOVERSION;  Surgeon: GENERIC ANESTHESIA PROVIDER;  Location: [facility] OR     CYSTOSCOPY       EP PACEMAKER N/A 4/10/2020    Procedure: EP Pacemaker;  Surgeon: Abhay Willams MD;  Location:  HEART CARDIAC CATH LAB     [facility]  09/25/2020    Done in Urology office with Dr Perez       FAMILY HISTORY:  Family History   Problem Relation Age of Onset     Diabetes Sister        SOCIAL HISTORY:  Social History     Socioeconomic History     Marital status: Single     Spouse name: None     Number of children: None     Years of education: None     Highest education level: None   Occupational History     None   Social Needs     Financial resource strain: None     Food insecurity     Worry: None     Inability: None     Transportation needs     Medical: None     Non-medical: None   Tobacco Use     Smoking status: Never Smoker     Smokeless tobacco: Never Used   Substance and Sexual Activity     Alcohol use: Yes     Comment: Rare     Drug use: None     Sexual activity: Not Currently   Lifestyle     Physical activity     Days per week: None     Minutes per session: None     Stress: None   Relationships     Social connections     Talks on phone: None     Gets together: None     Attends Tenriism service: None     Active member of club or organization: None     Attends meetings of clubs or organizations: None     Relationship status: None     Intimate partner violence     Fear of current or ex  "partner: None     Emotionally abused: None     Physically abused: None     Forced sexual activity: None   Other Topics Concern     Parent/sibling w/ CABG, MI or angioplasty before 65F 55M? Not Asked   Social History Narrative     None       Review of Systems:  Skin:  Negative       Eyes:  Positive for glasses    ENT:  Negative      Respiratory:  Negative       Cardiovascular:  Negative      Gastroenterology: Negative      Genitourinary:  Negative      Musculoskeletal:  Negative      Neurologic:  Negative      Psychiatric:  Negative      Heme/Lymph/Imm:  Negative      Endocrine:  Positive for diabetes      Physical Exam:  Vitals: /66 (BP Location: Right arm, Patient Position: Sitting, Cuff Size: Adult Large)   Pulse 70   Ht 1.74 m (5' 8.5\")   Wt 84.5 kg (186 lb 4.8 oz)   SpO2 98%   BMI 27.91 kg/m      Constitutional:  cooperative        Skin:  warm and dry to the touch          Head:  normocephalic        Eyes:  pupils equal and round        Lymph:      ENT:    pallor      Neck:  no carotid bruit        Respiratory:  clear to auscultation;normal symmetry    diminished BS throughout    Cardiac: regular rhythm;no murmurs, gallops or rubs detected                  pulses below the femoral arteries are diminished                                      GI:  abdomen soft;no bruits        Extremities and Muscular Skeletal:  no edema;no deformities, clubbing, cyanosis, erythema observed              Neurological:  no gross motor deficits;affect appropriate        Psych:  Alert and Oriented x 3      Thank you for allowing me to participate in the care of your patient.      Sincerely,     Sushila Frank DO     [facility]    cc:   Marsha Noonan, PA-C  [address] [place]  [place] [address]        "

## 2021-05-27 NOTE — PROGRESS NOTES
Service Date: 05/27/2021    REFERRING PHYSICIAN:  Dr. Dm Lipscomb.      HISTORY OF PRESENT ILLNESS:  Mr. Pagan is a pleasant 76-year-old male with a history of combined biventricular systolic congestive heart failure and atrial fibrillation.  He had an episode a cardiogenic shock in March of last year.  Her underwent an AV sarah ablation procedure with biventricular pacing.  He has recovered remarkably well.  His last ejection fraction has increased significantly from his baseline back in March of last year, which was 20%-25%.  His EF is now estimated at 45%-50%.  He continues to have some mild decreased RV function as well but significantly improved from baseline.  He is followed in our [facility].  He has had some minor adjustments to his Bumex dose, which have been helpful, upon his last visit.  His creatinine, or kidney function, has been stable and he follows with nephrology.  He is on Eliquis for CVA prophylaxis without bleeding complication and stable hemoglobins.  His last interrogation of his device did show episode of nonsustained ventricular tachycardia for which he was asymptomatic.  No therapies were required.  Thresholds are appropriate.    PHYSICAL EXAMINATION:   VITAL SIGNS:  On exam today, his blood pressure is 114/66, pulse of 70, weight 186, which is down about 5 pounds from his last visit here and stable per his history.    CARDIOVASCULAR:  Tones were regular.  I did not appreciate a murmur or gallop on exam.    LUNGS:  Diminished posteriorly but otherwise clear.    EXTREMITIES:  No peripheral edema noted on exam.      ASSESSMENT AND PLAN:  In summary, Mr Pagan is a very pleasant 76-year-old male with a history of biventricular systolic congestive heart failure, improved with biventricular pacing and AV sarah ablation due to atrial fibrillation with rapid ventricular response.  He appears compensated on exam and has been doing quite well.  His ejection fraction has improved remarkably  from his baseline, last estimated around 45%-50% back in July of last year.  I will recommend to continue his current medical management and recommend a 6-month followup with [facility].  He remains active working for [employer].  He will contact us if he has any difficulties prior to that appointment.  Please feel free to contact me with any questions you have in regards to his care.    cc:  Dm Lipscomb MD   [facility]   [address], [address]   [place] [address]     Sushila Frank DO        D: 2021   T: 2021   MT: lisa    Name:     MIKE DUNHAM  MRN:      [id number]        Account:      [id number]   :      1945           Service Date: 2021       Document: [id number]

## 2021-06-07 ENCOUNTER — TELEPHONE (OUTPATIENT)
Dept: INTERNAL MEDICINE | Facility: CLINIC | Age: 76
End: 2021-06-07

## 2021-06-07 DIAGNOSIS — I48.91 ATRIAL FIBRILLATION WITH RAPID VENTRICULAR RESPONSE (H): ICD-10-CM

## 2021-06-07 NOTE — TELEPHONE ENCOUNTER
Per message from [facility] pharmacy regarding apixaban ANTICOAGULANT (ELIQUIS) 5 MG tablet    Pt would like Dr. Lipscomb to authorize refills for Eliquis (previously managed by Dr.. Martínez Ramirez) Please review and advise.

## 2021-06-12 NOTE — ED TRIAGE NOTES
Pt presents with concerns for urinary retention, had a prostate procedure 3 weeks ago and since then has been straight cathing intermittently, lately he has been dribbling urine and cannot getting anything out with straight cathing, feels like he is retaining. Pt alert, oriented x3 ABCs intact   Negative

## 2021-07-19 ENCOUNTER — ANCILLARY PROCEDURE (OUTPATIENT)
Dept: CARDIOLOGY | Facility: CLINIC | Age: 76
End: 2021-07-19
Attending: INTERNAL MEDICINE
Payer: COMMERCIAL

## 2021-07-19 DIAGNOSIS — Z95.0 CARDIAC PACEMAKER IN SITU: ICD-10-CM

## 2021-07-19 DIAGNOSIS — Z98.890 S/P AV NODAL ABLATION: ICD-10-CM

## 2021-07-19 PROCEDURE — 93294 REM INTERROG EVL PM/LDLS PM: CPT | Performed by: INTERNAL MEDICINE

## 2021-07-19 PROCEDURE — 93296 REM INTERROG EVL PM/IDS: CPT | Performed by: INTERNAL MEDICINE

## 2021-07-25 LAB
MDC_IDC_EPISODE_DTM: NORMAL
MDC_IDC_EPISODE_DTM: NORMAL
MDC_IDC_EPISODE_ID: NORMAL
MDC_IDC_EPISODE_ID: NORMAL
MDC_IDC_EPISODE_TYPE: NORMAL
MDC_IDC_EPISODE_TYPE: NORMAL
MDC_IDC_LEAD_IMPLANT_DT: NORMAL
MDC_IDC_LEAD_LOCATION: NORMAL
MDC_IDC_LEAD_LOCATION_DETAIL_1: NORMAL
MDC_IDC_LEAD_MFG: NORMAL
MDC_IDC_LEAD_MODEL: NORMAL
MDC_IDC_LEAD_POLARITY_TYPE: NORMAL
MDC_IDC_LEAD_SERIAL: NORMAL
MDC_IDC_MSMT_BATTERY_DTM: NORMAL
MDC_IDC_MSMT_BATTERY_REMAINING_LONGEVITY: 114 MO
MDC_IDC_MSMT_BATTERY_REMAINING_PERCENTAGE: 100 %
MDC_IDC_MSMT_BATTERY_STATUS: NORMAL
MDC_IDC_MSMT_LEADCHNL_LV_IMPEDANCE_VALUE: 959 OHM
MDC_IDC_MSMT_LEADCHNL_LV_PACING_THRESHOLD_AMPLITUDE: 1.4 V
MDC_IDC_MSMT_LEADCHNL_LV_PACING_THRESHOLD_PULSEWIDTH: 0.5 MS
MDC_IDC_MSMT_LEADCHNL_RA_IMPEDANCE_VALUE: 768 OHM
MDC_IDC_MSMT_LEADCHNL_RV_IMPEDANCE_VALUE: 790 OHM
MDC_IDC_MSMT_LEADCHNL_RV_PACING_THRESHOLD_AMPLITUDE: 0.7 V
MDC_IDC_MSMT_LEADCHNL_RV_PACING_THRESHOLD_PULSEWIDTH: 0.4 MS
MDC_IDC_PG_IMPLANT_DTM: NORMAL
MDC_IDC_PG_MFG: NORMAL
MDC_IDC_PG_MODEL: NORMAL
MDC_IDC_PG_SERIAL: NORMAL
MDC_IDC_PG_TYPE: NORMAL
MDC_IDC_SESS_CLINIC_NAME: NORMAL
MDC_IDC_SESS_DTM: NORMAL
MDC_IDC_SESS_TYPE: NORMAL
MDC_IDC_SET_BRADY_AT_MODE_SWITCH_MODE: NORMAL
MDC_IDC_SET_BRADY_AT_MODE_SWITCH_RATE: 140 {BEATS}/MIN
MDC_IDC_SET_BRADY_LOWRATE: 70 {BEATS}/MIN
MDC_IDC_SET_BRADY_MAX_SENSOR_RATE: 130 {BEATS}/MIN
MDC_IDC_SET_BRADY_MAX_TRACKING_RATE: 130 {BEATS}/MIN
MDC_IDC_SET_BRADY_MODE: NORMAL
MDC_IDC_SET_BRADY_PAV_DELAY_LOW: 180 MS
MDC_IDC_SET_BRADY_SAV_DELAY_LOW: 120 MS
MDC_IDC_SET_CRT_LVRV_DELAY: 30 MS
MDC_IDC_SET_CRT_PACED_CHAMBERS: NORMAL
MDC_IDC_SET_LEADCHNL_LV_PACING_AMPLITUDE: 2.4 V
MDC_IDC_SET_LEADCHNL_LV_PACING_ANODE_ELECTRODE_1: NORMAL
MDC_IDC_SET_LEADCHNL_LV_PACING_ANODE_LOCATION_1: NORMAL
MDC_IDC_SET_LEADCHNL_LV_PACING_CATHODE_ELECTRODE_1: NORMAL
MDC_IDC_SET_LEADCHNL_LV_PACING_CATHODE_LOCATION_1: NORMAL
MDC_IDC_SET_LEADCHNL_LV_PACING_PULSEWIDTH: 0.5 MS
MDC_IDC_SET_LEADCHNL_LV_SENSING_ADAPTATION_MODE: NORMAL
MDC_IDC_SET_LEADCHNL_LV_SENSING_ANODE_ELECTRODE_1: NORMAL
MDC_IDC_SET_LEADCHNL_LV_SENSING_ANODE_LOCATION_1: NORMAL
MDC_IDC_SET_LEADCHNL_LV_SENSING_CATHODE_ELECTRODE_1: NORMAL
MDC_IDC_SET_LEADCHNL_LV_SENSING_CATHODE_LOCATION_1: NORMAL
MDC_IDC_SET_LEADCHNL_LV_SENSING_SENSITIVITY: 1.5 MV
MDC_IDC_SET_LEADCHNL_RA_PACING_AMPLITUDE: 3.5 V
MDC_IDC_SET_LEADCHNL_RA_PACING_CAPTURE_MODE: NORMAL
MDC_IDC_SET_LEADCHNL_RA_PACING_POLARITY: NORMAL
MDC_IDC_SET_LEADCHNL_RA_PACING_PULSEWIDTH: 0.4 MS
MDC_IDC_SET_LEADCHNL_RA_SENSING_ADAPTATION_MODE: NORMAL
MDC_IDC_SET_LEADCHNL_RA_SENSING_POLARITY: NORMAL
MDC_IDC_SET_LEADCHNL_RA_SENSING_SENSITIVITY: 0.25 MV
MDC_IDC_SET_LEADCHNL_RV_PACING_AMPLITUDE: 2 V
MDC_IDC_SET_LEADCHNL_RV_PACING_CAPTURE_MODE: NORMAL
MDC_IDC_SET_LEADCHNL_RV_PACING_POLARITY: NORMAL
MDC_IDC_SET_LEADCHNL_RV_PACING_PULSEWIDTH: 0.4 MS
MDC_IDC_SET_LEADCHNL_RV_SENSING_ADAPTATION_MODE: NORMAL
MDC_IDC_SET_LEADCHNL_RV_SENSING_POLARITY: NORMAL
MDC_IDC_SET_LEADCHNL_RV_SENSING_SENSITIVITY: 1.5 MV
MDC_IDC_SET_ZONE_DETECTION_INTERVAL: 375 MS
MDC_IDC_SET_ZONE_TYPE: NORMAL
MDC_IDC_SET_ZONE_VENDOR_TYPE: NORMAL
MDC_IDC_STAT_AT_BURDEN_PERCENT: 100 %
MDC_IDC_STAT_AT_DTM_END: NORMAL
MDC_IDC_STAT_AT_DTM_START: NORMAL
MDC_IDC_STAT_BRADY_DTM_END: NORMAL
MDC_IDC_STAT_BRADY_DTM_START: NORMAL
MDC_IDC_STAT_BRADY_RA_PERCENT_PACED: 0 %
MDC_IDC_STAT_BRADY_RV_PERCENT_PACED: 98 %
MDC_IDC_STAT_CRT_DTM_END: NORMAL
MDC_IDC_STAT_CRT_DTM_START: NORMAL
MDC_IDC_STAT_CRT_LV_PERCENT_PACED: 98 %
MDC_IDC_STAT_EPISODE_RECENT_COUNT: 0
MDC_IDC_STAT_EPISODE_RECENT_COUNT: 1
MDC_IDC_STAT_EPISODE_RECENT_COUNT_DTM_END: NORMAL
MDC_IDC_STAT_EPISODE_RECENT_COUNT_DTM_START: NORMAL
MDC_IDC_STAT_EPISODE_TYPE: NORMAL
MDC_IDC_STAT_EPISODE_VENDOR_TYPE: NORMAL

## 2021-08-01 ENCOUNTER — HEALTH MAINTENANCE LETTER (OUTPATIENT)
Age: 76
End: 2021-08-01

## 2021-09-26 ENCOUNTER — HEALTH MAINTENANCE LETTER (OUTPATIENT)
Age: 76
End: 2021-09-26

## 2021-10-13 ENCOUNTER — MYC MEDICAL ADVICE (OUTPATIENT)
Dept: INTERNAL MEDICINE | Facility: CLINIC | Age: 76
End: 2021-10-13

## 2021-10-14 NOTE — TELEPHONE ENCOUNTER
Please see patient's mychart message below.    States he needs a letter for work stating he has diabetes and need to eat something during his afternoon shift to maintain his blood sugar.    Please advise, thanks.

## 2021-10-19 ENCOUNTER — ANCILLARY PROCEDURE (OUTPATIENT)
Dept: CARDIOLOGY | Facility: CLINIC | Age: 76
End: 2021-10-19
Attending: INTERNAL MEDICINE
Payer: COMMERCIAL

## 2021-10-19 DIAGNOSIS — Z95.0 CARDIAC PACEMAKER IN SITU: ICD-10-CM

## 2021-10-19 LAB
MDC_IDC_EPISODE_DTM: NORMAL
MDC_IDC_EPISODE_DTM: NORMAL
MDC_IDC_EPISODE_ID: NORMAL
MDC_IDC_EPISODE_ID: NORMAL
MDC_IDC_EPISODE_TYPE: NORMAL
MDC_IDC_EPISODE_TYPE: NORMAL
MDC_IDC_LEAD_IMPLANT_DT: NORMAL
MDC_IDC_LEAD_LOCATION: NORMAL
MDC_IDC_LEAD_LOCATION_DETAIL_1: NORMAL
MDC_IDC_LEAD_MFG: NORMAL
MDC_IDC_LEAD_MODEL: NORMAL
MDC_IDC_LEAD_POLARITY_TYPE: NORMAL
MDC_IDC_LEAD_SERIAL: NORMAL
MDC_IDC_MSMT_BATTERY_DTM: NORMAL
MDC_IDC_MSMT_BATTERY_REMAINING_LONGEVITY: 108 MO
MDC_IDC_MSMT_BATTERY_REMAINING_PERCENTAGE: 100 %
MDC_IDC_MSMT_BATTERY_STATUS: NORMAL
MDC_IDC_MSMT_LEADCHNL_LV_IMPEDANCE_VALUE: 1075 OHM
MDC_IDC_MSMT_LEADCHNL_LV_PACING_THRESHOLD_AMPLITUDE: 1.4 V
MDC_IDC_MSMT_LEADCHNL_LV_PACING_THRESHOLD_PULSEWIDTH: 0.5 MS
MDC_IDC_MSMT_LEADCHNL_RA_IMPEDANCE_VALUE: 781 OHM
MDC_IDC_MSMT_LEADCHNL_RV_IMPEDANCE_VALUE: 833 OHM
MDC_IDC_MSMT_LEADCHNL_RV_PACING_THRESHOLD_AMPLITUDE: 0.9 V
MDC_IDC_MSMT_LEADCHNL_RV_PACING_THRESHOLD_PULSEWIDTH: 0.4 MS
MDC_IDC_PG_IMPLANT_DTM: NORMAL
MDC_IDC_PG_MFG: NORMAL
MDC_IDC_PG_MODEL: NORMAL
MDC_IDC_PG_SERIAL: NORMAL
MDC_IDC_PG_TYPE: NORMAL
MDC_IDC_SESS_CLINIC_NAME: NORMAL
MDC_IDC_SESS_DTM: NORMAL
MDC_IDC_SESS_TYPE: NORMAL
MDC_IDC_SET_BRADY_AT_MODE_SWITCH_MODE: NORMAL
MDC_IDC_SET_BRADY_AT_MODE_SWITCH_RATE: 140 {BEATS}/MIN
MDC_IDC_SET_BRADY_LOWRATE: 70 {BEATS}/MIN
MDC_IDC_SET_BRADY_MAX_SENSOR_RATE: 130 {BEATS}/MIN
MDC_IDC_SET_BRADY_MAX_TRACKING_RATE: 130 {BEATS}/MIN
MDC_IDC_SET_BRADY_MODE: NORMAL
MDC_IDC_SET_BRADY_PAV_DELAY_LOW: 180 MS
MDC_IDC_SET_BRADY_SAV_DELAY_LOW: 120 MS
MDC_IDC_SET_CRT_LVRV_DELAY: 30 MS
MDC_IDC_SET_CRT_PACED_CHAMBERS: NORMAL
MDC_IDC_SET_LEADCHNL_LV_PACING_AMPLITUDE: 2.4 V
MDC_IDC_SET_LEADCHNL_LV_PACING_ANODE_ELECTRODE_1: NORMAL
MDC_IDC_SET_LEADCHNL_LV_PACING_ANODE_LOCATION_1: NORMAL
MDC_IDC_SET_LEADCHNL_LV_PACING_CATHODE_ELECTRODE_1: NORMAL
MDC_IDC_SET_LEADCHNL_LV_PACING_CATHODE_LOCATION_1: NORMAL
MDC_IDC_SET_LEADCHNL_LV_PACING_PULSEWIDTH: 0.5 MS
MDC_IDC_SET_LEADCHNL_LV_SENSING_ADAPTATION_MODE: NORMAL
MDC_IDC_SET_LEADCHNL_LV_SENSING_ANODE_ELECTRODE_1: NORMAL
MDC_IDC_SET_LEADCHNL_LV_SENSING_ANODE_LOCATION_1: NORMAL
MDC_IDC_SET_LEADCHNL_LV_SENSING_CATHODE_ELECTRODE_1: NORMAL
MDC_IDC_SET_LEADCHNL_LV_SENSING_CATHODE_LOCATION_1: NORMAL
MDC_IDC_SET_LEADCHNL_LV_SENSING_SENSITIVITY: 1.5 MV
MDC_IDC_SET_LEADCHNL_RA_PACING_AMPLITUDE: 3.5 V
MDC_IDC_SET_LEADCHNL_RA_PACING_CAPTURE_MODE: NORMAL
MDC_IDC_SET_LEADCHNL_RA_PACING_POLARITY: NORMAL
MDC_IDC_SET_LEADCHNL_RA_PACING_PULSEWIDTH: 0.4 MS
MDC_IDC_SET_LEADCHNL_RA_SENSING_ADAPTATION_MODE: NORMAL
MDC_IDC_SET_LEADCHNL_RA_SENSING_POLARITY: NORMAL
MDC_IDC_SET_LEADCHNL_RA_SENSING_SENSITIVITY: 0.25 MV
MDC_IDC_SET_LEADCHNL_RV_PACING_AMPLITUDE: 2 V
MDC_IDC_SET_LEADCHNL_RV_PACING_CAPTURE_MODE: NORMAL
MDC_IDC_SET_LEADCHNL_RV_PACING_POLARITY: NORMAL
MDC_IDC_SET_LEADCHNL_RV_PACING_PULSEWIDTH: 0.4 MS
MDC_IDC_SET_LEADCHNL_RV_SENSING_ADAPTATION_MODE: NORMAL
MDC_IDC_SET_LEADCHNL_RV_SENSING_POLARITY: NORMAL
MDC_IDC_SET_LEADCHNL_RV_SENSING_SENSITIVITY: 1.5 MV
MDC_IDC_SET_ZONE_DETECTION_INTERVAL: 375 MS
MDC_IDC_SET_ZONE_TYPE: NORMAL
MDC_IDC_SET_ZONE_VENDOR_TYPE: NORMAL
MDC_IDC_STAT_AT_BURDEN_PERCENT: 100 %
MDC_IDC_STAT_AT_DTM_END: NORMAL
MDC_IDC_STAT_AT_DTM_START: NORMAL
MDC_IDC_STAT_BRADY_DTM_END: NORMAL
MDC_IDC_STAT_BRADY_DTM_START: NORMAL
MDC_IDC_STAT_BRADY_RA_PERCENT_PACED: 0 %
MDC_IDC_STAT_BRADY_RV_PERCENT_PACED: 96 %
MDC_IDC_STAT_CRT_DTM_END: NORMAL
MDC_IDC_STAT_CRT_DTM_START: NORMAL
MDC_IDC_STAT_CRT_LV_PERCENT_PACED: 96 %
MDC_IDC_STAT_EPISODE_RECENT_COUNT: 0
MDC_IDC_STAT_EPISODE_RECENT_COUNT: 1
MDC_IDC_STAT_EPISODE_RECENT_COUNT_DTM_END: NORMAL
MDC_IDC_STAT_EPISODE_RECENT_COUNT_DTM_START: NORMAL
MDC_IDC_STAT_EPISODE_TYPE: NORMAL
MDC_IDC_STAT_EPISODE_VENDOR_TYPE: NORMAL

## 2021-10-19 PROCEDURE — 93294 REM INTERROG EVL PM/LDLS PM: CPT | Performed by: INTERNAL MEDICINE

## 2021-10-19 PROCEDURE — 93296 REM INTERROG EVL PM/IDS: CPT | Performed by: INTERNAL MEDICINE

## 2021-10-25 DIAGNOSIS — I48.91 ATRIAL FIBRILLATION WITH RAPID VENTRICULAR RESPONSE (H): ICD-10-CM

## 2021-10-25 DIAGNOSIS — E11.69 TYPE 2 DIABETES MELLITUS WITH OTHER SPECIFIED COMPLICATION, UNSPECIFIED WHETHER LONG TERM INSULIN USE (H): ICD-10-CM

## 2021-10-26 DIAGNOSIS — I50.23 ACUTE ON CHRONIC SYSTOLIC CONGESTIVE HEART FAILURE (H): ICD-10-CM

## 2021-10-26 RX ORDER — BUMETANIDE 1 MG/1
1 TABLET ORAL DAILY
Qty: 90 TABLET | Refills: 0 | Status: SHIPPED | OUTPATIENT
Start: 2021-10-26 | End: 2022-02-02

## 2021-10-28 RX ORDER — APIXABAN 5 MG/1
TABLET, FILM COATED ORAL
Qty: 60 TABLET | Refills: 0 | Status: SHIPPED | OUTPATIENT
Start: 2021-10-28 | End: 2021-11-24

## 2021-10-28 RX ORDER — BLOOD SUGAR DIAGNOSTIC
STRIP MISCELLANEOUS
Qty: 200 STRIP | Refills: 1 | Status: SHIPPED | OUTPATIENT
Start: 2021-10-28 | End: 2022-02-17

## 2021-11-03 NOTE — TELEPHONE ENCOUNTER
Patient advised letter completed. He requests to have letter sent to him by email and then mail the original to his home address.     Patient's email address: [email or web address]     Letter emailed and then sent to patient via mail.     Fernanda May RN  [facility]

## 2021-11-03 NOTE — TELEPHONE ENCOUNTER
Patient sent additional [email or web address] message checking on status of letter. He needs this for work.     Fernanda May RN  [facility]

## 2021-11-04 ENCOUNTER — LAB (OUTPATIENT)
Dept: LAB | Facility: CLINIC | Age: 76
End: 2021-11-04
Payer: COMMERCIAL

## 2021-11-04 ENCOUNTER — OFFICE VISIT (OUTPATIENT)
Dept: CARDIOLOGY | Facility: CLINIC | Age: 76
End: 2021-11-04
Payer: COMMERCIAL

## 2021-11-04 VITALS
HEIGHT: 68 IN | SYSTOLIC BLOOD PRESSURE: 114 MMHG | HEART RATE: 71 BPM | WEIGHT: 195.6 LBS | BODY MASS INDEX: 29.64 KG/M2 | DIASTOLIC BLOOD PRESSURE: 60 MMHG | OXYGEN SATURATION: 97 %

## 2021-11-04 DIAGNOSIS — I50.42 CHRONIC COMBINED SYSTOLIC AND DIASTOLIC HEART FAILURE (H): ICD-10-CM

## 2021-11-04 DIAGNOSIS — I50.22 CHRONIC SYSTOLIC CONGESTIVE HEART FAILURE (H): ICD-10-CM

## 2021-11-04 DIAGNOSIS — Z95.0 CARDIAC PACEMAKER IN SITU: ICD-10-CM

## 2021-11-04 DIAGNOSIS — N18.4 CKD (CHRONIC KIDNEY DISEASE) STAGE 4, GFR 15-29 ML/MIN (H): ICD-10-CM

## 2021-11-04 DIAGNOSIS — Z98.890 S/P AV NODAL ABLATION: Primary | ICD-10-CM

## 2021-11-04 LAB
ANION GAP SERPL CALCULATED.3IONS-SCNC: 7 MMOL/L (ref 3–14)
BUN SERPL-MCNC: 55 MG/DL (ref 7–30)
CALCIUM SERPL-MCNC: 8.8 MG/DL (ref 8.5–10.1)
CHLORIDE BLD-SCNC: 105 MMOL/L (ref 94–109)
CO2 SERPL-SCNC: 25 MMOL/L (ref 20–32)
CREAT SERPL-MCNC: 2.19 MG/DL (ref 0.66–1.25)
GFR SERPL CREATININE-BSD FRML MDRD: 28 ML/MIN/1.73M2
GLUCOSE BLD-MCNC: 138 MG/DL (ref 70–99)
POTASSIUM BLD-SCNC: 4 MMOL/L (ref 3.4–5.3)
SODIUM SERPL-SCNC: 137 MMOL/L (ref 133–144)

## 2021-11-04 PROCEDURE — 99213 OFFICE O/P EST LOW 20 MIN: CPT | Performed by: INTERNAL MEDICINE

## 2021-11-04 PROCEDURE — 80048 BASIC METABOLIC PNL TOTAL CA: CPT | Performed by: INTERNAL MEDICINE

## 2021-11-04 PROCEDURE — 36415 COLL VENOUS BLD VENIPUNCTURE: CPT | Performed by: INTERNAL MEDICINE

## 2021-11-04 RX ORDER — BUMETANIDE 0.5 MG/1
0.5 TABLET ORAL DAILY
Qty: 90 TABLET | Refills: 3 | Status: SHIPPED | OUTPATIENT
Start: 2021-11-04 | End: 2022-11-03

## 2021-11-04 ASSESSMENT — MIFFLIN-ST. JEOR: SCORE: 1581.74

## 2021-11-04 NOTE — PROGRESS NOTES
Service Date: 11/04/2021    REFERRING PROVIDER:  Dr. Dm Lipscomb.    HISTORY OF PRESENT ILLNESS:  Mr. Pagan is a very pleasant 76-year-old male with a history of biventricular systolic congestive heart failure and atrial fibrillation.  His congestive heart failure was likely tachycardic-induced.  He underwent an AV sarah ablation procedure with a biventricular pacing device.  His ejection fraction improved from 20%-25%, now at 45%-50%.  He does have stage IV chronic kidney disease and is on Bumex for diuresis.  He has been doing well over the last 6 months.  He has not had any difficulty with breathing.  His weight has fluctuated a little bit, and he does admit that he had screwed up on taking his Bumex for the last couple of days.  He was taking less than what was prescribed, and his weight has gone up a little bit.  He did correct this, and he states his weight is starting to go back down.  On his scale at home he was 186, but on our scale today he was 195.  Nevertheless, he is not experiencing any orthopnea or PND or dyspnea with exertion.  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    PHYSICAL EXAMINATION:  VITAL SIGNS:  Blood pressure is 114/60, pulse is 71.  Again, his weight is 195.  Body mass index of 29.  CARDIOVASCULAR:  Tones were regular today and distant.  I did not appreciate a murmur or gallop.  LUNGS:  Clear posteriorly.  EXTREMITIES:  He does not have any significant peripheral edema.    ASSESSMENT AND PLAN:  In summary, Mr. Pagan is a very pleasant 76-year-old male with a history of tachycardia-induced cardiomyopathy, mild LV dysfunction, persistent.  His EF is now around 45%-50%.  He has a biventricular pacing device, and this was last checked in October showing 100% pacing and normal thresholds.  He has not had a basic metabolic panel or kidney check since April.  Since he did have a little bit of trouble with compliance with his  medications including the diuretics with fluctuations in his weight, I would like to see what his kidney function is doing.  I am going to ask him to have a basic metabolic panel done today.  I did review his previous ones.  It does not look like he is acidotic.  He does not follow regularly with a nephrologist, but this may be something to consider in the future.  I will have him continue to follow in our clinic every 6 months or as needed.    Please feel free to contact me with any questions you have in regards to his visit today, and I will contact him with his results of his kidney function.    Sushila Frank DO    cc:  Dm Lipscomb MD  [facility]  [address] [place], [address]  [place] [address]    Sushila Frank DO        D: 2021   T: 2021   MT: lisa    Name:     MIKE DUNHAM  MRN:      [id number]        Account:      [id number]   :      1945           Service Date: 2021       Document: [id number]

## 2021-11-04 NOTE — PROGRESS NOTES
HPI and Plan:   See dictation    Orders Placed This Encounter   Procedures     Basic metabolic panel       Orders Placed This Encounter   Medications     bumetanide (BUMEX) 0.5 MG tablet     Sig: Take 1 tablet (0.5 mg) by mouth daily (+ additional 1 mg to = 1.5 mg daily).     Dispense:  90 tablet     Refill:  3       Medications Discontinued During This Encounter   Medication Reason     bumetanide (BUMEX) 0.5 MG tablet Reorder         Encounter Diagnoses   Name Primary?     Chronic combined systolic and diastolic heart failure (H)      Chronic systolic congestive heart failure (H)      Cardiac pacemaker in situ      S/P AV sarah ablation Yes     CKD (chronic kidney disease) stage 4, GFR 15-29 ml/min (H)        CURRENT MEDICATIONS:  Current Outpatient Medications   Medication Sig Dispense Refill     bumetanide (BUMEX) 0.5 MG tablet Take 1 tablet (0.5 mg) by mouth daily (+ additional 1 mg to = 1.5 mg daily). 90 tablet 3     bumetanide (BUMEX) 1 MG tablet Take 1 tablet (1 mg) by mouth daily (+ additional 0.5 mg to = 1.5 mg daily). 90 tablet 0     Cholecalciferol (VITAMIN D3) 75 MCG (3000 UT) TABS Take 1 tablet by mouth daily       ELIQUIS ANTICOAGULANT 5 MG tablet Take 1 tablet (5 mg) by mouth 2 times daily 60 tablet 0     Ferrous Sulfate (IRON) 325 (65 Fe) MG tablet Take 1 tablet by mouth daily       insulin glargine (LANTUS SOLOSTAR) 100 UNIT/ML pen Inject 11 Units Subcutaneous At Bedtime 15 mL 1     insulin pen needle (ULTICARE SHORT) 31G X 8 MM miscellaneous use 1 pen daily for injection 100 each 3     melatonin 3 MG tablet Take 3 mg by mouth At Bedtime       metoprolol succinate ER (TOPROL-XL) 25 MG 24 hr tablet Take 1 tablet (25 mg) by mouth daily 90 tablet 3     Multiple Vitamins-Minerals (MULTIVITAMIN ADULT PO) Take 1 tablet by mouth daily       ONETOUCH ULTRA test strip Use to test blood sugar 2 times daily or as directed. 200 strip 1     tamsulosin (FLOMAX) 0.4 MG capsule Take 1 capsule (0.4 mg) by mouth At  Bedtime 90 capsule 3       ALLERGIES   No Known Allergies    PAST MEDICAL HISTORY:  Past Medical History:   Diagnosis Date     Anemia      Atrial fibrillation     AV node ablation and pacemaker placement 4/10/2020     BPH (benign prostatic hyperplasia)      Chronic diastolic CHF      Chronic kidney disease (CKD), stage III (moderate)      Diabetes mellitus - type 2      Palpitations      Systolic CHF (H)        PAST SURGICAL HISTORY:  Past Surgical History:   Procedure Laterality Date     ANESTHESIA CARDIOVERSION N/A 4/6/2020    Procedure: ANESTHESIA, FOR CARDIOVERSION;  Surgeon: GENERIC ANESTHESIA PROVIDER;  Location: [facility] OR     CYSTOSCOPY       EP PACEMAKER N/A 4/10/2020    Procedure: EP Pacemaker;  Surgeon: Abhay Willams MD;  Location:  HEART CARDIAC CATH LAB     [facility]  09/25/2020    Done in Urology office with Dr Perez       FAMILY HISTORY:  Family History   Problem Relation Age of Onset     Diabetes Sister        SOCIAL HISTORY:  Social History     Socioeconomic History     Marital status: Single     Spouse name: Not on file     Number of children: Not on file     Years of education: Not on file     Highest education level: Not on file   Occupational History     Not on file   Tobacco Use     Smoking status: Never Smoker     Smokeless tobacco: Never Used   Substance and Sexual Activity     Alcohol use: Yes     Comment: Rare     Drug use: Not on file     Sexual activity: Not Currently   Other Topics Concern     Parent/sibling w/ CABG, MI or angioplasty before 65F 55M? Not Asked   Social History Narrative     Not on file     Social Determinants of Health     Financial Resource Strain:      Difficulty of Paying Living Expenses:    Food Insecurity:      Worried About Running Out of Food in the Last Year:      Ran Out of Food in the Last Year:    Transportation Needs:      Lack of Transportation (Medical):      Lack of Transportation (Non-Medical):    Physical Activity:      Days of Exercise per Week:      Minutes  "of Exercise per Session:    Stress:      Feeling of Stress :    Social Connections:      Frequency of Communication with Friends and Family:      Frequency of Social Gatherings with Friends and Family:      Attends Evangelical Services:      Active Member of Clubs or Organizations:      Attends Club or Organization Meetings:      Marital Status:    Intimate Partner Violence:      Fear of Current or Ex-Partner:      Emotionally Abused:      Physically Abused:      Sexually Abused:        Review of Systems:  Skin:  Negative       Eyes:  Positive for glasses    ENT:  Negative      Respiratory:  Negative cough     Cardiovascular:  Negative      Gastroenterology: Negative      Genitourinary:  Negative      Musculoskeletal:  Negative      Neurologic:  Negative      Psychiatric:  Negative      Heme/Lymph/Imm:  Negative      Endocrine:  Positive for diabetes      Physical Exam:  Vitals: /60 (BP Location: Right arm, Patient Position: Sitting, Cuff Size: Adult Regular)   Pulse 71   Ht 1.727 m (5' 8\")   Wt 88.7 kg (195 lb 9.6 oz)   SpO2 97%   BMI 29.74 kg/m      Constitutional:  cooperative        Skin:  warm and dry to the touch          Head:  normocephalic        Eyes:  pupils equal and round        Lymph:      ENT:    pallor      Neck:  no carotid bruit        Respiratory:  clear to auscultation;normal symmetry         Cardiac: regular rhythm;no murmurs, gallops or rubs detected                  pulses below the femoral arteries are diminished                                      GI:  abdomen soft;no bruits        Extremities and Muscular Skeletal:  no edema;no deformities, clubbing, cyanosis, erythema observed              Neurological:  no gross motor deficits;affect appropriate        Psych:  Alert and Oriented x 3          CC  Sushila Frank, DO  [address]  [place] [address]                  "

## 2021-11-04 NOTE — LETTER
11/4/2021    Dm Lipscomb MD, MD  [address]  [facility] [address]    RE: Gene Pgaan       Dear Colleague,    I had the pleasure of seeing Gene Pagan in the [facility].    HPI and Plan:   See dictation    Orders Placed This Encounter   Procedures     Basic metabolic panel       Orders Placed This Encounter   Medications     bumetanide (BUMEX) 0.5 MG tablet     Sig: Take 1 tablet (0.5 mg) by mouth daily (+ additional 1 mg to = 1.5 mg daily).     Dispense:  90 tablet     Refill:  3       Medications Discontinued During This Encounter   Medication Reason     bumetanide (BUMEX) 0.5 MG tablet Reorder         Encounter Diagnoses   Name Primary?     Chronic combined systolic and diastolic heart failure (H)      Chronic systolic congestive heart failure (H)      Cardiac pacemaker in situ      S/P AV sarah ablation Yes     CKD (chronic kidney disease) stage 4, GFR 15-29 ml/min (H)        CURRENT MEDICATIONS:  Current Outpatient Medications   Medication Sig Dispense Refill     bumetanide (BUMEX) 0.5 MG tablet Take 1 tablet (0.5 mg) by mouth daily (+ additional 1 mg to = 1.5 mg daily). 90 tablet 3     bumetanide (BUMEX) 1 MG tablet Take 1 tablet (1 mg) by mouth daily (+ additional 0.5 mg to = 1.5 mg daily). 90 tablet 0     Cholecalciferol (VITAMIN D3) 75 MCG (3000 UT) TABS Take 1 tablet by mouth daily       ELIQUIS ANTICOAGULANT 5 MG tablet Take 1 tablet (5 mg) by mouth 2 times daily 60 tablet 0     Ferrous Sulfate (IRON) 325 (65 Fe) MG tablet Take 1 tablet by mouth daily       insulin glargine (LANTUS SOLOSTAR) 100 UNIT/ML pen Inject 11 Units Subcutaneous At Bedtime 15 mL 1     insulin pen needle (ULTICARE SHORT) 31G X 8 MM miscellaneous use 1 pen daily for injection 100 each 3     melatonin 3 MG tablet Take 3 mg by mouth At Bedtime       metoprolol succinate ER (TOPROL-XL) 25 MG 24 hr tablet Take 1 tablet (25 mg) by mouth daily 90 tablet 3      Multiple Vitamins-Minerals (MULTIVITAMIN ADULT PO) Take 1 tablet by mouth daily       ONETOUCH ULTRA test strip Use to test blood sugar 2 times daily or as directed. 200 strip 1     tamsulosin (FLOMAX) 0.4 MG capsule Take 1 capsule (0.4 mg) by mouth At Bedtime 90 capsule 3       ALLERGIES   No Known Allergies    PAST MEDICAL HISTORY:  Past Medical History:   Diagnosis Date     Anemia      Atrial fibrillation     AV node ablation and pacemaker placement 4/10/2020     BPH (benign prostatic hyperplasia)      Chronic diastolic CHF      Chronic kidney disease (CKD), stage III (moderate)      Diabetes mellitus - type 2      Palpitations      Systolic CHF (H)        PAST SURGICAL HISTORY:  Past Surgical History:   Procedure Laterality Date     ANESTHESIA CARDIOVERSION N/A 4/6/2020    Procedure: ANESTHESIA, FOR CARDIOVERSION;  Surgeon: GENERIC ANESTHESIA PROVIDER;  Location: [facility] OR     CYSTOSCOPY       EP PACEMAKER N/A 4/10/2020    Procedure: EP Pacemaker;  Surgeon: Abhay Willams MD;  Location: [facility] CARDIAC CATH LAB     [facility]  09/25/2020    Done in Urology office with Dr Perez       FAMILY HISTORY:  Family History   Problem Relation Age of Onset     Diabetes Sister        SOCIAL HISTORY:  Social History     Socioeconomic History     Marital status: Single     Spouse name: Not on file     Number of children: Not on file     Years of education: Not on file     Highest education level: Not on file   Occupational History     Not on file   Tobacco Use     Smoking status: Never Smoker     Smokeless tobacco: Never Used   Substance and Sexual Activity     Alcohol use: Yes     Comment: Rare     Drug use: Not on file     Sexual activity: Not Currently   Other Topics Concern     Parent/sibling w/ CABG, MI or angioplasty before 65F 55M? Not Asked   Social History Narrative     Not on file     Social Determinants of Health     Financial Resource Strain:      Difficulty of Paying Living Expenses:    Food Insecurity:      Worried  "About Running Out of Food in the Last Year:      Ran Out of Food in the Last Year:    Transportation Needs:      Lack of Transportation (Medical):      Lack of Transportation (Non-Medical):    Physical Activity:      Days of Exercise per Week:      Minutes of Exercise per Session:    Stress:      Feeling of Stress :    Social Connections:      Frequency of Communication with Friends and Family:      Frequency of Social Gatherings with Friends and Family:      Attends Yazidism Services:      Active Member of Clubs or Organizations:      Attends Club or Organization Meetings:      Marital Status:    Intimate Partner Violence:      Fear of Current or Ex-Partner:      Emotionally Abused:      Physically Abused:      Sexually Abused:        Review of Systems:  Skin:  Negative       Eyes:  Positive for glasses    ENT:  Negative      Respiratory:  Negative cough     Cardiovascular:  Negative      Gastroenterology: Negative      Genitourinary:  Negative      Musculoskeletal:  Negative      Neurologic:  Negative      Psychiatric:  Negative      Heme/Lymph/Imm:  Negative      Endocrine:  Positive for diabetes      Physical Exam:  Vitals: /60 (BP Location: Right arm, Patient Position: Sitting, Cuff Size: Adult Regular)   Pulse 71   Ht 1.727 m (5' 8\")   Wt 88.7 kg (195 lb 9.6 oz)   SpO2 97%   BMI 29.74 kg/m      Constitutional:  cooperative        Skin:  warm and dry to the touch          Head:  normocephalic        Eyes:  pupils equal and round        Lymph:      ENT:    pallor      Neck:  no carotid bruit        Respiratory:  clear to auscultation;normal symmetry         Cardiac: regular rhythm;no murmurs, gallops or rubs detected                  pulses below the femoral arteries are diminished                                      GI:  abdomen soft;no bruits        Extremities and Muscular Skeletal:  no edema;no deformities, clubbing, cyanosis, erythema observed              Neurological:  no gross motor " deficits;affect appropriate        Psych:  Alert and Oriented x 3          CC  Sushila Frank DO  [address]  [place] [address]                      Thank you for allowing me to participate in the care of your patient.      Sincerely,     Sushila Frank DO     [facility]  cc:   Sushila Frank DO  [address]  [place] [address]

## 2021-11-04 NOTE — LETTER
11/4/2021       RE: Gene Pagan  [address]  [facility] [address]     Dear Colleague,    Thank you for referring your patient, Gene Pagan, to the [facility]. Please see a copy of my visit note below.    HPI and Plan:   See dictation    Orders Placed This Encounter   Procedures     Basic metabolic panel       Orders Placed This Encounter   Medications     bumetanide (BUMEX) 0.5 MG tablet     Sig: Take 1 tablet (0.5 mg) by mouth daily (+ additional 1 mg to = 1.5 mg daily).     Dispense:  90 tablet     Refill:  3       Medications Discontinued During This Encounter   Medication Reason     bumetanide (BUMEX) 0.5 MG tablet Reorder         Encounter Diagnoses   Name Primary?     Chronic combined systolic and diastolic heart failure (H)      Chronic systolic congestive heart failure (H)      Cardiac pacemaker in situ      S/P AV sarah ablation Yes     CKD (chronic kidney disease) stage 4, GFR 15-29 ml/min (H)        CURRENT MEDICATIONS:  Current Outpatient Medications   Medication Sig Dispense Refill     bumetanide (BUMEX) 0.5 MG tablet Take 1 tablet (0.5 mg) by mouth daily (+ additional 1 mg to = 1.5 mg daily). 90 tablet 3     bumetanide (BUMEX) 1 MG tablet Take 1 tablet (1 mg) by mouth daily (+ additional 0.5 mg to = 1.5 mg daily). 90 tablet 0     Cholecalciferol (VITAMIN D3) 75 MCG (3000 UT) TABS Take 1 tablet by mouth daily       ELIQUIS ANTICOAGULANT 5 MG tablet Take 1 tablet (5 mg) by mouth 2 times daily 60 tablet 0     Ferrous Sulfate (IRON) 325 (65 Fe) MG tablet Take 1 tablet by mouth daily       insulin glargine (LANTUS SOLOSTAR) 100 UNIT/ML pen Inject 11 Units Subcutaneous At Bedtime 15 mL 1     insulin pen needle (ULTICARE SHORT) 31G X 8 MM miscellaneous use 1 pen daily for injection 100 each 3     melatonin 3 MG tablet Take 3 mg by mouth At Bedtime       metoprolol succinate ER  (TOPROL-XL) 25 MG 24 hr tablet Take 1 tablet (25 mg) by mouth daily 90 tablet 3     Multiple Vitamins-Minerals (MULTIVITAMIN ADULT PO) Take 1 tablet by mouth daily       ONETOUCH ULTRA test strip Use to test blood sugar 2 times daily or as directed. 200 strip 1     tamsulosin (FLOMAX) 0.4 MG capsule Take 1 capsule (0.4 mg) by mouth At Bedtime 90 capsule 3       ALLERGIES   No Known Allergies    PAST MEDICAL HISTORY:  Past Medical History:   Diagnosis Date     Anemia      Atrial fibrillation     AV node ablation and pacemaker placement 4/10/2020     BPH (benign prostatic hyperplasia)      Chronic diastolic CHF      Chronic kidney disease (CKD), stage III (moderate)      Diabetes mellitus - type 2      Palpitations      Systolic CHF (H)        PAST SURGICAL HISTORY:  Past Surgical History:   Procedure Laterality Date     ANESTHESIA CARDIOVERSION N/A 4/6/2020    Procedure: ANESTHESIA, FOR CARDIOVERSION;  Surgeon: GENERIC ANESTHESIA PROVIDER;  Location: [facility] OR     CYSTOSCOPY       EP PACEMAKER N/A 4/10/2020    Procedure: EP Pacemaker;  Surgeon: Abhay Willams MD;  Location:  HEART CARDIAC CATH LAB     [facility]  09/25/2020    Done in Urology office with Dr Perez       FAMILY HISTORY:  Family History   Problem Relation Age of Onset     Diabetes Sister        SOCIAL HISTORY:  Social History     Socioeconomic History     Marital status: Single     Spouse name: Not on file     Number of children: Not on file     Years of education: Not on file     Highest education level: Not on file   Occupational History     Not on file   Tobacco Use     Smoking status: Never Smoker     Smokeless tobacco: Never Used   Substance and Sexual Activity     Alcohol use: Yes     Comment: Rare     Drug use: Not on file     Sexual activity: Not Currently   Other Topics Concern     Parent/sibling w/ CABG, MI or angioplasty before 65F 55M? Not Asked   Social History Narrative     Not on file     Social Determinants of Health     Financial Resource  "Strain:      Difficulty of Paying Living Expenses:    Food Insecurity:      Worried About Running Out of Food in the Last Year:      Ran Out of Food in the Last Year:    Transportation Needs:      Lack of Transportation (Medical):      Lack of Transportation (Non-Medical):    Physical Activity:      Days of Exercise per Week:      Minutes of Exercise per Session:    Stress:      Feeling of Stress :    Social Connections:      Frequency of Communication with Friends and Family:      Frequency of Social Gatherings with Friends and Family:      Attends Baptism Services:      Active Member of Clubs or Organizations:      Attends Club or Organization Meetings:      Marital Status:    Intimate Partner Violence:      Fear of Current or Ex-Partner:      Emotionally Abused:      Physically Abused:      Sexually Abused:        Review of Systems:  Skin:  Negative       Eyes:  Positive for glasses    ENT:  Negative      Respiratory:  Negative cough     Cardiovascular:  Negative      Gastroenterology: Negative      Genitourinary:  Negative      Musculoskeletal:  Negative      Neurologic:  Negative      Psychiatric:  Negative      Heme/Lymph/Imm:  Negative      Endocrine:  Positive for diabetes      Physical Exam:  Vitals: /60 (BP Location: Right arm, Patient Position: Sitting, Cuff Size: Adult Regular)   Pulse 71   Ht 1.727 m (5' 8\")   Wt 88.7 kg (195 lb 9.6 oz)   SpO2 97%   BMI 29.74 kg/m      Constitutional:  cooperative        Skin:  warm and dry to the touch          Head:  normocephalic        Eyes:  pupils equal and round        Lymph:      ENT:    pallor      Neck:  no carotid bruit        Respiratory:  clear to auscultation;normal symmetry         Cardiac: regular rhythm;no murmurs, gallops or rubs detected                  pulses below the femoral arteries are diminished                                      GI:  abdomen soft;no bruits        Extremities and Muscular Skeletal:  no edema;no deformities, " clubbing, cyanosis, erythema observed              Neurological:  no gross motor deficits;affect appropriate        Psych:  Alert and Oriented x 3          CC  Sushila Frank, DO  [address]  [place] [address]                    Service Date: 11/04/2021    REFERRING PROVIDER:  Dr. Dm Lipscomb.    HISTORY OF PRESENT ILLNESS:  Mr. Pagan is a very pleasant 76-year-old male with a history of biventricular systolic congestive heart failure and atrial fibrillation.  His congestive heart failure was likely tachycardic-induced.  He underwent an AV sarah ablation procedure with a biventricular pacing device.  His ejection fraction improved from 20%-25%, now at 45%-50%.  He does have stage IV chronic kidney disease and is on Bumex for diuresis.  He has been doing well over the last 6 months.  He has not had any difficulty with breathing.  His weight has fluctuated a little bit, and he does admit that he had screwed up on taking his Bumex for the last couple of days.  He was taking less than what was prescribed, and his weight has gone up a little bit.  He did correct this, and he states his weight is starting to go back down.  On his scale at home he was 186, but on our scale today he was 195.  Nevertheless, he is not experiencing any orthopnea or PND or dyspnea with exertion.  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    PHYSICAL EXAMINATION:  VITAL SIGNS:  Blood pressure is 114/60, pulse is 71.  Again, his weight is 195.  Body mass index of 29.  CARDIOVASCULAR:  Tones were regular today and distant.  I did not appreciate a murmur or gallop.  LUNGS:  Clear posteriorly.  EXTREMITIES:  He does not have any significant peripheral edema.    ASSESSMENT AND PLAN:  In summary, Mr. Pagan is a very pleasant 76-year-old male with a history of tachycardia-induced cardiomyopathy, mild LV dysfunction, persistent.  His EF is now around 45%-50%.  He has a  biventricular pacing device, and this was last checked in October showing 100% pacing and normal thresholds.  He has not had a basic metabolic panel or kidney check since April.  Since he did have a little bit of trouble with compliance with his medications including the diuretics with fluctuations in his weight, I would like to see what his kidney function is doing.  I am going to ask him to have a basic metabolic panel done today.  I did review his previous ones.  It does not look like he is acidotic.  He does not follow regularly with a nephrologist, but this may be something to consider in the future.  I will have him continue to follow in our clinic every 6 months or as needed.    Please feel free to contact me with any questions you have in regards to his visit today, and I will contact him with his results of his kidney function.      Sushila Frank DO      cc:  Dm Lipscomb MD  [facility]  [address], [address]  [place] [address]          D: 2021   T: 2021   MT: lisa    Name:     MIKE DUNHAMKyle  MRN:      [id number]        Account:      [id number]   :      1945           Service Date: 2021       Document: [id number]

## 2021-11-21 ENCOUNTER — HEALTH MAINTENANCE LETTER (OUTPATIENT)
Age: 76
End: 2021-11-21

## 2021-12-20 ENCOUNTER — LAB (OUTPATIENT)
Dept: LAB | Facility: CLINIC | Age: 76
End: 2021-12-20
Payer: COMMERCIAL

## 2021-12-20 DIAGNOSIS — Z79.4 TYPE 2 DIABETES MELLITUS WITH STAGE 4 CHRONIC KIDNEY DISEASE, WITH LONG-TERM CURRENT USE OF INSULIN (H): ICD-10-CM

## 2021-12-20 DIAGNOSIS — E11.22 TYPE 2 DIABETES MELLITUS WITH STAGE 4 CHRONIC KIDNEY DISEASE, WITH LONG-TERM CURRENT USE OF INSULIN (H): ICD-10-CM

## 2021-12-20 DIAGNOSIS — N18.4 TYPE 2 DIABETES MELLITUS WITH STAGE 4 CHRONIC KIDNEY DISEASE, WITH LONG-TERM CURRENT USE OF INSULIN (H): ICD-10-CM

## 2021-12-20 LAB
CREAT UR-MCNC: 64 MG/DL
HBA1C MFR BLD: 6.6 % (ref 0–5.6)
MICROALBUMIN UR-MCNC: 113 MG/L
MICROALBUMIN/CREAT UR: 176.56 MG/G CR (ref 0–17)

## 2021-12-20 PROCEDURE — 83036 HEMOGLOBIN GLYCOSYLATED A1C: CPT

## 2021-12-20 PROCEDURE — 36415 COLL VENOUS BLD VENIPUNCTURE: CPT

## 2021-12-20 PROCEDURE — 82043 UR ALBUMIN QUANTITATIVE: CPT

## 2021-12-27 DIAGNOSIS — I48.91 ATRIAL FIBRILLATION WITH RAPID VENTRICULAR RESPONSE (H): ICD-10-CM

## 2021-12-27 NOTE — TELEPHONE ENCOUNTER
Routing refill request to provider for review/approval because:  Labs out of range:  Creatinine   Labs not current:  platelet   Patient needs to be seen because:  more than 6 months since last visit, next appointment is in February.     Appointments in Next Year      Feb 17, 2022  3:00 PM  (Arrive by 2:40 PM)  Provider Visit with Dm Lipscomb MD  [facility] ([facility] ) [phone]     Mar 02, 2022  2:20 PM  CARDIAC DEVICE CHECK - IN CLINIC with HIRA GARCIA  [facility] ([facility] ) [phone]           Fernanda May RN  [facility]

## 2021-12-27 NOTE — TELEPHONE ENCOUNTER
Reason for call:  Medication   If this is a refill request, has the caller requested the refill from the pharmacy already? No  Will the patient be using a [facility]? No  Name of the pharmacy and phone number for the current request: [facility] - [phone]    Name of the medication requested: Eliquis    Other request: Has a week left of prescription     Phone number to reach patient:  Cell number on file:    Telephone Information:   Mobile [phone]       Best Time:  any    Can we leave a detailed message on this number?  YES    Travel screening: Negative

## 2022-01-15 ENCOUNTER — HEALTH MAINTENANCE LETTER (OUTPATIENT)
Age: 77
End: 2022-01-15

## 2022-02-02 DIAGNOSIS — I50.23 ACUTE ON CHRONIC SYSTOLIC CONGESTIVE HEART FAILURE (H): ICD-10-CM

## 2022-02-02 RX ORDER — BUMETANIDE 1 MG/1
1 TABLET ORAL DAILY
Qty: 90 TABLET | Refills: 0 | Status: SHIPPED | OUTPATIENT
Start: 2022-02-02 | End: 2022-06-30

## 2022-02-17 ENCOUNTER — OFFICE VISIT (OUTPATIENT)
Dept: INTERNAL MEDICINE | Facility: CLINIC | Age: 77
End: 2022-02-17
Payer: COMMERCIAL

## 2022-02-17 VITALS
HEIGHT: 68 IN | BODY MASS INDEX: 30.62 KG/M2 | RESPIRATION RATE: 16 BRPM | WEIGHT: 202 LBS | TEMPERATURE: 97.9 F | OXYGEN SATURATION: 99 % | SYSTOLIC BLOOD PRESSURE: 126 MMHG | DIASTOLIC BLOOD PRESSURE: 64 MMHG | HEART RATE: 74 BPM

## 2022-02-17 DIAGNOSIS — N13.8 BPH WITH OBSTRUCTION/LOWER URINARY TRACT SYMPTOMS: ICD-10-CM

## 2022-02-17 DIAGNOSIS — N40.1 BPH WITH OBSTRUCTION/LOWER URINARY TRACT SYMPTOMS: ICD-10-CM

## 2022-02-17 DIAGNOSIS — N18.4 CKD (CHRONIC KIDNEY DISEASE) STAGE 4, GFR 15-29 ML/MIN (H): ICD-10-CM

## 2022-02-17 DIAGNOSIS — Z79.899 MEDICATION MANAGEMENT: ICD-10-CM

## 2022-02-17 DIAGNOSIS — E11.22 TYPE 2 DIABETES MELLITUS WITH STAGE 4 CHRONIC KIDNEY DISEASE, WITH LONG-TERM CURRENT USE OF INSULIN (H): ICD-10-CM

## 2022-02-17 DIAGNOSIS — Z79.4 TYPE 2 DIABETES MELLITUS WITH STAGE 4 CHRONIC KIDNEY DISEASE, WITH LONG-TERM CURRENT USE OF INSULIN (H): ICD-10-CM

## 2022-02-17 DIAGNOSIS — I77.810 THORACIC AORTIC ECTASIA (H): ICD-10-CM

## 2022-02-17 DIAGNOSIS — R53.83 FATIGUE, UNSPECIFIED TYPE: ICD-10-CM

## 2022-02-17 DIAGNOSIS — I48.91 ATRIAL FIBRILLATION WITH RAPID VENTRICULAR RESPONSE (H): ICD-10-CM

## 2022-02-17 DIAGNOSIS — D64.9 ANEMIA, UNSPECIFIED TYPE: ICD-10-CM

## 2022-02-17 DIAGNOSIS — I50.42 CHRONIC COMBINED SYSTOLIC AND DIASTOLIC HEART FAILURE (H): Primary | ICD-10-CM

## 2022-02-17 DIAGNOSIS — N18.4 TYPE 2 DIABETES MELLITUS WITH STAGE 4 CHRONIC KIDNEY DISEASE, WITH LONG-TERM CURRENT USE OF INSULIN (H): ICD-10-CM

## 2022-02-17 DIAGNOSIS — E11.69 TYPE 2 DIABETES MELLITUS WITH OTHER SPECIFIED COMPLICATION, UNSPECIFIED WHETHER LONG TERM INSULIN USE (H): ICD-10-CM

## 2022-02-17 DIAGNOSIS — I48.19 PERSISTENT ATRIAL FIBRILLATION (H): ICD-10-CM

## 2022-02-17 DIAGNOSIS — E78.5 HYPERLIPIDEMIA LDL GOAL <100: ICD-10-CM

## 2022-02-17 PROBLEM — R57.0 CARDIOGENIC SHOCK (H): Status: RESOLVED | Noted: 2022-02-17 | Resolved: 2022-02-17

## 2022-02-17 PROBLEM — R57.0 CARDIOGENIC SHOCK (H): Status: ACTIVE | Noted: 2022-02-17

## 2022-02-17 LAB
BASOPHILS # BLD AUTO: 0 10E3/UL (ref 0–0.2)
BASOPHILS NFR BLD AUTO: 0 %
EOSINOPHIL # BLD AUTO: 0.3 10E3/UL (ref 0–0.7)
EOSINOPHIL NFR BLD AUTO: 4 %
ERYTHROCYTE [DISTWIDTH] IN BLOOD BY AUTOMATED COUNT: 15.7 % (ref 10–15)
HBA1C MFR BLD: 6.9 % (ref 0–5.6)
HCT VFR BLD AUTO: 41.3 % (ref 40–53)
HGB BLD-MCNC: 12.8 G/DL (ref 13.3–17.7)
LYMPHOCYTES # BLD AUTO: 2 10E3/UL (ref 0.8–5.3)
LYMPHOCYTES NFR BLD AUTO: 23 %
MCH RBC QN AUTO: 28.4 PG (ref 26.5–33)
MCHC RBC AUTO-ENTMCNC: 31 G/DL (ref 31.5–36.5)
MCV RBC AUTO: 92 FL (ref 78–100)
MONOCYTES # BLD AUTO: 0.8 10E3/UL (ref 0–1.3)
MONOCYTES NFR BLD AUTO: 9 %
NEUTROPHILS # BLD AUTO: 5.4 10E3/UL (ref 1.6–8.3)
NEUTROPHILS NFR BLD AUTO: 63 %
PLATELET # BLD AUTO: 300 10E3/UL (ref 150–450)
RBC # BLD AUTO: 4.51 10E6/UL (ref 4.4–5.9)
WBC # BLD AUTO: 8.5 10E3/UL (ref 4–11)

## 2022-02-17 PROCEDURE — 83550 IRON BINDING TEST: CPT | Performed by: INTERNAL MEDICINE

## 2022-02-17 PROCEDURE — 80061 LIPID PANEL: CPT | Performed by: INTERNAL MEDICINE

## 2022-02-17 PROCEDURE — 82043 UR ALBUMIN QUANTITATIVE: CPT | Performed by: INTERNAL MEDICINE

## 2022-02-17 PROCEDURE — 80053 COMPREHEN METABOLIC PANEL: CPT | Performed by: INTERNAL MEDICINE

## 2022-02-17 PROCEDURE — 85025 COMPLETE CBC W/AUTO DIFF WBC: CPT | Performed by: INTERNAL MEDICINE

## 2022-02-17 PROCEDURE — 99214 OFFICE O/P EST MOD 30 MIN: CPT | Performed by: INTERNAL MEDICINE

## 2022-02-17 PROCEDURE — 84443 ASSAY THYROID STIM HORMONE: CPT | Performed by: INTERNAL MEDICINE

## 2022-02-17 PROCEDURE — 83036 HEMOGLOBIN GLYCOSYLATED A1C: CPT | Performed by: INTERNAL MEDICINE

## 2022-02-17 PROCEDURE — 36415 COLL VENOUS BLD VENIPUNCTURE: CPT | Performed by: INTERNAL MEDICINE

## 2022-02-17 PROCEDURE — 99207 PR FOOT EXAM NO CHARGE: CPT | Performed by: INTERNAL MEDICINE

## 2022-02-17 RX ORDER — METOPROLOL SUCCINATE 25 MG/1
25 TABLET, EXTENDED RELEASE ORAL DAILY
Qty: 90 TABLET | Refills: 3 | Status: SHIPPED | OUTPATIENT
Start: 2022-02-17 | End: 2023-02-21

## 2022-02-17 RX ORDER — INSULIN GLARGINE 100 [IU]/ML
11 INJECTION, SOLUTION SUBCUTANEOUS AT BEDTIME
Qty: 15 ML | Refills: 1 | Status: SHIPPED | OUTPATIENT
Start: 2022-02-17 | End: 2022-12-20

## 2022-02-17 RX ORDER — BLOOD SUGAR DIAGNOSTIC
STRIP MISCELLANEOUS
Qty: 200 STRIP | Refills: 1 | Status: SHIPPED | OUTPATIENT
Start: 2022-02-17 | End: 2023-03-27

## 2022-02-17 RX ORDER — METHYLPREDNISOLONE SODIUM SUCCINATE 125 MG/2ML
INJECTION, POWDER, FOR SOLUTION INTRAMUSCULAR; INTRAVENOUS
Qty: 100 EACH | Refills: 3 | Status: SHIPPED | OUTPATIENT
Start: 2022-02-17 | End: 2023-04-10

## 2022-02-17 RX ORDER — TAMSULOSIN HYDROCHLORIDE 0.4 MG/1
0.4 CAPSULE ORAL AT BEDTIME
Qty: 90 CAPSULE | Refills: 3 | Status: SHIPPED | OUTPATIENT
Start: 2022-02-17 | End: 2023-02-24

## 2022-02-17 NOTE — PROGRESS NOTES
Assessment & Plan   (I50.42) Chronic combined systolic and diastolic heart failure (H)  (primary encounter diagnosis)  Comment: satisfactorily controlled. Continue current meds. F/y with cardiology as they advise.  Plan: metoprolol succinate ER (TOPROL-XL) 25 MG 24 hr        tablet         (E11.22,  N18.4,  Z79.4) Type 2 diabetes mellitus with stage 4 chronic kidney disease, with long-term current use of insulin (H)  Comment: Update A1c, eye and nephrology f/u. Continue current meds.   Plan: OPTOMETRY REFERRAL, Adult Nephrology          Referral, **A1C FUTURE 3mo, Albumin Random         Urine Quantitative with Creat Ratio, FOOT EXAM,        insulin glargine (LANTUS SOLOSTAR) 100 UNIT/ML         pen, insulin pen needle (ULTICARE SHORT) 31G X         8 MM miscellaneous, blood glucose (ONETOUCH         ULTRA) test strip, CANCELED: Basic metabolic         panel  (Ca, Cl, CO2, Creat, Gluc, K, Na, BUN)          (E78.5) Hyperlipidemia LDL goal <100  Comment: Update lipids. Continue current meds.   Plan: Lipid panel reflex to direct LDL Fasting,         **Comprehensive metabolic panel FUTURE 2mo          (N18.4) CKD (chronic kidney disease) stage 4, GFR 15-29 ml/min (H)    (I77.810) Thoracic aortic ectasia (H)  (I48.19) Persistent atrial fibrillation (H)  Comment: Continue to follow with cardiology as they advise.     (Z88.899) Medication management    (D64.9) Anemia, unspecified type  Comment: Update labs.   Plan: CBC with platelets and differential, Iron and         iron binding capacity          (R53.83) Fatigue, unspecified type  Comment: Due to some fatigue, advised checking hemogram and TSH.  Plan: CBC with platelets and differential, TSH with         free T4 reflex, CANCELED: Basic metabolic panel        (Ca, Cl, CO2, Creat, Gluc, K, Na, BUN)          (I48.91) Atrial fibrillation with rapid ventricular response (H)  Comment: Rate now controlled.   Plan: apixaban ANTICOAGULANT (ELIQUIS ANTICOAGULANT)         " 5 MG tablet          (E11.69) Type 2 diabetes mellitus with other specified complication, unspecified whether long term insulin use (H)    (N40.1,  N13.8) BPH with obstruction/lower urinary tract symptoms  Comment: Refilled tamsulosin.   Plan: tamsulosin (FLOMAX) 0.4 MG capsule               BMI:   Estimated body mass index is 30.71 kg/m  as calculated from the following:    Height as of this encounter: 1.727 m (5' 8\").    Weight as of this encounter: 91.6 kg (202 lb).   Weight management plan: Discussed healthy diet and exercise guidelines    Patient Instructions   Recommend seeing the Nephrology (\"kidney specialist\") again, due to the combination of diabetes and reduced kidney function.     Needed prescriptions refilled today.     No labs required until about three months from now.     See me shortly after labs, in about three months.   Could be video visit or face to face.       Return in about 3 months (around 5/17/2022) for Diabetes Recheck.    Dm Lipscomb MD,   [facility] VICENTE Mills is a 76 year old who presents for the following health issues : Follow up, patient would like 6 months of refills, does not need refills today.    HPI     Diabetes Follow-up    How often are you checking your blood sugar? One time daily  What time of day are you checking your blood sugars (select all that apply)?  Before meals  Have you had any blood sugars above 200?  No, lately blood sugars have been 120-140.  Have you had any blood sugars below 70?  No    What symptoms do you notice when your blood sugar is low?  None    What concerns do you have today about your diabetes? None     Do you have any of these symptoms? (Select all that apply)  No numbness or tingling in feet.  No redness, sores or blisters on feet.  No complaints of excessive thirst.  No reports of blurry vision.  No significant changes to weight.    Have you had a diabetic eye exam in the last 12 months? No        BP " Readings from Last 2 Encounters:   02/17/22 126/64   11/04/21 114/60     Hemoglobin A1C POCT (%)   Date Value   04/06/2021 6.4 (H)   10/23/2020 6.8 (H)     Hemoglobin A1C (%)   Date Value   12/20/2021 6.6 (H)     LDL Cholesterol Calculated (mg/dL)   Date Value   04/06/2021 76   10/23/2020 98       Hypertension Follow-up      Do you check your blood pressure regularly outside of the clinic? Yes     Are you following a low salt diet? Yes    Are your blood pressures ever more than 140 on the top number (systolic) OR more   than 90 on the bottom number (diastolic), for example 140/90? No      How many servings of fruits and vegetables do you eat daily?  4 or more    On average, how many sweetened beverages do you drink each day (Examples: soda, juice, sweet tea, etc.  Do NOT count diet or artificially sweetened beverages)?   Occasional Reema green tea.    How many days per week do you exercise enough to make your heart beat faster? 4    How many minutes a day do you exercise enough to make your heart beat faster? Walks an average of 4 miles a day at [employer].    How many days per week do you miss taking your medication? 0    The patient last saw cardiology in November. He notes no significant dyspnea, chest discomfort, dizziness or palpitations.     We agreed to update needed labs, including A1c and LDL-Cholesterol.  Blood pressure appears satisfactorily controlled.   We discussed the recommendation to follow up with nephrology due to a gradual decline in renal function combined with diabetes.     Past medical, family and social histories as well as medications reviewed and updated as needed.    Review of Systems   No dyspnea or cough. No chest discomfort, dizziness or palpitations. No diarrhea, abdominal pain or rectal bleeding. No dysuria or hematuria. No acute problems with vision or speech, lateralizing weakness or paresthesias.    ROS: as above or negative for Respiratory, CV, GI, , endocrine, neuro systems.      "  Objective    /64   Pulse 74   Temp 97.9  F (36.6  C) (Tympanic)   Resp 16   Ht 1.727 m (5' 8\")   Wt 91.6 kg (202 lb)   SpO2 99%   BMI 30.71 kg/m    Body mass index is 30.71 kg/m .     Physical Exam   GENERAL: healthy, alert and no distress  NECK: no adenopathy, no asymmetry, masses, or scars and thyroid normal to palpation  RESP: lungs clear to auscultation - no rales, rhonchi or wheezes  CV: regular rate and rhythm, normal S1 S2, no S3 or S4, no murmur, click or rub, no peripheral edema and peripheral pulses strong  ABDOMEN: soft, nontender, no hepatosplenomegaly, no masses and bowel sounds normal  MS: no gross musculoskeletal defects noted, no edema  NEURO: Normal strength and tone, mentation intact and speech normal  PSYCH: mentation appears normal, affect normal/bright  Diabetic foot exam: normal DP and PT pulses, no trophic changes or ulcerative lesions and normal sensory exam            "

## 2022-02-17 NOTE — PATIENT INSTRUCTIONS
"Recommend seeing the Nephrology (\"kidney specialist\") again, due to the combination of diabetes and reduced kidney function.     Needed prescriptions refilled today.     No labs required until about three months from now.     See me shortly after labs, in about three months.   Could be video visit or face to face.   "

## 2022-02-18 LAB
ALBUMIN SERPL-MCNC: 3.5 G/DL (ref 3.4–5)
ALP SERPL-CCNC: 112 U/L (ref 40–150)
ALT SERPL W P-5'-P-CCNC: 23 U/L (ref 0–70)
ANION GAP SERPL CALCULATED.3IONS-SCNC: 8 MMOL/L (ref 3–14)
AST SERPL W P-5'-P-CCNC: 17 U/L (ref 0–45)
BILIRUB SERPL-MCNC: 0.3 MG/DL (ref 0.2–1.3)
BUN SERPL-MCNC: 50 MG/DL (ref 7–30)
CALCIUM SERPL-MCNC: 9.2 MG/DL (ref 8.5–10.1)
CHLORIDE BLD-SCNC: 102 MMOL/L (ref 94–109)
CHOLEST SERPL-MCNC: 140 MG/DL
CO2 SERPL-SCNC: 26 MMOL/L (ref 20–32)
CREAT SERPL-MCNC: 2.26 MG/DL (ref 0.66–1.25)
CREAT UR-MCNC: 46 MG/DL
FASTING STATUS PATIENT QL REPORTED: NO
GFR SERPL CREATININE-BSD FRML MDRD: 29 ML/MIN/1.73M2
GLUCOSE BLD-MCNC: 110 MG/DL (ref 70–99)
HDLC SERPL-MCNC: 31 MG/DL
IRON SATN MFR SERPL: 14 % (ref 15–46)
IRON SERPL-MCNC: 47 UG/DL (ref 35–180)
LDLC SERPL CALC-MCNC: 67 MG/DL
MICROALBUMIN UR-MCNC: 84 MG/L
MICROALBUMIN/CREAT UR: 182.61 MG/G CR (ref 0–17)
NONHDLC SERPL-MCNC: 109 MG/DL
POTASSIUM BLD-SCNC: 4.6 MMOL/L (ref 3.4–5.3)
PROT SERPL-MCNC: 8.1 G/DL (ref 6.8–8.8)
SODIUM SERPL-SCNC: 136 MMOL/L (ref 133–144)
TIBC SERPL-MCNC: 332 UG/DL (ref 240–430)
TRIGL SERPL-MCNC: 211 MG/DL
TSH SERPL DL<=0.005 MIU/L-ACNC: 1.36 MU/L (ref 0.4–4)

## 2022-03-02 ENCOUNTER — ANCILLARY PROCEDURE (OUTPATIENT)
Dept: CARDIOLOGY | Facility: CLINIC | Age: 77
End: 2022-03-02
Attending: INTERNAL MEDICINE
Payer: COMMERCIAL

## 2022-03-02 DIAGNOSIS — Z95.0 CARDIAC PACEMAKER IN SITU: ICD-10-CM

## 2022-03-02 DIAGNOSIS — I42.9 CARDIOMYOPATHY (H): ICD-10-CM

## 2022-03-02 DIAGNOSIS — Z98.890 S/P AV NODAL ABLATION: Primary | ICD-10-CM

## 2022-03-02 PROCEDURE — 93281 PM DEVICE PROGR EVAL MULTI: CPT | Performed by: INTERNAL MEDICINE

## 2022-03-04 NOTE — PROGRESS NOTES
"Gene Pagan is a 75 year old male who is being evaluated via a billable video visit.      The patient has been notified of following:     \"This video visit will be conducted via a call between you and your physician/provider. We have found that certain health care needs can be provided without the need for an in-person physical exam.  This service lets us provide the care you need with a video conversation.  If a prescription is necessary we can send it directly to your pharmacy.  If lab work is needed we can place an order for that and you can then stop by our lab to have the test done at a later time.    Video visits are billed at different rates depending on your insurance coverage.  Please reach out to your insurance provider with any questions.    If during the course of the call the physician/provider feels a video visit is not appropriate, you will not be charged for this service.\"    Patient has given verbal consent for Video visit? Yes    How would you like to obtain your AVS? Mail a copy    Patient would like the video invitation sent by: [phone]  Will anyone else be joining your video visit? No        Subjective     Gene Pagan is a 75 year old male who presents to clinic today for the following health issues:    HPI    Hospital Follow-up Visit:    Hospital/Nursing Home/IP Rehab Facility: [facility]  Date of Admission: 04/10/2020  Date of Discharge: 04/17/2020  Reason(s) for Admission: ATRIAL FIBRILLATION, CHF      Was your hospitalization related to COVID-19? No   Problems taking medications regularly:  None  Medication changes since discharge:   Hospital Follow-up Visit:    Hospital/Nursing Home/IP Rehab Facility: [facility]  Date of Admission: 04/10/2020  Date of Discharge: 04/17/2020  Reason(s) for Admission: atrial fibrillation, CHF      Was your hospitalization related to COVID-19? No   Problems taking medications regularly:  None  Medication changes " since discharge: as per Epic      apixaban ANTICOAGULANT (ELIQUIS) 5 MG tablet Take 1 tablet (5 mg) by mouth 2 times daily  Qty: 60 tablet, Refills: 0     Comments: Future refills by PCP Dr. Martínez Duarte with phone number [phone].  Associated Diagnoses: Atrial fibrillation with rapid ventricular response (H)       bumetanide (BUMEX) 1 MG tablet Take 1 tablet (1 mg) by mouth daily  Qty: 30 tablet, Refills: 0     Comments: Future refills by PCP Dr. Martínez Duarte with phone number [phone].  Associated Diagnoses: Combined systolic and diastolic congestive heart failure, unspecified HF chronicity (H)       cefdinir (OMNICEF) 300 MG capsule Take 1 capsule (300 mg) by mouth daily for 3 days  Qty: 3 capsule, Refills: 0     Associated Diagnoses: Pyelonephritis       insulin glargine (LANTUS SOLOSTAR) 100 UNIT/ML pen Inject 9 Units Subcutaneous At Bedtime Future refills by PCP Dr. Martínez Duarte with phone number [phone].  Qty: 15 mL, Refills: 0     Associated Diagnoses: Type 2 diabetes mellitus with other specified complication, unspecified whether long term insulin use (H)       insulin pen needle (31G X 8 MM) 31G X 8 MM miscellaneous 1 Box of 100 insulin pen needles to be dispensed with every insulin pen prescription  Qty: 100 each, Refills: 0     Comments: Future refills by PCP Dr. Martínez Duarte with phone number [phone].  Associated Diagnoses: Type 2 diabetes mellitus with other specified complication, unspecified whether long term insulin use (H)       Problems adhering to non-medication therapy:  None    Summary of hospitalization:  [facility] discharge summary reviewed  Diagnostic Tests/Treatments reviewed.  Follow up needed: labs as ordered by [facility] clinic, CT of chest in 3 months  Other Healthcare Providers Involved in Patient s Care:         Homecare and Specialist appointment - Cardiology/Core clinic, Urology  Update since discharge: improved.       Post  Discharge Medication Reconciliation: discharge medications reconciled and changed, per note/orders (see AVS).  Plan of care communicated with patient         Problems adhering to non-medication therapy:  None                  Video Start Time: 1349    The patient was hospitalized for several weeks, at [facility] in [place] from March 24 through April 10, at which time he was transferred to [facility] until discharge from that facility April 17.    Problems on initial presentation and included cardiogenic shock, acute systolic heart failure, atrial fibrillation with rapid ventricular rate, and gross hematuria.    He required initial pressors and intubation.  He was treated with broad-spectrum IV and later oral antibiotics for presumptive pneumonia as he presented with hypoxic respiratory failure.    Urology was consulted regarding gross hematuria and he does have follow-up scheduled with them.    After transfer to [facility] he did receive the AV sarah ablation and CRP by electrophysiology specialist.     Incidentally noted during his hospitalization was a 1 cm lung nodule noted on CT scan, for which follow-up CT scan was advised in 3 months as an outpatient.    The patient has seen Dr. Marquez in virtual follow-up earlier this week.  However, he would like to transfer his care to this clinic for the future.    Past medical, family and social histories as well as medications reviewed and updated as needed.    Reviewed and updated as needed this visit by Provider         Review of Systems   REVIEW OF SYSTEMS: The following systems have been completely reviewed and are negative except as noted above:   Constitutional, respiratory, cardiovascular, gastrointestinal, genitourinary, hematologic, and neurologic systems.        Objective    There were no vitals taken for this visit.  Estimated body mass index is 28.17 kg/m  as calculated from the following:    Height as  "of 3/27/20: 1.74 m (5' 8.5\").    Weight as of 4/22/20: 85.3 kg (188 lb).  Physical Exam     GENERAL: healthy, alert and no distress  EYES: Eyes grossly normal to inspection, conjunctivae and sclerae normal  RESP: no audible wheeze, cough, or visible cyanosis.  No visible retractions or increased work of breathing.  Able to speak fully in complete sentences.  NEURO: Cranial nerves grossly intact, mentation intact and speech normal  PSYCH: mentation appears normal, affect normal/bright, judgement and insight intact, normal speech and appearance well-groomed      Diagnostic Test Results:  Labs reviewed in Epic        Assessment & Plan     (J96.01) Acute respiratory failure with hypoxia (H)  (primary encounter diagnosis)  (R57.0) Cardiogenic shock (H)  Comment: Condition stabilized.     (I50.23) Acute on chronic systolic congestive heart failure (H)  (I48.91) Atrial fibrillation with rapid ventricular response (H)  Comment: F/u with cardiology as they advise.     (N17.0,  N18.3) Acute renal failure with acute tubular necrosis superimposed on stage 3 chronic kidney disease (H)  Comment: Stable.     (R31.0) Gross hematuria  Comment: F/u with Urology as they advise.     (E11.22,  N18.3) Type 2 diabetes mellitus with stage 3 chronic kidney disease, unspecified whether long term insulin use (H)  Comment: Most recent A1c 6.2 on 3/27/2020. Meds recently filled by Dr Marquez. Continue current meds.     (R91.1) Lung nodule  Comment: Advised f/u chest CT in three months.   Plan: CT Chest w/o Contrast       BMI:   Estimated body mass index is 28.17 kg/m  as calculated from the following:    Height as of 3/27/20: 1.74 m (5' 8.5\").    Weight as of 4/22/20: 85.3 kg (188 lb).     CT scan in three months, with office f/u shortly afterward.     No follow-ups on file.    Dm Lipscomb MD,   [facility]      Video-Visit Details    Type of service:  Video Visit    Video End Time:1407    Originating Location (pt. " Location): Home    Distant Location (provider location):  [facility]     Mode of Communication:  Video Conference via [employer]    No follow-ups on file.       Dm Lipscomb MD,       11-20

## 2022-03-11 ENCOUNTER — TRANSFERRED RECORDS (OUTPATIENT)
Dept: HEALTH INFORMATION MANAGEMENT | Facility: CLINIC | Age: 77
End: 2022-03-11
Payer: COMMERCIAL

## 2022-03-14 LAB
MDC_IDC_EPISODE_DTM: NORMAL
MDC_IDC_EPISODE_ID: NORMAL
MDC_IDC_EPISODE_TYPE: NORMAL
MDC_IDC_LEAD_IMPLANT_DT: NORMAL
MDC_IDC_LEAD_LOCATION: NORMAL
MDC_IDC_LEAD_LOCATION_DETAIL_1: NORMAL
MDC_IDC_LEAD_MFG: NORMAL
MDC_IDC_LEAD_MODEL: NORMAL
MDC_IDC_LEAD_POLARITY_TYPE: NORMAL
MDC_IDC_LEAD_SERIAL: NORMAL
MDC_IDC_MSMT_BATTERY_DTM: NORMAL
MDC_IDC_MSMT_BATTERY_REMAINING_LONGEVITY: 102 MO
MDC_IDC_MSMT_BATTERY_REMAINING_PERCENTAGE: 100 %
MDC_IDC_MSMT_BATTERY_STATUS: NORMAL
MDC_IDC_MSMT_LEADCHNL_LV_IMPEDANCE_VALUE: 1140 OHM
MDC_IDC_MSMT_LEADCHNL_LV_PACING_THRESHOLD_AMPLITUDE: 1.6 V
MDC_IDC_MSMT_LEADCHNL_LV_PACING_THRESHOLD_PULSEWIDTH: 0.5 MS
MDC_IDC_MSMT_LEADCHNL_RA_IMPEDANCE_VALUE: 797 OHM
MDC_IDC_MSMT_LEADCHNL_RA_LEAD_CHANNEL_STATUS: NORMAL
MDC_IDC_MSMT_LEADCHNL_RV_IMPEDANCE_VALUE: 818 OHM
MDC_IDC_MSMT_LEADCHNL_RV_LEAD_CHANNEL_STATUS: NORMAL
MDC_IDC_MSMT_LEADCHNL_RV_PACING_THRESHOLD_AMPLITUDE: 0.9 V
MDC_IDC_MSMT_LEADCHNL_RV_PACING_THRESHOLD_PULSEWIDTH: 0.4 MS
MDC_IDC_PG_IMPLANT_DTM: NORMAL
MDC_IDC_PG_MFG: NORMAL
MDC_IDC_PG_MODEL: NORMAL
MDC_IDC_PG_SERIAL: NORMAL
MDC_IDC_PG_TYPE: NORMAL
MDC_IDC_SESS_CLINIC_NAME: NORMAL
MDC_IDC_SESS_DTM: NORMAL
MDC_IDC_SESS_TYPE: NORMAL
MDC_IDC_SET_BRADY_LOWRATE: 70 {BEATS}/MIN
MDC_IDC_SET_BRADY_MAX_SENSOR_RATE: 130 {BEATS}/MIN
MDC_IDC_SET_BRADY_MODE: NORMAL
MDC_IDC_SET_CRT_LVRV_DELAY: 30 MS
MDC_IDC_SET_CRT_PACED_CHAMBERS: NORMAL
MDC_IDC_SET_LEADCHNL_LV_PACING_AMPLITUDE: 2.6 V
MDC_IDC_SET_LEADCHNL_LV_PACING_ANODE_ELECTRODE_1: NORMAL
MDC_IDC_SET_LEADCHNL_LV_PACING_ANODE_LOCATION_1: NORMAL
MDC_IDC_SET_LEADCHNL_LV_PACING_CATHODE_ELECTRODE_1: NORMAL
MDC_IDC_SET_LEADCHNL_LV_PACING_CATHODE_LOCATION_1: NORMAL
MDC_IDC_SET_LEADCHNL_LV_PACING_PULSEWIDTH: 0.5 MS
MDC_IDC_SET_LEADCHNL_LV_SENSING_ADAPTATION_MODE: NORMAL
MDC_IDC_SET_LEADCHNL_LV_SENSING_ANODE_ELECTRODE_1: NORMAL
MDC_IDC_SET_LEADCHNL_LV_SENSING_ANODE_LOCATION_1: NORMAL
MDC_IDC_SET_LEADCHNL_LV_SENSING_CATHODE_ELECTRODE_1: NORMAL
MDC_IDC_SET_LEADCHNL_LV_SENSING_CATHODE_LOCATION_1: NORMAL
MDC_IDC_SET_LEADCHNL_LV_SENSING_SENSITIVITY: 1.5 MV
MDC_IDC_SET_LEADCHNL_RA_SENSING_ADAPTATION_MODE: NORMAL
MDC_IDC_SET_LEADCHNL_RA_SENSING_SENSITIVITY: 0.25 MV
MDC_IDC_SET_LEADCHNL_RV_PACING_AMPLITUDE: 2 V
MDC_IDC_SET_LEADCHNL_RV_PACING_CAPTURE_MODE: NORMAL
MDC_IDC_SET_LEADCHNL_RV_PACING_POLARITY: NORMAL
MDC_IDC_SET_LEADCHNL_RV_PACING_PULSEWIDTH: 0.4 MS
MDC_IDC_SET_LEADCHNL_RV_SENSING_ADAPTATION_MODE: NORMAL
MDC_IDC_SET_LEADCHNL_RV_SENSING_POLARITY: NORMAL
MDC_IDC_SET_LEADCHNL_RV_SENSING_SENSITIVITY: 1.5 MV
MDC_IDC_SET_ZONE_DETECTION_INTERVAL: 375 MS
MDC_IDC_SET_ZONE_TYPE: NORMAL
MDC_IDC_SET_ZONE_VENDOR_TYPE: NORMAL
MDC_IDC_STAT_AT_BURDEN_PERCENT: 100 %
MDC_IDC_STAT_AT_DTM_END: NORMAL
MDC_IDC_STAT_AT_DTM_START: NORMAL
MDC_IDC_STAT_BRADY_DTM_END: NORMAL
MDC_IDC_STAT_BRADY_DTM_START: NORMAL
MDC_IDC_STAT_BRADY_RA_PERCENT_PACED: 0 %
MDC_IDC_STAT_BRADY_RV_PERCENT_PACED: 95 %
MDC_IDC_STAT_CRT_DTM_END: NORMAL
MDC_IDC_STAT_CRT_DTM_START: NORMAL
MDC_IDC_STAT_CRT_LV_PERCENT_PACED: 94 %
MDC_IDC_STAT_EPISODE_RECENT_COUNT: 0
MDC_IDC_STAT_EPISODE_RECENT_COUNT: 2
MDC_IDC_STAT_EPISODE_RECENT_COUNT: 6
MDC_IDC_STAT_EPISODE_RECENT_COUNT_DTM_END: NORMAL
MDC_IDC_STAT_EPISODE_RECENT_COUNT_DTM_START: NORMAL
MDC_IDC_STAT_EPISODE_TYPE: NORMAL
MDC_IDC_STAT_EPISODE_VENDOR_TYPE: NORMAL

## 2022-04-12 ENCOUNTER — TELEPHONE (OUTPATIENT)
Dept: INTERNAL MEDICINE | Facility: CLINIC | Age: 77
End: 2022-04-12
Payer: COMMERCIAL

## 2022-04-12 DIAGNOSIS — R32 URINARY INCONTINENCE, UNSPECIFIED TYPE: ICD-10-CM

## 2022-04-12 DIAGNOSIS — R33.9 URINARY RETENTION: Primary | ICD-10-CM

## 2022-04-12 NOTE — TELEPHONE ENCOUNTER
[facility] * incontinence supplies received via fax     Forms in [facility] mail box for review and signature.

## 2022-04-19 ENCOUNTER — TELEPHONE (OUTPATIENT)
Dept: INTERNAL MEDICINE | Facility: CLINIC | Age: 77
End: 2022-04-19
Payer: COMMERCIAL

## 2022-04-19 DIAGNOSIS — I50.20 SYSTOLIC CONGESTIVE HEART FAILURE, UNSPECIFIED HF CHRONICITY (H): Primary | ICD-10-CM

## 2022-05-26 NOTE — PROGRESS NOTES
Pt had 4 wk post op w/ Dr. Fernandez scheduled on 6/2, and she was unaware. She had already had 2 wk post op and is wanting to know if she should keep 4 wk post op. Please advise.   Renal Medicine Progress Note                                Gene Pagan MRN# [id number]   Age: 74 year old YOB: 1945   Date of Admission: 3/27/2020 Hospital LOS: 6                  Assessment/Plan:     74-year-old male admitted through the emergency room dated 03/27/2020 in the setting of increasing shortness of breath and tachycardia.     Evaluated with respect to apparent acute on chronic renal insufficiency in the setting of probable cardiogenic shock.     1. Baseline CKD III/IV   -creatinine 2.0 mg/dl range   -GFR 30 ml/min  2.  Dipstick proteinuria  3.  ARF   -non oliguric   -pre renal v ATN  4.  Hemodynamic instability   -NE  5.  Metabolic acidosis   -lactate normalized   -improving   6.  Hyponatremia    -hypo osmolar hypervolemic  7.  Respiratory failure   -extubated   8.  Shock liver   -transaminases falling        Continue off diuretic  Replace K  1 liter NS    No dialysis at this time  Progressive azotemia  Creatinine falling        Interval History:       Hematuria  Clearing  ? Mckenzie trauma      Extubated  More alert today  IO and UO reviewed  5.8 liters UO yesterday  Now negative 11.6 liters  Weight decreasing     Labs reviewed  BUN increasing from diuresis and protein load      ROS:     No CP or SOB    Medications and Allergies:     Reviewed    Physical Exam:     Vitals were reviewed  Patient Vitals for the past 8 hrs:   BP Temp Pulse Heart Rate Resp SpO2 Weight   04/02/20 1038 98/68 -- -- 129 -- -- --   04/02/20 1000 99/71 99.5  F (37.5  C) 134 134 9 98 % --   04/02/20 0900 -- 99.1  F (37.3  C) -- 118 24 98 % --   04/02/20 0800 -- 99.3  F (37.4  C) -- 129 8 98 % --   04/02/20 0700 -- 99.1  F (37.3  C) -- 144 (!) 32 98 % --   04/02/20 0600 -- 99.1  F (37.3  C) -- 126 28 96 % --   04/02/20 0500 -- 99.1  F (37.3  C) -- 130 20 97 % --   04/02/20 0400 -- 99.1  F (37.3  C) -- 118 17 100 % --   04/02/20 0350 -- 99  F (37.2  C) -- 128 12 99 % 79.2 kg (174 lb 9.7 oz)     I/O last 3 completed  shifts:  In: 2728.79 [I.V.:798.79; NG/GT:850]  Out: 4795 [Urine:4795]    Vitals:    03/29/20 0555 03/30/20 0600 03/31/20 0329 04/01/20 0354   Weight: 94.7 kg (208 lb 12.4 oz) 92 kg (202 lb 13.2 oz) 85.8 kg (189 lb 2.5 oz) 81.6 kg (179 lb 14.3 oz)    04/02/20 0350   Weight: 79.2 kg (174 lb 9.7 oz)     GENERAL:  intubated and sedated; chronically ill appearing   HEENT: ETT  RESP:  clear anteriorly  CV: RRR, normal S1 S2  ABDOMEN: soft, nontender, no HSM or masses and bowel sounds normal  :  baker catheter  MS: no clubbing, cyanosis   EXT: bilateral edema    Data:     Recent Labs   Lab 04/02/20  0610 04/01/20  1945 04/01/20  1240 04/01/20  0545 03/31/20  0540 03/30/20  0500     --   --  135 134 130*   POTASSIUM 3.3* 3.5 3.3* 2.7* 3.1* 4.1   CHLORIDE 97  --   --  95 98 98   CO2 29  --   --  26 21 15*   ANIONGAP 11  --   --  14 16* 17*   *  --   --  158* 145* 130*   *  --   --  116* 91* 78*   CR 5.51*  --   --  5.72* 5.76* 5.58*   GFRESTIMATED 9*  --   --  9* 9* 9*   GFRESTBLACK 11*  --   --  10* 10* 10*   HARPER 9.1  --   --  8.5 7.4* 6.8*         Recent Labs   Lab Test 04/02/20  0610 04/01/20  0545 03/31/20  0540 03/30/20  0500 03/29/20  1528 03/29/20  0910 03/29/20  0412 03/28/20  2346 03/28/20  1900 03/28/20  1557   CR 5.51* 5.72* 5.76* 5.58* 5.14* 4.94* 4.85* 4.59* 4.46* 4.20*     Recent Labs   Lab 04/01/20  0545 03/31/20  0540 03/30/20  0500 03/29/20  1528   ALBUMIN 2.6* 2.5* 2.8* 2.8*     Recent Labs   Lab 03/29/20  0609 03/28/20  1900 03/28/20  1557 03/28/20  1230   LACT 1.7 3.2* 3.3* 3.9*     Recent Labs   Lab 04/02/20  0610 04/01/20  0545 03/31/20  0540 03/30/20  0500 03/29/20  0910   PHOS  --  8.1* 8.5* 8.6* 7.6*   HGB 12.7* 11.9* 10.8* 11.1*  --      Recent Labs   Lab 03/28/20  0947   COLOR Dark Brown   APPEARANCE Cloudy   URINEGLC 200*   URINEBILI Negative   URINEKETONE Negative   SG 1.024   UBLD Large*   URINEPH 7.5*   PROTEIN 300*   NITRITE Negative   LEUKEST Moderate*   RBCU >182*    WBCU 179*         G Terrell Hilario MD    [facility] - Nephrology  [phone]

## 2022-05-27 ENCOUNTER — MEDICAL CORRESPONDENCE (OUTPATIENT)
Dept: HEALTH INFORMATION MANAGEMENT | Facility: CLINIC | Age: 77
End: 2022-05-27

## 2022-05-27 ENCOUNTER — OFFICE VISIT (OUTPATIENT)
Dept: CARDIOLOGY | Facility: CLINIC | Age: 77
End: 2022-05-27
Attending: INTERNAL MEDICINE
Payer: COMMERCIAL

## 2022-05-27 VITALS
OXYGEN SATURATION: 97 % | HEART RATE: 70 BPM | HEIGHT: 68 IN | BODY MASS INDEX: 30.9 KG/M2 | WEIGHT: 203.9 LBS | DIASTOLIC BLOOD PRESSURE: 64 MMHG | SYSTOLIC BLOOD PRESSURE: 114 MMHG

## 2022-05-27 DIAGNOSIS — Z98.890 S/P AV NODAL ABLATION: ICD-10-CM

## 2022-05-27 DIAGNOSIS — I50.42 CHRONIC COMBINED SYSTOLIC AND DIASTOLIC HEART FAILURE (H): ICD-10-CM

## 2022-05-27 DIAGNOSIS — Z95.0 CARDIAC PACEMAKER IN SITU: ICD-10-CM

## 2022-05-27 DIAGNOSIS — N18.4 CKD (CHRONIC KIDNEY DISEASE) STAGE 4, GFR 15-29 ML/MIN (H): ICD-10-CM

## 2022-05-27 DIAGNOSIS — I50.22 CHRONIC SYSTOLIC CONGESTIVE HEART FAILURE (H): ICD-10-CM

## 2022-05-27 PROCEDURE — 99214 OFFICE O/P EST MOD 30 MIN: CPT | Performed by: INTERNAL MEDICINE

## 2022-05-27 NOTE — LETTER
5/27/2022    Dm Lipscomb MD, MD  [address] [facility] [address]  [facility] [address]    RE: Gene Pagan       Dear Colleague,     I had the pleasure of seeing Gene Pagan in the [facility].  HPI and Plan:   See dictation    No orders of the defined types were placed in this encounter.      No orders of the defined types were placed in this encounter.      There are no discontinued medications.      Encounter Diagnoses   Name Primary?     Chronic combined systolic and diastolic heart failure (H)      Chronic systolic congestive heart failure (H)      Cardiac pacemaker in situ      S/P AV sarah ablation      CKD (chronic kidney disease) stage 4, GFR 15-29 ml/min (H)        CURRENT MEDICATIONS:  Current Outpatient Medications   Medication Sig Dispense Refill     apixaban ANTICOAGULANT (ELIQUIS ANTICOAGULANT) 5 MG tablet Take 1 tablet (5 mg) by mouth 2 times daily 180 tablet 1     Ascorbic Acid (VITAMIN C PO) Take 1,000 mg by mouth daily       blood glucose (ONETOUCH ULTRA) test strip Use to test blood sugar one time daily or as directed. 200 strip 1     bumetanide (BUMEX) 0.5 MG tablet Take 1 tablet (0.5 mg) by mouth daily (+ additional 1 mg to = 1.5 mg daily). 90 tablet 3     bumetanide (BUMEX) 1 MG tablet Take 1 tablet (1 mg) by mouth daily (+ additional 0.5 mg to = 1.5 mg daily). 90 tablet 0     Ferrous Sulfate (IRON) 325 (65 Fe) MG tablet Take 1 tablet by mouth daily       insulin glargine (LANTUS SOLOSTAR) 100 UNIT/ML pen Inject 11 Units Subcutaneous At Bedtime 15 mL 1     insulin pen needle (ULTICARE SHORT) 31G X 8 MM miscellaneous use 1 pen daily for injection 100 each 3     melatonin 3 MG tablet Take 3 mg by mouth nightly as needed       metoprolol succinate ER (TOPROL-XL) 25 MG 24 hr tablet Take 1 tablet (25 mg) by mouth daily 90 tablet 3     Multiple Vitamins-Minerals (MULTIVITAMIN ADULT PO) Take 1 tablet by mouth daily       Multiple Vitamins-Minerals (ZINC PO) Take 50 mg by mouth  "daily       tamsulosin (FLOMAX) 0.4 MG capsule Take 1 capsule (0.4 mg) by mouth At Bedtime 90 capsule 3     Vitamin D3 (CHOLECALCIFEROL) 125 MCG (5000 UT) tablet Take 5,000 Units by mouth daily          ALLERGIES   No Known Allergies    PAST MEDICAL HISTORY:  Past Medical History:   Diagnosis Date     Anemia      Atrial fibrillation     AV node ablation and pacemaker placement 4/10/2020     BPH (benign prostatic hyperplasia)      Chronic diastolic CHF      Chronic kidney disease (CKD), stage III (moderate) (H)      Diabetes mellitus - type 2      Palpitations      Systolic CHF (H)        PAST SURGICAL HISTORY:  Past Surgical History:   Procedure Laterality Date     ANESTHESIA CARDIOVERSION N/A 4/6/2020    Procedure: ANESTHESIA, FOR CARDIOVERSION;  Surgeon: GENERIC ANESTHESIA PROVIDER;  Location: [facility] OR     CYSTOSCOPY       EP PACEMAKER N/A 4/10/2020    Procedure: EP Pacemaker;  Surgeon: Abhay Willams MD;  Location:  HEART CARDIAC CATH LAB     [facility]  09/25/2020    Done in Urology office with Dr Perez       FAMILY HISTORY:  Family History   Problem Relation Age of Onset     Diabetes Sister        SOCIAL HISTORY:  Social History     Socioeconomic History     Marital status: Single   Tobacco Use     Smoking status: Never Smoker     Smokeless tobacco: Never Used   Vaping Use     Vaping Use: Never used   Substance and Sexual Activity     Alcohol use: Yes     Comment: Rare     Drug use: Never     Sexual activity: Not Currently       Review of Systems:  Skin:          Eyes:         ENT:         Respiratory:          Cardiovascular:         Gastroenterology:        Genitourinary:         Musculoskeletal:         Neurologic:         Psychiatric:         Heme/Lymph/Imm:         Endocrine:           Physical Exam:  Vitals: /64 (BP Location: Right arm, Patient Position: Sitting, Cuff Size: Adult Regular)   Pulse 70   Ht 1.727 m (5' 8\")   Wt 92.5 kg (203 lb 14.4 oz)   SpO2 97%   BMI 31.00 kg/m      Constitutional:  " cooperative        Skin:  warm and dry to the touch          Head:  normocephalic        Eyes:  pupils equal and round        Lymph:      ENT:    pallor      Neck:  no carotid bruit        Respiratory:  clear to auscultation;normal symmetry    diminished BS throughout    Cardiac: regular rhythm;no murmurs, gallops or rubs detected                  pulses below the femoral arteries are diminished                                      GI:  abdomen soft;no bruits        Extremities and Muscular Skeletal:  no edema;no deformities, clubbing, cyanosis, erythema observed              Neurological:  no gross motor deficits;affect appropriate        Psych:  Alert and Oriented x 3          CC  Sushila Frank DO  [address]  [place] [address]    Thank you for allowing me to participate in the care of your patient.      Sincerely,     Sushila Frank DO     [facility]

## 2022-05-27 NOTE — PROGRESS NOTES
HPI and Plan:   See dictation    No orders of the defined types were placed in this encounter.      No orders of the defined types were placed in this encounter.      There are no discontinued medications.      Encounter Diagnoses   Name Primary?     Chronic combined systolic and diastolic heart failure (H)      Chronic systolic congestive heart failure (H)      Cardiac pacemaker in situ      S/P AV sarah ablation      CKD (chronic kidney disease) stage 4, GFR 15-29 ml/min (H)        CURRENT MEDICATIONS:  Current Outpatient Medications   Medication Sig Dispense Refill     apixaban ANTICOAGULANT (ELIQUIS ANTICOAGULANT) 5 MG tablet Take 1 tablet (5 mg) by mouth 2 times daily 180 tablet 1     Ascorbic Acid (VITAMIN C PO) Take 1,000 mg by mouth daily       blood glucose (ONETOUCH ULTRA) test strip Use to test blood sugar one time daily or as directed. 200 strip 1     bumetanide (BUMEX) 0.5 MG tablet Take 1 tablet (0.5 mg) by mouth daily (+ additional 1 mg to = 1.5 mg daily). 90 tablet 3     bumetanide (BUMEX) 1 MG tablet Take 1 tablet (1 mg) by mouth daily (+ additional 0.5 mg to = 1.5 mg daily). 90 tablet 0     Ferrous Sulfate (IRON) 325 (65 Fe) MG tablet Take 1 tablet by mouth daily       insulin glargine (LANTUS SOLOSTAR) 100 UNIT/ML pen Inject 11 Units Subcutaneous At Bedtime 15 mL 1     insulin pen needle (ULTICARE SHORT) 31G X 8 MM miscellaneous use 1 pen daily for injection 100 each 3     melatonin 3 MG tablet Take 3 mg by mouth nightly as needed       metoprolol succinate ER (TOPROL-XL) 25 MG 24 hr tablet Take 1 tablet (25 mg) by mouth daily 90 tablet 3     Multiple Vitamins-Minerals (MULTIVITAMIN ADULT PO) Take 1 tablet by mouth daily       Multiple Vitamins-Minerals (ZINC PO) Take 50 mg by mouth daily       tamsulosin (FLOMAX) 0.4 MG capsule Take 1 capsule (0.4 mg) by mouth At Bedtime 90 capsule 3     Vitamin D3 (CHOLECALCIFEROL) 125 MCG (5000 UT) tablet Take 5,000 Units by mouth daily          ALLERGIES   No  "Known Allergies    PAST MEDICAL HISTORY:  Past Medical History:   Diagnosis Date     Anemia      Atrial fibrillation     AV node ablation and pacemaker placement 4/10/2020     BPH (benign prostatic hyperplasia)      Chronic diastolic CHF      Chronic kidney disease (CKD), stage III (moderate) (H)      Diabetes mellitus - type 2      Palpitations      Systolic CHF (H)        PAST SURGICAL HISTORY:  Past Surgical History:   Procedure Laterality Date     ANESTHESIA CARDIOVERSION N/A 4/6/2020    Procedure: ANESTHESIA, FOR CARDIOVERSION;  Surgeon: GENERIC ANESTHESIA PROVIDER;  Location: [facility] OR     CYSTOSCOPY       EP PACEMAKER N/A 4/10/2020    Procedure: EP Pacemaker;  Surgeon: Abhay Willams MD;  Location:  HEART CARDIAC CATH LAB     [email or web address]  09/25/2020    Done in Urology office with Dr Perez       FAMILY HISTORY:  Family History   Problem Relation Age of Onset     Diabetes Sister        SOCIAL HISTORY:  Social History     Socioeconomic History     Marital status: Single   Tobacco Use     Smoking status: Never Smoker     Smokeless tobacco: Never Used   Vaping Use     Vaping Use: Never used   Substance and Sexual Activity     Alcohol use: Yes     Comment: Rare     Drug use: Never     Sexual activity: Not Currently       Review of Systems:  Skin:          Eyes:         ENT:         Respiratory:          Cardiovascular:         Gastroenterology:        Genitourinary:         Musculoskeletal:         Neurologic:         Psychiatric:         Heme/Lymph/Imm:         Endocrine:           Physical Exam:  Vitals: /64 (BP Location: Right arm, Patient Position: Sitting, Cuff Size: Adult Regular)   Pulse 70   Ht 1.727 m (5' 8\")   Wt 92.5 kg (203 lb 14.4 oz)   SpO2 97%   BMI 31.00 kg/m      Constitutional:  cooperative        Skin:  warm and dry to the touch          Head:  normocephalic        Eyes:  pupils equal and round        Lymph:      ENT:    pallor      Neck:  no carotid bruit        Respiratory:  clear to " auscultation;normal symmetry    diminished BS throughout    Cardiac: regular rhythm;no murmurs, gallops or rubs detected                  pulses below the femoral arteries are diminished                                      GI:  abdomen soft;no bruits        Extremities and Muscular Skeletal:  no edema;no deformities, clubbing, cyanosis, erythema observed              Neurological:  no gross motor deficits;affect appropriate        Psych:  Alert and Oriented x 3          CC  Sushila Elaine Frank, DO  [address]  [place] [address]

## 2022-05-27 NOTE — PROGRESS NOTES
Service Date: 05/27/2022    REFERRING PHYSICIAN:  Dr. Dm Lipscomb.    HISTORY OF PRESENT ILLNESS:  Mr. Pagan is a pleasant 77-year-old gentleman with a history of biventricular systolic congestive heart failure and atrial fibrillation.  He underwent AV sarah ablation procedure with biventricular pacing device.  His ejection fraction improved from 20%-25% now to 45%-50%.  He has stage IV chronic kidney disease and has seen Dr. Christine in the past.  He has recently seen Dr. Lipscomb, who has referred him back to Dr. Christine.  Mr. Pagan believes he is retaining some fluid.  He feels it in his abdomen a little bit.  He denies any difficulty breathing or chest pains.  He denies any lightheadedness or dizziness, and he has no peripheral edema.    PHYSICAL EXAMINATION:  VITAL SIGNS:  On exam today his blood pressure is 114/64, pulse is 70, weight is 203 pounds.  It looks like this is up 17 pounds from last year.  He is only up 1 pound from February.  CARDIOVASCULAR:  Tones today were regular.  LUNGS:  Lung fields are clear.  EXTREMITIES:  Again, no peripheral edema.    Mr. Pagan denies any active bleeding.  No noticeable blood in the urine or stool.  Very occasional bruising.  He is on apixaban for CVA prophylaxis.    ASSESSMENT AND PLAN:  In summary, Mr. Pagan is a very pleasant 77-year-old male with a previous history of systolic congestive heart failure and atrial fibrillation.  He is status post AV sarah ablation and permanent pacemaker implant with a biventricular device.  His ejection fraction has improved to mildly reduced with an EF around 45%-50%.  He has had some significant weight gain over the last year and possibly some fluid retention but no evidence of decompensated heart failure at this time.  I would encourage him to make that followup appointment with his nephrologist.  He may possibly need a dose adjustment of his diuretics.  I reviewed his last interrogation of his pacing device, which seems to  be working well, good thresholds and battery life.  He will turn 78 next year, and with his renal insufficiency I will recommend a reduction in the dose of apixaban to 2.5 mg twice daily.  He did not need any renewed prescriptions today.  I am happy to continue to follow him annually or as needed.    Please feel free to contact me with any questions you have in regards to his care.    Sushila Frank DO    cc:  Dm Lipscomb MD  [facility]  [address], [address]  [place] [address]    Sushila Frank DO        D: 2022   T: 2022   MT: lisa    Name:     MIKE DUNHAM  MRN:      [id number]        Account:      [id number]   :      1945           Service Date: 2022       Document: [id number]

## 2022-06-01 ENCOUNTER — ANCILLARY PROCEDURE (OUTPATIENT)
Dept: CARDIOLOGY | Facility: CLINIC | Age: 77
End: 2022-06-01
Attending: INTERNAL MEDICINE
Payer: COMMERCIAL

## 2022-06-01 ENCOUNTER — TELEPHONE (OUTPATIENT)
Dept: INTERNAL MEDICINE | Facility: CLINIC | Age: 77
End: 2022-06-01

## 2022-06-01 DIAGNOSIS — Z95.0 CARDIAC PACEMAKER IN SITU: ICD-10-CM

## 2022-06-01 DIAGNOSIS — I42.9 CARDIOMYOPATHY (H): ICD-10-CM

## 2022-06-01 DIAGNOSIS — Z98.890 S/P AV NODAL ABLATION: ICD-10-CM

## 2022-06-01 PROCEDURE — 93294 REM INTERROG EVL PM/LDLS PM: CPT | Performed by: INTERNAL MEDICINE

## 2022-06-01 PROCEDURE — 93296 REM INTERROG EVL PM/IDS: CPT | Performed by: INTERNAL MEDICINE

## 2022-06-01 NOTE — TELEPHONE ENCOUNTER
[facility]* stockings order received via fax    Forms in [facility] mail box for review and signature.

## 2022-06-07 LAB
MDC_IDC_EPISODE_DTM: NORMAL
MDC_IDC_EPISODE_DURATION: 15 S
MDC_IDC_EPISODE_ID: NORMAL
MDC_IDC_EPISODE_TYPE: NORMAL
MDC_IDC_LEAD_IMPLANT_DT: NORMAL
MDC_IDC_LEAD_LOCATION: NORMAL
MDC_IDC_LEAD_LOCATION_DETAIL_1: NORMAL
MDC_IDC_LEAD_MFG: NORMAL
MDC_IDC_LEAD_MODEL: NORMAL
MDC_IDC_LEAD_POLARITY_TYPE: NORMAL
MDC_IDC_LEAD_SERIAL: NORMAL
MDC_IDC_MSMT_BATTERY_DTM: NORMAL
MDC_IDC_MSMT_BATTERY_REMAINING_LONGEVITY: 126 MO
MDC_IDC_MSMT_BATTERY_REMAINING_PERCENTAGE: 100 %
MDC_IDC_MSMT_BATTERY_STATUS: NORMAL
MDC_IDC_MSMT_LEADCHNL_LV_IMPEDANCE_VALUE: 1124 OHM
MDC_IDC_MSMT_LEADCHNL_LV_PACING_THRESHOLD_AMPLITUDE: 1.6 V
MDC_IDC_MSMT_LEADCHNL_LV_PACING_THRESHOLD_PULSEWIDTH: 0.5 MS
MDC_IDC_MSMT_LEADCHNL_RA_IMPEDANCE_VALUE: 765 OHM
MDC_IDC_MSMT_LEADCHNL_RV_IMPEDANCE_VALUE: 738 OHM
MDC_IDC_MSMT_LEADCHNL_RV_PACING_THRESHOLD_AMPLITUDE: 0.9 V
MDC_IDC_MSMT_LEADCHNL_RV_PACING_THRESHOLD_PULSEWIDTH: 0.4 MS
MDC_IDC_PG_IMPLANT_DTM: NORMAL
MDC_IDC_PG_MFG: NORMAL
MDC_IDC_PG_MODEL: NORMAL
MDC_IDC_PG_SERIAL: NORMAL
MDC_IDC_PG_TYPE: NORMAL
MDC_IDC_SESS_CLINIC_NAME: NORMAL
MDC_IDC_SESS_DTM: NORMAL
MDC_IDC_SESS_TYPE: NORMAL
MDC_IDC_SET_BRADY_AT_MODE_SWITCH_RATE: 140 {BEATS}/MIN
MDC_IDC_SET_BRADY_LOWRATE: 70 {BEATS}/MIN
MDC_IDC_SET_BRADY_MAX_SENSOR_RATE: 130 {BEATS}/MIN
MDC_IDC_SET_BRADY_MODE: NORMAL
MDC_IDC_SET_CRT_LVRV_DELAY: 30 MS
MDC_IDC_SET_CRT_PACED_CHAMBERS: NORMAL
MDC_IDC_SET_LEADCHNL_LV_PACING_AMPLITUDE: 2.6 V
MDC_IDC_SET_LEADCHNL_LV_PACING_ANODE_ELECTRODE_1: NORMAL
MDC_IDC_SET_LEADCHNL_LV_PACING_ANODE_LOCATION_1: NORMAL
MDC_IDC_SET_LEADCHNL_LV_PACING_CATHODE_ELECTRODE_1: NORMAL
MDC_IDC_SET_LEADCHNL_LV_PACING_CATHODE_LOCATION_1: NORMAL
MDC_IDC_SET_LEADCHNL_LV_PACING_PULSEWIDTH: 0.5 MS
MDC_IDC_SET_LEADCHNL_LV_SENSING_ADAPTATION_MODE: NORMAL
MDC_IDC_SET_LEADCHNL_LV_SENSING_ANODE_ELECTRODE_1: NORMAL
MDC_IDC_SET_LEADCHNL_LV_SENSING_ANODE_LOCATION_1: NORMAL
MDC_IDC_SET_LEADCHNL_LV_SENSING_CATHODE_ELECTRODE_1: NORMAL
MDC_IDC_SET_LEADCHNL_LV_SENSING_CATHODE_LOCATION_1: NORMAL
MDC_IDC_SET_LEADCHNL_LV_SENSING_SENSITIVITY: 1.5 MV
MDC_IDC_SET_LEADCHNL_RA_SENSING_ADAPTATION_MODE: NORMAL
MDC_IDC_SET_LEADCHNL_RA_SENSING_SENSITIVITY: 0.25 MV
MDC_IDC_SET_LEADCHNL_RV_PACING_AMPLITUDE: 2 V
MDC_IDC_SET_LEADCHNL_RV_PACING_CAPTURE_MODE: NORMAL
MDC_IDC_SET_LEADCHNL_RV_PACING_POLARITY: NORMAL
MDC_IDC_SET_LEADCHNL_RV_PACING_PULSEWIDTH: 0.4 MS
MDC_IDC_SET_LEADCHNL_RV_SENSING_ADAPTATION_MODE: NORMAL
MDC_IDC_SET_LEADCHNL_RV_SENSING_POLARITY: NORMAL
MDC_IDC_SET_LEADCHNL_RV_SENSING_SENSITIVITY: 1.5 MV
MDC_IDC_SET_ZONE_DETECTION_INTERVAL: 375 MS
MDC_IDC_SET_ZONE_TYPE: NORMAL
MDC_IDC_SET_ZONE_VENDOR_TYPE: NORMAL
MDC_IDC_STAT_BRADY_DTM_END: NORMAL
MDC_IDC_STAT_BRADY_DTM_START: NORMAL
MDC_IDC_STAT_BRADY_RA_PERCENT_PACED: 0 %
MDC_IDC_STAT_BRADY_RV_PERCENT_PACED: 92 %
MDC_IDC_STAT_CRT_DTM_END: NORMAL
MDC_IDC_STAT_CRT_DTM_START: NORMAL
MDC_IDC_STAT_CRT_LV_PERCENT_PACED: 92 %
MDC_IDC_STAT_EPISODE_RECENT_COUNT: 0
MDC_IDC_STAT_EPISODE_RECENT_COUNT: 1
MDC_IDC_STAT_EPISODE_RECENT_COUNT_DTM_END: NORMAL
MDC_IDC_STAT_EPISODE_RECENT_COUNT_DTM_START: NORMAL
MDC_IDC_STAT_EPISODE_TYPE: NORMAL
MDC_IDC_STAT_EPISODE_VENDOR_TYPE: NORMAL

## 2022-06-30 DIAGNOSIS — I50.23 ACUTE ON CHRONIC SYSTOLIC CONGESTIVE HEART FAILURE (H): ICD-10-CM

## 2022-06-30 DIAGNOSIS — I50.42 CHRONIC COMBINED SYSTOLIC AND DIASTOLIC HEART FAILURE (H): ICD-10-CM

## 2022-06-30 RX ORDER — BUMETANIDE 1 MG/1
1 TABLET ORAL DAILY
Qty: 90 TABLET | Refills: 3 | Status: SHIPPED | OUTPATIENT
Start: 2022-06-30 | End: 2023-04-21

## 2022-07-02 ENCOUNTER — HEALTH MAINTENANCE LETTER (OUTPATIENT)
Age: 77
End: 2022-07-02

## 2022-09-09 ENCOUNTER — ANCILLARY PROCEDURE (OUTPATIENT)
Dept: CARDIOLOGY | Facility: CLINIC | Age: 77
End: 2022-09-09
Attending: INTERNAL MEDICINE
Payer: COMMERCIAL

## 2022-09-09 DIAGNOSIS — Z95.0 CARDIAC PACEMAKER IN SITU: ICD-10-CM

## 2022-09-09 DIAGNOSIS — Z98.890 S/P AV NODAL ABLATION: ICD-10-CM

## 2022-09-09 DIAGNOSIS — I42.9 CARDIOMYOPATHY (H): ICD-10-CM

## 2022-09-09 PROCEDURE — 93296 REM INTERROG EVL PM/IDS: CPT | Performed by: INTERNAL MEDICINE

## 2022-09-09 PROCEDURE — 93295 DEV INTERROG REMOTE 1/2/MLT: CPT | Performed by: INTERNAL MEDICINE

## 2022-09-15 LAB
MDC_IDC_EPISODE_DTM: NORMAL
MDC_IDC_EPISODE_ID: NORMAL
MDC_IDC_EPISODE_TYPE: NORMAL
MDC_IDC_LEAD_IMPLANT_DT: NORMAL
MDC_IDC_LEAD_LOCATION: NORMAL
MDC_IDC_LEAD_LOCATION_DETAIL_1: NORMAL
MDC_IDC_LEAD_MFG: NORMAL
MDC_IDC_LEAD_MODEL: NORMAL
MDC_IDC_LEAD_POLARITY_TYPE: NORMAL
MDC_IDC_LEAD_SERIAL: NORMAL
MDC_IDC_MSMT_BATTERY_DTM: NORMAL
MDC_IDC_MSMT_BATTERY_REMAINING_LONGEVITY: 120 MO
MDC_IDC_MSMT_BATTERY_REMAINING_PERCENTAGE: 100 %
MDC_IDC_MSMT_BATTERY_STATUS: NORMAL
MDC_IDC_MSMT_LEADCHNL_LV_IMPEDANCE_VALUE: 1091 OHM
MDC_IDC_MSMT_LEADCHNL_LV_PACING_THRESHOLD_AMPLITUDE: 1.6 V
MDC_IDC_MSMT_LEADCHNL_LV_PACING_THRESHOLD_PULSEWIDTH: 0.5 MS
MDC_IDC_MSMT_LEADCHNL_RA_IMPEDANCE_VALUE: 771 OHM
MDC_IDC_MSMT_LEADCHNL_RV_IMPEDANCE_VALUE: 697 OHM
MDC_IDC_MSMT_LEADCHNL_RV_PACING_THRESHOLD_AMPLITUDE: 1 V
MDC_IDC_MSMT_LEADCHNL_RV_PACING_THRESHOLD_PULSEWIDTH: 0.4 MS
MDC_IDC_PG_IMPLANT_DTM: NORMAL
MDC_IDC_PG_MFG: NORMAL
MDC_IDC_PG_MODEL: NORMAL
MDC_IDC_PG_SERIAL: NORMAL
MDC_IDC_PG_TYPE: NORMAL
MDC_IDC_SESS_CLINIC_NAME: NORMAL
MDC_IDC_SESS_DTM: NORMAL
MDC_IDC_SESS_TYPE: NORMAL
MDC_IDC_SET_BRADY_AT_MODE_SWITCH_RATE: 140 {BEATS}/MIN
MDC_IDC_SET_BRADY_LOWRATE: 70 {BEATS}/MIN
MDC_IDC_SET_BRADY_MAX_SENSOR_RATE: 130 {BEATS}/MIN
MDC_IDC_SET_BRADY_MODE: NORMAL
MDC_IDC_SET_CRT_LVRV_DELAY: 30 MS
MDC_IDC_SET_CRT_PACED_CHAMBERS: NORMAL
MDC_IDC_SET_LEADCHNL_LV_PACING_AMPLITUDE: 2.6 V
MDC_IDC_SET_LEADCHNL_LV_PACING_ANODE_ELECTRODE_1: NORMAL
MDC_IDC_SET_LEADCHNL_LV_PACING_ANODE_LOCATION_1: NORMAL
MDC_IDC_SET_LEADCHNL_LV_PACING_CATHODE_ELECTRODE_1: NORMAL
MDC_IDC_SET_LEADCHNL_LV_PACING_CATHODE_LOCATION_1: NORMAL
MDC_IDC_SET_LEADCHNL_LV_PACING_PULSEWIDTH: 0.5 MS
MDC_IDC_SET_LEADCHNL_LV_SENSING_ADAPTATION_MODE: NORMAL
MDC_IDC_SET_LEADCHNL_LV_SENSING_ANODE_ELECTRODE_1: NORMAL
MDC_IDC_SET_LEADCHNL_LV_SENSING_ANODE_LOCATION_1: NORMAL
MDC_IDC_SET_LEADCHNL_LV_SENSING_CATHODE_ELECTRODE_1: NORMAL
MDC_IDC_SET_LEADCHNL_LV_SENSING_CATHODE_LOCATION_1: NORMAL
MDC_IDC_SET_LEADCHNL_LV_SENSING_SENSITIVITY: 1.5 MV
MDC_IDC_SET_LEADCHNL_RA_SENSING_ADAPTATION_MODE: NORMAL
MDC_IDC_SET_LEADCHNL_RA_SENSING_SENSITIVITY: 0.25 MV
MDC_IDC_SET_LEADCHNL_RV_PACING_AMPLITUDE: 2 V
MDC_IDC_SET_LEADCHNL_RV_PACING_CAPTURE_MODE: NORMAL
MDC_IDC_SET_LEADCHNL_RV_PACING_POLARITY: NORMAL
MDC_IDC_SET_LEADCHNL_RV_PACING_PULSEWIDTH: 0.4 MS
MDC_IDC_SET_LEADCHNL_RV_SENSING_ADAPTATION_MODE: NORMAL
MDC_IDC_SET_LEADCHNL_RV_SENSING_POLARITY: NORMAL
MDC_IDC_SET_LEADCHNL_RV_SENSING_SENSITIVITY: 1.5 MV
MDC_IDC_SET_ZONE_DETECTION_INTERVAL: 375 MS
MDC_IDC_SET_ZONE_TYPE: NORMAL
MDC_IDC_SET_ZONE_VENDOR_TYPE: NORMAL
MDC_IDC_STAT_BRADY_DTM_END: NORMAL
MDC_IDC_STAT_BRADY_DTM_START: NORMAL
MDC_IDC_STAT_BRADY_RA_PERCENT_PACED: 0 %
MDC_IDC_STAT_BRADY_RV_PERCENT_PACED: 91 %
MDC_IDC_STAT_CRT_DTM_END: NORMAL
MDC_IDC_STAT_CRT_DTM_START: NORMAL
MDC_IDC_STAT_CRT_LV_PERCENT_PACED: 91 %
MDC_IDC_STAT_EPISODE_RECENT_COUNT: 0
MDC_IDC_STAT_EPISODE_RECENT_COUNT: 1
MDC_IDC_STAT_EPISODE_RECENT_COUNT_DTM_END: NORMAL
MDC_IDC_STAT_EPISODE_RECENT_COUNT_DTM_START: NORMAL
MDC_IDC_STAT_EPISODE_TYPE: NORMAL
MDC_IDC_STAT_EPISODE_VENDOR_TYPE: NORMAL

## 2022-09-21 ENCOUNTER — NURSE TRIAGE (OUTPATIENT)
Dept: INTERNAL MEDICINE | Facility: CLINIC | Age: 77
End: 2022-09-21

## 2022-09-21 NOTE — TELEPHONE ENCOUNTER
Nurse Triage SBAR    Is this a 2nd Level Triage? NO    Situation: Patient calls with a productive cough.    Background: Extensive health history. Not covid vaccinated.    Assessment: Patient calls asking if they need an appointment to assess cough with phlegm production.    Patient had a fever, sore throat, and cough last week after wife came home with similar symptoms. Patient denies nausea, vomiting, or diarrhea.     Patient now has a productive cough. Patient coughs intermittently and has a small amount of dark grey phlegm. Phlegm is more present after laying down. Patient also reports fatigue. Patient denies fever, shortness of breath, chest pain, difficulty stopping coughing spell. Patient did not test for Covid. Patient has a home test.     Patient given home care advice. Patient taking tylenol, anti-histamines, and nasal decongestants to relief.      Protocol Recommended Disposition:   Home Care    Recommendation: Home care advice given. Advised to monitor for worsening symptoms.       Does the patient meet one of the following criteria for ADS visit consideration? 16+ years old, with an [facility] PCP     TIP  Providers, please consider if this condition is appropriate for management at one of our Acute and Diagnostic Services sites.     If patient is a good candidate, please use dotphrase <dot>triageresponse and select Refer to ADS to document.  Reason for Disposition    Cough with cold symptoms (e.g., runny nose, postnasal drip, throat clearing)    Additional Information    Negative: SEVERE difficulty breathing (e.g., struggling for each breath, speaks in single words)    Negative: Bluish (or gray) lips or face now    Negative: [1] Difficulty breathing AND [2] exposure to flames, smoke, or fumes    Negative: [1] Stridor AND [2] difficulty breathing    Negative: Sounds like a life-threatening emergency to the triager    Negative: [1] Previous asthma attacks AND [2] this feels like asthma attack    Negative:  Dry cough (non-productive;  no sputum or minimal clear sputum)    Negative: [1] MODERATE difficulty breathing (e.g., speaks in phrases, SOB even at rest, pulse 100-120) AND [2] still present when not coughing    Negative: Chest pain  (Exception: MILD central chest pain, present only when coughing)    Negative: Patient sounds very sick or weak to the triager    Negative: [1] MILD difficulty breathing (e.g., minimal/no SOB at rest, SOB with walking, pulse <100) AND [2] still present when not coughing    Negative: [1] Coughed up blood AND [2] > 1 tablespoon (15 ml)  (Exception: Blood-tinged sputum.)    Negative: Fever > 103 F (39.4 C)    Negative: [1] Fever > 101 F (38.3 C) AND [2] age > 60 years    Negative: [1] Fever > 100.0 F (37.8 C) AND [2] bedridden (e.g., nursing home patient, CVA, chronic illness, recovering from surgery)    Negative: [1] Fever > 100.0 F (37.8 C) AND [2] diabetes mellitus or weak immune system (e.g., HIV positive, cancer chemo, splenectomy, organ transplant, chronic steroids)    Negative: Wheezing is present    Negative: SEVERE coughing spells (e.g., whooping sound after coughing, vomiting after coughing)    Negative: [1] Continuous (nonstop) coughing interferes with work or school AND [2] no improvement using cough treatment per Care Advice    Negative: Coughing up barrie-colored (reddish-brown) sputum    Negative: Fever present > 3 days (72 hours)    Negative: [1] Fever returns after gone for over 24 hours AND [2] symptoms worse or not improved    Negative: [1] Using nasal washes and pain medicine > 24 hours AND [2] sinus pain (around cheekbone or eye) persists    Negative: Earache    Negative: [1] Known COPD or other severe lung disease (i.e., bronchiectasis, cystic fibrosis, lung surgery) AND [2] worsening symptoms (i.e., increased sputum purulence or amount, increased breathing difficulty    Negative: Cough has been present for > 3 weeks    Negative: [1] Nasal discharge AND [2] present > 10  "days    Negative: [1] Coughed up blood-tinged sputum AND [2] more than once    Negative: Exposure to TB (Tuberculosis)    Negative: Cough    Answer Assessment - Initial Assessment Questions  1. ONSET: \"When did the cough begin?\"       A week ago  2. SEVERITY: \"How bad is the cough today?\"       Mild  3. SPUTUM: \"Describe the color of your sputum\" (none, dry cough; clear, white, yellow, green)      Dark grey  4. HEMOPTYSIS: \"Are you coughing up any blood?\" If so ask: \"How much?\" (flecks, streaks, tablespoons, etc.)      no  5. DIFFICULTY BREATHING: \"Are you having difficulty breathing?\" If Yes, ask: \"How bad is it?\" (e.g., mild, moderate, severe)     - MILD: No SOB at rest, mild SOB with walking, speaks normally in sentences, can lie down, no retractions, pulse < 100.     - MODERATE: SOB at rest, SOB with minimal exertion and prefers to sit, cannot lie down flat, speaks in phrases, mild retractions, audible wheezing, pulse 100-120.     - SEVERE: Very SOB at rest, speaks in single words, struggling to breathe, sitting hunched forward, retractions, pulse > 120       No  6. FEVER: \"Do you have a fever?\" If Yes, ask: \"What is your temperature, how was it measured, and when did it start?\"      No  7. CARDIAC HISTORY: \"Do you have any history of heart disease?\" (e.g., heart attack, congestive heart failure)       Yes  8. LUNG HISTORY: \"Do you have any history of lung disease?\"  (e.g., pulmonary embolus, asthma, emphysema)      No  9. PE RISK FACTORS: \"Do you have a history of blood clots?\" (or: recent major surgery, recent prolonged travel, bedridden)      No  10. OTHER SYMPTOMS: \"Do you have any other symptoms?\" (e.g., runny nose, wheezing, chest pain)        Denies all other symptoms  11. PREGNANCY: \"Is there any chance you are pregnant?\" \"When was your last menstrual period?\"        No  12. TRAVEL: \"Have you traveled out of the country in the last month?\" (e.g., travel history, exposures)        No    Protocols used: " COUGH - ACUTE [id number]

## 2022-11-03 DIAGNOSIS — I50.42 CHRONIC COMBINED SYSTOLIC AND DIASTOLIC HEART FAILURE (H): ICD-10-CM

## 2022-11-03 RX ORDER — BUMETANIDE 0.5 MG/1
0.5 TABLET ORAL DAILY
Qty: 90 TABLET | Refills: 2 | Status: SHIPPED | OUTPATIENT
Start: 2022-11-03 | End: 2023-07-07

## 2022-11-25 ENCOUNTER — TRANSFERRED RECORDS (OUTPATIENT)
Dept: HEALTH INFORMATION MANAGEMENT | Facility: CLINIC | Age: 77
End: 2022-11-25

## 2022-12-12 DIAGNOSIS — E11.22 TYPE 2 DIABETES MELLITUS WITH STAGE 4 CHRONIC KIDNEY DISEASE, WITH LONG-TERM CURRENT USE OF INSULIN (H): ICD-10-CM

## 2022-12-12 DIAGNOSIS — N18.4 TYPE 2 DIABETES MELLITUS WITH STAGE 4 CHRONIC KIDNEY DISEASE, WITH LONG-TERM CURRENT USE OF INSULIN (H): ICD-10-CM

## 2022-12-12 DIAGNOSIS — Z79.4 TYPE 2 DIABETES MELLITUS WITH STAGE 4 CHRONIC KIDNEY DISEASE, WITH LONG-TERM CURRENT USE OF INSULIN (H): ICD-10-CM

## 2022-12-12 NOTE — TELEPHONE ENCOUNTER
Pt states that he has increased his dosage from 11 units to 13 and would like refill to reflect that.     Gene- [phone], ok to leave detailed message    Debra Garcia

## 2022-12-14 NOTE — TELEPHONE ENCOUNTER
Insulin Glargine (Lantus solostar) 100 unit/ml pen  Routing refill request to provider for review/approval because:  Labs not current:  Hgb A1C  Patient needs to be seen because:  More than 6 months since last office visit    LOV 02/17/2022    Lab Results   Component Value Date    A1C 6.9 02/17/2022    A1C 6.6 12/20/2021    A1C 6.4 04/06/2021    A1C 6.8 10/23/2020    A1C 6.2 03/27/2020

## 2022-12-20 RX ORDER — INSULIN GLARGINE 100 [IU]/ML
11 INJECTION, SOLUTION SUBCUTANEOUS AT BEDTIME
Qty: 15 ML | Refills: 0 | Status: SHIPPED | OUTPATIENT
Start: 2022-12-20 | End: 2023-03-01

## 2022-12-20 NOTE — TELEPHONE ENCOUNTER
Patient advised but he only has meds for today. Patient will go to hospital for lab work.   Please refill

## 2022-12-21 ENCOUNTER — ANCILLARY PROCEDURE (OUTPATIENT)
Dept: CARDIOLOGY | Facility: CLINIC | Age: 77
End: 2022-12-21
Attending: INTERNAL MEDICINE
Payer: COMMERCIAL

## 2022-12-21 DIAGNOSIS — Z98.890 S/P AV NODAL ABLATION: ICD-10-CM

## 2022-12-21 DIAGNOSIS — Z95.0 CARDIAC PACEMAKER IN SITU: ICD-10-CM

## 2022-12-21 DIAGNOSIS — I42.9 CARDIOMYOPATHY (H): ICD-10-CM

## 2022-12-21 PROCEDURE — 93294 REM INTERROG EVL PM/LDLS PM: CPT | Performed by: INTERNAL MEDICINE

## 2022-12-21 PROCEDURE — 93296 REM INTERROG EVL PM/IDS: CPT | Performed by: INTERNAL MEDICINE

## 2022-12-30 ENCOUNTER — LAB (OUTPATIENT)
Dept: LAB | Facility: CLINIC | Age: 77
End: 2022-12-30
Payer: COMMERCIAL

## 2022-12-30 DIAGNOSIS — N18.4 TYPE 2 DIABETES MELLITUS WITH STAGE 4 CHRONIC KIDNEY DISEASE, WITH LONG-TERM CURRENT USE OF INSULIN (H): ICD-10-CM

## 2022-12-30 DIAGNOSIS — E11.22 TYPE 2 DIABETES MELLITUS WITH STAGE 4 CHRONIC KIDNEY DISEASE, WITH LONG-TERM CURRENT USE OF INSULIN (H): ICD-10-CM

## 2022-12-30 DIAGNOSIS — Z79.4 TYPE 2 DIABETES MELLITUS WITH STAGE 4 CHRONIC KIDNEY DISEASE, WITH LONG-TERM CURRENT USE OF INSULIN (H): ICD-10-CM

## 2022-12-30 LAB — HBA1C MFR BLD: 7.5 %

## 2022-12-30 PROCEDURE — 36415 COLL VENOUS BLD VENIPUNCTURE: CPT

## 2022-12-30 PROCEDURE — 83036 HEMOGLOBIN GLYCOSYLATED A1C: CPT

## 2023-01-04 ENCOUNTER — TELEPHONE (OUTPATIENT)
Dept: INTERNAL MEDICINE | Facility: CLINIC | Age: 78
End: 2023-01-04
Payer: COMMERCIAL

## 2023-01-04 LAB
MDC_IDC_EPISODE_DTM: NORMAL
MDC_IDC_EPISODE_DTM: NORMAL
MDC_IDC_EPISODE_ID: NORMAL
MDC_IDC_EPISODE_ID: NORMAL
MDC_IDC_EPISODE_TYPE: NORMAL
MDC_IDC_EPISODE_TYPE: NORMAL
MDC_IDC_LEAD_IMPLANT_DT: NORMAL
MDC_IDC_LEAD_LOCATION: NORMAL
MDC_IDC_LEAD_LOCATION_DETAIL_1: NORMAL
MDC_IDC_LEAD_MFG: NORMAL
MDC_IDC_LEAD_MODEL: NORMAL
MDC_IDC_LEAD_POLARITY_TYPE: NORMAL
MDC_IDC_LEAD_SERIAL: NORMAL
MDC_IDC_MSMT_BATTERY_DTM: NORMAL
MDC_IDC_MSMT_BATTERY_REMAINING_LONGEVITY: 126 MO
MDC_IDC_MSMT_BATTERY_REMAINING_PERCENTAGE: 100 %
MDC_IDC_MSMT_BATTERY_STATUS: NORMAL
MDC_IDC_MSMT_LEADCHNL_LV_IMPEDANCE_VALUE: 1152 OHM
MDC_IDC_MSMT_LEADCHNL_LV_PACING_THRESHOLD_AMPLITUDE: 1.6 V
MDC_IDC_MSMT_LEADCHNL_LV_PACING_THRESHOLD_PULSEWIDTH: 0.5 MS
MDC_IDC_MSMT_LEADCHNL_RA_IMPEDANCE_VALUE: 796 OHM
MDC_IDC_MSMT_LEADCHNL_RV_IMPEDANCE_VALUE: 711 OHM
MDC_IDC_MSMT_LEADCHNL_RV_PACING_THRESHOLD_AMPLITUDE: 0.9 V
MDC_IDC_MSMT_LEADCHNL_RV_PACING_THRESHOLD_PULSEWIDTH: 0.4 MS
MDC_IDC_PG_IMPLANT_DTM: NORMAL
MDC_IDC_PG_MFG: NORMAL
MDC_IDC_PG_MODEL: NORMAL
MDC_IDC_PG_SERIAL: NORMAL
MDC_IDC_PG_TYPE: NORMAL
MDC_IDC_SESS_CLINIC_NAME: NORMAL
MDC_IDC_SESS_DTM: NORMAL
MDC_IDC_SESS_TYPE: NORMAL
MDC_IDC_SET_BRADY_AT_MODE_SWITCH_RATE: 140 {BEATS}/MIN
MDC_IDC_SET_BRADY_LOWRATE: 70 {BEATS}/MIN
MDC_IDC_SET_BRADY_MAX_SENSOR_RATE: 130 {BEATS}/MIN
MDC_IDC_SET_BRADY_MODE: NORMAL
MDC_IDC_SET_CRT_LVRV_DELAY: 30 MS
MDC_IDC_SET_CRT_PACED_CHAMBERS: NORMAL
MDC_IDC_SET_LEADCHNL_LV_PACING_AMPLITUDE: 2.6 V
MDC_IDC_SET_LEADCHNL_LV_PACING_ANODE_ELECTRODE_1: NORMAL
MDC_IDC_SET_LEADCHNL_LV_PACING_ANODE_LOCATION_1: NORMAL
MDC_IDC_SET_LEADCHNL_LV_PACING_CATHODE_ELECTRODE_1: NORMAL
MDC_IDC_SET_LEADCHNL_LV_PACING_CATHODE_LOCATION_1: NORMAL
MDC_IDC_SET_LEADCHNL_LV_PACING_PULSEWIDTH: 0.5 MS
MDC_IDC_SET_LEADCHNL_LV_SENSING_ADAPTATION_MODE: NORMAL
MDC_IDC_SET_LEADCHNL_LV_SENSING_ANODE_ELECTRODE_1: NORMAL
MDC_IDC_SET_LEADCHNL_LV_SENSING_ANODE_LOCATION_1: NORMAL
MDC_IDC_SET_LEADCHNL_LV_SENSING_CATHODE_ELECTRODE_1: NORMAL
MDC_IDC_SET_LEADCHNL_LV_SENSING_CATHODE_LOCATION_1: NORMAL
MDC_IDC_SET_LEADCHNL_LV_SENSING_SENSITIVITY: 1.5 MV
MDC_IDC_SET_LEADCHNL_RA_SENSING_ADAPTATION_MODE: NORMAL
MDC_IDC_SET_LEADCHNL_RA_SENSING_SENSITIVITY: 0.25 MV
MDC_IDC_SET_LEADCHNL_RV_PACING_AMPLITUDE: 2 V
MDC_IDC_SET_LEADCHNL_RV_PACING_CAPTURE_MODE: NORMAL
MDC_IDC_SET_LEADCHNL_RV_PACING_POLARITY: NORMAL
MDC_IDC_SET_LEADCHNL_RV_PACING_PULSEWIDTH: 0.4 MS
MDC_IDC_SET_LEADCHNL_RV_SENSING_ADAPTATION_MODE: NORMAL
MDC_IDC_SET_LEADCHNL_RV_SENSING_POLARITY: NORMAL
MDC_IDC_SET_LEADCHNL_RV_SENSING_SENSITIVITY: 1.5 MV
MDC_IDC_SET_ZONE_DETECTION_INTERVAL: 375 MS
MDC_IDC_SET_ZONE_TYPE: NORMAL
MDC_IDC_SET_ZONE_VENDOR_TYPE: NORMAL
MDC_IDC_STAT_BRADY_DTM_END: NORMAL
MDC_IDC_STAT_BRADY_DTM_START: NORMAL
MDC_IDC_STAT_BRADY_RA_PERCENT_PACED: 0 %
MDC_IDC_STAT_BRADY_RV_PERCENT_PACED: 91 %
MDC_IDC_STAT_CRT_DTM_END: NORMAL
MDC_IDC_STAT_CRT_DTM_START: NORMAL
MDC_IDC_STAT_CRT_LV_PERCENT_PACED: 91 %
MDC_IDC_STAT_EPISODE_RECENT_COUNT: 0
MDC_IDC_STAT_EPISODE_RECENT_COUNT: 1
MDC_IDC_STAT_EPISODE_RECENT_COUNT_DTM_END: NORMAL
MDC_IDC_STAT_EPISODE_RECENT_COUNT_DTM_START: NORMAL
MDC_IDC_STAT_EPISODE_TYPE: NORMAL
MDC_IDC_STAT_EPISODE_VENDOR_TYPE: NORMAL

## 2023-01-04 NOTE — TELEPHONE ENCOUNTER
Patient Quality Outreach    Patient is due for the following:   Diabetes -  A1C, LDL (Fasting), Eye Exam, Microalbumin and Diabetic Follow-Up Visit  Physical Annual Wellness Visit    Next Steps:   Schedule a office visit for diabetic follow up and Adult Preventative    Type of outreach:    Sent Bl[employer] message.      Questions for provider review:    None     Zulema Umana, [facility]

## 2023-02-03 NOTE — TELEPHONE ENCOUNTER
Patient Quality Outreach    Patient is due for the following:   Diabetes -  A1C, LDL (Fasting), Eye Exam, Microalbumin and Diabetic Follow-Up Visit  Physical Annual Wellness Visit    Next Steps:   Patient has upcoming appointment, these items will be addressed at that time.    Type of outreach:    Chart review performed, no outreach needed.      Questions for provider review:    None     Zulema Umana, CMA

## 2023-02-10 NOTE — PROVIDER NOTIFICATION
Tele hub called with the following critical labs: pH 7.13, Bicarb 8, and lactic 11.2. Bedside RN also given values.    Clofazimine Counseling:  I discussed with the patient the risks of clofazimine including but not limited to skin and eye pigmentation, liver damage, nausea/vomiting, gastrointestinal bleeding and allergy.

## 2023-02-19 DIAGNOSIS — I50.42 CHRONIC COMBINED SYSTOLIC AND DIASTOLIC HEART FAILURE (H): ICD-10-CM

## 2023-02-21 RX ORDER — METOPROLOL SUCCINATE 25 MG/1
25 TABLET, EXTENDED RELEASE ORAL DAILY
Qty: 90 TABLET | Refills: 0 | Status: SHIPPED | OUTPATIENT
Start: 2023-02-21 | End: 2023-11-28

## 2023-02-22 NOTE — TELEPHONE ENCOUNTER
Pending Prescriptions:                       Disp   Refills    metoprolol succinate ER (TOPROL XL) 25 MG*90 tab*0            Sig: Take 1 tablet (25 mg) by mouth daily    Prescription approved per [facility] Refill Protocol.    Next 5 appointments (look out 90 days)    Mar 01, 2023  3:00 PM  (Arrive by 2:40 PM)  Welcome to Medicare Visit with Dm Lipscomb MD  [facility] ([facility] ) [address]  [address]  [facility] [address] [phone]

## 2023-02-24 DIAGNOSIS — N13.8 BPH WITH OBSTRUCTION/LOWER URINARY TRACT SYMPTOMS: ICD-10-CM

## 2023-02-24 DIAGNOSIS — N40.1 BPH WITH OBSTRUCTION/LOWER URINARY TRACT SYMPTOMS: ICD-10-CM

## 2023-02-24 RX ORDER — TAMSULOSIN HYDROCHLORIDE 0.4 MG/1
0.4 CAPSULE ORAL AT BEDTIME
Qty: 90 CAPSULE | Refills: 0 | Status: SHIPPED | OUTPATIENT
Start: 2023-02-24 | End: 2023-03-01

## 2023-02-24 NOTE — TELEPHONE ENCOUNTER
Medication is being filled for 1 time refill only due to:  Patient needs to be seen because it has been more than one year since last visit.    Pt has appt scheduled 3/1/2023  Naomi WEINER RN, BSN

## 2023-02-27 DIAGNOSIS — N18.4 CHRONIC KIDNEY DISEASE, STAGE IV (SEVERE) (H): Primary | ICD-10-CM

## 2023-02-27 DIAGNOSIS — I50.9 HEART FAILURE, UNSPECIFIED (H): ICD-10-CM

## 2023-02-27 DIAGNOSIS — R33.9 RETENTION OF URINE, UNSPECIFIED: ICD-10-CM

## 2023-03-01 ENCOUNTER — LAB (OUTPATIENT)
Dept: LAB | Facility: CLINIC | Age: 78
End: 2023-03-01
Payer: COMMERCIAL

## 2023-03-01 ENCOUNTER — OFFICE VISIT (OUTPATIENT)
Dept: INTERNAL MEDICINE | Facility: CLINIC | Age: 78
End: 2023-03-01
Payer: COMMERCIAL

## 2023-03-01 VITALS
HEART RATE: 78 BPM | WEIGHT: 208 LBS | RESPIRATION RATE: 18 BRPM | DIASTOLIC BLOOD PRESSURE: 62 MMHG | TEMPERATURE: 97.9 F | SYSTOLIC BLOOD PRESSURE: 122 MMHG | BODY MASS INDEX: 33.43 KG/M2 | OXYGEN SATURATION: 98 % | HEIGHT: 66 IN

## 2023-03-01 DIAGNOSIS — I77.810 THORACIC AORTIC ECTASIA (H): ICD-10-CM

## 2023-03-01 DIAGNOSIS — I48.91 ATRIAL FIBRILLATION, UNSPECIFIED TYPE (H): ICD-10-CM

## 2023-03-01 DIAGNOSIS — R33.9 RETENTION OF URINE, UNSPECIFIED: ICD-10-CM

## 2023-03-01 DIAGNOSIS — N18.4 TYPE 2 DIABETES MELLITUS WITH STAGE 4 CHRONIC KIDNEY DISEASE, WITH LONG-TERM CURRENT USE OF INSULIN (H): ICD-10-CM

## 2023-03-01 DIAGNOSIS — N18.4 STAGE 4 CHRONIC KIDNEY DISEASE (H): ICD-10-CM

## 2023-03-01 DIAGNOSIS — E78.5 HYPERLIPIDEMIA LDL GOAL <100: ICD-10-CM

## 2023-03-01 DIAGNOSIS — E11.21 DIABETIC NEPHROPATHY ASSOCIATED WITH TYPE 2 DIABETES MELLITUS (H): ICD-10-CM

## 2023-03-01 DIAGNOSIS — I50.20 SYSTOLIC CONGESTIVE HEART FAILURE, UNSPECIFIED HF CHRONICITY (H): ICD-10-CM

## 2023-03-01 DIAGNOSIS — N13.8 BPH WITH OBSTRUCTION/LOWER URINARY TRACT SYMPTOMS: ICD-10-CM

## 2023-03-01 DIAGNOSIS — I50.9 HEART FAILURE, UNSPECIFIED (H): ICD-10-CM

## 2023-03-01 DIAGNOSIS — N18.4 CHRONIC KIDNEY DISEASE, STAGE IV (SEVERE) (H): ICD-10-CM

## 2023-03-01 DIAGNOSIS — Z11.59 NEED FOR HEPATITIS C SCREENING TEST: ICD-10-CM

## 2023-03-01 DIAGNOSIS — N40.1 BPH WITH OBSTRUCTION/LOWER URINARY TRACT SYMPTOMS: ICD-10-CM

## 2023-03-01 DIAGNOSIS — Z79.4 TYPE 2 DIABETES MELLITUS WITH STAGE 4 CHRONIC KIDNEY DISEASE, WITH LONG-TERM CURRENT USE OF INSULIN (H): ICD-10-CM

## 2023-03-01 DIAGNOSIS — E11.22 TYPE 2 DIABETES MELLITUS WITH STAGE 4 CHRONIC KIDNEY DISEASE, WITH LONG-TERM CURRENT USE OF INSULIN (H): ICD-10-CM

## 2023-03-01 DIAGNOSIS — Z00.00 ENCOUNTER FOR MEDICARE ANNUAL WELLNESS EXAM: Primary | ICD-10-CM

## 2023-03-01 DIAGNOSIS — M25.512 LEFT SHOULDER PAIN, UNSPECIFIED CHRONICITY: ICD-10-CM

## 2023-03-01 LAB
ALBUMIN MFR UR ELPH: 15.3 MG/DL (ref 1–14)
ALBUMIN SERPL BCG-MCNC: 4.3 G/DL (ref 3.5–5.2)
ALBUMIN UR-MCNC: 10 MG/DL
ALP SERPL-CCNC: 94 U/L (ref 40–129)
ALT SERPL W P-5'-P-CCNC: 14 U/L (ref 10–50)
ANION GAP SERPL CALCULATED.3IONS-SCNC: 15 MMOL/L (ref 7–15)
APPEARANCE UR: ABNORMAL
AST SERPL W P-5'-P-CCNC: 24 U/L (ref 10–50)
BACTERIA #/AREA URNS HPF: ABNORMAL /HPF
BILIRUB SERPL-MCNC: 0.3 MG/DL
BILIRUB UR QL STRIP: NEGATIVE
BUN SERPL-MCNC: 49.4 MG/DL (ref 8–23)
CALCIUM SERPL-MCNC: 9.7 MG/DL (ref 8.8–10.2)
CHLORIDE SERPL-SCNC: 100 MMOL/L (ref 98–107)
CHOLEST SERPL-MCNC: 162 MG/DL
COLOR UR AUTO: ABNORMAL
CREAT SERPL-MCNC: 2.46 MG/DL (ref 0.67–1.17)
CREAT UR-MCNC: 41.9 MG/DL
CREAT UR-MCNC: 45.5 MG/DL
DEPRECATED HCO3 PLAS-SCNC: 24 MMOL/L (ref 22–29)
ERYTHROCYTE [DISTWIDTH] IN BLOOD BY AUTOMATED COUNT: 15 % (ref 10–15)
GFR SERPL CREATININE-BSD FRML MDRD: 26 ML/MIN/1.73M2
GLUCOSE SERPL-MCNC: 131 MG/DL (ref 70–99)
GLUCOSE UR STRIP-MCNC: NEGATIVE MG/DL
HBA1C MFR BLD: 7.4 % (ref 0–5.6)
HCT VFR BLD AUTO: 42.7 % (ref 40–53)
HDLC SERPL-MCNC: 30 MG/DL
HGB BLD-MCNC: 13.5 G/DL (ref 13.3–17.7)
HGB UR QL STRIP: ABNORMAL
KETONES UR STRIP-MCNC: NEGATIVE MG/DL
LDLC SERPL CALC-MCNC: 83 MG/DL
LEUKOCYTE ESTERASE UR QL STRIP: ABNORMAL
MCH RBC QN AUTO: 28.2 PG (ref 26.5–33)
MCHC RBC AUTO-ENTMCNC: 31.6 G/DL (ref 31.5–36.5)
MCV RBC AUTO: 89 FL (ref 78–100)
MICROALBUMIN UR-MCNC: 56.9 MG/L
MICROALBUMIN/CREAT UR: 135.8 MG/G CR (ref 0–17)
NITRATE UR QL: NEGATIVE
NONHDLC SERPL-MCNC: 132 MG/DL
PH UR STRIP: 6 [PH] (ref 5–7)
PLATELET # BLD AUTO: 279 10E3/UL (ref 150–450)
POTASSIUM SERPL-SCNC: 5.1 MMOL/L (ref 3.4–5.3)
PROT SERPL-MCNC: 7.7 G/DL (ref 6.4–8.3)
PROT/CREAT 24H UR: 0.34 MG/MG CR (ref 0–0.2)
RBC # BLD AUTO: 4.79 10E6/UL (ref 4.4–5.9)
RBC URINE: 30 /HPF
SODIUM SERPL-SCNC: 139 MMOL/L (ref 136–145)
SP GR UR STRIP: 1.01 (ref 1–1.03)
SQUAMOUS EPITHELIAL: <1 /HPF
TRIGL SERPL-MCNC: 246 MG/DL
TSH SERPL DL<=0.005 MIU/L-ACNC: 1.67 UIU/ML (ref 0.3–4.2)
UROBILINOGEN UR STRIP-MCNC: NORMAL MG/DL
WBC # BLD AUTO: 8.6 10E3/UL (ref 4–11)
WBC CLUMPS #/AREA URNS HPF: PRESENT /HPF
WBC URINE: >182 /HPF

## 2023-03-01 PROCEDURE — 84156 ASSAY OF PROTEIN URINE: CPT

## 2023-03-01 PROCEDURE — G0439 PPPS, SUBSEQ VISIT: HCPCS | Performed by: INTERNAL MEDICINE

## 2023-03-01 PROCEDURE — 86803 HEPATITIS C AB TEST: CPT | Performed by: INTERNAL MEDICINE

## 2023-03-01 PROCEDURE — 36415 COLL VENOUS BLD VENIPUNCTURE: CPT | Performed by: INTERNAL MEDICINE

## 2023-03-01 PROCEDURE — 80053 COMPREHEN METABOLIC PANEL: CPT | Performed by: INTERNAL MEDICINE

## 2023-03-01 PROCEDURE — 83036 HEMOGLOBIN GLYCOSYLATED A1C: CPT | Performed by: INTERNAL MEDICINE

## 2023-03-01 PROCEDURE — 84443 ASSAY THYROID STIM HORMONE: CPT | Performed by: INTERNAL MEDICINE

## 2023-03-01 PROCEDURE — 81001 URINALYSIS AUTO W/SCOPE: CPT

## 2023-03-01 PROCEDURE — 80061 LIPID PANEL: CPT | Performed by: INTERNAL MEDICINE

## 2023-03-01 PROCEDURE — 82043 UR ALBUMIN QUANTITATIVE: CPT | Performed by: INTERNAL MEDICINE

## 2023-03-01 PROCEDURE — 99214 OFFICE O/P EST MOD 30 MIN: CPT | Mod: 25 | Performed by: INTERNAL MEDICINE

## 2023-03-01 PROCEDURE — 82570 ASSAY OF URINE CREATININE: CPT | Performed by: INTERNAL MEDICINE

## 2023-03-01 PROCEDURE — 85027 COMPLETE CBC AUTOMATED: CPT | Performed by: INTERNAL MEDICINE

## 2023-03-01 RX ORDER — TAMSULOSIN HYDROCHLORIDE 0.4 MG/1
0.4 CAPSULE ORAL AT BEDTIME
Qty: 90 CAPSULE | Refills: 3 | Status: SHIPPED | OUTPATIENT
Start: 2023-03-01 | End: 2024-03-20

## 2023-03-01 RX ORDER — INSULIN GLARGINE 100 [IU]/ML
13 INJECTION, SOLUTION SUBCUTANEOUS AT BEDTIME
Qty: 15 ML | Refills: 0 | Status: SHIPPED | OUTPATIENT
Start: 2023-03-01 | End: 2023-07-28

## 2023-03-01 RX ORDER — GINSENG 100 MG
50 CAPSULE ORAL DAILY
COMMUNITY

## 2023-03-01 SDOH — ECONOMIC STABILITY: FOOD INSECURITY: WITHIN THE PAST 12 MONTHS, THE FOOD YOU BOUGHT JUST DIDN'T LAST AND YOU DIDN'T HAVE MONEY TO GET MORE.: NEVER TRUE

## 2023-03-01 SDOH — ECONOMIC STABILITY: TRANSPORTATION INSECURITY
IN THE PAST 12 MONTHS, HAS LACK OF TRANSPORTATION KEPT YOU FROM MEETINGS, WORK, OR FROM GETTING THINGS NEEDED FOR DAILY LIVING?: NO

## 2023-03-01 SDOH — ECONOMIC STABILITY: FOOD INSECURITY: WITHIN THE PAST 12 MONTHS, YOU WORRIED THAT YOUR FOOD WOULD RUN OUT BEFORE YOU GOT MONEY TO BUY MORE.: NEVER TRUE

## 2023-03-01 SDOH — ECONOMIC STABILITY: TRANSPORTATION INSECURITY
IN THE PAST 12 MONTHS, HAS THE LACK OF TRANSPORTATION KEPT YOU FROM MEDICAL APPOINTMENTS OR FROM GETTING MEDICATIONS?: NO

## 2023-03-01 SDOH — ECONOMIC STABILITY: INCOME INSECURITY: IN THE LAST 12 MONTHS, WAS THERE A TIME WHEN YOU WERE NOT ABLE TO PAY THE MORTGAGE OR RENT ON TIME?: NO

## 2023-03-01 ASSESSMENT — ENCOUNTER SYMPTOMS
SHORTNESS OF BREATH: 0
CHILLS: 0
HEMATURIA: 0
DYSURIA: 0
NAUSEA: 0
ABDOMINAL PAIN: 0
MYALGIAS: 1
HEMATOCHEZIA: 0
WEAKNESS: 0
PARESTHESIAS: 0
EYE PAIN: 0
PALPITATIONS: 0
JOINT SWELLING: 0
DIARRHEA: 0
ARTHRALGIAS: 0
COUGH: 0
FREQUENCY: 1
DIZZINESS: 0
FEVER: 0
HEADACHES: 0
NERVOUS/ANXIOUS: 0
SORE THROAT: 0
CONSTIPATION: 0
HEARTBURN: 0

## 2023-03-01 ASSESSMENT — SOCIAL DETERMINANTS OF HEALTH (SDOH)
WITHIN THE LAST YEAR, HAVE YOU BEEN AFRAID OF YOUR PARTNER OR EX-PARTNER?: NO
WITHIN THE LAST YEAR, HAVE YOU BEEN KICKED, HIT, SLAPPED, OR OTHERWISE PHYSICALLY HURT BY YOUR PARTNER OR EX-PARTNER?: NO
HOW HARD IS IT FOR YOU TO PAY FOR THE VERY BASICS LIKE FOOD, HOUSING, MEDICAL CARE, AND HEATING?: NOT HARD AT ALL
WITHIN THE LAST YEAR, HAVE TO BEEN RAPED OR FORCED TO HAVE ANY KIND OF SEXUAL ACTIVITY BY YOUR PARTNER OR EX-PARTNER?: NO
WITHIN THE LAST YEAR, HAVE YOU BEEN HUMILIATED OR EMOTIONALLY ABUSED IN OTHER WAYS BY YOUR PARTNER OR EX-PARTNER?: NO

## 2023-03-01 ASSESSMENT — ACTIVITIES OF DAILY LIVING (ADL): CURRENT_FUNCTION: NO ASSISTANCE NEEDED

## 2023-03-01 ASSESSMENT — LIFESTYLE VARIABLES
HOW OFTEN DO YOU HAVE SIX OR MORE DRINKS ON ONE OCCASION: NEVER
HOW OFTEN DO YOU HAVE A DRINK CONTAINING ALCOHOL: MONTHLY OR LESS
SKIP TO QUESTIONS 9-10: 1
HOW MANY STANDARD DRINKS CONTAINING ALCOHOL DO YOU HAVE ON A TYPICAL DAY: 1 OR 2
AUDIT-C TOTAL SCORE: 1

## 2023-03-01 NOTE — LETTER
3/1/2023    Regarding:  Gene Pagan  [id number]  [facility] [address]           To whom it may concern:      Gene Pagan has Type 2 diabetes mellitus, and requires an afternoon snack during the afternoon shift to maintain his blood sugar.     If you have any further questions or concerns, please contact our office.    Sincerely,        Dm Lipscomb MD

## 2023-03-01 NOTE — PATIENT INSTRUCTIONS
Patient Education   Personalized Prevention Plan  You are due for the preventive services outlined below.  Your care team is available to assist you in scheduling these services.  If you have already completed any of these items, please share that information with your care team to update in your medical record.  Health Maintenance Due   Topic Date Due    Heart Failure Action Plan  Never done    Parathyroid Lab  Never done    Eye Exam  Never done    COVID-19 Vaccine (1) Never done    Pneumococcal Vaccine (1 - PCV) Never done    Hepatitis C Screening  Never done    Zoster (Shingles) Vaccine (1 of 2) Never done    Diptheria Tetanus Pertussis (DTAP/TDAP/TD) Vaccine (3 - Td or Tdap) 01/01/2019    Basic Metabolic Panel  05/17/2022    Kidney Microalbumin Urine Test  05/17/2022    Hemoglobin  08/17/2022    Flu Vaccine (1) 09/01/2022    Liver Monitoring Lab  02/17/2023    Cholesterol Lab  02/17/2023    Diabetic Foot Exam  02/17/2023    Complete Blood Count  02/17/2023    Thyroid Function Lab  02/17/2023       Exercise for a Healthier Heart  You may wonder how you can improve the health of your heart. If you re thinking about exercise, you re on the right track. You don t need to become an athlete. But you do need a certain amount of brisk exercise to help strengthen your heart. If you have been diagnosed with a heart condition, your healthcare provider may advise exercise to help stabilize your condition. To help make exercise a habit, choose safe, fun activities.      Exercise with a friend. When activity is fun, you're more likely to stick with it.   Before you start  Check with your healthcare provider before starting an exercise program. This is especially important if you have not been active for a while. It's also important if you have a long-term (chronic) health problem such as heart disease, diabetes, or obesity. Or if you are at high risk for having these problems.   Why exercise?  Exercising regularly offers  many healthy rewards. It can help you do all of the following:   Improve your blood cholesterol level to help prevent further heart trouble  Lower your blood pressure to help prevent a stroke or heart attack  Control diabetes, or reduce your risk of getting this disease  Improve your heart and lung function  Reach and stay at a healthy weight  Make your muscles stronger so you can stay active  Prevent falls and fractures by slowing the loss of bone mass (osteoporosis)  Manage stress better  Reduce your blood pressure  Improve your sense of self and your body image  Exercise tips    Ease into your routine. Set small goals. Then build on them. If you are not sure what your activity level should be, talk with your healthcare provider first before starting an exercise routine.  Exercise on most days. Aim for a total of 150 minutes (2 hours and 30 minutes) or more of moderate-intensity aerobic activity each week. Or 75 minutes (1 hour and 15 minutes) or more of vigorous-intensity aerobic activity each week. Or try for a combination of both. Moderate activity means that you breathe heavier and your heart rate increases but you can still talk. Think about doing 40 minutes of moderate exercise, 3 to 4 times a week. For best results, activity should last for about 40 minutes to lower blood pressure and cholesterol. It's OK to work up to the 40-minute period over time. Examples of moderate-intensity activity are walking 1 mile in 15 minutes. Or doing 30 to 45 minutes of yard work.  Step up your daily activity level.  Along with your exercise program, try being more active the whole day. Walk instead of drive. Or park further away so that you take more steps each day. Do more household tasks or yard work. You may not be able to meet the advised mount of physical activity. But doing some moderate- or vigorous-intensity aerobic activity can help reduce your risk for heart disease. Your healthcare provider can help you figure out  what is best for you.  Choose 1 or more activities you enjoy.  Walking is one of the easiest things you can do. You can also try swimming, riding a bike, dancing, or taking an exercise class.    When to call your healthcare provider  Call your healthcare provider if you have any of these:   Chest pain or feel dizzy or lightheaded  Burning, tightness, pressure, or heaviness in your chest, neck, shoulders, back, or arms  Abnormal shortness of breath  More joint or muscle pain  A very fast or irregular heartbeat (palpitations)  [employer] last reviewed this educational content on 7/1/2019 2000-2021 [employer]. All rights reserved. This information is not intended as a substitute for professional medical care. Always follow your healthcare professional's instructions.          Urinary Incontinence (Male)    Urinary incontinence means not being able to control the release of urine from the bladder.   Causes  Common causes of urinary incontinence in men include:  Infection  Certain medicines  Aging  Poor pelvic muscle tone  Bladder spasms  Obesity  Trouble urinating and fully emptying the bladder (urinary retention)  Other things that can cause incontinence are:   Nervous system diseases  Diabetes  Sleep apnea  Urinary tract infections  Prostate surgery  Pelvic injury  Constipation and smoking have also been identified as risk factors.   Symptoms  Urge incontinence (overactive bladder). This is a sudden urge to urinate. It occurs even though there may not be much urine in the bladder. The need to urinate often during the night is common. It's due to bladder spasms.  Stress incontinence. This is urine leakage that you can't control. It can occur with sneezing, coughing, and other actions that put stress on the bladder.    Treatment  Treatment depends on what is causing the condition. Bladder infections are treated with antibiotics. Urinary retention is treated with a bladder catheter.   Home care  Follow  these guidelines when caring for yourself at home:  Don't have any foods and drinks that may irritate the bladder. This includes:  Chocolate  Alcohol  Caffeine  Carbonated drinks  Acidic fruits and juices  Limit fluids to 6 to 8 cups a day.  Lose weight if you are overweight. This will reduce your symptoms.  If advised, do regular pelvic muscle-strengthening exercises such as Kegel exercises.  If needed, wear absorbent pads to catch urine. Change the pads often. This is for good hygiene and to prevent skin and bladder infections.  Bathe daily for good hygiene.  If an antibiotic was prescribed to treat a bladder infection, take it until it's finished. Keep taking it even if you are feeling better. This is to make sure your infection has cleared.  If a catheter was left in place, keep bacteria from getting into the collection bag. Don't disconnect the catheter from the collection bag.  Use a leg band to secure the catheter drainage tube, so it does not pull on the catheter. Drain the collection bag when it becomes full. To do this, use the drain spout at the bottom of the bag. Don't disconnect the bag from the catheter.  Don't pull on or try to remove a catheter. The catheter must be removed by a healthcare provider.  If you smoke, stop. Ask your provider for help if you can't do this on your own.  Follow-up care  Follow up with your healthcare provider, or as advised.  When to get medical advice  Call your healthcare provider right away if any of these occur:  Fever over 100.4 F (38 C), or as directed by your provider  Bladder pain or fullness  Belly swelling, nausea, or vomiting  Back pain  Weakness, dizziness, or fainting  If a catheter was left in place, return if:  The catheter falls out  The catheter stops draining for 6 hours  Your urine gets cloudy or smells bad  [employer] master last reviewed this educational content on 1/1/2020 2000-2021 [employer]. All rights reserved. This information is not  intended as a substitute for professional medical care. Always follow your healthcare professional's instructions.        Everything looks fine!    Refills of needed medications have been faxed to your pharmacy.     Lab results will be available soon on [employer].    See me in six months for either a video visit or office visit--we can update your A1c at that time. Call sooner if problems.

## 2023-03-01 NOTE — PROGRESS NOTES
"SUBJECTIVE:   Gene is a 77 year old who presents for Preventive Visit.      Patient has been advised of split billing requirements and indicates understanding: Yes  Are you in the first 12 months of your Medicare coverage?  No    Healthy Habits:     In general, how would you rate your overall health?  Good    Frequency of exercise:  None    Do you usually eat at least 4 servings of fruit and vegetables a day, include whole grains    & fiber and avoid regularly eating high fat or \"junk\" foods?  Yes    Taking medications regularly:  Yes    Medication side effects:  Not applicable    Ability to successfully perform activities of daily living:  No assistance needed    Home Safety:  Throw rugs in the hallway and lack of grab bars in the bathroom    Hearing Impairment:  No hearing concerns    In the past 6 months, have you been bothered by leaking of urine? Yes    In general, how would you rate your overall mental or emotional health?  Excellent      PHQ-2 Total Score: 0    Additional concerns today:  Yes         Have you ever done Advance Care Planning? (For example, a Health Directive, POLST, or a discussion with a medical provider or your loved ones about your wishes): Yes, patient states has an Advance Care Planning document and will bring a copy to the clinic.       Fall risk  Fallen 2 or more times in the past year?: No  Any fall with injury in the past year?: No    Cognitive Screening   1) Repeat 3 items (Leader, Season, Table)    2) Clock draw: NORMAL  3) 3 item recall: Recalls 3 objects  Results: 3 items recalled: COGNITIVE IMPAIRMENT LESS LIKELY    Mini-CogTM Copyright MICHAEL Joy. Licensed by the author for use in [facility]; reprinted with permission (nona@.[facility]). All rights reserved.      Do you have sleep apnea, excessive snoring or daytime drowsiness?: no    Reviewed and updated as needed this visit by clinical staff   Tobacco  Allergies  Meds              Reviewed and updated as needed " this visit by Provider                 Social History     Tobacco Use     Smoking status: Never     Smokeless tobacco: Never   Substance Use Topics     Alcohol use: Yes     Comment: Rare           Alcohol Use 3/1/2023   Prescreen: >3 drinks/day or >7 drinks/week? No       PROBLEMS TO ADD ON... In addition to an Annual Wellness Exam, we addressed diabetes, hyperlipidemia, left shoulder pain.     Patient taking 13 units of Lantus at bedtime. Most recent A1c was 7.5 on 12/30/2022. We agreed to update an A1c today.     We also agreed to update his LDL-Cholesterol. He is tolerating statin therapy.     He believes that he injured his left shoulder working at [employer]. His job requires repetitive lifting. He is offered a Physical Therapy referral.       Current providers sharing in care for this patient include:   Patient Care Team:  Dm Lipscomb MD as PCP - General (Internal Medicine)  Dm Lipscomb MD as Assigned PCP  Cheikh Perez MD as MD (Urology)  Ssuhila Frank DO as Assigned Heart and Vascular Provider    The following health maintenance items are reviewed in Epic and correct as of today:  Health Maintenance   Topic Date Due     HF ACTION PLAN  Never done     PARATHYROID  Never done     EYE EXAM  Never done     COVID-19 Vaccine (1) Never done     Pneumococcal Vaccine: 65+ Years (1 - PCV) Never done     HEPATITIS C SCREENING  Never done     ZOSTER IMMUNIZATION (1 of 2) Never done     MEDICARE ANNUAL WELLNESS VISIT  Never done     DTAP/TDAP/TD IMMUNIZATION (2 - Td or Tdap) 01/01/2019     BMP  05/17/2022     MICROALBUMIN  05/17/2022     HEMOGLOBIN  08/17/2022     INFLUENZA VACCINE (1) 09/01/2022     ALT  02/17/2023     LIPID  02/17/2023     DIABETIC FOOT EXAM  02/17/2023     ANNUAL REVIEW OF HM ORDERS  02/17/2023     CBC  02/17/2023     TSH W/FREE T4 REFLEX  02/17/2023     A1C  03/30/2023     FALL RISK ASSESSMENT  03/01/2024     ADVANCE CARE PLANNING  03/29/2025     PHOSPHORUS  Completed     PHQ-2  "(once per calendar year)  Completed     URINALYSIS  Completed     ALK PHOS  Completed     IPV IMMUNIZATION  Aged Out     MENINGITIS IMMUNIZATION  Aged Out     Pneumonia Vaccine:         Review of Systems   Constitutional: Negative for chills and fever.   HENT: Negative for congestion, ear pain, hearing loss and sore throat.    Eyes: Negative for pain and visual disturbance.   Respiratory: Negative for cough and shortness of breath.    Cardiovascular: Negative for chest pain, palpitations and peripheral edema.   Gastrointestinal: Negative for abdominal pain, constipation, diarrhea, heartburn, hematochezia and nausea.   Genitourinary: Positive for frequency, impotence and urgency. Negative for dysuria, genital sores, hematuria and penile discharge.   Musculoskeletal: Positive for myalgias. Negative for arthralgias and joint swelling.   Skin: Negative for rash.   Neurological: Negative for dizziness, weakness, headaches and paresthesias.   Psychiatric/Behavioral: Negative for mood changes. The patient is not nervous/anxious.        OBJECTIVE:   /62 (BP Location: Right arm, Patient Position: Sitting, Cuff Size: Adult Large)   Pulse 78   Temp 97.9  F (36.6  C) (Tympanic)   Resp 18   Ht 1.676 m (5' 6\")   Wt 94.3 kg (208 lb)   SpO2 98%   BMI 33.57 kg/m   Estimated body mass index is 33.57 kg/m  as calculated from the following:    Height as of this encounter: 1.676 m (5' 6\").    Weight as of this encounter: 94.3 kg (208 lb).     Physical Exam  GENERAL: healthy, alert and no distress  EYES: Eyes grossly normal to inspection, PERRL and conjunctivae and sclerae normal  HENT: ear canals and TM's normal, nose and mouth without ulcers or lesions  NECK: no adenopathy, no asymmetry, masses, or scars and thyroid normal to palpation  RESP: lungs clear to auscultation - no rales, rhonchi or wheezes  CV: regular rate and rhythm, normal S1 S2, no S3 or S4, no murmur, click or rub, no peripheral edema and peripheral pulses " strong  ABDOMEN: soft, nontender, no hepatosplenomegaly, no masses and bowel sounds normal  MS: no gross musculoskeletal defects noted, no edema  SKIN: no suspicious lesions or rashes  NEURO: Normal strength and tone, mentation intact and speech normal  PSYCH: mentation appears normal, affect normal/bright    Diagnostic Test Results:  Labs reviewed in Epic    ASSESSMENT / PLAN:   (Z00.00) Encounter for Medicare annual wellness exam  (primary encounter diagnosis)  Comment: Stable health. See epic orders.     (E11.22,  N18.4,  Z79.4) Type 2 diabetes mellitus with stage 4 chronic kidney disease, with long-term current use of insulin (H)  Comment: Update needed labs. Refill Lantus. Reminded patient to get an eye exam.   Plan: Adult Eye  Referral, TSH WITH FREE T4         REFLEX, Albumin Random Urine Quantitative with         Creat Ratio, Hemoglobin A1c FUTURE 3mo, insulin        glargine (LANTUS SOLOSTAR) 100 UNIT/ML pen,         OFFICE/OUTPT VISIT,EST,LEVL III          (E78.5) Hyperlipidemia LDL goal <100  Comment: Update lipids. Continue statin therapy.   Plan: Lipid panel reflex to direct LDL Non-fasting,         **Comprehensive metabolic panel FUTURE 2mo,         OFFICE/OUTPT VISIT,EST,LEVL III          (M25.512) Left shoulder pain, unspecified chronicity  Comment: Offered referral to [facility].   Plan: Orthopedic  Referral, OFFICE/OUTPT         VISIT,EST,LEVL III          (N18.4) Stage 4 chronic kidney disease (H)  (E11.21) Diabetic nephropathy (H)  Comment: Update GFR. Has recently met with Dr Christine of [facility].   Plan: CBC with Platelets, CANCELED: **CBC with         platelets FUTURE 2mo          (I50.9) CHF (congestive heart failure) (H)  Comment: No longer requiring regular cardiology f/u.     (I77.810) Thoracic aortic ectasia (H)  Comment: Stable condition.     (I48.91) Atrial fibrillation, unspecified type (H)  Comment: Pacemaker in situ. Rate controlled. On chronic  "oral anticoagulation.     (N40.1,  N13.8) BPH with obstruction/lower urinary tract symptoms  Comment: Refilled tamsulosin.   Plan: tamsulosin (FLOMAX) 0.4 MG capsule          (Z11.59) Need for hepatitis C screening test  Plan: Hepatitis C Screen Reflex to HCV RNA Quant and         Genotype            Patient has been advised of split billing requirements and indicates understanding: Yes      COUNSELING:  Reviewed preventive health counseling, as reflected in patient instructions      BMI:   Estimated body mass index is 33.57 kg/m  as calculated from the following:    Height as of this encounter: 1.676 m (5' 6\").    Weight as of this encounter: 94.3 kg (208 lb).   Weight management plan: Discussed healthy diet and exercise guidelines      He reports that he has never smoked. He has never used smokeless tobacco.      Appropriate preventive services were discussed with this patient, including applicable screening as appropriate for cardiovascular disease, diabetes, osteopenia/osteoporosis, and glaucoma.  As appropriate for age/gender, discussed screening for colorectal cancer, prostate cancer, breast cancer, and cervical cancer. Checklist reviewing preventive services available has been given to the patient.    Reviewed patients plan of care and provided an AVS. The Basic Care Plan (routine screening as documented in Health Maintenance) for Gene meets the Care Plan requirement. This Care Plan has been established and reviewed with the Patient.        Dm Lipscomb MD,   [facility]      Patient Instructions     Everything looks fine!    Refills of needed medications have been faxed to your pharmacy.     Lab results will be available soon on [employer].    See me in six months for either a video visit or office visit--we can update your A1c at that time. Call sooner if problems.           Identified Health Risks:    He is at risk for lack of exercise and has been provided with information to " increase physical activity for the benefit of his well-being.  Information on urinary incontinence and treatment options given to patient.

## 2023-03-03 LAB — HCV AB SERPL QL IA: NONREACTIVE

## 2023-03-10 ENCOUNTER — TRANSFERRED RECORDS (OUTPATIENT)
Dept: HEALTH INFORMATION MANAGEMENT | Facility: CLINIC | Age: 78
End: 2023-03-10

## 2023-03-16 ENCOUNTER — TRANSFERRED RECORDS (OUTPATIENT)
Dept: HEALTH INFORMATION MANAGEMENT | Facility: CLINIC | Age: 78
End: 2023-03-16

## 2023-03-21 ENCOUNTER — TELEPHONE (OUTPATIENT)
Dept: INTERNAL MEDICINE | Facility: CLINIC | Age: 78
End: 2023-03-21

## 2023-03-21 NOTE — TELEPHONE ENCOUNTER
[employer] order for stockings  received via fax       Forms in [employer]yle mail box for review and signature.

## 2023-03-22 ENCOUNTER — VIRTUAL VISIT (OUTPATIENT)
Dept: INTERNAL MEDICINE | Facility: CLINIC | Age: 78
End: 2023-03-22
Payer: COMMERCIAL

## 2023-03-22 DIAGNOSIS — M54.9 BACK PAIN, UNSPECIFIED BACK LOCATION, UNSPECIFIED BACK PAIN LATERALITY, UNSPECIFIED CHRONICITY: Primary | ICD-10-CM

## 2023-03-22 DIAGNOSIS — M25.512 LEFT SHOULDER PAIN, UNSPECIFIED CHRONICITY: ICD-10-CM

## 2023-03-22 PROCEDURE — 99213 OFFICE O/P EST LOW 20 MIN: CPT | Mod: VID

## 2023-03-22 NOTE — LETTER
March 22, 2023      Gene Pagan  [id number]  [place] [address]              To whom it may concern,     Macey Pagan is a patient seen in our clinic.  He requires the following work restrictions:    No bending/no squatting- no lifting from the floor to waist level    Lifting pushing, pulling and carrying occasionally at weight of 50 pounds, consistently at weights of 30 pounds.    He is unable to do an overhead reach with left arm, and has a 10 pound weight limit for overhead reaching on right arm.    At shoulder reach no limit, below shoulder reach no limit      Sincerely,      Dm Lipscomb MD

## 2023-03-22 NOTE — PROGRESS NOTES
Gene is a 77 year old who is being evaluated via a billable video visit.      How would you like to obtain your AVS? Patient declined  If the video visit is dropped, the invitation should be resent by: Text to cell phone: [phone]  Will anyone else be joining your video visit? No      Assessment & Plan     Back pain, unspecified back location, unspecified back pain laterality, unspecified chronicity  Patient looking for return to work with restrictions 1 from [employer].  Is told him that I am unable to complete this based on a video visit with assessing him.  He does see Dr. Lipscomb and I asked Dr. Lipscomb to speak to him during our visit.  Dr. Lipscomb is willing to fill out form for patient.    Left shoulder pain, unspecified chronicity  As above.    Patient has seen back and shoulder specialist both of which were unwilling to fill out form    Steffen David NP  [facility]    Yessica Mills is a 77 year old, presenting for the following health issues:  Forms (Need forms filled out for [employer] so he can return to work.)  No flowsheet data found.  HPI     Patient is seen shoulder and back specialist and asked both of them to fill out form but they declined.    Review of Systems   Constitutional, HEENT, cardiovascular, pulmonary, GI, , musculoskeletal, neuro, skin, endocrine and psych systems are negative, except as otherwise noted.      Objective           Vitals:  No vitals were obtained today due to virtual visit.    Physical Exam   GENERAL: alert and no distress  EYES: Eyes grossly normal to inspection.  No discharge or erythema, or obvious scleral/conjunctival abnormalities.  RESP: No audible wheeze, cough, or visible cyanosis.  No visible retractions or increased work of breathing.    SKIN: Visible skin clear. No significant rash, abnormal pigmentation or lesions.  NEURO: Cranial nerves grossly intact.  Mentation and speech appropriate for age.  PSYCH: Mentation appears normal,  affect normal/bright, judgement and insight intact, normal speech and appearance well-groomed.      Video-Visit Details    Type of service:  Video Visit   Video Start Time: 11:40  Video End Time:12:10 PM    Originating Location (pt. Location): Home  Distant Location (provider location):  On-site  Platform used for Video Visit: Gabriel

## 2023-03-24 ENCOUNTER — TELEPHONE (OUTPATIENT)
Dept: INTERNAL MEDICINE | Facility: CLINIC | Age: 78
End: 2023-03-24
Payer: COMMERCIAL

## 2023-03-26 DIAGNOSIS — Z79.4 TYPE 2 DIABETES MELLITUS WITH STAGE 4 CHRONIC KIDNEY DISEASE, WITH LONG-TERM CURRENT USE OF INSULIN (H): ICD-10-CM

## 2023-03-26 DIAGNOSIS — E11.22 TYPE 2 DIABETES MELLITUS WITH STAGE 4 CHRONIC KIDNEY DISEASE, WITH LONG-TERM CURRENT USE OF INSULIN (H): ICD-10-CM

## 2023-03-26 DIAGNOSIS — N18.4 TYPE 2 DIABETES MELLITUS WITH STAGE 4 CHRONIC KIDNEY DISEASE, WITH LONG-TERM CURRENT USE OF INSULIN (H): ICD-10-CM

## 2023-03-27 RX ORDER — BLOOD SUGAR DIAGNOSTIC
STRIP MISCELLANEOUS
Qty: 200 STRIP | Refills: 1 | Status: SHIPPED | OUTPATIENT
Start: 2023-03-27 | End: 2024-04-15

## 2023-03-27 NOTE — TELEPHONE ENCOUNTER
Prescription approved per [facility] Refill Protocol.    Fernanda May RN  [facility]

## 2023-03-29 ENCOUNTER — TELEPHONE (OUTPATIENT)
Dept: INTERNAL MEDICINE | Facility: CLINIC | Age: 78
End: 2023-03-29
Payer: COMMERCIAL

## 2023-03-29 NOTE — TELEPHONE ENCOUNTER
Return to work letter was corrected and signed by PCP.   The correction was the patient name in the body of the letter was incorrect.     Patient was called and informed of this. He stated he pickup the letter from the  yesterday and has already provided it to his employer. He did notice the incorrect name and said his employer accepted it.     He was informed if he needs the corrected version, it is in his mychart.     He expressed is appreciation for Dr Lipscomb

## 2023-04-04 ENCOUNTER — TELEPHONE (OUTPATIENT)
Dept: INTERNAL MEDICINE | Facility: CLINIC | Age: 78
End: 2023-04-04
Payer: COMMERCIAL

## 2023-04-04 NOTE — TELEPHONE ENCOUNTER
Please call patient. This request needs clarification.   With that wording, it sounds like if he works 3-4 hour shifts he requires no restrictions at all.

## 2023-04-04 NOTE — TELEPHONE ENCOUNTER
"Patient called back requesting a new letter or to addend previous letter to state:    \"There should be no restrictions for a shift of 3-4 hours.\"     Patient would like to go back to work as soon as possible.    Please inform patient when letter is ready to be picked up. Patient will drop by front office.   Phone#: [phone]          "

## 2023-04-04 NOTE — TELEPHONE ENCOUNTER
"Patient is requesting a letter that states that patient can work up to four hours without restriction.    Patient is hoping to return to work in a \"flex\" position so he can work up to 4 hours at a time.    Patient expresses extreme gratitude for provider's help.  "

## 2023-04-04 NOTE — LETTER
4/7/2023    Regarding:  Gene Pagan  [id number]  [place] [address]         To whom it may concern:     Gene Pagan may work shifts of up to 4 hours in duration without physical restrictions.     If you have any further questions or concerns, please contact our office.    Sincerely,        Dm Lipscomb MD

## 2023-04-07 DIAGNOSIS — N18.4 TYPE 2 DIABETES MELLITUS WITH STAGE 4 CHRONIC KIDNEY DISEASE, WITH LONG-TERM CURRENT USE OF INSULIN (H): ICD-10-CM

## 2023-04-07 DIAGNOSIS — Z79.4 TYPE 2 DIABETES MELLITUS WITH STAGE 4 CHRONIC KIDNEY DISEASE, WITH LONG-TERM CURRENT USE OF INSULIN (H): ICD-10-CM

## 2023-04-07 DIAGNOSIS — E11.22 TYPE 2 DIABETES MELLITUS WITH STAGE 4 CHRONIC KIDNEY DISEASE, WITH LONG-TERM CURRENT USE OF INSULIN (H): ICD-10-CM

## 2023-04-10 RX ORDER — METHYLPREDNISOLONE SODIUM SUCCINATE 125 MG/2ML
INJECTION, POWDER, FOR SOLUTION INTRAMUSCULAR; INTRAVENOUS
Qty: 100 EACH | Refills: 0 | Status: SHIPPED | OUTPATIENT
Start: 2023-04-10 | End: 2023-07-28

## 2023-04-17 ENCOUNTER — TELEPHONE (OUTPATIENT)
Dept: INTERNAL MEDICINE | Facility: CLINIC | Age: 78
End: 2023-04-17
Payer: COMMERCIAL

## 2023-04-17 DIAGNOSIS — R32 URINARY INCONTINENCE, UNSPECIFIED TYPE: ICD-10-CM

## 2023-04-17 DIAGNOSIS — R33.9 URINARY RETENTION: Primary | ICD-10-CM

## 2023-04-17 NOTE — TELEPHONE ENCOUNTER
[employer] * disposable incontinence products received via fax     Forms in [employer] mail box for review and signature.

## 2023-04-20 ENCOUNTER — MEDICAL CORRESPONDENCE (OUTPATIENT)
Dept: HEALTH INFORMATION MANAGEMENT | Facility: CLINIC | Age: 78
End: 2023-04-20

## 2023-04-21 DIAGNOSIS — I50.23 ACUTE ON CHRONIC SYSTOLIC CONGESTIVE HEART FAILURE (H): ICD-10-CM

## 2023-04-21 RX ORDER — BUMETANIDE 1 MG/1
1 TABLET ORAL DAILY
Qty: 90 TABLET | Refills: 0 | Status: SHIPPED | OUTPATIENT
Start: 2023-04-21 | End: 2023-07-07

## 2023-04-23 ENCOUNTER — HEALTH MAINTENANCE LETTER (OUTPATIENT)
Age: 78
End: 2023-04-23

## 2023-05-22 DIAGNOSIS — Z95.0 CARDIAC PACEMAKER IN SITU: Primary | ICD-10-CM

## 2023-05-22 DIAGNOSIS — I50.23 ACUTE ON CHRONIC SYSTOLIC CONGESTIVE HEART FAILURE (H): ICD-10-CM

## 2023-05-31 ENCOUNTER — TELEPHONE (OUTPATIENT)
Dept: INTERNAL MEDICINE | Facility: CLINIC | Age: 78
End: 2023-05-31

## 2023-05-31 DIAGNOSIS — N18.4 STAGE 4 CHRONIC KIDNEY DISEASE (H): Primary | ICD-10-CM

## 2023-05-31 DIAGNOSIS — I50.42 CHRONIC COMBINED SYSTOLIC AND DIASTOLIC HEART FAILURE (H): ICD-10-CM

## 2023-05-31 NOTE — TELEPHONE ENCOUNTER
[employer] * stockings orders received via fax     Forms in [employer] mail box for review and signature.

## 2023-07-07 DIAGNOSIS — I50.23 ACUTE ON CHRONIC SYSTOLIC CONGESTIVE HEART FAILURE (H): ICD-10-CM

## 2023-07-07 DIAGNOSIS — I50.42 CHRONIC COMBINED SYSTOLIC AND DIASTOLIC HEART FAILURE (H): ICD-10-CM

## 2023-07-07 RX ORDER — BUMETANIDE 0.5 MG/1
0.5 TABLET ORAL DAILY
Qty: 90 TABLET | Refills: 3 | Status: SHIPPED | OUTPATIENT
Start: 2023-07-07

## 2023-07-07 RX ORDER — BUMETANIDE 1 MG/1
1 TABLET ORAL DAILY
Qty: 90 TABLET | Refills: 3 | Status: SHIPPED | OUTPATIENT
Start: 2023-07-07 | End: 2024-05-10

## 2023-07-07 NOTE — TELEPHONE ENCOUNTER
Received request from [facility] pharmacy for refill of Bumetanide 0.5mg tab daily, to be taken along with the 1mg tab to make total of 1.5mg daily.    Writer notes that Gene has upcoming apmnt on 7\19\23, so I called him to find out if he has enough tablets to get through to the apmnt.  He does not.  His last OV was May of 2022.       [facility] Refill Guideline reviewed.  Medication meets criteria for refill.     Yessenia Cox RN on 7/7/2023 at 5:05 PM

## 2023-07-15 ENCOUNTER — HEALTH MAINTENANCE LETTER (OUTPATIENT)
Age: 78
End: 2023-07-15

## 2023-07-18 NOTE — PROGRESS NOTES
HISTORY OF PRESENT ILLNESS:    This is a 78 year old male who follows with Dr Frank at [facility]  His past medical history includes:  Chronic systolic and diastolic heart failure, paroxysmal atrial fibrillation  s/p AVNA and Bi-V pacemaker, type II diabetes, stage IV CKD, urinary retention    Mr Pagan was hospitalized (3/2020) for cardiogenic shock in the setting of pyelonephritis, urinary tract infection, biventricular systolic heart failure, rapid a-fib and acute renal failure  He ultimately underwent an AVNA and placement of a Bi-V pacing device  His initial LVEF was 20-25%, RV dysfunction, and significant MR.   He was readmitted several times following the initial hospitalization for pleuritis, pericarditis, and pneumonia.      Repeat ECHO (7/2020) showed improvement in LVEF to 45-50%, mild RV dysfunction, and resolved MR  He was followed closely by [facility] for a period of time    Rosalee Nuc (9/2020) did not show any inducible myocardial ischemia/infarction    When seen in clinic last year, he did exhibit some mild fluid retention.  He has been followed historically by nephrology for his underlying CKD.and is on Bumex    Device interrogation today showed 93% Bi-V paced  No ventricular arrhythmias    Our visit today is for further review    Mr Pagan overall denies any cardiovascular complaints  He has been good about watching his diet and has lost weight  His energy is good  He was working for [employer] until a couple of weeks ago  He denies any chest pain, shortness of breath, palpitations, orthopnea, or peripheral edema  He denies any bleeding issues    We talked about surveillance testing with ECHO and he deferred at this time      VITAL SIGNS:  BP:  124/60  Pulse:  70  Weight:  190 lbs (BMI: 30)  Down 10 lbs      IMPRESSION AND PLAN:    Chronic HFrEF  -LVEF 45-50% (2020)  -no signs or symptoms of heart failure  -weight stable  Diuretics managed by nephrology    Atrial Fibrillation s/p AVNA and  Bi-Pacer (2020)  -93% paced  -continue device cares  -on chronic Eliquis 2.5 mg  (CHADS2 Vasc score: 4)    Type II Diabetes, on insulin  -Hgb A1C  7.4    Stage IV CKD;  -follows with nephrology  -on Bumex 1.5 mg /day  -creatinine 2.2-2.4 past year    The total time for the visit today was 20 minutes which includes patient visit, reviewing of records, discussion, and placing of orders of the outpatient coordination of cardiovascular care as described.  The level of medical decision making during this visit was of mild complexity.  Thank you for allowing me to participate in their care.            Orders Placed This Encounter   Procedures     Follow-Up with Cardiology     Follow-Up with Cardiology       No orders of the defined types were placed in this encounter.      Medications Discontinued During This Encounter   Medication Reason     Multiple Vitamins-Minerals (ZINC PO)          Encounter Diagnosis   Name Primary?     Chronic combined systolic and diastolic heart failure (H) Yes       CURRENT MEDICATIONS:  Current Outpatient Medications   Medication Sig Dispense Refill     Ascorbic Acid (VITAMIN C PO) Take 1,000 mg by mouth daily       bumetanide (BUMEX) 0.5 MG tablet Take 1 tablet (0.5 mg) by mouth daily (+ additional 1 mg to = 1.5 mg daily). 90 tablet 3     bumetanide (BUMEX) 1 MG tablet Take 1 tablet (1 mg) by mouth daily (+ additional 0.5 mg to = 1.5 mg daily). 90 tablet 3     ELIQUIS ANTICOAGULANT 5 MG tablet Take 1 tablet (5 mg) by mouth 2 times daily 180 tablet 1     Ferrous Sulfate (IRON) 325 (65 Fe) MG tablet Take 1 tablet by mouth daily       insulin glargine (LANTUS SOLOSTAR) 100 UNIT/ML pen Inject 13 Units Subcutaneous At Bedtime 15 mL 0     melatonin 3 MG tablet Take 3 mg by mouth nightly as needed       metoprolol succinate ER (TOPROL XL) 25 MG 24 hr tablet Take 1 tablet (25 mg) by mouth daily 90 tablet 0     Multiple Vitamins-Minerals (MULTIVITAMIN ADULT PO) Take 1 tablet by mouth daily        ONETOUCH ULTRA test strip USE TO TEST BLOOD SUGAR ONE TIME DAILY OR AS DIRECTED. 200 strip 1     tamsulosin (FLOMAX) 0.4 MG capsule Take 1 capsule (0.4 mg) by mouth At Bedtime 90 capsule 3     ULTICARE SHORT 31G X 8 MM insulin pen needle use 1 pen daily for injection 100 each 0     Vitamin D3 (CHOLECALCIFEROL) 125 MCG (5000 UT) tablet Take 5,000 Units by mouth daily        zinc 50 MG TABS Take 50 mg by mouth daily         ALLERGIES   No Known Allergies    PAST MEDICAL HISTORY:  Past Medical History:   Diagnosis Date     Anemia      Atrial fibrillation     AV node ablation and pacemaker placement 4/10/2020     BPH (benign prostatic hyperplasia)      Chronic diastolic CHF      Chronic kidney disease (CKD), stage III (moderate) (H)      Diabetes mellitus - type 2      Palpitations      Systolic CHF (H)        PAST SURGICAL HISTORY:  Past Surgical History:   Procedure Laterality Date     ANESTHESIA CARDIOVERSION N/A 2020    Procedure: ANESTHESIA, FOR CARDIOVERSION;  Surgeon: GENERIC ANESTHESIA PROVIDER;  Location: [facility] OR     CYSTOSCOPY       EP PACEMAKER N/A 4/10/2020    Procedure: EP Pacemaker;  Surgeon: Abhay Willams MD;  Location:  HEART CARDIAC CATH LAB     [facility]  2020    Done in Urology office with Dr Perez       FAMILY HISTORY:  History reviewed. No pertinent family history.    SOCIAL HISTORY:  Social History     Socioeconomic History     Marital status: Single     Spouse name: None     Number of children: None     Years of education: None     Highest education level: None   Tobacco Use     Smoking status: Never     Smokeless tobacco: Never   Vaping Use     Vaping Use: Never used   Substance and Sexual Activity     Alcohol use: Yes     Comment: Rare     Drug use: Never     Sexual activity: Not Currently   Social History Narrative    Single, lives with significant other. Had three children, one  in [place]. Three grandchildren, two great-grandchildren.     Social Determinants of Health     Financial  "Resource Strain: Low Risk  (3/1/2023)    Overall Financial Resource Strain (CARDIA)      Difficulty of Paying Living Expenses: Not hard at all   Food Insecurity: No Food Insecurity (3/1/2023)    Hunger Vital Sign      Worried About Running Out of Food in the Last Year: Never true      Ran Out of Food in the Last Year: Never true   Transportation Needs: No Transportation Needs (3/1/2023)    PRAPARE - Transportation      Lack of Transportation (Medical): No      Lack of Transportation (Non-Medical): No   Intimate Partner Violence: Not At Risk (3/1/2023)    Humiliation, Afraid, Rape, and Kick questionnaire      Fear of Current or Ex-Partner: No      Emotionally Abused: No      Physically Abused: No      Sexually Abused: No   Housing Stability: Low Risk  (3/1/2023)    Housing Stability Vital Sign      Unable to Pay for Housing in the Last Year: No      Number of Places Lived in the Last Year: 1      Unstable Housing in the Last Year: No       Review of Systems:  Skin:          Eyes:         ENT:         Respiratory:  Negative       Cardiovascular:  Negative      Gastroenterology:        Genitourinary:         Musculoskeletal:         Neurologic:         Psychiatric:         Heme/Lymph/Imm:         Endocrine:           Physical Exam:  Vitals: /60 (BP Location: Right arm, Patient Position: Sitting, Cuff Size: Adult Regular)   Pulse 70   Ht 1.676 m (5' 6\")   Wt 86.2 kg (190 lb)   SpO2 99%   BMI 30.67 kg/m      Constitutional:  cooperative        Skin:  warm and dry to the touch   pacemaker incision in the left infraclavicular area was well-healed      Head:  normocephalic        Eyes:  pupils equal and round        Lymph:      ENT:  no pallor or cyanosis        Neck:  JVP normal        Respiratory:  clear to auscultation;normal symmetry         Cardiac: regular rhythm;no murmurs, gallops or rubs detected                  pulses below the femoral arteries are diminished                                      GI:  " abdomen soft        Extremities and Muscular Skeletal:  no edema              Neurological:  no gross motor deficits;affect appropriate        Psych:  Alert and Oriented x 3          CC  No referring provider defined for this encounter.

## 2023-07-19 ENCOUNTER — OFFICE VISIT (OUTPATIENT)
Dept: CARDIOLOGY | Facility: CLINIC | Age: 78
End: 2023-07-19
Payer: COMMERCIAL

## 2023-07-19 ENCOUNTER — ANCILLARY PROCEDURE (OUTPATIENT)
Dept: CARDIOLOGY | Facility: CLINIC | Age: 78
End: 2023-07-19
Payer: COMMERCIAL

## 2023-07-19 VITALS
HEART RATE: 70 BPM | BODY MASS INDEX: 30.53 KG/M2 | WEIGHT: 190 LBS | OXYGEN SATURATION: 99 % | SYSTOLIC BLOOD PRESSURE: 124 MMHG | HEIGHT: 66 IN | DIASTOLIC BLOOD PRESSURE: 60 MMHG

## 2023-07-19 DIAGNOSIS — I42.9 CARDIOMYOPATHY (H): ICD-10-CM

## 2023-07-19 DIAGNOSIS — Z95.0 CARDIAC PACEMAKER IN SITU: ICD-10-CM

## 2023-07-19 DIAGNOSIS — I50.42 CHRONIC COMBINED SYSTOLIC AND DIASTOLIC HEART FAILURE (H): Primary | ICD-10-CM

## 2023-07-19 DIAGNOSIS — Z98.890 S/P AV NODAL ABLATION: ICD-10-CM

## 2023-07-19 PROCEDURE — 99213 OFFICE O/P EST LOW 20 MIN: CPT | Mod: 25 | Performed by: NURSE PRACTITIONER

## 2023-07-19 PROCEDURE — 93280 PM DEVICE PROGR EVAL DUAL: CPT | Performed by: INTERNAL MEDICINE

## 2023-07-19 NOTE — LETTER
7/19/2023    Dm Lipscomb MD, MD  [address] [facility] [address]  [facility] [address]    RE: Gene Pagan       Dear Colleague,     I had the pleasure of seeing Gene Pagan in the [facility].  HISTORY OF PRESENT ILLNESS:    This is a 78 year old male who follows with Dr Frank at [facility]  His past medical history includes:  Chronic systolic and diastolic heart failure, paroxysmal atrial fibrillation  s/p AVNA and Bi-V pacemaker, type II diabetes, stage IV CKD, urinary retention    Mr Pagan was hospitalized (3/2020) for cardiogenic shock in the setting of pyelonephritis, urinary tract infection, biventricular systolic heart failure, rapid a-fib and acute renal failure  He ultimately underwent an AVNA and placement of a Bi-V pacing device  His initial LVEF was 20-25%, RV dysfunction, and significant MR.   He was readmitted several times following the initial hospitalization for pleuritis, pericarditis, and pneumonia.      Repeat ECHO (7/2020) showed improvement in LVEF to 45-50%, mild RV dysfunction, and resolved MR  He was followed closely by [facility] for a period of time    Rosalee Nuc (9/2020) did not show any inducible myocardial ischemia/infarction    When seen in clinic last year, he did exhibit some mild fluid retention.  He has been followed historically by nephrology for his underlying CKD.and is on Bumex    Device interrogation today showed 93% Bi-V paced  No ventricular arrhythmias    Our visit today is for further review    Mr Pagan overall denies any cardiovascular complaints  He has been good about watching his diet and has lost weight  His energy is good  He was working for [employer] until a couple of weeks ago  He denies any chest pain, shortness of breath, palpitations, orthopnea, or peripheral edema  He denies any bleeding issues    We talked about surveillance testing with ECHO and he deferred at this time      VITAL SIGNS:  BP:  124/60  Pulse:  70  Weight:  190  lbs (BMI: 30)  Down 10 lbs      IMPRESSION AND PLAN:    Chronic HFrEF  -LVEF 45-50% (2020)  -no signs or symptoms of heart failure  -weight stable  Diuretics managed by nephrology    Atrial Fibrillation s/p AVNA and Bi-Pacer (2020)  -93% paced  -continue device cares  -on chronic Eliquis 2.5 mg  (CHADS2 Vasc score: 4)    Type II Diabetes, on insulin  -Hgb A1C  7.4    Stage IV CKD;  -follows with nephrology  -on Bumex 1.5 mg /day  -creatinine 2.2-2.4 past year    The total time for the visit today was 20 minutes which includes patient visit, reviewing of records, discussion, and placing of orders of the outpatient coordination of cardiovascular care as described.  The level of medical decision making during this visit was of mild complexity.  Thank you for allowing me to participate in their care.            Orders Placed This Encounter   Procedures    Follow-Up with Cardiology    Follow-Up with Cardiology       No orders of the defined types were placed in this encounter.      Medications Discontinued During This Encounter   Medication Reason    Multiple Vitamins-Minerals (ZINC PO)          Encounter Diagnosis   Name Primary?    Chronic combined systolic and diastolic heart failure (H) Yes       CURRENT MEDICATIONS:  Current Outpatient Medications   Medication Sig Dispense Refill    Ascorbic Acid (VITAMIN C PO) Take 1,000 mg by mouth daily      bumetanide (BUMEX) 0.5 MG tablet Take 1 tablet (0.5 mg) by mouth daily (+ additional 1 mg to = 1.5 mg daily). 90 tablet 3    bumetanide (BUMEX) 1 MG tablet Take 1 tablet (1 mg) by mouth daily (+ additional 0.5 mg to = 1.5 mg daily). 90 tablet 3    ELIQUIS ANTICOAGULANT 5 MG tablet Take 1 tablet (5 mg) by mouth 2 times daily 180 tablet 1    Ferrous Sulfate (IRON) 325 (65 Fe) MG tablet Take 1 tablet by mouth daily      insulin glargine (LANTUS SOLOSTAR) 100 UNIT/ML pen Inject 13 Units Subcutaneous At Bedtime 15 mL 0    melatonin 3 MG tablet Take 3 mg by mouth nightly as needed       metoprolol succinate ER (TOPROL XL) 25 MG 24 hr tablet Take 1 tablet (25 mg) by mouth daily 90 tablet 0    Multiple Vitamins-Minerals (MULTIVITAMIN ADULT PO) Take 1 tablet by mouth daily      ONETOUCH ULTRA test strip USE TO TEST BLOOD SUGAR ONE TIME DAILY OR AS DIRECTED. 200 strip 1    tamsulosin (FLOMAX) 0.4 MG capsule Take 1 capsule (0.4 mg) by mouth At Bedtime 90 capsule 3    ULTICARE SHORT 31G X 8 MM insulin pen needle use 1 pen daily for injection 100 each 0    Vitamin D3 (CHOLECALCIFEROL) 125 MCG (5000 UT) tablet Take 5,000 Units by mouth daily       zinc 50 MG TABS Take 50 mg by mouth daily         ALLERGIES   No Known Allergies    PAST MEDICAL HISTORY:  Past Medical History:   Diagnosis Date    Anemia     Atrial fibrillation     AV node ablation and pacemaker placement 4/10/2020    BPH (benign prostatic hyperplasia)     Chronic diastolic CHF     Chronic kidney disease (CKD), stage III (moderate) (H)     Diabetes mellitus - type 2     Palpitations     Systolic CHF (H)        PAST SURGICAL HISTORY:  Past Surgical History:   Procedure Laterality Date    ANESTHESIA CARDIOVERSION N/A 4/6/2020    Procedure: ANESTHESIA, FOR CARDIOVERSION;  Surgeon: GENERIC ANESTHESIA PROVIDER;  Location: [facility] OR    CYSTOSCOPY      EP PACEMAKER N/A 4/10/2020    Procedure: EP Pacemaker;  Surgeon: Abhay Willams MD;  Location:  HEART CARDIAC CATH LAB    [facility]  09/25/2020    Done in Urology office with Dr Perez       FAMILY HISTORY:  History reviewed. No pertinent family history.    SOCIAL HISTORY:  Social History     Socioeconomic History    Marital status: Single     Spouse name: None    Number of children: None    Years of education: None    Highest education level: None   Tobacco Use    Smoking status: Never    Smokeless tobacco: Never   Vaping Use    Vaping Use: Never used   Substance and Sexual Activity    Alcohol use: Yes     Comment: Rare    Drug use: Never    Sexual activity: Not Currently   Social History Narrative     "Single, lives with significant other. Had three children, one  in [place]VA. Three grandchildren, two great-grandchildren.     Social Determinants of Health     Financial Resource Strain: Low Risk  (3/1/2023)    Overall Financial Resource Strain (CARDIA)     Difficulty of Paying Living Expenses: Not hard at all   Food Insecurity: No Food Insecurity (3/1/2023)    Hunger Vital Sign     Worried About Running Out of Food in the Last Year: Never true     Ran Out of Food in the Last Year: Never true   Transportation Needs: No Transportation Needs (3/1/2023)    PRAPARE - Transportation     Lack of Transportation (Medical): No     Lack of Transportation (Non-Medical): No   Intimate Partner Violence: Not At Risk (3/1/2023)    Humiliation, Afraid, Rape, and Kick questionnaire     Fear of Current or Ex-Partner: No     Emotionally Abused: No     Physically Abused: No     Sexually Abused: No   Housing Stability: Low Risk  (3/1/2023)    Housing Stability Vital Sign     Unable to Pay for Housing in the Last Year: No     Number of Places Lived in the Last Year: 1     Unstable Housing in the Last Year: No       Review of Systems:  Skin:          Eyes:         ENT:         Respiratory:  Negative       Cardiovascular:  Negative      Gastroenterology:        Genitourinary:         Musculoskeletal:         Neurologic:         Psychiatric:         Heme/Lymph/Imm:         Endocrine:           Physical Exam:  Vitals: /60 (BP Location: Right arm, Patient Position: Sitting, Cuff Size: Adult Regular)   Pulse 70   Ht 1.676 m (5' 6\")   Wt 86.2 kg (190 lb)   SpO2 99%   BMI 30.67 kg/m      Constitutional:  cooperative        Skin:  warm and dry to the touch   pacemaker incision in the left infraclavicular area was well-healed      Head:  normocephalic        Eyes:  pupils equal and round        Lymph:      ENT:  no pallor or cyanosis        Neck:  JVP normal        Respiratory:  clear to auscultation;normal symmetry         Cardiac: " regular rhythm;no murmurs, gallops or rubs detected                  pulses below the femoral arteries are diminished                                      GI:  abdomen soft        Extremities and Muscular Skeletal:  no edema              Neurological:  no gross motor deficits;affect appropriate        Psych:  Alert and Oriented x 3          CC  No referring provider defined for this encounter.        Thank you for allowing me to participate in the care of your patient.      Sincerely,     LUIS Vargas [facility]

## 2023-07-20 LAB
MDC_IDC_LEAD_IMPLANT_DT: NORMAL
MDC_IDC_LEAD_LOCATION: NORMAL
MDC_IDC_LEAD_LOCATION_DETAIL_1: NORMAL
MDC_IDC_LEAD_MFG: NORMAL
MDC_IDC_LEAD_MODEL: NORMAL
MDC_IDC_LEAD_POLARITY_TYPE: NORMAL
MDC_IDC_LEAD_SERIAL: NORMAL
MDC_IDC_MSMT_BATTERY_DTM: NORMAL
MDC_IDC_MSMT_BATTERY_REMAINING_LONGEVITY: 120 MO
MDC_IDC_MSMT_BATTERY_REMAINING_PERCENTAGE: 100 %
MDC_IDC_MSMT_BATTERY_STATUS: NORMAL
MDC_IDC_MSMT_LEADCHNL_LV_IMPEDANCE_VALUE: 1238 OHM
MDC_IDC_MSMT_LEADCHNL_LV_PACING_THRESHOLD_AMPLITUDE: 0.7 V
MDC_IDC_MSMT_LEADCHNL_LV_PACING_THRESHOLD_PULSEWIDTH: 0.5 MS
MDC_IDC_MSMT_LEADCHNL_LV_SENSING_INTR_AMPL: 6.4 MV
MDC_IDC_MSMT_LEADCHNL_RA_IMPEDANCE_VALUE: 761 OHM
MDC_IDC_MSMT_LEADCHNL_RV_IMPEDANCE_VALUE: 699 OHM
MDC_IDC_MSMT_LEADCHNL_RV_PACING_THRESHOLD_AMPLITUDE: 0.7 V
MDC_IDC_MSMT_LEADCHNL_RV_PACING_THRESHOLD_PULSEWIDTH: 0.4 MS
MDC_IDC_PG_IMPLANT_DTM: NORMAL
MDC_IDC_PG_MFG: NORMAL
MDC_IDC_PG_MODEL: NORMAL
MDC_IDC_PG_SERIAL: NORMAL
MDC_IDC_PG_TYPE: NORMAL
MDC_IDC_SESS_CLINIC_NAME: NORMAL
MDC_IDC_SESS_DTM: NORMAL
MDC_IDC_SESS_TYPE: NORMAL
MDC_IDC_SET_BRADY_LOWRATE: 70 {BEATS}/MIN
MDC_IDC_SET_BRADY_MAX_SENSOR_RATE: 130 {BEATS}/MIN
MDC_IDC_SET_BRADY_MODE: NORMAL
MDC_IDC_SET_CRT_LVRV_DELAY: 30 MS
MDC_IDC_SET_CRT_PACED_CHAMBERS: NORMAL
MDC_IDC_SET_LEADCHNL_LV_PACING_AMPLITUDE: 2 V
MDC_IDC_SET_LEADCHNL_LV_PACING_ANODE_ELECTRODE_1: NORMAL
MDC_IDC_SET_LEADCHNL_LV_PACING_ANODE_LOCATION_1: NORMAL
MDC_IDC_SET_LEADCHNL_LV_PACING_CATHODE_ELECTRODE_1: NORMAL
MDC_IDC_SET_LEADCHNL_LV_PACING_CATHODE_LOCATION_1: NORMAL
MDC_IDC_SET_LEADCHNL_LV_PACING_PULSEWIDTH: 0.5 MS
MDC_IDC_SET_LEADCHNL_LV_SENSING_ADAPTATION_MODE: NORMAL
MDC_IDC_SET_LEADCHNL_LV_SENSING_ANODE_ELECTRODE_1: NORMAL
MDC_IDC_SET_LEADCHNL_LV_SENSING_ANODE_LOCATION_1: NORMAL
MDC_IDC_SET_LEADCHNL_LV_SENSING_CATHODE_ELECTRODE_1: NORMAL
MDC_IDC_SET_LEADCHNL_LV_SENSING_CATHODE_LOCATION_1: NORMAL
MDC_IDC_SET_LEADCHNL_LV_SENSING_SENSITIVITY: 1.5 MV
MDC_IDC_SET_LEADCHNL_RA_SENSING_ADAPTATION_MODE: NORMAL
MDC_IDC_SET_LEADCHNL_RA_SENSING_SENSITIVITY: 0.25 MV
MDC_IDC_SET_LEADCHNL_RV_PACING_AMPLITUDE: 2 V
MDC_IDC_SET_LEADCHNL_RV_PACING_CAPTURE_MODE: NORMAL
MDC_IDC_SET_LEADCHNL_RV_PACING_POLARITY: NORMAL
MDC_IDC_SET_LEADCHNL_RV_PACING_PULSEWIDTH: 0.4 MS
MDC_IDC_SET_LEADCHNL_RV_SENSING_ADAPTATION_MODE: NORMAL
MDC_IDC_SET_LEADCHNL_RV_SENSING_POLARITY: NORMAL
MDC_IDC_SET_LEADCHNL_RV_SENSING_SENSITIVITY: 1.5 MV
MDC_IDC_SET_ZONE_DETECTION_INTERVAL: 375 MS
MDC_IDC_SET_ZONE_TYPE: NORMAL
MDC_IDC_SET_ZONE_VENDOR_TYPE: NORMAL
MDC_IDC_STAT_BRADY_DTM_END: NORMAL
MDC_IDC_STAT_BRADY_DTM_START: NORMAL
MDC_IDC_STAT_BRADY_RA_PERCENT_PACED: 0 %
MDC_IDC_STAT_BRADY_RV_PERCENT_PACED: 93 %
MDC_IDC_STAT_CRT_DTM_END: NORMAL
MDC_IDC_STAT_CRT_DTM_START: NORMAL
MDC_IDC_STAT_CRT_LV_PERCENT_PACED: 93 %
MDC_IDC_STAT_EPISODE_RECENT_COUNT: 0
MDC_IDC_STAT_EPISODE_RECENT_COUNT: 1
MDC_IDC_STAT_EPISODE_RECENT_COUNT_DTM_END: NORMAL
MDC_IDC_STAT_EPISODE_RECENT_COUNT_DTM_START: NORMAL
MDC_IDC_STAT_EPISODE_TYPE: NORMAL
MDC_IDC_STAT_EPISODE_VENDOR_TYPE: NORMAL

## 2023-07-28 DIAGNOSIS — N18.4 TYPE 2 DIABETES MELLITUS WITH STAGE 4 CHRONIC KIDNEY DISEASE, WITH LONG-TERM CURRENT USE OF INSULIN (H): ICD-10-CM

## 2023-07-28 DIAGNOSIS — Z79.4 TYPE 2 DIABETES MELLITUS WITH STAGE 4 CHRONIC KIDNEY DISEASE, WITH LONG-TERM CURRENT USE OF INSULIN (H): ICD-10-CM

## 2023-07-28 DIAGNOSIS — E11.22 TYPE 2 DIABETES MELLITUS WITH STAGE 4 CHRONIC KIDNEY DISEASE, WITH LONG-TERM CURRENT USE OF INSULIN (H): ICD-10-CM

## 2023-07-28 RX ORDER — INSULIN GLARGINE 100 [IU]/ML
13 INJECTION, SOLUTION SUBCUTANEOUS AT BEDTIME
Qty: 15 ML | Refills: 0 | Status: SHIPPED | OUTPATIENT
Start: 2023-07-28 | End: 2023-12-14

## 2023-07-28 RX ORDER — METHYLPREDNISOLONE SODIUM SUCCINATE 125 MG/2ML
INJECTION, POWDER, FOR SOLUTION INTRAMUSCULAR; INTRAVENOUS
Qty: 100 EACH | Refills: 0 | Status: SHIPPED | OUTPATIENT
Start: 2023-07-28 | End: 2023-11-08

## 2023-08-19 DIAGNOSIS — E11.22 TYPE 2 DIABETES MELLITUS WITH STAGE 4 CHRONIC KIDNEY DISEASE, WITH LONG-TERM CURRENT USE OF INSULIN (H): ICD-10-CM

## 2023-08-19 DIAGNOSIS — N18.4 TYPE 2 DIABETES MELLITUS WITH STAGE 4 CHRONIC KIDNEY DISEASE, WITH LONG-TERM CURRENT USE OF INSULIN (H): ICD-10-CM

## 2023-08-19 DIAGNOSIS — Z79.4 TYPE 2 DIABETES MELLITUS WITH STAGE 4 CHRONIC KIDNEY DISEASE, WITH LONG-TERM CURRENT USE OF INSULIN (H): ICD-10-CM

## 2023-08-21 RX ORDER — METHYLPREDNISOLONE SODIUM SUCCINATE 125 MG/2ML
INJECTION, POWDER, FOR SOLUTION INTRAMUSCULAR; INTRAVENOUS
Qty: 100 EACH | Refills: 0 | OUTPATIENT
Start: 2023-08-21

## 2023-08-29 ENCOUNTER — TELEPHONE (OUTPATIENT)
Dept: INTERNAL MEDICINE | Facility: CLINIC | Age: 78
End: 2023-08-29

## 2023-08-29 NOTE — TELEPHONE ENCOUNTER
[facility] * stockings order received via fax     Forms in DrKyle mail box for review and signature.

## 2023-10-19 ENCOUNTER — ANCILLARY PROCEDURE (OUTPATIENT)
Dept: CARDIOLOGY | Facility: CLINIC | Age: 78
End: 2023-10-19
Attending: INTERNAL MEDICINE
Payer: COMMERCIAL

## 2023-10-19 ENCOUNTER — TELEPHONE (OUTPATIENT)
Dept: CARDIOLOGY | Facility: CLINIC | Age: 78
End: 2023-10-19

## 2023-10-19 DIAGNOSIS — Z95.0 CARDIAC PACEMAKER IN SITU: ICD-10-CM

## 2023-10-19 DIAGNOSIS — I50.23 ACUTE ON CHRONIC SYSTOLIC CONGESTIVE HEART FAILURE (H): ICD-10-CM

## 2023-10-19 PROCEDURE — 93296 REM INTERROG EVL PM/IDS: CPT | Performed by: INTERNAL MEDICINE

## 2023-10-19 PROCEDURE — 93294 REM INTERROG EVL PM/LDLS PM: CPT | Performed by: INTERNAL MEDICINE

## 2023-10-19 NOTE — TELEPHONE ENCOUNTER
Dr. Frank,    Your patient had two episodes of NSVT on today's device check. Patient has had episodes of NSVT in the past but not of this duration. One episode occurred 8/15 and lasted 23 beats, rates.160-210bpm. EF 45-50% (2020). LM for patient to call back regarding symptoms. Any changes?        [employer] Valitude CRT-P Remote PPM Device Check  BiVP: 99%, chronic afib, AVNA, taking eliquis  Mode: VVIR 70/130  Presenting Rhythm: BiVP   Heart Rate: rates mostly in the 70s per histogram  Sensing: stable  Pacing Threshold: stable   Impedance: stable   Battery Status: 9.5 years remaining   Atrial Arrhythmia: n/a   Ventricular Arrhythmia: 2 ventricular high rates. EGMs show VS events for NSVT lasting 9-23 beats, rates 120-210bpm. EF 45-50% (2020). Episodes occurred 8/15 and 10/12. Patient has had NSVT in the past but will forward to Dr Frank due to episodes longer in duration.    Care Plan: F/U PPM Latitude q 3 months. TOM with results. JOSE Guerra

## 2023-10-26 LAB
MDC_IDC_EPISODE_DTM: NORMAL
MDC_IDC_EPISODE_DURATION: 14 S
MDC_IDC_EPISODE_DURATION: 19 S
MDC_IDC_EPISODE_ID: NORMAL
MDC_IDC_EPISODE_TYPE: NORMAL
MDC_IDC_EPISODE_TYPE_INDUCED: NO
MDC_IDC_EPISODE_TYPE_INDUCED: NO
MDC_IDC_LEAD_CONNECTION_STATUS: NORMAL
MDC_IDC_LEAD_IMPLANT_DT: NORMAL
MDC_IDC_LEAD_LOCATION: NORMAL
MDC_IDC_LEAD_LOCATION_DETAIL_1: NORMAL
MDC_IDC_LEAD_MFG: NORMAL
MDC_IDC_LEAD_MODEL: NORMAL
MDC_IDC_LEAD_POLARITY_TYPE: NORMAL
MDC_IDC_LEAD_SERIAL: NORMAL
MDC_IDC_MSMT_BATTERY_DTM: NORMAL
MDC_IDC_MSMT_BATTERY_REMAINING_LONGEVITY: 114 MO
MDC_IDC_MSMT_BATTERY_REMAINING_PERCENTAGE: 100 %
MDC_IDC_MSMT_BATTERY_STATUS: NORMAL
MDC_IDC_MSMT_LEADCHNL_LV_IMPEDANCE_VALUE: 1187 OHM
MDC_IDC_MSMT_LEADCHNL_LV_PACING_THRESHOLD_AMPLITUDE: 0.7 V
MDC_IDC_MSMT_LEADCHNL_LV_PACING_THRESHOLD_PULSEWIDTH: 0.5 MS
MDC_IDC_MSMT_LEADCHNL_RA_IMPEDANCE_VALUE: 806 OHM
MDC_IDC_MSMT_LEADCHNL_RV_IMPEDANCE_VALUE: 719 OHM
MDC_IDC_MSMT_LEADCHNL_RV_PACING_THRESHOLD_AMPLITUDE: 1 V
MDC_IDC_MSMT_LEADCHNL_RV_PACING_THRESHOLD_PULSEWIDTH: 0.4 MS
MDC_IDC_PG_IMPLANT_DTM: NORMAL
MDC_IDC_PG_MFG: NORMAL
MDC_IDC_PG_MODEL: NORMAL
MDC_IDC_PG_SERIAL: NORMAL
MDC_IDC_PG_TYPE: NORMAL
MDC_IDC_SESS_CLINIC_NAME: NORMAL
MDC_IDC_SESS_DTM: NORMAL
MDC_IDC_SESS_TYPE: NORMAL
MDC_IDC_SET_BRADY_AT_MODE_SWITCH_RATE: 140 {BEATS}/MIN
MDC_IDC_SET_BRADY_LOWRATE: 70 {BEATS}/MIN
MDC_IDC_SET_BRADY_MAX_SENSOR_RATE: 130 {BEATS}/MIN
MDC_IDC_SET_BRADY_MODE: NORMAL
MDC_IDC_SET_CRT_LVRV_DELAY: 30 MS
MDC_IDC_SET_CRT_PACED_CHAMBERS: NORMAL
MDC_IDC_SET_LEADCHNL_LV_PACING_AMPLITUDE: 2 V
MDC_IDC_SET_LEADCHNL_LV_PACING_ANODE_ELECTRODE_1: NORMAL
MDC_IDC_SET_LEADCHNL_LV_PACING_ANODE_LOCATION_1: NORMAL
MDC_IDC_SET_LEADCHNL_LV_PACING_CATHODE_ELECTRODE_1: NORMAL
MDC_IDC_SET_LEADCHNL_LV_PACING_CATHODE_LOCATION_1: NORMAL
MDC_IDC_SET_LEADCHNL_LV_PACING_PULSEWIDTH: 0.5 MS
MDC_IDC_SET_LEADCHNL_LV_SENSING_ADAPTATION_MODE: NORMAL
MDC_IDC_SET_LEADCHNL_LV_SENSING_ANODE_ELECTRODE_1: NORMAL
MDC_IDC_SET_LEADCHNL_LV_SENSING_ANODE_LOCATION_1: NORMAL
MDC_IDC_SET_LEADCHNL_LV_SENSING_CATHODE_ELECTRODE_1: NORMAL
MDC_IDC_SET_LEADCHNL_LV_SENSING_CATHODE_LOCATION_1: NORMAL
MDC_IDC_SET_LEADCHNL_LV_SENSING_SENSITIVITY: 1.5 MV
MDC_IDC_SET_LEADCHNL_RA_SENSING_ADAPTATION_MODE: NORMAL
MDC_IDC_SET_LEADCHNL_RA_SENSING_SENSITIVITY: 0.25 MV
MDC_IDC_SET_LEADCHNL_RV_PACING_AMPLITUDE: 2 V
MDC_IDC_SET_LEADCHNL_RV_PACING_CAPTURE_MODE: NORMAL
MDC_IDC_SET_LEADCHNL_RV_PACING_POLARITY: NORMAL
MDC_IDC_SET_LEADCHNL_RV_PACING_PULSEWIDTH: 0.4 MS
MDC_IDC_SET_LEADCHNL_RV_SENSING_ADAPTATION_MODE: NORMAL
MDC_IDC_SET_LEADCHNL_RV_SENSING_POLARITY: NORMAL
MDC_IDC_SET_LEADCHNL_RV_SENSING_SENSITIVITY: 1.5 MV
MDC_IDC_SET_ZONE_DETECTION_INTERVAL: 375 MS
MDC_IDC_SET_ZONE_STATUS: NORMAL
MDC_IDC_SET_ZONE_TYPE: NORMAL
MDC_IDC_SET_ZONE_VENDOR_TYPE: NORMAL
MDC_IDC_STAT_BRADY_DTM_END: NORMAL
MDC_IDC_STAT_BRADY_DTM_START: NORMAL
MDC_IDC_STAT_BRADY_RA_PERCENT_PACED: 0 %
MDC_IDC_STAT_BRADY_RV_PERCENT_PACED: 99 %
MDC_IDC_STAT_CRT_DTM_END: NORMAL
MDC_IDC_STAT_CRT_DTM_START: NORMAL
MDC_IDC_STAT_CRT_LV_PERCENT_PACED: 99 %
MDC_IDC_STAT_EPISODE_RECENT_COUNT: 0
MDC_IDC_STAT_EPISODE_RECENT_COUNT: 0
MDC_IDC_STAT_EPISODE_RECENT_COUNT: 2
MDC_IDC_STAT_EPISODE_RECENT_COUNT_DTM_END: NORMAL
MDC_IDC_STAT_EPISODE_RECENT_COUNT_DTM_START: NORMAL
MDC_IDC_STAT_EPISODE_TYPE: NORMAL
MDC_IDC_STAT_EPISODE_VENDOR_TYPE: NORMAL
MDC_IDC_STAT_EPISODE_VENDOR_TYPE: NORMAL

## 2023-11-08 DIAGNOSIS — E11.22 TYPE 2 DIABETES MELLITUS WITH STAGE 4 CHRONIC KIDNEY DISEASE, WITH LONG-TERM CURRENT USE OF INSULIN (H): ICD-10-CM

## 2023-11-08 DIAGNOSIS — N18.4 TYPE 2 DIABETES MELLITUS WITH STAGE 4 CHRONIC KIDNEY DISEASE, WITH LONG-TERM CURRENT USE OF INSULIN (H): ICD-10-CM

## 2023-11-08 DIAGNOSIS — Z79.4 TYPE 2 DIABETES MELLITUS WITH STAGE 4 CHRONIC KIDNEY DISEASE, WITH LONG-TERM CURRENT USE OF INSULIN (H): ICD-10-CM

## 2023-11-08 RX ORDER — METHYLPREDNISOLONE SODIUM SUCCINATE 125 MG/2ML
INJECTION, POWDER, FOR SOLUTION INTRAMUSCULAR; INTRAVENOUS
Qty: 100 EACH | Refills: 0 | Status: SHIPPED | OUTPATIENT
Start: 2023-11-08 | End: 2023-12-11

## 2023-11-13 ENCOUNTER — MEDICAL CORRESPONDENCE (OUTPATIENT)
Dept: HEALTH INFORMATION MANAGEMENT | Facility: CLINIC | Age: 78
End: 2023-11-13

## 2023-11-14 NOTE — TELEPHONE ENCOUNTER
Second attempt made to contact patient, message left.    Nehal Pacheco RN on 11/14/2023 at 9:36 AM

## 2023-11-21 ENCOUNTER — TELEPHONE (OUTPATIENT)
Dept: CARDIOLOGY | Facility: CLINIC | Age: 78
End: 2023-11-21
Payer: COMMERCIAL

## 2023-11-21 DIAGNOSIS — I50.42 CHRONIC COMBINED SYSTOLIC AND DIASTOLIC HEART FAILURE (H): ICD-10-CM

## 2023-11-21 NOTE — TELEPHONE ENCOUNTER
Alert for NSVT with EGM showing initially 3s run of NSVT and then another 13s run of NSVT with V-rates in the 170's.      Pt does have previous hx of NSVT, but usually averages around 3-4s long at most. Pt takes Metoprolol 25mg daily.    Tried to call pt, but he did not answer.  Left a VM to call clinic back to let us know whether or not he was having symptoms or not.     Underlying Rhythm: AFib with CHB achieved by AVNA with junctional escape at 37    Will forward to Dr. Frank and team

## 2023-11-28 ENCOUNTER — TELEPHONE (OUTPATIENT)
Dept: CARDIOLOGY | Facility: CLINIC | Age: 78
End: 2023-11-28
Payer: COMMERCIAL

## 2023-11-28 DIAGNOSIS — I50.42 CHRONIC COMBINED SYSTOLIC AND DIASTOLIC HEART FAILURE (H): ICD-10-CM

## 2023-11-28 RX ORDER — METOPROLOL SUCCINATE 25 MG/1
25 TABLET, EXTENDED RELEASE ORAL 2 TIMES DAILY
Qty: 180 TABLET | Refills: 3 | Status: SHIPPED | OUTPATIENT
Start: 2023-11-28 | End: 2023-12-04

## 2023-11-28 NOTE — TELEPHONE ENCOUNTER
Per Dr. Frank patient to increase Metoprolol from 25 daily to 25 BID.  Patient states medication list was not accurate and he had only been taking Metoprolol ER 12.5 mg daily.  Provider to advise.    Nehal Pacheco RN on 11/28/2023 at 12:45 PM

## 2023-11-28 NOTE — TELEPHONE ENCOUNTER
Sushila Frank DO Kustrich, Tricia, JIMMIE; Nehal Pacheco, RN  Cc: [facility] Team 9  Caller: Unspecified (1 week ago,  2:40 PM)  Can you have him increase his metoprolol from 25mg once daily to  25mg twice daily     Message left for patient to return call to clinic.  Nehal Pacheco, JIMMIE on 11/28/2023 at 11:47 AM

## 2023-11-28 NOTE — TELEPHONE ENCOUNTER
Discussed recommendations with patient.  Advised daily BP monitoring on increased dose.  Patient instructed to call clinic if SBP consistently below 90 or if having lightheadedness/dizziness.  Patient has verbalized understanding and agreement with plan of care.  Nehal Pacheco RN on 11/28/2023 at 12:06 PM

## 2023-11-28 NOTE — TELEPHONE ENCOUNTER
Health Call Center    Phone Message    May a detailed message be left on voicemail: yes     Reason for Call: Medication Question or concern regarding medication   Prescription Clarification  Name of Medication: metoprolol succinate ER (TOPROL XL) 25 MG 24 hr tablet   Prescribing Provider: Monika   Pharmacy:   [id number] [place], [place] - [address] [facility]      What on the order needs clarification? Patient called requesting to speak with a member of is care team. Patient states he was told by the nurse the doctor wanted to double the medication. Please call patient back to further discuss.    Action Taken: Message routed to:  Other: Cardiology    Travel Screening: Not Applicable    Thank you!  [facility]

## 2023-12-02 ENCOUNTER — HEALTH MAINTENANCE LETTER (OUTPATIENT)
Age: 78
End: 2023-12-02

## 2023-12-04 ENCOUNTER — LAB (OUTPATIENT)
Dept: LAB | Facility: CLINIC | Age: 78
End: 2023-12-04
Payer: COMMERCIAL

## 2023-12-04 DIAGNOSIS — N18.4 TYPE 2 DIABETES MELLITUS WITH STAGE 4 CHRONIC KIDNEY DISEASE, WITH LONG-TERM CURRENT USE OF INSULIN (H): Primary | ICD-10-CM

## 2023-12-04 DIAGNOSIS — E11.22 TYPE 2 DIABETES MELLITUS WITH STAGE 4 CHRONIC KIDNEY DISEASE, WITH LONG-TERM CURRENT USE OF INSULIN (H): Primary | ICD-10-CM

## 2023-12-04 DIAGNOSIS — N18.4 CHRONIC KIDNEY DISEASE, STAGE IV (SEVERE) (H): ICD-10-CM

## 2023-12-04 DIAGNOSIS — E78.5 HYPERLIPIDEMIA LDL GOAL <100: ICD-10-CM

## 2023-12-04 DIAGNOSIS — Z79.4 TYPE 2 DIABETES MELLITUS WITH STAGE 4 CHRONIC KIDNEY DISEASE, WITH LONG-TERM CURRENT USE OF INSULIN (H): ICD-10-CM

## 2023-12-04 DIAGNOSIS — N18.4 TYPE 2 DIABETES MELLITUS WITH STAGE 4 CHRONIC KIDNEY DISEASE, WITH LONG-TERM CURRENT USE OF INSULIN (H): ICD-10-CM

## 2023-12-04 DIAGNOSIS — Z79.4 TYPE 2 DIABETES MELLITUS WITH STAGE 4 CHRONIC KIDNEY DISEASE, WITH LONG-TERM CURRENT USE OF INSULIN (H): Primary | ICD-10-CM

## 2023-12-04 DIAGNOSIS — I50.42 CHRONIC COMBINED SYSTOLIC AND DIASTOLIC HEART FAILURE (H): ICD-10-CM

## 2023-12-04 DIAGNOSIS — E11.22 TYPE 2 DIABETES MELLITUS WITH STAGE 4 CHRONIC KIDNEY DISEASE, WITH LONG-TERM CURRENT USE OF INSULIN (H): ICD-10-CM

## 2023-12-04 DIAGNOSIS — R33.9 RETENTION OF URINE, UNSPECIFIED: ICD-10-CM

## 2023-12-04 DIAGNOSIS — I50.9 HEART FAILURE, UNSPECIFIED (H): ICD-10-CM

## 2023-12-04 LAB
ALBUMIN SERPL BCG-MCNC: 4 G/DL (ref 3.5–5.2)
ALP SERPL-CCNC: 87 U/L (ref 40–150)
ALT SERPL W P-5'-P-CCNC: 9 U/L (ref 0–70)
ANION GAP SERPL CALCULATED.3IONS-SCNC: 13 MMOL/L (ref 7–15)
AST SERPL W P-5'-P-CCNC: 11 U/L (ref 0–45)
BASOPHILS # BLD AUTO: 0 10E3/UL (ref 0–0.2)
BASOPHILS NFR BLD AUTO: 1 %
BILIRUB SERPL-MCNC: 0.3 MG/DL
BUN SERPL-MCNC: 54.8 MG/DL (ref 8–23)
CALCIUM SERPL-MCNC: 9.3 MG/DL (ref 8.8–10.2)
CHLORIDE SERPL-SCNC: 99 MMOL/L (ref 98–107)
CHOLEST SERPL-MCNC: 126 MG/DL
CREAT SERPL-MCNC: 2.95 MG/DL (ref 0.67–1.17)
DEPRECATED HCO3 PLAS-SCNC: 23 MMOL/L (ref 22–29)
EGFRCR SERPLBLD CKD-EPI 2021: 21 ML/MIN/1.73M2
EOSINOPHIL # BLD AUTO: 0.2 10E3/UL (ref 0–0.7)
EOSINOPHIL NFR BLD AUTO: 2 %
ERYTHROCYTE [DISTWIDTH] IN BLOOD BY AUTOMATED COUNT: 15.8 % (ref 10–15)
GLUCOSE SERPL-MCNC: 130 MG/DL (ref 70–99)
HBA1C MFR BLD: 7.2 %
HCT VFR BLD AUTO: 34.6 % (ref 40–53)
HDLC SERPL-MCNC: 34 MG/DL
HGB BLD-MCNC: 10.6 G/DL (ref 13.3–17.7)
IMM GRANULOCYTES # BLD: 0.1 10E3/UL
IMM GRANULOCYTES NFR BLD: 2 %
LDLC SERPL CALC-MCNC: 63 MG/DL
LYMPHOCYTES # BLD AUTO: 1.8 10E3/UL (ref 0.8–5.3)
LYMPHOCYTES NFR BLD AUTO: 20 %
MCH RBC QN AUTO: 30.3 PG (ref 26.5–33)
MCHC RBC AUTO-ENTMCNC: 30.6 G/DL (ref 31.5–36.5)
MCV RBC AUTO: 99 FL (ref 78–100)
MONOCYTES # BLD AUTO: 1 10E3/UL (ref 0–1.3)
MONOCYTES NFR BLD AUTO: 11 %
NEUTROPHILS # BLD AUTO: 5.7 10E3/UL (ref 1.6–8.3)
NEUTROPHILS NFR BLD AUTO: 64 %
NONHDLC SERPL-MCNC: 92 MG/DL
NRBC # BLD AUTO: 0 10E3/UL
NRBC BLD AUTO-RTO: 0 /100
PHOSPHATE SERPL-MCNC: 4.2 MG/DL (ref 2.5–4.5)
PLATELET # BLD AUTO: 405 10E3/UL (ref 150–450)
POTASSIUM SERPL-SCNC: 4.6 MMOL/L (ref 3.4–5.3)
PROT SERPL-MCNC: 7.7 G/DL (ref 6.4–8.3)
RBC # BLD AUTO: 3.5 10E6/UL (ref 4.4–5.9)
SODIUM SERPL-SCNC: 135 MMOL/L (ref 135–145)
TRIGL SERPL-MCNC: 144 MG/DL
WBC # BLD AUTO: 8.8 10E3/UL (ref 4–11)

## 2023-12-04 PROCEDURE — 84100 ASSAY OF PHOSPHORUS: CPT

## 2023-12-04 PROCEDURE — 80061 LIPID PANEL: CPT

## 2023-12-04 PROCEDURE — 82435 ASSAY OF BLOOD CHLORIDE: CPT

## 2023-12-04 PROCEDURE — 83036 HEMOGLOBIN GLYCOSYLATED A1C: CPT

## 2023-12-04 PROCEDURE — 85048 AUTOMATED LEUKOCYTE COUNT: CPT

## 2023-12-04 PROCEDURE — 82947 ASSAY GLUCOSE BLOOD QUANT: CPT

## 2023-12-04 PROCEDURE — 36415 COLL VENOUS BLD VENIPUNCTURE: CPT

## 2023-12-04 PROCEDURE — 85014 HEMATOCRIT: CPT

## 2023-12-04 PROCEDURE — 82374 ASSAY BLOOD CARBON DIOXIDE: CPT

## 2023-12-04 RX ORDER — METOPROLOL SUCCINATE 25 MG/1
12.5 TABLET, EXTENDED RELEASE ORAL 2 TIMES DAILY
Qty: 90 TABLET | Refills: 3 | Status: SHIPPED | OUTPATIENT
Start: 2023-12-04

## 2023-12-04 NOTE — TELEPHONE ENCOUNTER
Sushila Frank DO Lidke, Jen M, RN  Caller: Unspecified (6 days ago, 12:20 PM)  Then increase to 12.5mg twice daily    Spoke with patient and discussed the correct dose change.  Patient has verbalized understanding and will call clinic with any concerns.  Nehal Pacheco, RN on 12/4/2023 at 9:35 AM

## 2023-12-06 ENCOUNTER — TELEPHONE (OUTPATIENT)
Dept: ANTICOAGULATION | Facility: CLINIC | Age: 78
End: 2023-12-06
Payer: COMMERCIAL

## 2023-12-06 DIAGNOSIS — I48.91 NEW ONSET ATRIAL FIBRILLATION (H): Primary | ICD-10-CM

## 2023-12-06 NOTE — TELEPHONE ENCOUNTER
ANTICOAGULATION DIRECT ORAL ANTICOAGULANT MONITORING    SUBJECTIVE     The [facility]agulation Clinic is evaluating Gene Pagan's Apixaban (Eliquis) as part of its Anticoagulation Monitoring Program.    Indication:  Atrial Fibrillation .  Current dose per medication list: Apixaban 5 mg BID  On therapy since: 4/11/20  Recent hospitalizations/ED/Office Visits for bleeding/clotting concerns: No  Other bleeding or side effect concerns: No  Additional findings: none    OBJECTIVE     Age: 78 year old    Wt Readings from Last 2 Encounters:   07/19/23 86.2 kg (190 lb)   03/01/23 94.3 kg (208 lb)      Lab Results   Component Value Date    CR 2.95 (H) 12/04/2023    CR 2.46 (H) 03/01/2023    CR 2.26 (H) 02/17/2022     Lab Results   Component Value Date    HGB 10.6 12/04/2023    HGB 12.3 04/06/2021     12/04/2023     09/22/2020     ASSESSMENT/PLAN     A chart review for Direct Oral Anticoagulant (DOAC) Stewardship has been completed for:     Dosing: recommend adjustment to Apixaban 2.5 mg BID for age >= 80 years and creatinine >= 1.5 mg/dL (consistent with package insert dosing)  Lab monitoring: last creatinine done on 12/4/23    Plan made with [facility] Pharmacist Alisha Domingo RN  Anticoagulation Clinic

## 2023-12-08 NOTE — TELEPHONE ENCOUNTER
Dose adjusted under [facility] approval    Alisha Faustin, PharmD, [facility]  Anticoagulation Clinic

## 2023-12-09 DIAGNOSIS — I48.91 ATRIAL FIBRILLATION WITH RAPID VENTRICULAR RESPONSE (H): ICD-10-CM

## 2023-12-10 DIAGNOSIS — N18.4 TYPE 2 DIABETES MELLITUS WITH STAGE 4 CHRONIC KIDNEY DISEASE, WITH LONG-TERM CURRENT USE OF INSULIN (H): ICD-10-CM

## 2023-12-10 DIAGNOSIS — E11.22 TYPE 2 DIABETES MELLITUS WITH STAGE 4 CHRONIC KIDNEY DISEASE, WITH LONG-TERM CURRENT USE OF INSULIN (H): ICD-10-CM

## 2023-12-10 DIAGNOSIS — Z79.4 TYPE 2 DIABETES MELLITUS WITH STAGE 4 CHRONIC KIDNEY DISEASE, WITH LONG-TERM CURRENT USE OF INSULIN (H): ICD-10-CM

## 2023-12-11 RX ORDER — METHYLPREDNISOLONE SODIUM SUCCINATE 125 MG/2ML
INJECTION, POWDER, FOR SOLUTION INTRAMUSCULAR; INTRAVENOUS
Qty: 100 EACH | Refills: 0 | Status: SHIPPED | OUTPATIENT
Start: 2023-12-11 | End: 2024-06-06

## 2023-12-13 NOTE — TELEPHONE ENCOUNTER
Left message requesting call back  regarding a message from provider. Please route return call to [phone] or send to aman latif.

## 2023-12-14 DIAGNOSIS — Z79.4 TYPE 2 DIABETES MELLITUS WITH STAGE 4 CHRONIC KIDNEY DISEASE, WITH LONG-TERM CURRENT USE OF INSULIN (H): ICD-10-CM

## 2023-12-14 DIAGNOSIS — N18.4 TYPE 2 DIABETES MELLITUS WITH STAGE 4 CHRONIC KIDNEY DISEASE, WITH LONG-TERM CURRENT USE OF INSULIN (H): ICD-10-CM

## 2023-12-14 DIAGNOSIS — E11.22 TYPE 2 DIABETES MELLITUS WITH STAGE 4 CHRONIC KIDNEY DISEASE, WITH LONG-TERM CURRENT USE OF INSULIN (H): ICD-10-CM

## 2023-12-14 RX ORDER — INSULIN GLARGINE 100 [IU]/ML
13 INJECTION, SOLUTION SUBCUTANEOUS AT BEDTIME
Qty: 15 ML | Refills: 0 | Status: SHIPPED | OUTPATIENT
Start: 2023-12-14 | End: 2024-03-21

## 2023-12-14 NOTE — TELEPHONE ENCOUNTER
ANTICOAGULATION  STEWARDSHIP    Spoke with Gene to review advised dosage change for Apixaban (Eliquis).    Education provided: may split tablets in order to start new dose & finish current supply of medication.    Patient has been wondering about if he needs to decrease his dose because he has noticed some blood in his urine the past couple of weeks.  Advised patient to call PCP's office regarding blood in the urine, denies any other symptoms.    They verbalized understanding of new Eliquis dose/tablet strength  They were notified Rx for new dosage would be sent to preferred pharmacy    Elaine Vazquez RN  [facility] Anticoagulation Clinic

## 2023-12-18 RX ORDER — APIXABAN 5 MG/1
TABLET, FILM COATED ORAL
Qty: 180 TABLET | Refills: 0 | Status: SHIPPED | OUTPATIENT
Start: 2023-12-18 | End: 2023-12-19 | Stop reason: DRUGHIGH

## 2023-12-18 NOTE — TELEPHONE ENCOUNTER
Patient calls and is still waiting for a refill at the new dosage of 1 tablet 2.5 mg twice daily for the Eliquis.  He is now completely out of medication.  Please send to Pharmacy as soon as possible.

## 2024-01-01 NOTE — TELEPHONE ENCOUNTER
CONSULT - LACTATION  Baby Boy Salgado (Kaysha) 1 days male MRN: [id number]    [facility] AN NURSERY Room / Bed: (N)/(N) Encounter: [id number]    Maternal Information     MOTHER:  Poly Salgado  Maternal Age: 26 y.o.  OB History: # 1 - Date: [birth year], Sex: Male, Weight: None, GA: None, Type: Vaginal, Spontaneous, Apgar1: None, Apgar5: None, Living: None, Birth Comments: None    # 2 - Date: , Sex: None, Weight: None, GA: None, Type: None, Apgar1: None, Apgar5: None, Living: None, Birth Comments: None    # 3 - Date: 24, Sex: Male, Weight: 3500 g (7 lb 11.5 oz), GA: 41w0d, Type: Vaginal, Spontaneous, Apgar1: 9, Apgar5: 9, Living: Living, Birth Comments: None   Previouse breast reduction surgery? No    Lactation history:   Has patient previously breast fed: Yes   How long had patient previously breast fed: 24 months   Previous breast feeding complications:       Past Surgical History:   Procedure Laterality Date    SINUS SURGERY         Birth information:  YOB: 2026   Time of birth: 8:00 PM   Sex: male   Delivery type: Vaginal, Spontaneous   Birth Weight: 3500 g (7 lb 11.5 oz)   Percent of Weight Change: 0%     Gestational Age: 41w0d   [unfilled]    Assessment     Breast and nipple assessment:  not assessed, visitors present    [place] Assessment:  not assessed, in NBN for circumcision    Feeding assessment:  feeding well per mom  LATCH:  Latch: Too sleepy or reluctant, no latch achieved   Audible Swallowing: None   Type of Nipple: Inverted   Comfort (Breast/Nipple): Engorged, cracked, bleeding, large blisters or bruises   Hold (Positioning): Full assist, staff holds infant at breast   LATCH Score: 0          Feeding recommendations:  breast feed on demand    Met with Poly who is breastfeeding her baby boy. She reports feeding is going well and has no concerns. She is an experienced breastfeeding mom who  her first for 2 years.  This form can wait for Dr. Lipscomb.  I do not see any recent information to be able to do the forms   Provided the Ready Set Baby and the Discharge Breastfeeding Booklets for personal review.    Encouraged to call for lactation support as needed.    Samuel Rodriguez MA 2024 12:16 PM

## 2024-01-10 ENCOUNTER — LAB (OUTPATIENT)
Dept: LAB | Facility: CLINIC | Age: 79
End: 2024-01-10
Payer: COMMERCIAL

## 2024-01-10 ENCOUNTER — MEDICAL CORRESPONDENCE (OUTPATIENT)
Dept: SCHEDULING | Facility: CLINIC | Age: 79
End: 2024-01-10
Payer: COMMERCIAL

## 2024-01-10 DIAGNOSIS — Z87.440 PERSONAL HISTORY UTI: ICD-10-CM

## 2024-01-10 DIAGNOSIS — R82.90 UNSPECIFIED ABNORMAL FINDINGS IN URINE: ICD-10-CM

## 2024-01-10 LAB
ALBUMIN UR-MCNC: 100 MG/DL
APPEARANCE UR: ABNORMAL
BACTERIA #/AREA URNS HPF: ABNORMAL /HPF
BILIRUB UR QL STRIP: NEGATIVE
COLOR UR AUTO: ABNORMAL
GLUCOSE UR STRIP-MCNC: NEGATIVE MG/DL
HGB UR QL STRIP: ABNORMAL
KETONES UR STRIP-MCNC: NEGATIVE MG/DL
LEUKOCYTE ESTERASE UR QL STRIP: ABNORMAL
NITRATE UR QL: NEGATIVE
PH UR STRIP: 5.5 [PH] (ref 5–7)
RBC URINE: 152 /HPF
SP GR UR STRIP: 1.01 (ref 1–1.03)
UROBILINOGEN UR STRIP-MCNC: NORMAL MG/DL
WBC CLUMPS #/AREA URNS HPF: PRESENT /HPF
WBC URINE: >182 /HPF

## 2024-01-10 PROCEDURE — 81001 URINALYSIS AUTO W/SCOPE: CPT

## 2024-01-10 PROCEDURE — 87086 URINE CULTURE/COLONY COUNT: CPT

## 2024-01-12 LAB — BACTERIA UR CULT: ABNORMAL

## 2024-01-16 ENCOUNTER — HOSPITAL ENCOUNTER (OUTPATIENT)
Dept: ULTRASOUND IMAGING | Facility: CLINIC | Age: 79
Discharge: HOME | End: 2024-01-16
Attending: STUDENT IN AN ORGANIZED HEALTH CARE EDUCATION/TRAINING PROGRAM | Admitting: STUDENT IN AN ORGANIZED HEALTH CARE EDUCATION/TRAINING PROGRAM
Payer: COMMERCIAL

## 2024-01-16 DIAGNOSIS — N18.5 CHRONIC KIDNEY DISEASE, STAGE V (H): ICD-10-CM

## 2024-01-16 PROCEDURE — 76770 US EXAM ABDO BACK WALL COMP: CPT

## 2024-01-19 ENCOUNTER — HOSPITAL ENCOUNTER (EMERGENCY)
Facility: CLINIC | Age: 79
Discharge: HOME | End: 2024-01-19
Attending: PHYSICIAN ASSISTANT | Admitting: PHYSICIAN ASSISTANT
Payer: COMMERCIAL

## 2024-01-19 ENCOUNTER — APPOINTMENT (OUTPATIENT)
Dept: CT IMAGING | Facility: CLINIC | Age: 79
End: 2024-01-19
Attending: PHYSICIAN ASSISTANT
Payer: COMMERCIAL

## 2024-01-19 VITALS
RESPIRATION RATE: 18 BRPM | HEART RATE: 66 BPM | TEMPERATURE: 98.3 F | OXYGEN SATURATION: 99 % | SYSTOLIC BLOOD PRESSURE: 113 MMHG | DIASTOLIC BLOOD PRESSURE: 65 MMHG

## 2024-01-19 DIAGNOSIS — N28.9 LESION OF RIGHT NATIVE KIDNEY: ICD-10-CM

## 2024-01-19 DIAGNOSIS — N13.30 BILATERAL HYDRONEPHROSIS: ICD-10-CM

## 2024-01-19 DIAGNOSIS — Z97.8 FOLEY CATHETER IN PLACE: ICD-10-CM

## 2024-01-19 DIAGNOSIS — N18.31 CHRONIC KIDNEY DISEASE, STAGE 3A (H): ICD-10-CM

## 2024-01-19 LAB
ALBUMIN UR-MCNC: 20 MG/DL
ANION GAP SERPL CALCULATED.3IONS-SCNC: 11 MMOL/L (ref 7–15)
APPEARANCE UR: ABNORMAL
BASOPHILS # BLD AUTO: 0 10E3/UL (ref 0–0.2)
BASOPHILS NFR BLD AUTO: 1 %
BILIRUB UR QL STRIP: NEGATIVE
BUN SERPL-MCNC: 38.8 MG/DL (ref 8–23)
CALCIUM SERPL-MCNC: 9 MG/DL (ref 8.8–10.2)
CHLORIDE SERPL-SCNC: 103 MMOL/L (ref 98–107)
COLOR UR AUTO: YELLOW
CREAT SERPL-MCNC: 2.07 MG/DL (ref 0.67–1.17)
DEPRECATED HCO3 PLAS-SCNC: 24 MMOL/L (ref 22–29)
EGFRCR SERPLBLD CKD-EPI 2021: 32 ML/MIN/1.73M2
EOSINOPHIL # BLD AUTO: 0.3 10E3/UL (ref 0–0.7)
EOSINOPHIL NFR BLD AUTO: 3 %
ERYTHROCYTE [DISTWIDTH] IN BLOOD BY AUTOMATED COUNT: 15.2 % (ref 10–15)
GLUCOSE SERPL-MCNC: 99 MG/DL (ref 70–99)
GLUCOSE UR STRIP-MCNC: NEGATIVE MG/DL
HCT VFR BLD AUTO: 33.8 % (ref 40–53)
HGB BLD-MCNC: 10.7 G/DL (ref 13.3–17.7)
HGB UR QL STRIP: NEGATIVE
HYALINE CASTS: 1 /LPF
IMM GRANULOCYTES # BLD: 0.1 10E3/UL
IMM GRANULOCYTES NFR BLD: 1 %
KETONES UR STRIP-MCNC: NEGATIVE MG/DL
LEUKOCYTE ESTERASE UR QL STRIP: ABNORMAL
LYMPHOCYTES # BLD AUTO: 1.7 10E3/UL (ref 0.8–5.3)
LYMPHOCYTES NFR BLD AUTO: 21 %
MCH RBC QN AUTO: 30.3 PG (ref 26.5–33)
MCHC RBC AUTO-ENTMCNC: 31.7 G/DL (ref 31.5–36.5)
MCV RBC AUTO: 96 FL (ref 78–100)
MONOCYTES # BLD AUTO: 0.7 10E3/UL (ref 0–1.3)
MONOCYTES NFR BLD AUTO: 9 %
MUCOUS THREADS #/AREA URNS LPF: PRESENT /LPF
NEUTROPHILS # BLD AUTO: 5.2 10E3/UL (ref 1.6–8.3)
NEUTROPHILS NFR BLD AUTO: 65 %
NITRATE UR QL: NEGATIVE
NRBC # BLD AUTO: 0 10E3/UL
NRBC BLD AUTO-RTO: 0 /100
PH UR STRIP: 5 [PH] (ref 5–7)
PLATELET # BLD AUTO: 285 10E3/UL (ref 150–450)
POTASSIUM SERPL-SCNC: 3.8 MMOL/L (ref 3.4–5.3)
RBC # BLD AUTO: 3.53 10E6/UL (ref 4.4–5.9)
RBC URINE: 8 /HPF
SODIUM SERPL-SCNC: 138 MMOL/L (ref 135–145)
SP GR UR STRIP: 1.01 (ref 1–1.03)
SQUAMOUS EPITHELIAL: 2 /HPF
UROBILINOGEN UR STRIP-MCNC: NORMAL MG/DL
WBC # BLD AUTO: 8 10E3/UL (ref 4–11)
WBC URINE: 88 /HPF

## 2024-01-19 PROCEDURE — 85004 AUTOMATED DIFF WBC COUNT: CPT | Performed by: PHYSICIAN ASSISTANT

## 2024-01-19 PROCEDURE — 36415 COLL VENOUS BLD VENIPUNCTURE: CPT | Performed by: PHYSICIAN ASSISTANT

## 2024-01-19 PROCEDURE — 87086 URINE CULTURE/COLONY COUNT: CPT | Performed by: STUDENT IN AN ORGANIZED HEALTH CARE EDUCATION/TRAINING PROGRAM

## 2024-01-19 PROCEDURE — 74176 CT ABD & PELVIS W/O CONTRAST: CPT

## 2024-01-19 PROCEDURE — 51702 INSERT TEMP BLADDER CATH: CPT

## 2024-01-19 PROCEDURE — 80048 BASIC METABOLIC PNL TOTAL CA: CPT | Performed by: PHYSICIAN ASSISTANT

## 2024-01-19 PROCEDURE — 99284 EMERGENCY DEPT VISIT MOD MDM: CPT | Mod: 25

## 2024-01-19 PROCEDURE — 81001 URINALYSIS AUTO W/SCOPE: CPT | Performed by: STUDENT IN AN ORGANIZED HEALTH CARE EDUCATION/TRAINING PROGRAM

## 2024-01-19 PROCEDURE — 250N000009 HC RX 250: Performed by: PHYSICIAN ASSISTANT

## 2024-01-19 RX ORDER — LIDOCAINE HYDROCHLORIDE 20 MG/ML
JELLY TOPICAL
Status: COMPLETED | OUTPATIENT
Start: 2024-01-19 | End: 2024-01-19

## 2024-01-19 RX ADMIN — LIDOCAINE HYDROCHLORIDE: 20 JELLY TOPICAL at 14:58

## 2024-01-19 ASSESSMENT — ACTIVITIES OF DAILY LIVING (ADL)
ADLS_ACUITY_SCORE: 33
ADLS_ACUITY_SCORE: 35
ADLS_ACUITY_SCORE: 35

## 2024-01-19 NOTE — ED TRIAGE NOTES
"Pt reports that he had an US done on his kidneys on Tuesday and they called pt to come to the ED due to \"too much fluid in there\" PT reports that he feels he is urinating normal but concerned due to frequent infections. VSS and ABC's intact        "

## 2024-01-19 NOTE — ED PROVIDER NOTES
History     Chief Complaint:  Urinary Retention       HPI   Gene Pagan is a 78 year old male who has a history of chronic kidney disease, A-fib, BPH, chronic diastolic CHF, type 2 diabetes, anemia who presents with concerns regarding bilateral hydronephrosis that was diagnosed with ultrasound imaging through his nephrologist on 1/16/2024.  Patient is unsure of the exact reason for the ultrasound he thinks it may be because of recent urinary tract infection he was diagnosed with.  I know on 1/10/2024 he had a positive urine culture that grew out Aerococcus Urinae.  Patient reports he was treated with antibiotics and finished a few days ago.  He reports that his symptoms have resolved in regards to the dysuria increased frequency.  He denies any fevers abdominal pain flank pain.  He reports he had some blood in his urine with a UTI but this is now resolved.  He does have a remote history of kidney stones.  He is in no pain at this time.  He denies any fevers.  Last nephrologist note available [facility] 1/5/2024 she reports creatinine has increased.    Patient reports he was sent to the emergency department for draining the kidney.  He reports that he feels he can produce urine and a stream.  He is unsure if he can fully empty his bladder or not he assumes he cannot since he has fluid in his kidneys.  However he does not feel like he cannot produce a stream.  Patient was able to urinate and provide a urine sample prior to my evaluation.        US Renal Complete Non-Vascular [[id number]] Resulted: 01/17/24 1209   Order Status: Completed Updated: 01/17/24 1211   Narrative:     ULTRASOUND RENAL COMPLETE NON-VASCULAR January 16, 2024 3:55 PM    CLINICAL HISTORY: Chronic kidney disease, stage V (H).    TECHNIQUE: Routine Bilateral Renal and Bladder Ultrasound.    COMPARISON: March 28, 2020.    FINDINGS:    RIGHT KIDNEY: 11.3 x 5.5 x 5.7 cm. Moderate to marked hydronephrosis.  No cysts.    LEFT KIDNEY: 11.6 x  5.9 x 5.0 cm. Marked hydronephrosis. No cysts.    BLADDER: Unremarkable given its level of distention, patient could not  void to evaluate for postvoid residual.   Impression:     IMPRESSION: Bilateral hydronephrosis.    BIJAN BARNES MD       Independent Historian:        Review of External Notes:  Nephrology Visit - 1/5/24    Medications:    apixaban ANTICOAGULANT (ELIQUIS) 2.5 MG tablet  Ascorbic Acid (VITAMIN C PO)  bumetanide (BUMEX) 0.5 MG tablet  bumetanide (BUMEX) 1 MG tablet  Ferrous Sulfate (IRON) 325 (65 Fe) MG tablet  LANTUS SOLOSTAR 100 UNIT/ML soln  melatonin 3 MG tablet  metoprolol succinate ER (TOPROL XL) 25 MG 24 hr tablet  Multiple Vitamins-Minerals (MULTIVITAMIN ADULT PO)  ONETOUCH ULTRA test strip  tamsulosin (FLOMAX) 0.4 MG capsule  ULTICARE SHORT 31G X 8 MM insulin pen needle  Vitamin D3 (CHOLECALCIFEROL) 125 MCG (5000 UT) tablet  zinc 50 MG TABS        Past Medical History:    Past Medical History:   Diagnosis Date    Anemia     Atrial fibrillation     BPH (benign prostatic hyperplasia)     Chronic diastolic CHF     Chronic kidney disease (CKD), stage III (moderate) (H)     Diabetes mellitus - type 2     Palpitations     Systolic CHF (H)        Past Surgical History:    Past Surgical History:   Procedure Laterality Date    ANESTHESIA CARDIOVERSION N/A 4/6/2020    Procedure: ANESTHESIA, FOR CARDIOVERSION;  Surgeon: GENERIC ANESTHESIA PROVIDER;  Location: [facility] OR    CYSTOSCOPY      EP PACEMAKER N/A 4/10/2020    Procedure: EP Pacemaker;  Surgeon: Abhay Willams MD;  Location:  [facility] CATH LAB    [facility]  09/25/2020    Done in Urology office with Dr Perez          Physical Exam   Patient Vitals for the past 24 hrs:   BP Temp Temp src Pulse Resp SpO2   01/19/24 1545 113/65 -- -- 66 -- 99 %   01/19/24 1530 112/66 -- -- 70 -- 98 %   01/19/24 1515 117/64 -- -- 70 -- 96 %   01/19/24 1050 133/87 98.3  F (36.8  C) Temporal 71 18 99 %      Bladder scan- pre (626ml) and post void  (575ml)      Physical Exam  Vitals and nursing note reviewed.   Constitutional:       Appearance: He is not diaphoretic.   HENT:      Mouth/Throat:      Mouth: Mucous membranes are moist.      Pharynx: Oropharynx is clear.   Eyes:      General: No scleral icterus.     Conjunctiva/sclera: Conjunctivae normal.   Cardiovascular:      Rate and Rhythm: Normal rate and regular rhythm.      Heart sounds: Normal heart sounds. No murmur heard.     No friction rub. No gallop.   Pulmonary:      Effort: Pulmonary effort is normal. No respiratory distress.      Breath sounds: No stridor. No wheezing or rhonchi.   Abdominal:      General: There is no distension.      Tenderness: There is no abdominal tenderness. There is no right CVA tenderness, left CVA tenderness, guarding or rebound.   Skin:     General: Skin is warm.      Coloration: Skin is not jaundiced.   Neurological:      Mental Status: He is alert and oriented to person, place, and time. Mental status is at baseline.   Psychiatric:         Mood and Affect: Mood normal.         Behavior: Behavior normal.         Thought Content: Thought content normal.         Emergency Department Course     Imaging:  Abd/pelvis CT no contrast - Stone Protocol   Final Result   IMPRESSION:    1.  Bilateral severe hydroureteronephrosis to the level of the   ureterovesical junction, and moderately distended urinary bladder. No   stone or obstructing lesion within the limitations of noncontrast CT.   Findings likely reflect outlet obstruction.   2.  Since 2020, mild increased in size indeterminate right superior   pole 3.3 cm lesion. Recommend renal CT or MRI to exclude underlying   solid lesion, although this could reflect proteinaceous/hemorrhagic   cyst.   3.  Moderately enlarged prostate.      DESHAUN CAREY MD            SYSTEM ID:  [id number]        Report per radiology    Laboratory:  Labs Ordered and Resulted from Time of ED Arrival to Time of ED Departure   ROUTINE UA WITH  MICROSCOPIC REFLEX TO CULTURE - Abnormal       Result Value    Color Urine Yellow      Appearance Urine Slightly Cloudy (*)     Glucose Urine Negative      Bilirubin Urine Negative      Ketones Urine Negative      Specific Gravity Urine 1.014      Blood Urine Negative      pH Urine 5.0      Protein Albumin Urine 20 (*)     Urobilinogen Urine Normal      Nitrite Urine Negative      Leukocyte Esterase Urine Large (*)     Mucus Urine Present (*)     RBC Urine 8 (*)     WBC Urine 88 (*)     Squamous Epithelials Urine 2 (*)     Hyaline Casts Urine 1     BASIC METABOLIC PANEL - Abnormal    Sodium 138      Potassium 3.8      Chloride 103      Carbon Dioxide (CO2) 24      Anion Gap 11      Urea Nitrogen 38.8 (*)     Creatinine 2.07 (*)     GFR Estimate 32 (*)     Calcium 9.0      Glucose 99     CBC WITH PLATELETS AND DIFFERENTIAL - Abnormal    WBC Count 8.0      RBC Count 3.53 (*)     Hemoglobin 10.7 (*)     Hematocrit 33.8 (*)     MCV 96      MCH 30.3      MCHC 31.7      RDW 15.2 (*)     Platelet Count 285      % Neutrophils 65      % Lymphocytes 21      % Monocytes 9      % Eosinophils 3      % Basophils 1      % Immature Granulocytes 1      NRBCs per 100 WBC 0      Absolute Neutrophils 5.2      Absolute Lymphocytes 1.7      Absolute Monocytes 0.7      Absolute Eosinophils 0.3      Absolute Basophils 0.0      Absolute Immature Granulocytes 0.1      Absolute NRBCs 0.0     URINE CULTURE        Procedures       Emergency Department Course & Assessments:             Interventions:  Medications   lidocaine (XYLOCAINE) 2 % external gel ( Urethral $Given 1/19/24 1451)        Independent Interpretation (X-rays, CTs, rhythm strip):  None    Consultations/Discussion of Management or Tests:  None  ED Course as of 01/19/24 1927 Fri Jan 19, 2024   1255 Spoke with Fernanda Light the patient nephrology PA.  She reports she was she treated the patient with Augmentin and due to the culture results.  She is concerned over the last 2 months  that his creatinine has increased from 2.4-4 and he has a history of outlet obstruction.  She sent him here to get urinary catheter decompression but she is okay with doing noncontrast CT imaging to rule out stone.  We did discuss UA findings today compared to last time he still has pyuria we will consider discussing with pharmacy to place him on additional antibiotics.  She also would like us to check basic metabolic panel as well which we can do.   1404 Spoke with Dr. Andersen of nephrology.  He recommends continuing with urinary Apollo catheter placement.  He does not feel the patient requires any antibiotics and would wait for culture.  He also recommends contacting urology for close urology follow-up.   Spoke with Laxmi Calles PA-C, Urology. She will help facilitate out patient urology follow up. She agrees with urine waiting for urine culture before starting antibiotics again given the lat of UTI symptoms.    Social Determinants of Health affecting care:       Disposition:  The patient was discharged to home.     Impression & Plan    CMS Diagnoses: None        Medical Decision Making:  This is an 78-year-old male who presents with concerns regarding urinary outlet obstruction in the setting of recent increasing serum creatinines with underlying chronic kidney disease.  Patient still able to urinate however with recent renal ultrasound showing bilateral hydronephrosis there was concern by his nephrology team that he would need urinary decompression with urinary catheter.  Fortunately his serum creatinine and GFR have improved.  Patient reports finishing recent treatment with Augmentin for urinary tract infection and the symptoms of dysuria frequency and hematuria have resolved.  I did opt for a noncontrast CT scan showing no evidence of stones without any obstructing renal mass but does show bilateral hydronephrosis.  Suspicion for urinary outlet syndrome obstruction.  UA shows improvement from previous UA with  decreased white blood WBC.  He still has what some white blood cells still in the urine.  I spoke with Fernanda Light and subsequently Dr. Andersen, nephrology team who do not feel he requires any additional antibiotics and would recommend waiting for culture.  They still would like to proceed with catheter placement even in the setting of improved serum creatinine.  They also recommend I get in touch with urology team.  Spoke with urology ALPHONSO Lawton who agrees with urinary catheterization and waiting for culture results before additional antibiotics.  If culture results come back positive consider additional treatment with antibiotics considering the patient's underlying chronic kidney disease and GFR level.  Patient tolerated urinary catheter placement he will follow-up closely with urology who will help facilitate an appointment early next week to discuss further treatment and cares regarding outlet obstruction.  As noted on CT incidental finding of renal mass which is slightly increased recommend outpatient follow-up and consider additional testing.  I will leave this up to his nephrology and urology teams to decide regarding further workup.  At this time the patient will be discharged home return precautions were discussed including development of fevers, hematuria, pain, dysfunction of the urinary catheter or worsening condition.      Diagnosis:    ICD-10-CM    1. Bilateral hydronephrosis  N13.30       2. Chronic kidney disease, stage 3a (H)  N18.31       3. Mckenzie catheter in place  Z97.8       4. Lesion of right native kidney  N28.9            Discharge Medications:  Discharge Medication List as of 1/19/2024  3:47 PM           1/19/2024   Hugo Salazar PA-C Kruger, Jacob C, PA-C  01/19/24 1933

## 2024-01-20 LAB — BACTERIA UR CULT: NO GROWTH

## 2024-01-23 ENCOUNTER — OFFICE VISIT (OUTPATIENT)
Dept: UROLOGY | Facility: CLINIC | Age: 79
End: 2024-01-23
Payer: COMMERCIAL

## 2024-01-23 VITALS
DIASTOLIC BLOOD PRESSURE: 64 MMHG | HEIGHT: 66 IN | WEIGHT: 179 LBS | SYSTOLIC BLOOD PRESSURE: 128 MMHG | BODY MASS INDEX: 28.77 KG/M2

## 2024-01-23 DIAGNOSIS — N40.1 BENIGN PROSTATIC HYPERPLASIA WITH URINARY RETENTION: Primary | ICD-10-CM

## 2024-01-23 DIAGNOSIS — R33.8 BENIGN PROSTATIC HYPERPLASIA WITH URINARY RETENTION: Primary | ICD-10-CM

## 2024-01-23 PROCEDURE — 99204 OFFICE O/P NEW MOD 45 MIN: CPT | Performed by: NURSE PRACTITIONER

## 2024-01-23 RX ORDER — FINASTERIDE 5 MG/1
5 TABLET, FILM COATED ORAL DAILY
Qty: 90 TABLET | Refills: 3 | Status: SHIPPED | OUTPATIENT
Start: 2024-01-23

## 2024-01-23 ASSESSMENT — PAIN SCALES - GENERAL: PAINLEVEL: NO PAIN (0)

## 2024-01-23 NOTE — PROGRESS NOTES
Urology Outpatient Visit    Name: Gene Pagan    MRN: [id number]   YOB: 1945               Chief Complaint:   Hydronephrosis          Impression and Plan:   Impression / Plan:   Gene Pagan is a 78 year old male with history of BPH on Flomax s/p Rezum (2020)      It was my pleasure to meet with Mr. Pagan in clinic today to discuss his lower urinary tract symptoms. Patient presentation is not consistent with prostate cancer, UTI, urinary obstruction, prostatitis, neurogenic bladder, diabetes, nor diuretic related. I explained that his lower urinary tract symptoms are likely secondary to benign prostatic hyperplasia (BPH). We reviewed management options including additional management with finasteride, CIC, chronic indwelling, and repeat procedure as he likely has significant prostatic regrowth at this point in time.     After shared decision making process patient elects to start finasteride while maintaining indwelling baker while we wait for this medication to reach therapeutic potential (approximately 6 months).    Plan to initiate finasteride. Discussed with patient that this medication works by shrinking the size of the prostate. Discussed monitoring for signs of severe allergic reaction, including hives, trouble breathing/talking, and to seek care immediately if these symptoms develop. Discussed potential side effects including gynecomastia, nipple discharge, dizziness related to orthostatic hypotension.     Will continue Flomax.    We will arrange for monthly catheter exchanges and plan for TOV in 6 months. If patient is still retaining a significant amount of urine at that time we will plan to schedule for cystoscopy for further evaluation and consideration of repeat procedure.    Thank you for the opportunity to participate in the care of Gene Pagan.     LUIS Borja, CNP  [facility] - Department of Urology  [phone]          History of Present Illness:    Gene Pagan is a 78 year old male with chronic kidney disease, A-fib, BPH on Flomax, chronic diastolic CHF, type 2 diabetes. He is status post a REZUM procedure on 9/25/2020. He presented to the ED with concerns regarding bilateral hydronephrosis that was diagnosed with ultrasound imaging through his nephrologist on 1/16/2024. Of note he did have a UTI diagnosed 1/10/2024 when he had a positive urine culture that grew out Aerococcus Urinae, he was treated with PO Augmentin. In the ED her underwent CT which demonstrated bilateral severe hydroureteronephrosis to the level of the ureterovesical junction, and moderately distended urinary bladder. Findings likely reflecting underlying ROGERS due to prostatomegaly. He was discharged with baker in place and here today for follow up    History is obtained from patient & EMR    Pertinent imaging reviewed:  CT ABDOMEN AND PELVIS WITHOUT CONTRAST 1/19/2024 1:32 PM     ULTRASOUND RENAL COMPLETE NON-VASCULAR January 16, 2024 3:55 PM           Past Medical History:     Past Medical History:   Diagnosis Date    Anemia     Atrial fibrillation     AV node ablation and pacemaker placement 4/10/2020    BPH (benign prostatic hyperplasia)     Chronic diastolic CHF     Chronic kidney disease (CKD), stage III (moderate) (H)     Diabetes mellitus - type 2     Palpitations     Systolic CHF (H)           Past Surgical History:     Past Surgical History:   Procedure Laterality Date    ANESTHESIA CARDIOVERSION N/A 4/6/2020    Procedure: ANESTHESIA, FOR CARDIOVERSION;  Surgeon: GENERIC ANESTHESIA PROVIDER;  Location: [facility] OR    CYSTOSCOPY      EP PACEMAKER N/A 4/10/2020    Procedure: EP Pacemaker;  Surgeon: Abhay Willams MD;  Location:  [facility] CATH LAB    [facility]  09/25/2020    Done in Urology office with Dr Perez          Social History:     Social History     Tobacco Use    Smoking status: Never    Smokeless tobacco: Never   Substance Use Topics    Alcohol use: Yes     Comment:  Rare          Family History:   No family history on file.       Allergies:   No Known Allergies       Medications:     Current Outpatient Medications   Medication Sig    apixaban ANTICOAGULANT (ELIQUIS) 2.5 MG tablet Take 1 tablet (2.5 mg) by mouth 2 times daily    Ascorbic Acid (VITAMIN C PO) Take 1,000 mg by mouth daily    bumetanide (BUMEX) 0.5 MG tablet Take 1 tablet (0.5 mg) by mouth daily (+ additional 1 mg to = 1.5 mg daily).    bumetanide (BUMEX) 1 MG tablet Take 1 tablet (1 mg) by mouth daily (+ additional 0.5 mg to = 1.5 mg daily).    Ferrous Sulfate (IRON) 325 (65 Fe) MG tablet Take 1 tablet by mouth daily    LANTUS SOLOSTAR 100 UNIT/ML soln Inject 13 Units Subcutaneous At Bedtime    melatonin 3 MG tablet Take 3 mg by mouth nightly as needed    metoprolol succinate ER (TOPROL XL) 25 MG 24 hr tablet Take 0.5 tablets (12.5 mg) by mouth 2 times daily    Multiple Vitamins-Minerals (MULTIVITAMIN ADULT PO) Take 1 tablet by mouth daily    ONETOUCH ULTRA test strip USE TO TEST BLOOD SUGAR ONE TIME DAILY OR AS DIRECTED.    tamsulosin (FLOMAX) 0.4 MG capsule Take 1 capsule (0.4 mg) by mouth At Bedtime    ULTICARE SHORT 31G X 8 MM insulin pen needle use 1 pen daily for injection    Vitamin D3 (CHOLECALCIFEROL) 125 MCG (5000 UT) tablet Take 5,000 Units by mouth daily     zinc 50 MG TABS Take 50 mg by mouth daily     No current facility-administered medications for this visit.     Facility-Administered Medications Ordered in Other Visits   Medication    HYDROcodone-acetaminophen (NORCO) 5-325 MG per tablet 1-2 tablet    Patient is already receiving anticoagulation with heparin, enoxaparin (LOVENOX), warfarin (COUMADIN)  or other anticoagulant medication    polyethylene glycol (MIRALAX) Packet 17 g          Review of Systems:    ROS: See HPI for pertinent details.  Remainder of 10-point ROS negative.         Physical Exam:   VS:  T: Data Unavailable    HR: Data Unavailable    BP: Data Unavailable    RR: Data  "Unavailable     GEN:  Alert.  NAD.   HEENT:  Sclerae anicteric.    CV:  No obvious jugular venous distension.  LUNGS: No respiratory distress, breathing comfortably wo accessory muscle use.  ABD:  ND.     SKIN:  Dry. No visible rashes.   NEURO:  CN grossly intact.           Data:   All laboratory data reviewed:    No results found for: \"PSA\"  Lab Results   Component Value Date    CR 2.07 01/19/2024    CR 2.95 12/04/2023    CR 2.46 03/01/2023    CR 2.26 02/17/2022    CR 2.19 11/04/2021    CR 2.30 04/06/2021    CR 2.27 12/21/2020    CR 2.27 11/12/2020    CR 2.14 10/23/2020    CR 2.61 10/17/2020    CR 2.43 10/10/2020    CR 2.17 09/22/2020    CR 1.91 06/02/2020    CR 1.97 05/29/2020    CR 2.03 05/24/2020     Lab Results   Component Value Date    GFRESTIMATED 32 01/19/2024    GFRESTIMATED 21 12/04/2023    GFRESTIMATED 26 03/01/2023    GFRESTIMATED 29 02/17/2022    GFRESTIMATED 28 11/04/2021    GFRESTIMATED 26 04/06/2021    GFRESTIMATED 27 12/21/2020    GFRESTIMATED 27 11/12/2020    GFRESTIMATED 29 10/23/2020    GFRESTIMATED 23 10/17/2020    GFRESTIMATED 25 10/10/2020    GFRESTIMATED 28 09/22/2020    GFRESTIMATED 34 06/02/2020    GFRESTIMATED 32 05/29/2020    GFRESTIMATED 31 05/24/2020     Color Urine (no units)   Date Value   01/19/2024 Yellow   01/21/2021 Yellow     Appearance Urine (no units)   Date Value   01/19/2024 Slightly Cloudy (A)   01/21/2021 Cloudy     Glucose Urine (mg/dL)   Date Value   01/19/2024 Negative   01/21/2021 Negative     Bilirubin Urine (no units)   Date Value   01/19/2024 Negative   01/21/2021 Negative     Ketones Urine (mg/dL)   Date Value   01/19/2024 Negative   01/21/2021 Negative     Specific Gravity Urine (no units)   Date Value   01/19/2024 1.014   01/21/2021 1.011     pH Urine   Date Value   01/19/2024 5.0   01/21/2021 6.0 pH     Protein Albumin Urine (mg/dL)   Date Value   01/19/2024 20 (A)   01/21/2021 50 (A)     Urobilinogen Urine (EU/dL)   Date Value   09/25/2020 0.2     Nitrite Urine " (no units)   Date Value   01/19/2024 Negative   01/21/2021 Negative     Leukocyte Esterase Urine (no units)   Date Value   01/19/2024 Large (A)   01/21/2021 Large (A)

## 2024-01-23 NOTE — LETTER
1/23/2024       RE: Gene Pagan  [address] [facility] [id number]     Dear Colleague,    Thank you for referring your patient, Gene Pagan, to the [facility] [place] at [facility]. Please see a copy of my visit note below.      Urology Outpatient Visit    Name: Gene Pagan    MRN: [id number]   YOB: 1945               Chief Complaint:   Hydronephrosis          Impression and Plan:   Impression / Plan:   Gene Pagan is a 78 year old male with history of BPH on Flomax s/p Rezum (2020)      It was my pleasure to meet with Mr. Pagan in clinic today to discuss his lower urinary tract symptoms. Patient presentation is not consistent with prostate cancer, UTI, urinary obstruction, prostatitis, neurogenic bladder, diabetes, nor diuretic related. I explained that his lower urinary tract symptoms are likely secondary to benign prostatic hyperplasia (BPH). We reviewed management options including additional management with finasteride, CIC, chronic indwelling, and repeat procedure as he likely has significant prostatic regrowth at this point in time.     After shared decision making process patient elects to start finasteride while maintaining indwelling baker while we wait for this medication to reach therapeutic potential (approximately 6 months).    Plan to initiate finasteride. Discussed with patient that this medication works by shrinking the size of the prostate. Discussed monitoring for signs of severe allergic reaction, including hives, trouble breathing/talking, and to seek care immediately if these symptoms develop. Discussed potential side effects including gynecomastia, nipple discharge, dizziness related to orthostatic hypotension.     Will continue Flomax.    We will arrange for monthly catheter exchanges and plan for TOV in 6 months. If patient is still retaining a significant amount of urine  at that time we will plan to schedule for cystoscopy for further evaluation and consideration of repeat procedure.    Thank you for the opportunity to participate in the care of Gene Pagan.     LUIS Borja, CNP  [facility] - Department of Urology  [phone]          History of Present Illness:   Gene Pagan is a 78 year old male with chronic kidney disease, A-fib, BPH on Flomax, chronic diastolic CHF, type 2 diabetes. He is status post a REZUM procedure on 9/25/2020. He presented to the ED with concerns regarding bilateral hydronephrosis that was diagnosed with ultrasound imaging through his nephrologist on 1/16/2024. Of note he did have a UTI diagnosed 1/10/2024 when he had a positive urine culture that grew out Aerococcus Urinae, he was treated with PO Augmentin. In the ED her underwent CT which demonstrated bilateral severe hydroureteronephrosis to the level of the ureterovesical junction, and moderately distended urinary bladder. Findings likely reflecting underlying ROGERS due to prostatomegaly. He was discharged with baker in place and here today for follow up    History is obtained from patient & EMR    Pertinent imaging reviewed:  CT ABDOMEN AND PELVIS WITHOUT CONTRAST 1/19/2024 1:32 PM     ULTRASOUND RENAL COMPLETE NON-VASCULAR January 16, 2024 3:55 PM           Past Medical History:     Past Medical History:   Diagnosis Date    Anemia     Atrial fibrillation     AV node ablation and pacemaker placement 4/10/2020    BPH (benign prostatic hyperplasia)     Chronic diastolic CHF     Chronic kidney disease (CKD), stage III (moderate) (H)     Diabetes mellitus - type 2     Palpitations     Systolic CHF (H)           Past Surgical History:     Past Surgical History:   Procedure Laterality Date    ANESTHESIA CARDIOVERSION N/A 4/6/2020    Procedure: ANESTHESIA, FOR CARDIOVERSION;  Surgeon: GENERIC ANESTHESIA PROVIDER;  Location: [facility] OR    CYSTOSCOPY      EP PACEMAKER N/A 4/10/2020    Procedure:  EP Pacemaker;  Surgeon: Abhay Willams MD;  Location:  [facility]    CYNDI  09/25/2020    Done in Urology office with Dr Perez          Social History:     Social History     Tobacco Use    Smoking status: Never    Smokeless tobacco: Never   Substance Use Topics    Alcohol use: Yes     Comment: Rare          Family History:   No family history on file.       Allergies:   No Known Allergies       Medications:     Current Outpatient Medications   Medication Sig    apixaban ANTICOAGULANT (ELIQUIS) 2.5 MG tablet Take 1 tablet (2.5 mg) by mouth 2 times daily    Ascorbic Acid (VITAMIN C PO) Take 1,000 mg by mouth daily    bumetanide (BUMEX) 0.5 MG tablet Take 1 tablet (0.5 mg) by mouth daily (+ additional 1 mg to = 1.5 mg daily).    bumetanide (BUMEX) 1 MG tablet Take 1 tablet (1 mg) by mouth daily (+ additional 0.5 mg to = 1.5 mg daily).    Ferrous Sulfate (IRON) 325 (65 Fe) MG tablet Take 1 tablet by mouth daily    LANTUS SOLOSTAR 100 UNIT/ML soln Inject 13 Units Subcutaneous At Bedtime    melatonin 3 MG tablet Take 3 mg by mouth nightly as needed    metoprolol succinate ER (TOPROL XL) 25 MG 24 hr tablet Take 0.5 tablets (12.5 mg) by mouth 2 times daily    Multiple Vitamins-Minerals (MULTIVITAMIN ADULT PO) Take 1 tablet by mouth daily    ONETOUCH ULTRA test strip USE TO TEST BLOOD SUGAR ONE TIME DAILY OR AS DIRECTED.    tamsulosin (FLOMAX) 0.4 MG capsule Take 1 capsule (0.4 mg) by mouth At Bedtime    ULTICARE SHORT 31G X 8 MM insulin pen needle use 1 pen daily for injection    Vitamin D3 (CHOLECALCIFEROL) 125 MCG (5000 UT) tablet Take 5,000 Units by mouth daily     zinc 50 MG TABS Take 50 mg by mouth daily     No current facility-administered medications for this visit.     Facility-Administered Medications Ordered in Other Visits   Medication    HYDROcodone-acetaminophen (NORCO) 5-325 MG per tablet 1-2 tablet    Patient is already receiving anticoagulation with heparin, enoxaparin (LOVENOX), warfarin  "(COUMADIN)  or other anticoagulant medication    polyethylene glycol (MIRALAX) Packet 17 g          Review of Systems:    ROS: See HPI for pertinent details.  Remainder of 10-point ROS negative.         Physical Exam:   VS:  T: Data Unavailable    HR: Data Unavailable    BP: Data Unavailable    RR: Data Unavailable     GEN:  Alert.  NAD.   HEENT:  Sclerae anicteric.    CV:  No obvious jugular venous distension.  LUNGS: No respiratory distress, breathing comfortably wo accessory muscle use.  ABD:  ND.     SKIN:  Dry. No visible rashes.   NEURO:  CN grossly intact.           Data:   All laboratory data reviewed:    No results found for: \"PSA\"  Lab Results   Component Value Date    CR 2.07 01/19/2024    CR 2.95 12/04/2023    CR 2.46 03/01/2023    CR 2.26 02/17/2022    CR 2.19 11/04/2021    CR 2.30 04/06/2021    CR 2.27 12/21/2020    CR 2.27 11/12/2020    CR 2.14 10/23/2020    CR 2.61 10/17/2020    CR 2.43 10/10/2020    CR 2.17 09/22/2020    CR 1.91 06/02/2020    CR 1.97 05/29/2020    CR 2.03 05/24/2020     Lab Results   Component Value Date    GFRESTIMATED 32 01/19/2024    GFRESTIMATED 21 12/04/2023    GFRESTIMATED 26 03/01/2023    GFRESTIMATED 29 02/17/2022    GFRESTIMATED 28 11/04/2021    GFRESTIMATED 26 04/06/2021    GFRESTIMATED 27 12/21/2020    GFRESTIMATED 27 11/12/2020    GFRESTIMATED 29 10/23/2020    GFRESTIMATED 23 10/17/2020    GFRESTIMATED 25 10/10/2020    GFRESTIMATED 28 09/22/2020    GFRESTIMATED 34 06/02/2020    GFRESTIMATED 32 05/29/2020    GFRESTIMATED 31 05/24/2020     Color Urine (no units)   Date Value   01/19/2024 Yellow   01/21/2021 Yellow     Appearance Urine (no units)   Date Value   01/19/2024 Slightly Cloudy (A)   01/21/2021 Cloudy     Glucose Urine (mg/dL)   Date Value   01/19/2024 Negative   01/21/2021 Negative     Bilirubin Urine (no units)   Date Value   01/19/2024 Negative   01/21/2021 Negative     Ketones Urine (mg/dL)   Date Value   01/19/2024 Negative   01/21/2021 Negative "     Specific Gravity Urine (no units)   Date Value   01/19/2024 1.014   01/21/2021 1.011     pH Urine   Date Value   01/19/2024 5.0   01/21/2021 6.0 pH     Protein Albumin Urine (mg/dL)   Date Value   01/19/2024 20 (A)   01/21/2021 50 (A)     Urobilinogen Urine (EU/dL)   Date Value   09/25/2020 0.2     Nitrite Urine (no units)   Date Value   01/19/2024 Negative   01/21/2021 Negative     Leukocyte Esterase Urine (no units)   Date Value   01/19/2024 Large (A)   01/21/2021 Large (A)

## 2024-01-23 NOTE — NURSING NOTE
Chief Complaint   Patient presents with    Urinary Retention     Pt has catheter in still.    Aydee Thakur, EMT

## 2024-01-26 ENCOUNTER — LAB (OUTPATIENT)
Dept: LAB | Facility: CLINIC | Age: 79
End: 2024-01-26
Payer: COMMERCIAL

## 2024-01-26 ENCOUNTER — TELEPHONE (OUTPATIENT)
Dept: UROLOGY | Facility: CLINIC | Age: 79
End: 2024-01-26

## 2024-01-26 DIAGNOSIS — R82.81 PUS IN URINE: ICD-10-CM

## 2024-01-26 DIAGNOSIS — N39.0 URINARY TRACT INFECTION: ICD-10-CM

## 2024-01-26 DIAGNOSIS — R30.0 DYSURIA: ICD-10-CM

## 2024-01-26 DIAGNOSIS — R30.0 DYSURIA: Primary | ICD-10-CM

## 2024-01-26 LAB
ALBUMIN UR-MCNC: 100 MG/DL
APPEARANCE UR: ABNORMAL
BILIRUB UR QL STRIP: NEGATIVE
COLOR UR AUTO: YELLOW
GLUCOSE UR STRIP-MCNC: NEGATIVE MG/DL
HGB UR QL STRIP: ABNORMAL
KETONES UR STRIP-MCNC: NEGATIVE MG/DL
LEUKOCYTE ESTERASE UR QL STRIP: ABNORMAL
NITRATE UR QL: NEGATIVE
PH UR STRIP: 7 [PH] (ref 5–7)
SP GR UR STRIP: 1.01 (ref 1–1.03)
UROBILINOGEN UR STRIP-MCNC: NORMAL MG/DL

## 2024-01-26 PROCEDURE — 87186 SC STD MICRODIL/AGAR DIL: CPT

## 2024-01-26 PROCEDURE — 81003 URINALYSIS AUTO W/O SCOPE: CPT | Mod: QW

## 2024-01-26 PROCEDURE — 87086 URINE CULTURE/COLONY COUNT: CPT

## 2024-01-26 RX ORDER — CEPHALEXIN 500 MG/1
500 CAPSULE ORAL 2 TIMES DAILY
Qty: 14 CAPSULE | Refills: 0 | Status: SHIPPED | OUTPATIENT
Start: 2024-01-26 | End: 2024-02-02

## 2024-01-26 NOTE — TELEPHONE ENCOUNTER
Spoke with patient, he's been having pyuria, irritation and burning at the tip of the penis where his baker catheter exits.  Patient is also having intermittent bladder spasms.  Patient denies fever/chills.  Patient will leave UA/UC today at [facility].  Will await results.  Patient states understanding.    Concha Deleon RN, BSN  Care Coordinator  [facility]

## 2024-01-26 NOTE — TELEPHONE ENCOUNTER
[facility] Call Center    Phone Message    May a detailed message be left on voicemail: yes     Reason for Call: Patient would like to talk to Gonzalez Simmons regarding rrtitation in the urine and also burns him while urinating. Please call pt back for further discussion. Thank you.    Action Taken: [facility] UROLOGIC PHY CHAMBERLAIN    Travel Screening: Not Applicable

## 2024-01-26 NOTE — TELEPHONE ENCOUNTER
Spoke with patient went over UA results and recommendation from Miguel Gonzalez CNP to place patient on Keflex 500 mg BID x 7 days while we await patients UC results.  Rx sent to patients preferred pharmacy.  Patient states understanding.    Concha Deleon, RN, BSN  Care Coordinator  [facility]

## 2024-01-27 LAB — BACTERIA UR CULT: ABNORMAL

## 2024-02-06 DIAGNOSIS — R30.0 DYSURIA: Primary | ICD-10-CM

## 2024-02-07 ENCOUNTER — LAB (OUTPATIENT)
Dept: LAB | Facility: CLINIC | Age: 79
End: 2024-02-07
Payer: COMMERCIAL

## 2024-02-07 DIAGNOSIS — R30.0 DYSURIA: ICD-10-CM

## 2024-02-07 LAB
ALBUMIN UR-MCNC: 100 MG/DL
APPEARANCE UR: ABNORMAL
BILIRUB UR QL STRIP: NEGATIVE
COLOR UR AUTO: YELLOW
GLUCOSE UR STRIP-MCNC: NEGATIVE MG/DL
HGB UR QL STRIP: ABNORMAL
KETONES UR STRIP-MCNC: NEGATIVE MG/DL
LEUKOCYTE ESTERASE UR QL STRIP: ABNORMAL
NITRATE UR QL: NEGATIVE
PH UR STRIP: 8 [PH] (ref 5–7)
SP GR UR STRIP: 1.01 (ref 1–1.03)
UROBILINOGEN UR STRIP-MCNC: NORMAL MG/DL

## 2024-02-07 PROCEDURE — 87086 URINE CULTURE/COLONY COUNT: CPT

## 2024-02-07 PROCEDURE — 81003 URINALYSIS AUTO W/O SCOPE: CPT | Mod: QW

## 2024-02-07 PROCEDURE — 87186 SC STD MICRODIL/AGAR DIL: CPT

## 2024-02-08 LAB — BACTERIA UR CULT: ABNORMAL

## 2024-02-09 DIAGNOSIS — A49.8 PROTEUS MIRABILIS INFECTION: Primary | ICD-10-CM

## 2024-02-09 RX ORDER — CEPHALEXIN 500 MG/1
500 CAPSULE ORAL 2 TIMES DAILY
Qty: 14 CAPSULE | Refills: 0 | Status: SHIPPED | OUTPATIENT
Start: 2024-02-09 | End: 2024-03-21

## 2024-02-20 ENCOUNTER — ALLIED HEALTH/NURSE VISIT (OUTPATIENT)
Dept: UROLOGY | Facility: CLINIC | Age: 79
End: 2024-02-20
Payer: COMMERCIAL

## 2024-02-20 DIAGNOSIS — Z79.2 PROPHYLACTIC ANTIBIOTIC: ICD-10-CM

## 2024-02-20 DIAGNOSIS — R33.9 URINARY RETENTION: ICD-10-CM

## 2024-02-20 DIAGNOSIS — R39.9 UTI SYMPTOMS: Primary | ICD-10-CM

## 2024-02-20 LAB
ALBUMIN UR-MCNC: 30 MG/DL
APPEARANCE UR: ABNORMAL
BILIRUB UR QL STRIP: NEGATIVE
COLOR UR AUTO: YELLOW
GLUCOSE UR STRIP-MCNC: NEGATIVE MG/DL
HGB UR QL STRIP: ABNORMAL
KETONES UR STRIP-MCNC: NEGATIVE MG/DL
LEUKOCYTE ESTERASE UR QL STRIP: ABNORMAL
NITRATE UR QL: NEGATIVE
PH UR STRIP: 7 [PH] (ref 5–7)
SP GR UR STRIP: 1.02 (ref 1–1.03)
UROBILINOGEN UR STRIP-ACNC: 0.2 E.U./DL

## 2024-02-20 PROCEDURE — 87186 SC STD MICRODIL/AGAR DIL: CPT

## 2024-02-20 PROCEDURE — 99207 PR NO CHARGE NURSE ONLY: CPT

## 2024-02-20 PROCEDURE — 51702 INSERT TEMP BLADDER CATH: CPT

## 2024-02-20 PROCEDURE — 87086 URINE CULTURE/COLONY COUNT: CPT

## 2024-02-20 PROCEDURE — 81003 URINALYSIS AUTO W/O SCOPE: CPT | Mod: QW

## 2024-02-20 PROCEDURE — 87088 URINE BACTERIA CULTURE: CPT

## 2024-02-20 RX ORDER — LIDOCAINE HYDROCHLORIDE 20 MG/ML
JELLY TOPICAL ONCE
Status: COMPLETED | OUTPATIENT
Start: 2024-02-20 | End: 2024-02-20

## 2024-02-20 RX ORDER — CIPROFLOXACIN 500 MG/1
500 TABLET, FILM COATED ORAL PRN
Qty: 1 TABLET | Refills: 0 | Status: SHIPPED | OUTPATIENT
Start: 2024-02-20

## 2024-02-20 RX ADMIN — LIDOCAINE HYDROCHLORIDE: 20 JELLY TOPICAL at 14:33

## 2024-02-20 NOTE — PROGRESS NOTES
Gene Pagan comes into clinic today for Urinary Retention  at the request of Miguel Gonzalez CNP, Ordering Provider for Catheter Change.    Pt is states he thinks he has a UTI, he's noted some burning an cloudy urine since yesterday.    The patient is here for a Baker catheter change.  Patient's catheter was disconnected from the leg bag and the balloon deflated. The catheter was removed with no complaints offered by the patient and the procedure was tolerated well.      5mL 2% lidocaine hydrochloride Urojet instilled into urethra.    NDC# [address]-5  Lot #: [id number]  Expiration Date:  3/25    Using sterile field technique a sterile, well lubricated 16 Albanian Baker coude catheter was gently inserted with ease x 1 attempt.  The balloon was filled with 10 ml sterile water.  No discomfort was voiced by the patient.  Clear, yellow urine return from the catheter was noted.  Sterile tubing and drainage bag were attached to the catheter and secured to the thigh. Patient to follow up in approximately one month as planned.    Urine specimen collected from new baker catheter today and sent for UAUC.    Pt will take 1 tablet Cipro 500 mg today after catheter change    This service provided today was under the supervising provider of the day Miguel Gonzalez CNP, who was available if needed.    Aydee Thakur, EMT

## 2024-02-21 ENCOUNTER — TELEPHONE (OUTPATIENT)
Dept: UROLOGY | Facility: CLINIC | Age: 79
End: 2024-02-21
Payer: COMMERCIAL

## 2024-02-21 LAB — BACTERIA UR CULT: ABNORMAL

## 2024-02-21 NOTE — TELEPHONE ENCOUNTER
----- Message from LUIS Iyer CNP sent at 2/21/2024  2:29 PM CST -----  Please reach out to brigid and let him know his UTI is showing a recurrence of proteus mirabilis. Please send order for Bactrim DS PO BID x 10 days to preferred pharmacy.

## 2024-02-22 ENCOUNTER — ANCILLARY PROCEDURE (OUTPATIENT)
Dept: CARDIOLOGY | Facility: CLINIC | Age: 79
End: 2024-02-22
Attending: INTERNAL MEDICINE
Payer: COMMERCIAL

## 2024-02-22 DIAGNOSIS — Z95.0 CARDIAC PACEMAKER IN SITU: ICD-10-CM

## 2024-02-22 DIAGNOSIS — I50.23 ACUTE ON CHRONIC SYSTOLIC CONGESTIVE HEART FAILURE (H): ICD-10-CM

## 2024-02-22 DIAGNOSIS — N39.0 URINARY TRACT INFECTION WITHOUT HEMATURIA, SITE UNSPECIFIED: Primary | ICD-10-CM

## 2024-02-22 PROCEDURE — 93294 REM INTERROG EVL PM/LDLS PM: CPT | Performed by: INTERNAL MEDICINE

## 2024-02-22 PROCEDURE — 93296 REM INTERROG EVL PM/IDS: CPT | Performed by: INTERNAL MEDICINE

## 2024-02-22 RX ORDER — CEPHALEXIN 500 MG/1
500 CAPSULE ORAL 2 TIMES DAILY
Qty: 28 CAPSULE | Refills: 0 | Status: SHIPPED | OUTPATIENT
Start: 2024-02-22 | End: 2024-03-07

## 2024-03-07 LAB
MDC_IDC_EPISODE_DTM: NORMAL
MDC_IDC_EPISODE_DTM: NORMAL
MDC_IDC_EPISODE_ID: NORMAL
MDC_IDC_EPISODE_ID: NORMAL
MDC_IDC_EPISODE_TYPE: NORMAL
MDC_IDC_EPISODE_TYPE: NORMAL
MDC_IDC_LEAD_CONNECTION_STATUS: NORMAL
MDC_IDC_LEAD_IMPLANT_DT: NORMAL
MDC_IDC_LEAD_LOCATION: NORMAL
MDC_IDC_LEAD_LOCATION_DETAIL_1: NORMAL
MDC_IDC_LEAD_MFG: NORMAL
MDC_IDC_LEAD_MODEL: NORMAL
MDC_IDC_LEAD_POLARITY_TYPE: NORMAL
MDC_IDC_LEAD_SERIAL: NORMAL
MDC_IDC_MSMT_BATTERY_DTM: NORMAL
MDC_IDC_MSMT_BATTERY_REMAINING_LONGEVITY: 114 MO
MDC_IDC_MSMT_BATTERY_REMAINING_PERCENTAGE: 100 %
MDC_IDC_MSMT_BATTERY_STATUS: NORMAL
MDC_IDC_MSMT_LEADCHNL_LV_IMPEDANCE_VALUE: 1503 OHM
MDC_IDC_MSMT_LEADCHNL_LV_PACING_THRESHOLD_AMPLITUDE: 0.7 V
MDC_IDC_MSMT_LEADCHNL_LV_PACING_THRESHOLD_PULSEWIDTH: 0.5 MS
MDC_IDC_MSMT_LEADCHNL_RA_IMPEDANCE_VALUE: 753 OHM
MDC_IDC_MSMT_LEADCHNL_RV_IMPEDANCE_VALUE: 632 OHM
MDC_IDC_MSMT_LEADCHNL_RV_PACING_THRESHOLD_AMPLITUDE: 0.9 V
MDC_IDC_MSMT_LEADCHNL_RV_PACING_THRESHOLD_PULSEWIDTH: 0.4 MS
MDC_IDC_PG_IMPLANT_DTM: NORMAL
MDC_IDC_PG_MFG: NORMAL
MDC_IDC_PG_MODEL: NORMAL
MDC_IDC_PG_SERIAL: NORMAL
MDC_IDC_PG_TYPE: NORMAL
MDC_IDC_SESS_CLINIC_NAME: NORMAL
MDC_IDC_SESS_DTM: NORMAL
MDC_IDC_SESS_TYPE: NORMAL
MDC_IDC_SET_BRADY_AT_MODE_SWITCH_RATE: 140 {BEATS}/MIN
MDC_IDC_SET_BRADY_LOWRATE: 70 {BEATS}/MIN
MDC_IDC_SET_BRADY_MAX_SENSOR_RATE: 130 {BEATS}/MIN
MDC_IDC_SET_BRADY_MODE: NORMAL
MDC_IDC_SET_CRT_LVRV_DELAY: 30 MS
MDC_IDC_SET_CRT_PACED_CHAMBERS: NORMAL
MDC_IDC_SET_LEADCHNL_LV_PACING_AMPLITUDE: 2 V
MDC_IDC_SET_LEADCHNL_LV_PACING_ANODE_ELECTRODE_1: NORMAL
MDC_IDC_SET_LEADCHNL_LV_PACING_ANODE_LOCATION_1: NORMAL
MDC_IDC_SET_LEADCHNL_LV_PACING_CATHODE_ELECTRODE_1: NORMAL
MDC_IDC_SET_LEADCHNL_LV_PACING_CATHODE_LOCATION_1: NORMAL
MDC_IDC_SET_LEADCHNL_LV_PACING_PULSEWIDTH: 0.5 MS
MDC_IDC_SET_LEADCHNL_LV_SENSING_ADAPTATION_MODE: NORMAL
MDC_IDC_SET_LEADCHNL_LV_SENSING_ANODE_ELECTRODE_1: NORMAL
MDC_IDC_SET_LEADCHNL_LV_SENSING_ANODE_LOCATION_1: NORMAL
MDC_IDC_SET_LEADCHNL_LV_SENSING_CATHODE_ELECTRODE_1: NORMAL
MDC_IDC_SET_LEADCHNL_LV_SENSING_CATHODE_LOCATION_1: NORMAL
MDC_IDC_SET_LEADCHNL_LV_SENSING_SENSITIVITY: 1.5 MV
MDC_IDC_SET_LEADCHNL_RA_SENSING_ADAPTATION_MODE: NORMAL
MDC_IDC_SET_LEADCHNL_RA_SENSING_SENSITIVITY: 0.25 MV
MDC_IDC_SET_LEADCHNL_RV_PACING_AMPLITUDE: 2 V
MDC_IDC_SET_LEADCHNL_RV_PACING_CAPTURE_MODE: NORMAL
MDC_IDC_SET_LEADCHNL_RV_PACING_POLARITY: NORMAL
MDC_IDC_SET_LEADCHNL_RV_PACING_PULSEWIDTH: 0.4 MS
MDC_IDC_SET_LEADCHNL_RV_SENSING_ADAPTATION_MODE: NORMAL
MDC_IDC_SET_LEADCHNL_RV_SENSING_POLARITY: NORMAL
MDC_IDC_SET_LEADCHNL_RV_SENSING_SENSITIVITY: 1.5 MV
MDC_IDC_SET_ZONE_DETECTION_INTERVAL: 375 MS
MDC_IDC_SET_ZONE_STATUS: NORMAL
MDC_IDC_SET_ZONE_TYPE: NORMAL
MDC_IDC_SET_ZONE_VENDOR_TYPE: NORMAL
MDC_IDC_STAT_BRADY_DTM_END: NORMAL
MDC_IDC_STAT_BRADY_DTM_START: NORMAL
MDC_IDC_STAT_BRADY_RA_PERCENT_PACED: 0 %
MDC_IDC_STAT_BRADY_RV_PERCENT_PACED: 99 %
MDC_IDC_STAT_CRT_DTM_END: NORMAL
MDC_IDC_STAT_CRT_DTM_START: NORMAL
MDC_IDC_STAT_CRT_LV_PERCENT_PACED: 99 %
MDC_IDC_STAT_EPISODE_RECENT_COUNT: 0
MDC_IDC_STAT_EPISODE_RECENT_COUNT: 1
MDC_IDC_STAT_EPISODE_RECENT_COUNT: 2
MDC_IDC_STAT_EPISODE_RECENT_COUNT_DTM_END: NORMAL
MDC_IDC_STAT_EPISODE_RECENT_COUNT_DTM_START: NORMAL
MDC_IDC_STAT_EPISODE_TYPE: NORMAL
MDC_IDC_STAT_EPISODE_VENDOR_TYPE: NORMAL
MDC_IDC_STAT_EPISODE_VENDOR_TYPE: NORMAL

## 2024-03-11 NOTE — PROVIDER NOTIFICATION
"2105: \"Has hematuria and clots in urine, baker pulled in AM, bladder scanned for >800. Per urology note, CBI if pt has gross hematuria. Did you want CBI ordered or to assess pt?\" MD notified.   " none

## 2024-03-18 DIAGNOSIS — N40.1 BPH WITH OBSTRUCTION/LOWER URINARY TRACT SYMPTOMS: ICD-10-CM

## 2024-03-18 DIAGNOSIS — N13.8 BPH WITH OBSTRUCTION/LOWER URINARY TRACT SYMPTOMS: ICD-10-CM

## 2024-03-19 ENCOUNTER — ALLIED HEALTH/NURSE VISIT (OUTPATIENT)
Dept: UROLOGY | Facility: CLINIC | Age: 79
End: 2024-03-19
Payer: COMMERCIAL

## 2024-03-19 DIAGNOSIS — R39.9 UTI SYMPTOMS: ICD-10-CM

## 2024-03-19 DIAGNOSIS — R33.9 URINARY RETENTION: Primary | ICD-10-CM

## 2024-03-19 DIAGNOSIS — Z79.2 PROPHYLACTIC ANTIBIOTIC: ICD-10-CM

## 2024-03-19 LAB
ALBUMIN UR-MCNC: 30 MG/DL
APPEARANCE UR: CLEAR
BILIRUB UR QL STRIP: NEGATIVE
COLOR UR AUTO: YELLOW
GLUCOSE UR STRIP-MCNC: NEGATIVE MG/DL
HGB UR QL STRIP: ABNORMAL
KETONES UR STRIP-MCNC: NEGATIVE MG/DL
LEUKOCYTE ESTERASE UR QL STRIP: ABNORMAL
NITRATE UR QL: NEGATIVE
PH UR STRIP: 5.5 [PH] (ref 5–7)
SP GR UR STRIP: 1.02 (ref 1–1.03)
UROBILINOGEN UR STRIP-ACNC: 0.2 E.U./DL

## 2024-03-19 PROCEDURE — 51702 INSERT TEMP BLADDER CATH: CPT

## 2024-03-19 PROCEDURE — 87088 URINE BACTERIA CULTURE: CPT

## 2024-03-19 PROCEDURE — 87186 SC STD MICRODIL/AGAR DIL: CPT

## 2024-03-19 PROCEDURE — 81003 URINALYSIS AUTO W/O SCOPE: CPT | Mod: QW

## 2024-03-19 PROCEDURE — 87086 URINE CULTURE/COLONY COUNT: CPT

## 2024-03-19 RX ORDER — LIDOCAINE HYDROCHLORIDE 20 MG/ML
JELLY TOPICAL ONCE
Status: COMPLETED | OUTPATIENT
Start: 2024-03-19 | End: 2024-03-19

## 2024-03-19 RX ORDER — CIPROFLOXACIN 500 MG/1
500 TABLET, FILM COATED ORAL ONCE
Qty: 1 TABLET | Refills: 0 | Status: SHIPPED | OUTPATIENT
Start: 2024-03-19 | End: 2024-03-19

## 2024-03-19 RX ADMIN — LIDOCAINE HYDROCHLORIDE 5 ML: 20 JELLY TOPICAL at 14:41

## 2024-03-19 NOTE — PROGRESS NOTES
"Gene Pagan comes into clinic today at the request of Miguel Gonzalez CNP Ordering Provider for Catheter Change.    Pt here for catheter change today. Pt states last round of antibiotics seems to have cleared infection, pt states he recently started getting \"bladder discomfort\" and suspects UTI is returning. Pt's urine was collected today and sent for culture.     The patient is here for a Mckenzie catheter change.  Patient's catheter was disconnected from the leg bag and balloon deflated. The catheter was removed with no complaints offered by the patient and the procedure was tolerated well.      Using sterile field technique a sterile, well lubricated 16 Palestinian Mckenzie coude catheter was gently inserted with ease x 1 attempt.  The balloon was filled with 8 ml sterile water.  No discomfort was voiced by the patient.  Clear, pink urine return drained from the catheter.  Sterile tubing and drainage bag were attached to the catheter and secured to the right thigh. Patient was instructed on proper hygiene and catheter care.  Patient to follow up in approximately one month as planned.    5mL 2% lidocaine hydrochloride Urojet instilled into urethra.    NDC# [address]-4  Lot #: [id number]  Expiration Date:  09/25      This service provided today was under the supervising provider of the day Miguel Gonzalez CNP, who was available if needed.    Vanessa Jacome CMA   "

## 2024-03-20 ENCOUNTER — TELEPHONE (OUTPATIENT)
Dept: INTERNAL MEDICINE | Facility: CLINIC | Age: 79
End: 2024-03-20

## 2024-03-20 RX ORDER — TAMSULOSIN HYDROCHLORIDE 0.4 MG/1
0.4 CAPSULE ORAL AT BEDTIME
Qty: 90 CAPSULE | Refills: 0 | Status: SHIPPED | OUTPATIENT
Start: 2024-03-20 | End: 2024-03-21

## 2024-03-21 ENCOUNTER — OFFICE VISIT (OUTPATIENT)
Dept: INTERNAL MEDICINE | Facility: CLINIC | Age: 79
End: 2024-03-21
Payer: COMMERCIAL

## 2024-03-21 VITALS
HEART RATE: 82 BPM | RESPIRATION RATE: 16 BRPM | HEIGHT: 66 IN | DIASTOLIC BLOOD PRESSURE: 70 MMHG | TEMPERATURE: 97.9 F | BODY MASS INDEX: 30.22 KG/M2 | OXYGEN SATURATION: 98 % | WEIGHT: 188 LBS | SYSTOLIC BLOOD PRESSURE: 119 MMHG

## 2024-03-21 DIAGNOSIS — I50.20 SYSTOLIC CONGESTIVE HEART FAILURE, UNSPECIFIED HF CHRONICITY (H): ICD-10-CM

## 2024-03-21 DIAGNOSIS — N18.30 STAGE 3 CHRONIC KIDNEY DISEASE, UNSPECIFIED WHETHER STAGE 3A OR 3B CKD (H): Primary | ICD-10-CM

## 2024-03-21 DIAGNOSIS — I48.91 ATRIAL FIBRILLATION, UNSPECIFIED TYPE (H): ICD-10-CM

## 2024-03-21 DIAGNOSIS — N40.1 BPH WITH OBSTRUCTION/LOWER URINARY TRACT SYMPTOMS: ICD-10-CM

## 2024-03-21 DIAGNOSIS — E11.22 TYPE 2 DIABETES MELLITUS WITH STAGE 3B CHRONIC KIDNEY DISEASE, WITH LONG-TERM CURRENT USE OF INSULIN (H): ICD-10-CM

## 2024-03-21 DIAGNOSIS — Z79.4 TYPE 2 DIABETES MELLITUS WITH STAGE 3B CHRONIC KIDNEY DISEASE, WITH LONG-TERM CURRENT USE OF INSULIN (H): ICD-10-CM

## 2024-03-21 DIAGNOSIS — E78.5 HYPERLIPIDEMIA LDL GOAL <100: ICD-10-CM

## 2024-03-21 DIAGNOSIS — R33.9 URINARY RETENTION: ICD-10-CM

## 2024-03-21 DIAGNOSIS — N18.32 TYPE 2 DIABETES MELLITUS WITH STAGE 3B CHRONIC KIDNEY DISEASE, WITH LONG-TERM CURRENT USE OF INSULIN (H): ICD-10-CM

## 2024-03-21 DIAGNOSIS — N13.8 BPH WITH OBSTRUCTION/LOWER URINARY TRACT SYMPTOMS: ICD-10-CM

## 2024-03-21 DIAGNOSIS — I50.9 CONGESTIVE HEART FAILURE, UNSPECIFIED HF CHRONICITY, UNSPECIFIED HEART FAILURE TYPE (H): ICD-10-CM

## 2024-03-21 DIAGNOSIS — I77.810 THORACIC AORTIC ECTASIA (H): ICD-10-CM

## 2024-03-21 DIAGNOSIS — Z00.00 ENCOUNTER FOR MEDICARE ANNUAL WELLNESS EXAM: Primary | ICD-10-CM

## 2024-03-21 PROBLEM — N18.4 STAGE 4 CHRONIC KIDNEY DISEASE (H): Status: RESOLVED | Noted: 2021-01-26 | Resolved: 2024-03-21

## 2024-03-21 LAB — BACTERIA UR CULT: ABNORMAL

## 2024-03-21 PROCEDURE — 99207 PR FOOT EXAM NO CHARGE: CPT | Performed by: INTERNAL MEDICINE

## 2024-03-21 PROCEDURE — 99214 OFFICE O/P EST MOD 30 MIN: CPT | Mod: 25 | Performed by: INTERNAL MEDICINE

## 2024-03-21 PROCEDURE — G0439 PPPS, SUBSEQ VISIT: HCPCS | Performed by: INTERNAL MEDICINE

## 2024-03-21 RX ORDER — INSULIN GLARGINE 100 [IU]/ML
13 INJECTION, SOLUTION SUBCUTANEOUS AT BEDTIME
Qty: 15 ML | Refills: 3 | Status: SHIPPED | OUTPATIENT
Start: 2024-03-21

## 2024-03-21 RX ORDER — TAMSULOSIN HYDROCHLORIDE 0.4 MG/1
0.4 CAPSULE ORAL AT BEDTIME
Qty: 90 CAPSULE | Refills: 3 | Status: SHIPPED | OUTPATIENT
Start: 2024-03-21 | End: 2024-09-10

## 2024-03-21 RX ORDER — RESPIRATORY SYNCYTIAL VIRUS VACCINE 120MCG/0.5
0.5 KIT INTRAMUSCULAR ONCE
Qty: 1 EACH | Refills: 0 | Status: CANCELLED | OUTPATIENT
Start: 2024-03-21 | End: 2024-03-21

## 2024-03-21 SDOH — HEALTH STABILITY: PHYSICAL HEALTH: ON AVERAGE, HOW MANY MINUTES DO YOU ENGAGE IN EXERCISE AT THIS LEVEL?: 0 MIN

## 2024-03-21 SDOH — HEALTH STABILITY: PHYSICAL HEALTH: ON AVERAGE, HOW MANY DAYS PER WEEK DO YOU ENGAGE IN MODERATE TO STRENUOUS EXERCISE (LIKE A BRISK WALK)?: 0 DAYS

## 2024-03-21 ASSESSMENT — LIFESTYLE VARIABLES
HOW OFTEN DO YOU HAVE A DRINK CONTAINING ALCOHOL: MONTHLY OR LESS
HOW MANY STANDARD DRINKS CONTAINING ALCOHOL DO YOU HAVE ON A TYPICAL DAY: 1 OR 2
AUDIT-C TOTAL SCORE: 1
SKIP TO QUESTIONS 9-10: 1
HOW OFTEN DO YOU HAVE SIX OR MORE DRINKS ON ONE OCCASION: NEVER

## 2024-03-21 ASSESSMENT — SOCIAL DETERMINANTS OF HEALTH (SDOH): HOW OFTEN DO YOU GET TOGETHER WITH FRIENDS OR RELATIVES?: PATIENT DECLINED

## 2024-03-21 NOTE — PATIENT INSTRUCTIONS
Preventive Care Advice   This is general advice given by our system to help you stay healthy. However, your care team may have specific advice just for you. Please talk to your care team about your preventive care needs.  Nutrition  Eat 5 or more servings of fruits and vegetables each day.  Try wheat bread, brown rice and whole grain pasta (instead of white bread, rice, and pasta).  Get enough calcium and vitamin D. Check the label on foods and aim for 100% of the RDA (recommended daily allowance).  Lifestyle  Exercise at least 150 minutes each week   (30 minutes a day, 5 days a week).  Do muscle strengthening activities 2 days a week. These help control your weight and prevent disease.  No smoking.  Wear sunscreen to prevent skin cancer.  Have a dental exam and cleaning every 6 months.  Yearly exams  See your health care team every year to talk about:  Any changes in your health.  Any medicines your care team has prescribed.  Preventive care, family planning, and ways to prevent chronic diseases.  Shots (vaccines)   HPV shots (up to age 26), if you've never had them before.  Hepatitis B shots (up to age 59), if you've never had them before.  COVID-19 shot: Get this shot when it's due.  Flu shot: Get a flu shot every year.  Tetanus shot: Get a tetanus shot every 10 years.  Pneumococcal, hepatitis A, and RSV shots: Ask your care team if you need these based on your risk.  Shingles shot (for age 50 and up).  General health tests  Diabetes screening:  Starting at age 35, Get screened for diabetes at least every 3 years.  If you are younger than age 35, ask your care team if you should be screened for diabetes.  Cholesterol test: At age 39, start having a cholesterol test every 5 years, or more often if advised.  Bone density scan (DEXA): At age 50, ask your care team if you should have this scan for osteoporosis (brittle bones).  Hepatitis C: Get tested at least once in your life.  STIs (sexually transmitted  infections)  Before age 24: Ask your care team if you should be screened for STIs.  After age 24: Get screened for STIs if you're at risk. You are at risk for STIs (including HIV) if:  You are sexually active with more than one person.  You don't use condoms every time.  You or a partner was diagnosed with a sexually transmitted infection.  If you are at risk for HIV, ask about PrEP medicine to prevent HIV.  Get tested for HIV at least once in your life, whether you are at risk for HIV or not.  Cancer screening tests  Cervical cancer screening: If you have a cervix, begin getting regular cervical cancer screening tests at age 21. Most people who have regular screenings with normal results can stop after age 65. Talk about this with your provider.  Breast cancer scan (mammogram): If you've ever had breasts, begin having regular mammograms starting at age 40. This is a scan to check for breast cancer.  Colon cancer screening: It is important to start screening for colon cancer at age 45.  Have a colonoscopy test every 10 years (or more often if you're at risk) Or, ask your provider about stool tests like a FIT test every year or Cologuard test every 3 years.  To learn more about your testing options, visit: https://www.[email or web address]/605497.pdf.  For help making a decision, visit: https://bit.ly/pe27446.  Prostate cancer screening test: If you have a prostate and are age 55 to 69, ask your provider if you would benefit from a yearly prostate cancer screening test.  Lung cancer screening: If you are a current or former smoker age 50 to 80, ask your care team if ongoing lung cancer screenings are right for you.  For informational purposes only. Not to replace the advice of your health care provider. Copyright   2023 [employer]. All rights reserved. Clinically reviewed by the [facility]. Windeln.de [id number] - REV 01/24.      Everything looks fine!    Refills of needed medications have  "been faxed to your pharmacy.     Stop by the hospital lab tomorrow.   Lab results will be available soon on [employer].    Schedule a \"lab only\" test in about six months.   See me in a year, sooner if problems.     "

## 2024-03-21 NOTE — PROGRESS NOTES
"Preventive Care Visit  [facility]  Dm Lipscomb MD, Internal Medicine  Mar 21, 2024      Assessment & Plan     (Z00.00) Encounter for Medicare annual wellness exam  (primary encounter diagnosis)  Comment: Stable health. See epic orders.     (E11.22,  N18.32,  Z79.4) Type 2 diabetes mellitus with stage 3b chronic kidney disease, with long-term current use of insulin (H)  Comment: Historically Well controlled. Continue current meds.   Plan: Adult Eye  Referral, HEMOGLOBIN A1C,         OFFICE/OUTPT VISIT,EST,LEVL IV, TSH with free         T4 reflex, FOOT EXAM          (E78.5) Hyperlipidemia LDL goal <100  Comment: Return in AM to update lipids. Continue current meds.   Plan: Comprehensive metabolic panel, Lipid panel         reflex to direct LDL Fasting, OFFICE/OUTPT         VISIT,EST,LEVL IV          (R33.9) Urinary retention  (N40.1,  N13.8) BPH with obstruction/lower urinary tract symptoms  Comment: Continue to follow with Urology as they advise.   Plan: tamsulosin (FLOMAX) 0.4 MG capsule,         OFFICE/OUTPT VISIT,EST,LEVL IV          (I48.91) Atrial fibrillation, unspecified type (H)  Comment: Continue chronic oral anticoagulation.   Plan: apixaban ANTICOAGULANT (ELIQUIS ANTICOAGULANT)         2.5 MG tablet, OFFICE/OUTPT VISIT,EST,LEVL IV          (I50.20) Systolic congestive heart failure, unspecified HF chronicity (H)  (I50.9) Congestive heart failure, unspecified HF chronicity, unspecified heart failure type (H)  (I77.810) Thoracic aortic ectasia (H24)  Comment: Follow up with cardiology as they advise.   Plan: OFFICE/OUTPT VISIT,EST,LEVL IV            Patient has been advised of split billing requirements and indicates understanding: Yes      BMI  Estimated body mass index is 30.76 kg/m  as calculated from the following:    Height as of this encounter: 1.665 m (5' 5.55\").    Weight as of this encounter: 85.3 kg (188 lb).       Counseling  Appropriate preventive services " "were discussed with this patient, including applicable screening as appropriate for fall prevention, nutrition, physical activity, Tobacco-use cessation, weight loss and cognition.  Checklist reviewing preventive services available has been given to the patient.  Reviewed patient's diet, addressing concerns and/or questions.   The patient was instructed to see the dentist every 6 months.   Information on urinary incontinence and treatment options given to patient.     Patient Instructions   Everything looks fine!    Refills of needed medications have been faxed to your pharmacy.     Stop by the hospital lab tomorrow.   Lab results will be available soon on [employer].    Schedule a \"lab only\" test in about six months.   See me in a year, sooner if problems.         Yessica Mills is a 78 year old, presenting for the following:  Medicare Visit        3/21/2024     2:21 PM   Additional Questions   Roomed by Zulema PERRY CMA         Health Care Directive  Patient does not have a Health Care Directive or Living Will: Discussed advance care planning with patient; information given to patient to review.    HPI  In addition to an Annual Wellness Exam, we addressed diabetes, hyperlipidemia, chronic kidney disease, cardiac and urologic concerns.     He checks glucoses daily, noting them usually to run in the 120's.  Patient taking 13 units of Lantus at bedtime. Most recent A1c was 7.2 on 12/242023.     He follows with nephrology for Stage 3b to 4 chronic kidney disease. He just saw them yesterday.     We agreed for him to return tomorrow for fasting labs, to update an A1c, LDL-Cholesterol, and other needed labs. He is tolerating statin therapy.      He notes some chronic neck pain, and just yesterday some bilateral hip pain.     He continues on tamsulosin which improves urinary flow. He has followed with Urology for catheter changes and previous recurrent UTI's.     Last office visit with cardiology appears to have been on " 7/19/2023 for chronic combined systolic and diastolic heart failure. He denies problems with dyspnea at rest, orthopnea, chest discomfort, dizziness, palpitations or increased edema.   He remains on chronic oral anticoagulation due to history of atrial fibrillation.          3/21/2024   General Health   How would you rate your overall physical health? (!) FAIR   Feel stress (tense, anxious, or unable to sleep) Not at all         3/21/2024   Nutrition   Diet: Low salt    Diabetic         3/21/2024   Exercise   Days per week of moderate/strenous exercise 0 days   Average minutes spent exercising at this level 0 min   (!) EXERCISE CONCERN      3/21/2024   Social Factors   Frequency of gathering with friends or relatives Patient declined   Worry food won't last until get money to buy more No   Food not last or not have enough money for food? No   Do you have housing?  Yes   Are you worried about losing your housing? No   Lack of transportation? No   Unable to get utilities (heat,electricity)? No         3/21/2024   Activities of Daily Living- Home Safety   Needs help with the following daily activites None of the above   Safety concerns in the home None of the above         3/21/2024   Dental   Dentist two times every year? (!) NO         3/21/2024   Hearing Screening   Hearing concerns? None of the above         3/21/2024   Driving Risk Screening   Patient/family members have concerns about driving No         3/21/2024   General Alertness/Fatigue Screening   Have you been more tired than usual lately? No         3/21/2024   Urinary Incontinence Screening   Bothered by leaking urine in past 6 months Yes         3/21/2024   TB Screening   Were you born outside of the [place]? No         Today's PHQ-2 Score:       3/21/2024     2:14 PM   PHQ-2 ( 1999 [employer])   Q1: Little interest or pleasure in doing things 0   Q2: Feeling down, depressed or hopeless 0   PHQ-2 Score 0   Q1: Little interest or pleasure in doing things Not at  all   Q2: Feeling down, depressed or hopeless Not at all   PHQ-2 Score 0           3/21/2024   Substance Use   Alcohol more than 3/day or more than 7/wk No   Do you have a current opioid prescription? No   How severe/bad is pain from 1 to 10? 1/10   Do you use any other substances recreationally? (!) CANNABIS PRODUCTS    (!) DECLINE     Social History     Tobacco Use    Smoking status: Never    Smokeless tobacco: Never   Vaping Use    Vaping Use: Never used   Substance Use Topics    Alcohol use: Yes     Comment: Rare    Drug use: Never               Reviewed and updated as needed this visit by Provider                    Current providers sharing in care for this patient include:  Patient Care Team:  Dm Lipscomb MD as PCP - General (Internal Medicine)  Dm Lipscomb MD as Assigned PCP  Cheikh Perez MD as MD (Urology)  Diane Mueller APRN CNP as Nurse Practitioner (Cardiovascular Disease)  Adelina Infante APRN CNP as Assigned Heart and Vascular Provider  Miguel Gonzalez APRN CNP as Assigned Surgical Provider    The following health maintenance items are reviewed in Epic and correct as of today:  Health Maintenance   Topic Date Due    HF ACTION PLAN  Never done    PARATHYROID  Never done    EYE EXAM  Never done    Pneumococcal Vaccine: 65+ Years (1 of 2 - PCV) Never done    ZOSTER IMMUNIZATION (1 of 2) Never done    RSV VACCINE (Pregnancy & 60+) (1 - 1-dose 60+ series) Never done    DTAP/TDAP/TD IMMUNIZATION (3 - Td or Tdap) 01/01/2019    DIABETIC FOOT EXAM  02/17/2023    MICROALBUMIN  06/01/2023    INFLUENZA VACCINE (1) 09/01/2023    COVID-19 Vaccine (1 - 2023-24 season) Never done    ANNUAL REVIEW OF HM ORDERS  03/01/2024    A1C  03/04/2024    MEDICARE ANNUAL WELLNESS VISIT  03/01/2024    BMP  04/19/2024    HEMOGLOBIN  07/19/2024    ALT  12/04/2024    LIPID  12/04/2024    CBC  01/19/2025    FALL RISK ASSESSMENT  03/21/2025    ADVANCE CARE PLANNING  03/02/2028    PHOSPHORUS  Completed    TSH W/FREE  "T4 REFLEX  Completed    PHQ-2 (once per calendar year)  Completed    URINALYSIS  Completed    ALK PHOS  Completed    IPV IMMUNIZATION  Aged Out    HPV IMMUNIZATION  Aged Out    MENINGITIS IMMUNIZATION  Aged Out    RSV MONOCLONAL ANTIBODY  Aged Out       REVIEW OF SYSTEMS: The following systems have been completely reviewed and are negative except as noted above:   Constitutional, HEENT, respiratory, cardiovascular, gastrointestinal, genitourinary, musculoskeletal, dermatologic, hematologic, endocrine, psychiatric, and neurologic systems.       Objective    Exam  /70 (BP Location: Right arm, Patient Position: Sitting, Cuff Size: Adult Large)   Pulse 82   Temp 97.9  F (36.6  C) (Oral)   Resp 16   Ht 1.665 m (5' 5.55\")   Wt 85.3 kg (188 lb)   SpO2 98%   BMI 30.76 kg/m     Estimated body mass index is 30.76 kg/m  as calculated from the following:    Height as of this encounter: 1.665 m (5' 5.55\").    Weight as of this encounter: 85.3 kg (188 lb).    Physical Exam  GENERAL: alert and no distress  EYES: Eyes grossly normal to inspection, PERRL and conjunctivae and sclerae normal  HENT: ear canals and TM's normal, nose and mouth without ulcers or lesions  NECK: no adenopathy, no asymmetry, masses, or scars  RESP: lungs clear to auscultation - no rales, rhonchi or wheezes  CV: regular rate and rhythm, normal S1 S2, no S3 or S4, no murmur, click or rub, no peripheral edema  ABDOMEN: soft, nontender, no hepatosplenomegaly, no masses and bowel sounds normal  MS: no gross musculoskeletal defects noted, no edema  SKIN: no suspicious lesions or rashes  NEURO: Normal strength and tone, mentation intact and speech normal  PSYCH: mentation appears normal, affect normal/bright  Diabetic foot exam: normal DP and PT pulses, no trophic changes or ulcerative lesions, and normal sensory exam        3/21/2024   Mini Cog   Clock Draw Score 2 Normal    2 Normal   3 Item Recall 3 objects recalled    3 objects recalled   Mini Cog " Total Score 5    5              Signed Electronically by: Dm Lipscomb MD,

## 2024-03-21 NOTE — COMMUNITY RESOURCES LIST (ENGLISH)
March 21, 2024           YOUR PERSONALIZED LIST OF SERVICES & PROGRAMS           SPORTS & RECREATION    Individual/Team Sports      [facility] - Summer Sports Camp  [address] [place] [address] (Distance: 5.1 miles)  Phone: [phone]  Website: [email or web address].[facility].org/child_care__preschool/summer_programs/Crucible/summer_youth_sports  Language: English  Fee: Self pay      [facility] - Game [employer]- Women  [address] [place] [address] (Distance: 3.6 miles)  Website: http://[employer]/  Language: English, Romansh  Fee: Free      [facility] - Adult Enrichment  Phone: [phone]  Website: [email or web address]  Language: English  Hours: Mon 7:30 AM - 4:00 PM Tue 7:30 AM - 4:00 PM Wed 7:30 AM - 4:00 PM Thu 7:30 AM - 4:00 PM Fri 7:30 AM - 4:00 PM    Exercise Classes/Groups      [facility] - Yoga Therapy  [address] [place] [address] (Distance: 4.2 miles)  Phone: [phone]  Language: English  Fee: Self pay  Accessibility: Ada accessible      [facility] - Group Training & Specialty Programs  [address] [place] [address] (Distance: 5.1 miles)  Phone: [phone]  Website: https://www.[facility].org/locations/[facility]rucible_[facility]/health__fitness/group_training_specialty_programs  Language: English  Fee: Self pay      [facility] - Adult Enrichment  Phone: [phone]  Website: [email or web address]  Language: English  Hours: Mon 7:30 AM - 4:00 PM Tue 7:30 AM - 4:00 PM Wed 7:30 AM - 4:00 PM Thu 7:30 AM - 4:00 PM Fri 7:30 AM - 4:00 PM               IMPORTANT NUMBERS & WEBSITES        Emergency Services  911  .   [facility]  211 http://211unitedway.org  .   Poison Control  (960) 510-4025 http://[place]poison.org http://wis[place]poison.org  .     Suicide and Crisis Lifeline  988 http://988[facility].org  .    Childhelp [place] Child Abuse Hotline  716.386.7506 http://Childhelphotline.org   .   National Sexual Assault Hotline  (912) 784-6461 (HOPE) http://Rainn.org   .     National Runaway Safeline  (726) 372-7881 (RUNAWAY) http://[employer].BECC  .   Pregnancy & Postpartum Support  Call/text 968-416-6339  [place]: http://ppsupport[place].org  [place]: http://psichapters.com/wi  .   Substance Abuse National Helpline ([facility])  227-039-HELP (2184) http://Findtreatment.gov   .                DISCLAIMER: [employer] does not endorse any service providers mentioned in this resource list. [employer] does not guarantee that the services mentioned in this resource list will be available to you or will improve your health or wellness.    [id number] [facility]

## 2024-03-22 ENCOUNTER — LAB (OUTPATIENT)
Dept: LAB | Facility: CLINIC | Age: 79
End: 2024-03-22
Payer: COMMERCIAL

## 2024-03-22 DIAGNOSIS — N39.0 URINARY TRACT INFECTION WITHOUT HEMATURIA, SITE UNSPECIFIED: Primary | ICD-10-CM

## 2024-03-22 DIAGNOSIS — Z79.4 TYPE 2 DIABETES MELLITUS WITH STAGE 3B CHRONIC KIDNEY DISEASE, WITH LONG-TERM CURRENT USE OF INSULIN (H): ICD-10-CM

## 2024-03-22 DIAGNOSIS — N18.32 TYPE 2 DIABETES MELLITUS WITH STAGE 3B CHRONIC KIDNEY DISEASE, WITH LONG-TERM CURRENT USE OF INSULIN (H): ICD-10-CM

## 2024-03-22 DIAGNOSIS — E78.5 HYPERLIPIDEMIA LDL GOAL <100: ICD-10-CM

## 2024-03-22 DIAGNOSIS — N18.30 STAGE 3 CHRONIC KIDNEY DISEASE, UNSPECIFIED WHETHER STAGE 3A OR 3B CKD (H): ICD-10-CM

## 2024-03-22 DIAGNOSIS — E11.22 TYPE 2 DIABETES MELLITUS WITH STAGE 3B CHRONIC KIDNEY DISEASE, WITH LONG-TERM CURRENT USE OF INSULIN (H): ICD-10-CM

## 2024-03-22 LAB
ALBUMIN SERPL BCG-MCNC: 4 G/DL (ref 3.5–5.2)
ALP SERPL-CCNC: 100 U/L (ref 40–150)
ALT SERPL W P-5'-P-CCNC: 10 U/L (ref 0–70)
ANION GAP SERPL CALCULATED.3IONS-SCNC: 13 MMOL/L (ref 7–15)
AST SERPL W P-5'-P-CCNC: 14 U/L (ref 0–45)
BILIRUB SERPL-MCNC: 0.3 MG/DL
BUN SERPL-MCNC: 31.9 MG/DL (ref 8–23)
CALCIUM SERPL-MCNC: 9.3 MG/DL (ref 8.8–10.2)
CHLORIDE SERPL-SCNC: 101 MMOL/L (ref 98–107)
CREAT SERPL-MCNC: 2.2 MG/DL (ref 0.67–1.17)
DEPRECATED HCO3 PLAS-SCNC: 26 MMOL/L (ref 22–29)
EGFRCR SERPLBLD CKD-EPI 2021: 30 ML/MIN/1.73M2
GLUCOSE SERPL-MCNC: 104 MG/DL (ref 70–99)
HBA1C MFR BLD: 6.6 %
POTASSIUM SERPL-SCNC: 4.1 MMOL/L (ref 3.4–5.3)
PROT SERPL-MCNC: 7.2 G/DL (ref 6.4–8.3)
SODIUM SERPL-SCNC: 140 MMOL/L (ref 135–145)
TSH SERPL DL<=0.005 MIU/L-ACNC: 1.58 UIU/ML (ref 0.3–4.2)

## 2024-03-22 PROCEDURE — 83036 HEMOGLOBIN GLYCOSYLATED A1C: CPT

## 2024-03-22 PROCEDURE — 82040 ASSAY OF SERUM ALBUMIN: CPT

## 2024-03-22 PROCEDURE — 84443 ASSAY THYROID STIM HORMONE: CPT

## 2024-03-22 PROCEDURE — 36415 COLL VENOUS BLD VENIPUNCTURE: CPT

## 2024-03-22 PROCEDURE — 80061 LIPID PANEL: CPT

## 2024-03-22 RX ORDER — CEPHALEXIN 500 MG/1
500 CAPSULE ORAL 2 TIMES DAILY
Qty: 20 CAPSULE | Refills: 0 | Status: SHIPPED | OUTPATIENT
Start: 2024-03-22 | End: 2024-04-01

## 2024-03-23 LAB
CHOLEST SERPL-MCNC: 168 MG/DL
FASTING STATUS PATIENT QL REPORTED: YES
HDLC SERPL-MCNC: 36 MG/DL
LDLC SERPL CALC-MCNC: 104 MG/DL
NONHDLC SERPL-MCNC: 132 MG/DL
TRIGL SERPL-MCNC: 139 MG/DL

## 2024-03-23 PROCEDURE — 82570 ASSAY OF URINE CREATININE: CPT | Performed by: INTERNAL MEDICINE

## 2024-03-23 PROCEDURE — 84156 ASSAY OF PROTEIN URINE: CPT

## 2024-03-24 LAB
ALBUMIN MFR UR ELPH: 8.7 MG/DL
CREAT UR-MCNC: 22.7 MG/DL
CREAT UR-MCNC: 23.2 MG/DL
MICROALBUMIN UR-MCNC: 16.5 MG/L
MICROALBUMIN/CREAT UR: 72.69 MG/G CR (ref 0–17)
PROT/CREAT 24H UR: 0.38 MG/MG CR (ref 0–0.2)

## 2024-04-09 ENCOUNTER — TELEPHONE (OUTPATIENT)
Dept: INTERNAL MEDICINE | Facility: CLINIC | Age: 79
End: 2024-04-09

## 2024-04-09 NOTE — TELEPHONE ENCOUNTER
[place] * incontinence supplies  order received via fax     Forms in [employer] mail box for review and signature.

## 2024-04-10 ENCOUNTER — TELEPHONE (OUTPATIENT)
Dept: INTERNAL MEDICINE | Facility: CLINIC | Age: 79
End: 2024-04-10

## 2024-04-10 ENCOUNTER — TELEPHONE (OUTPATIENT)
Dept: INTERNAL MEDICINE | Facility: CLINIC | Age: 79
End: 2024-04-10
Payer: COMMERCIAL

## 2024-04-10 NOTE — TELEPHONE ENCOUNTER
Patient Quality Outreach    Patient is due for the following:   Diabetes -  Eye Exam  Physical Annual Wellness Visit,  - Due after 3/21/2025    Next Steps:   No follow up needed at this time.    Type of outreach:    Chart review performed, no outreach needed.      Questions for provider review:               Zulema Umana [facility]

## 2024-04-15 DIAGNOSIS — N18.4 TYPE 2 DIABETES MELLITUS WITH STAGE 4 CHRONIC KIDNEY DISEASE, WITH LONG-TERM CURRENT USE OF INSULIN (H): ICD-10-CM

## 2024-04-15 DIAGNOSIS — Z79.4 TYPE 2 DIABETES MELLITUS WITH STAGE 4 CHRONIC KIDNEY DISEASE, WITH LONG-TERM CURRENT USE OF INSULIN (H): ICD-10-CM

## 2024-04-15 DIAGNOSIS — E11.22 TYPE 2 DIABETES MELLITUS WITH STAGE 4 CHRONIC KIDNEY DISEASE, WITH LONG-TERM CURRENT USE OF INSULIN (H): ICD-10-CM

## 2024-04-15 RX ORDER — BLOOD SUGAR DIAGNOSTIC
STRIP MISCELLANEOUS
Qty: 200 STRIP | Refills: 0 | Status: SHIPPED | OUTPATIENT
Start: 2024-04-15

## 2024-04-16 ENCOUNTER — ALLIED HEALTH/NURSE VISIT (OUTPATIENT)
Dept: UROLOGY | Facility: CLINIC | Age: 79
End: 2024-04-16
Payer: COMMERCIAL

## 2024-04-16 DIAGNOSIS — R33.9 URINARY RETENTION: ICD-10-CM

## 2024-04-16 DIAGNOSIS — Z79.2 PROPHYLACTIC ANTIBIOTIC: Primary | ICD-10-CM

## 2024-04-16 PROCEDURE — 51702 INSERT TEMP BLADDER CATH: CPT

## 2024-04-16 RX ORDER — LIDOCAINE HYDROCHLORIDE 20 MG/ML
JELLY TOPICAL ONCE
Status: COMPLETED | OUTPATIENT
Start: 2024-04-16 | End: 2024-04-16

## 2024-04-16 RX ORDER — CIPROFLOXACIN 500 MG/1
500 TABLET, FILM COATED ORAL
Qty: 3 TABLET | Refills: 0 | Status: SHIPPED | OUTPATIENT
Start: 2024-04-16

## 2024-04-16 RX ADMIN — LIDOCAINE HYDROCHLORIDE 5 ML: 20 JELLY TOPICAL at 15:32

## 2024-04-16 NOTE — PROGRESS NOTES
Gene Pagan comes into clinic today at the request of Miguel Gonzalez CNP Ordering Provider for Catheter Change.    The patient is here for a Mckenzie catheter change.  Patient's catheter was disconnected from the leg bag and balloon deflated. The catheter was removed with no complaints offered by the patient and the procedure was tolerated well.      Using sterile field technique a sterile, well lubricated 16 Nauruan Mckenzie coude catheter was gently inserted with ease x 1 attempt.  The balloon was filled with 8 ml sterile water.  No discomfort was voiced by the patient.  Clear, yellow urine return drained from the catheter.  Sterile tubing and drainage bag were attached to the catheter and secured to the right thigh.  No specimen was ordered to be collected or sent to the lab for examination.  Patient was instructed on proper hygiene and catheter care.  Patient to follow up in approximately one month as planned.    5mL 2% lidocaine hydrochloride Urojet instilled into urethra.    NDC# [address]-7  Lot #: [id number]  Expiration Date:  12/25      This service provided today was under the supervising provider of the day Miguel Gonzalez CNP, who was available if needed.    Vanessa Jacome CMA

## 2024-04-19 ENCOUNTER — LAB (OUTPATIENT)
Dept: LAB | Facility: CLINIC | Age: 79
End: 2024-04-19
Payer: COMMERCIAL

## 2024-04-19 DIAGNOSIS — N18.4 CHRONIC RENAL DISEASE, STAGE IV (H): ICD-10-CM

## 2024-04-19 DIAGNOSIS — E11.21 DIABETIC KIDNEY (H): ICD-10-CM

## 2024-04-19 LAB
ALBUMIN SERPL BCG-MCNC: 4 G/DL (ref 3.5–5.2)
ANION GAP SERPL CALCULATED.3IONS-SCNC: 9 MMOL/L (ref 7–15)
BUN SERPL-MCNC: 28.6 MG/DL (ref 8–23)
CALCIUM SERPL-MCNC: 9.4 MG/DL (ref 8.8–10.2)
CHLORIDE SERPL-SCNC: 99 MMOL/L (ref 98–107)
CREAT SERPL-MCNC: 1.96 MG/DL (ref 0.67–1.17)
DEPRECATED HCO3 PLAS-SCNC: 28 MMOL/L (ref 22–29)
EGFRCR SERPLBLD CKD-EPI 2021: 34 ML/MIN/1.73M2
GLUCOSE SERPL-MCNC: 131 MG/DL (ref 70–99)
PHOSPHATE SERPL-MCNC: 3.9 MG/DL (ref 2.5–4.5)
POTASSIUM SERPL-SCNC: 4.2 MMOL/L (ref 3.4–5.3)
SODIUM SERPL-SCNC: 136 MMOL/L (ref 135–145)

## 2024-04-19 PROCEDURE — 84100 ASSAY OF PHOSPHORUS: CPT

## 2024-04-19 PROCEDURE — 36415 COLL VENOUS BLD VENIPUNCTURE: CPT

## 2024-05-02 ENCOUNTER — TELEPHONE (OUTPATIENT)
Dept: INTERNAL MEDICINE | Facility: CLINIC | Age: 79
End: 2024-05-02

## 2024-05-03 ENCOUNTER — ALLIED HEALTH/NURSE VISIT (OUTPATIENT)
Dept: UROLOGY | Facility: CLINIC | Age: 79
End: 2024-05-03
Payer: COMMERCIAL

## 2024-05-03 DIAGNOSIS — R39.89 SUSPECTED UTI: Primary | ICD-10-CM

## 2024-05-03 DIAGNOSIS — R33.9 URINARY RETENTION: ICD-10-CM

## 2024-05-03 LAB
ALBUMIN UR-MCNC: >=300 MG/DL
APPEARANCE UR: ABNORMAL
BILIRUB UR QL STRIP: NEGATIVE
COLOR UR AUTO: YELLOW
GLUCOSE UR STRIP-MCNC: NEGATIVE MG/DL
HGB UR QL STRIP: ABNORMAL
KETONES UR STRIP-MCNC: NEGATIVE MG/DL
LEUKOCYTE ESTERASE UR QL STRIP: ABNORMAL
NITRATE UR QL: NEGATIVE
PH UR STRIP: 8.5 [PH] (ref 5–7)
SP GR UR STRIP: 1.02 (ref 1–1.03)
UROBILINOGEN UR STRIP-ACNC: 0.2 E.U./DL

## 2024-05-03 PROCEDURE — 87086 URINE CULTURE/COLONY COUNT: CPT

## 2024-05-03 PROCEDURE — 87088 URINE BACTERIA CULTURE: CPT

## 2024-05-03 PROCEDURE — 81003 URINALYSIS AUTO W/O SCOPE: CPT | Mod: QW

## 2024-05-03 PROCEDURE — 87186 SC STD MICRODIL/AGAR DIL: CPT

## 2024-05-03 PROCEDURE — 51702 INSERT TEMP BLADDER CATH: CPT

## 2024-05-03 RX ORDER — LIDOCAINE HYDROCHLORIDE 20 MG/ML
JELLY TOPICAL ONCE
Status: COMPLETED | OUTPATIENT
Start: 2024-05-03 | End: 2024-05-03

## 2024-05-03 RX ORDER — CEFDINIR 300 MG/1
300 CAPSULE ORAL DAILY
Qty: 7 CAPSULE | Refills: 0 | Status: SHIPPED | OUTPATIENT
Start: 2024-05-03

## 2024-05-03 RX ADMIN — LIDOCAINE HYDROCHLORIDE 5 ML: 20 JELLY TOPICAL at 10:21

## 2024-05-04 LAB — BACTERIA UR CULT: ABNORMAL

## 2024-05-10 ENCOUNTER — TELEPHONE (OUTPATIENT)
Dept: CARDIOLOGY | Facility: CLINIC | Age: 79
End: 2024-05-10
Payer: COMMERCIAL

## 2024-05-10 ENCOUNTER — MEDICAL CORRESPONDENCE (OUTPATIENT)
Dept: HEALTH INFORMATION MANAGEMENT | Facility: CLINIC | Age: 79
End: 2024-05-10

## 2024-05-10 DIAGNOSIS — I50.23 ACUTE ON CHRONIC SYSTOLIC CONGESTIVE HEART FAILURE (H): ICD-10-CM

## 2024-05-10 RX ORDER — BUMETANIDE 1 MG/1
1 TABLET ORAL DAILY
Qty: 90 TABLET | Refills: 3 | Status: SHIPPED | OUTPATIENT
Start: 2024-05-10

## 2024-05-10 NOTE — TELEPHONE ENCOUNTER
1mg rx of bumex sent so patient can do 1.5 tablets until her pharmacy is able to fill the 0.5 rx.

## 2024-05-10 NOTE — TELEPHONE ENCOUNTER
[facility] Call Center    Phone Message    May a detailed message be left on voicemail: yes     Reason for Call: Medication Question or concern regarding medication   Prescription Clarification  Name of Medication: bumetanide (BUMEX) 1 MG tablet  bumetanide (BUMEX) 0.5 MG tablet  Prescribing Provider: Dr. Frank   Pharmacy: [id number] [place], [place] - [address]     What on the order needs clarification? Patient stated their pharmacy is unable to get Bumex 0.5 MG from their supplier, and will like to know if another order for 1 MG can be sent so patient is able to split that one in half to continue taking 1.5 MG a day. Please review and call patient back to further discuss.      Action Taken: Other: Cardiology    Travel Screening: Not Applicable    Thank you!  [facility]

## 2024-05-25 ENCOUNTER — HOSPITAL ENCOUNTER (EMERGENCY)
Facility: CLINIC | Age: 79
Discharge: HOME | End: 2024-05-25
Attending: EMERGENCY MEDICINE | Admitting: EMERGENCY MEDICINE
Payer: COMMERCIAL

## 2024-05-25 VITALS
OXYGEN SATURATION: 100 % | HEART RATE: 70 BPM | HEIGHT: 68 IN | WEIGHT: 200.4 LBS | SYSTOLIC BLOOD PRESSURE: 153 MMHG | TEMPERATURE: 97.6 F | BODY MASS INDEX: 30.37 KG/M2 | RESPIRATION RATE: 16 BRPM | DIASTOLIC BLOOD PRESSURE: 85 MMHG

## 2024-05-25 DIAGNOSIS — T83.511A URINARY TRACT INFECTION ASSOCIATED WITH INDWELLING URETHRAL CATHETER, INITIAL ENCOUNTER (H): ICD-10-CM

## 2024-05-25 DIAGNOSIS — T83.011A MALFUNCTION OF FOLEY CATHETER, INITIAL ENCOUNTER (H): ICD-10-CM

## 2024-05-25 DIAGNOSIS — N39.0 URINARY TRACT INFECTION ASSOCIATED WITH INDWELLING URETHRAL CATHETER, INITIAL ENCOUNTER (H): ICD-10-CM

## 2024-05-25 LAB
ALBUMIN UR-MCNC: 20 MG/DL
APPEARANCE UR: ABNORMAL
BACTERIA #/AREA URNS HPF: ABNORMAL /HPF
BILIRUB UR QL STRIP: NEGATIVE
COLOR UR AUTO: ABNORMAL
CREAT BLD-MCNC: 2.3 MG/DL (ref 0.7–1.3)
EGFRCR SERPLBLD CKD-EPI 2021: 28 ML/MIN/1.73M2
GLUCOSE UR STRIP-MCNC: NEGATIVE MG/DL
HGB UR QL STRIP: ABNORMAL
KETONES UR STRIP-MCNC: NEGATIVE MG/DL
LEUKOCYTE ESTERASE UR QL STRIP: ABNORMAL
NITRATE UR QL: NEGATIVE
PH UR STRIP: 7 [PH] (ref 5–7)
RBC URINE: 24 /HPF
SP GR UR STRIP: 1.01 (ref 1–1.03)
UROBILINOGEN UR STRIP-MCNC: NORMAL MG/DL
WBC CLUMPS #/AREA URNS HPF: PRESENT /HPF
WBC URINE: >182 /HPF
YEAST #/AREA URNS HPF: ABNORMAL /HPF

## 2024-05-25 PROCEDURE — 250N000013 HC RX MED GY IP 250 OP 250 PS 637: Performed by: EMERGENCY MEDICINE

## 2024-05-25 PROCEDURE — 87186 SC STD MICRODIL/AGAR DIL: CPT | Performed by: EMERGENCY MEDICINE

## 2024-05-25 PROCEDURE — 81001 URINALYSIS AUTO W/SCOPE: CPT | Performed by: EMERGENCY MEDICINE

## 2024-05-25 PROCEDURE — 51702 INSERT TEMP BLADDER CATH: CPT

## 2024-05-25 PROCEDURE — 87086 URINE CULTURE/COLONY COUNT: CPT | Performed by: EMERGENCY MEDICINE

## 2024-05-25 PROCEDURE — 99284 EMERGENCY DEPT VISIT MOD MDM: CPT | Mod: 25

## 2024-05-25 PROCEDURE — 51798 US URINE CAPACITY MEASURE: CPT

## 2024-05-25 PROCEDURE — 82565 ASSAY OF CREATININE: CPT

## 2024-05-25 RX ORDER — CEPHALEXIN 500 MG/1
500 CAPSULE ORAL 2 TIMES DAILY
Qty: 20 CAPSULE | Refills: 0 | Status: SHIPPED | OUTPATIENT
Start: 2024-05-25 | End: 2024-06-04

## 2024-05-25 RX ORDER — CEPHALEXIN 500 MG/1
500 CAPSULE ORAL ONCE
Status: COMPLETED | OUTPATIENT
Start: 2024-05-25 | End: 2024-05-25

## 2024-05-25 RX ADMIN — CEPHALEXIN 500 MG: 500 CAPSULE ORAL at 15:15

## 2024-05-25 ASSESSMENT — ACTIVITIES OF DAILY LIVING (ADL)
ADLS_ACUITY_SCORE: 37
ADLS_ACUITY_SCORE: 35

## 2024-05-25 ASSESSMENT — COLUMBIA-SUICIDE SEVERITY RATING SCALE - C-SSRS
6. HAVE YOU EVER DONE ANYTHING, STARTED TO DO ANYTHING, OR PREPARED TO DO ANYTHING TO END YOUR LIFE?: NO
2. HAVE YOU ACTUALLY HAD ANY THOUGHTS OF KILLING YOURSELF IN THE PAST MONTH?: NO
1. IN THE PAST MONTH, HAVE YOU WISHED YOU WERE DEAD OR WISHED YOU COULD GO TO SLEEP AND NOT WAKE UP?: NO

## 2024-05-25 NOTE — ED TRIAGE NOTES
Patient reports that his indwelling cathter is not draining appropriately. He suspects that it stopped working overnight.

## 2024-05-25 NOTE — ED PROVIDER NOTES
"  Emergency Department Note      History of Present Illness     Chief Complaint  Catheter Problem    HPI  Gene Pagan is a 79 year old male anticoagulated on Eliquis with a history of BPH who presents due to catheter problem. The patient reports that he woke up to his bedside catheter bag to have a decreased volume. He notes a concern that his catheter appears to be clogged. He notes abdominal distenstion and reports that his bladder feels full. He notes that he has an appointment with his urologist scheduled next week and gets his catheter changed once a month. He denies nausea, vomiting, or fever.      Independent Historian  None    Review of External Notes  I reviewed [facility] note from 1/23/24. The patient's last catheter exchange occurred on 4/16.   Past Medical History   Medical History and Problem List  Anemia   Atrial fibrillation   BPH   CHF   Diabetes Mellitus   Thoracic aortic ectasia   Diabetic nephropathy   CKD      Medications  Eliquis   Bumex   Proscar   Insulin glargine   Toprol   Flomax     Surgical History   Cardioversion   Cystoscopy   Pacemaker placement   Rezum   Physical Exam   Patient Vitals for the past 24 hrs:   BP Temp Temp src Pulse Resp SpO2 Height Weight   05/25/24 1320 (!) 153/85 97.6  F (36.4  C) Temporal 70 16 100 % 1.727 m (5' 8\") 90.9 kg (200 lb 6.4 oz)     Physical Exam        HEENT:    Oropharynx is moist  Eyes:    Conjunctiva normal  Neck:     Supple, no meningismus.     CV:     Regular rate and rhythm.      No murmurs, rubs or gallops.  PULM:    Clear to auscultation bilateral.       No respiratory distress.      Good air exchange.     No rales or wheezing.  ABD:    Soft, non-distended.       No abdominal tenderness.     No rebound, guarding or rigidity.     No CVA tenderness.      No hepatosplenomegaly.  MSK:     No gross deformity to all four extremities.   LYMPH:   No cervical lymphadenopathy.  NEURO:   Alert.  Good muscular tone, no atrophy.   Skin: "    Warm, dry and intact.    Psych:    Mood is good and affect is appropriate.      Diagnostics   Lab Results   Labs Ordered and Resulted from Time of ED Arrival to Time of ED Departure   ROUTINE UA WITH MICROSCOPIC REFLEX TO CULTURE - Abnormal       Result Value    Color Urine Light Yellow      Appearance Urine Slightly Cloudy (*)     Glucose Urine Negative      Bilirubin Urine Negative      Ketones Urine Negative      Specific Gravity Urine 1.010      Blood Urine Small (*)     pH Urine 7.0      Protein Albumin Urine 20 (*)     Urobilinogen Urine Normal      Nitrite Urine Negative      Leukocyte Esterase Urine Large (*)     Bacteria Urine Moderate (*)     WBC Clumps Urine Present (*)     Budding Yeast Urine Many (*)     RBC Urine 24 (*)     WBC Urine >182 (*)    ISTAT CREATININE POCT - Abnormal    Creatinine POCT 2.3 (*)     GFR, ESTIMATED POCT 28 (*)    URINE CULTURE           Independent Interpretation  None  ED Course    Medications Administered  Medications   cephALEXin (KEFLEX) capsule 500 mg (500 mg Oral $Given 5/25/24 1518)       Procedures  Procedures     Discussion of Management  None    Social Determinants of Health adding to complexity of care  None    ED Course  ED Course as of 05/25/24 1833   Sat May 25, 2024   1345 I obtained history and examined the patient as noted above.      Medical Decision Making / Diagnosis       PRISCA    Gene Pagan is a 79 year old male with a history of chronic urinary retention presents with malfunctioning Mckenzie catheter that is no longer draining.  Mckenzie catheter was replaced and is now draining appropriately.  There is no evidence of novel postobstructive renal insufficiency.  Urinalysis concerning with infection.  Based on previous urine cultures, patient placed on cephalexin and will be discharged home on the same.  Follow-up with urology.  Return ED for worsening symptoms    Disposition  The patient was discharged.     ICD-10 Codes:    ICD-10-CM    1. Malfunction of  Mckenzie catheter, initial encounter (H24)  T83.011A       2. Urinary tract infection associated with indwelling urethral catheter, initial encounter  (H24)  T83.511A     N39.0            Discharge Medications  Discharge Medication List as of 5/25/2024  3:24 PM        START taking these medications    Details   cephALEXin (KEFLEX) 500 MG capsule Take 1 capsule (500 mg) by mouth 2 times daily for 10 days, Disp-20 capsule, R-0, E-Prescribe           Scribe Disclosure:  I, Karma Benitez, am serving as a scribe at 1:57 PM on 5/25/2024 to document services personally performed by Maged Sam MD based on my observations and the provider's statements to me.        Maged Sam MD  05/25/24 5427

## 2024-05-27 LAB
BACTERIA UR CULT: ABNORMAL
BACTERIA UR CULT: ABNORMAL

## 2024-05-28 ENCOUNTER — PATIENT OUTREACH (OUTPATIENT)
Dept: INTERNAL MEDICINE | Facility: CLINIC | Age: 79
End: 2024-05-28
Payer: COMMERCIAL

## 2024-05-28 ENCOUNTER — TELEPHONE (OUTPATIENT)
Dept: EMERGENCY MEDICINE | Facility: CLINIC | Age: 79
End: 2024-05-28
Payer: COMMERCIAL

## 2024-05-28 DIAGNOSIS — I48.91 ATRIAL FIBRILLATION, UNSPECIFIED TYPE (H): ICD-10-CM

## 2024-05-28 NOTE — TELEPHONE ENCOUNTER
Transitions of Care Outreach  Chief Complaint   Patient presents with    Hospital F/U       Most Recent Admission Date: 5/25/2024   Most Recent Admission Diagnosis:      Most Recent Discharge Date: 5/25/2024   Most Recent Discharge Diagnosis: Malfunction of Mckenzie catheter, initial encounter (H24) - T83.011A  Urinary tract infection associated with indwelling urethral catheter, initial encounter  (H24) - T83.511A, N39.0     Transitions of Care Assessment    Discharge Assessment  How are you doing now that you are home?: Doing good now, gave me 10 days supply of abx.  How are your symptoms? (Red Flag symptoms escalate to triage hotline per guidelines): Improved  Do you know how to contact your clinic care team if you have future questions or changes to your health status? : Yes  Does the patient have their discharge instructions? : Yes  Does the patient have questions regarding their discharge instructions? : No  Were you started on any new medications or were there changes to any of your previous medications? : Yes  Does the patient have all of their medications?: Yes  Do you have questions regarding any of your medications? : No  Do you have all of your needed medical supplies or equipment (DME)?  (i.e. oxygen tank, CPAP, cane, etc.): Yes    Follow up Plan     Discharge Follow-Up  Discharge follow up appointment scheduled in alignment with recommended follow up timeframe or Transitions of Risk Category? (Low = within 30 days; Moderate= within 14 days; High= within 7 days): No  Patient's follow up appointment not scheduled: Patient declined scheduling support. Education on the importance of transitions of care follow up. Provided scheduling phone number.    Future Appointments   Date Time Provider Department Center   5/29/2024  1:30 PM [facility] NURSE ANASTACIA GUERRERO [facility]   6/13/2024 12:00 AM [facility] TECH [facility]   7/23/2024  1:00 PM [facility] NURSE ANASTACIA GUERRERO [facility]       Outpatient Plan as outlined on AVS reviewed with  patient.    For any urgent concerns, please contact our 24 hour nurse triage line: [phone] ([phone])       Ibeth Roth RN

## 2024-05-28 NOTE — TELEPHONE ENCOUNTER
"[facility]    Reason for call: Lab Result Notification     Lab Result (including Rx patient on, if applicable).  If culture, copy of lab report at bottom.  Lab Result: See below  cephALEXin (KEFLEX) 500 MG capsule BID x 10 days SUSCEPTIBLE    Creatinine Level (mg/dl)   Creatinine   Date Value Ref Range Status   04/19/2024 1.96 (H) 0.67 - 1.17 mg/dL Final   04/06/2021 2.30 (H) 0.66 - 1.25 mg/dL Final     Creatinine POCT   Date Value Ref Range Status   05/25/2024 2.3 (H) 0.7 - 1.3 mg/dL Final    Creatinine clearance (ml/min), if applicable    Serum creatinine: 2.3 mg/dL (H) 05/25/24 1409  Estimated creatinine clearance: 27.6 mL/min (A)     Patient's current Symptoms:   \"Seems like its working\". Says he has a follow up on Wednesday.     RN Recommendations/Instructions per [place] ED lab result protocol:   [facility] ED lab result protocol utilized: urine culture.    Patient/care giver notified to contact your PCP clinic or return to the Emergency department if your:  Symptoms return.  Symptoms do not improve after 3 days on antibiotic.  Symptoms do not resolve after completing antibiotic.  Symptoms worsen or other concerning symptoms.    Dereck Huitron RN   "

## 2024-06-05 DIAGNOSIS — Z79.4 TYPE 2 DIABETES MELLITUS WITH STAGE 4 CHRONIC KIDNEY DISEASE, WITH LONG-TERM CURRENT USE OF INSULIN (H): ICD-10-CM

## 2024-06-05 DIAGNOSIS — N18.4 TYPE 2 DIABETES MELLITUS WITH STAGE 4 CHRONIC KIDNEY DISEASE, WITH LONG-TERM CURRENT USE OF INSULIN (H): ICD-10-CM

## 2024-06-05 DIAGNOSIS — E11.22 TYPE 2 DIABETES MELLITUS WITH STAGE 4 CHRONIC KIDNEY DISEASE, WITH LONG-TERM CURRENT USE OF INSULIN (H): ICD-10-CM

## 2024-06-06 RX ORDER — METHYLPREDNISOLONE SODIUM SUCCINATE 125 MG/2ML
INJECTION, POWDER, FOR SOLUTION INTRAMUSCULAR; INTRAVENOUS
Qty: 100 EACH | Refills: 2 | Status: SHIPPED | OUTPATIENT
Start: 2024-06-06

## 2024-06-13 ENCOUNTER — ANCILLARY PROCEDURE (OUTPATIENT)
Dept: CARDIOLOGY | Facility: CLINIC | Age: 79
End: 2024-06-13
Attending: INTERNAL MEDICINE
Payer: COMMERCIAL

## 2024-06-13 DIAGNOSIS — I50.23 ACUTE ON CHRONIC SYSTOLIC CONGESTIVE HEART FAILURE (H): ICD-10-CM

## 2024-06-13 DIAGNOSIS — Z95.0 CARDIAC PACEMAKER IN SITU: ICD-10-CM

## 2024-06-13 PROCEDURE — 93296 REM INTERROG EVL PM/IDS: CPT | Performed by: INTERNAL MEDICINE

## 2024-06-13 PROCEDURE — 93294 REM INTERROG EVL PM/LDLS PM: CPT | Performed by: INTERNAL MEDICINE

## 2024-06-14 LAB
MDC_IDC_EPISODE_DTM: NORMAL
MDC_IDC_EPISODE_ID: NORMAL
MDC_IDC_EPISODE_TYPE: NORMAL
MDC_IDC_LEAD_CONNECTION_STATUS: NORMAL
MDC_IDC_LEAD_IMPLANT_DT: NORMAL
MDC_IDC_LEAD_LOCATION: NORMAL
MDC_IDC_LEAD_LOCATION_DETAIL_1: NORMAL
MDC_IDC_LEAD_MFG: NORMAL
MDC_IDC_LEAD_MODEL: NORMAL
MDC_IDC_LEAD_POLARITY_TYPE: NORMAL
MDC_IDC_LEAD_SERIAL: NORMAL
MDC_IDC_MSMT_BATTERY_DTM: NORMAL
MDC_IDC_MSMT_BATTERY_REMAINING_LONGEVITY: 114 MO
MDC_IDC_MSMT_BATTERY_REMAINING_PERCENTAGE: 100 %
MDC_IDC_MSMT_BATTERY_STATUS: NORMAL
MDC_IDC_MSMT_LEADCHNL_LV_IMPEDANCE_VALUE: 1791 OHM
MDC_IDC_MSMT_LEADCHNL_LV_PACING_THRESHOLD_AMPLITUDE: 0.7 V
MDC_IDC_MSMT_LEADCHNL_LV_PACING_THRESHOLD_PULSEWIDTH: 0.5 MS
MDC_IDC_MSMT_LEADCHNL_RA_IMPEDANCE_VALUE: 785 OHM
MDC_IDC_MSMT_LEADCHNL_RV_IMPEDANCE_VALUE: 654 OHM
MDC_IDC_MSMT_LEADCHNL_RV_PACING_THRESHOLD_AMPLITUDE: 1 V
MDC_IDC_MSMT_LEADCHNL_RV_PACING_THRESHOLD_PULSEWIDTH: 0.4 MS
MDC_IDC_PG_IMPLANT_DTM: NORMAL
MDC_IDC_PG_MFG: NORMAL
MDC_IDC_PG_MODEL: NORMAL
MDC_IDC_PG_SERIAL: NORMAL
MDC_IDC_PG_TYPE: NORMAL
MDC_IDC_SESS_CLINIC_NAME: NORMAL
MDC_IDC_SESS_DTM: NORMAL
MDC_IDC_SESS_TYPE: NORMAL
MDC_IDC_SET_BRADY_AT_MODE_SWITCH_RATE: 140 {BEATS}/MIN
MDC_IDC_SET_BRADY_LOWRATE: 70 {BEATS}/MIN
MDC_IDC_SET_BRADY_MAX_SENSOR_RATE: 130 {BEATS}/MIN
MDC_IDC_SET_BRADY_MODE: NORMAL
MDC_IDC_SET_CRT_LVRV_DELAY: 30 MS
MDC_IDC_SET_CRT_PACED_CHAMBERS: NORMAL
MDC_IDC_SET_LEADCHNL_LV_PACING_AMPLITUDE: 2 V
MDC_IDC_SET_LEADCHNL_LV_PACING_ANODE_ELECTRODE_1: NORMAL
MDC_IDC_SET_LEADCHNL_LV_PACING_ANODE_LOCATION_1: NORMAL
MDC_IDC_SET_LEADCHNL_LV_PACING_CATHODE_ELECTRODE_1: NORMAL
MDC_IDC_SET_LEADCHNL_LV_PACING_CATHODE_LOCATION_1: NORMAL
MDC_IDC_SET_LEADCHNL_LV_PACING_PULSEWIDTH: 0.5 MS
MDC_IDC_SET_LEADCHNL_LV_SENSING_ADAPTATION_MODE: NORMAL
MDC_IDC_SET_LEADCHNL_LV_SENSING_ANODE_ELECTRODE_1: NORMAL
MDC_IDC_SET_LEADCHNL_LV_SENSING_ANODE_LOCATION_1: NORMAL
MDC_IDC_SET_LEADCHNL_LV_SENSING_CATHODE_ELECTRODE_1: NORMAL
MDC_IDC_SET_LEADCHNL_LV_SENSING_CATHODE_LOCATION_1: NORMAL
MDC_IDC_SET_LEADCHNL_LV_SENSING_SENSITIVITY: 1.5 MV
MDC_IDC_SET_LEADCHNL_RA_SENSING_ADAPTATION_MODE: NORMAL
MDC_IDC_SET_LEADCHNL_RA_SENSING_SENSITIVITY: 0.25 MV
MDC_IDC_SET_LEADCHNL_RV_PACING_AMPLITUDE: 2.2 V
MDC_IDC_SET_LEADCHNL_RV_PACING_CAPTURE_MODE: NORMAL
MDC_IDC_SET_LEADCHNL_RV_PACING_POLARITY: NORMAL
MDC_IDC_SET_LEADCHNL_RV_PACING_PULSEWIDTH: 0.4 MS
MDC_IDC_SET_LEADCHNL_RV_SENSING_ADAPTATION_MODE: NORMAL
MDC_IDC_SET_LEADCHNL_RV_SENSING_POLARITY: NORMAL
MDC_IDC_SET_LEADCHNL_RV_SENSING_SENSITIVITY: 1.5 MV
MDC_IDC_SET_ZONE_DETECTION_INTERVAL: 375 MS
MDC_IDC_SET_ZONE_STATUS: NORMAL
MDC_IDC_SET_ZONE_TYPE: NORMAL
MDC_IDC_SET_ZONE_VENDOR_TYPE: NORMAL
MDC_IDC_STAT_BRADY_DTM_END: NORMAL
MDC_IDC_STAT_BRADY_DTM_START: NORMAL
MDC_IDC_STAT_BRADY_RA_PERCENT_PACED: 0 %
MDC_IDC_STAT_BRADY_RV_PERCENT_PACED: 99 %
MDC_IDC_STAT_CRT_DTM_END: NORMAL
MDC_IDC_STAT_CRT_DTM_START: NORMAL
MDC_IDC_STAT_CRT_LV_PERCENT_PACED: 99 %
MDC_IDC_STAT_EPISODE_RECENT_COUNT: 0
MDC_IDC_STAT_EPISODE_RECENT_COUNT: 1
MDC_IDC_STAT_EPISODE_RECENT_COUNT: 2
MDC_IDC_STAT_EPISODE_RECENT_COUNT_DTM_END: NORMAL
MDC_IDC_STAT_EPISODE_RECENT_COUNT_DTM_START: NORMAL
MDC_IDC_STAT_EPISODE_TYPE: NORMAL
MDC_IDC_STAT_EPISODE_VENDOR_TYPE: NORMAL
MDC_IDC_STAT_EPISODE_VENDOR_TYPE: NORMAL

## 2024-06-18 ENCOUNTER — ALLIED HEALTH/NURSE VISIT (OUTPATIENT)
Dept: UROLOGY | Facility: CLINIC | Age: 79
End: 2024-06-18
Payer: COMMERCIAL

## 2024-06-18 DIAGNOSIS — R39.9 UTI SYMPTOMS: ICD-10-CM

## 2024-06-18 DIAGNOSIS — Z79.2 PROPHYLACTIC ANTIBIOTIC: ICD-10-CM

## 2024-06-18 DIAGNOSIS — R33.9 URINARY RETENTION: Primary | ICD-10-CM

## 2024-06-18 LAB
ALBUMIN UR-MCNC: 30 MG/DL
APPEARANCE UR: ABNORMAL
BILIRUB UR QL STRIP: NEGATIVE
COLOR UR AUTO: YELLOW
GLUCOSE UR STRIP-MCNC: NEGATIVE MG/DL
HGB UR QL STRIP: ABNORMAL
KETONES UR STRIP-MCNC: NEGATIVE MG/DL
LEUKOCYTE ESTERASE UR QL STRIP: ABNORMAL
NITRATE UR QL: POSITIVE
PH UR STRIP: 5.5 [PH] (ref 5–7)
SP GR UR STRIP: 1.02 (ref 1–1.03)
UROBILINOGEN UR STRIP-ACNC: 0.2 E.U./DL

## 2024-06-18 PROCEDURE — 51702 INSERT TEMP BLADDER CATH: CPT

## 2024-06-18 PROCEDURE — 87186 SC STD MICRODIL/AGAR DIL: CPT

## 2024-06-18 PROCEDURE — 81003 URINALYSIS AUTO W/O SCOPE: CPT | Mod: QW

## 2024-06-18 PROCEDURE — 99207 PR NO CHARGE NURSE ONLY: CPT

## 2024-06-18 PROCEDURE — 87086 URINE CULTURE/COLONY COUNT: CPT

## 2024-06-18 RX ORDER — CIPROFLOXACIN 500 MG/1
500 TABLET, FILM COATED ORAL PRN
Qty: 1 TABLET | Refills: 0 | Status: SHIPPED | OUTPATIENT
Start: 2024-06-18

## 2024-06-18 RX ORDER — LIDOCAINE HYDROCHLORIDE 20 MG/ML
JELLY TOPICAL ONCE
Status: COMPLETED | OUTPATIENT
Start: 2024-06-18 | End: 2024-06-18

## 2024-06-18 RX ADMIN — LIDOCAINE HYDROCHLORIDE: 20 JELLY TOPICAL at 14:24

## 2024-06-18 NOTE — PROGRESS NOTES
Gene Pagan comes into clinic today for Urinary Retention  at the request of Miguel Gonzalez CNP, Ordering Provider for Catheter Change.    Pt notes urine has been cloudy for the past week and he has some burning sensation in the urethra.  Specimen collected today for UA/UC, from new catheter.    The patient is here for a Mckenzie catheter change.  Patient's catheter was disconnected from the leg bag and the balloon deflated. The catheter was removed with no complaints offered by the patient and the procedure was tolerated well.      5mL 2% lidocaine hydrochloride Urojet instilled into urethra.    NDC# [address]-8  Lot #: [id number]  Expiration Date:  1/26    Using sterile field technique a sterile, well lubricated 16 [place] Mckenzie coude catheter was gently inserted with ease x 1 attempt.  The balloon was filled with 10 ml sterile water.  No discomfort was voiced by the patient.  Cloudy, yellow urine return from the catheter was noted, and specimen collected for UA/UC.  Sterile tubing and drainage bag were attached to the catheter and secured to the thigh. Patient to follow up in approximately one month as planned.    Pt will take 1 tablet antibiotic today after catheter change    This service provided today was under the supervising provider of the day Miguel Gonzalez CNP, who was available if needed.    Aydee Thakur, EMT

## 2024-06-20 ENCOUNTER — ANCILLARY PROCEDURE (OUTPATIENT)
Dept: CARDIOLOGY | Facility: CLINIC | Age: 79
End: 2024-06-20
Attending: INTERNAL MEDICINE
Payer: COMMERCIAL

## 2024-06-20 ENCOUNTER — TELEPHONE (OUTPATIENT)
Dept: CARDIOLOGY | Facility: CLINIC | Age: 79
End: 2024-06-20

## 2024-06-20 DIAGNOSIS — I50.23 ACUTE ON CHRONIC SYSTOLIC CONGESTIVE HEART FAILURE (H): ICD-10-CM

## 2024-06-20 DIAGNOSIS — Z95.0 CARDIAC PACEMAKER IN SITU: ICD-10-CM

## 2024-06-20 PROCEDURE — 93281 PM DEVICE PROGR EVAL MULTI: CPT | Performed by: INTERNAL MEDICINE

## 2024-06-20 NOTE — TELEPHONE ENCOUNTER
"Pt seen for annual device check, noted to have increasing LV impedance values establishing an upward trend most noticeably since Feb 2024.  Pt denies any incidents to the area outside of \"occasionally the cats hit the area.\"  Sensing and threshold for LV stable.  Lead is working appropriately, BiVP 99%.      Currently pt is scheduled for next remote device check 10/3/24.  Will route to implanting EP MD for review and recommendations.    JIMMIE Abdi        "

## 2024-06-21 LAB — BACTERIA UR CULT: ABNORMAL

## 2024-06-26 LAB
MDC_IDC_LEAD_CONNECTION_STATUS: NORMAL
MDC_IDC_LEAD_IMPLANT_DT: NORMAL
MDC_IDC_LEAD_LOCATION: NORMAL
MDC_IDC_LEAD_LOCATION_DETAIL_1: NORMAL
MDC_IDC_LEAD_MFG: NORMAL
MDC_IDC_LEAD_MODEL: NORMAL
MDC_IDC_LEAD_POLARITY_TYPE: NORMAL
MDC_IDC_LEAD_SERIAL: NORMAL
MDC_IDC_MSMT_BATTERY_DTM: NORMAL
MDC_IDC_MSMT_BATTERY_REMAINING_LONGEVITY: 114 MO
MDC_IDC_MSMT_BATTERY_REMAINING_PERCENTAGE: 100 %
MDC_IDC_MSMT_BATTERY_STATUS: NORMAL
MDC_IDC_MSMT_LEADCHNL_LV_IMPEDANCE_VALUE: 1798 OHM
MDC_IDC_MSMT_LEADCHNL_LV_IMPEDANCE_VALUE: 1798 OHM
MDC_IDC_MSMT_LEADCHNL_LV_PACING_THRESHOLD_AMPLITUDE: 0.6 V
MDC_IDC_MSMT_LEADCHNL_LV_PACING_THRESHOLD_PULSEWIDTH: 0.5 MS
MDC_IDC_MSMT_LEADCHNL_RA_IMPEDANCE_VALUE: 783 OHM
MDC_IDC_MSMT_LEADCHNL_RV_IMPEDANCE_VALUE: 666 OHM
MDC_IDC_MSMT_LEADCHNL_RV_LEAD_CHANNEL_STATUS: NORMAL
MDC_IDC_MSMT_LEADCHNL_RV_PACING_THRESHOLD_AMPLITUDE: 1 V
MDC_IDC_MSMT_LEADCHNL_RV_PACING_THRESHOLD_PULSEWIDTH: 0.4 MS
MDC_IDC_PG_IMPLANT_DTM: NORMAL
MDC_IDC_PG_MFG: NORMAL
MDC_IDC_PG_MODEL: NORMAL
MDC_IDC_PG_SERIAL: NORMAL
MDC_IDC_PG_TYPE: NORMAL
MDC_IDC_SESS_CLINIC_NAME: NORMAL
MDC_IDC_SESS_DTM: NORMAL
MDC_IDC_SESS_TYPE: NORMAL
MDC_IDC_SET_BRADY_LOWRATE: 70 {BEATS}/MIN
MDC_IDC_SET_BRADY_MAX_SENSOR_RATE: 130 {BEATS}/MIN
MDC_IDC_SET_BRADY_MODE: NORMAL
MDC_IDC_SET_CRT_LVRV_DELAY: 30 MS
MDC_IDC_SET_CRT_PACED_CHAMBERS: NORMAL
MDC_IDC_SET_LEADCHNL_LV_PACING_AMPLITUDE: 2 V
MDC_IDC_SET_LEADCHNL_LV_PACING_ANODE_ELECTRODE_1: NORMAL
MDC_IDC_SET_LEADCHNL_LV_PACING_ANODE_LOCATION_1: NORMAL
MDC_IDC_SET_LEADCHNL_LV_PACING_CATHODE_ELECTRODE_1: NORMAL
MDC_IDC_SET_LEADCHNL_LV_PACING_CATHODE_LOCATION_1: NORMAL
MDC_IDC_SET_LEADCHNL_LV_PACING_PULSEWIDTH: 0.5 MS
MDC_IDC_SET_LEADCHNL_LV_SENSING_ADAPTATION_MODE: NORMAL
MDC_IDC_SET_LEADCHNL_LV_SENSING_ANODE_ELECTRODE_1: NORMAL
MDC_IDC_SET_LEADCHNL_LV_SENSING_ANODE_LOCATION_1: NORMAL
MDC_IDC_SET_LEADCHNL_LV_SENSING_CATHODE_ELECTRODE_1: NORMAL
MDC_IDC_SET_LEADCHNL_LV_SENSING_CATHODE_LOCATION_1: NORMAL
MDC_IDC_SET_LEADCHNL_LV_SENSING_SENSITIVITY: 1.5 MV
MDC_IDC_SET_LEADCHNL_RA_SENSING_ADAPTATION_MODE: NORMAL
MDC_IDC_SET_LEADCHNL_RA_SENSING_SENSITIVITY: 0.25 MV
MDC_IDC_SET_LEADCHNL_RV_PACING_AMPLITUDE: 2 V
MDC_IDC_SET_LEADCHNL_RV_PACING_CAPTURE_MODE: NORMAL
MDC_IDC_SET_LEADCHNL_RV_PACING_POLARITY: NORMAL
MDC_IDC_SET_LEADCHNL_RV_PACING_PULSEWIDTH: 0.4 MS
MDC_IDC_SET_LEADCHNL_RV_SENSING_ADAPTATION_MODE: NORMAL
MDC_IDC_SET_LEADCHNL_RV_SENSING_POLARITY: NORMAL
MDC_IDC_SET_LEADCHNL_RV_SENSING_SENSITIVITY: 1.5 MV
MDC_IDC_SET_ZONE_DETECTION_INTERVAL: 375 MS
MDC_IDC_SET_ZONE_STATUS: NORMAL
MDC_IDC_SET_ZONE_TYPE: NORMAL
MDC_IDC_SET_ZONE_VENDOR_TYPE: NORMAL
MDC_IDC_STAT_BRADY_DTM_END: NORMAL
MDC_IDC_STAT_BRADY_DTM_START: NORMAL
MDC_IDC_STAT_BRADY_RA_PERCENT_PACED: 0 %
MDC_IDC_STAT_BRADY_RV_PERCENT_PACED: 99 %
MDC_IDC_STAT_CRT_DTM_END: NORMAL
MDC_IDC_STAT_CRT_DTM_START: NORMAL
MDC_IDC_STAT_CRT_LV_PERCENT_PACED: 99 %
MDC_IDC_STAT_EPISODE_RECENT_COUNT: 0
MDC_IDC_STAT_EPISODE_RECENT_COUNT: 1
MDC_IDC_STAT_EPISODE_RECENT_COUNT: 2
MDC_IDC_STAT_EPISODE_RECENT_COUNT_DTM_END: NORMAL
MDC_IDC_STAT_EPISODE_RECENT_COUNT_DTM_START: NORMAL
MDC_IDC_STAT_EPISODE_TYPE: NORMAL
MDC_IDC_STAT_EPISODE_VENDOR_TYPE: NORMAL
MDC_IDC_STAT_EPISODE_VENDOR_TYPE: NORMAL

## 2024-06-26 NOTE — TELEPHONE ENCOUNTER
Remote transmission received, slight improvement from in-clinic check results from 6/20/24 but remains elevated.        JIMMIE Claire   no

## 2024-06-26 NOTE — TELEPHONE ENCOUNTER
Asked pt to send remote transmission (PPI approved on [employer] site)for courtesy check to assess LV lead impedance.  Would like to be able to evaluate for provider update.    JIMMIE Abdi

## 2024-06-29 ENCOUNTER — HEALTH MAINTENANCE LETTER (OUTPATIENT)
Age: 79
End: 2024-06-29

## 2024-07-03 ENCOUNTER — MEDICAL CORRESPONDENCE (OUTPATIENT)
Dept: HEALTH INFORMATION MANAGEMENT | Facility: CLINIC | Age: 79
End: 2024-07-03

## 2024-07-03 NOTE — TELEPHONE ENCOUNTER
Form arrived via fax from [employer] for 20-30 mmgh Knee High Compression stockings. Signed by Dr Lipscomb and faxed to [phone]

## 2024-07-03 NOTE — TELEPHONE ENCOUNTER
Checked Dr Lipscomb's folders and also did not find this form.      Called [employer] at [phone] and spoke to Yanira. She is going to re-fax this order directly to fax at my station [phone].  Once this form arrives I will get signature from provider and fax back to [employer].

## 2024-07-05 ENCOUNTER — TELEPHONE (OUTPATIENT)
Dept: INTERNAL MEDICINE | Facility: CLINIC | Age: 79
End: 2024-07-05
Payer: COMMERCIAL

## 2024-07-05 DIAGNOSIS — T83.031S: Primary | ICD-10-CM

## 2024-07-05 NOTE — TELEPHONE ENCOUNTER
Incontinence Supply request arrived via fax from [employer].    Placed in provider mailbox for signature

## 2024-07-18 DIAGNOSIS — N18.30 CHRONIC KIDNEY DISEASE, STAGE III (MODERATE) (H): ICD-10-CM

## 2024-07-18 DIAGNOSIS — E11.21 DIABETIC NEPHROPATHY (H): Primary | ICD-10-CM

## 2024-07-22 ENCOUNTER — ALLIED HEALTH/NURSE VISIT (OUTPATIENT)
Dept: UROLOGY | Facility: CLINIC | Age: 79
End: 2024-07-22
Payer: COMMERCIAL

## 2024-07-22 ENCOUNTER — LAB (OUTPATIENT)
Dept: LAB | Facility: CLINIC | Age: 79
End: 2024-07-22
Payer: COMMERCIAL

## 2024-07-22 DIAGNOSIS — R33.9 URINARY RETENTION: Primary | ICD-10-CM

## 2024-07-22 DIAGNOSIS — N18.30 CHRONIC KIDNEY DISEASE, STAGE III (MODERATE) (H): ICD-10-CM

## 2024-07-22 DIAGNOSIS — E11.21 DIABETIC NEPHROPATHY (H): ICD-10-CM

## 2024-07-22 LAB
ALBUMIN SERPL BCG-MCNC: 4.1 G/DL (ref 3.5–5.2)
ALBUMIN UR-MCNC: NEGATIVE MG/DL
ANION GAP SERPL CALCULATED.3IONS-SCNC: 14 MMOL/L (ref 7–15)
APPEARANCE UR: CLEAR
BASOPHILS # BLD AUTO: 0.1 10E3/UL (ref 0–0.2)
BASOPHILS NFR BLD AUTO: 1 %
BILIRUB UR QL STRIP: NEGATIVE
BUN SERPL-MCNC: 31.5 MG/DL (ref 8–23)
CALCIUM SERPL-MCNC: 9 MG/DL (ref 8.8–10.4)
CHLORIDE SERPL-SCNC: 101 MMOL/L (ref 98–107)
COLOR UR AUTO: ABNORMAL
CREAT SERPL-MCNC: 2.1 MG/DL (ref 0.67–1.17)
CREAT UR-MCNC: 38.8 MG/DL
EGFRCR SERPLBLD CKD-EPI 2021: 31 ML/MIN/1.73M2
EOSINOPHIL # BLD AUTO: 0.3 10E3/UL (ref 0–0.7)
EOSINOPHIL NFR BLD AUTO: 3 %
ERYTHROCYTE [DISTWIDTH] IN BLOOD BY AUTOMATED COUNT: 14.8 % (ref 10–15)
FERRITIN SERPL-MCNC: 250 NG/ML (ref 31–409)
GLUCOSE SERPL-MCNC: 130 MG/DL (ref 70–99)
GLUCOSE UR STRIP-MCNC: NEGATIVE MG/DL
HBA1C MFR BLD: 6.5 %
HCO3 SERPL-SCNC: 22 MMOL/L (ref 22–29)
HCT VFR BLD AUTO: 41.9 % (ref 40–53)
HGB BLD-MCNC: 13.3 G/DL (ref 13.3–17.7)
HGB UR QL STRIP: NEGATIVE
IMM GRANULOCYTES # BLD: 0 10E3/UL
IMM GRANULOCYTES NFR BLD: 0 %
IRON BINDING CAPACITY (ROCHE): 266 UG/DL (ref 240–430)
IRON SATN MFR SERPL: 47 % (ref 15–46)
IRON SERPL-MCNC: 125 UG/DL (ref 61–157)
KETONES UR STRIP-MCNC: NEGATIVE MG/DL
LEUKOCYTE ESTERASE UR QL STRIP: ABNORMAL
LYMPHOCYTES # BLD AUTO: 1.8 10E3/UL (ref 0.8–5.3)
LYMPHOCYTES NFR BLD AUTO: 21 %
MCH RBC QN AUTO: 29.8 PG (ref 26.5–33)
MCHC RBC AUTO-ENTMCNC: 31.7 G/DL (ref 31.5–36.5)
MCV RBC AUTO: 94 FL (ref 78–100)
MICROALBUMIN UR-MCNC: 34.1 MG/L
MICROALBUMIN/CREAT UR: 87.89 MG/G CR (ref 0–17)
MONOCYTES # BLD AUTO: 0.7 10E3/UL (ref 0–1.3)
MONOCYTES NFR BLD AUTO: 8 %
NEUTROPHILS # BLD AUTO: 6 10E3/UL (ref 1.6–8.3)
NEUTROPHILS NFR BLD AUTO: 68 %
NITRATE UR QL: NEGATIVE
NRBC # BLD AUTO: 0 10E3/UL
NRBC BLD AUTO-RTO: 0 /100
PH UR STRIP: 6 [PH] (ref 5–7)
PHOSPHATE SERPL-MCNC: 3.4 MG/DL (ref 2.5–4.5)
PLATELET # BLD AUTO: 264 10E3/UL (ref 150–450)
POTASSIUM SERPL-SCNC: 4.4 MMOL/L (ref 3.4–5.3)
PTH-INTACT SERPL-MCNC: 134 PG/ML (ref 15–65)
RBC # BLD AUTO: 4.46 10E6/UL (ref 4.4–5.9)
RBC URINE: 1 /HPF
SODIUM SERPL-SCNC: 137 MMOL/L (ref 135–145)
SP GR UR STRIP: 1.01 (ref 1–1.03)
SQUAMOUS EPITHELIAL: <1 /HPF
UROBILINOGEN UR STRIP-MCNC: NORMAL MG/DL
VIT D+METAB SERPL-MCNC: 83 NG/ML (ref 20–50)
WBC # BLD AUTO: 8.8 10E3/UL (ref 4–11)
WBC URINE: 19 /HPF

## 2024-07-22 PROCEDURE — 83036 HEMOGLOBIN GLYCOSYLATED A1C: CPT

## 2024-07-22 PROCEDURE — 99207 PR NO CHARGE NURSE ONLY: CPT

## 2024-07-22 PROCEDURE — 83550 IRON BINDING TEST: CPT

## 2024-07-22 PROCEDURE — 36415 COLL VENOUS BLD VENIPUNCTURE: CPT

## 2024-07-22 PROCEDURE — 82306 VITAMIN D 25 HYDROXY: CPT

## 2024-07-22 PROCEDURE — 87186 SC STD MICRODIL/AGAR DIL: CPT

## 2024-07-22 PROCEDURE — 82043 UR ALBUMIN QUANTITATIVE: CPT

## 2024-07-22 PROCEDURE — 81001 URINALYSIS AUTO W/SCOPE: CPT

## 2024-07-22 PROCEDURE — 83970 ASSAY OF PARATHORMONE: CPT

## 2024-07-22 PROCEDURE — 87086 URINE CULTURE/COLONY COUNT: CPT

## 2024-07-22 PROCEDURE — 82040 ASSAY OF SERUM ALBUMIN: CPT

## 2024-07-22 PROCEDURE — 82728 ASSAY OF FERRITIN: CPT

## 2024-07-22 PROCEDURE — 85025 COMPLETE CBC W/AUTO DIFF WBC: CPT

## 2024-07-22 NOTE — PROGRESS NOTES
Gene Pagan comes into clinic today at the request of Miguel Gonzalez CNP Ordering Provider for Trial of Void.    Patient presents to clinic for a trial of void per MD order. Patient properly identified and procedure explained to patient. Patient's leg-bag emptied, clear-yellow urine drained from patient's catheter. Catheter was then detached from leg-bag and sterile cysto tubing inserted into catheter opening. Via gravity 150 cc's sterile water was gently instilled with brief pauses into bladder. Patient tolerated fairly well and then catheter balloon was completely deflated. Mckenzie catheter was removed from bladder.     Patient was able to successfully void 50 cc's following procedure.     Pt states he felt like he urinated well after trial of void and feels comfortable going home without catheter today. Pt was instructed to call clinic if unable to urinate, or go to ER if after hours.   Patient verbalized understanding of this and will follow-up with MD as planned.     This service provided today was under the supervising provider of the day Dr. Horne, who was available if needed.    Vanessa Jacome, CMA

## 2024-07-23 LAB — BACTERIA UR CULT: ABNORMAL

## 2024-08-02 ENCOUNTER — TRANSFERRED RECORDS (OUTPATIENT)
Dept: HEALTH INFORMATION MANAGEMENT | Facility: CLINIC | Age: 79
End: 2024-08-02
Payer: COMMERCIAL

## 2024-08-02 LAB — RETINOPATHY: NEGATIVE

## 2024-09-10 ENCOUNTER — OFFICE VISIT (OUTPATIENT)
Dept: UROLOGY | Facility: CLINIC | Age: 79
End: 2024-09-10
Payer: COMMERCIAL

## 2024-09-10 VITALS
BODY MASS INDEX: 30.31 KG/M2 | WEIGHT: 200 LBS | HEIGHT: 68 IN | DIASTOLIC BLOOD PRESSURE: 62 MMHG | SYSTOLIC BLOOD PRESSURE: 118 MMHG

## 2024-09-10 DIAGNOSIS — R33.8 BENIGN PROSTATIC HYPERPLASIA WITH URINARY RETENTION: Primary | ICD-10-CM

## 2024-09-10 DIAGNOSIS — R82.90 CLOUDY URINE: ICD-10-CM

## 2024-09-10 DIAGNOSIS — N28.89 RENAL MASS: ICD-10-CM

## 2024-09-10 DIAGNOSIS — N40.1 BENIGN PROSTATIC HYPERPLASIA WITH URINARY RETENTION: Primary | ICD-10-CM

## 2024-09-10 LAB
ALBUMIN UR-MCNC: NEGATIVE MG/DL
APPEARANCE UR: ABNORMAL
BILIRUB UR QL STRIP: NEGATIVE
COLOR UR AUTO: YELLOW
GLUCOSE UR STRIP-MCNC: NEGATIVE MG/DL
HGB UR QL STRIP: ABNORMAL
KETONES UR STRIP-MCNC: NEGATIVE MG/DL
LEUKOCYTE ESTERASE UR QL STRIP: ABNORMAL
NITRATE UR QL: NEGATIVE
PH UR STRIP: 5.5 [PH] (ref 5–7)
RESIDUAL VOLUME (RV) (EXTERNAL): 220
SP GR UR STRIP: 1.02 (ref 1–1.03)
UROBILINOGEN UR STRIP-ACNC: 0.2 E.U./DL

## 2024-09-10 PROCEDURE — 81003 URINALYSIS AUTO W/O SCOPE: CPT | Mod: QW | Performed by: NURSE PRACTITIONER

## 2024-09-10 PROCEDURE — 99214 OFFICE O/P EST MOD 30 MIN: CPT | Mod: 25 | Performed by: NURSE PRACTITIONER

## 2024-09-10 PROCEDURE — 51798 US URINE CAPACITY MEASURE: CPT | Performed by: NURSE PRACTITIONER

## 2024-09-10 PROCEDURE — 87086 URINE CULTURE/COLONY COUNT: CPT | Performed by: NURSE PRACTITIONER

## 2024-09-10 RX ORDER — TAMSULOSIN HYDROCHLORIDE 0.4 MG/1
0.8 CAPSULE ORAL DAILY
Qty: 180 CAPSULE | Refills: 4 | Status: SHIPPED | OUTPATIENT
Start: 2024-09-10

## 2024-09-10 ASSESSMENT — PAIN SCALES - GENERAL: PAINLEVEL: NO PAIN (0)

## 2024-09-10 NOTE — LETTER
9/10/2024       RE: Gene Pagan  [address] [address] [address] [address]     Dear Colleague,    Thank you for referring your patient, Gene Pagan, to the [facility] [place] at [facility]. Please see a copy of my visit note below.      Urology Outpatient Visit    Name: Gene Pagan    MRN: [id number]   YOB: 1945               Chief Complaint:   Urinary Symptom or Sign         Impression and Plan:   Impression / Plan:   Gene Pagan is a 79 year old male with BPH w incomplete emptying.  mL today.      -Continue  finasteride. Flomax 0.8 mg daily.   -Plan to obtain CT renal mass to further evaluate indeterminate right superior 3.3 cm renal lesion previously seen on CT.  -Follow-up in 6 months for symptom recheck bladder scan.    Thank you for the opportunity to participate in the care of Gene Pagan.     LUIS Borja, CNP   [facility] - Department of Urology  [phone]          History of Present Illness:   Gene Pagan is a 79 year old male with history of chronic kidney disease, A-fib, BPH on Flomax, chronic diastolic CHF, type 2 diabetes. He is status post a REZUM procedure on 9/25/2020. He is currently on Flomax and finasteride. Pt feels like he is urinating and emptying his bladder well.   today.  Pt states he feels like he may have a UTI, noticed a cloudiness recently in urine. No other symptoms. Pt currently taking cefdinir.    History is obtained from patient & EMR    Pertinent imaging reviewed:  CT ABDOMEN AND PELVIS WITHOUT CONTRAST 1/19/2024 1:32 PM   IMPRESSION:   1.  Bilateral severe hydroureteronephrosis to the level of the  ureterovesical junction, and moderately distended urinary bladder. No  stone or obstructing lesion within the limitations of noncontrast CT.  Findings likely reflect outlet obstruction.  2.  Since 2020, mild increased in size indeterminate  right superior  pole 3.3 cm lesion. Recommend renal CT or MRI to exclude underlying  solid lesion, although this could reflect proteinaceous/hemorrhagic  cyst.  3.  Moderately enlarged prostate.          Past Medical History:     Past Medical History:   Diagnosis Date     Anemia      Atrial fibrillation     AV node ablation and pacemaker placement 4/10/2020     BPH (benign prostatic hyperplasia)      Chronic diastolic CHF      Chronic kidney disease (CKD), stage III (moderate) (H)      Diabetes mellitus - type 2      Palpitations      Systolic CHF (H)           Past Surgical History:     Past Surgical History:   Procedure Laterality Date     ANESTHESIA CARDIOVERSION N/A 2020    Procedure: ANESTHESIA, FOR CARDIOVERSION;  Surgeon: GENERIC ANESTHESIA PROVIDER;  Location: [facility] OR     CYSTOSCOPY       EP PACEMAKER N/A 4/10/2020    Procedure: EP Pacemaker;  Surgeon: Abhay Willams MD;  Location:  [facility]ARDIAC CATH LAB     [facility]  2020    Done in Urology office with Dr Perez          Social History:     Social History     Tobacco Use     Smoking status: Never     Smokeless tobacco: Never   Substance Use Topics     Alcohol use: Yes     Comment: Rare          Family History:     Family History   Problem Relation Age of Onset     Family History Negative Mother          in her 80's     Family History Negative Father          accidentally in boating accident.          Allergies:   No Known Allergies       Medications:     Current Outpatient Medications   Medication Sig Dispense Refill     apixaban ANTICOAGULANT (ELIQUIS ANTICOAGULANT) 2.5 MG tablet Take 1 tablet (2.5 mg) by mouth 2 times daily 180 tablet 3     Ascorbic Acid (VITAMIN C PO) Take 1,000 mg by mouth daily       blood glucose (ONETOUCH ULTRA) test strip USE TO TEST BLOOD SUGAR ONE TIME DAILY OR AS DIRECTED. 200 strip 0     bumetanide (BUMEX) 0.5 MG tablet Take 1 tablet (0.5 mg) by mouth daily (+ additional 1 mg to = 1.5 mg daily). 90 tablet 3      bumetanide (BUMEX) 1 MG tablet Take 1 tablet (1 mg) by mouth daily (+ additional 0.5 mg to = 1.5 mg daily). 90 tablet 3     cefdinir (OMNICEF) 300 MG capsule Take 1 capsule (300 mg) by mouth daily 7 capsule 0     Ferrous Sulfate (IRON) 325 (65 Fe) MG tablet Take 1 tablet by mouth daily       finasteride (PROSCAR) 5 MG tablet Take 1 tablet (5 mg) by mouth daily 90 tablet 3     insulin glargine (LANTUS SOLOSTAR) 100 UNIT/ML pen Inject 13 Units Subcutaneous at bedtime 15 mL 3     insulin pen needle (ULTICARE SHORT) 31G X 8 MM miscellaneous use 1 pen daily for injection 100 each 2     melatonin 3 MG tablet Take 3 mg by mouth nightly as needed       metoprolol succinate ER (TOPROL XL) 25 MG 24 hr tablet Take 0.5 tablets (12.5 mg) by mouth 2 times daily 90 tablet 3     Multiple Vitamins-Minerals (MULTIVITAMIN ADULT PO) Take 1 tablet by mouth daily       tamsulosin (FLOMAX) 0.4 MG capsule Take 2 capsules (0.8 mg) by mouth daily. 180 capsule 4     zinc 50 MG TABS Take 50 mg by mouth daily       ciprofloxacin (CIPRO) 500 MG tablet Take 1 tablet (500 mg) by mouth as needed (Take 1 tablet after catheter change) 1 tablet 0     ciprofloxacin (CIPRO) 500 MG tablet Take 1 tablet (500 mg) by mouth once as needed (Take one tablet by mouth after each catheter change) 3 tablet 0     ciprofloxacin (CIPRO) 500 MG tablet Take 1 tablet (500 mg) by mouth as needed (Take 1 tablet after each catheter change) 1 tablet 0     Vitamin D3 (CHOLECALCIFEROL) 125 MCG (5000 UT) tablet Take 5,000 Units by mouth daily  (Patient not taking: Reported on 9/10/2024)       No current facility-administered medications for this visit.     Facility-Administered Medications Ordered in Other Visits   Medication Dose Route Frequency Provider Last Rate Last Admin     HYDROcodone-acetaminophen (NORCO) 5-325 MG per tablet 1-2 tablet  1-2 tablet Oral Q4H PRN Lin Ordonez MD         Patient is already receiving anticoagulation with heparin, enoxaparin (LOVENOX),  "warfarin (COUMADIN)  or other anticoagulant medication   Does not apply Continuous PRN Lin Ordonez MD         polyethylene glycol (MIRALAX) Packet 17 g  17 g Oral Daily PRN Lin Ordonez MD              Review of Systems:    ROS: See HPI for pertinent details.  Remainder of 10-point ROS negative.         Physical Exam:   VS:  T: Data Unavailable    HR: Data Unavailable    BP: 118/62    RR: Data Unavailable     GEN:  Alert.  NAD.   HEENT:  Sclerae anicteric.    CV:  No obvious jugular venous distension.  LUNGS: No respiratory distress, breathing comfortably wo accessory muscle use.  ABD:  ND.     SKIN:  Dry. No visible rashes.   NEURO:  CN grossly intact.          Laboratory Data:   No results found for: \"PSA\"  Lab Results   Component Value Date    CR 2.10 07/22/2024    CR 2.3 05/25/2024    CR 1.96 04/19/2024    CR 2.20 03/22/2024    CR 2.07 01/19/2024    CR 2.95 12/04/2023    CR 2.46 03/01/2023    CR 2.26 02/17/2022    CR 2.19 11/04/2021    CR 2.30 04/06/2021    CR 2.27 12/21/2020    CR 2.27 11/12/2020    CR 2.14 10/23/2020    CR 2.61 10/17/2020    CR 2.43 10/10/2020    CR 2.17 09/22/2020    CR 1.91 06/02/2020    CR 1.97 05/29/2020    CR 2.03 05/24/2020     Lab Results   Component Value Date    GFRESTIMATED 31 07/22/2024    GFRESTIMATED 28 05/25/2024    GFRESTIMATED 34 04/19/2024    GFRESTIMATED 30 03/22/2024    GFRESTIMATED 32 01/19/2024    GFRESTIMATED 21 12/04/2023    GFRESTIMATED 26 03/01/2023    GFRESTIMATED 29 02/17/2022    GFRESTIMATED 28 11/04/2021    GFRESTIMATED 26 04/06/2021    GFRESTIMATED 27 12/21/2020    GFRESTIMATED 27 11/12/2020    GFRESTIMATED 29 10/23/2020    GFRESTIMATED 23 10/17/2020    GFRESTIMATED 25 10/10/2020    GFRESTIMATED 28 09/22/2020    GFRESTIMATED 34 06/02/2020    GFRESTIMATED 32 05/29/2020    GFRESTIMATED 31 05/24/2020          Again, thank you for allowing me to participate in the care of your patient.      Sincerely,    LUIS Iyer CNP    "

## 2024-09-10 NOTE — NURSING NOTE
Chief Complaint   Patient presents with    Follow Up     Pt feels like he is urinating and emptying his bladder well. Pt states he feels like he may have a UTI, noticed a cloudiness recently in urine. No other symptoms. Pt currently taking cefdinir    PVR: 220 mL    Vanessa Jacome, CMA

## 2024-09-12 LAB
BACTERIA UR CULT: NORMAL
BACTERIA UR CULT: NORMAL

## 2024-09-21 NOTE — PROGRESS NOTES
Urology Outpatient Visit    Name: Gene Pagan    MRN: [id number]   YOB: 1945               Chief Complaint:   Urinary Symptom or Sign         Impression and Plan:   Impression / Plan:   Gene Pagan is a 79 year old male with BPH w incomplete emptying.  mL today.      -Continue  finasteride. Flomax 0.8 mg daily.   -Plan to obtain CT renal mass to further evaluate indeterminate right superior 3.3 cm renal lesion previously seen on CT.  -Follow-up in 6 months for symptom recheck bladder scan.    Thank you for the opportunity to participate in the care of Gene Pagan.     LUIS Borja, CNP  [facility] - Department of Urology  [phone]          History of Present Illness:   Gene Pagan is a 79 year old male with history of chronic kidney disease, A-fib, BPH on Flomax, chronic diastolic CHF, type 2 diabetes. He is status post a REZUM procedure on 9/25/2020. He is currently on Flomax and finasteride. Pt feels like he is urinating and emptying his bladder well.   today.  Pt states he feels like he may have a UTI, noticed a cloudiness recently in urine. No other symptoms. Pt currently taking cefdinir.    History is obtained from patient & EMR    Pertinent imaging reviewed:  CT ABDOMEN AND PELVIS WITHOUT CONTRAST 1/19/2024 1:32 PM   IMPRESSION:   1.  Bilateral severe hydroureteronephrosis to the level of the  ureterovesical junction, and moderately distended urinary bladder. No  stone or obstructing lesion within the limitations of noncontrast CT.  Findings likely reflect outlet obstruction.  2.  Since 2020, mild increased in size indeterminate right superior  pole 3.3 cm lesion. Recommend renal CT or MRI to exclude underlying  solid lesion, although this could reflect proteinaceous/hemorrhagic  cyst.  3.  Moderately enlarged prostate.          Past Medical History:     Past Medical History:   Diagnosis Date    Anemia     Atrial fibrillation     AV node  ablation and pacemaker placement 4/10/2020    BPH (benign prostatic hyperplasia)     Chronic diastolic CHF     Chronic kidney disease (CKD), stage III (moderate) (H)     Diabetes mellitus - type 2     Palpitations     Systolic CHF (H)           Past Surgical History:     Past Surgical History:   Procedure Laterality Date    ANESTHESIA CARDIOVERSION N/A 2020    Procedure: ANESTHESIA, FOR CARDIOVERSION;  Surgeon: GENERIC ANESTHESIA PROVIDER;  Location: [facility] OR    CYSTOSCOPY      EP PACEMAKER N/A 4/10/2020    Procedure: EP Pacemaker;  Surgeon: Abhay Willams MD;  Location:  [facility]ARDI[facility] CATH LAB    [facility]  2020    Done in Urology office with Dr Perez          Social History:     Social History     Tobacco Use    Smoking status: Never    Smokeless tobacco: Never   Substance Use Topics    Alcohol use: Yes     Comment: Rare          Family History:     Family History   Problem Relation Age of Onset    Family History Negative Mother          in her 80's    Family History Negative Father          accidentally in boating accident.          Allergies:   No Known Allergies       Medications:     Current Outpatient Medications   Medication Sig Dispense Refill    apixaban ANTICOAGULANT (ELIQUIS ANTICOAGULANT) 2.5 MG tablet Take 1 tablet (2.5 mg) by mouth 2 times daily 180 tablet 3    Ascorbic Acid (VITAMIN C PO) Take 1,000 mg by mouth daily      blood glucose (ONETOUCH ULTRA) test strip USE TO TEST BLOOD SUGAR ONE TIME DAILY OR AS DIRECTED. 200 strip 0    bumetanide (BUMEX) 0.5 MG tablet Take 1 tablet (0.5 mg) by mouth daily (+ additional 1 mg to = 1.5 mg daily). 90 tablet 3    bumetanide (BUMEX) 1 MG tablet Take 1 tablet (1 mg) by mouth daily (+ additional 0.5 mg to = 1.5 mg daily). 90 tablet 3    cefdinir (OMNICEF) 300 MG capsule Take 1 capsule (300 mg) by mouth daily 7 capsule 0    Ferrous Sulfate (IRON) 325 (65 Fe) MG tablet Take 1 tablet by mouth daily      finasteride (PROSCAR) 5 MG tablet Take 1 tablet  (5 mg) by mouth daily 90 tablet 3    insulin glargine (LANTUS SOLOSTAR) 100 UNIT/ML pen Inject 13 Units Subcutaneous at bedtime 15 mL 3    insulin pen needle (ULTICARE SHORT) 31G X 8 MM miscellaneous use 1 pen daily for injection 100 each 2    melatonin 3 MG tablet Take 3 mg by mouth nightly as needed      metoprolol succinate ER (TOPROL XL) 25 MG 24 hr tablet Take 0.5 tablets (12.5 mg) by mouth 2 times daily 90 tablet 3    Multiple Vitamins-Minerals (MULTIVITAMIN ADULT PO) Take 1 tablet by mouth daily      tamsulosin (FLOMAX) 0.4 MG capsule Take 2 capsules (0.8 mg) by mouth daily. 180 capsule 4    zinc 50 MG TABS Take 50 mg by mouth daily      ciprofloxacin (CIPRO) 500 MG tablet Take 1 tablet (500 mg) by mouth as needed (Take 1 tablet after catheter change) 1 tablet 0    ciprofloxacin (CIPRO) 500 MG tablet Take 1 tablet (500 mg) by mouth once as needed (Take one tablet by mouth after each catheter change) 3 tablet 0    ciprofloxacin (CIPRO) 500 MG tablet Take 1 tablet (500 mg) by mouth as needed (Take 1 tablet after each catheter change) 1 tablet 0    Vitamin D3 (CHOLECALCIFEROL) 125 MCG (5000 UT) tablet Take 5,000 Units by mouth daily  (Patient not taking: Reported on 9/10/2024)       No current facility-administered medications for this visit.     Facility-Administered Medications Ordered in Other Visits   Medication Dose Route Frequency Provider Last Rate Last Admin    HYDROcodone-acetaminophen (NORCO) 5-325 MG per tablet 1-2 tablet  1-2 tablet Oral Q4H PRN Lin Ordonez MD        Patient is already receiving anticoagulation with heparin, enoxaparin (LOVENOX), warfarin (COUMADIN)  or other anticoagulant medication   Does not apply Continuous PRN Lin Ordonez MD        polyethylene glycol (MIRALAX) Packet 17 g  17 g Oral Daily PRN Lin Ordonez MD              Review of Systems:    ROS: See HPI for pertinent details.  Remainder of 10-point ROS negative.         Physical Exam:   VS:  T: Data Unavailable     "HR: Data Unavailable    BP: 118/62    RR: Data Unavailable     GEN:  Alert.  NAD.   HEENT:  Sclerae anicteric.    CV:  No obvious jugular venous distension.  LUNGS: No respiratory distress, breathing comfortably wo accessory muscle use.  ABD:  ND.     SKIN:  Dry. No visible rashes.   NEURO:  CN grossly intact.          Laboratory Data:   No results found for: \"PSA\"  Lab Results   Component Value Date    CR 2.10 07/22/2024    CR 2.3 05/25/2024    CR 1.96 04/19/2024    CR 2.20 03/22/2024    CR 2.07 01/19/2024    CR 2.95 12/04/2023    CR 2.46 03/01/2023    CR 2.26 02/17/2022    CR 2.19 11/04/2021    CR 2.30 04/06/2021    CR 2.27 12/21/2020    CR 2.27 11/12/2020    CR 2.14 10/23/2020    CR 2.61 10/17/2020    CR 2.43 10/10/2020    CR 2.17 09/22/2020    CR 1.91 06/02/2020    CR 1.97 05/29/2020    CR 2.03 05/24/2020     Lab Results   Component Value Date    GFRESTIMATED 31 07/22/2024    GFRESTIMATED 28 05/25/2024    GFRESTIMATED 34 04/19/2024    GFRESTIMATED 30 03/22/2024    GFRESTIMATED 32 01/19/2024    GFRESTIMATED 21 12/04/2023    GFRESTIMATED 26 03/01/2023    GFRESTIMATED 29 02/17/2022    GFRESTIMATED 28 11/04/2021    GFRESTIMATED 26 04/06/2021    GFRESTIMATED 27 12/21/2020    GFRESTIMATED 27 11/12/2020    GFRESTIMATED 29 10/23/2020    GFRESTIMATED 23 10/17/2020    GFRESTIMATED 25 10/10/2020    GFRESTIMATED 28 09/22/2020    GFRESTIMATED 34 06/02/2020    GFRESTIMATED 32 05/29/2020    GFRESTIMATED 31 05/24/2020        "

## 2024-10-03 ENCOUNTER — ANCILLARY PROCEDURE (OUTPATIENT)
Dept: CARDIOLOGY | Facility: CLINIC | Age: 79
End: 2024-10-03
Attending: INTERNAL MEDICINE
Payer: COMMERCIAL

## 2024-10-03 DIAGNOSIS — Z95.0 CARDIAC PACEMAKER IN SITU: ICD-10-CM

## 2024-10-03 DIAGNOSIS — I50.23 ACUTE ON CHRONIC SYSTOLIC CONGESTIVE HEART FAILURE (H): ICD-10-CM

## 2024-10-03 PROCEDURE — 93296 REM INTERROG EVL PM/IDS: CPT | Performed by: INTERNAL MEDICINE

## 2024-10-03 PROCEDURE — 93294 REM INTERROG EVL PM/LDLS PM: CPT | Performed by: INTERNAL MEDICINE

## 2024-10-04 LAB
MDC_IDC_EPISODE_DTM: NORMAL
MDC_IDC_EPISODE_DTM: NORMAL
MDC_IDC_EPISODE_ID: NORMAL
MDC_IDC_EPISODE_ID: NORMAL
MDC_IDC_EPISODE_TYPE: NORMAL
MDC_IDC_EPISODE_TYPE: NORMAL
MDC_IDC_LEAD_CONNECTION_STATUS: NORMAL
MDC_IDC_LEAD_IMPLANT_DT: NORMAL
MDC_IDC_LEAD_LOCATION: NORMAL
MDC_IDC_LEAD_LOCATION_DETAIL_1: NORMAL
MDC_IDC_LEAD_MFG: NORMAL
MDC_IDC_LEAD_MODEL: NORMAL
MDC_IDC_LEAD_POLARITY_TYPE: NORMAL
MDC_IDC_LEAD_SERIAL: NORMAL
MDC_IDC_MSMT_BATTERY_DTM: NORMAL
MDC_IDC_MSMT_BATTERY_REMAINING_LONGEVITY: 108 MO
MDC_IDC_MSMT_BATTERY_REMAINING_PERCENTAGE: 100 %
MDC_IDC_MSMT_BATTERY_STATUS: NORMAL
MDC_IDC_MSMT_LEADCHNL_LV_IMPEDANCE_VALUE: 1640 OHM
MDC_IDC_MSMT_LEADCHNL_LV_PACING_THRESHOLD_AMPLITUDE: 0.6 V
MDC_IDC_MSMT_LEADCHNL_LV_PACING_THRESHOLD_PULSEWIDTH: 0.5 MS
MDC_IDC_MSMT_LEADCHNL_RA_IMPEDANCE_VALUE: 785 OHM
MDC_IDC_MSMT_LEADCHNL_RV_IMPEDANCE_VALUE: 646 OHM
MDC_IDC_MSMT_LEADCHNL_RV_PACING_THRESHOLD_AMPLITUDE: 0.9 V
MDC_IDC_MSMT_LEADCHNL_RV_PACING_THRESHOLD_PULSEWIDTH: 0.4 MS
MDC_IDC_PG_IMPLANT_DTM: NORMAL
MDC_IDC_PG_MFG: NORMAL
MDC_IDC_PG_MODEL: NORMAL
MDC_IDC_PG_SERIAL: NORMAL
MDC_IDC_PG_TYPE: NORMAL
MDC_IDC_SESS_CLINIC_NAME: NORMAL
MDC_IDC_SESS_DTM: NORMAL
MDC_IDC_SESS_TYPE: NORMAL
MDC_IDC_SET_BRADY_AT_MODE_SWITCH_RATE: 140 {BEATS}/MIN
MDC_IDC_SET_BRADY_LOWRATE: 70 {BEATS}/MIN
MDC_IDC_SET_BRADY_MAX_SENSOR_RATE: 130 {BEATS}/MIN
MDC_IDC_SET_BRADY_MODE: NORMAL
MDC_IDC_SET_CRT_LVRV_DELAY: 30 MS
MDC_IDC_SET_CRT_PACED_CHAMBERS: NORMAL
MDC_IDC_SET_LEADCHNL_LV_PACING_AMPLITUDE: 2 V
MDC_IDC_SET_LEADCHNL_LV_PACING_ANODE_ELECTRODE_1: NORMAL
MDC_IDC_SET_LEADCHNL_LV_PACING_ANODE_LOCATION_1: NORMAL
MDC_IDC_SET_LEADCHNL_LV_PACING_CATHODE_ELECTRODE_1: NORMAL
MDC_IDC_SET_LEADCHNL_LV_PACING_CATHODE_LOCATION_1: NORMAL
MDC_IDC_SET_LEADCHNL_LV_PACING_PULSEWIDTH: 0.5 MS
MDC_IDC_SET_LEADCHNL_LV_SENSING_ADAPTATION_MODE: NORMAL
MDC_IDC_SET_LEADCHNL_LV_SENSING_ANODE_ELECTRODE_1: NORMAL
MDC_IDC_SET_LEADCHNL_LV_SENSING_ANODE_LOCATION_1: NORMAL
MDC_IDC_SET_LEADCHNL_LV_SENSING_CATHODE_ELECTRODE_1: NORMAL
MDC_IDC_SET_LEADCHNL_LV_SENSING_CATHODE_LOCATION_1: NORMAL
MDC_IDC_SET_LEADCHNL_LV_SENSING_SENSITIVITY: 1.5 MV
MDC_IDC_SET_LEADCHNL_RA_SENSING_ADAPTATION_MODE: NORMAL
MDC_IDC_SET_LEADCHNL_RA_SENSING_SENSITIVITY: 0.25 MV
MDC_IDC_SET_LEADCHNL_RV_PACING_AMPLITUDE: 2.2 V
MDC_IDC_SET_LEADCHNL_RV_PACING_CAPTURE_MODE: NORMAL
MDC_IDC_SET_LEADCHNL_RV_PACING_POLARITY: NORMAL
MDC_IDC_SET_LEADCHNL_RV_PACING_PULSEWIDTH: 0.4 MS
MDC_IDC_SET_LEADCHNL_RV_SENSING_ADAPTATION_MODE: NORMAL
MDC_IDC_SET_LEADCHNL_RV_SENSING_POLARITY: NORMAL
MDC_IDC_SET_LEADCHNL_RV_SENSING_SENSITIVITY: 1.5 MV
MDC_IDC_SET_ZONE_DETECTION_INTERVAL: 375 MS
MDC_IDC_SET_ZONE_STATUS: NORMAL
MDC_IDC_SET_ZONE_TYPE: NORMAL
MDC_IDC_SET_ZONE_VENDOR_TYPE: NORMAL
MDC_IDC_STAT_BRADY_DTM_END: NORMAL
MDC_IDC_STAT_BRADY_DTM_START: NORMAL
MDC_IDC_STAT_BRADY_RA_PERCENT_PACED: 0 %
MDC_IDC_STAT_BRADY_RV_PERCENT_PACED: 100 %
MDC_IDC_STAT_CRT_DTM_END: NORMAL
MDC_IDC_STAT_CRT_DTM_START: NORMAL
MDC_IDC_STAT_CRT_LV_PERCENT_PACED: 100 %
MDC_IDC_STAT_EPISODE_RECENT_COUNT: 0
MDC_IDC_STAT_EPISODE_RECENT_COUNT: 0
MDC_IDC_STAT_EPISODE_RECENT_COUNT: 1
MDC_IDC_STAT_EPISODE_RECENT_COUNT_DTM_END: NORMAL
MDC_IDC_STAT_EPISODE_RECENT_COUNT_DTM_START: NORMAL
MDC_IDC_STAT_EPISODE_TYPE: NORMAL
MDC_IDC_STAT_EPISODE_VENDOR_TYPE: NORMAL
MDC_IDC_STAT_EPISODE_VENDOR_TYPE: NORMAL

## 2024-10-22 ENCOUNTER — LAB (OUTPATIENT)
Dept: LAB | Facility: CLINIC | Age: 79
End: 2024-10-22
Payer: COMMERCIAL

## 2024-10-22 DIAGNOSIS — N18.4 CHRONIC KIDNEY DISEASE, STAGE IV (SEVERE) (H): ICD-10-CM

## 2024-10-22 DIAGNOSIS — E55.9 AVITAMINOSIS D: ICD-10-CM

## 2024-10-22 LAB
ALBUMIN SERPL BCG-MCNC: 4.1 G/DL (ref 3.5–5.2)
ANION GAP SERPL CALCULATED.3IONS-SCNC: 14 MMOL/L (ref 7–15)
BUN SERPL-MCNC: 28 MG/DL (ref 8–23)
CALCIUM SERPL-MCNC: 9.1 MG/DL (ref 8.8–10.4)
CHLORIDE SERPL-SCNC: 96 MMOL/L (ref 98–107)
CREAT SERPL-MCNC: 2.51 MG/DL (ref 0.67–1.17)
EGFRCR SERPLBLD CKD-EPI 2021: 25 ML/MIN/1.73M2
GLUCOSE SERPL-MCNC: 171 MG/DL (ref 70–99)
HCO3 SERPL-SCNC: 23 MMOL/L (ref 22–29)
PHOSPHATE SERPL-MCNC: 3.6 MG/DL (ref 2.5–4.5)
POTASSIUM SERPL-SCNC: 4.2 MMOL/L (ref 3.4–5.3)
SODIUM SERPL-SCNC: 133 MMOL/L (ref 135–145)
VIT D+METAB SERPL-MCNC: 72 NG/ML (ref 20–50)

## 2024-10-22 PROCEDURE — 82310 ASSAY OF CALCIUM: CPT

## 2024-10-22 PROCEDURE — 36415 COLL VENOUS BLD VENIPUNCTURE: CPT

## 2024-10-22 PROCEDURE — 82306 VITAMIN D 25 HYDROXY: CPT

## 2024-10-24 DIAGNOSIS — E11.22 TYPE 2 DIABETES MELLITUS WITH STAGE 4 CHRONIC KIDNEY DISEASE, WITH LONG-TERM CURRENT USE OF INSULIN (H): ICD-10-CM

## 2024-10-24 DIAGNOSIS — Z79.4 TYPE 2 DIABETES MELLITUS WITH STAGE 4 CHRONIC KIDNEY DISEASE, WITH LONG-TERM CURRENT USE OF INSULIN (H): ICD-10-CM

## 2024-10-24 DIAGNOSIS — N18.4 TYPE 2 DIABETES MELLITUS WITH STAGE 4 CHRONIC KIDNEY DISEASE, WITH LONG-TERM CURRENT USE OF INSULIN (H): ICD-10-CM

## 2024-10-24 RX ORDER — BLOOD SUGAR DIAGNOSTIC
STRIP MISCELLANEOUS
Qty: 100 STRIP | Refills: 1 | Status: SHIPPED | OUTPATIENT
Start: 2024-10-24

## 2024-10-28 DIAGNOSIS — N18.4 CKD (CHRONIC KIDNEY DISEASE) STAGE 4, GFR 15-29 ML/MIN (H): Primary | ICD-10-CM

## 2024-11-16 ENCOUNTER — HEALTH MAINTENANCE LETTER (OUTPATIENT)
Age: 79
End: 2024-11-16

## 2024-12-10 ENCOUNTER — LAB (OUTPATIENT)
Dept: LAB | Facility: CLINIC | Age: 79
End: 2024-12-10
Payer: COMMERCIAL

## 2024-12-10 DIAGNOSIS — N18.4 CKD (CHRONIC KIDNEY DISEASE) STAGE 4, GFR 15-29 ML/MIN (H): ICD-10-CM

## 2024-12-10 LAB
ALBUMIN SERPL BCG-MCNC: 4 G/DL (ref 3.5–5.2)
ANION GAP SERPL CALCULATED.3IONS-SCNC: 14 MMOL/L (ref 7–15)
BUN SERPL-MCNC: 31.7 MG/DL (ref 8–23)
CALCIUM SERPL-MCNC: 8.9 MG/DL (ref 8.8–10.4)
CHLORIDE SERPL-SCNC: 100 MMOL/L (ref 98–107)
CREAT SERPL-MCNC: 2.31 MG/DL (ref 0.67–1.17)
EGFRCR SERPLBLD CKD-EPI 2021: 28 ML/MIN/1.73M2
GLUCOSE SERPL-MCNC: 140 MG/DL (ref 70–99)
HCO3 SERPL-SCNC: 22 MMOL/L (ref 22–29)
PHOSPHATE SERPL-MCNC: 3.5 MG/DL (ref 2.5–4.5)
POTASSIUM SERPL-SCNC: 4.2 MMOL/L (ref 3.4–5.3)
SODIUM SERPL-SCNC: 136 MMOL/L (ref 135–145)

## 2024-12-10 PROCEDURE — 36415 COLL VENOUS BLD VENIPUNCTURE: CPT

## 2024-12-10 PROCEDURE — 84520 ASSAY OF UREA NITROGEN: CPT

## 2024-12-10 PROCEDURE — 84100 ASSAY OF PHOSPHORUS: CPT

## 2024-12-10 PROCEDURE — 82947 ASSAY GLUCOSE BLOOD QUANT: CPT

## 2025-01-16 ENCOUNTER — ANCILLARY PROCEDURE (OUTPATIENT)
Dept: CARDIOLOGY | Facility: CLINIC | Age: 80
End: 2025-01-16
Attending: INTERNAL MEDICINE
Payer: COMMERCIAL

## 2025-01-16 DIAGNOSIS — Z95.0 CARDIAC PACEMAKER IN SITU: ICD-10-CM

## 2025-01-16 DIAGNOSIS — I50.23 ACUTE ON CHRONIC SYSTOLIC CONGESTIVE HEART FAILURE (H): ICD-10-CM

## 2025-01-16 LAB
MDC_IDC_EPISODE_DTM: NORMAL
MDC_IDC_EPISODE_DTM: NORMAL
MDC_IDC_EPISODE_ID: NORMAL
MDC_IDC_EPISODE_ID: NORMAL
MDC_IDC_EPISODE_TYPE: NORMAL
MDC_IDC_EPISODE_TYPE: NORMAL
MDC_IDC_LEAD_CONNECTION_STATUS: NORMAL
MDC_IDC_LEAD_IMPLANT_DT: NORMAL
MDC_IDC_LEAD_LOCATION: NORMAL
MDC_IDC_LEAD_LOCATION_DETAIL_1: NORMAL
MDC_IDC_LEAD_MFG: NORMAL
MDC_IDC_LEAD_MODEL: NORMAL
MDC_IDC_LEAD_POLARITY_TYPE: NORMAL
MDC_IDC_LEAD_SERIAL: NORMAL
MDC_IDC_MSMT_BATTERY_DTM: NORMAL
MDC_IDC_MSMT_BATTERY_REMAINING_LONGEVITY: 102 MO
MDC_IDC_MSMT_BATTERY_REMAINING_PERCENTAGE: 100 %
MDC_IDC_MSMT_BATTERY_STATUS: NORMAL
MDC_IDC_MSMT_LEADCHNL_LV_IMPEDANCE_VALUE: 1737 OHM
MDC_IDC_MSMT_LEADCHNL_LV_PACING_THRESHOLD_AMPLITUDE: 0.6 V
MDC_IDC_MSMT_LEADCHNL_LV_PACING_THRESHOLD_PULSEWIDTH: 0.5 MS
MDC_IDC_MSMT_LEADCHNL_RA_IMPEDANCE_VALUE: 800 OHM
MDC_IDC_MSMT_LEADCHNL_RV_IMPEDANCE_VALUE: 665 OHM
MDC_IDC_MSMT_LEADCHNL_RV_PACING_THRESHOLD_AMPLITUDE: 1 V
MDC_IDC_MSMT_LEADCHNL_RV_PACING_THRESHOLD_PULSEWIDTH: 0.4 MS
MDC_IDC_PG_IMPLANT_DTM: NORMAL
MDC_IDC_PG_MFG: NORMAL
MDC_IDC_PG_MODEL: NORMAL
MDC_IDC_PG_SERIAL: NORMAL
MDC_IDC_PG_TYPE: NORMAL
MDC_IDC_SESS_CLINIC_NAME: NORMAL
MDC_IDC_SESS_DTM: NORMAL
MDC_IDC_SESS_TYPE: NORMAL
MDC_IDC_SET_BRADY_AT_MODE_SWITCH_RATE: 140 {BEATS}/MIN
MDC_IDC_SET_BRADY_LOWRATE: 70 {BEATS}/MIN
MDC_IDC_SET_BRADY_MAX_SENSOR_RATE: 130 {BEATS}/MIN
MDC_IDC_SET_BRADY_MODE: NORMAL
MDC_IDC_SET_CRT_LVRV_DELAY: 30 MS
MDC_IDC_SET_CRT_PACED_CHAMBERS: NORMAL
MDC_IDC_SET_LEADCHNL_LV_PACING_AMPLITUDE: 2 V
MDC_IDC_SET_LEADCHNL_LV_PACING_ANODE_ELECTRODE_1: NORMAL
MDC_IDC_SET_LEADCHNL_LV_PACING_ANODE_LOCATION_1: NORMAL
MDC_IDC_SET_LEADCHNL_LV_PACING_CATHODE_ELECTRODE_1: NORMAL
MDC_IDC_SET_LEADCHNL_LV_PACING_CATHODE_LOCATION_1: NORMAL
MDC_IDC_SET_LEADCHNL_LV_PACING_PULSEWIDTH: 0.5 MS
MDC_IDC_SET_LEADCHNL_LV_SENSING_ADAPTATION_MODE: NORMAL
MDC_IDC_SET_LEADCHNL_LV_SENSING_ANODE_ELECTRODE_1: NORMAL
MDC_IDC_SET_LEADCHNL_LV_SENSING_ANODE_LOCATION_1: NORMAL
MDC_IDC_SET_LEADCHNL_LV_SENSING_CATHODE_ELECTRODE_1: NORMAL
MDC_IDC_SET_LEADCHNL_LV_SENSING_CATHODE_LOCATION_1: NORMAL
MDC_IDC_SET_LEADCHNL_LV_SENSING_SENSITIVITY: 1.5 MV
MDC_IDC_SET_LEADCHNL_RA_SENSING_ADAPTATION_MODE: NORMAL
MDC_IDC_SET_LEADCHNL_RA_SENSING_SENSITIVITY: 0.25 MV
MDC_IDC_SET_LEADCHNL_RV_PACING_AMPLITUDE: 2.2 V
MDC_IDC_SET_LEADCHNL_RV_PACING_CAPTURE_MODE: NORMAL
MDC_IDC_SET_LEADCHNL_RV_PACING_POLARITY: NORMAL
MDC_IDC_SET_LEADCHNL_RV_PACING_PULSEWIDTH: 0.4 MS
MDC_IDC_SET_LEADCHNL_RV_SENSING_ADAPTATION_MODE: NORMAL
MDC_IDC_SET_LEADCHNL_RV_SENSING_POLARITY: NORMAL
MDC_IDC_SET_LEADCHNL_RV_SENSING_SENSITIVITY: 1.5 MV
MDC_IDC_SET_ZONE_DETECTION_INTERVAL: 375 MS
MDC_IDC_SET_ZONE_STATUS: NORMAL
MDC_IDC_SET_ZONE_TYPE: NORMAL
MDC_IDC_SET_ZONE_VENDOR_TYPE: NORMAL
MDC_IDC_STAT_BRADY_DTM_END: NORMAL
MDC_IDC_STAT_BRADY_DTM_START: NORMAL
MDC_IDC_STAT_BRADY_RA_PERCENT_PACED: 0 %
MDC_IDC_STAT_BRADY_RV_PERCENT_PACED: 100 %
MDC_IDC_STAT_CRT_DTM_END: NORMAL
MDC_IDC_STAT_CRT_DTM_START: NORMAL
MDC_IDC_STAT_CRT_LV_PERCENT_PACED: 100 %
MDC_IDC_STAT_EPISODE_RECENT_COUNT: 0
MDC_IDC_STAT_EPISODE_RECENT_COUNT: 0
MDC_IDC_STAT_EPISODE_RECENT_COUNT: 3
MDC_IDC_STAT_EPISODE_RECENT_COUNT_DTM_END: NORMAL
MDC_IDC_STAT_EPISODE_RECENT_COUNT_DTM_START: NORMAL
MDC_IDC_STAT_EPISODE_TYPE: NORMAL
MDC_IDC_STAT_EPISODE_VENDOR_TYPE: NORMAL
MDC_IDC_STAT_EPISODE_VENDOR_TYPE: NORMAL

## 2025-01-16 PROCEDURE — 93294 REM INTERROG EVL PM/LDLS PM: CPT | Performed by: INTERNAL MEDICINE

## 2025-01-16 PROCEDURE — 93296 REM INTERROG EVL PM/IDS: CPT | Performed by: INTERNAL MEDICINE

## 2025-01-22 DIAGNOSIS — N40.1 BENIGN PROSTATIC HYPERPLASIA WITH URINARY RETENTION: ICD-10-CM

## 2025-01-22 DIAGNOSIS — R33.8 BENIGN PROSTATIC HYPERPLASIA WITH URINARY RETENTION: ICD-10-CM

## 2025-01-22 RX ORDER — FINASTERIDE 5 MG/1
5 TABLET, FILM COATED ORAL DAILY
Qty: 90 TABLET | Refills: 0 | Status: SHIPPED | OUTPATIENT
Start: 2025-01-22

## 2025-03-08 ENCOUNTER — HEALTH MAINTENANCE LETTER (OUTPATIENT)
Age: 80
End: 2025-03-08

## 2025-03-21 ENCOUNTER — HOSPITAL ENCOUNTER (OUTPATIENT)
Dept: CT IMAGING | Facility: CLINIC | Age: 80
Discharge: HOME | End: 2025-03-21
Attending: NURSE PRACTITIONER | Admitting: NURSE PRACTITIONER
Payer: COMMERCIAL

## 2025-03-21 DIAGNOSIS — N28.89 RENAL MASS: ICD-10-CM

## 2025-03-21 LAB
CREAT BLD-MCNC: 2.3 MG/DL (ref 0.7–1.2)
EGFRCR SERPLBLD CKD-EPI 2021: 28 ML/MIN/1.73M2

## 2025-03-21 PROCEDURE — 74176 CT ABD & PELVIS W/O CONTRAST: CPT

## 2025-03-21 PROCEDURE — 82565 ASSAY OF CREATININE: CPT

## 2025-03-24 ENCOUNTER — TELEPHONE (OUTPATIENT)
Dept: INTERNAL MEDICINE | Facility: CLINIC | Age: 80
End: 2025-03-24

## 2025-03-24 DIAGNOSIS — E11.22 TYPE 2 DIABETES MELLITUS WITH STAGE 4 CHRONIC KIDNEY DISEASE, WITH LONG-TERM CURRENT USE OF INSULIN (H): Primary | ICD-10-CM

## 2025-03-24 DIAGNOSIS — N18.4 TYPE 2 DIABETES MELLITUS WITH STAGE 4 CHRONIC KIDNEY DISEASE, WITH LONG-TERM CURRENT USE OF INSULIN (H): Primary | ICD-10-CM

## 2025-03-24 DIAGNOSIS — E78.5 HYPERLIPIDEMIA LDL GOAL <100: ICD-10-CM

## 2025-03-24 DIAGNOSIS — Z79.4 TYPE 2 DIABETES MELLITUS WITH STAGE 4 CHRONIC KIDNEY DISEASE, WITH LONG-TERM CURRENT USE OF INSULIN (H): Primary | ICD-10-CM

## 2025-03-24 NOTE — TELEPHONE ENCOUNTER
Itasia from [facility] lab calling to report pt coming to get labs done - hemoglobin A1c. No orders.    Please advise lab orders due to at this time    Please call patient with updates. Pt is seeing PCP 3/26

## 2025-03-25 ENCOUNTER — VIRTUAL VISIT (OUTPATIENT)
Dept: UROLOGY | Facility: CLINIC | Age: 80
End: 2025-03-25
Payer: COMMERCIAL

## 2025-03-25 VITALS — BODY MASS INDEX: 28.14 KG/M2 | WEIGHT: 190 LBS | HEIGHT: 69 IN

## 2025-03-25 DIAGNOSIS — N13.30 HYDRONEPHROSIS, UNSPECIFIED HYDRONEPHROSIS TYPE: ICD-10-CM

## 2025-03-25 DIAGNOSIS — N28.89 RENAL MASS: Primary | ICD-10-CM

## 2025-03-25 PROCEDURE — 98005 SYNCH AUDIO-VIDEO EST LOW 20: CPT | Performed by: NURSE PRACTITIONER

## 2025-03-25 PROCEDURE — 1126F AMNT PAIN NOTED NONE PRSNT: CPT | Performed by: NURSE PRACTITIONER

## 2025-03-25 NOTE — LETTER
3/25/2025       RE: Gene Pagan  [address] [facility] [id number]     Dear Colleague,    Thank you for referring your patient, Gene Pagan, to the [facility] [place] [facility]. Please see a copy of my visit note below.    Virtual Visit Details  Originating Location (pt. Location): Home    Distant Location (provider location):  On-site  Platform used for Video Visit: [facility]      Urology Outpatient Visit    Name: Gene Pagan    MRN: [id number]   YOB: 1945               Chief Complaint:   Renal Mass         Impression and Plan:   Impression / Plan:   Gene Pagan is a 79 year old male with Hx of a-fib w MRI-incompatible pacemaker, stage 3 CKD, BPH & incomplete emptying s/p Rezum 9/2020, maintained on Flomax & finasteride, with gradually enlarging 4.2 cm heterogeneous solid-appearing R renal mass, and ongoing bilateral hydronephrosis suspected to be 2/2 ROGERS.      - Nurse visit for catheterized PVR, suspect ongoing incomplete emptying contributing to bilateral hydroureteronephrosis. If he is not emptying well with PVRs less than 250 mL will need to reinitiate CIC or place indwelling.   - Plan for follow-up with Dr. Perez for further discussion regarding renal mass, and for cystoscopy to eval for outlet obstruction / possible repeat outlet procedure.   - Continue Finasteride and Flomax daily.    Thank you for the opportunity to participate in the care of Gene Pagan.     LUIS Borja, CNP  [facility] - Department of Urology  [phone]          History of Present Illness:   Gene Pagan is a 79 year old male with history of chronic kidney disease stage 3 (Cr has varied, but is mostly between 2-2.5 (eGFR ~25-30), followed by nephrology), A-fib with pacemaker (Pt reports this is MRI-incompatible), on Eliquis, BPH on Flomax, chronic diastolic CHF, type 2 diabetes. He is status post a  REZUM procedure on 9/25/2020. He is currently on Flomax 0.8 mg every day and finasteride.     1/23/2024  UTI diagnosed 1/10/2024 when he had a positive urine culture that grew out Aerococcus Urinae, he was treated with PO Augmentin. He was seen in the ED at that time and underwent CT which demonstrated bilateral severe hydroureteronephrosis to the level of the ureterovesical junction, and moderately distended urinary bladder. Findings likely reflecting underlying ROGERS due to prostatomegaly. He was discharged with baker in place. CT also revealed that since 2020, mild increase in size of indeterminate right superior pole 3.3 cm renal lesion.  -He was started on finasteride with plan for TOV in 6 mo.    7/22/24 TOV was successful.     9/10/2024   mL   Plan for follow-up CT to monitor renal mass and recheck on hydro.    TODAY 3/25/2025  -Doing well. Feels like he is emptying his bladder well. No UTI-Sx. Here for follow-up on CT.   -CT demonstrates ongoing Bilateral severe hydroureteronephrosis to the level of the  ureterovesical junction, and moderately distended urinary bladder, possibly related to chronic outlet obstruction.  -CT shows Right upper pole heterogeneous renal mass, suspicious for neoplasm has enlarged to 4.2 cm from 3.3 cm in January 2024.     History is obtained from patient & EMR    Pertinent imaging reviewed:  CT ABDOMEN PELVIS W/O CONTRAST  LOCATION: [facility]  DATE: 3/21/2025  MPRESSION:   1.  Right upper pole heterogeneous renal mass, suspicious for neoplasm, although technically indeterminate given lack of intravenous contrast. Could consider contrast-enhanced CT or MRI for confirmation if patient is able to receive contrast.  2.  Moderate to severe bilateral hydroureteronephrosis to the level of the urinary bladder. There is urothelial thickening and surrounding fat stranding involving both ureters and the bladder, could be related to chronic outlet obstruction but  recommend   correlation with urinalysis to exclude urinary tract infection.    CT ABDOMEN AND PELVIS WITHOUT CONTRAST 1/19/2024 1:32 PM   IMPRESSION:   1.  Bilateral severe hydroureteronephrosis to the level of the  ureterovesical junction, and moderately distended urinary bladder. No  stone or obstructing lesion within the limitations of noncontrast CT.  Findings likely reflect outlet obstruction.  2.  Since 2020, mild increased in size indeterminate right superior  pole 3.3 cm lesion. Recommend renal CT or MRI to exclude underlying  solid lesion, although this could reflect proteinaceous/hemorrhagic  cyst.  3.  Moderately enlarged prostate.    ULTRASOUND RENAL COMPLETE NON-VASCULAR January 16, 2024 3:55 PM   RIGHT KIDNEY: 11.3 x 5.5 x 5.7 cm. Moderate to marked hydronephrosis.  No cysts.  LEFT KIDNEY: 11.6 x 5.9 x 5.0 cm. Marked hydronephrosis. No cysts.  BLADDER: Unremarkable given its level of distention, patient could not  void to evaluate for postvoid residual.             IMPRESSION: Bilateral hydronephrosis.    CT CHEST ABDOMEN PELVIS WITHOUT CONTRAST 3/28/2020 9:48 PM   There is stranding around both kidneys which was not well-seen on the  prior CT chest dated 1/17/2009. There is prominence of the bilateral  extrarenal pelvises more so on the left. There is bilateral  hydroureter, moderate on the left and mild on right. No evidence for  renal or ureteral calculus is identified. Urinary bladder is  completely decompressed around a catheter. The prostate gland is  enlarged.          Past Medical History:     Past Medical History:   Diagnosis Date     Anemia      Atrial fibrillation     AV node ablation and pacemaker placement 4/10/2020     BPH (benign prostatic hyperplasia)      Chronic diastolic CHF      Chronic kidney disease (CKD), stage III (moderate) (H)      Diabetes mellitus - type 2      Palpitations      Systolic CHF (H)           Past Surgical History:     Past Surgical History:   Procedure  Laterality Date     ANESTHESIA CARDIOVERSION N/A 2020    Procedure: ANESTHESIA, FOR CARDIOVERSION;  Surgeon: GENERIC ANESTHESIA PROVIDER;  Location: [facility] OR     CYSTOSCOPY       EP PACEMAKER N/A 4/10/2020    Procedure: EP Pacemaker;  Surgeon: Abhay Willams MD;  Location:  [facility]     CYNDI  2020    Done in Urology office with Dr Perez          Social History:     Social History     Tobacco Use     Smoking status: Never     Smokeless tobacco: Never   Substance Use Topics     Alcohol use: Yes     Comment: Rare          Family History:     Family History   Problem Relation Age of Onset     Family History Negative Mother          in her 80's     Family History Negative Father          accidentally in boating accident.          Allergies:   No Known Allergies       Medications:     Current Outpatient Medications   Medication Sig Dispense Refill     Ascorbic Acid (VITAMIN C PO) Take 1,000 mg by mouth daily       blood glucose (ONETOUCH ULTRA TEST) test strip USE TO TEST BLOOD SUGAR ONE TIME DAILY OR AS DIRECTED. 100 strip 1     bumetanide (BUMEX) 0.5 MG tablet Take 1 tablet (0.5 mg) by mouth daily (+ additional 1 mg to = 1.5 mg daily). 90 tablet 3     bumetanide (BUMEX) 1 MG tablet Take 1 tablet (1 mg) by mouth daily (+ additional 0.5 mg to = 1.5 mg daily). 90 tablet 3     cefdinir (OMNICEF) 300 MG capsule Take 1 capsule (300 mg) by mouth daily 7 capsule 0     ciprofloxacin (CIPRO) 500 MG tablet Take 1 tablet (500 mg) by mouth as needed (Take 1 tablet after catheter change) 1 tablet 0     ciprofloxacin (CIPRO) 500 MG tablet Take 1 tablet (500 mg) by mouth once as needed (Take one tablet by mouth after each catheter change) 3 tablet 0     ciprofloxacin (CIPRO) 500 MG tablet Take 1 tablet (500 mg) by mouth as needed (Take 1 tablet after each catheter change) 1 tablet 0     ELIQUIS ANTICOAGULANT 2.5 MG tablet Take 1 tablet (2.5 mg) by mouth 2 times daily 180 tablet 0     Ferrous Sulfate  (IRON) 325 (65 Fe) MG tablet Take 1 tablet by mouth daily       finasteride (PROSCAR) 5 MG tablet Take 1 tablet (5 mg) by mouth daily. 90 tablet 0     insulin glargine (LANTUS SOLOSTAR) 100 UNIT/ML pen Inject 13 Units Subcutaneous at bedtime 15 mL 3     insulin pen needle (ULTICARE SHORT) 31G X 8 MM miscellaneous use 1 pen daily for injection 100 each 2     melatonin 3 MG tablet Take 3 mg by mouth nightly as needed       metoprolol succinate ER (TOPROL XL) 25 MG 24 hr tablet Take 0.5 tablets (12.5 mg) by mouth 2 times daily 90 tablet 3     Multiple Vitamins-Minerals (MULTIVITAMIN ADULT PO) Take 1 tablet by mouth daily       tamsulosin (FLOMAX) 0.4 MG capsule Take 2 capsules (0.8 mg) by mouth daily. 180 capsule 4     Vitamin D3 (CHOLECALCIFEROL) 125 MCG (5000 UT) tablet Take 5,000 Units by mouth daily  (Patient not taking: Reported on 9/10/2024)       zinc 50 MG TABS Take 50 mg by mouth daily       No current facility-administered medications for this visit.     Facility-Administered Medications Ordered in Other Visits   Medication Dose Route Frequency Provider Last Rate Last Admin     HYDROcodone-acetaminophen (NORCO) 5-325 MG per tablet 1-2 tablet  1-2 tablet Oral Q4H PRN Lin Ordonez MD         Patient is already receiving anticoagulation with heparin, enoxaparin (LOVENOX), warfarin (COUMADIN)  or other anticoagulant medication   Does not apply Continuous PRN Lin Ordonez MD         polyethylene glycol (MIRALAX) Packet 17 g  17 g Oral Daily PRN Lin Ordonez MD              Review of Systems:    ROS: See HPI for pertinent details.  Remainder of 10-point ROS negative.         Physical Exam:   VS:  T: Data Unavailable    HR: Data Unavailable    BP: Data Unavailable    RR: Data Unavailable     GEN:  Alert.  NAD.   HEENT:  Sclerae anicteric.    CV:  No obvious jugular venous distension.  LUNGS: No respiratory distress, breathing comfortably wo accessory muscle use.  ABD:  ND.     SKIN:  Dry. No visible rashes.  "  NEURO:  CN grossly intact.          Laboratory Data:   No results found for: \"PSA\"  Lab Results   Component Value Date    CR 2.3 03/21/2025    CR 2.31 12/10/2024    CR 2.51 10/22/2024    CR 2.10 07/22/2024    CR 2.3 05/25/2024    CR 1.96 04/19/2024    CR 2.20 03/22/2024    CR 2.07 01/19/2024    CR 2.95 12/04/2023    CR 2.46 03/01/2023    CR 2.26 02/17/2022    CR 2.19 11/04/2021    CR 2.30 04/06/2021    CR 2.27 12/21/2020    CR 2.27 11/12/2020    CR 2.14 10/23/2020    CR 2.61 10/17/2020    CR 2.43 10/10/2020    CR 2.17 09/22/2020    CR 1.91 06/02/2020    CR 1.97 05/29/2020    CR 2.03 05/24/2020     Lab Results   Component Value Date    GFRESTIMATED 28 03/21/2025    GFRESTIMATED 28 12/10/2024    GFRESTIMATED 25 10/22/2024    GFRESTIMATED 31 07/22/2024    GFRESTIMATED 28 05/25/2024    GFRESTIMATED 34 04/19/2024    GFRESTIMATED 30 03/22/2024    GFRESTIMATED 32 01/19/2024    GFRESTIMATED 21 12/04/2023    GFRESTIMATED 26 03/01/2023    GFRESTIMATED 29 02/17/2022    GFRESTIMATED 28 11/04/2021    GFRESTIMATED 26 04/06/2021    GFRESTIMATED 27 12/21/2020    GFRESTIMATED 27 11/12/2020    GFRESTIMATED 29 10/23/2020    GFRESTIMATED 23 10/17/2020    GFRESTIMATED 25 10/10/2020    GFRESTIMATED 28 09/22/2020    GFRESTIMATED 34 06/02/2020    GFRESTIMATED 32 05/29/2020    GFRESTIMATED 31 05/24/2020            Again, thank you for allowing me to participate in the care of your patient.      Sincerely,    LUIS Iyer CNP    "

## 2025-03-25 NOTE — NURSING NOTE
Current patient location: [address] [facility] [address]    Is the patient currently in the state of [place]? YES    Visit mode: VIDEO    If the visit is dropped, the patient can be reconnected by:TELEPHONE VISIT: Phone number: [phone]    Will anyone else be joining the visit? NO  (If patient encounters technical issues they should call [phone]    Are changes needed to the allergy or medication list? No    Are refills needed on medications prescribed by this physician? NO    Rooming Documentation:  Questionnaire(s) completed    Reason for visit: RECHECK    Nicole GAN

## 2025-03-25 NOTE — PROGRESS NOTES
Virtual Visit Details  Originating Location (pt. Location): Home    Distant Location (provider location):  On-site  Platform used for Video Visit: [facility]      Urology Outpatient Visit    Name: Gene Pagan    MRN: [id number]   YOB: 1945               Chief Complaint:   Renal Mass         Impression and Plan:   Impression / Plan:   Gene Pagan is a 79 year old male with Hx of a-fib w MRI-incompatible pacemaker, stage 3 CKD, BPH & incomplete emptying s/p Rezum 9/2020, maintained on Flomax & finasteride, with gradually enlarging 4.2 cm heterogeneous solid-appearing R renal mass, and ongoing bilateral hydronephrosis suspected to be 2/2 ROGERS.      - Nurse visit for catheterized PVR, suspect ongoing incomplete emptying contributing to bilateral hydroureteronephrosis. If he is not emptying well with PVRs less than 250 mL will need to reinitiate CIC or place indwelling.   - Plan for follow-up with Dr. Perez for further discussion regarding renal mass, and for cystoscopy to eval for outlet obstruction / possible repeat outlet procedure.   - Continue Finasteride and Flomax daily.    Thank you for the opportunity to participate in the care of Gene Pagan.     LUIS Borja, CNP  [facility] - Department of Urology  [phone]          History of Present Illness:   Gene Pagan is a 79 year old male with history of chronic kidney disease stage 3 (Cr has varied, but is mostly between 2-2.5 (eGFR ~25-30), followed by nephrology), A-fib with pacemaker (Pt reports this is MRI-incompatible), on Eliquis, BPH on Flomax, chronic diastolic CHF, type 2 diabetes. He is status post a REZUM procedure on 9/25/2020. He is currently on Flomax 0.8 mg every day and finasteride.     1/23/2024  UTI diagnosed 1/10/2024 when he had a positive urine culture that grew out Aerococcus Urinae, he was treated with PO Augmentin. He was seen in the ED at that time and underwent CT which demonstrated bilateral  severe hydroureteronephrosis to the level of the ureterovesical junction, and moderately distended urinary bladder. Findings likely reflecting underlying ROGERS due to prostatomegaly. He was discharged with baker in place. CT also revealed that since 2020, mild increase in size of indeterminate right superior pole 3.3 cm renal lesion.  -He was started on finasteride with plan for TOV in 6 mo.    7/22/24 TOV was successful.     9/10/2024   mL   Plan for follow-up CT to monitor renal mass and recheck on hydro.    TODAY 3/25/2025  -Doing well. Feels like he is emptying his bladder well. No UTI-Sx. Here for follow-up on CT.   -CT demonstrates ongoing Bilateral severe hydroureteronephrosis to the level of the  ureterovesical junction, and moderately distended urinary bladder, possibly related to chronic outlet obstruction.  -CT shows Right upper pole heterogeneous renal mass, suspicious for neoplasm has enlarged to 4.2 cm from 3.3 cm in January 2024.     History is obtained from patient & EMR    Pertinent imaging reviewed:  CT ABDOMEN PELVIS W/O CONTRAST  LOCATION: [facility]  DATE: 3/21/2025  MPRESSION:   1.  Right upper pole heterogeneous renal mass, suspicious for neoplasm, although technically indeterminate given lack of intravenous contrast. Could consider contrast-enhanced CT or MRI for confirmation if patient is able to receive contrast.  2.  Moderate to severe bilateral hydroureteronephrosis to the level of the urinary bladder. There is urothelial thickening and surrounding fat stranding involving both ureters and the bladder, could be related to chronic outlet obstruction but recommend   correlation with urinalysis to exclude urinary tract infection.    CT ABDOMEN AND PELVIS WITHOUT CONTRAST 1/19/2024 1:32 PM   IMPRESSION:   1.  Bilateral severe hydroureteronephrosis to the level of the  ureterovesical junction, and moderately distended urinary bladder. No  stone or obstructing lesion  within the limitations of noncontrast CT.  Findings likely reflect outlet obstruction.  2.  Since 2020, mild increased in size indeterminate right superior  pole 3.3 cm lesion. Recommend renal CT or MRI to exclude underlying  solid lesion, although this could reflect proteinaceous/hemorrhagic  cyst.  3.  Moderately enlarged prostate.    ULTRASOUND RENAL COMPLETE NON-VASCULAR January 16, 2024 3:55 PM   RIGHT KIDNEY: 11.3 x 5.5 x 5.7 cm. Moderate to marked hydronephrosis.  No cysts.  LEFT KIDNEY: 11.6 x 5.9 x 5.0 cm. Marked hydronephrosis. No cysts.  BLADDER: Unremarkable given its level of distention, patient could not  void to evaluate for postvoid residual.             IMPRESSION: Bilateral hydronephrosis.    CT CHEST ABDOMEN PELVIS WITHOUT CONTRAST 3/28/2020 9:48 PM   There is stranding around both kidneys which was not well-seen on the  prior CT chest dated 1/17/2009. There is prominence of the bilateral  extrarenal pelvises more so on the left. There is bilateral  hydroureter, moderate on the left and mild on right. No evidence for  renal or ureteral calculus is identified. Urinary bladder is  completely decompressed around a catheter. The prostate gland is  enlarged.          Past Medical History:     Past Medical History:   Diagnosis Date    Anemia     Atrial fibrillation     AV node ablation and pacemaker placement 4/10/2020    BPH (benign prostatic hyperplasia)     Chronic diastolic CHF     Chronic kidney disease (CKD), stage III (moderate) (H)     Diabetes mellitus - type 2     Palpitations     Systolic CHF (H)           Past Surgical History:     Past Surgical History:   Procedure Laterality Date    ANESTHESIA CARDIOVERSION N/A 4/6/2020    Procedure: ANESTHESIA, FOR CARDIOVERSION;  Surgeon: GENERIC ANESTHESIA PROVIDER;  Location: [facility] OR    CYSTOSCOPY      EP PACEMAKER N/A 4/10/2020    Procedure: EP Pacemaker;  Surgeon: Abhay Willams MD;  Location:  [facility]    [facility]  09/25/2020    Done in  Urology office with Dr Perez          Social History:     Social History     Tobacco Use    Smoking status: Never    Smokeless tobacco: Never   Substance Use Topics    Alcohol use: Yes     Comment: Rare          Family History:     Family History   Problem Relation Age of Onset    Family History Negative Mother          in her 80's    Family History Negative Father          accidentally in boating accident.          Allergies:   No Known Allergies       Medications:     Current Outpatient Medications   Medication Sig Dispense Refill    Ascorbic Acid (VITAMIN C PO) Take 1,000 mg by mouth daily      blood glucose (ONETOUCH ULTRA TEST) test strip USE TO TEST BLOOD SUGAR ONE TIME DAILY OR AS DIRECTED. 100 strip 1    bumetanide (BUMEX) 0.5 MG tablet Take 1 tablet (0.5 mg) by mouth daily (+ additional 1 mg to = 1.5 mg daily). 90 tablet 3    bumetanide (BUMEX) 1 MG tablet Take 1 tablet (1 mg) by mouth daily (+ additional 0.5 mg to = 1.5 mg daily). 90 tablet 3    cefdinir (OMNICEF) 300 MG capsule Take 1 capsule (300 mg) by mouth daily 7 capsule 0    ciprofloxacin (CIPRO) 500 MG tablet Take 1 tablet (500 mg) by mouth as needed (Take 1 tablet after catheter change) 1 tablet 0    ciprofloxacin (CIPRO) 500 MG tablet Take 1 tablet (500 mg) by mouth once as needed (Take one tablet by mouth after each catheter change) 3 tablet 0    ciprofloxacin (CIPRO) 500 MG tablet Take 1 tablet (500 mg) by mouth as needed (Take 1 tablet after each catheter change) 1 tablet 0    ELIQUIS ANTICOAGULANT 2.5 MG tablet Take 1 tablet (2.5 mg) by mouth 2 times daily 180 tablet 0    Ferrous Sulfate (IRON) 325 (65 Fe) MG tablet Take 1 tablet by mouth daily      finasteride (PROSCAR) 5 MG tablet Take 1 tablet (5 mg) by mouth daily. 90 tablet 0    insulin glargine (LANTUS SOLOSTAR) 100 UNIT/ML pen Inject 13 Units Subcutaneous at bedtime 15 mL 3    insulin pen needle (ULTICARE SHORT) 31G X 8 MM miscellaneous use 1 pen daily for injection 100 each 2  "   melatonin 3 MG tablet Take 3 mg by mouth nightly as needed      metoprolol succinate ER (TOPROL XL) 25 MG 24 hr tablet Take 0.5 tablets (12.5 mg) by mouth 2 times daily 90 tablet 3    Multiple Vitamins-Minerals (MULTIVITAMIN ADULT PO) Take 1 tablet by mouth daily      tamsulosin (FLOMAX) 0.4 MG capsule Take 2 capsules (0.8 mg) by mouth daily. 180 capsule 4    Vitamin D3 (CHOLECALCIFEROL) 125 MCG (5000 UT) tablet Take 5,000 Units by mouth daily  (Patient not taking: Reported on 9/10/2024)      zinc 50 MG TABS Take 50 mg by mouth daily       No current facility-administered medications for this visit.     Facility-Administered Medications Ordered in Other Visits   Medication Dose Route Frequency Provider Last Rate Last Admin    HYDROcodone-acetaminophen (NORCO) 5-325 MG per tablet 1-2 tablet  1-2 tablet Oral Q4H PRN Lin Ordonez MD        Patient is already receiving anticoagulation with heparin, enoxaparin (LOVENOX), warfarin (COUMADIN)  or other anticoagulant medication   Does not apply Continuous PRN Lin Ordonez MD        polyethylene glycol (MIRALAX) Packet 17 g  17 g Oral Daily PRN Lin Ordonez MD              Review of Systems:    ROS: See HPI for pertinent details.  Remainder of 10-point ROS negative.         Physical Exam:   VS:  T: Data Unavailable    HR: Data Unavailable    BP: Data Unavailable    RR: Data Unavailable     GEN:  Alert.  NAD.   HEENT:  Sclerae anicteric.    CV:  No obvious jugular venous distension.  LUNGS: No respiratory distress, breathing comfortably wo accessory muscle use.  ABD:  ND.     SKIN:  Dry. No visible rashes.   NEURO:  CN grossly intact.          Laboratory Data:   No results found for: \"PSA\"  Lab Results   Component Value Date    CR 2.3 03/21/2025    CR 2.31 12/10/2024    CR 2.51 10/22/2024    CR 2.10 07/22/2024    CR 2.3 05/25/2024    CR 1.96 04/19/2024    CR 2.20 03/22/2024    CR 2.07 01/19/2024    CR 2.95 12/04/2023    CR 2.46 03/01/2023    CR 2.26 02/17/2022    " CR 2.19 11/04/2021    CR 2.30 04/06/2021    CR 2.27 12/21/2020    CR 2.27 11/12/2020    CR 2.14 10/23/2020    CR 2.61 10/17/2020    CR 2.43 10/10/2020    CR 2.17 09/22/2020    CR 1.91 06/02/2020    CR 1.97 05/29/2020    CR 2.03 05/24/2020     Lab Results   Component Value Date    GFRESTIMATED 28 03/21/2025    GFRESTIMATED 28 12/10/2024    GFRESTIMATED 25 10/22/2024    GFRESTIMATED 31 07/22/2024    GFRESTIMATED 28 05/25/2024    GFRESTIMATED 34 04/19/2024    GFRESTIMATED 30 03/22/2024    GFRESTIMATED 32 01/19/2024    GFRESTIMATED 21 12/04/2023    GFRESTIMATED 26 03/01/2023    GFRESTIMATED 29 02/17/2022    GFRESTIMATED 28 11/04/2021    GFRESTIMATED 26 04/06/2021    GFRESTIMATED 27 12/21/2020    GFRESTIMATED 27 11/12/2020    GFRESTIMATED 29 10/23/2020    GFRESTIMATED 23 10/17/2020    GFRESTIMATED 25 10/10/2020    GFRESTIMATED 28 09/22/2020    GFRESTIMATED 34 06/02/2020    GFRESTIMATED 32 05/29/2020    GFRESTIMATED 31 05/24/2020

## 2025-03-26 ENCOUNTER — TELEPHONE (OUTPATIENT)
Dept: INTERNAL MEDICINE | Facility: CLINIC | Age: 80
End: 2025-03-26

## 2025-03-26 PROBLEM — Z79.4 TYPE 2 DIABETES MELLITUS WITH STAGE 3B CHRONIC KIDNEY DISEASE, WITH LONG-TERM CURRENT USE OF INSULIN (H): Status: ACTIVE | Noted: 2020-10-23

## 2025-03-26 PROBLEM — I48.91 NEW ONSET ATRIAL FIBRILLATION (H): Status: RESOLVED | Noted: 2020-03-27 | Resolved: 2025-03-26

## 2025-03-26 PROBLEM — E11.22 TYPE 2 DIABETES MELLITUS WITH STAGE 3B CHRONIC KIDNEY DISEASE, WITH LONG-TERM CURRENT USE OF INSULIN (H): Status: ACTIVE | Noted: 2020-10-23

## 2025-03-26 PROBLEM — N18.32 TYPE 2 DIABETES MELLITUS WITH STAGE 3B CHRONIC KIDNEY DISEASE, WITH LONG-TERM CURRENT USE OF INSULIN (H): Status: ACTIVE | Noted: 2020-10-23

## 2025-03-26 NOTE — TELEPHONE ENCOUNTER
Patient Quality Outreach    Patient is due for the following:   Diabetes -  A1C and Foot Exam  Physical Annual Wellness Visit    Action(s) Taken:   Patient has upcoming appointment, these items will be addressed at that time.  3/26/2025 appointment with Dr. Lipscomb.    Type of outreach:    Chart review performed, no outreach needed.    Questions for provider review:    None         Zulema Umana, [facility]  Chart routed to none.

## 2025-04-03 ENCOUNTER — LAB (OUTPATIENT)
Dept: LAB | Facility: CLINIC | Age: 80
End: 2025-04-03
Payer: COMMERCIAL

## 2025-04-03 DIAGNOSIS — Z79.4 TYPE 2 DIABETES MELLITUS WITH STAGE 4 CHRONIC KIDNEY DISEASE, WITH LONG-TERM CURRENT USE OF INSULIN (H): ICD-10-CM

## 2025-04-03 DIAGNOSIS — E11.22 TYPE 2 DIABETES MELLITUS WITH STAGE 4 CHRONIC KIDNEY DISEASE, WITH LONG-TERM CURRENT USE OF INSULIN (H): ICD-10-CM

## 2025-04-03 DIAGNOSIS — N18.4 TYPE 2 DIABETES MELLITUS WITH STAGE 4 CHRONIC KIDNEY DISEASE, WITH LONG-TERM CURRENT USE OF INSULIN (H): ICD-10-CM

## 2025-04-03 DIAGNOSIS — E78.5 HYPERLIPIDEMIA LDL GOAL <100: ICD-10-CM

## 2025-04-03 LAB
ALBUMIN SERPL BCG-MCNC: 3.9 G/DL (ref 3.5–5.2)
ALP SERPL-CCNC: 94 U/L (ref 40–150)
ALT SERPL W P-5'-P-CCNC: 14 U/L (ref 0–70)
ANION GAP SERPL CALCULATED.3IONS-SCNC: 13 MMOL/L (ref 7–15)
AST SERPL W P-5'-P-CCNC: 17 U/L (ref 0–45)
BILIRUB SERPL-MCNC: 0.4 MG/DL
BUN SERPL-MCNC: 27.3 MG/DL (ref 8–23)
CALCIUM SERPL-MCNC: 9.3 MG/DL (ref 8.8–10.4)
CHLORIDE SERPL-SCNC: 100 MMOL/L (ref 98–107)
CHOLEST SERPL-MCNC: 150 MG/DL
CREAT SERPL-MCNC: 2 MG/DL (ref 0.67–1.17)
CREAT UR-MCNC: 156 MG/DL
EGFRCR SERPLBLD CKD-EPI 2021: 33 ML/MIN/1.73M2
EST. AVERAGE GLUCOSE BLD GHB EST-MCNC: 128 MG/DL
FASTING STATUS PATIENT QL REPORTED: YES
GLUCOSE SERPL-MCNC: 111 MG/DL (ref 70–99)
HBA1C MFR BLD: 6.1 %
HCO3 SERPL-SCNC: 24 MMOL/L (ref 22–29)
HDLC SERPL-MCNC: 33 MG/DL
LDLC SERPL CALC-MCNC: 81 MG/DL
MICROALBUMIN UR-MCNC: 485 MG/L
MICROALBUMIN/CREAT UR: 310.9 MG/G CR (ref 0–17)
NONHDLC SERPL-MCNC: 117 MG/DL
POTASSIUM SERPL-SCNC: 4 MMOL/L (ref 3.4–5.3)
PROT SERPL-MCNC: 7.4 G/DL (ref 6.4–8.3)
SODIUM SERPL-SCNC: 137 MMOL/L (ref 135–145)
TRIGL SERPL-MCNC: 179 MG/DL
TSH SERPL DL<=0.005 MIU/L-ACNC: 1.52 UIU/ML (ref 0.3–4.2)

## 2025-04-03 PROCEDURE — 82043 UR ALBUMIN QUANTITATIVE: CPT

## 2025-04-03 PROCEDURE — 83036 HEMOGLOBIN GLYCOSYLATED A1C: CPT

## 2025-04-03 PROCEDURE — 80053 COMPREHEN METABOLIC PANEL: CPT

## 2025-04-03 PROCEDURE — 80061 LIPID PANEL: CPT

## 2025-04-03 PROCEDURE — 84443 ASSAY THYROID STIM HORMONE: CPT

## 2025-04-03 PROCEDURE — 36415 COLL VENOUS BLD VENIPUNCTURE: CPT

## 2025-04-16 ENCOUNTER — OFFICE VISIT (OUTPATIENT)
Dept: UROLOGY | Facility: CLINIC | Age: 80
End: 2025-04-16
Payer: COMMERCIAL

## 2025-04-16 VITALS
HEIGHT: 68 IN | HEART RATE: 98 BPM | DIASTOLIC BLOOD PRESSURE: 87 MMHG | OXYGEN SATURATION: 70 % | WEIGHT: 192 LBS | SYSTOLIC BLOOD PRESSURE: 152 MMHG | BODY MASS INDEX: 29.1 KG/M2

## 2025-04-16 DIAGNOSIS — R33.8 BENIGN PROSTATIC HYPERPLASIA WITH URINARY RETENTION: Primary | ICD-10-CM

## 2025-04-16 DIAGNOSIS — N28.89 RIGHT RENAL MASS: ICD-10-CM

## 2025-04-16 DIAGNOSIS — R33.9 URINARY RETENTION: ICD-10-CM

## 2025-04-16 DIAGNOSIS — N40.1 BENIGN PROSTATIC HYPERPLASIA WITH URINARY RETENTION: Primary | ICD-10-CM

## 2025-04-16 LAB — RESIDUAL VOLUME (RV) (EXTERNAL): NORMAL

## 2025-04-16 PROCEDURE — 3077F SYST BP >= 140 MM HG: CPT | Performed by: UROLOGY

## 2025-04-16 PROCEDURE — 51798 US URINE CAPACITY MEASURE: CPT | Performed by: UROLOGY

## 2025-04-16 PROCEDURE — 3079F DIAST BP 80-89 MM HG: CPT | Performed by: UROLOGY

## 2025-04-16 PROCEDURE — 52000 CYSTOURETHROSCOPY: CPT | Performed by: UROLOGY

## 2025-04-16 PROCEDURE — 99215 OFFICE O/P EST HI 40 MIN: CPT | Mod: 25 | Performed by: UROLOGY

## 2025-04-16 PROCEDURE — 1126F AMNT PAIN NOTED NONE PRSNT: CPT | Performed by: UROLOGY

## 2025-04-16 RX ORDER — LIDOCAINE HYDROCHLORIDE 20 MG/ML
JELLY TOPICAL ONCE
Status: COMPLETED | OUTPATIENT
Start: 2025-04-16 | End: 2025-04-16

## 2025-04-16 RX ADMIN — LIDOCAINE HYDROCHLORIDE 5 ML: 20 JELLY TOPICAL at 15:04

## 2025-04-16 ASSESSMENT — PAIN SCALES - GENERAL: PAINLEVEL_OUTOF10: NO PAIN (0)

## 2025-04-16 NOTE — LETTER
4/16/2025       RE: Gene Pagan  [address]  [facility] [address]     Dear Colleague,    Thank you for referring your patient, Gene Pagan, to the [facility] at [facility]. Please see a copy of my visit note below.    [place]  CHIEF COMPLAINT   It was my pleasure to see Gene Pagan who is a 79 year old male .      HPI   Gene Pagan is a very pleasant 79 year old male      Initially seen 4/29/20:  Gene Pagan is a 75 year old male who is being seen for evaluation of acute urinary retention.  He is being seen today for hospital follow-up for urologic issues of gross hematuria and urinary retention.     He was seen in the hospital recently after being admitted in the setting of shortness of breath, atrial fibrillation with RVR and acute kidney injury with work-up suggestive of recurrent cardiogenic shock.  He subsequently had a pacemaker placed on April 10.  His hematuria resolved while in the hospital however he continued to have urinary retention and failed a trial of void prior to discharge.     He reports having worsening symptoms of LUTS for the past several months to year prior to hospitalization and had been started on tamsulosin 0.4 mg daily about 1 month prior to hospitalization.  He also notes that he had a self-limited episode of gross hematuria in December.  No prior instances of gross hematuria and he denies any smoking history.     He failed his Trial of void      5/20/20:  Return for cystoscopy and Trial of void   Cystoscopy with 4cm long prostate and significant bilobar hypertrophy with a small median lobe and mild trabeculation.   Failed Trial of void and baker catheter replaced     8/19/20:  Returns for baker catheter change and discussion of bladder outlet procedural options  He has been doing very well with regards to his return of strength and his cardiogenic rehab  He is hoping to return  "to his part-time work at [employer] in the next coming weeks    9/25/20:  REZUM procedure: The prostate was measured at 83.2 cc  The transrectal ultrasound probe was then removed.  8 targeted treatments delivered into the prostate utilizing radiofrequency power to form sterile water vapor. 1 treatment was applied to the bladder neck. 4 treatments in left lateral lobe and 3 treatments in the right lateral lobe  by 1 cm were applied.    10/15/20:   Failed Trial of void and instructed on intermittent self catheterization on 9/28/2020 and on 10/8/2020.  On 10/10/2020 he presented to the emergency room due to room difficulty with intermittent self-catheterization and a Baker catheter was replaced  He returns today for Baker removal and instruction with intermittent self-catheterization    10/22/2020:  He initially did ok on intermittent self catheterization, however then developed urinary retention and presented to the ER on 10/17/20    3/25/2025 - Miguel Oswalden:  Gene Pagan is a 79 year old male with Hx of a-fib w MRI-incompatible pacemaker, stage 3 CKD, BPH & incomplete emptying s/p Rezum 9/2020, maintained on Flomax & finasteride, with gradually enlarging 4.2 cm heterogeneous solid-appearing R renal mass, and ongoing bilateral hydronephrosis suspected to be 2/2 ROGERS.     4/1/2025 - RN visit:  PVR = 350cc  UA/UC sent and baker placed  UC consistent with contamination, started on empiric Bactrim    TODAY 4/16/2025:  Follow-up today for cystoscopy and trial of void  Also to discuss his CT scan with an enlarging right renal mass    PHYSICAL EXAM  Patient is a 79 year old  male   Vitals: Blood pressure (!) 152/87, pulse 98, height 1.727 m (5' 8\"), weight 87.1 kg (192 lb), SpO2 (!) 70%.  Body mass index is 29.19 kg/m .  General Appearance Adult:   Alert, no acute distress, oriented  HENT: throat/mouth:normal, good dentition  Lungs: no respiratory distress, or pursed lip breathing  Heart: No obvious jugular venous " distension present  Abdomen: soft, nontender, no organomegaly or masses  Musculoskeltal: extremities normal, no peripheral edema  Skin: no suspicious lesions or rashes  Neuro: Alert, oriented, speech and mentation normal  Psych: affect and mood normal  Gait: Normal    UA RESULTS:  Recent Labs   Lab Test 04/01/25  1538 09/10/24  1456 07/22/24  1115   COLOR Yellow   < > Light Yellow   APPEARANCE Cloudy*   < > Clear   URINEGLC Negative   < > Negative   URINEBILI Negative   < > Negative   URINEKETONE Negative   < > Negative   SG 1.015   < > 1.009   UBLD Small*   < > Negative   URINEPH 6.5   < > 6.0   PROTEIN 30*   < > Negative   UROBILINOGEN 0.2   < >  --    NITRITE Negative   < > Negative   LEUKEST Large*   < > Large*   RBCU  --   --  1   WBCU  --   --  19*    < > = values in this interval not displayed.      Creatinine   Date Value Ref Range Status   04/03/2025 2.00 (H) 0.67 - 1.17 mg/dL Final   12/10/2024 2.31 (H) 0.67 - 1.17 mg/dL Final   10/22/2024 2.51 (H) 0.67 - 1.17 mg/dL Final   07/22/2024 2.10 (H) 0.67 - 1.17 mg/dL Final   04/19/2024 1.96 (H) 0.67 - 1.17 mg/dL Final   03/22/2024 2.20 (H) 0.67 - 1.17 mg/dL Final   01/19/2024 2.07 (H) 0.67 - 1.17 mg/dL Final   12/04/2023 2.95 (H) 0.67 - 1.17 mg/dL Final     Creatinine POCT   Date Value Ref Range Status   03/21/2025 2.3 (H) 0.7 - 1.2 mg/dL Final   05/25/2024 2.3 (H) 0.7 - 1.3 mg/dL Final         PATHOLOGY:  3/21/2025:  FINDINGS:   LOWER CHEST: Mild fibrosis in the lung bases. Pacemaker leads partially visualized in the right atrium, right ventricle and coronary sinus. No focal airspace consolidation      HEPATOBILIARY: Normal.     PANCREAS: Normal.     SPLEEN: Multiple calcified splenic granulomas.     ADRENAL GLANDS: Normal.     KIDNEYS/BLADDER: Atrophic kidneys with moderate to severe bilateral hydroureteronephrosis to the level of the urinary bladder bilaterally. There is significant urothelial thickening in the ureters with some adjacent fat stranding  bilaterally.   Heterogeneous solid-appearing mass exophytic from the upper pole of the right kidney measuring up to 4.2 cm. No additional suspicious renal lesions are visualized. No nephroureterolithiasis. Urinary bladder demonstrates diffuse wall thickening with   subtle surrounding fat stranding.     BOWEL: Diverticulosis of the colon. No acute inflammatory change. No obstruction. Normal appendix.     LYMPH NODES: No suspicious lymphadenopathy. No abdominopelvic free fluid.     VASCULATURE: Moderate calcified atherosclerosis.     PELVIC ORGANS: Mild prostatomegaly.     MUSCULOSKELETAL: No acute bony abnormality or suspicious osseous lesion on this noncontrast exam.                                                         IMPRESSION:   1.  Right upper pole heterogeneous renal mass, suspicious for neoplasm, although technically indeterminate given lack of intravenous contrast. Could consider contrast-enhanced CT or MRI for confirmation if patient is able to receive contrast.  2.  Moderate to severe bilateral hydroureteronephrosis to the level of the urinary bladder. There is urothelial thickening and surrounding fat stranding involving both ureters and the bladder, could be related to chronic outlet obstruction but recommend   correlation with urinalysis to exclude urinary tract infection.        ASSESSMENT and PLAN  79 year old male with Hx of a-fib w MRI-incompatible pacemaker, stage 3 CKD, BPH & incomplete emptying s/p Rezum 9/2020, maintained on Flomax & finasteride, with gradually enlarging 4.2 cm heterogeneous solid-appearing R renal mass, and ongoing bilateral hydronephrosis    BPH with recurrent urinary retention and bilateral hydroureteronephrosis possibly secondary to bladder outlet obstruction  - I reviewed his labs with a fairly stable serum creatinine at 2.09-I reviewed his CT scan and agree with radiologist interpretation with moderate to severe bilateral hydroureteronephrosis  - Cystoscopy today with  significant 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, left nodule greater than right, with a small median lobe and moderate to severe trabeculation  - Following cystoscopy he passed a trial of void successfully  - Will plan for an RN visit within the next week to check a PVR  - If this is less than 150-200 cc we can maintain without a Mckenzie catheter and plan for renal ultrasound to assess his hydronephrosis in about 2 weeks  - If this is elevated he will need to resume intermittent self-catheterization and we may need to consider bilateral ureteral stent placement    Enlarging right renal mass  - I reviewed his CT scan and reviewed these images personally.  I compared these to his prior CT scans and agree that there has been interval growth  - We discussed that this is concerning for a renal neoplasm and that I would recommend we make plans for a robotic partial nephrectomy  - We discussed the risks and benefits of a robotic partial nephrectomy in detail as well as the expected perioperative and postoperative recovery plan  - Order for surgery placed  - This is a chronic problem with progression requiring a major surgical intervention    Time spent: 45 minutes spent on the date of the encounter doing chart review, history and exam, documentation and further activities as noted above.  This was in addition to cystoscopy time    Chart documentation with Dragon Voice recognition Software. Although reviewed after completion, some words and grammatical errors may remain.       Cheikh Perez MD   Urology  [facility]  [facility] Phone: [phone]  [facility] Phone: [phone]        Again, thank you for allowing me to participate in the care of your patient.      Sincerely,    Cheikh Perez MD

## 2025-04-16 NOTE — PROGRESS NOTES
[facility]  CHIEF COMPLAINT   It was my pleasure to see Gene Pagan who is a 79 year old male .      HPI   Gene Pagan is a very pleasant 79 year old male      Initially seen 4/29/20:  Gene Pagan is a 75 year old male who is being seen for evaluation of acute urinary retention.  He is being seen today for hospital follow-up for urologic issues of gross hematuria and urinary retention.     He was seen in the hospital recently after being admitted in the setting of shortness of breath, atrial fibrillation with RVR and acute kidney injury with work-up suggestive of recurrent cardiogenic shock.  He subsequently had a pacemaker placed on April 10.  His hematuria resolved while in the hospital however he continued to have urinary retention and failed a trial of void prior to discharge.     He reports having worsening symptoms of LUTS for the past several months to year prior to hospitalization and had been started on tamsulosin 0.4 mg daily about 1 month prior to hospitalization.  He also notes that he had a self-limited episode of gross hematuria in December.  No prior instances of gross hematuria and he denies any smoking history.     He failed his Trial of void      5/20/20:  Return for cystoscopy and Trial of void   Cystoscopy with 4cm long prostate and significant bilobar hypertrophy with a small median lobe and mild trabeculation.   Failed Trial of void and baker catheter replaced     8/19/20:  Returns for baker catheter change and discussion of bladder outlet procedural options  He has been doing very well with regards to his return of strength and his cardiogenic rehab  He is hoping to return to his part-time work at [employer] in the next coming weeks    9/25/20:  REZUM procedure: The prostate was measured at 83.2 cc  The transrectal ultrasound probe was then removed.  8 targeted treatments delivered into the prostate utilizing radiofrequency power to form sterile water vapor. 1 treatment was applied to  "the bladder neck. 4 treatments in left lateral lobe and 3 treatments in the right lateral lobe  by 1 cm were applied.    10/15/20:   Failed Trial of void and instructed on intermittent self catheterization on 9/28/2020 and on 10/8/2020.  On 10/10/2020 he presented to the emergency room due to room difficulty with intermittent self-catheterization and a Baker catheter was replaced  He returns today for Baker removal and instruction with intermittent self-catheterization    10/22/2020:  He initially did ok on intermittent self catheterization, however then developed urinary retention and presented to the ER on 10/17/20    3/25/2025 - Miguel Gonzalez:  Gene Pagan is a 79 year old male with Hx of a-fib w MRI-incompatible pacemaker, stage 3 CKD, BPH & incomplete emptying s/p Rezum 9/2020, maintained on Flomax & finasteride, with gradually enlarging 4.2 cm heterogeneous solid-appearing R renal mass, and ongoing bilateral hydronephrosis suspected to be 2/2 ROGERS.     4/1/2025 - RN visit:  PVR = 350cc  UA/UC sent and baker placed  UC consistent with contamination, started on empiric Bactrim    TODAY 4/16/2025:  Follow-up today for cystoscopy and trial of void  Also to discuss his CT scan with an enlarging right renal mass    PHYSICAL EXAM  Patient is a 79 year old  male   Vitals: Blood pressure (!) 152/87, pulse 98, height 1.727 m (5' 8\"), weight 87.1 kg (192 lb), SpO2 (!) 70%.  Body mass index is 29.19 kg/m .  General Appearance Adult:   Alert, no acute distress, oriented  HENT: throat/mouth:normal, good dentition  Lungs: no respiratory distress, or pursed lip breathing  Heart: No obvious jugular venous distension present  Abdomen: soft, nontender, no organomegaly or masses  Musculoskeltal: extremities normal, no peripheral edema  Skin: no suspicious lesions or rashes  Neuro: Alert, oriented, speech and mentation normal  Psych: affect and mood normal  Gait: Normal    UA RESULTS:  Recent Labs   Lab Test " 04/01/25  1538 09/10/24  1456 07/22/24  1115   COLOR Yellow   < > Light Yellow   APPEARANCE Cloudy*   < > Clear   URINEGLC Negative   < > Negative   URINEBILI Negative   < > Negative   URINEKETONE Negative   < > Negative   SG 1.015   < > 1.009   UBLD Small*   < > Negative   URINEPH 6.5   < > 6.0   PROTEIN 30*   < > Negative   UROBILINOGEN 0.2   < >  --    NITRITE Negative   < > Negative   LEUKEST Large*   < > Large*   RBCU  --   --  1   WBCU  --   --  19*    < > = values in this interval not displayed.      Creatinine   Date Value Ref Range Status   04/03/2025 2.00 (H) 0.67 - 1.17 mg/dL Final   12/10/2024 2.31 (H) 0.67 - 1.17 mg/dL Final   10/22/2024 2.51 (H) 0.67 - 1.17 mg/dL Final   07/22/2024 2.10 (H) 0.67 - 1.17 mg/dL Final   04/19/2024 1.96 (H) 0.67 - 1.17 mg/dL Final   03/22/2024 2.20 (H) 0.67 - 1.17 mg/dL Final   01/19/2024 2.07 (H) 0.67 - 1.17 mg/dL Final   12/04/2023 2.95 (H) 0.67 - 1.17 mg/dL Final     Creatinine POCT   Date Value Ref Range Status   03/21/2025 2.3 (H) 0.7 - 1.2 mg/dL Final   05/25/2024 2.3 (H) 0.7 - 1.3 mg/dL Final         PATHOLOGY:  3/21/2025:  FINDINGS:   LOWER CHEST: Mild fibrosis in the lung bases. Pacemaker leads partially visualized in the right atrium, right ventricle and coronary sinus. No focal airspace consolidation      HEPATOBILIARY: Normal.     PANCREAS: Normal.     SPLEEN: Multiple calcified splenic granulomas.     ADRENAL GLANDS: Normal.     KIDNEYS/BLADDER: Atrophic kidneys with moderate to severe bilateral hydroureteronephrosis to the level of the urinary bladder bilaterally. There is significant urothelial thickening in the ureters with some adjacent fat stranding bilaterally.   Heterogeneous solid-appearing mass exophytic from the upper pole of the right kidney measuring up to 4.2 cm. No additional suspicious renal lesions are visualized. No nephroureterolithiasis. Urinary bladder demonstrates diffuse wall thickening with   subtle surrounding fat stranding.     BOWEL:  Diverticulosis of the colon. No acute inflammatory change. No obstruction. Normal appendix.     LYMPH NODES: No suspicious lymphadenopathy. No abdominopelvic free fluid.     VASCULATURE: Moderate calcified atherosclerosis.     PELVIC ORGANS: Mild prostatomegaly.     MUSCULOSKELETAL: No acute bony abnormality or suspicious osseous lesion on this noncontrast exam.                                                         IMPRESSION:   1.  Right upper pole heterogeneous renal mass, suspicious for neoplasm, although technically indeterminate given lack of intravenous contrast. Could consider contrast-enhanced CT or MRI for confirmation if patient is able to receive contrast.  2.  Moderate to severe bilateral hydroureteronephrosis to the level of the urinary bladder. There is urothelial thickening and surrounding fat stranding involving both ureters and the bladder, could be related to chronic outlet obstruction but recommend   correlation with urinalysis to exclude urinary tract infection.        ASSESSMENT and PLAN  79 year old male with Hx of a-fib w MRI-incompatible pacemaker, stage 3 CKD, BPH & incomplete emptying s/p Rezum 9/2020, maintained on Flomax & finasteride, with gradually enlarging 4.2 cm heterogeneous solid-appearing R renal mass, and ongoing bilateral hydronephrosis    BPH with recurrent urinary retention and bilateral hydroureteronephrosis possibly secondary to bladder outlet obstruction  - I reviewed his labs with a fairly stable serum creatinine at 2.09-I reviewed his CT scan and agree with radiologist interpretation with moderate to severe bilateral hydroureteronephrosis  - Cystoscopy today with significant 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, left nodule greater than right, with a small median lobe and moderate to severe trabeculation  - Following cystoscopy he passed a trial of void successfully  - Will plan for an RN visit within the next week to check a PVR  - If this is less than 150-200 cc  we can maintain without a Mckenzie catheter and plan for renal ultrasound to assess his hydronephrosis in about 2 weeks  - If this is elevated he will need to resume intermittent self-catheterization and we may need to consider bilateral ureteral stent placement    Enlarging right renal mass  - I reviewed his CT scan and reviewed these images personally.  I compared these to his prior CT scans and agree that there has been interval growth  - We discussed that this is concerning for a renal neoplasm and that I would recommend we make plans for a robotic partial nephrectomy  - We discussed the risks and benefits of a robotic partial nephrectomy in detail as well as the expected perioperative and postoperative recovery plan  - Order for surgery placed  - This is a chronic problem with progression requiring a major surgical intervention    Time spent: 45 minutes spent on the date of the encounter doing chart review, history and exam, documentation and further activities as noted above.  This was in addition to cystoscopy time    Chart documentation with Dragon Voice recognition Software. Although reviewed after completion, some words and grammatical errors may remain.       Cheikh Perez MD   Urology  [facility]  [facility] Phone: [phone]  [facility] Phone: [phone]

## 2025-04-16 NOTE — PATIENT INSTRUCTIONS
"AFTER YOUR CYSTOSCOPY  ?  ?  You have just completed a cystoscopy, or \"cysto\", which allowed your physician to learn more about your bladder (or to remove a stent placed after surgery). We suggest that you continue to avoid caffeine, fruit juice, and alcohol for the next 24 hours, however, you are encouraged to return to your normal activities.  ?  ?  A few things that are considered normal after your cystoscopy:  ?  * small amount of bleeding (or spotting) that clears within the next 24 hours  ?  * slight burning sensation with urination  ?  * sensation of needing to void (urinate) more frequently  ?  * the feeling of \"air\" in your urine  ?  * mild discomfort that is relieved with Tylenol    * bladder spasms  ?  ?  ?  Please contact our office promptly if you:  ?  * develop a fever above 101 degrees  ?  * are unable to urinate  ?  * develop bright red blood that does not stop  ?  * experience severe pain or swelling  ?  ?  ?  And of course, please contact our office with any concerns or questions [phone].  ?   AFTER YOUR CYSTOSCOPY        You have just completed a cystoscopy, or \"cysto\", which allowed your physician to learn more about your bladder (or to remove a stent placed after surgery). We suggest that you continue to avoid caffeine, fruit juice, and alcohol for the next 24 hours, however, you are encouraged to return to your normal activities.         A few things that are considered normal after your cystoscopy:     * Small amount of bleeding (or spotting) that clears within the next 24 hours     * Slight burning sensation with urination     * Sensation to of needing to avoid more frequently     * The feeling of \"air\" in your urine     * Mild discomfort that is relieved with Tylenol        Please contact our office promptly if you:     * Develop a fever above 101 degrees     * Are unable to urinate     * Develop bright red blood that does not stop     * Severe pain or swelling         Please contact " our office with any concerns or questions @[facility].

## 2025-04-16 NOTE — NURSING NOTE
Chief Complaint   Patient presents with    Benign Prostatic Hypertrophy    Urinary Retention     Here in clinic for a cystoscopy    Prior to the start of the procedure DR PEÑA and with procedural staff participation, I verbally confirmed the patient s identity using two indicators, relevant allergies, that the procedure was appropriate and matched the consent or emergent situation, and that the correct equipment/implants were available. Immediately prior to starting the procedure I conducted the Time Out with the procedural staff and re-confirmed the patient s name, procedure, and site/side. I have wiped the patient off with the povidone-Iodine solution, draped them,  used Lidocaine hydrochloride jelly, and instilled sterile water into the bladder. (The Joint Commission universal protocol was followed.)  Yes  5mL 2% lidocaine hydrochloride Urojet instilled into urethra.    NDC# [address]-9  Lot #: [id number]  Expiration Date:  09-26  ANDREA Yost DR placed 225ml of sterile water in the bladder ,patient voided 100ml .  Pvr is 153ml done with bladder scan   Brett Soler CMA     Catheter removal documentation on 4/16/2025:    Gene Pagan presents to the clinic for catheter removal.  Reason for removal: for cystoscopy  Order has been verified. DR PEÑA  Catheter successfully removed at   3.15 PM without immediate complication.  25 cc's of urine present in the catheter bag.  Urethral meatus is free of secretions and encrustation.  The patient is afebrile.  The patient tolerated the procedure and was instructed to drink plenty of water    Brett Soler CMA

## 2025-04-19 NOTE — PROCEDURES
CYSTOSCOPY PROCEDURE NOTE:    Gene Pagan is a 79 year old male  who presents with BPH and urinary retention for cystoscopy.    Pt ID verified with patient: Yes     Procedure verified with patient: Yes     Procedure confirmed with physician and support staff: Yes     Consent form confirmed with physician and support staff.    Sign In  History and Physical Exam reviewed.  Informed Consent Discussed: Yes   Sign in Communication: Yes   Time Out:  Team Confirms the Correct Patient, Correct Procedure; Yes , Correct Site and Site Marking, Correct Position (if applicable).    Affirmation of Time Out: Yes   Sign Out:  Sign Out Discussion: Yes   Physician: Cheikh Perez MD    A urinalysis was performed revealing no evidence of infection.    The benefits, risks, alternatives of the cystoscopy procedure and personnel were discussed with the patient. The verbal consent was obtained and the patient agrees to proceed.      Description of procedure:   After fully informed, voluntary consent was obtained, the patient was brought into the procedure room, identified and placed in a supine position on the cystoscopy table.  The groin/scrotum were prepped with betadine and draped in a sterile fashion.  Urojet lidocaine gel was introduced.  A 15F flexible cystoscope was inserted into the urethra, and the bladder and urethra wereexamined in a systematic manner.  The patient tolerated the procedure well and there were no complications.      Cystoscopic findings:  The urethra was normal without strictures.  The prostate was 4cm long and demonstrated nodular bilobar hypertrophy regrowth L>R.  There was a small median lobe.  The external sphincter coapted normally and the bladder neck was distorted. The bladder was  entered and careful pan endoscopy was carried out. The posterior, superior and lateral walls and dome of the bladder were all well visualized and the scope was retroflexed upon itself..  There was severe trabeculation.   There were no neoplasms, stones, or diverticula identifed.  The ureteric orifices were normal in position and number and effluxing clear urine.    Assessment/Plan:   Gene Pagan is a 79 year old male with a history of BPH with LUTS and incomplete bladder emptying     -See clinic note      Cheikh Perez MD

## 2025-04-22 ENCOUNTER — ALLIED HEALTH/NURSE VISIT (OUTPATIENT)
Dept: UROLOGY | Facility: CLINIC | Age: 80
End: 2025-04-22
Payer: COMMERCIAL

## 2025-04-22 DIAGNOSIS — Z79.2 PROPHYLACTIC ANTIBIOTIC: Primary | ICD-10-CM

## 2025-04-22 DIAGNOSIS — R33.9 URINARY RETENTION: ICD-10-CM

## 2025-04-22 RX ORDER — CIPROFLOXACIN 500 MG/1
500 TABLET, FILM COATED ORAL ONCE
Qty: 1 TABLET | Refills: 0 | Status: SHIPPED | OUTPATIENT
Start: 2025-04-22 | End: 2025-04-22

## 2025-04-22 RX ORDER — LIDOCAINE HYDROCHLORIDE 20 MG/ML
JELLY TOPICAL ONCE
Status: COMPLETED | OUTPATIENT
Start: 2025-04-22 | End: 2025-04-22

## 2025-04-22 RX ADMIN — LIDOCAINE HYDROCHLORIDE: 20 JELLY TOPICAL at 16:00

## 2025-04-22 NOTE — PROGRESS NOTES
"Gene Pagan comes into clinic today for Urinary Retention  at the request of Dr. Perez, Ordering Provider for PVR.    PVR: 261 mL by bladder scan    Per Dr. Perez's last note:  \"- Will plan for an RN visit within the next week to check a PVR  - If this is less than 150-200 cc we can maintain without a Mckenzie catheter and plan for renal ultrasound to assess his hydronephrosis in about 2 weeks  - If this is elevated he will need to resume intermittent self-catheterization and we may need to consider bilateral ureteral stent placement\"    Per Pt, SIC \"did not work\" and he had to \"go to the ER for Mckenzie placement\" and he prefers to have an indwelling placement placed today instead of relearning SIC.      5mL 2% lidocaine hydrochloride Urojet instilled into urethra.    NDC# [address]-3  Lot #: [id number]  Expiration Date:  9/26    Using sterile field technique a sterile, well lubricated 16 English Mckenzie coude catheter was gently inserted with ease x 1 attempt.  The balloon was filled with 10 ml sterile water.  No discomfort was voiced by the patient.  Clear, yellow urine return from the catheter was noted.  Sterile tubing and drainage bag were attached to the catheter and secured to the thigh.    Message sent to Dr. Perez for recommendation for follow up.    This service provided today was under the supervising provider of the sheng Gonzalez CNP, who was available if needed.    Aydee Thakur    "

## 2025-04-24 ENCOUNTER — ANCILLARY PROCEDURE (OUTPATIENT)
Dept: CARDIOLOGY | Facility: CLINIC | Age: 80
End: 2025-04-24
Attending: INTERNAL MEDICINE
Payer: COMMERCIAL

## 2025-04-24 DIAGNOSIS — Z95.0 CARDIAC PACEMAKER IN SITU: ICD-10-CM

## 2025-04-24 DIAGNOSIS — I50.23 ACUTE ON CHRONIC SYSTOLIC CONGESTIVE HEART FAILURE (H): ICD-10-CM

## 2025-04-24 LAB
MDC_IDC_EPISODE_DTM: NORMAL
MDC_IDC_EPISODE_ID: NORMAL
MDC_IDC_EPISODE_TYPE: NORMAL
MDC_IDC_LEAD_CONNECTION_STATUS: NORMAL
MDC_IDC_LEAD_IMPLANT_DT: NORMAL
MDC_IDC_LEAD_LOCATION: NORMAL
MDC_IDC_LEAD_LOCATION_DETAIL_1: NORMAL
MDC_IDC_LEAD_MFG: NORMAL
MDC_IDC_LEAD_MODEL: NORMAL
MDC_IDC_LEAD_POLARITY_TYPE: NORMAL
MDC_IDC_LEAD_SERIAL: NORMAL
MDC_IDC_MSMT_BATTERY_DTM: NORMAL
MDC_IDC_MSMT_BATTERY_REMAINING_LONGEVITY: 102 MO
MDC_IDC_MSMT_BATTERY_REMAINING_PERCENTAGE: 100 %
MDC_IDC_MSMT_BATTERY_STATUS: NORMAL
MDC_IDC_MSMT_LEADCHNL_LV_IMPEDANCE_VALUE: 1639 OHM
MDC_IDC_MSMT_LEADCHNL_LV_PACING_THRESHOLD_AMPLITUDE: 0.6 V
MDC_IDC_MSMT_LEADCHNL_LV_PACING_THRESHOLD_PULSEWIDTH: 0.5 MS
MDC_IDC_MSMT_LEADCHNL_RA_IMPEDANCE_VALUE: 757 OHM
MDC_IDC_MSMT_LEADCHNL_RV_IMPEDANCE_VALUE: 641 OHM
MDC_IDC_MSMT_LEADCHNL_RV_PACING_THRESHOLD_AMPLITUDE: 0.9 V
MDC_IDC_MSMT_LEADCHNL_RV_PACING_THRESHOLD_PULSEWIDTH: 0.4 MS
MDC_IDC_PG_IMPLANT_DTM: NORMAL
MDC_IDC_PG_MFG: NORMAL
MDC_IDC_PG_MODEL: NORMAL
MDC_IDC_PG_SERIAL: NORMAL
MDC_IDC_PG_TYPE: NORMAL
MDC_IDC_SESS_CLINIC_NAME: NORMAL
MDC_IDC_SESS_DTM: NORMAL
MDC_IDC_SESS_TYPE: NORMAL
MDC_IDC_SET_BRADY_AT_MODE_SWITCH_RATE: 140 {BEATS}/MIN
MDC_IDC_SET_BRADY_LOWRATE: 70 {BEATS}/MIN
MDC_IDC_SET_BRADY_MAX_SENSOR_RATE: 130 {BEATS}/MIN
MDC_IDC_SET_BRADY_MODE: NORMAL
MDC_IDC_SET_CRT_LVRV_DELAY: 30 MS
MDC_IDC_SET_CRT_PACED_CHAMBERS: NORMAL
MDC_IDC_SET_LEADCHNL_LV_PACING_AMPLITUDE: 2 V
MDC_IDC_SET_LEADCHNL_LV_PACING_ANODE_ELECTRODE_1: NORMAL
MDC_IDC_SET_LEADCHNL_LV_PACING_ANODE_LOCATION_1: NORMAL
MDC_IDC_SET_LEADCHNL_LV_PACING_CATHODE_ELECTRODE_1: NORMAL
MDC_IDC_SET_LEADCHNL_LV_PACING_CATHODE_LOCATION_1: NORMAL
MDC_IDC_SET_LEADCHNL_LV_PACING_PULSEWIDTH: 0.5 MS
MDC_IDC_SET_LEADCHNL_LV_SENSING_ADAPTATION_MODE: NORMAL
MDC_IDC_SET_LEADCHNL_LV_SENSING_ANODE_ELECTRODE_1: NORMAL
MDC_IDC_SET_LEADCHNL_LV_SENSING_ANODE_LOCATION_1: NORMAL
MDC_IDC_SET_LEADCHNL_LV_SENSING_CATHODE_ELECTRODE_1: NORMAL
MDC_IDC_SET_LEADCHNL_LV_SENSING_CATHODE_LOCATION_1: NORMAL
MDC_IDC_SET_LEADCHNL_LV_SENSING_SENSITIVITY: 1.5 MV
MDC_IDC_SET_LEADCHNL_RA_SENSING_ADAPTATION_MODE: NORMAL
MDC_IDC_SET_LEADCHNL_RA_SENSING_SENSITIVITY: 0.25 MV
MDC_IDC_SET_LEADCHNL_RV_PACING_AMPLITUDE: 2.2 V
MDC_IDC_SET_LEADCHNL_RV_PACING_CAPTURE_MODE: NORMAL
MDC_IDC_SET_LEADCHNL_RV_PACING_POLARITY: NORMAL
MDC_IDC_SET_LEADCHNL_RV_PACING_PULSEWIDTH: 0.4 MS
MDC_IDC_SET_LEADCHNL_RV_SENSING_ADAPTATION_MODE: NORMAL
MDC_IDC_SET_LEADCHNL_RV_SENSING_POLARITY: NORMAL
MDC_IDC_SET_LEADCHNL_RV_SENSING_SENSITIVITY: 1.5 MV
MDC_IDC_SET_ZONE_DETECTION_INTERVAL: 375 MS
MDC_IDC_SET_ZONE_STATUS: NORMAL
MDC_IDC_SET_ZONE_TYPE: NORMAL
MDC_IDC_SET_ZONE_VENDOR_TYPE: NORMAL
MDC_IDC_STAT_BRADY_DTM_END: NORMAL
MDC_IDC_STAT_BRADY_DTM_START: NORMAL
MDC_IDC_STAT_BRADY_RA_PERCENT_PACED: 0 %
MDC_IDC_STAT_BRADY_RV_PERCENT_PACED: 100 %
MDC_IDC_STAT_CRT_DTM_END: NORMAL
MDC_IDC_STAT_CRT_DTM_START: NORMAL
MDC_IDC_STAT_CRT_LV_PERCENT_PACED: 100 %
MDC_IDC_STAT_EPISODE_RECENT_COUNT: 0
MDC_IDC_STAT_EPISODE_RECENT_COUNT: 0
MDC_IDC_STAT_EPISODE_RECENT_COUNT: 4
MDC_IDC_STAT_EPISODE_RECENT_COUNT_DTM_END: NORMAL
MDC_IDC_STAT_EPISODE_RECENT_COUNT_DTM_START: NORMAL
MDC_IDC_STAT_EPISODE_TYPE: NORMAL
MDC_IDC_STAT_EPISODE_VENDOR_TYPE: NORMAL
MDC_IDC_STAT_EPISODE_VENDOR_TYPE: NORMAL

## 2025-04-24 PROCEDURE — 93296 REM INTERROG EVL PM/IDS: CPT | Performed by: INTERNAL MEDICINE

## 2025-04-24 PROCEDURE — 93294 REM INTERROG EVL PM/LDLS PM: CPT | Performed by: INTERNAL MEDICINE

## 2025-05-03 DIAGNOSIS — E11.22 TYPE 2 DIABETES MELLITUS WITH STAGE 4 CHRONIC KIDNEY DISEASE, WITH LONG-TERM CURRENT USE OF INSULIN (H): ICD-10-CM

## 2025-05-03 DIAGNOSIS — Z79.4 TYPE 2 DIABETES MELLITUS WITH STAGE 4 CHRONIC KIDNEY DISEASE, WITH LONG-TERM CURRENT USE OF INSULIN (H): ICD-10-CM

## 2025-05-03 DIAGNOSIS — N18.4 TYPE 2 DIABETES MELLITUS WITH STAGE 4 CHRONIC KIDNEY DISEASE, WITH LONG-TERM CURRENT USE OF INSULIN (H): ICD-10-CM

## 2025-05-05 RX ORDER — BLOOD SUGAR DIAGNOSTIC
STRIP MISCELLANEOUS
Qty: 100 STRIP | Refills: 2 | Status: SHIPPED | OUTPATIENT
Start: 2025-05-05

## 2025-05-08 DIAGNOSIS — R33.8 BENIGN PROSTATIC HYPERPLASIA WITH URINARY RETENTION: ICD-10-CM

## 2025-05-08 DIAGNOSIS — N40.1 BENIGN PROSTATIC HYPERPLASIA WITH URINARY RETENTION: ICD-10-CM

## 2025-05-08 RX ORDER — FINASTERIDE 5 MG/1
5 TABLET, FILM COATED ORAL DAILY
Qty: 90 TABLET | Refills: 3 | Status: SHIPPED | OUTPATIENT
Start: 2025-05-08

## 2025-05-21 ENCOUNTER — RESULTS FOLLOW-UP (OUTPATIENT)
Dept: INTERNAL MEDICINE | Facility: CLINIC | Age: 80
End: 2025-05-21

## 2025-06-03 ENCOUNTER — OFFICE VISIT (OUTPATIENT)
Dept: INTERNAL MEDICINE | Facility: CLINIC | Age: 80
End: 2025-06-03
Payer: COMMERCIAL

## 2025-06-03 ENCOUNTER — RESULTS FOLLOW-UP (OUTPATIENT)
Dept: INTERNAL MEDICINE | Facility: CLINIC | Age: 80
End: 2025-06-03

## 2025-06-03 VITALS
HEART RATE: 74 BPM | BODY MASS INDEX: 30.01 KG/M2 | RESPIRATION RATE: 18 BRPM | SYSTOLIC BLOOD PRESSURE: 122 MMHG | OXYGEN SATURATION: 96 % | HEIGHT: 68 IN | DIASTOLIC BLOOD PRESSURE: 62 MMHG | WEIGHT: 198 LBS | TEMPERATURE: 97.1 F

## 2025-06-03 DIAGNOSIS — I50.42 CHRONIC COMBINED SYSTOLIC AND DIASTOLIC HEART FAILURE (H): ICD-10-CM

## 2025-06-03 DIAGNOSIS — Z79.4 TYPE 2 DIABETES MELLITUS WITH STAGE 3B CHRONIC KIDNEY DISEASE, WITH LONG-TERM CURRENT USE OF INSULIN (H): ICD-10-CM

## 2025-06-03 DIAGNOSIS — Z01.818 PRE-OP EXAMINATION: Primary | ICD-10-CM

## 2025-06-03 DIAGNOSIS — E78.5 HYPERLIPIDEMIA LDL GOAL <100: ICD-10-CM

## 2025-06-03 DIAGNOSIS — N28.89 RENAL MASS, RIGHT: ICD-10-CM

## 2025-06-03 DIAGNOSIS — E11.22 TYPE 2 DIABETES MELLITUS WITH STAGE 3B CHRONIC KIDNEY DISEASE, WITH LONG-TERM CURRENT USE OF INSULIN (H): ICD-10-CM

## 2025-06-03 DIAGNOSIS — N18.32 TYPE 2 DIABETES MELLITUS WITH STAGE 3B CHRONIC KIDNEY DISEASE, WITH LONG-TERM CURRENT USE OF INSULIN (H): ICD-10-CM

## 2025-06-03 LAB
ERYTHROCYTE [DISTWIDTH] IN BLOOD BY AUTOMATED COUNT: 13.5 % (ref 10–15)
HCT VFR BLD AUTO: 41.6 % (ref 40–53)
HGB BLD-MCNC: 13.5 G/DL (ref 13.3–17.7)
MCH RBC QN AUTO: 30 PG (ref 26.5–33)
MCHC RBC AUTO-ENTMCNC: 32.5 G/DL (ref 31.5–36.5)
MCV RBC AUTO: 92 FL (ref 78–100)
PLATELET # BLD AUTO: 264 10E3/UL (ref 150–450)
RBC # BLD AUTO: 4.5 10E6/UL (ref 4.4–5.9)
WBC # BLD AUTO: 8 10E3/UL (ref 4–11)

## 2025-06-03 PROCEDURE — 36415 COLL VENOUS BLD VENIPUNCTURE: CPT | Performed by: INTERNAL MEDICINE

## 2025-06-03 PROCEDURE — G2211 COMPLEX E/M VISIT ADD ON: HCPCS | Performed by: INTERNAL MEDICINE

## 2025-06-03 PROCEDURE — 99214 OFFICE O/P EST MOD 30 MIN: CPT | Performed by: INTERNAL MEDICINE

## 2025-06-03 PROCEDURE — 93000 ELECTROCARDIOGRAM COMPLETE: CPT | Performed by: INTERNAL MEDICINE

## 2025-06-03 PROCEDURE — 85027 COMPLETE CBC AUTOMATED: CPT | Performed by: INTERNAL MEDICINE

## 2025-06-03 PROCEDURE — 3078F DIAST BP <80 MM HG: CPT | Performed by: INTERNAL MEDICINE

## 2025-06-03 PROCEDURE — 3074F SYST BP LT 130 MM HG: CPT | Performed by: INTERNAL MEDICINE

## 2025-06-03 PROCEDURE — 80048 BASIC METABOLIC PNL TOTAL CA: CPT | Performed by: INTERNAL MEDICINE

## 2025-06-03 RX ORDER — BUMETANIDE 1 MG/1
1 TABLET ORAL DAILY
Qty: 90 TABLET | Refills: 3 | Status: SHIPPED | OUTPATIENT
Start: 2025-06-03

## 2025-06-03 NOTE — PATIENT INSTRUCTIONS
Everything looks fine to go ahead with surgery as planned.     Hold Eliquis for 3 days prior to surgery.   Hold Bumex the day before/morning of surgery.     Reduce Lantus dose from 13 units to 6 units the night before surgery.   Take metoprolol the morning of surgery with sips of water on the morning of surgery.      See me in around October 2025 for next diabetes checkup.

## 2025-06-03 NOTE — PROGRESS NOTES
Preoperative Evaluation  [facility]  [address]  [address]  [facility] [address]  Phone: [phone]  Primary Provider: Dm Lipscomb MD,   Pre-op Performing Provider: Dm Lipscomb MD,   Irving 3, 2025             6/3/2025   Surgical Information   What procedure is being done? growth from kidney   [facility] or [facility] where [facility]   Who is doing the procedure / surgery? Dr Perez   Date of surgery / procedure: 6/23   Time of surgery / procedure: do not know   Where do you plan to recover after surgery? at home with family     Fax number for surgical facility: Note does not need to be faxed, will be available electronically in Epic.    Assessment & Plan     The proposed surgical procedure is considered INTERMEDIATE risk.    (Z01.818) Pre-op examination  (primary encounter diagnosis)  Comment:  Satisfactory operative candidate with anesthesia as required.   Plan: EKG 12-lead complete w/read - Clinics, Basic         metabolic panel  (Ca, Cl, CO2, Creat, Gluc, K,         Na, BUN), CBC with platelets          (N28.89) Renal mass, right  Comment: Reason for surgery.     (E11.22,  N18.32,  Z79.4) Type 2 diabetes mellitus with stage 3b chronic kidney disease, with long-term current use of insulin (H)  Comment: Most recent A1c at target. Continue current measures.     (I50.42) Chronic combined systolic and diastolic heart failure (H)  Comment: Appears well-compensated. Continue current meds. Follow with cardiology as they advise.   Plan: bumetanide (BUMEX) 1 MG tablet          (E78.5) Hyperlipidemia LDL goal <100  Comment: Most recent LDL at target. Continue current meds.      Implanted Device   - Type of device: pacemaker Patient advised to bring device information on day of surgery.       - No identified additional risk factors other than previously addressed    Patient Instructions   Everything looks fine to go ahead with surgery as  planned.     Hold Eliquis for 3 days prior to surgery.   Hold Bumex the day before/morning of surgery.     Reduce Lantus dose from 13 units to 6 units the night before surgery.   Take metoprolol the morning of surgery with sips of water on the morning of surgery.      See me in around October 2025 for next diabetes checkup.      Recommendation  Approval given to proceed with proposed procedure, without further diagnostic evaluation.      Yessica Mills is a 80 year old, presenting for the following:  Pre-Op Exam          6/3/2025     2:56 PM   Additional Questions   Roomed by Zulema PERRY CMA     HPI: Preop exam prior to kidney surgery. No acute illness concerns.    - Chronic sinus congestion.  - Permanent Mckenzie catheter, reinserted a couple of months ago  - A1c level previously recorded at 6.1 (April 2025).  - Scheduled for surgery, with instructions to hold Eliquis for 3 days prior and Bumex the day before surgery  - Recent sodium and potassium levels checked in April Will update needed labs today.         6/3/2025   Pre-Op Questionnaire   Have you ever had a heart attack or stroke? (!) YES Reported by patient, no MI or CVA noted in record review   Have you ever had surgery on your heart or blood vessels, such as a stent placement, a coronary artery bypass, or surgery on an artery in your head, neck, heart, or legs? No   Do you have chest pain with activity? No   Do you have a history of heart failure? (!) YES Following with cardiology   Do you currently have a cold, bronchitis or symptoms of other infection? No   Do you have a cough, shortness of breath, or wheezing? No   Do you or anyone in your family have previous history of blood clots? No   Do you or does anyone in your family have a serious bleeding problem such as prolonged bleeding following surgeries or cuts? No   Have you ever had problems with anemia or been told to take iron pills? (!) YES    Have you had any abnormal blood loss such as black, tarry  or bloody stools? No   Have you ever had a blood transfusion? No   Are you willing to have a blood transfusion if it is medically needed before, during, or after your surgery? Yes   Have you or any of your relatives ever had problems with anesthesia? No   Do you have sleep apnea, excessive snoring or daytime drowsiness? No   Do you have any artifical heart valves or other implanted medical devices like a pacemaker, defibrillator, or continuous glucose monitor? (!) YES   What type of device do you have? pacemaker   Name of the clinic that manages your device unknown   Do you have artificial joints? No   Are you allergic to latex? No     Advance Care Planning    Discussed advance care planning with patient; informed AVS has link to [facility] [employer].    Preoperative Review of    reviewed - no record of controlled substances prescribed.      Patient Active Problem List    Diagnosis Date Noted    Thoracic aortic ectasia 01/26/2021     Priority: Medium    Diabetic nephropathy (H) 01/18/2021     Priority: Medium    Type 2 diabetes mellitus with stage 3b chronic kidney disease, with long-term current use of insulin (H) 10/23/2020     Priority: Medium    CKD (chronic kidney disease) stage 3, GFR 30-59 ml/min (H) 09/25/2020     Priority: Medium    CAP (community acquired pneumonia) 05/07/2020     Priority: Medium    Urinary retention 05/01/2020     Priority: Medium    Gross hematuria 05/01/2020     Priority: Medium    Benign prostatic hyperplasia with urinary obstruction 05/01/2020     Priority: Medium    Chronic diastolic heart failure (H) 04/27/2020     Priority: Medium    CHF (congestive heart failure) (H) 04/10/2020     Priority: Medium    Hyperopia of both eyes with astigmatism and presbyopia 11/18/2019     Priority: Medium    Nuclear senile cataract of both eyes 10/09/2017     Priority: Medium    Routine health maintenance 04/01/2017     Priority: Medium     Formatting of this note might be different from the  original.  Colonoscopy 9/16 normal.      Vitamin D deficiency 12/30/2016     Priority: Medium      Past Medical History:   Diagnosis Date    Anemia     Atrial fibrillation     AV node ablation and pacemaker placement 4/10/2020    BPH (benign prostatic hyperplasia)     Chronic diastolic CHF     Chronic kidney disease (CKD), stage III (moderate) (H)     Diabetes mellitus - type 2     Palpitations     Systolic CHF (H)      Past Surgical History:   Procedure Laterality Date    ANESTHESIA CARDIOVERSION N/A 4/6/2020    Procedure: ANESTHESIA, FOR CARDIOVERSION;  Surgeon: GENERIC ANESTHESIA PROVIDER;  Location: [facility] OR    CYSTOSCOPY      EP PACEMAKER N/A 4/10/2020    Procedure: EP Pacemaker;  Surgeon: Abhay Willams MD;  Location:  HE[facility]ARDI[facility] CATH LAB    [facility]  09/25/2020    Done in Urology office with Dr Perez     Current Outpatient Medications   Medication Sig Dispense Refill    apixaban ANTICOAGULANT (ELIQUIS ANTICOAGULANT) 2.5 MG tablet Take 1 tablet (2.5 mg) by mouth 2 times daily. 180 tablet 3    Ascorbic Acid (VITAMIN C PO) Take 1,000 mg by mouth daily      blood glucose (ONETOUCH ULTRA TEST) test strip USE TO TEST BLOOD SUGAR ONE TIME DAILY OR AS DIRECTED. 100 strip 2    bumetanide (BUMEX) 0.5 MG tablet Take 1 tablet (0.5 mg) by mouth daily (+ additional 1 mg to = 1.5 mg daily). 90 tablet 3    bumetanide (BUMEX) 1 MG tablet Take 1 tablet (1 mg) by mouth daily (+ additional 0.5 mg to = 1.5 mg daily). 90 tablet 3    finasteride (PROSCAR) 5 MG tablet Take 1 tablet (5 mg) by mouth daily. 90 tablet 3    insulin glargine (LANTUS SOLOSTAR) 100 UNIT/ML pen Inject 13 Units subcutaneously at bedtime. 15 mL 3    insulin pen needle (ULTICARE SHORT) 31G X 8 MM miscellaneous use 1 pen daily for injection 100 each 2    melatonin 3 MG tablet Take 3 mg by mouth nightly as needed      metoprolol succinate ER (TOPROL XL) 25 MG 24 hr tablet Take 0.5 tablets (12.5 mg) by mouth 2 times daily. 90 tablet 3    Multiple Vitamins-Minerals  "(MULTIVITAMIN ADULT PO) Take 1 tablet by mouth daily      tamsulosin (FLOMAX) 0.4 MG capsule Take 2 capsules (0.8 mg) by mouth daily. 180 capsule 4    zinc 50 MG TABS Take 50 mg by mouth daily      sulfamethoxazole-trimethoprim (BACTRIM DS) 800-160 MG tablet Take 1 tablet by mouth 2 times daily. (Patient not taking: Reported on 6/3/2025) 10 tablet 0       No Known Allergies     Social History     Tobacco Use    Smoking status: Never    Smokeless tobacco: Never   Substance Use Topics    Alcohol use: Yes     Comment: Rare     Family History   Problem Relation Age of Onset    Family History Negative Mother          in her 80's    Family History Negative Father          accidentally in boating accident.     History   Drug Use Unknown           REVIEW OF SYSTEMS: The following systems have been completely reviewed and are negative except as noted above:   Constitutional, HEENT, respiratory, cardiovascular, gastrointestinal, genitourinary, musculoskeletal, dermatologic, hematologic, endocrine, psychiatric, and neurologic systems.      Objective    /62 (BP Location: Right arm, Patient Position: Sitting, Cuff Size: Adult Large)   Pulse 74   Temp 97.1  F (36.2  C) (Tympanic)   Resp 18   Ht 1.727 m (5' 8\")   Wt 89.8 kg (198 lb)   SpO2 96%   BMI 30.11 kg/m     Estimated body mass index is 30.11 kg/m  as calculated from the following:    Height as of this encounter: 1.727 m (5' 8\").    Weight as of this encounter: 89.8 kg (198 lb).    Physical Exam  GENERAL: alert and no distress  EYES: Eyes grossly normal to inspection, PERRL and conjunctivae and sclerae normal  HENT: normal cephalic/atraumatic, right ear: occluded with wax, left ear: normal: no effusions, no erythema, normal landmarks, nose and mouth without ulcers or lesions, oropharynx clear, and oral mucous membranes moist  NECK: no adenopathy, no asymmetry, masses, or scars  RESP: lungs clear to auscultation - no rales, rhonchi or wheezes  CV: regular " rate and rhythm, normal S1 S2, no S3 or S4, no murmur, click or rub, no peripheral edema  ABDOMEN: soft, nontender, no hepatosplenomegaly, no masses and bowel sounds normal  MS: trace bilateral distal pretibial pitting edema   SKIN: no suspicious lesions or rashes  NEURO: Normal strength and tone, mentation intact and speech normal  PSYCH: mentation appears normal, affect normal/bright    Recent Labs   Lab Test 04/03/25  0942 03/21/25  1510 12/10/24  1620 10/22/24  1509 07/22/24  1040   HGB  --   --   --   --  13.3   PLT  --   --   --   --  264     --  136   < > 137   POTASSIUM 4.0  --  4.2   < > 4.4   CR 2.00* 2.3* 2.31*   < > 2.10*   A1C 6.1*  --   --   --  6.5*    < > = values in this interval not displayed.        Diagnostics  Labs pending at this time.  Results will be reviewed when available.   EKG: Paced rhythm, unchanged from previous tracings    Revised Cardiac Risk Index (RCRI)  The patient has the following serious cardiovascular risks for perioperative complications:   - Congestive Heart Failure (pulmonary edema, PND, s3 rob, CXR with pulmonary congestion, basilar rales) = 1 point   - Diabetes Mellitus (on Insulin) = 1 point     RCRI Interpretation: 2 points: Class III (moderate risk - 6.6% complication rate)     Estimated Functional Capacity: Performs 4 METS exercise without symptoms (e.g., light housework, stairs, 4 mph walk, 7 mph bike, slow step dance)           Signed Electronically by: Dm Lipscomb MD,   A copy of this evaluation report is provided to the requesting physician.

## 2025-06-04 ENCOUNTER — TELEPHONE (OUTPATIENT)
Dept: UROLOGY | Facility: CLINIC | Age: 80
End: 2025-06-04
Payer: COMMERCIAL

## 2025-06-04 LAB
ANION GAP SERPL CALCULATED.3IONS-SCNC: 13 MMOL/L (ref 7–15)
BUN SERPL-MCNC: 35.8 MG/DL (ref 8–23)
CALCIUM SERPL-MCNC: 9.4 MG/DL (ref 8.8–10.4)
CHLORIDE SERPL-SCNC: 101 MMOL/L (ref 98–107)
CREAT SERPL-MCNC: 2.34 MG/DL (ref 0.67–1.17)
EGFRCR SERPLBLD CKD-EPI 2021: 27 ML/MIN/1.73M2
GLUCOSE SERPL-MCNC: 153 MG/DL (ref 70–99)
HCO3 SERPL-SCNC: 24 MMOL/L (ref 22–29)
POTASSIUM SERPL-SCNC: 4.3 MMOL/L (ref 3.4–5.3)
SODIUM SERPL-SCNC: 138 MMOL/L (ref 135–145)

## 2025-06-17 RX ORDER — MULTIVIT WITH MINERALS/LUTEIN
1000 TABLET ORAL DAILY
COMMUNITY

## 2025-06-17 NOTE — PROGRESS NOTES
PTA medications updated by Medication Scribe prior to surgery via phone call with patient (last doses completed by Nurse)     Medication history sources: Patient, Surescripts, and H&P  In the past week, patient estimated taking medication this percent of the time: Greater than 90%      Significant changes made to the medication list:  None      Additional medication history information:   None    Medication reconciliation completed by provider prior to medication history? No    Time spent in this activity: 30 minutes      The information provided in this note is only as accurate as the sources available at the time of update(s)      Prior to Admission medications    Medication Sig Last Dose Taking? Auth Provider Long Term End Date   apixaban ANTICOAGULANT (ELIQUIS ANTICOAGULANT) 2.5 MG tablet Take 1 tablet (2.5 mg) by mouth 2 times daily. Evening Yes Dm Lipscomb MD     bumetanide (BUMEX) 0.5 MG tablet Take 1 tablet (0.5 mg) by mouth daily (+ additional 1 mg to = 1.5 mg daily). Morning Yes Sushila Frank,  Yes    bumetanide (BUMEX) 1 MG tablet Take 1 tablet (1 mg) by mouth daily. (+ additional 0.5 mg to = 1.5 mg daily). Morning Yes Dm Lipscomb MD Yes    finasteride (PROSCAR) 5 MG tablet Take 1 tablet (5 mg) by mouth daily.  Yes Cheikh Perez MD     insulin glargine (LANTUS SOLOSTAR) 100 UNIT/ML pen Inject 13 Units subcutaneously at bedtime. Bedtime Yes Dm Lipscomb MD Yes    melatonin 3 MG tablet Take 3 mg by mouth nightly as needed Bedtime Yes Reported, Patient     metoprolol succinate ER (TOPROL XL) 25 MG 24 hr tablet Take 0.5 tablets (12.5 mg) by mouth 2 times daily. Morning Yes Dm Lipscomb MD Yes    tamsulosin (FLOMAX) 0.4 MG capsule Take 2 capsules (0.8 mg) by mouth daily. Morning Yes Miguel Gonzalez APRN CNP     vitamin C (ASCORBIC ACID) 1000 MG TABS Take 1,000 mg by mouth daily. Morning Yes Reported, Patient     blood glucose (ONETOUCH ULTRA TEST) test strip USE TO TEST  BLOOD SUGAR ONE TIME DAILY OR AS DIRECTED.   Dm Lipscomb MD No    insulin pen needle (ULTICARE SHORT) 31G X 8 MM miscellaneous use 1 pen daily for injection   Dm Lipscomb MD     Multiple Vitamin (MULTIVITAMIN ADULT) TABS Take 1 tablet by mouth daily.  Yes Reported, Patient     zinc 50 MG TABS Take 50 mg by mouth daily Morning Yes Reported, Patient         Medication history completed by: Ani Velazquez

## 2025-06-23 ENCOUNTER — HOSPITAL ENCOUNTER (OUTPATIENT)
Facility: CLINIC | Age: 80
Discharge: HOME | End: 2025-06-24
Attending: UROLOGY | Admitting: UROLOGY
Payer: COMMERCIAL

## 2025-06-23 ENCOUNTER — ANESTHESIA EVENT (OUTPATIENT)
Dept: SURGERY | Facility: CLINIC | Age: 80
End: 2025-06-23
Payer: COMMERCIAL

## 2025-06-23 ENCOUNTER — ANESTHESIA (OUTPATIENT)
Dept: SURGERY | Facility: CLINIC | Age: 80
End: 2025-06-23
Payer: COMMERCIAL

## 2025-06-23 DIAGNOSIS — N28.89 RIGHT RENAL MASS: Primary | ICD-10-CM

## 2025-06-23 PROBLEM — I10 ESSENTIAL (PRIMARY) HYPERTENSION: Status: ACTIVE | Noted: 2024-01-08

## 2025-06-23 PROBLEM — N18.30 CKD (CHRONIC KIDNEY DISEASE) STAGE 3, GFR 30-59 ML/MIN (H): Status: ACTIVE | Noted: 2020-09-25

## 2025-06-23 PROBLEM — N18.9 ANEMIA IN CHRONIC KIDNEY DISEASE: Status: ACTIVE | Noted: 2024-01-08

## 2025-06-23 PROBLEM — D63.1 ANEMIA IN CHRONIC KIDNEY DISEASE: Status: ACTIVE | Noted: 2024-01-08

## 2025-06-23 LAB
CREAT SERPL-MCNC: 2.26 MG/DL (ref 0.67–1.17)
EGFRCR SERPLBLD CKD-EPI 2021: 28 ML/MIN/1.73M2
FASTING STATUS PATIENT QL REPORTED: YES
GLUCOSE BLDC GLUCOMTR-MCNC: 183 MG/DL (ref 70–99)
GLUCOSE BLDC GLUCOMTR-MCNC: 197 MG/DL (ref 70–99)
GLUCOSE BLDC GLUCOMTR-MCNC: 207 MG/DL (ref 70–99)
GLUCOSE BLDC GLUCOMTR-MCNC: 215 MG/DL (ref 70–99)
GLUCOSE SERPL-MCNC: 144 MG/DL (ref 70–99)
POTASSIUM SERPL-SCNC: 4.3 MMOL/L (ref 3.4–5.3)

## 2025-06-23 PROCEDURE — 84132 ASSAY OF SERUM POTASSIUM: CPT | Performed by: ANESTHESIOLOGY

## 2025-06-23 PROCEDURE — 258N000003 HC RX IP 258 OP 636: Performed by: NURSE ANESTHETIST, CERTIFIED REGISTERED

## 2025-06-23 PROCEDURE — 250N000013 HC RX MED GY IP 250 OP 250 PS 637: Performed by: UROLOGY

## 2025-06-23 PROCEDURE — 272N000001 HC OR GENERAL SUPPLY STERILE: Performed by: UROLOGY

## 2025-06-23 PROCEDURE — 250N000011 HC RX IP 250 OP 636: Performed by: UROLOGY

## 2025-06-23 PROCEDURE — 250N000013 HC RX MED GY IP 250 OP 250 PS 637: Performed by: STUDENT IN AN ORGANIZED HEALTH CARE EDUCATION/TRAINING PROGRAM

## 2025-06-23 PROCEDURE — 370N000017 HC ANESTHESIA TECHNICAL FEE, PER MIN: Performed by: UROLOGY

## 2025-06-23 PROCEDURE — 99214 OFFICE O/P EST MOD 30 MIN: CPT | Performed by: STUDENT IN AN ORGANIZED HEALTH CARE EDUCATION/TRAINING PROGRAM

## 2025-06-23 PROCEDURE — 250N000009 HC RX 250: Performed by: NURSE ANESTHETIST, CERTIFIED REGISTERED

## 2025-06-23 PROCEDURE — 710N000009 HC RECOVERY PHASE 1, LEVEL 1, PER MIN: Performed by: UROLOGY

## 2025-06-23 PROCEDURE — 82962 GLUCOSE BLOOD TEST: CPT

## 2025-06-23 PROCEDURE — 36415 COLL VENOUS BLD VENIPUNCTURE: CPT | Performed by: ANESTHESIOLOGY

## 2025-06-23 PROCEDURE — 250N000011 HC RX IP 250 OP 636: Performed by: NURSE ANESTHETIST, CERTIFIED REGISTERED

## 2025-06-23 PROCEDURE — 50543 LAPARO PARTIAL NEPHRECTOMY: CPT | Mod: RT | Performed by: UROLOGY

## 2025-06-23 PROCEDURE — 999N000141 HC STATISTIC PRE-PROCEDURE NURSING ASSESSMENT: Performed by: UROLOGY

## 2025-06-23 PROCEDURE — 360N000080 HC SURGERY LEVEL 7, PER MIN: Performed by: UROLOGY

## 2025-06-23 PROCEDURE — 250N000012 HC RX MED GY IP 250 OP 636 PS 637: Performed by: STUDENT IN AN ORGANIZED HEALTH CARE EDUCATION/TRAINING PROGRAM

## 2025-06-23 PROCEDURE — 250N000025 HC SEVOFLURANE, PER MIN: Performed by: UROLOGY

## 2025-06-23 PROCEDURE — 82947 ASSAY GLUCOSE BLOOD QUANT: CPT | Performed by: ANESTHESIOLOGY

## 2025-06-23 PROCEDURE — 76770 US EXAM ABDO BACK WALL COMP: CPT | Mod: 26 | Performed by: UROLOGY

## 2025-06-23 PROCEDURE — 88341 IMHCHEM/IMCYTCHM EA ADD ANTB: CPT | Mod: TC | Performed by: UROLOGY

## 2025-06-23 PROCEDURE — 250N000011 HC RX IP 250 OP 636: Performed by: ANESTHESIOLOGY

## 2025-06-23 PROCEDURE — 250N000009 HC RX 250: Performed by: UROLOGY

## 2025-06-23 PROCEDURE — 272N000002 HC OR SUPPLY OTHER OPNP: Performed by: UROLOGY

## 2025-06-23 PROCEDURE — 82565 ASSAY OF CREATININE: CPT | Performed by: ANESTHESIOLOGY

## 2025-06-23 RX ORDER — ACETAMINOPHEN 325 MG/1
975 TABLET ORAL ONCE
Status: COMPLETED | OUTPATIENT
Start: 2025-06-23 | End: 2025-06-23

## 2025-06-23 RX ORDER — SODIUM CHLORIDE, SODIUM LACTATE, POTASSIUM CHLORIDE, CALCIUM CHLORIDE 600; 310; 30; 20 MG/100ML; MG/100ML; MG/100ML; MG/100ML
INJECTION, SOLUTION INTRAVENOUS CONTINUOUS PRN
Status: DISCONTINUED | OUTPATIENT
Start: 2025-06-23 | End: 2025-06-23

## 2025-06-23 RX ORDER — HYDROMORPHONE HCL IN WATER/PF 6 MG/30 ML
0.2 PATIENT CONTROLLED ANALGESIA SYRINGE INTRAVENOUS EVERY 5 MIN PRN
Status: DISCONTINUED | OUTPATIENT
Start: 2025-06-23 | End: 2025-06-23 | Stop reason: HOSPADM

## 2025-06-23 RX ORDER — CEFAZOLIN SODIUM/WATER 2 G/20 ML
2 SYRINGE (ML) INTRAVENOUS SEE ADMIN INSTRUCTIONS
Status: DISCONTINUED | OUTPATIENT
Start: 2025-06-23 | End: 2025-06-23 | Stop reason: HOSPADM

## 2025-06-23 RX ORDER — ONDANSETRON 2 MG/ML
4 INJECTION INTRAMUSCULAR; INTRAVENOUS EVERY 6 HOURS PRN
Status: DISCONTINUED | OUTPATIENT
Start: 2025-06-23 | End: 2025-06-24 | Stop reason: HOSPADM

## 2025-06-23 RX ORDER — SODIUM CHLORIDE, SODIUM LACTATE, POTASSIUM CHLORIDE, CALCIUM CHLORIDE 600; 310; 30; 20 MG/100ML; MG/100ML; MG/100ML; MG/100ML
INJECTION, SOLUTION INTRAVENOUS CONTINUOUS
Status: DISCONTINUED | OUTPATIENT
Start: 2025-06-23 | End: 2025-06-23 | Stop reason: HOSPADM

## 2025-06-23 RX ORDER — FENTANYL CITRATE 50 UG/ML
50 INJECTION, SOLUTION INTRAMUSCULAR; INTRAVENOUS EVERY 5 MIN PRN
Status: DISCONTINUED | OUTPATIENT
Start: 2025-06-23 | End: 2025-06-23 | Stop reason: HOSPADM

## 2025-06-23 RX ORDER — LIDOCAINE 40 MG/G
CREAM TOPICAL
Status: DISCONTINUED | OUTPATIENT
Start: 2025-06-23 | End: 2025-06-24 | Stop reason: HOSPADM

## 2025-06-23 RX ORDER — DEXAMETHASONE SODIUM PHOSPHATE 4 MG/ML
INJECTION, SOLUTION INTRA-ARTICULAR; INTRALESIONAL; INTRAMUSCULAR; INTRAVENOUS; SOFT TISSUE PRN
Status: DISCONTINUED | OUTPATIENT
Start: 2025-06-23 | End: 2025-06-23

## 2025-06-23 RX ORDER — HYDROMORPHONE HCL IN WATER/PF 6 MG/30 ML
0.1 PATIENT CONTROLLED ANALGESIA SYRINGE INTRAVENOUS EVERY 4 HOURS PRN
Status: DISCONTINUED | OUTPATIENT
Start: 2025-06-23 | End: 2025-06-24 | Stop reason: HOSPADM

## 2025-06-23 RX ORDER — ASPIRIN 81 MG
100 TABLET, DELAYED RELEASE (ENTERIC COATED) ORAL DAILY
Qty: 60 TABLET | Refills: 1 | Status: SHIPPED | OUTPATIENT
Start: 2025-06-23

## 2025-06-23 RX ORDER — DEXTROSE MONOHYDRATE 25 G/50ML
25-50 INJECTION, SOLUTION INTRAVENOUS
Status: DISCONTINUED | OUTPATIENT
Start: 2025-06-23 | End: 2025-06-24 | Stop reason: HOSPADM

## 2025-06-23 RX ORDER — PROPOFOL 10 MG/ML
INJECTION, EMULSION INTRAVENOUS PRN
Status: DISCONTINUED | OUTPATIENT
Start: 2025-06-23 | End: 2025-06-23

## 2025-06-23 RX ORDER — NALOXONE HYDROCHLORIDE 0.4 MG/ML
0.2 INJECTION, SOLUTION INTRAMUSCULAR; INTRAVENOUS; SUBCUTANEOUS
Status: DISCONTINUED | OUTPATIENT
Start: 2025-06-23 | End: 2025-06-24 | Stop reason: HOSPADM

## 2025-06-23 RX ORDER — HYDROMORPHONE HCL IN WATER/PF 6 MG/30 ML
0.2 PATIENT CONTROLLED ANALGESIA SYRINGE INTRAVENOUS EVERY 4 HOURS PRN
Status: DISCONTINUED | OUTPATIENT
Start: 2025-06-23 | End: 2025-06-24 | Stop reason: HOSPADM

## 2025-06-23 RX ORDER — FINASTERIDE 5 MG/1
5 TABLET, FILM COATED ORAL DAILY
Status: DISCONTINUED | OUTPATIENT
Start: 2025-06-23 | End: 2025-06-24 | Stop reason: HOSPADM

## 2025-06-23 RX ORDER — FENTANYL CITRATE 50 UG/ML
25 INJECTION, SOLUTION INTRAMUSCULAR; INTRAVENOUS EVERY 5 MIN PRN
Status: DISCONTINUED | OUTPATIENT
Start: 2025-06-23 | End: 2025-06-23 | Stop reason: HOSPADM

## 2025-06-23 RX ORDER — BUMETANIDE 0.5 MG/1
0.5 TABLET ORAL DAILY
Status: DISCONTINUED | OUTPATIENT
Start: 2025-06-23 | End: 2025-06-24 | Stop reason: HOSPADM

## 2025-06-23 RX ORDER — NALOXONE HYDROCHLORIDE 0.4 MG/ML
0.4 INJECTION, SOLUTION INTRAMUSCULAR; INTRAVENOUS; SUBCUTANEOUS
Status: DISCONTINUED | OUTPATIENT
Start: 2025-06-23 | End: 2025-06-24 | Stop reason: HOSPADM

## 2025-06-23 RX ORDER — CEFAZOLIN SODIUM/WATER 2 G/20 ML
2 SYRINGE (ML) INTRAVENOUS
Status: COMPLETED | OUTPATIENT
Start: 2025-06-23 | End: 2025-06-23

## 2025-06-23 RX ORDER — NICOTINE POLACRILEX 4 MG
15-30 LOZENGE BUCCAL
Status: DISCONTINUED | OUTPATIENT
Start: 2025-06-23 | End: 2025-06-24 | Stop reason: HOSPADM

## 2025-06-23 RX ORDER — ONDANSETRON 4 MG/1
4 TABLET, ORALLY DISINTEGRATING ORAL EVERY 6 HOURS PRN
Status: DISCONTINUED | OUTPATIENT
Start: 2025-06-23 | End: 2025-06-24 | Stop reason: HOSPADM

## 2025-06-23 RX ORDER — SODIUM CHLORIDE 9 MG/ML
INJECTION, SOLUTION INTRAVENOUS CONTINUOUS
Status: DISCONTINUED | OUTPATIENT
Start: 2025-06-23 | End: 2025-06-24 | Stop reason: HOSPADM

## 2025-06-23 RX ORDER — HEPARIN SODIUM 5000 [USP'U]/.5ML
5000 INJECTION, SOLUTION INTRAVENOUS; SUBCUTANEOUS EVERY 12 HOURS
Status: DISCONTINUED | OUTPATIENT
Start: 2025-06-24 | End: 2025-06-24 | Stop reason: HOSPADM

## 2025-06-23 RX ORDER — OXYCODONE HYDROCHLORIDE 5 MG/1
5 TABLET ORAL EVERY 4 HOURS PRN
Status: DISCONTINUED | OUTPATIENT
Start: 2025-06-23 | End: 2025-06-24 | Stop reason: HOSPADM

## 2025-06-23 RX ORDER — AMOXICILLIN 250 MG
2 CAPSULE ORAL 2 TIMES DAILY PRN
Status: DISCONTINUED | OUTPATIENT
Start: 2025-06-23 | End: 2025-06-24 | Stop reason: HOSPADM

## 2025-06-23 RX ORDER — POLYETHYLENE GLYCOL 3350 17 G/17G
17 POWDER, FOR SOLUTION ORAL DAILY
Status: DISCONTINUED | OUTPATIENT
Start: 2025-06-24 | End: 2025-06-24 | Stop reason: HOSPADM

## 2025-06-23 RX ORDER — ONDANSETRON 2 MG/ML
4 INJECTION INTRAMUSCULAR; INTRAVENOUS EVERY 30 MIN PRN
Status: DISCONTINUED | OUTPATIENT
Start: 2025-06-23 | End: 2025-06-23 | Stop reason: HOSPADM

## 2025-06-23 RX ORDER — OXYCODONE HYDROCHLORIDE 5 MG/1
5 TABLET ORAL EVERY 6 HOURS PRN
Qty: 9 TABLET | Refills: 0 | Status: SHIPPED | OUTPATIENT
Start: 2025-06-23 | End: 2025-06-26

## 2025-06-23 RX ORDER — HYDROMORPHONE HCL IN WATER/PF 6 MG/30 ML
0.4 PATIENT CONTROLLED ANALGESIA SYRINGE INTRAVENOUS EVERY 5 MIN PRN
Status: DISCONTINUED | OUTPATIENT
Start: 2025-06-23 | End: 2025-06-23 | Stop reason: HOSPADM

## 2025-06-23 RX ORDER — TAMSULOSIN HYDROCHLORIDE 0.4 MG/1
0.8 CAPSULE ORAL DAILY
Status: DISCONTINUED | OUTPATIENT
Start: 2025-06-23 | End: 2025-06-24 | Stop reason: HOSPADM

## 2025-06-23 RX ORDER — ONDANSETRON 2 MG/ML
INJECTION INTRAMUSCULAR; INTRAVENOUS PRN
Status: DISCONTINUED | OUTPATIENT
Start: 2025-06-23 | End: 2025-06-23

## 2025-06-23 RX ORDER — FENTANYL CITRATE 50 UG/ML
INJECTION, SOLUTION INTRAMUSCULAR; INTRAVENOUS PRN
Status: DISCONTINUED | OUTPATIENT
Start: 2025-06-23 | End: 2025-06-23

## 2025-06-23 RX ORDER — LIDOCAINE HYDROCHLORIDE 20 MG/ML
INJECTION, SOLUTION INFILTRATION; PERINEURAL PRN
Status: DISCONTINUED | OUTPATIENT
Start: 2025-06-23 | End: 2025-06-23

## 2025-06-23 RX ORDER — ACETAMINOPHEN 325 MG/1
975 TABLET ORAL EVERY 6 HOURS
Status: DISCONTINUED | OUTPATIENT
Start: 2025-06-23 | End: 2025-06-24 | Stop reason: HOSPADM

## 2025-06-23 RX ORDER — ONDANSETRON 4 MG/1
4 TABLET, ORALLY DISINTEGRATING ORAL EVERY 30 MIN PRN
Status: DISCONTINUED | OUTPATIENT
Start: 2025-06-23 | End: 2025-06-23 | Stop reason: HOSPADM

## 2025-06-23 RX ORDER — BUMETANIDE 1 MG/1
1 TABLET ORAL DAILY
Status: DISCONTINUED | OUTPATIENT
Start: 2025-06-23 | End: 2025-06-24 | Stop reason: HOSPADM

## 2025-06-23 RX ORDER — BUPIVACAINE HYDROCHLORIDE 2.5 MG/ML
INJECTION, SOLUTION EPIDURAL; INFILTRATION; INTRACAUDAL; PERINEURAL PRN
Status: DISCONTINUED | OUTPATIENT
Start: 2025-06-23 | End: 2025-06-23 | Stop reason: HOSPADM

## 2025-06-23 RX ORDER — NALOXONE HYDROCHLORIDE 0.4 MG/ML
0.1 INJECTION, SOLUTION INTRAMUSCULAR; INTRAVENOUS; SUBCUTANEOUS
Status: DISCONTINUED | OUTPATIENT
Start: 2025-06-23 | End: 2025-06-23 | Stop reason: HOSPADM

## 2025-06-23 RX ORDER — ACETAMINOPHEN 650 MG/1
650 SUPPOSITORY RECTAL ONCE
Status: COMPLETED | OUTPATIENT
Start: 2025-06-23 | End: 2025-06-23

## 2025-06-23 RX ORDER — DEXAMETHASONE SODIUM PHOSPHATE 4 MG/ML
4 INJECTION, SOLUTION INTRA-ARTICULAR; INTRALESIONAL; INTRAMUSCULAR; INTRAVENOUS; SOFT TISSUE
Status: DISCONTINUED | OUTPATIENT
Start: 2025-06-23 | End: 2025-06-23 | Stop reason: HOSPADM

## 2025-06-23 RX ADMIN — Medication 2 G: at 12:41

## 2025-06-23 RX ADMIN — METOPROLOL SUCCINATE 12.5 MG: 25 TABLET, EXTENDED RELEASE ORAL at 21:49

## 2025-06-23 RX ADMIN — ACETAMINOPHEN 975 MG: 325 TABLET, FILM COATED ORAL at 17:21

## 2025-06-23 RX ADMIN — ACETAMINOPHEN 975 MG: 325 TABLET, FILM COATED ORAL at 10:10

## 2025-06-23 RX ADMIN — OXYCODONE HYDROCHLORIDE 5 MG: 5 TABLET ORAL at 17:22

## 2025-06-23 RX ADMIN — LIDOCAINE HYDROCHLORIDE 100 MG: 20 INJECTION, SOLUTION INFILTRATION; PERINEURAL at 12:38

## 2025-06-23 RX ADMIN — SENNOSIDES AND DOCUSATE SODIUM 2 TABLET: 50; 8.6 TABLET ORAL at 23:02

## 2025-06-23 RX ADMIN — ONDANSETRON 4 MG: 2 INJECTION INTRAMUSCULAR; INTRAVENOUS at 14:58

## 2025-06-23 RX ADMIN — DEXAMETHASONE SODIUM PHOSPHATE 4 MG: 4 INJECTION, SOLUTION INTRA-ARTICULAR; INTRALESIONAL; INTRAMUSCULAR; INTRAVENOUS; SOFT TISSUE at 12:38

## 2025-06-23 RX ADMIN — OXYCODONE HYDROCHLORIDE 5 MG: 5 TABLET ORAL at 21:46

## 2025-06-23 RX ADMIN — Medication 200 MG: at 15:09

## 2025-06-23 RX ADMIN — INSULIN GLARGINE 13 UNITS: 100 INJECTION, SOLUTION SUBCUTANEOUS at 23:12

## 2025-06-23 RX ADMIN — FENTANYL CITRATE 100 MCG: 50 INJECTION INTRAMUSCULAR; INTRAVENOUS at 12:38

## 2025-06-23 RX ADMIN — ROCURONIUM BROMIDE 20 MG: 50 INJECTION, SOLUTION INTRAVENOUS at 13:29

## 2025-06-23 RX ADMIN — HYDROMORPHONE HYDROCHLORIDE 0.5 MG: 1 INJECTION, SOLUTION INTRAMUSCULAR; INTRAVENOUS; SUBCUTANEOUS at 13:24

## 2025-06-23 RX ADMIN — FENTANYL CITRATE 25 MCG: 50 INJECTION INTRAMUSCULAR; INTRAVENOUS at 15:51

## 2025-06-23 RX ADMIN — FINASTERIDE 5 MG: 5 TABLET, FILM COATED ORAL at 21:46

## 2025-06-23 RX ADMIN — BUMETANIDE 1 MG: 1 TABLET ORAL at 17:22

## 2025-06-23 RX ADMIN — FENTANYL CITRATE 50 MCG: 50 INJECTION INTRAMUSCULAR; INTRAVENOUS at 16:03

## 2025-06-23 RX ADMIN — ONDANSETRON 4 MG: 2 INJECTION, SOLUTION INTRAMUSCULAR; INTRAVENOUS at 19:46

## 2025-06-23 RX ADMIN — FENTANYL CITRATE 25 MCG: 50 INJECTION INTRAMUSCULAR; INTRAVENOUS at 15:36

## 2025-06-23 RX ADMIN — PROPOFOL 140 MG: 10 INJECTION, EMULSION INTRAVENOUS at 12:38

## 2025-06-23 RX ADMIN — SODIUM CHLORIDE, SODIUM LACTATE, POTASSIUM CHLORIDE, AND CALCIUM CHLORIDE: .6; .31; .03; .02 INJECTION, SOLUTION INTRAVENOUS at 14:59

## 2025-06-23 RX ADMIN — ACETAMINOPHEN 975 MG: 325 TABLET, FILM COATED ORAL at 23:02

## 2025-06-23 RX ADMIN — BUMETANIDE 0.5 MG: 1 TABLET ORAL at 19:01

## 2025-06-23 RX ADMIN — SODIUM CHLORIDE, SODIUM LACTATE, POTASSIUM CHLORIDE, AND CALCIUM CHLORIDE: .6; .31; .03; .02 INJECTION, SOLUTION INTRAVENOUS at 12:30

## 2025-06-23 RX ADMIN — TAMSULOSIN HYDROCHLORIDE 0.8 MG: 0.4 CAPSULE ORAL at 20:17

## 2025-06-23 RX ADMIN — ROCURONIUM BROMIDE 50 MG: 50 INJECTION, SOLUTION INTRAVENOUS at 12:38

## 2025-06-23 ASSESSMENT — ACTIVITIES OF DAILY LIVING (ADL)
ADLS_ACUITY_SCORE: 25
ADLS_ACUITY_SCORE: 48
ADLS_ACUITY_SCORE: 22
ADLS_ACUITY_SCORE: 24
ADLS_ACUITY_SCORE: 22
ADLS_ACUITY_SCORE: 25
ADLS_ACUITY_SCORE: 25
ADLS_ACUITY_SCORE: 22
ADLS_ACUITY_SCORE: 25
ADLS_ACUITY_SCORE: 24
ADLS_ACUITY_SCORE: 22
ADLS_ACUITY_SCORE: 25
ADLS_ACUITY_SCORE: 25
ADLS_ACUITY_SCORE: 22

## 2025-06-23 ASSESSMENT — LIFESTYLE VARIABLES: TOBACCO_USE: 0

## 2025-06-23 NOTE — ANESTHESIA CARE TRANSFER NOTE
Patient: Gene Pagan    Procedure: Procedure(s):  RIGHT ROBOTIC ASSISTED LAPAROSOCPIC PARTIAL NEPHRECTOMY WITH INTRA-OPERATIVE RENAL ULTRASOUND,       Diagnosis: Right renal mass [N28.89]  Diagnosis Additional Information: No value filed.    Anesthesia Type:   General     Note:    Oropharynx: oropharynx clear of all foreign objects and spontaneously breathing  Level of Consciousness: awake  Oxygen Supplementation: face mask  Level of Supplemental Oxygen (L/min / FiO2): 6  Independent Airway: airway patency satisfactory and stable  Dentition: dentition unchanged  Vital Signs Stable: post-procedure vital signs reviewed and stable  Report to RN Given: handoff report given  Patient transferred to: PACU    Handoff Report: Identifed the Patient, Identified the Reponsible Provider, Reviewed the pertinent medical history, Discussed the surgical course, Reviewed Intra-OP anesthesia mangement and issues during anesthesia, Set expectations for post-procedure period and Allowed opportunity for questions and acknowledgement of understanding  Vitals:  Vitals Value Taken Time   /114 06/23/25 15:17   Temp 35.5  C (95.9  F) 06/23/25 15:20   Pulse 70 06/23/25 15:20   Resp 27 06/23/25 15:20   SpO2 99 % 06/23/25 15:20   Vitals shown include unfiled device data.    Electronically Signed By: LUIS Marr CRNA  June 23, 2025  3:21 PM

## 2025-06-23 NOTE — DISCHARGE INSTRUCTIONS
POSTOPERATIVE INSTRUCTIONS    Diagnosis-------------------------------   RIGHT renal mass    Procedure-------------------------------  Procedure(s) (LRB):  RIGHT ROBOTIC ASSISTED LAPAROSOCPIC PARTIAL NEPHRECTOMY WITH INTRA-OPERATIVE RENAL ULTRASOUND, (Right)      Findings--------------------------------  RIGHT renal mass removed     Home-going instructions-----------------         Activity Limitation:     - No driving or operating heavy machinery while on narcotic pain medication.   - No strenuous exercise for 6 weeks.   - No lifting, pushing, pulling more than 10 pounds for 6 weeks. Take care when pushing with your arms to stand up.   - Do not strain your belly area. When you bend, sit up or twice, you could strain the area around your incision.   - Do not strain with bowel movements.   - Do not drive until you can press the brake pedal quickly and fully without pain.   - Do not operate a motor vehicle while taking narcotic pain medications    FOLLOW THESE INSTRUCTIONS AS INDICATED BELOW:  - Observe operative area for signs of excessive bleeding.  - You may shower.  - Increase fluid intake to promote clear urine.  - Resume usual diet as tolerated    What to expect while recovering----------- WOUND CARE:  - You may shower and get incisions wet starting 48 hrs after surgery.  - Do not scrub incisions or submerge wounds (aka, bath, pool, hot tub, etc.) for 2 weeks or until wounds have healed   - Remove wound dressing 48 hours after surgery if already not removed.   - If purple dermabond glue was used, avoid applying any lotions or ointments.   - Leave incision open to air. Cover with gauze only if needed for comfort or to protect clothing from drainage.    Discharge Medications/instructions:   1) PAIN: Oxycodone is a narcotic medication that has been prescribed for pain. Narcotics will cause sleepiness and constipation, therefore it is best to stop or reduce them as soon as you can and switch to using acetaminophen  "(Tylenol) and/or ibuprofen (Advil/Motrin), taken as directed on the packaging. Keep in mind that certain narcotics can contain acetaminophen, also called \"APAP\" on prescription bottles. Do not take more than 4,000mg of Tylenol (acetaminophen/ APAP) from all sources in any 24 hour period since this can cause liver damage. Never drive, operate machinery or drink alcoholic beverages while you are taking narcotic pain medications.     2) CONSTIPATION: Pericolace (senna/docusate sodium) can be taken twice daily for prevention of constipation since surgery, pain medications and bladder spasm medications can all make you constipated. Please reduce or stop pericolace if you develop loose stools. Other over the counter solutions such as prune juice, miralax, fiber products, senna, and dulcolax can also be used. If you are taking the pericolace but still have not had a bowel movement in 3 days, start over-the-counter Milk of Magnesia taken twice daily until you have a nice bowel movement. Call the office with any concerns.     3) ELIQUIS: You may resume your Eliquis on Thursday 6/26/2025    4) You are going home with a Brooks drain after your kidney surgery. This drain helps remove extra fluid from the area where you had surgery. Taking care of your drain is important for your recovery and to help prevent problems like infection or fluid buildup.    What to Expect:   The drain is a soft, flexible tube connected to a small bulb or container.   You may have some fluid draining for several days. The amount and color may change as you heal. It is normal for the fluid to be pink, yellow, or clear.   Most patients have the drain removed within a few days, but the exact timing depends on your recovery and the amount of fluid.    How to Care for Your Drain:  1. Empty the Drain:   Empty the bulb at least twice a day, or whenever it is half full.   Wash your hands before and after handling the drain.   Open the plug, squeeze the fluid " into a measuring cup, and record the amount and color.   Squeeze the bulb flat before closing the plug to keep gentle suction.    2. Keep the Area Clean:   You may shower daily. Let soap and water run over the drain site, then gently pat dry.   Do not soak in a bathtub or swim until the drain is removed.    3. Check for Problems:   Watch for signs of infection: redness, swelling, warmth, pus, or a bad smell at the drain site.   Call your care team if you have a fever over 101 F, sudden increase in drainage, blood in the drain, or if the drain falls out.      Questions/concerns------------------------  [facility]: [phone]    Future appointments  You are scheduled for follow up on 7/7/2025 with Dr. Perez.  Our office will reach out to you about scheduling a visit for drain removal.    Cheikh Perez MD

## 2025-06-23 NOTE — ANESTHESIA PREPROCEDURE EVALUATION
Anesthesia Pre-Procedure Evaluation    Patient: Gene Pagan   MRN: [id number] : 1945          Procedure : Procedure(s):  RIGHT ROBOTIC ASSISTED LAPAROSOCPIC PARTIAL NEPHRECTOMY WITH INTRA-OPERATIVE RENAL ULTRASOUND,  POSSIBLE OPEN         Past Medical History:   Diagnosis Date    Anemia     Atrial fibrillation     AV node ablation and pacemaker placement 4/10/2020    BPH (benign prostatic hyperplasia)     Chronic diastolic CHF     Chronic kidney disease (CKD), stage III (moderate) (H)     Diabetes mellitus - type 2     Palpitations     Systolic CHF (H)       Past Surgical History:   Procedure Laterality Date    ANESTHESIA CARDIOVERSION N/A 2020    Procedure: ANESTHESIA, FOR CARDIOVERSION;  Surgeon: GENERIC ANESTHESIA PROVIDER;  Location: [facility] OR    CYSTOSCOPY      EP PACEMAKER N/A 4/10/2020    Procedure: EP Pacemaker;  Surgeon: Abhay Willams MD;  Location:  [facility] CATH LAB    [facility]  2020    Done in Urology office with Dr Perez      No Known Allergies   Social History     Tobacco Use    Smoking status: Never    Smokeless tobacco: Never   Substance Use Topics    Alcohol use: Yes     Comment: Rare      Wt Readings from Last 1 Encounters:   25 89.8 kg (198 lb)        Anesthesia Evaluation            ROS/MED HX  ENT/Pulmonary:     (+)     ANDRESSA risk factors, snores loudly,                               (-) tobacco use and sleep apnea   Neurologic:       Cardiovascular:     (+) Dyslipidemia - -   -  - -      CHF        pacemaker, Reason placed: Afib/Aflutter, CHF. type: BiV,  - Patient is dependent on pacemaker.               Previous cardiac testing   Echo: Date: 20 Results:  Interpretation Summary     Left ventricular systolic function is mildly reduced.  The visual ejection fraction is estimated at 45-50%.  Regional wall motion abnormalities cannot be excluded due to limited  visualization.  Mildly decreased right ventricular systolic function  The study was technically  difficult. Compared to the prior study dated 6/20,  there have been no changes.  _____________________________________________________________________________  __        Left Ventricle  Left ventricular systolic function is mildly reduced. The visual ejection  fraction is estimated at 45-50%. There is mild global hypokinesia of the left  ventricle. Regional wall motion abnormalities cannot be excluded due to  limited visualization.     Right Ventricle  Borderline right ventricular enlargement. Mildly decreased right ventricular  systolic function. There is a pacemaker lead in the right ventricle.     Mitral Valve  There is mild mitral annular calcification. The mitral valve leaflets are  mildly thickened. There is trace mitral regurgitation.     Tricuspid Valve  There is trace to mild tricuspid regurgitation. The right ventricular systolic  pressure is approximated at 28.5 mmHg plus the right atrial pressure. IVC  diameter <2.1 cm collapsing >50% with sniff suggests a normal RA pressure of 3  mmHg.        Aortic Valve  There is mild trileaflet aortic sclerosis. There is mild (1+) aortic  regurgitation. No aortic stenosis is present.     Pulmonic Valve  The pulmonic valve is not well visualized.     Pericardium  There is no pericardial effusion.     Rhythm  The rhythm was atrial fibrillation with wide QRS rhythm.    Stress Test:  Date: 9/11/20 Results:     The nuclear stress test is negative for inducible myocardial ischemia or infarction.     Left ventricular function is moderately reduced.     The left ventricular ejection fraction at stress is 36%.     There is no prior study for comparison.        ECG Reviewed:  Date: 6/3/25 Results:  Undetermined Rhythm   -Right bundle branch block and right axis -possible right ventricular hypertrophy -consider pulmonary disease or posterior fasicular block.  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  Cath:  Date: Results:      METS/Exercise Tolerance:     Hematologic:     (+)      anemia,           Musculoskeletal:       GI/Hepatic:    (-) GERD   Renal/Genitourinary:     (+) renal disease, type: CRI,      BPH,      Endo:     (+)  type II DM,       Diabetic complications: nephropathy.             Psychiatric/Substance Use:       Infectious Disease:       Malignancy:   (+) Malignancy, History of Other.    Other:              Physical Exam  Airway  Mallampati: II  TM distance: >3 FB  Neck ROM: full  Mouth opening: >= 4 cm    Cardiovascular Rate: normal rate     Dental   (+) Edentulous      Pulmonary - normal exam      Neurological - normal exam  He appears awake, alert and oriented x3.    Other Findings       OUTSIDE LABS:  CBC:   Lab Results   Component Value Date    WBC 8.0 06/03/2025    WBC 8.8 07/22/2024    HGB 13.5 06/03/2025    HGB 13.3 07/22/2024    HCT 41.6 06/03/2025    HCT 41.9 07/22/2024     06/03/2025     07/22/2024     BMP:   Lab Results   Component Value Date     06/03/2025     04/03/2025    POTASSIUM 4.3 06/03/2025    POTASSIUM 4.0 04/03/2025    CHLORIDE 101 06/03/2025    CHLORIDE 100 04/03/2025    CO2 24 06/03/2025    CO2 24 04/03/2025    BUN 35.8 (H) 06/03/2025    BUN 27.3 (H) 04/03/2025    CR 2.34 (H) 06/03/2025    CR 2.00 (H) 04/03/2025     (H) 06/03/2025     (H) 04/03/2025     COAGS:   Lab Results   Component Value Date    INR 1.17 (H) 04/06/2020    FIBR 405 03/29/2020     POC:   Lab Results   Component Value Date     (H) 05/09/2020     HEPATIC:   Lab Results   Component Value Date    ALBUMIN 3.9 04/03/2025    PROTTOTAL 7.4 04/03/2025    ALT 14 04/03/2025    AST 17 04/03/2025    ALKPHOS 94 04/03/2025    BILITOTAL 0.4 04/03/2025     OTHER:   Lab Results   Component Value Date    PH 7.44 04/01/2020    LACT 0.9 05/07/2020    A1C 6.1 (H) 04/03/2025    HARPER 9.4 06/03/2025    PHOS 3.5 12/10/2024    MAG 1.9 04/10/2020    LIPASE 917 (H) 03/29/2020    TSH 1.52 04/03/2025    T4 1.32 05/05/2020    CRP 34.9 (H) 06/02/2020    SED 85 (H) 06/02/2020  "      Anesthesia Plan    ASA Status:  3      NPO Status: NPO Appropriate   Anesthesia Type: General.  Airway: oral.  Induction: intravenous.  Maintenance: Inhalation.   Techniques and Equipment:     - Airway:  Planned airway equipment includes video laryngoscope.     - Monitoring Plan: standard ASA monitoring     Consents    Anesthesia Plan(s) and associated risks, benefits, and realistic alternatives discussed. Questions answered and patient/representative(s) expressed understanding.     - Discussed:     - Discussed with:  Patient, family               Postoperative Care         Comments:                   German Miller MD    I have reviewed the pertinent notes and labs in the chart from the past 30 days and (re)examined the patient.  Any updates or changes from those notes are reflected in this note.    Clinically Significant Risk Factors Present on Admission                # Drug Induced Coagulation Defect: home medication list includes an anticoagulant medication              # Obesity: Estimated body mass index is 30.11 kg/m  as calculated from the following:    Height as of 6/3/25: 1.727 m (5' 8\").    Weight as of 6/3/25: 89.8 kg (198 lb).        # Pacemaker present             "

## 2025-06-23 NOTE — OP NOTE
OPERATIVE REPORT  DATE OF SURGERY: 06/23/25  LOCATION OF SURGERY: [facility] OR  PREOPERATIVE DIAGNOSIS:  (N28.89) Right renal mass  (primary encounter diagnosis)  POSTOPERATIVE DIAGNOSIS: (N28.89) Right renal mass  (primary encounter diagnosis)     START TIME: 1:02 PM  END TIME: 3:07 PM    PROCEDURE PERFORMED:   ROBOTIC ASSISTED LAPAROSCOPIC PARTIAL NEPHRECTOMY - RIGHT  ROBOTIC ASSISTED LAPAROSCOPIC INTRA-OPERATIVE RENAL ULTRASOUND     STAFF SURGEON: Cheikh Perez MD  ASSISTANT: Caroline Reilly  ANESTHESIA: General.   ESTIMATED BLOOD LOSS: 250 mL.   DRAINS AND TUBES: 19fr Brooks drain, 16fr coude catheter  COMPLICATIONS: None.   DISPOSITION: PACU.   SPECIMENS OBTAINED:   ID Type Source Tests Collected by Time Destination   1 : RIGHT RENAL NEOPLASM Tissue Kidney, Right SURGICAL PATHOLOGY EXAM Cheikh Perez MD 6/23/2025  2:52 PM       SIGNIFICANT FINDINGS: RIGHT renal mass removed with grossly negative margins.     HISTORY OF PRESENT ILLNESS: Gene Pagan is a 80 year old man with a RIGHT renal mass. He was counseled on treatment options and elected to proceed with the above surgery.      OPERATION PERFORMED:   Informed consent was obtained and the patient was brought to the operating room where general anesthesia was induced. The patient was given appropriate preoperative antibiotics and positioned supine.     The patient was then placed in the lateral flank position with RIGHT side up in preparation for a RIGHT-sided partial nephrectomy. We prepped and draped in the usual sterile fashion.  We then performed a timeout, verifying the correct patient's site and procedure to be performed.  We gained access to the abdomen for CO2 insufflation with a Veress needle of the lateral border of the rectus muscle in line with the 11th rib. After access was obtained, the 12-mm port was inserted and the camera was inserted. No adhesions were noted. An 8-mm port was placed one hand's breath cranial, 1-2 fingers below the  costal margin. A finder needle was used to identify the optimal site and angle for the liver retractor and a 5mm port was placed medial and cranial to the robotic port for this. Two 8-mm ports were placed in the RIGHT lower quadrant, medial and anterior to the anterior iliac spine.  We then placed a 12-mm assistant port. The robot was then docked, and the ascending colon was dropped off the lateral wall. The liver was retracted and held in place with a locking Cheryl clamp. The plane was found between the mesentery and Gerota's fascia. The duodenum was partially kocherized and the IVC was identified. The gonadal vein was identified.  We were able to follow this up to the renal vein. Dissection was carried to the hilum and both the renal artery and vein were exposed. Care was taken to ensure the artery was dissected adequately for clamp placement. The kidney was then defatted and a bulge was noted at the upper pole and the kidney was noted to have very adherent and toxic fat.  The renal ultrasound was used to delineate the mass and the renal capsule was scored, marking the outline of the tumors. A bulldog clamp was placed on the artery. The partial nephrectomy was then performed, removing the tumor with grossly negative margin.  Hemostasis was maintained in the nephrectomy bed was running 2-0 SH vicryl suture. The renorrhaphy was closed with a 0 CT-1 Vicryl suture through the capsule. Capsular sutures were secured with Weck clips. The final end was doubly clipped. The bulldog clamp was then removed and hemostasis was checked, which was excellent. Ischemia time was 22 minutes. Pneumoperitoneum was lowered and there was no bleeding from the renorrhaphy site. Flowseal was applied to the renorrhaphy site. The specimen was placed in an endocatch bag. A drain was placed in the lateral-most port. The robot was undocked. The 12-mm assistant port was decided to be our extraction site. The assistant port was then extended.  Extraction incision was closed with 0-PDS. The remainder of the skin incisions were irrigated out and closed subcuticularly using 4-0 Vicryl suture and covered with skin glue. Dressings were applied.     The patient was awoken from anesthesia and taken to PACU in stable condition.      Cheikh Perez MD   Urology  [facility]  Clinic Phone [phone]

## 2025-06-23 NOTE — PLAN OF CARE
Date & Time: 06/23/25 [id number]  Surgery/POD#: POD#0 RIGHT ROBOTIC ASSISTED LAPAROSOCPIC PARTIAL NEPHRECTOMY WITH INTRA-OPERATIVE RENAL ULTRASOUND  Behavior & Aggression: Green  Fall Risk: Yes  Orientation: AOx4  ABNL VS/O2: VSS on RA ex HTN  ABNL Labs: See Chart  Pain Management: PRN oxy x1, scheduled meds  Bowel/Bladder: Chronic Baker, No BM  Drains: PIV infusing NS@100  Wounds/incisions: x4 Lap sites, GERI drain   Diet: Clears  Number of times OUT OF BED this shift: not OOB  Tests/Procedures: N/A  Anticipated  DC Date: 6/24/25  Significant Information: Pt arrived around 1700. VSS ex HTN. Tolerating clears. Plan is to discharge with baker catheter. GERI with bloody output.

## 2025-06-23 NOTE — ANESTHESIA PROCEDURE NOTES
Airway       Patient location during procedure: OR       Procedure Start/Stop Times: 6/23/2025 12:41 PM  Staff -        CRNA: Joseph Tan APRN CRNA       Other Anesthesia Staff: Miguel Olivier       Performed By: SRNA  Consent for Airway        Urgency: elective  Indications and Patient Condition       Indications for airway management: sanju-procedural       Induction type:intravenous       Mask difficulty assessment: 2 - vent by mask + OA or adjuvant +/- NMBA    Final Airway Details       Final airway type: endotracheal airway       Successful airway: ETT - single and Oral  Endotracheal Airway Details        ETT size (mm): 8.0       Cuffed: yes       Successful intubation technique: direct laryngoscopy       DL Blade Type: Burrell 2       Grade View of Cords: 1       Adjucts: stylet       Position: Right       Measured from: gums/teeth       Secured at (cm): 24       Bite block used: None    Post intubation assessment        Placement verified by: capnometry, equal breath sounds and chest rise        Number of attempts at approach: 1       Number of other approaches attempted: 0       Secured with: tape       Ease of procedure: easy       Dentition: Intact and Unchanged    Medication(s) Administered   Medication Administration Time: 6/23/2025 12:41 PM

## 2025-06-24 VITALS
BODY MASS INDEX: 28.93 KG/M2 | DIASTOLIC BLOOD PRESSURE: 66 MMHG | WEIGHT: 190.9 LBS | OXYGEN SATURATION: 97 % | HEART RATE: 72 BPM | RESPIRATION RATE: 16 BRPM | SYSTOLIC BLOOD PRESSURE: 131 MMHG | HEIGHT: 68 IN | TEMPERATURE: 97.7 F

## 2025-06-24 LAB
ANION GAP SERPL CALCULATED.3IONS-SCNC: 14 MMOL/L (ref 7–15)
BUN SERPL-MCNC: 33.4 MG/DL (ref 8–23)
CALCIUM SERPL-MCNC: 8.7 MG/DL (ref 8.8–10.4)
CHLORIDE SERPL-SCNC: 102 MMOL/L (ref 98–107)
CREAT SERPL-MCNC: 2.59 MG/DL (ref 0.67–1.17)
EGFRCR SERPLBLD CKD-EPI 2021: 24 ML/MIN/1.73M2
ERYTHROCYTE [DISTWIDTH] IN BLOOD BY AUTOMATED COUNT: 13.7 % (ref 10–15)
GLUCOSE BLDC GLUCOMTR-MCNC: 123 MG/DL (ref 70–99)
GLUCOSE BLDC GLUCOMTR-MCNC: 131 MG/DL (ref 70–99)
GLUCOSE BLDC GLUCOMTR-MCNC: 173 MG/DL (ref 70–99)
GLUCOSE SERPL-MCNC: 131 MG/DL (ref 70–99)
HCO3 SERPL-SCNC: 22 MMOL/L (ref 22–29)
HCT VFR BLD AUTO: 34.2 % (ref 40–53)
HGB BLD-MCNC: 11.1 G/DL (ref 13.3–17.7)
MCH RBC QN AUTO: 29.5 PG (ref 26.5–33)
MCHC RBC AUTO-ENTMCNC: 32.5 G/DL (ref 31.5–36.5)
MCV RBC AUTO: 91 FL (ref 78–100)
PLATELET # BLD AUTO: 248 10E3/UL (ref 150–450)
POTASSIUM SERPL-SCNC: 4.7 MMOL/L (ref 3.4–5.3)
RBC # BLD AUTO: 3.76 10E6/UL (ref 4.4–5.9)
SODIUM SERPL-SCNC: 138 MMOL/L (ref 135–145)
WBC # BLD AUTO: 12.5 10E3/UL (ref 4–11)

## 2025-06-24 PROCEDURE — 36415 COLL VENOUS BLD VENIPUNCTURE: CPT | Performed by: UROLOGY

## 2025-06-24 PROCEDURE — 250N000013 HC RX MED GY IP 250 OP 250 PS 637: Performed by: STUDENT IN AN ORGANIZED HEALTH CARE EDUCATION/TRAINING PROGRAM

## 2025-06-24 PROCEDURE — 82962 GLUCOSE BLOOD TEST: CPT

## 2025-06-24 PROCEDURE — 250N000011 HC RX IP 250 OP 636: Performed by: UROLOGY

## 2025-06-24 PROCEDURE — 96372 THER/PROPH/DIAG INJ SC/IM: CPT | Performed by: UROLOGY

## 2025-06-24 PROCEDURE — 85049 AUTOMATED PLATELET COUNT: CPT | Performed by: UROLOGY

## 2025-06-24 PROCEDURE — 80048 BASIC METABOLIC PNL TOTAL CA: CPT | Performed by: UROLOGY

## 2025-06-24 PROCEDURE — 250N000013 HC RX MED GY IP 250 OP 250 PS 637: Performed by: UROLOGY

## 2025-06-24 RX ORDER — OXYBUTYNIN CHLORIDE 5 MG/1
5 TABLET ORAL
Status: COMPLETED | OUTPATIENT
Start: 2025-06-24 | End: 2025-06-24

## 2025-06-24 RX ADMIN — HYDROMORPHONE HYDROCHLORIDE 0.2 MG: 0.2 INJECTION, SOLUTION INTRAMUSCULAR; INTRAVENOUS; SUBCUTANEOUS at 00:14

## 2025-06-24 RX ADMIN — POLYETHYLENE GLYCOL 3350 17 G: 17 POWDER, FOR SOLUTION ORAL at 10:11

## 2025-06-24 RX ADMIN — METOPROLOL SUCCINATE 12.5 MG: 25 TABLET, EXTENDED RELEASE ORAL at 10:10

## 2025-06-24 RX ADMIN — HEPARIN SODIUM 5000 UNITS: 5000 INJECTION, SOLUTION INTRAVENOUS; SUBCUTANEOUS at 10:10

## 2025-06-24 RX ADMIN — BUMETANIDE 1 MG: 1 TABLET ORAL at 10:10

## 2025-06-24 RX ADMIN — OXYCODONE HYDROCHLORIDE 5 MG: 5 TABLET ORAL at 14:34

## 2025-06-24 RX ADMIN — OXYCODONE HYDROCHLORIDE 5 MG: 5 TABLET ORAL at 05:57

## 2025-06-24 RX ADMIN — OXYBUTYNIN CHLORIDE 5 MG: 5 TABLET ORAL at 00:20

## 2025-06-24 RX ADMIN — ACETAMINOPHEN 975 MG: 325 TABLET, FILM COATED ORAL at 10:09

## 2025-06-24 RX ADMIN — Medication 10 MG: at 01:09

## 2025-06-24 RX ADMIN — OXYCODONE HYDROCHLORIDE 5 MG: 5 TABLET ORAL at 10:10

## 2025-06-24 RX ADMIN — FINASTERIDE 5 MG: 5 TABLET, FILM COATED ORAL at 10:09

## 2025-06-24 RX ADMIN — BUMETANIDE 0.5 MG: 1 TABLET ORAL at 10:10

## 2025-06-24 RX ADMIN — ACETAMINOPHEN 975 MG: 325 TABLET, FILM COATED ORAL at 04:29

## 2025-06-24 ASSESSMENT — ACTIVITIES OF DAILY LIVING (ADL)
ADLS_ACUITY_SCORE: 29
ADLS_ACUITY_SCORE: 25
ADLS_ACUITY_SCORE: 25
ADLS_ACUITY_SCORE: 29

## 2025-06-24 NOTE — CONSULTS
[facility]  Consult Note - Hospitalist Service  Date of Admission:  6/23/2025  Consult Requested by: Dr. Perez  Reason for Consult: medical co-management    Assessment & Plan   Gene Pagan is a 80 year old male admitted on 6/23/2025. He presents with a right renal mass s/p ROBOTIC ASSISTED LAPAROSCOPIC PARTIAL NEPHRECTOMY - RIGHT       Right renal mass    Assessment: s/p ROBOTIC ASSISTED LAPAROSCOPIC PARTIAL NEPHRECTOMY - RIGHT and ROBOTIC ASSISTED LAPAROSCOPIC INTRA-OPERATIVE RENAL ULTRASOUND with Dr. Perez with  CC[facility]    Plan:   - Postop surgical management/DVT prophylaxis per urology service  - Pain control as needed  - Follow vitals/temp      CHF (congestive heart failure) (H)    Chronic diastolic heart failure (H)    Assessment/Plan: No evidence of acute exacerbation at this time.  Resume prior to mission Bumex/metoprolol in AM.      Benign prostatic hyperplasia with urinary obstruction    Assessment/Plan: Continue prior to mission Flomax      CKD (chronic kidney disease) stage 3, GFR 30-59 ml/min (H)    Assessment/Plan: Creatinine of 2.26 on admission, will recheck BMP in AM.  Avoid NSAIDs/nephrotoxins.      Type 2 diabetes mellitus with stage 3b chronic kidney disease, with long-term current use of insulin (H)    Diabetic nephropathy (H)    Assessment: PTA on glargine 13 units at bedtime    Plan:   - Continue prior to admission glargine at 30 units at bedtime  - Medium intensity sliding scale insulin as needed  - Hypoglycemia protocol      Anemia in chronic kidney disease    Assessment/Plan: CBC in AM      Essential (primary) hypertension    Assessment/Plan: continue PTA Metoprolol      Atrial Fibrillation    Assessment/Plan: continue PTA Metoprolol.  Resume prior to admission Eliquis when appropriate from a urology standpoint.         Clinically Significant Risk Factors Present on Admission                # Drug Induced Coagulation Defect: home medication list includes an  "anticoagulant medication    # Hypertension: Noted on problem list           # Overweight: Estimated body mass index is 29.03 kg/m  as calculated from the following:    Height as of this encounter: 1.727 m (5' 8\").    Weight as of this encounter: 86.6 kg (190 lb 14.4 oz).        # Pacemaker present       Eduardo Mccauley MD  Hospitalist Service  Securely message with [employer]uck (more info)  Text page via [facility] Paging/Directory   ______________________________________________________________________    Chief Complaint     Right Renal Mass    History is obtained from the patient    History of Present Illness     Gene Pagan is a 80 year old male with past medical history of hypertension, chronic diastolic heart failure, type 2 diabetes mellitus, atrial fibrillation on Eliquis who presents for evaluation of right renal mass.    Patient is s/p ROBOTIC ASSISTED LAPAROSCOPIC PARTIAL NEPHRECTOMY - RIGHT and ROBOTIC ASSISTED LAPAROSCOPIC INTRA-OPERATIVE RENAL ULTRASOUND with Dr. Perez with  [facility]. he currently denies any chest pain or shortness of breath.  He has no nausea/vomiting or abdominal pain but endorses some constipation.  Denies any recent fevers or chills.  Denies any recent blood in his stool, weight loss or night sweats.  Otherwise has no headaches, slurred speech, localized weakness or numbness of his extremities.  Denies any recent falls or head trauma.  At this time the patient has no other complaints.  Reports that his pain is well-controlled at this time.    Past Medical History    Past Medical History:   Diagnosis Date    Anemia     Atrial fibrillation     AV node ablation and pacemaker placement 4/10/2020    BPH (benign prostatic hyperplasia)     Chronic diastolic CHF     Chronic kidney disease (CKD), stage III (moderate) (H)     Diabetes mellitus - type 2     Palpitations     Systolic CHF (H)        Past Surgical History   Past Surgical History:   Procedure Laterality Date    ANESTHESIA CARDIOVERSION " N/A 4/6/2020    Procedure: ANESTHESIA, FOR CARDIOVERSION;  Surgeon: GENERIC ANESTHESIA PROVIDER;  Location: [facility] OR    CYSTOSCOPY      EP PACEMAKER N/A 4/10/2020    Procedure: EP Pacemaker;  Surgeon: Abhay Willams MD;  Location:  [facility]    CYNDI  09/25/2020    Done in Urology office with Dr Perez       Medications   I have reviewed this patient's current medications  Medications Prior to Admission   Medication Sig Dispense Refill Last Dose/Taking    apixaban ANTICOAGULANT (ELIQUIS ANTICOAGULANT) 2.5 MG tablet Take 1 tablet (2.5 mg) by mouth 2 times daily. 180 tablet 3 Past Week    bumetanide (BUMEX) 0.5 MG tablet Take 1 tablet (0.5 mg) by mouth daily (+ additional 1 mg to = 1.5 mg daily). 90 tablet 3 6/22/2025 Morning    bumetanide (BUMEX) 1 MG tablet Take 1 tablet (1 mg) by mouth daily. (+ additional 0.5 mg to = 1.5 mg daily). 90 tablet 3 6/22/2025 Morning    finasteride (PROSCAR) 5 MG tablet Take 1 tablet (5 mg) by mouth daily. 90 tablet 3 Past Week    insulin glargine (LANTUS SOLOSTAR) 100 UNIT/ML pen Inject 13 Units subcutaneously at bedtime. 15 mL 3 6/22/2025 Evening    melatonin 3 MG tablet Take 3 mg by mouth nightly as needed   6/22/2025 Evening    metoprolol succinate ER (TOPROL XL) 25 MG 24 hr tablet Take 0.5 tablets (12.5 mg) by mouth 2 times daily. 90 tablet 3 6/22/2025    Multiple Vitamin (MULTIVITAMIN ADULT) TABS Take 1 tablet by mouth daily.   6/23/2025 Morning    tamsulosin (FLOMAX) 0.4 MG capsule Take 2 capsules (0.8 mg) by mouth daily. 180 capsule 4 6/22/2025 Evening    vitamin C (ASCORBIC ACID) 1000 MG TABS Take 1,000 mg by mouth daily.   6/23/2025 Morning    zinc 50 MG TABS Take 50 mg by mouth daily   6/22/2025 Evening    blood glucose (ONETOUCH ULTRA TEST) test strip USE TO TEST BLOOD SUGAR ONE TIME DAILY OR AS DIRECTED. 100 strip 2     insulin pen needle (ULTICARE SHORT) 31G X 8 MM miscellaneous use 1 pen daily for injection 100 each 2           Review of Systems    The 10  point Review of Systems is negative other than noted in the HPI or here.     Social History   I have reviewed this patient's social history and updated it with pertinent information if needed.  Social History     Tobacco Use    Smoking status: Never    Smokeless tobacco: Never   Vaping Use    Vaping status: Never Used   Substance Use Topics    Alcohol use: Yes     Comment: Rare    Drug use: Not Currently     Types: Marijuana         Family History   I have reviewed this patient's family history and updated it with pertinent information if needed.  Family History   Problem Relation Age of Onset    Family History Negative Mother          in her 80's    Family History Negative Father          accidentally in boating accident.         Allergies   No Known Allergies  ------------------------------------------------------------------------     Physical Exam   Vital Signs: Temp: 97  F (36.1  C) Temp src: Temporal BP: (!) 159/70 Pulse: 70   Resp: 16 SpO2: 99 % O2 Device: None (Room air) Oxygen Delivery: 2 LPM  Weight: 190 lbs 14.4 oz    Constitutional: awake, alert, cooperative, no apparent distress.   ENT: Normocephalic, without obvious abnormality, atraumati  Respiratory: CTABL   Cardiovascular: RRR with no m/r/g   GI: Normal bowel sounds, soft, non-distended, appropriately tender around incision sight.   : 1 GERI drain present in right flank.   Skin: normal skin color, texture, turgor   Musculoskeletal: There is no redness, warmth, or swelling of the joints. Full range of motion noted.   Neurologic: Awake, alert, oriented to name, place and time. Aleksey. Motor is 5 out of 5 bilaterally. Sensory is intact.   Neuropsychiatric: normal mood and affect    ----------------------------------------------------------------------------------------------------------------------------------    Medical Decision Making       55 MINUTES SPENT BY ME on the date of service doing chart review, history, exam, documentation & further  activities per the note.      Data   ------------------------- PAST 24 HR DATA REVIEWED -----------------------------------------------    I have personally reviewed the following data over the past 24 hrs:    N/A  \   N/A   / N/A     N/A N/A N/A /  197 (H)   4.3 N/A 2.26 (H) \       Imaging results reviewed over the past 24 hrs:   No results found for this or any previous visit (from the past 24 hours).  ------------------------- ENCOUNTER LABS ----------------------------------------------------------------  Recent Labs   Lab 06/23/25 2145 06/23/25  1800 06/23/25  1531 06/23/25  1022   POTASSIUM  --   --   --  4.3   CR  --   --   --  2.26*   * 215* 207* 144*       Most Recent 3 CBC's:  Recent Labs   Lab Test 06/03/25  1558 07/22/24  1040 01/19/24  1315   WBC 8.0 8.8 8.0   HGB 13.5 13.3 10.7*   MCV 92 94 96    264 285     Most Recent 3 BMP's:  Recent Labs   Lab Test 06/23/25 2145 06/23/25  1800 06/23/25  1531 06/23/25  1022 06/03/25  1558 04/03/25  0942 03/21/25  1510 12/10/24  1620   NA  --   --   --   --  138 137  --  136   POTASSIUM  --   --   --  4.3 4.3 4.0  --  4.2   CHLORIDE  --   --   --   --  101 100  --  100   CO2  --   --   --   --  24 24  --  22   BUN  --   --   --   --  35.8* 27.3*  --  31.7*   CR  --   --   --  2.26* 2.34* 2.00*   < > 2.31*   ANIONGAP  --   --   --   --  13 13  --  14   HARPER  --   --   --   --  9.4 9.3  --  8.9   * 215* 207* 144* 153* 111*  --  140*    < > = values in this interval not displayed.     Most Recent 2 LFT's:  Recent Labs   Lab Test 04/03/25  0942 03/22/24  0931   AST 17 14   ALT 14 10   ALKPHOS 94 100   BILITOTAL 0.4 0.3     Most Recent 3 INR's:  Recent Labs   Lab Test 04/06/20  1154 04/03/20  0604 03/27/20  1348   INR 1.17* 1.23* 1.20*     Most Recent 3 Troponin's:  Recent Labs   Lab Test 05/24/20  1830 05/07/20  1841 05/06/20  0214   TROPI <0.015 <0.015 <0.015     Most Recent 3 BNP's:  Recent Labs   Lab Test 04/06/21  0910 11/12/20  0905  10/23/20  1031 06/02/20  0809 05/24/20  1830 05/20/20  1645 05/07/20  1841 05/06/20  0214   NTBNPI  --   --   --   --  4,059*  --  9,653* 8,995*   NTBNP 1,985* 1,825* 1,839*   < >  --    < >  --   --     < > = values in this interval not displayed.     Most Recent D-dimer:No lab results found.  Most Recent Cholesterol Panel:  Recent Labs   Lab Test 04/03/25  0942   CHOL 150   LDL 81   HDL 33*   TRIG 179*     Most Recent 6 Bacteria Isolates From Any Culture (See EPIC Reports for Culture Details):  Recent Labs   Lab Test 01/21/21  1054 12/21/20  0840 10/17/20  1257 10/10/20  1305 05/08/20  0910 05/07/20  1928   CULT 50,000 to 100,000 colonies/mL  Staphylococcus epidermidis  * >100,000 colonies/mL  Staphylococcus epidermidis  *  <10,000 colonies/mL  urogenital sriram  Susceptibility testing not routinely done   50,000 to 100,000 colonies/mL  Candida tropicalis  Susceptibility testing not routinely done  * 10,000 to 50,000 colonies/mL  Candida tropicalis  Susceptibility testing not routinely done  * Moderate growth  Normal sriram    Heavy growth  Farzaneh tropicalis  Susceptibility testing not routinely done  * No growth  No growth     Most Recent TSH and T4:  Recent Labs   Lab Test 04/03/25  0942 06/02/20  0809 05/05/20  1017   TSH 1.52   < > 1.96   T4  --   --  1.32    < > = values in this interval not displayed.     Most Recent Hemoglobin A1c:  Recent Labs   Lab Test 04/03/25  0942   A1C 6.1*     Most Recent 6 glucoses:  Recent Labs   Lab Test 06/23/25  2145 06/23/25  1800 06/23/25  1531 06/23/25  1022 06/03/25  1558 04/03/25  0942   * 215* 207* 144* 153* 111*     Most Recent Urinalysis:  Recent Labs   Lab Test 04/01/25  1538 09/10/24  1456 07/22/24  1115   COLOR Yellow   < > Light Yellow   APPEARANCE Cloudy*   < > Clear   URINEGLC Negative   < > Negative   URINEBILI Negative   < > Negative   URINEKETONE Negative   < > Negative   SG 1.015   < > 1.009   UBLD Small*   < > Negative   URINEPH 6.5   < > 6.0    PROTEIN 30*   < > Negative   UROBILINOGEN 0.2   < >  --    NITRITE Negative   < > Negative   LEUKEST Large*   < > Large*   RBCU  --   --  1   WBCU  --   --  19*    < > = values in this interval not displayed.     Most Recent ESR & CRP:  Recent Labs   Lab Test 06/02/20  0809   SED 85*   CRP 34.9*

## 2025-06-24 NOTE — DISCHARGE SUMMARY
Discharge    Patient discharged to Home via [facility] with Wife  Care plan note: See Below    Listed belongings gathered and given to patient (including from security/pharmacy). Yes  Care Plan and Patient education resolved: Yes  Prescriptions if needed, hard copies sent with patient  NA  Medication Bin checked and emptied on discharge Yes  SW/care coordinator/charge RN aware of discharge: Yes    Orientation; AO x4  Pain: Oxycodone x2; Scheduled Tylenol   Vitals/Tele: VSS RA  IV Access/drains: PIV removed  Diet: regular, tolerating   Mobility: SBA  GI/: Chronic Mckenzie; Passing gas, no BM   Wound/Skin: ABD Incisions   Consults: Urology   Discharge Plan: This afternoon      See Flow sheets for assessment

## 2025-06-24 NOTE — PROGRESS NOTES
"  Urology Progress Note    Name: Gene Pagan    MRN: [id number]   YOB: 1945  Date of Admission: 6/23/2025               Impression and Plan:   Impression / Plan:   Gene Pagan is a 80 year old male with Hx of enlarging R renal mass now POD1 robotic assisted laparoscopic partial nephrectomy performed by Dr. Perez.     Had some Jello, tolerated well without N/V, reports not feeling hungry but doing well otherwise. Passing flatus. No BM. AVSS. NAEO. Incision site pain & some pain w breathing well controlled with Oxy. No CP/SOB. Ambulating.     Exam  /66 (BP Location: Right arm)   Pulse 72   Temp 97.7  F (36.5  C) (Oral)   Resp 16   Ht 1.727 m (5' 8\")   Wt 86.6 kg (190 lb 14.4 oz)   SpO2 97%   BMI 29.03 kg/m    No acute distress  Unlabored breathing  Abdomen soft, nontender, nondistended. Incisions CDI  Brooks drain OP serosanguinous  Baker with yellow clear urine in tubing    /700  GERI 130/305    Labs  WBC 12.5  Hgb 11.1  Cr 2.59        -Will advance to regular diet.   -After tolerating discharge diet anticipate discharge home later today with brooks drain and baker in place.   -Plan to resume PTA Eliquis Thursday.     Discussed with Dr. Perez  Thank you for the opportunity to participate in the care of Gene Pagan.     LUIS Borja, CNP  [facility] - Department of Urology  Office: [phone]  After business hours: [phone]  Securely message with [employer]    "

## 2025-06-24 NOTE — PROGRESS NOTES
MD Notification    Notified Person: MD    Notified Person Name: Dr. Rosado     Notification Date/Time: 12AM     Notification Interaction: Telephone with readback     Purpose of Notification: Pt seems to be having bladder spasms, bypassing catheter at times. Urine does not seem to be flowing in tubing. Can we get irrigation orders for baker?     Orders Received: Irrigation orders added and PRN oxybutynin ordered     Comments:

## 2025-06-24 NOTE — ANESTHESIA POSTPROCEDURE EVALUATION
Patient: Gene Pagan    Procedure: Procedure(s):  RIGHT ROBOTIC ASSISTED LAPAROSOCPIC PARTIAL NEPHRECTOMY WITH INTRA-OPERATIVE RENAL ULTRASOUND,       Anesthesia Type:  General    Note:  Disposition: Admission   Postop Pain Control: Uneventful            Sign Out: Well controlled pain   PONV: No   Neuro/Psych: Uneventful            Sign Out: Acceptable/Baseline neuro status   Airway/Respiratory: Uneventful            Sign Out: Acceptable/Baseline resp. status   CV/Hemodynamics: Uneventful            Sign Out: Acceptable CV status   Other NRE: NONE   DID A NON-ROUTINE EVENT OCCUR? No           Last vitals:  Vitals Value Taken Time   /72 06/23/25 16:15   Temp 36.1  C (97  F) 06/23/25 16:10   Pulse 70 06/23/25 16:31   Resp 23 06/23/25 16:31   SpO2 100 % 06/23/25 16:29   Vitals shown include unfiled device data.    Electronically Signed By: German Miller MD  June 23, 2025  7:12 PM

## 2025-06-24 NOTE — DISCHARGE SUMMARY
Urology Discharge Summary    Patient: Gene Pagan    MRN: [id number]   : 1945         Date of Admission:  2025   Date of Discharge::  2025  Admitting Physician:  Cheikh Perez MD  Discharge Provider:  LUIS Borja CNP          Admission Diagnoses:   Right renal mass [N28.89]  Past Medical History:   Diagnosis Date    Anemia     Atrial fibrillation     AV node ablation and pacemaker placement 4/10/2020    BPH (benign prostatic hyperplasia)     Chronic diastolic CHF     Chronic kidney disease (CKD), stage III (moderate) (H)     Diabetes mellitus - type 2     Palpitations     Systolic CHF (H)              Discharge Diagnosis:     Right renal mass [N28.89]  Past Medical History:   Diagnosis Date    Anemia     Atrial fibrillation     AV node ablation and pacemaker placement 4/10/2020    BPH (benign prostatic hyperplasia)     Chronic diastolic CHF     Chronic kidney disease (CKD), stage III (moderate) (H)     Diabetes mellitus - type 2     Palpitations     Systolic CHF (H)             Procedures:     Procedure(s): AL LAP,PARTIAL NEPHRECTOMY [53474] (RIGHT ROBOTIC ASSISTED LAPAROSOCPIC PARTIAL NEPHRECTOMY WITH INTRA-OPERATIVE RENAL ULTRASOUND,)  AL PARTIAL REMOVAL OF KIDNEY [07962] (POSSIBLE OPEN)             Medications Prior to Admission:     Medications Prior to Admission   Medication Sig Dispense Refill Last Dose/Taking    apixaban ANTICOAGULANT (ELIQUIS ANTICOAGULANT) 2.5 MG tablet Take 1 tablet (2.5 mg) by mouth 2 times daily. 180 tablet 3 Past Week    bumetanide (BUMEX) 0.5 MG tablet Take 1 tablet (0.5 mg) by mouth daily (+ additional 1 mg to = 1.5 mg daily). 90 tablet 3 2025 Morning    bumetanide (BUMEX) 1 MG tablet Take 1 tablet (1 mg) by mouth daily. (+ additional 0.5 mg to = 1.5 mg daily). 90 tablet 3 2025 Morning    finasteride (PROSCAR) 5 MG tablet Take 1 tablet (5 mg) by mouth daily. 90 tablet 3 Past Week    insulin glargine (LANTUS SOLOSTAR) 100 UNIT/ML pen Inject  13 Units subcutaneously at bedtime. 15 mL 3 6/22/2025 Evening    melatonin 3 MG tablet Take 3 mg by mouth nightly as needed   6/22/2025 Evening    metoprolol succinate ER (TOPROL XL) 25 MG 24 hr tablet Take 0.5 tablets (12.5 mg) by mouth 2 times daily. 90 tablet 3 6/22/2025    Multiple Vitamin (MULTIVITAMIN ADULT) TABS Take 1 tablet by mouth daily.   6/23/2025 Morning    tamsulosin (FLOMAX) 0.4 MG capsule Take 2 capsules (0.8 mg) by mouth daily. 180 capsule 4 6/22/2025 Evening    vitamin C (ASCORBIC ACID) 1000 MG TABS Take 1,000 mg by mouth daily.   6/23/2025 Morning    zinc 50 MG TABS Take 50 mg by mouth daily   6/22/2025 Evening    blood glucose (ONETOUCH ULTRA TEST) test strip USE TO TEST BLOOD SUGAR ONE TIME DAILY OR AS DIRECTED. 100 strip 2     insulin pen needle (ULTICARE SHORT) 31G X 8 MM miscellaneous use 1 pen daily for injection 100 each 2              Discharge Medications:     Current Discharge Medication List        START taking these medications    Details   docusate sodium (COLACE) 100 MG tablet Take 1 tablet (100 mg) by mouth daily.  Qty: 60 tablet, Refills: 1    Associated Diagnoses: Right renal mass      oxyCODONE (ROXICODONE) 5 MG tablet Take 1 tablet (5 mg) by mouth every 6 hours as needed for pain.  Qty: 9 tablet, Refills: 0    Associated Diagnoses: Right renal mass           CONTINUE these medications which have NOT CHANGED    Details   apixaban ANTICOAGULANT (ELIQUIS ANTICOAGULANT) 2.5 MG tablet Take 1 tablet (2.5 mg) by mouth 2 times daily.  Qty: 180 tablet, Refills: 3    Associated Diagnoses: Atrial fibrillation, unspecified type (H)      !! bumetanide (BUMEX) 0.5 MG tablet Take 1 tablet (0.5 mg) by mouth daily (+ additional 1 mg to = 1.5 mg daily).  Qty: 90 tablet, Refills: 3    Associated Diagnoses: Chronic combined systolic and diastolic heart failure (H)      !! bumetanide (BUMEX) 1 MG tablet Take 1 tablet (1 mg) by mouth daily. (+ additional 0.5 mg to = 1.5 mg daily).  Qty: 90 tablet,  Refills: 3    Associated Diagnoses: Chronic combined systolic and diastolic heart failure (H)      finasteride (PROSCAR) 5 MG tablet Take 1 tablet (5 mg) by mouth daily.  Qty: 90 tablet, Refills: 3    Associated Diagnoses: Benign prostatic hyperplasia with urinary retention      insulin glargine (LANTUS SOLOSTAR) 100 UNIT/ML pen Inject 13 Units subcutaneously at bedtime.  Qty: 15 mL, Refills: 3    Comments: If Lantus is not covered by insurance, may substitute Basaglar or Semglee or other insulin glargine product per insurance preference at same dose and frequency.    Associated Diagnoses: Type 2 diabetes mellitus with other specified complication, unspecified whether long term insulin use (H)      melatonin 3 MG tablet Take 3 mg by mouth nightly as needed      metoprolol succinate ER (TOPROL XL) 25 MG 24 hr tablet Take 0.5 tablets (12.5 mg) by mouth 2 times daily.  Qty: 90 tablet, Refills: 3    Associated Diagnoses: Chronic combined systolic and diastolic heart failure (H)      Multiple Vitamin (MULTIVITAMIN ADULT) TABS Take 1 tablet by mouth daily.      tamsulosin (FLOMAX) 0.4 MG capsule Take 2 capsules (0.8 mg) by mouth daily.  Qty: 180 capsule, Refills: 4    Associated Diagnoses: Benign prostatic hyperplasia with urinary retention      vitamin C (ASCORBIC ACID) 1000 MG TABS Take 1,000 mg by mouth daily.      zinc 50 MG TABS Take 50 mg by mouth daily      blood glucose (ONETOUCH ULTRA TEST) test strip USE TO TEST BLOOD SUGAR ONE TIME DAILY OR AS DIRECTED.  Qty: 100 strip, Refills: 2    Associated Diagnoses: Type 2 diabetes mellitus with stage 4 chronic kidney disease, with long-term current use of insulin (H)      insulin pen needle (ULTICARE SHORT) 31G X 8 MM miscellaneous use 1 pen daily for injection  Qty: 100 each, Refills: 2    Associated Diagnoses: Type 2 diabetes mellitus with stage 4 chronic kidney disease, with long-term current use of insulin (H)       !! - Potential duplicate medications found. Please  "discuss with provider.                Consultations:   Consultation during this admission received:   HOSPITALIST IP CONSULT          Brief History of Illness:   Reason for admission requiring a surgical or invasive procedure:   Right renal mass [N28.89]   The patient underwent the following procedure(s):   See above   There were no immediate complications during this procedure.    Please refer to the full operative summary for details.           Hospital Course:   The patient's hospital course was unremarkable.  Gene Pagan recovered as anticipated and experienced no post-operative complications.      On POD 1 he was ambulating without assitance, tolerating the discharge diet, had pain controlled with PO medications to go home with, and was requiring no IV medications or fluids. He was discharged to home with appropriate contact information, follow-up and instructions as seen below in the discharge paperwork.         Discharge Labs:     No results found for: \"PSA\"  Recent Labs   Lab 06/24/25  0703   WBC 12.5*   HGB 11.1*          Recent Labs   Lab 06/24/25  0929 06/24/25  0703 06/23/25  1531 06/23/25  1022   NA  --  138  --   --    POTASSIUM  --  4.7  --  4.3   CHLORIDE  --  102  --   --    CO2  --  22  --   --    BUN  --  33.4*  --   --    CR  --  2.59*  --  2.26*   * 131*   < > 144*   HARPER  --  8.7*  --   --     < > = values in this interval not displayed.     No lab results found in last 7 days.    Invalid input(s): \"URINEBLOOD\"  Results for orders placed or performed in visit on 04/01/25   Urine Culture Aerobic Bacterial [[id number]]    Collection Time: 04/01/25  3:39 PM    Specimen: Urine, Straight Catheter   Result Value Ref Range    Culture 10,000-50,000 CFU/mL Gram positive cocci (A)     Culture 10,000-50,000 CFU/mL Gram positive cocci (A)     Culture 10,000-50,000 CFU/mL Gram positive cocci (A)     Culture 10,000-50,000 CFU/mL Gram positive cocci (A)             Discharge Instructions and " Follow-Up:      POSTOPERATIVE INSTRUCTIONS     Diagnosis-------------------------------   RIGHT renal mass     Procedure-------------------------------  Procedure(s) (LRB):  RIGHT ROBOTIC ASSISTED LAPAROSOCPIC PARTIAL NEPHRECTOMY WITH INTRA-OPERATIVE RENAL ULTRASOUND, (Right)        Findings--------------------------------  RIGHT renal mass removed      Home-going instructions-----------------         Activity Limitation:     - No driving or operating heavy machinery while on narcotic pain medication.   - No strenuous exercise for 6 weeks.   - No lifting, pushing, pulling more than 10 pounds for 6 weeks. Take care when pushing with your arms to stand up.   - Do not strain your belly area. When you bend, sit up or twice, you could strain the area around your incision.   - Do not strain with bowel movements.   - Do not drive until you can press the brake pedal quickly and fully without pain.   - Do not operate a motor vehicle while taking narcotic pain medications     FOLLOW THESE INSTRUCTIONS AS INDICATED BELOW:  - Observe operative area for signs of excessive bleeding.  - You may shower.  - Increase fluid intake to promote clear urine.  - Resume usual diet as tolerated     What to expect while recovering----------- WOUND CARE:  - You may shower and get incisions wet starting 48 hrs after surgery.  - Do not scrub incisions or submerge wounds (aka, bath, pool, hot tub, etc.) for 2 weeks or until wounds have healed   - Remove wound dressing 48 hours after surgery if already not removed.   - If purple dermabond glue was used, avoid applying any lotions or ointments.   - Leave incision open to air. Cover with gauze only if needed for comfort or to protect clothing from drainage.     Discharge Medications/instructions:   1) PAIN: Oxycodone is a narcotic medication that has been prescribed for pain. Narcotics will cause sleepiness and constipation, therefore it is best to stop or reduce them as soon as you can and switch  "to using acetaminophen (Tylenol) and/or ibuprofen (Advil/Motrin), taken as directed on the packaging. Keep in mind that certain narcotics can contain acetaminophen, also called \"APAP\" on prescription bottles. Do not take more than 4,000mg of Tylenol (acetaminophen/ APAP) from all sources in any 24 hour period since this can cause liver damage. Never drive, operate machinery or drink alcoholic beverages while you are taking narcotic pain medications.      2) CONSTIPATION: Pericolace (senna/docusate sodium) can be taken twice daily for prevention of constipation since surgery, pain medications and bladder spasm medications can all make you constipated. Please reduce or stop pericolace if you develop loose stools. Other over the counter solutions such as prune juice, miralax, fiber products, senna, and dulcolax can also be used. If you are taking the pericolace but still have not had a bowel movement in 3 days, start over-the-counter Milk of Magnesia taken twice daily until you have a nice bowel movement. Call the office with any concerns.      3) ELIQUIS: You may resume your Eliquis on Thursday 6/26/2025     4) You are going home with a Brooks drain after your kidney surgery. This drain helps remove extra fluid from the area where you had surgery. Taking care of your drain is important for your recovery and to help prevent problems like infection or fluid buildup.     What to Expect:   The drain is a soft, flexible tube connected to a small bulb or container.   You may have some fluid draining for several days. The amount and color may change as you heal. It is normal for the fluid to be pink, yellow, or clear.   Most patients have the drain removed within a few days, but the exact timing depends on your recovery and the amount of fluid.     How to Care for Your Drain:  1. Empty the Drain:   Empty the bulb at least twice a day, or whenever it is half full.   Wash your hands before and after handling the drain.   Open " the plug, squeeze the fluid into a measuring cup, and record the amount and color.   Squeeze the bulb flat before closing the plug to keep gentle suction.     2. Keep the Area Clean:   You may shower daily. Let soap and water run over the drain site, then gently pat dry.   Do not soak in a bathtub or swim until the drain is removed.     3. Check for Problems:   Watch for signs of infection: redness, swelling, warmth, pus, or a bad smell at the drain site.   Call your care team if you have a fever over 101 F, sudden increase in drainage, blood in the drain, or if the drain falls out.        Questions/concerns------------------------  [facility]: [phone]     Future appointments  You are scheduled for follow up on 7/7/2025 with Dr. Perez.    Our office will reach out to you about scheduling a visit for drain removal.     Phone numbers:  - Nursing phone helpline at the Urology Clinic (8A-4:30P M-F):  [phone]. Call for questions, requests for medication refills, or to schedule or confirm an appointment.  - Nights, weekends, or holidays, call [phone] and ask the  to page the [facility] urologist on call. Typically, the on-call provider should return your call within 1 hr.  Please page the on-call provider again if you haven't been contacted as expected.   - For emergencies, always call [phone]          Discharge Disposition:     Discharged to home      Attestation: I have reviewed today's vital signs, notes, medications, labs and imaging.    LUIS Borja CNP  [facility] - Department of Urology  Office: [phone]  After business hours: [phone]  Securely message with [employer]  June 24, 2025

## 2025-06-24 NOTE — PLAN OF CARE
Date & Time: 1900-0700  Surgery/POD#: POD#1 RIGHT ROBOTIC ASSISTED LAPAROSOCPIC PARTIAL NEPHRECTOMY WITH INTRA-OPERATIVE RENAL ULTRASOUND  Behavior & Aggression: Green  Fall Risk: Yes  Orientation: AOx4  ABNL VS/O2: VSS on RA ex HTN  ABNL Labs: See Chart  Pain Management: PRN oxy x1, scheduled meds. PRN IV dilaudid prior to irrigation of baker  Bowel/Bladder: Chronic Baker. Passing gas, no BM   Drains: PIV SL  Wounds/incisions: x4 Lap sites. GERI drain with bloody output- changed dressing x1  Diet: Clears  Number of times OUT OF BED this shift: Up SBA. Ambulated in halls x2  Anticipated  DC Date: Pending   Significant Information: Pt had one emesis occurrence earlier in shift, PRN Zofran given x1 with relief, denied since. Pt reported to have bladder spasms and bypass catheter. Bladder scanned for 244 with no urine in baker tube. Baker then was draining with few clots in bag, only irrigated once after to ensure no further clots which there was not

## 2025-06-26 ENCOUNTER — ALLIED HEALTH/NURSE VISIT (OUTPATIENT)
Dept: UROLOGY | Facility: CLINIC | Age: 80
End: 2025-06-26
Payer: COMMERCIAL

## 2025-06-26 DIAGNOSIS — N28.89 RIGHT RENAL MASS: Primary | ICD-10-CM

## 2025-06-26 LAB
PATH REPORT.COMMENTS IMP SPEC: ABNORMAL
PATH REPORT.COMMENTS IMP SPEC: YES
PATH REPORT.FINAL DX SPEC: ABNORMAL
PATH REPORT.GROSS SPEC: ABNORMAL
PATH REPORT.MICROSCOPIC SPEC OTHER STN: ABNORMAL
PATH REPORT.RELEVANT HX SPEC: ABNORMAL
PATHOLOGY SYNOPTIC REPORT: ABNORMAL
PHOTO IMAGE: ABNORMAL

## 2025-06-26 PROCEDURE — 88342 IMHCHEM/IMCYTCHM 1ST ANTB: CPT | Mod: 26 | Performed by: PATHOLOGY

## 2025-06-26 PROCEDURE — 88307 TISSUE EXAM BY PATHOLOGIST: CPT | Mod: 26 | Performed by: PATHOLOGY

## 2025-06-26 PROCEDURE — 88341 IMHCHEM/IMCYTCHM EA ADD ANTB: CPT | Mod: 26 | Performed by: PATHOLOGY

## 2025-06-26 NOTE — PROGRESS NOTES
Gene Pagan comes into clinic today at the request of Dr. Perez Ordering Provider for Brooks Drain Removal.    Pt presents to clinic today for Brooks Drain removal.  Pt states drain came out in the middle of the night and brought to clinic in bag, pt's stitch was still intact. This was removed with ease. New dressing was placed over drain site      This service provided today was under the supervising provider of the day Dr. Ware, who was available if needed.    Vanessa Jacome, CMA

## 2025-06-29 ENCOUNTER — HOSPITAL ENCOUNTER (EMERGENCY)
Facility: CLINIC | Age: 80
Discharge: HOME | End: 2025-06-30
Attending: EMERGENCY MEDICINE | Admitting: EMERGENCY MEDICINE
Payer: COMMERCIAL

## 2025-06-29 DIAGNOSIS — N39.0 URINARY TRACT INFECTION ASSOCIATED WITH INDWELLING URETHRAL CATHETER, INITIAL ENCOUNTER: ICD-10-CM

## 2025-06-29 DIAGNOSIS — N18.9 CHRONIC RENAL IMPAIRMENT, UNSPECIFIED CKD STAGE: ICD-10-CM

## 2025-06-29 DIAGNOSIS — T83.091A OBSTRUCTION OF FOLEY CATHETER, INITIAL ENCOUNTER: ICD-10-CM

## 2025-06-29 DIAGNOSIS — T83.511A URINARY TRACT INFECTION ASSOCIATED WITH INDWELLING URETHRAL CATHETER, INITIAL ENCOUNTER: ICD-10-CM

## 2025-06-29 PROCEDURE — 51798 US URINE CAPACITY MEASURE: CPT

## 2025-06-29 PROCEDURE — 99284 EMERGENCY DEPT VISIT MOD MDM: CPT | Mod: 25

## 2025-06-29 PROCEDURE — 51702 INSERT TEMP BLADDER CATH: CPT

## 2025-06-29 ASSESSMENT — COLUMBIA-SUICIDE SEVERITY RATING SCALE - C-SSRS
1. IN THE PAST MONTH, HAVE YOU WISHED YOU WERE DEAD OR WISHED YOU COULD GO TO SLEEP AND NOT WAKE UP?: NO
6. HAVE YOU EVER DONE ANYTHING, STARTED TO DO ANYTHING, OR PREPARED TO DO ANYTHING TO END YOUR LIFE?: NO
2. HAVE YOU ACTUALLY HAD ANY THOUGHTS OF KILLING YOURSELF IN THE PAST MONTH?: NO

## 2025-06-30 ENCOUNTER — DOCUMENTATION ONLY (OUTPATIENT)
Dept: OTHER | Facility: CLINIC | Age: 80
End: 2025-06-30

## 2025-06-30 ENCOUNTER — RESULTS FOLLOW-UP (OUTPATIENT)
Dept: SURGERY | Facility: CLINIC | Age: 80
End: 2025-06-30

## 2025-06-30 VITALS
DIASTOLIC BLOOD PRESSURE: 66 MMHG | RESPIRATION RATE: 16 BRPM | HEART RATE: 71 BPM | TEMPERATURE: 97.7 F | SYSTOLIC BLOOD PRESSURE: 135 MMHG | OXYGEN SATURATION: 99 %

## 2025-06-30 LAB
ALBUMIN UR-MCNC: 50 MG/DL
ANION GAP SERPL CALCULATED.3IONS-SCNC: 14 MMOL/L (ref 7–15)
APPEARANCE UR: ABNORMAL
BASOPHILS # BLD AUTO: 0 10E3/UL (ref 0–0.2)
BASOPHILS NFR BLD AUTO: 0 %
BILIRUB UR QL STRIP: NEGATIVE
BUN SERPL-MCNC: 32.6 MG/DL (ref 8–23)
CALCIUM SERPL-MCNC: 9 MG/DL (ref 8.8–10.4)
CHLORIDE SERPL-SCNC: 103 MMOL/L (ref 98–107)
COLOR UR AUTO: ABNORMAL
CREAT SERPL-MCNC: 2.43 MG/DL (ref 0.67–1.17)
EGFRCR SERPLBLD CKD-EPI 2021: 26 ML/MIN/1.73M2
EOSINOPHIL # BLD AUTO: 0.2 10E3/UL (ref 0–0.7)
EOSINOPHIL NFR BLD AUTO: 2 %
ERYTHROCYTE [DISTWIDTH] IN BLOOD BY AUTOMATED COUNT: 13.7 % (ref 10–15)
GLUCOSE SERPL-MCNC: 115 MG/DL (ref 70–99)
GLUCOSE UR STRIP-MCNC: NEGATIVE MG/DL
HCO3 SERPL-SCNC: 21 MMOL/L (ref 22–29)
HCT VFR BLD AUTO: 32.6 % (ref 40–53)
HGB BLD-MCNC: 10.6 G/DL (ref 13.3–17.7)
HGB UR QL STRIP: ABNORMAL
IMM GRANULOCYTES # BLD: 0.1 10E3/UL
IMM GRANULOCYTES NFR BLD: 1 %
KETONES UR STRIP-MCNC: NEGATIVE MG/DL
LEUKOCYTE ESTERASE UR QL STRIP: ABNORMAL
LYMPHOCYTES # BLD AUTO: 1.9 10E3/UL (ref 0.8–5.3)
LYMPHOCYTES NFR BLD AUTO: 19 %
MCH RBC QN AUTO: 29 PG (ref 26.5–33)
MCHC RBC AUTO-ENTMCNC: 32.5 G/DL (ref 31.5–36.5)
MCV RBC AUTO: 89 FL (ref 78–100)
MONOCYTES # BLD AUTO: 0.9 10E3/UL (ref 0–1.3)
MONOCYTES NFR BLD AUTO: 10 %
NEUTROPHILS # BLD AUTO: 6.4 10E3/UL (ref 1.6–8.3)
NEUTROPHILS NFR BLD AUTO: 67 %
NITRATE UR QL: NEGATIVE
NRBC # BLD AUTO: 0 10E3/UL
NRBC BLD AUTO-RTO: 0 /100
PH UR STRIP: 5.5 [PH] (ref 5–7)
PLATELET # BLD AUTO: 333 10E3/UL (ref 150–450)
POTASSIUM SERPL-SCNC: 3.9 MMOL/L (ref 3.4–5.3)
RBC # BLD AUTO: 3.65 10E6/UL (ref 4.4–5.9)
RBC URINE: >182 /HPF
SODIUM SERPL-SCNC: 138 MMOL/L (ref 135–145)
SP GR UR STRIP: 1.02 (ref 1–1.03)
UROBILINOGEN UR STRIP-MCNC: NORMAL MG/DL
WBC # BLD AUTO: 9.6 10E3/UL (ref 4–11)
WBC CLUMPS #/AREA URNS HPF: PRESENT /HPF
WBC URINE: 168 /HPF

## 2025-06-30 PROCEDURE — 36415 COLL VENOUS BLD VENIPUNCTURE: CPT | Performed by: EMERGENCY MEDICINE

## 2025-06-30 PROCEDURE — 96365 THER/PROPH/DIAG IV INF INIT: CPT

## 2025-06-30 PROCEDURE — 250N000011 HC RX IP 250 OP 636: Performed by: EMERGENCY MEDICINE

## 2025-06-30 PROCEDURE — 81001 URINALYSIS AUTO W/SCOPE: CPT | Performed by: EMERGENCY MEDICINE

## 2025-06-30 PROCEDURE — 85025 COMPLETE CBC W/AUTO DIFF WBC: CPT | Performed by: EMERGENCY MEDICINE

## 2025-06-30 PROCEDURE — 87186 SC STD MICRODIL/AGAR DIL: CPT | Performed by: EMERGENCY MEDICINE

## 2025-06-30 PROCEDURE — 80048 BASIC METABOLIC PNL TOTAL CA: CPT | Performed by: EMERGENCY MEDICINE

## 2025-06-30 RX ORDER — CIPROFLOXACIN 500 MG/1
500 TABLET, FILM COATED ORAL DAILY
Qty: 7 TABLET | Refills: 0 | Status: SHIPPED | OUTPATIENT
Start: 2025-06-30 | End: 2025-06-30

## 2025-06-30 RX ORDER — CIPROFLOXACIN 500 MG/1
500 TABLET, FILM COATED ORAL DAILY
Qty: 7 TABLET | Refills: 0 | Status: SHIPPED | OUTPATIENT
Start: 2025-06-30 | End: 2025-07-03

## 2025-06-30 RX ORDER — CEFTRIAXONE 1 G/1
1 INJECTION, POWDER, FOR SOLUTION INTRAMUSCULAR; INTRAVENOUS ONCE
Status: COMPLETED | OUTPATIENT
Start: 2025-06-30 | End: 2025-06-30

## 2025-06-30 RX ADMIN — CEFTRIAXONE 1 G: 1 INJECTION, POWDER, FOR SOLUTION INTRAMUSCULAR; INTRAVENOUS at 02:19

## 2025-06-30 ASSESSMENT — ACTIVITIES OF DAILY LIVING (ADL)
ADLS_ACUITY_SCORE: 51

## 2025-06-30 NOTE — ED PROVIDER NOTES
Emergency Department Note      History of Present Illness     Chief Complaint   Catheter Problem      MANNY Pagan is a 80 year old male presents with concerns of catheter malfunction.  Patient was recently admitted for right partial nephrectomy for a renal mass.  During that hospitalization he had a Mckenzie catheter that was inserted and remained.  He was discharged home in good condition.  He has a history of atrial fibrillation and was off Eliquis up until 3 days prior.  He reinitiated Eliquis although did not take it today.  Yesterday he noted some mild blood in the urine.  Today the Mckenzie catheter stopped draining.  He has mild lower abdominal discomfort.  He denies any fever, nausea or vomiting.    Independent Historian   None    Review of External Notes   I reviewed discharge summary from 6/24/2025 in which patient was seen for a right renal mass status post partial nephrectomy with robot assisted laparoscopic partial nephrectomy with intraoperative renal ultrasound.    Past Medical History     Medical History and Problem List   Past Medical History:   Diagnosis Date    Anemia     Atrial fibrillation     BPH (benign prostatic hyperplasia)     Chronic diastolic CHF     Chronic kidney disease (CKD), stage III (moderate) (H)     Diabetes mellitus - type 2     Palpitations     Systolic CHF (H)        Medications   apixaban ANTICOAGULANT (ELIQUIS ANTICOAGULANT) 2.5 MG tablet  blood glucose (ONETOUCH ULTRA TEST) test strip  bumetanide (BUMEX) 0.5 MG tablet  bumetanide (BUMEX) 1 MG tablet  docusate sodium (COLACE) 100 MG tablet  finasteride (PROSCAR) 5 MG tablet  insulin glargine (LANTUS SOLOSTAR) 100 UNIT/ML pen  insulin pen needle (ULTICARE SHORT) 31G X 8 MM miscellaneous  melatonin 3 MG tablet  metoprolol succinate ER (TOPROL XL) 25 MG 24 hr tablet  Multiple Vitamin (MULTIVITAMIN ADULT) TABS  tamsulosin (FLOMAX) 0.4 MG capsule  vitamin C (ASCORBIC ACID) 1000 MG TABS  zinc 50 MG TABS        Surgical  History   Past Surgical History:   Procedure Laterality Date    ANESTHESIA CARDIOVERSION N/A 4/6/2020    Procedure: ANESTHESIA, FOR CARDIOVERSION;  Surgeon: GENERIC ANESTHESIA PROVIDER;  Location: [facility] OR    CYSTOSCOPY      DAVINCI NEPHRECTOMY PARTIAL Right 6/23/2025    Procedure: RIGHT ROBOTIC ASSISTED LAPAROSOCPIC PARTIAL NEPHRECTOMY WITH INTRA-OPERATIVE RENAL ULTRASOUND,;  Surgeon: Cheikh Perez MD;  Location:  OR    EP PACEMAKER N/A 4/10/2020    Procedure: EP Pacemaker;  Surgeon: Abhay Willams MD;  Location:  [facility]    REZU  09/25/2020    Done in Urology office with Dr Perez       Physical Exam     Patient Vitals for the past 24 hrs:   BP Temp Temp src Pulse Resp SpO2   06/29/25 2352 (!) 151/63 97.7  F (36.5  C) Temporal 72 18 100 %     Physical Exam      HEENT:    Oropharynx is moist  Eyes:    Conjunctiva normal  Neck:     Supple, no meningismus.     CV:     Regular rate and rhythm.      No murmurs, rubs or gallops.  PULM:    Clear to auscultation bilateral.       No respiratory distress.      Good air exchange.  ABD:    Soft, non-distended.       Mild focal tenderness in the midline low abdomen.     No pulsatile masses.       No rebound, guarding or rigidity.     No CVA tenderness.   MSK:     No gross deformity to all four extremities.   LYMPH:   No cervical lymphadenopathy.  NEURO:   Alert.  Good muscular tone, no atrophy.   Skin:    Warm, dry and intact.    Psych:    Mood is good and affect is appropriate.      Diagnostics     Lab Results   Labs Ordered and Resulted from Time of ED Arrival to Time of ED Departure   ROUTINE UA WITH MICROSCOPIC REFLEX TO CULTURE - Abnormal       Result Value    Color Urine Orange (*)     Appearance Urine Slightly Cloudy (*)     Glucose Urine Negative      Bilirubin Urine Negative      Ketones Urine Negative      Specific Gravity Urine 1.020      Blood Urine Large (*)     pH Urine 5.5      Protein Albumin Urine 50 (*)     Urobilinogen Urine Normal       Nitrite Urine Negative      Leukocyte Esterase Urine Moderate (*)     WBC Clumps Urine Present (*)     RBC Urine >182 (*)     WBC Urine 168 (*)    BASIC METABOLIC PANEL - Abnormal    Sodium 138      Potassium 3.9      Chloride 103      Carbon Dioxide (CO2) 21 (*)     Anion Gap 14      Urea Nitrogen 32.6 (*)     Creatinine 2.43 (*)     GFR Estimate 26 (*)     Calcium 9.0      Glucose 115 (*)    CBC WITH PLATELETS AND DIFFERENTIAL - Abnormal    WBC Count 9.6      RBC Count 3.65 (*)     Hemoglobin 10.6 (*)     Hematocrit 32.6 (*)     MCV 89      MCH 29.0      MCHC 32.5      RDW 13.7      Platelet Count 333      % Neutrophils 67      % Lymphocytes 19      % Monocytes 10      % Eosinophils 2      % Basophils 0      % Immature Granulocytes 1      NRBCs per 100 WBC 0      Absolute Neutrophils 6.4      Absolute Lymphocytes 1.9      Absolute Monocytes 0.9      Absolute Eosinophils 0.2      Absolute Basophils 0.0      Absolute Immature Granulocytes 0.1      Absolute NRBCs 0.0     URINE CULTURE       Imaging   No orders to display         Independent Interpretation   None    ED Course      Medications Administered   Medications   cefTRIAXone (ROCEPHIN) 1 g vial to attach to  mL bag for ADULTS or NS 50 mL bag for PEDS (has no administration in time range)       Procedures   Procedures     Discussion of Management   None    ED Course        Additional Documentation  None    Medical Decision Making / Diagnosis     CMS Diagnoses: IV Antibiotics given and/or elevated Lactate of 0 and no sepsis note found - Delete this reminder and enter the sepsis note or '.edcms' before signing chart.>>>None    MIPS   Mckenzie catheter was inserted because patient had an existing catheter.              PRISCA Pagan is a 80 year old male presents with Mckenzie catheter malfunction that was inserted after right partial nephrectomy.  Mckenzie catheter is not functioning is clearly obstructed likely secondary to hematuria.  Mckenzie catheter  was removed in which there is a visible clot on the catheter.  A three-way catheter was placed in event patient required irrigation.  As soon as catheter was inserted, the urine has been draining well.  There is no overt hematuria.  No indication for bladder irrigation.  Renal function is at baseline.  He has a clear UTI.  He was given ceftriaxone.  Previous urine cultures have revealed E. coli with some resistance patterns to cephalosporins outside ceftriaxone thus I feel it is most appropriate to treat with ciprofloxacin based on prior sensitivities.  Patient will discharge home with close follow-up with urology    Disposition   The patient was discharged.     Diagnosis     ICD-10-CM    1. Obstruction of Mckenzie catheter, initial encounter  T83.091A       2. Urinary tract infection associated with indwelling urethral catheter, initial encounter  T83.511A     N39.0       3. Chronic renal impairment, unspecified CKD stage  N18.9            Discharge Medications   New Prescriptions    No medications on file         MD Flaco Castañeda Jeremiah R, MD  06/30/25 0152

## 2025-06-30 NOTE — ED TRIAGE NOTES
Kidney surgery on mon catheter plugged in hospital. Some blood in leg bag and no other drainage. Peed a little around the catheter. Restarted elequis on thurs. CYDNEY ALVAREZ

## 2025-06-30 NOTE — DISCHARGE INSTRUCTIONS
Please call your urologist for close follow up in 3-5 days.    Discharge Instructions  Urinary Tract Infection  You or your child have been diagnosed with a urinary tract infection, or UTI. The urinary tract includes the kidneys (which make urine/pee), ureters (the tubes that carry urine/pee from the kidneys to the bladder), the bladder (which stores urine/pee), and urethra (the tube that carries urine/pee out of the bladder). Urinary tract infections occur when bacteria travel up the urethra into the bladder (bladder infection) and, in some cases, from there into the kidneys (kidney infection).  Generally, every Emergency Department visit should have a follow-up clinic visit with either a primary or a specialty clinic/provider. Please follow-up as instructed by your emergency provider today.  Return to the Emergency Department if:  You or your child have severe back pain.  You or your child are vomiting (throwing up) so that you cannot take your medicine.  You or your child have a new fever (had not previously had a fever) over 101 F.  You or your child have confusion or are very weak, or feel very ill.  Your child seems much more ill, will not wake up, will not respond right, or is crying for a long time and will not calm down.  You or your child are showing signs of dehydration. These signs may include decreased urination (pee), dry mouth/gums/tongue, or decreased activity.    Follow-up with your provider:   Children under 24 months need to be seen by their regular provider within one week after a diagnosis of a UTI. It may be necessary to do some more tests to look at the child s kidney or bladder.  You should begin to feel better within 24 - 48 hours of starting your antibiotic; follow-up with your regular clinic/doctor/provider if this is not the case.    Treatment:   You will be treated with an antibiotic to kill the bacteria. We have to make an educated guess, based on what we know about common bacteria and  "antibiotics, as to which antibiotic will work for your infection. We will be correct most times but there will be some cases where the antibiotic chosen is not correct (see urine cultures below).  Take a pain medication such as acetaminophen (Tylenol ) or ibuprofen (Advil , Motrin , Nuprin ).  Phenazopyridine (Pyridium , Uristat ) is a prescription medication that numbs the bladder to reduce the burning pain of some UTIs.  The same medication is available in a non-prescription version (Azo-Standard , Urodol ). This medication will change the color of the urine and tears (usually blue or orange). If you wear contacts, do not wear them while taking this medication as they may be stained by the medication.    Urine Cultures:  If indicated, a urine culture may have been performed today. This test generally takes 24-48 hours to complete so the results are not known at this time. The results can confirm that an infection is present but also determine which antibiotic is effective for the specific bacteria that is causing the infection. If your urine culture shows that the antibiotic you were given today will not work to treat your infection, we will attempt to contact you to make arrangements to change the antibiotic. If the culture confirms that the antibiotic is effective for your infection, you will not be contacted. We often recommend follow-up with your regular physician/provider on the culture results regardless of this process.    Antibiotic Warning:   If you have been placed on antibiotics - watch for signs of allergic reaction.  These include rash, lip swelling, difficulty breathing, wheezing, and dizziness.  If you develop any of these symptoms, stop the antibiotic immediately and go to an emergency room or urgent care for evaluation.    Probiotics: If you have been given an antibiotic, you may want to also take a probiotic pill or eat yogurt with live cultures. Probiotics have \"good bacteria\" to help your " intestines stay healthy. Studies have shown that probiotics help prevent diarrhea and other intestine problems (including C. diff infection) when you take antibiotics. You can buy these without a prescription in the pharmacy section of the store.   If you were given a prescription for medicine here today, be sure to read all of the information (including the package insert) that comes with your prescription.  This will include important information about the medicine, its side effects, and any warnings that you need to know about.  The pharmacist who fills the prescription can provide more information and answer questions you may have about the medicine.  If you have questions or concerns that the pharmacist cannot address, please call or return to the Emergency Department.   Remember that you can always come back to the Emergency Department if you are not able to see your regular provider in the amount of time listed above, if you get any new symptoms, or if there is anything that worries you.

## 2025-07-01 ENCOUNTER — VIRTUAL VISIT (OUTPATIENT)
Dept: INTERNAL MEDICINE | Facility: CLINIC | Age: 80
End: 2025-07-01
Payer: COMMERCIAL

## 2025-07-01 ENCOUNTER — TELEPHONE (OUTPATIENT)
Dept: UROLOGY | Facility: CLINIC | Age: 80
End: 2025-07-01

## 2025-07-01 DIAGNOSIS — T83.511D URINARY TRACT INFECTION ASSOCIATED WITH INDWELLING URETHRAL CATHETER, SUBSEQUENT ENCOUNTER: ICD-10-CM

## 2025-07-01 DIAGNOSIS — T83.091D: Primary | ICD-10-CM

## 2025-07-01 DIAGNOSIS — N28.89 RENAL MASS, RIGHT: ICD-10-CM

## 2025-07-01 DIAGNOSIS — N39.0 URINARY TRACT INFECTION ASSOCIATED WITH INDWELLING URETHRAL CATHETER, SUBSEQUENT ENCOUNTER: ICD-10-CM

## 2025-07-01 PROCEDURE — 98013 SYNCH AUDIO-ONLY EST LOW 20: CPT | Performed by: INTERNAL MEDICINE

## 2025-07-01 PROCEDURE — 1111F DSCHRG MED/CURRENT MED MERGE: CPT | Mod: 95 | Performed by: INTERNAL MEDICINE

## 2025-07-01 RX ORDER — LORATADINE 10 MG/1
10 CAPSULE, LIQUID FILLED ORAL DAILY
COMMUNITY

## 2025-07-01 NOTE — PROGRESS NOTES
Telephone visit: 7 minute conversation.  ([id number])      Gene is a 80 year old who is being evaluated via a billable video visit.    HOWEVER, this  visit was converted to a Telephone Visit due to poor internet connection.        How would you like to obtain your AVS? MyChart  If the video visit is dropped, the invitation should be resent by: Text to cell phone: [phone]  Will anyone else be joining your video visit? No      Assessment & Plan   (T83.091D) Obstruction of urethral catheter, subsequent encounter  (primary encounter diagnosis)  Comment: - Catheter blockage likely due to blood clots from restarting Eliquis. Catheter replaced and currently functioning well.  - Follow-up appointment with Dr. Perez on July 7, 2025, to assess catheter and overall condition.    (T83.511D,  N39.0) Urinary tract infection associated with indwelling urethral catheter, subsequent encounter  Comment: - Presence of white blood cells in urine suggests possible infection, historically E. coli.  - Prescribed Cipro, 1 tablet daily for seven days. Monitor symptoms and follow up with Dr. Perez on July 7, 2025.Culture and sensitivities pending. Begin taking ciprofloxacin 500 mg daily as prescribed in ED.     (N28.89) Renal mass, right  Comment: s/p right partial nephrectomy. Follow up with Urology as scheduled.         MED REC REQUIRED  Post Medication Reconciliation Status: discharge medications reconciled and changed, per note/orders    Patient Instructions   Glad you are feeling better.     Continue on the ciprofloxacin antibiotic (500 mg once daily) as prescribed in the ER.     See Dr Perez in the office on 7/7/2025 as scheduled.        Subjective   Gene is a 80 year old, presenting for the following health issues:  ER F/U      7/1/2025     1:04 PM   Additional Questions   Roomed by Zulema SIMS     Video Start Time: N/A    HPI      ED/UC Followup:    Facility:  [facility] ED  Date of visit: 6/30/2025  Reason for  visit: obstruction of Mckenzie catheter  Current Status: Improved      - Hospitalized overnight for urologic issues related to catheter blockage  - Permanent catheter placed through the penis prior to surgery  - Catheter blockage suspected due to blood from restarting Eliquis  - History of E. coli infections, currently suspected gram-negative bacilli infection  - No fever, chills, nausea, or vomiting reported  - Visible blood in urine cleared  - Shivering noted, affecting blood sugar levels          Past medical, family and social histories as well as medications reviewed and updated as needed.    REVIEW OF SYSTEMS: The following systems have been completely reviewed and are negative except as noted above:   Constitutional, gastrointestinal, genitourinary, musculoskeletal, endocrine systems.        Objective           Vitals:  No vitals were obtained today due to virtual visit.    Physical Exam   General: Alert and no distress //Respiratory: No audible wheeze, cough, or shortness of breath // Psychiatric:  Appropriate affect, tone, and pace of words          Video-Visit Details    Type of service:  Video Visit   Video End Time:N/A  Originating Location (pt. Location): Home    Distant Location (provider location):  On-site  Platform used for Video Visit: Telephone  Signed Electronically by: Dm Lipscomb MD,

## 2025-07-01 NOTE — TELEPHONE ENCOUNTER
"Urology Postop Phone Note:    Gene underwent surgery on 6/23      RIGHT ROBOTIC ASSISTED LAPAROSOCPIC PARTIAL NEPHRECTOMY          Postop Call Questions:  Fevers/chills: Afebrile. No chills or diaphoresis  Eating/drinking: Yes, adequate amounts. No N/V or any other issues.    Urine output: Baker draining adequate amounts. Clear, yellow. (Restarted Eliquis). Currently no bleeding, he was seen in ED for bleeding/ baker not draining yesterday.   Drains: Baker  Incisions: Per pt description: R flank ecchymosis, edges approximated, cdi. No drainage or s/s of infection. Open to air.     Bowel movement since surgery: Yes, no issues.     Pain: 2 out of 10. Pain goal: 3  Pain med(s): Tylenol PRN    Pt has no questions or concerns at this time.    Follow up:  Follow up appointment scheduled on 7/7 at 11:00 am with Dr. Perez at:  [facility]  [address]  [address], [address]  [place] [address]      Thank you,  CLAYTON FuentesN, RN  Care Coordinator  [facility]  [facility]   [employer]  My direct line: [phone]  Clinic/Schedulers: [phone]  Fax #: [phone]  Monday-Friday 8am- 4:30pm      After Hours:  If it is a night, weekend, or holiday call the [facility] phone number. Listen to the recording to get the number for on call Urology Resident.   Another option: Call the main hospital phone number where you had surgery and ask the  to page the \"urology resident on call\".    Typically, the on-call provider should return your call within 45 minutes. Please page the on-call provider again if you haven't been contacted as expected.    Rarely, the on-call provider will be unable to promptly return a call due to a hospital emergency.  If you have paged twice and are still not contacted, ask the hospital  to page the \"urology chief-resident on call\".  For emergencies, or if you cannot wait for a call back: call 911 or go to " the Emergency Department      Wound Care:  - You may shower and get incisions wet starting 48 hrs after surgery, unless surgeon told you otherwise.   - Do not scrub incisions or submerge wounds (bath, pool, hot tub, etc.) until catheter is removed and wounds have fully healed, typically 4-6 weeks. Blot incisions dry with clean towel, do not rub.   - Remove wound dressing 48 hours after surgery if already not removed.   - Your incisions were closed with dissolvable suture that will not need to be removed.  Commonly, purple dermabond glue is applied over the top of the sutures.  Avoid using lotions or ointments on your incisions.    - Leave incisions open to air.  Cover with gauze only if needed for comfort or to protect clothing from drainage.

## 2025-07-02 LAB
BACTERIA UR CULT: ABNORMAL
BACTERIA UR CULT: ABNORMAL

## 2025-07-02 NOTE — PATIENT INSTRUCTIONS
Glad you are feeling better.     Continue on the ciprofloxacin antibiotic (500 mg once daily) as prescribed in the ER.     See Dr Perez in the office on 7/7/2025 as scheduled.

## 2025-07-03 ENCOUNTER — TELEPHONE (OUTPATIENT)
Dept: NURSING | Facility: CLINIC | Age: 80
End: 2025-07-03
Payer: COMMERCIAL

## 2025-07-03 DIAGNOSIS — N39.0 URINARY TRACT INFECTION: Primary | ICD-10-CM

## 2025-07-03 RX ORDER — CIPROFLOXACIN 500 MG/1
500 TABLET, FILM COATED ORAL DAILY
Qty: 7 TABLET | Refills: 0 | Status: SHIPPED | OUTPATIENT
Start: 2025-07-03

## 2025-07-03 NOTE — TELEPHONE ENCOUNTER
[facility]    Reason for call: Lab Result Notification     Lab Result (including Rx patient on, if applicable).  If culture, copy of lab report at bottom.  Lab Result: Urine Culture  6/29/25 Prescribed Ciprofloxacin (CIPRO) 500 MG tablet Take 1 tablet (500 mg) by mouth daily. - Oral (SUSCEPTIBLE)    Creatinine Level (mg/dl)   Creatinine   Date Value Ref Range Status   06/30/2025 2.43 (H) 0.67 - 1.17 mg/dL Final   04/06/2021 2.30 (H) 0.66 - 1.25 mg/dL Final    Creatinine clearance (ml/min), if applicable    Serum creatinine: 2.43 mg/dL (H) 06/30/25 0109  Estimated creatinine clearance: 25.1 mL/min (A)     RN Recommendations/Instructions per [facility] ED lab result protocol:   [facility] ED lab result protocol utilized: Urine Culture    6/29/25 Presented to ED with symptoms: catheter malfunction, mild blood in urine, mild lower abdominal discomfort    Patient's current Symptoms at time of telephone encounter:   Pt states he went to Pharmacy and was told Rx canceled.  Upon review in pt record, no cancellation of Rx - Rx resent to Pharmacy.  Pt states he has been improving daily.      Does patient fit any of the following criteria?:     Has allergy to medications: No    On Coumadin/Warfarin: No      Patient/care giver notified to contact your PCP clinic or return to the Emergency department if your:  Symptoms return.  Symptoms do not improve after 3 days on antibiotic.  Symptoms do not resolve after completing antibiotic.  Symptoms worsen or other concerning symptoms.           Raquel Barrios RN

## 2025-07-07 ENCOUNTER — OFFICE VISIT (OUTPATIENT)
Dept: UROLOGY | Facility: CLINIC | Age: 80
End: 2025-07-07
Payer: COMMERCIAL

## 2025-07-07 VITALS
DIASTOLIC BLOOD PRESSURE: 73 MMHG | SYSTOLIC BLOOD PRESSURE: 154 MMHG | HEIGHT: 68 IN | BODY MASS INDEX: 28.04 KG/M2 | WEIGHT: 185 LBS | HEART RATE: 70 BPM

## 2025-07-07 DIAGNOSIS — N40.1 BENIGN PROSTATIC HYPERPLASIA WITH URINARY RETENTION: ICD-10-CM

## 2025-07-07 DIAGNOSIS — R33.8 BENIGN PROSTATIC HYPERPLASIA WITH URINARY RETENTION: ICD-10-CM

## 2025-07-07 DIAGNOSIS — C64.1 RENAL CELL CARCINOMA OF RIGHT KIDNEY (H): Primary | ICD-10-CM

## 2025-07-07 PROCEDURE — 1126F AMNT PAIN NOTED NONE PRSNT: CPT | Performed by: UROLOGY

## 2025-07-07 PROCEDURE — 99024 POSTOP FOLLOW-UP VISIT: CPT | Performed by: UROLOGY

## 2025-07-07 PROCEDURE — 3078F DIAST BP <80 MM HG: CPT | Performed by: UROLOGY

## 2025-07-07 PROCEDURE — 3077F SYST BP >= 140 MM HG: CPT | Performed by: UROLOGY

## 2025-07-07 ASSESSMENT — PAIN SCALES - GENERAL: PAINLEVEL_OUTOF10: NO PAIN (0)

## 2025-07-07 NOTE — LETTER
7/7/2025       RE: Gene Pagan  [address]  [facility] [address]     Dear Colleague,    Thank you for referring your patient, Gene Pagan, to the [facility] at [facility]. Please see a copy of my visit note below.    [place]  CHIEF COMPLAINT   It was my pleasure to see Gene Pagan who is a 79 year old male .      HPI   Gene Pagan is a very pleasant 79 year old male      Initially seen 4/29/20:  Gene Pagan is a 75 year old male who is being seen for evaluation of acute urinary retention.  He is being seen today for hospital follow-up for urologic issues of gross hematuria and urinary retention.     He was seen in the hospital recently after being admitted in the setting of shortness of breath, atrial fibrillation with RVR and acute kidney injury with work-up suggestive of recurrent cardiogenic shock.  He subsequently had a pacemaker placed on April 10.  His hematuria resolved while in the hospital however he continued to have urinary retention and failed a trial of void prior to discharge.     He reports having worsening symptoms of LUTS for the past several months to year prior to hospitalization and had been started on tamsulosin 0.4 mg daily about 1 month prior to hospitalization.  He also notes that he had a self-limited episode of gross hematuria in December.  No prior instances of gross hematuria and he denies any smoking history.     He failed his Trial of void      5/20/20:  Return for cystoscopy and Trial of void   Cystoscopy with 4cm long prostate and significant bilobar hypertrophy with a small median lobe and mild trabeculation.   Failed Trial of void and baker catheter replaced     8/19/20:  Returns for baker catheter change and discussion of bladder outlet procedural options  He has been doing very well with regards to his return of strength and his cardiogenic rehab  He is hoping to return  "to his part-time work at [employer] in the next coming weeks    9/25/20:  REZUM procedure: The prostate was measured at 83.2 cc  The transrectal ultrasound probe was then removed.  8 targeted treatments delivered into the prostate utilizing radiofrequency power to form sterile water vapor. 1 treatment was applied to the bladder neck. 4 treatments in left lateral lobe and 3 treatments in the right lateral lobe  by 1 cm were applied.    10/15/20:   Failed Trial of void and instructed on intermittent self catheterization on 9/28/2020 and on 10/8/2020.  On 10/10/2020 he presented to the emergency room due to room difficulty with intermittent self-catheterization and a Baker catheter was replaced  He returns today for Baker removal and instruction with intermittent self-catheterization    10/22/2020:  He initially did ok on intermittent self catheterization, however then developed urinary retention and presented to the ER on 10/17/20    3/25/2025 - Miguel Gonzalez:  Gene Pagan is a 79 year old male with Hx of a-fib w MRI-incompatible pacemaker, stage 3 CKD, BPH & incomplete emptying s/p Rezum 9/2020, maintained on Flomax & finasteride, with gradually enlarging 4.2 cm heterogeneous solid-appearing R renal mass, and ongoing bilateral hydronephrosis suspected to be 2/2 ROGERS.     4/1/2025 - RN visit:  PVR = 350cc  UA/UC sent and baker placed  UC consistent with contamination, started on empiric Bactrim    4/16/2025:  Follow-up today for cystoscopy and trial of void  Also to discuss his CT scan with an enlarging right renal mass    TODAY 7/7/2025:  Follow-up today for postop check  He has been doing fairly well after surgery though did require a Baker catheter replacement secondary to clot retention    PHYSICAL EXAM  Patient is a 80 year old  male   Vitals: Blood pressure (!) 154/73, pulse 70, height 1.727 m (5' 8\"), weight 83.9 kg (185 lb).  Body mass index is 28.13 kg/m .  General Appearance Adult:   Alert, no acute " distress, oriented  HENT: throat/mouth:normal, good dentition  Lungs: no respiratory distress, or pursed lip breathing  Heart: No obvious jugular venous distension present  Abdomen: soft, nontender, incisions clean, dry and intact  Neuro: Alert, oriented, speech and mentation normal  Psych: affect and mood normal  Gait: Normal  : Mckenzie catheter draining clear urine    UA RESULTS:  Recent Labs   Lab Test 04/01/25  1538 09/10/24  1456 07/22/24  1115   COLOR Yellow   < > Light Yellow   APPEARANCE Cloudy*   < > Clear   URINEGLC Negative   < > Negative   URINEBILI Negative   < > Negative   URINEKETONE Negative   < > Negative   SG 1.015   < > 1.009   UBLD Small*   < > Negative   URINEPH 6.5   < > 6.0   PROTEIN 30*   < > Negative   UROBILINOGEN 0.2   < >  --    NITRITE Negative   < > Negative   LEUKEST Large*   < > Large*   RBCU  --   --  1   WBCU  --   --  19*    < > = values in this interval not displayed.      Creatinine   Date Value Ref Range Status   06/30/2025 2.43 (H) 0.67 - 1.17 mg/dL Final   06/24/2025 2.59 (H) 0.67 - 1.17 mg/dL Final   06/23/2025 2.26 (H) 0.67 - 1.17 mg/dL Final   06/03/2025 2.34 (H) 0.67 - 1.17 mg/dL Final   04/03/2025 2.00 (H) 0.67 - 1.17 mg/dL Final   12/10/2024 2.31 (H) 0.67 - 1.17 mg/dL Final   10/22/2024 2.51 (H) 0.67 - 1.17 mg/dL Final   07/22/2024 2.10 (H) 0.67 - 1.17 mg/dL Final     Creatinine POCT   Date Value Ref Range Status   03/21/2025 2.3 (H) 0.7 - 1.2 mg/dL Final   05/25/2024 2.3 (H) 0.7 - 1.3 mg/dL Final         PATHOLOGY:  Final Diagnosis   Kidney, right, partial nephrectomy-  Clear-cell renal cell carcinoma, 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 Pacemaker leads partially visualized in the right atrium, right ventricle and coronary sinus. No focal airspace consolidation      HEPATOBILIARY: Normal.     PANCREAS: Normal.     SPLEEN: Multiple calcified splenic granulomas.     ADRENAL  GLANDS: Normal.     KIDNEYS/BLADDER: Atrophic kidneys with moderate to severe bilateral hydroureteronephrosis to the level of the urinary bladder bilaterally. There is significant urothelial thickening in the ureters with some adjacent fat stranding bilaterally.   Heterogeneous solid-appearing mass exophytic from the upper pole of the right kidney measuring up to 4.2 cm. No additional suspicious renal lesions are visualized. No nephroureterolithiasis. Urinary bladder demonstrates diffuse wall thickening with   subtle surrounding fat stranding.     BOWEL: Diverticulosis of the colon. No acute inflammatory change. No obstruction. Normal appendix.     LYMPH NODES: No suspicious lymphadenopathy. No abdominopelvic free fluid.     VASCULATURE: Moderate calcified atherosclerosis.     PELVIC ORGANS: Mild prostatomegaly.     MUSCULOSKELETAL: No acute bony abnormality or suspicious osseous lesion on this noncontrast exam.                                                         IMPRESSION:   1.  Right upper pole heterogeneous renal mass, suspicious for neoplasm, although technically indeterminate given lack of intravenous contrast. Could consider contrast-enhanced CT or MRI for confirmation if patient is able to receive contrast.  2.  Moderate to severe bilateral hydroureteronephrosis to the level of the urinary bladder. There is urothelial thickening and surrounding fat stranding involving both ureters and the bladder, could be related to chronic outlet obstruction but recommend   correlation with urinalysis to exclude urinary tract infection.        ASSESSMENT and PLAN  79 year old male with Hx of a-fib w MRI-incompatible pacemaker, stage 3 CKD, BPH & incomplete emptying s/p Rezum 9/2020, maintained on Flomax & finasteride, with gradually enlarging 4.2 cm heterogeneous solid-appearing R renal mass, and ongoing bilateral hydronephrosis.  Now status post a right robotic partial nephrectomy with a pT1a, clear-cell renal cell  carcinoma removed with negative margins.    Clear-cell renal cell carcinoma  - Status post a robotic partial nephrectomy with negative margins  - Will plan for surveillance imaging in about 6 months    BPH with recurrent urinary retention and bilateral hydroureteronephrosis possibly secondary to bladder outlet obstruction  - Cystoscopy recently with significant 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, left nodule greater than right, with a small median lobe and moderate to severe trabeculation  -Mckenzie catheter exchanged today  - Will plan for follow-up in 2 to 4 weeks for repeat trial of void and discussion of bladder outlet procedural options      Time spent: 15 minutes spent on the date of the encounter doing chart review, history and exam, documentation and further activities as noted above.    Chart documentation with Dragon Voice recognition Software. Although reviewed after completion, some words and grammatical errors may remain.       Cheikh Perez MD   Urology  [facility]  [facility] Phone: [phone]  [facility] Phone: [phone]        Again, thank you for allowing me to participate in the care of your patient.      Sincerely,    Cheikh Perez MD

## 2025-07-07 NOTE — NURSING NOTE
Chief Complaint   Patient presents with    hx renal mass     Follow up post up    Patient does have a catheter  Patient is doing well   Brett Soler CMA      Gene Pagan comes into clinic today at the request of Dr. PEÑA Ordering Provider for Catheter Change.    The patient is here for a post op follow up due to a partial nephrectomy and  Urinary Retention.  Patient's 20 Surinamese catheter was disconnected from the leg bag and a sterile catheter-tip syringe was attached to the catheter with 30cc of sterile H2O instilled into the bladder with ease. The catheter was removed with no complaints offered by the patient and the procedure was tolerated well.      Using sterile-field technique a sterile, well-lubricated 18 Sur[place]amese Surinamese Mckenzie Coude-tip  catheter was gently inserted with ease x 1 attempt.  The balloon was filled with 10ml sterile water.  No discomfort was voiced by the patient.  Clear-yellow urine return was noted with 40cc drained from the catheter.  Sterile-tubing and drainage-bag were attached to the catheter.  No specimen was ordered to be collected or sent to the lab for examination.  Patient was instructed on proper hygiene and catheter care.  Patient to follow up in approximately one month as planned. Patient was instructed to take one antibiotic to prevent infection that they have on hand.     Prior to catheter insertion patient's urethra was cleansed with iodine and draped in sterile fashion. Lidocaine gel was inserted into urethra. Patient tolerated procedure well.   2% Lidocaine hydrochloride jelly Lot# [id number]   Exp: 02-27     This service provided today was under the supervising provider of the day Dr. PEÑA, who was available if needed.    Brett Soler CMA

## 2025-07-07 NOTE — PROGRESS NOTES
[facility]  CHIEF COMPLAINT   It was my pleasure to see Gene Pagan who is a 79 year old male .      HPI   Gene Pagan is a very pleasant 79 year old male      Initially seen 4/29/20:  Gene Pagan is a 75 year old male who is being seen for evaluation of acute urinary retention.  He is being seen today for hospital follow-up for urologic issues of gross hematuria and urinary retention.     He was seen in the hospital recently after being admitted in the setting of shortness of breath, atrial fibrillation with RVR and acute kidney injury with work-up suggestive of recurrent cardiogenic shock.  He subsequently had a pacemaker placed on April 10.  His hematuria resolved while in the hospital however he continued to have urinary retention and failed a trial of void prior to discharge.     He reports having worsening symptoms of LUTS for the past several months to year prior to hospitalization and had been started on tamsulosin 0.4 mg daily about 1 month prior to hospitalization.  He also notes that he had a self-limited episode of gross hematuria in December.  No prior instances of gross hematuria and he denies any smoking history.     He failed his Trial of void      5/20/20:  Return for cystoscopy and Trial of void   Cystoscopy with 4cm long prostate and significant bilobar hypertrophy with a small median lobe and mild trabeculation.   Failed Trial of void and baker catheter replaced     8/19/20:  Returns for baker catheter change and discussion of bladder outlet procedural options  He has been doing very well with regards to his return of strength and his cardiogenic rehab  He is hoping to return to his part-time work at [employer] in the next coming weeks    9/25/20:  REZUM procedure: The prostate was measured at 83.2 cc  The transrectal ultrasound probe was then removed.  8 targeted treatments delivered into the prostate utilizing radiofrequency power to form sterile water vapor. 1 treatment was applied to  "the bladder neck. 4 treatments in left lateral lobe and 3 treatments in the right lateral lobe  by 1 cm were applied.    10/15/20:   Failed Trial of void and instructed on intermittent self catheterization on 9/28/2020 and on 10/8/2020.  On 10/10/2020 he presented to the emergency room due to room difficulty with intermittent self-catheterization and a Baker catheter was replaced  He returns today for Baker removal and instruction with intermittent self-catheterization    10/22/2020:  He initially did ok on intermittent self catheterization, however then developed urinary retention and presented to the ER on 10/17/20    3/25/2025 - Miguel Gonzalez:  Gene Pagan is a 79 year old male with Hx of a-fib w MRI-incompatible pacemaker, stage 3 CKD, BPH & incomplete emptying s/p Rezum 9/2020, maintained on Flomax & finasteride, with gradually enlarging 4.2 cm heterogeneous solid-appearing R renal mass, and ongoing bilateral hydronephrosis suspected to be 2/2 ROGERS.     4/1/2025 - RN visit:  PVR = 350cc  UA/UC sent and baker placed  UC consistent with contamination, started on empiric Bactrim    4/16/2025:  Follow-up today for cystoscopy and trial of void  Also to discuss his CT scan with an enlarging right renal mass    TODAY 7/7/2025:  Follow-up today for postop check  He has been doing fairly well after surgery though did require a Baker catheter replacement secondary to clot retention    PHYSICAL EXAM  Patient is a 80 year old  male   Vitals: Blood pressure (!) 154/73, pulse 70, height 1.727 m (5' 8\"), weight 83.9 kg (185 lb).  Body mass index is 28.13 kg/m .  General Appearance Adult:   Alert, no acute distress, oriented  HENT: throat/mouth:normal, good dentition  Lungs: no respiratory distress, or pursed lip breathing  Heart: No obvious jugular venous distension present  Abdomen: soft, nontender, incisions clean, dry and intact  Neuro: Alert, oriented, speech and mentation normal  Psych: affect and mood " normal  Gait: Normal  : Mckenzie catheter draining clear urine    UA RESULTS:  Recent Labs   Lab Test 04/01/25  1538 09/10/24  1456 07/22/24  1115   COLOR Yellow   < > Light Yellow   APPEARANCE Cloudy*   < > Clear   URINEGLC Negative   < > Negative   URINEBILI Negative   < > Negative   URINEKETONE Negative   < > Negative   SG 1.015   < > 1.009   UBLD Small*   < > Negative   URINEPH 6.5   < > 6.0   PROTEIN 30*   < > Negative   UROBILINOGEN 0.2   < >  --    NITRITE Negative   < > Negative   LEUKEST Large*   < > Large*   RBCU  --   --  1   WBCU  --   --  19*    < > = values in this interval not displayed.      Creatinine   Date Value Ref Range Status   06/30/2025 2.43 (H) 0.67 - 1.17 mg/dL Final   06/24/2025 2.59 (H) 0.67 - 1.17 mg/dL Final   06/23/2025 2.26 (H) 0.67 - 1.17 mg/dL Final   06/03/2025 2.34 (H) 0.67 - 1.17 mg/dL Final   04/03/2025 2.00 (H) 0.67 - 1.17 mg/dL Final   12/10/2024 2.31 (H) 0.67 - 1.17 mg/dL Final   10/22/2024 2.51 (H) 0.67 - 1.17 mg/dL Final   07/22/2024 2.10 (H) 0.67 - 1.17 mg/dL Final     Creatinine POCT   Date Value Ref Range Status   03/21/2025 2.3 (H) 0.7 - 1.2 mg/dL Final   05/25/2024 2.3 (H) 0.7 - 1.3 mg/dL Final         PATHOLOGY:  Final Diagnosis   Kidney, right, partial nephrectomy-  Clear-cell renal cell carcinoma, 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 Pacemaker leads partially visualized in the right atrium, right ventricle and coronary sinus. No focal airspace consolidation      HEPATOBILIARY: Normal.     PANCREAS: Normal.     SPLEEN: Multiple calcified splenic granulomas.     ADRENAL GLANDS: Normal.     KIDNEYS/BLADDER: Atrophic kidneys with moderate to severe bilateral hydroureteronephrosis to the level of the urinary bladder bilaterally. There is significant urothelial thickening in the ureters with some adjacent fat stranding bilaterally.   Heterogeneous solid-appearing mass exophytic  from the upper pole of the right kidney measuring up to 4.2 cm. No additional suspicious renal lesions are visualized. No nephroureterolithiasis. Urinary bladder demonstrates diffuse wall thickening with   subtle surrounding fat stranding.     BOWEL: Diverticulosis of the colon. No acute inflammatory change. No obstruction. Normal appendix.     LYMPH NODES: No suspicious lymphadenopathy. No abdominopelvic free fluid.     VASCULATURE: Moderate calcified atherosclerosis.     PELVIC ORGANS: Mild prostatomegaly.     MUSCULOSKELETAL: No acute bony abnormality or suspicious osseous lesion on this noncontrast exam.                                                         IMPRESSION:   1.  Right upper pole heterogeneous renal mass, suspicious for neoplasm, although technically indeterminate given lack of intravenous contrast. Could consider contrast-enhanced CT or MRI for confirmation if patient is able to receive contrast.  2.  Moderate to severe bilateral hydroureteronephrosis to the level of the urinary bladder. There is urothelial thickening and surrounding fat stranding involving both ureters and the bladder, could be related to chronic outlet obstruction but recommend   correlation with urinalysis to exclude urinary tract infection.        ASSESSMENT and PLAN  79 year old male with Hx of a-fib w MRI-incompatible pacemaker, stage 3 CKD, BPH & incomplete emptying s/p Rezum 9/2020, maintained on Flomax & finasteride, with gradually enlarging 4.2 cm heterogeneous solid-appearing R renal mass, and ongoing bilateral hydronephrosis.  Now status post a right robotic partial nephrectomy with a pT1a, clear-cell renal cell carcinoma removed with negative margins.    Clear-cell renal cell carcinoma  - Status post a robotic partial nephrectomy with negative margins  - Will plan for surveillance imaging in about 6 months    BPH with recurrent urinary retention and bilateral hydroureteronephrosis possibly secondary to bladder outlet  obstruction  - Cystoscopy recently with significant 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, left nodule greater than right, with a small median lobe and moderate to severe trabeculation  -Mckenzie catheter exchanged today  - Will plan for follow-up in 2 to 4 weeks for repeat trial of void and discussion of bladder outlet procedural options      Time spent: 15 minutes spent on the date of the encounter doing chart review, history and exam, documentation and further activities as noted above.    Chart documentation with Dragon Voice recognition Software. Although reviewed after completion, some words and grammatical errors may remain.       Cheikh Perez MD   Urology  [facility]  [facility] Phone: [phone]  [facility] Phone: [phone]

## 2025-07-13 ENCOUNTER — HEALTH MAINTENANCE LETTER (OUTPATIENT)
Age: 80
End: 2025-07-13

## 2025-07-16 ENCOUNTER — APPOINTMENT (OUTPATIENT)
Dept: CT IMAGING | Facility: CLINIC | Age: 80
End: 2025-07-16
Attending: EMERGENCY MEDICINE
Payer: COMMERCIAL

## 2025-07-16 ENCOUNTER — HOSPITAL ENCOUNTER (INPATIENT)
Facility: CLINIC | Age: 80
End: 2025-07-16
Attending: EMERGENCY MEDICINE | Admitting: INTERNAL MEDICINE
Payer: COMMERCIAL

## 2025-07-16 DIAGNOSIS — R31.0 GROSS HEMATURIA: ICD-10-CM

## 2025-07-16 DIAGNOSIS — N28.89 RENAL HEMORRHAGE, RIGHT: ICD-10-CM

## 2025-07-16 LAB
ABO + RH BLD: NORMAL
ALBUMIN UR-MCNC: ABNORMAL G/DL
ANION GAP SERPL CALCULATED.3IONS-SCNC: 11 MMOL/L (ref 7–15)
APPEARANCE UR: ABNORMAL
BASOPHILS # BLD AUTO: 0 10E3/UL (ref 0–0.2)
BASOPHILS NFR BLD AUTO: 1 %
BILIRUB UR QL STRIP: ABNORMAL
BLD GP AB SCN SERPL QL: NEGATIVE
BUN SERPL-MCNC: 27.9 MG/DL (ref 8–23)
CALCIUM SERPL-MCNC: 8.9 MG/DL (ref 8.8–10.4)
CAOX CRY #/AREA URNS HPF: ABNORMAL /HPF
CHLORIDE SERPL-SCNC: 101 MMOL/L (ref 98–107)
COLOR UR AUTO: ABNORMAL
CREAT SERPL-MCNC: 2.07 MG/DL (ref 0.67–1.17)
EGFRCR SERPLBLD CKD-EPI 2021: 31 ML/MIN/1.73M2
EOSINOPHIL # BLD AUTO: 0.2 10E3/UL (ref 0–0.7)
EOSINOPHIL NFR BLD AUTO: 2 %
ERYTHROCYTE [DISTWIDTH] IN BLOOD BY AUTOMATED COUNT: 14.1 % (ref 10–15)
EST. AVERAGE GLUCOSE BLD GHB EST-MCNC: 137 MG/DL
GLUCOSE BLDC GLUCOMTR-MCNC: 116 MG/DL (ref 70–99)
GLUCOSE BLDC GLUCOMTR-MCNC: 88 MG/DL (ref 70–99)
GLUCOSE SERPL-MCNC: 110 MG/DL (ref 70–99)
GLUCOSE UR STRIP-MCNC: ABNORMAL MG/DL
HBA1C MFR BLD: 6.4 %
HCO3 SERPL-SCNC: 23 MMOL/L (ref 22–29)
HCT VFR BLD AUTO: 35.2 % (ref 40–53)
HGB BLD-MCNC: 11 G/DL (ref 13.3–17.7)
HGB UR QL STRIP: ABNORMAL
IMM GRANULOCYTES # BLD: 0 10E3/UL
IMM GRANULOCYTES NFR BLD: 1 %
INR PPP: 1.03 (ref 0.85–1.15)
KETONES UR STRIP-MCNC: ABNORMAL MG/DL
LEUKOCYTE ESTERASE UR QL STRIP: ABNORMAL
LYMPHOCYTES # BLD AUTO: 1.6 10E3/UL (ref 0.8–5.3)
LYMPHOCYTES NFR BLD AUTO: 22 %
MCH RBC QN AUTO: 28.6 PG (ref 26.5–33)
MCHC RBC AUTO-ENTMCNC: 31.3 G/DL (ref 31.5–36.5)
MCV RBC AUTO: 91 FL (ref 78–100)
MONOCYTES # BLD AUTO: 0.6 10E3/UL (ref 0–1.3)
MONOCYTES NFR BLD AUTO: 7 %
MUCOUS THREADS #/AREA URNS LPF: PRESENT /LPF
NEUTROPHILS # BLD AUTO: 5 10E3/UL (ref 1.6–8.3)
NEUTROPHILS NFR BLD AUTO: 67 %
NITRATE UR QL: ABNORMAL
NRBC # BLD AUTO: 0 10E3/UL
NRBC BLD AUTO-RTO: 0 /100
PH UR STRIP: ABNORMAL [PH]
PLATELET # BLD AUTO: 326 10E3/UL (ref 150–450)
POTASSIUM SERPL-SCNC: 4.4 MMOL/L (ref 3.4–5.3)
PROTHROMBIN TIME: 13.6 SECONDS (ref 11.8–14.8)
RBC # BLD AUTO: 3.85 10E6/UL (ref 4.4–5.9)
RBC URINE: 55 /HPF
SODIUM SERPL-SCNC: 135 MMOL/L (ref 135–145)
SP GR UR STRIP: ABNORMAL
SPECIMEN EXP DATE BLD: NORMAL
UROBILINOGEN UR STRIP-MCNC: ABNORMAL MG/DL
WBC # BLD AUTO: 7.4 10E3/UL (ref 4–11)
WBC URINE: 6 /HPF

## 2025-07-16 PROCEDURE — 87106 FUNGI IDENTIFICATION YEAST: CPT | Performed by: PHYSICIAN ASSISTANT

## 2025-07-16 PROCEDURE — 258N000003 HC RX IP 258 OP 636: Performed by: EMERGENCY MEDICINE

## 2025-07-16 PROCEDURE — G0378 HOSPITAL OBSERVATION PER HR: HCPCS

## 2025-07-16 PROCEDURE — 0T2BX0Z CHANGE DRAINAGE DEVICE IN BLADDER, EXTERNAL APPROACH: ICD-10-PCS | Performed by: EMERGENCY MEDICINE

## 2025-07-16 PROCEDURE — 36415 COLL VENOUS BLD VENIPUNCTURE: CPT | Performed by: PHYSICIAN ASSISTANT

## 2025-07-16 PROCEDURE — 85025 COMPLETE CBC W/AUTO DIFF WBC: CPT | Performed by: EMERGENCY MEDICINE

## 2025-07-16 PROCEDURE — 99285 EMERGENCY DEPT VISIT HI MDM: CPT | Mod: 25 | Performed by: EMERGENCY MEDICINE

## 2025-07-16 PROCEDURE — 99222 1ST HOSP IP/OBS MODERATE 55: CPT | Mod: FS | Performed by: UROLOGY

## 2025-07-16 PROCEDURE — 258N000001 HC RX 258: Performed by: INTERNAL MEDICINE

## 2025-07-16 PROCEDURE — 80048 BASIC METABOLIC PNL TOTAL CA: CPT | Performed by: EMERGENCY MEDICINE

## 2025-07-16 PROCEDURE — 258N000001 HC RX 258: Performed by: EMERGENCY MEDICINE

## 2025-07-16 PROCEDURE — 81001 URINALYSIS AUTO W/SCOPE: CPT | Performed by: EMERGENCY MEDICINE

## 2025-07-16 PROCEDURE — 36415 COLL VENOUS BLD VENIPUNCTURE: CPT | Performed by: EMERGENCY MEDICINE

## 2025-07-16 PROCEDURE — 83036 HEMOGLOBIN GLYCOSYLATED A1C: CPT | Performed by: INTERNAL MEDICINE

## 2025-07-16 PROCEDURE — 3E1K78Z IRRIGATION OF GENITOURINARY TRACT USING IRRIGATING SUBSTANCE, VIA NATURAL OR ARTIFICIAL OPENING: ICD-10-PCS | Performed by: EMERGENCY MEDICINE

## 2025-07-16 PROCEDURE — 51702 INSERT TEMP BLADDER CATH: CPT

## 2025-07-16 PROCEDURE — 85610 PROTHROMBIN TIME: CPT | Performed by: EMERGENCY MEDICINE

## 2025-07-16 PROCEDURE — 74176 CT ABD & PELVIS W/O CONTRAST: CPT

## 2025-07-16 PROCEDURE — 82962 GLUCOSE BLOOD TEST: CPT

## 2025-07-16 PROCEDURE — 250N000013 HC RX MED GY IP 250 OP 250 PS 637: Performed by: INTERNAL MEDICINE

## 2025-07-16 PROCEDURE — 86850 RBC ANTIBODY SCREEN: CPT | Performed by: PHYSICIAN ASSISTANT

## 2025-07-16 PROCEDURE — 120N000001 HC R&B MED SURG/OB

## 2025-07-16 PROCEDURE — 99223 1ST HOSP IP/OBS HIGH 75: CPT | Mod: AI | Performed by: INTERNAL MEDICINE

## 2025-07-16 RX ORDER — SENNOSIDES 8.6 MG
1-2 TABLET ORAL 2 TIMES DAILY PRN
Status: DISCONTINUED | OUTPATIENT
Start: 2025-07-16 | End: 2025-07-20 | Stop reason: HOSPADM

## 2025-07-16 RX ORDER — NICOTINE POLACRILEX 4 MG
15-30 LOZENGE BUCCAL
Status: DISCONTINUED | OUTPATIENT
Start: 2025-07-16 | End: 2025-07-20 | Stop reason: HOSPADM

## 2025-07-16 RX ORDER — METOPROLOL SUCCINATE 25 MG/1
12.5 TABLET, EXTENDED RELEASE ORAL EVERY MORNING
COMMUNITY

## 2025-07-16 RX ORDER — TAMSULOSIN HYDROCHLORIDE 0.4 MG/1
0.4 CAPSULE ORAL 2 TIMES DAILY
Status: DISCONTINUED | OUTPATIENT
Start: 2025-07-16 | End: 2025-07-20 | Stop reason: HOSPADM

## 2025-07-16 RX ORDER — ACETAMINOPHEN 325 MG/1
650 TABLET ORAL EVERY 4 HOURS PRN
Status: DISCONTINUED | OUTPATIENT
Start: 2025-07-16 | End: 2025-07-20 | Stop reason: HOSPADM

## 2025-07-16 RX ORDER — DOCUSATE SODIUM 100 MG/1
100 CAPSULE, LIQUID FILLED ORAL DAILY
Status: DISCONTINUED | OUTPATIENT
Start: 2025-07-16 | End: 2025-07-20 | Stop reason: HOSPADM

## 2025-07-16 RX ORDER — ONDANSETRON 4 MG/1
4 TABLET, ORALLY DISINTEGRATING ORAL EVERY 6 HOURS PRN
Status: DISCONTINUED | OUTPATIENT
Start: 2025-07-16 | End: 2025-07-20 | Stop reason: HOSPADM

## 2025-07-16 RX ORDER — FINASTERIDE 5 MG/1
5 TABLET, FILM COATED ORAL DAILY
Status: DISCONTINUED | OUTPATIENT
Start: 2025-07-16 | End: 2025-07-20 | Stop reason: HOSPADM

## 2025-07-16 RX ORDER — ACETAMINOPHEN 650 MG/1
650 SUPPOSITORY RECTAL EVERY 4 HOURS PRN
Status: DISCONTINUED | OUTPATIENT
Start: 2025-07-16 | End: 2025-07-20 | Stop reason: HOSPADM

## 2025-07-16 RX ORDER — ONDANSETRON 2 MG/ML
4 INJECTION INTRAMUSCULAR; INTRAVENOUS EVERY 6 HOURS PRN
Status: DISCONTINUED | OUTPATIENT
Start: 2025-07-16 | End: 2025-07-20 | Stop reason: HOSPADM

## 2025-07-16 RX ORDER — PROCHLORPERAZINE MALEATE 5 MG/1
5 TABLET ORAL EVERY 6 HOURS PRN
Status: DISCONTINUED | OUTPATIENT
Start: 2025-07-16 | End: 2025-07-20 | Stop reason: HOSPADM

## 2025-07-16 RX ORDER — LORATADINE 10 MG/1
10 TABLET ORAL DAILY
Status: DISCONTINUED | OUTPATIENT
Start: 2025-07-16 | End: 2025-07-20 | Stop reason: HOSPADM

## 2025-07-16 RX ORDER — TAMSULOSIN HYDROCHLORIDE 0.4 MG/1
0.4 CAPSULE ORAL 2 TIMES DAILY
COMMUNITY

## 2025-07-16 RX ORDER — BUMETANIDE 0.5 MG/1
1.5 TABLET ORAL DAILY
Status: DISCONTINUED | OUTPATIENT
Start: 2025-07-16 | End: 2025-07-20 | Stop reason: HOSPADM

## 2025-07-16 RX ORDER — DEXTROSE MONOHYDRATE 25 G/50ML
25-50 INJECTION, SOLUTION INTRAVENOUS
Status: DISCONTINUED | OUTPATIENT
Start: 2025-07-16 | End: 2025-07-20 | Stop reason: HOSPADM

## 2025-07-16 RX ADMIN — FINASTERIDE 5 MG: 5 TABLET, FILM COATED ORAL at 16:27

## 2025-07-16 RX ADMIN — LORATADINE 10 MG: 10 TABLET ORAL at 16:27

## 2025-07-16 RX ADMIN — ACETAMINOPHEN 650 MG: 325 TABLET ORAL at 16:27

## 2025-07-16 RX ADMIN — SODIUM CHLORIDE 3000 ML: 900 IRRIGANT IRRIGATION at 09:05

## 2025-07-16 RX ADMIN — TAMSULOSIN HYDROCHLORIDE 0.4 MG: 0.4 CAPSULE ORAL at 20:04

## 2025-07-16 RX ADMIN — Medication 1 MG: at 22:29

## 2025-07-16 RX ADMIN — SODIUM CHLORIDE 3000 ML: 900 IRRIGANT IRRIGATION at 07:33

## 2025-07-16 RX ADMIN — BUMETANIDE 1.5 MG: 0.5 TABLET ORAL at 16:27

## 2025-07-16 RX ADMIN — DOCUSATE SODIUM 100 MG: 100 CAPSULE, LIQUID FILLED ORAL at 16:27

## 2025-07-16 RX ADMIN — SODIUM CHLORIDE 1000 ML: 0.9 INJECTION, SOLUTION INTRAVENOUS at 07:33

## 2025-07-16 ASSESSMENT — ACTIVITIES OF DAILY LIVING (ADL)
ADLS_ACUITY_SCORE: 51
ADLS_ACUITY_SCORE: 48
ADLS_ACUITY_SCORE: 45
ADLS_ACUITY_SCORE: 51
ADLS_ACUITY_SCORE: 45
ADLS_ACUITY_SCORE: 48
ADLS_ACUITY_SCORE: 51
ADLS_ACUITY_SCORE: 45
ADLS_ACUITY_SCORE: 51
ADLS_ACUITY_SCORE: 51
ADLS_ACUITY_SCORE: 45
ADLS_ACUITY_SCORE: 51
ADLS_ACUITY_SCORE: 51
ADLS_ACUITY_SCORE: 48

## 2025-07-16 ASSESSMENT — COLUMBIA-SUICIDE SEVERITY RATING SCALE - C-SSRS
6. HAVE YOU EVER DONE ANYTHING, STARTED TO DO ANYTHING, OR PREPARED TO DO ANYTHING TO END YOUR LIFE?: NO
1. IN THE PAST MONTH, HAVE YOU WISHED YOU WERE DEAD OR WISHED YOU COULD GO TO SLEEP AND NOT WAKE UP?: NO
2. HAVE YOU ACTUALLY HAD ANY THOUGHTS OF KILLING YOURSELF IN THE PAST MONTH?: NO

## 2025-07-16 NOTE — ED PROVIDER NOTES
"  Emergency Department Note      History of Present Illness     Chief Complaint   Hematuria      HPI   Gene Pagan is a 80 year old male anticoagulated with Eliquis for a-fib with past medical history significant for congestive heart failure, type 2 diabetes mellitus, stage 3 chronic kidney disease, and hypertension who presents via car from home accompanied by wife with chief complaint of hematuria. The patient reports onset of hematuria 24 hours ago (7/15/25). He states he has had intermittent episodes of hematuria since his recent kidney surgery 4 weeks ago to remove a renal mass. These episodes are usually pink tinge and not \"fully red\". His catheter is still draining. Last night at 2200 (7/15/25), he noticed some clotting. He is not on chemo. He follows with his urologist Dr. Carrasco. He skipped his Eliquis dose this morning (07/16/25) and last night (7/15/25).    Independent Historian   None    Review of External Notes   6/23/25- urology op note, R renal mass excision    Past Medical History     Medical History and Problem List   A-fib  Congestive heart failure  Benign prostatic hyperplasia with urinary obstruction  Pneumonia  Chronic kidney disease, stage 3  Type 2 diabetes mellitus  Thoracic aortic ectasia  Chronic diastolic heart failure  Diabetic nephropathy  Hyperopia  Cataract  Anemia  Right renal mass  Hypertension    Medications   Eliquis  Bumetanide  Ciprofloxacin  Finasteride  Loratadine  Metoprolol  Tamsulosin    Surgical History   DaVinci Nephrectomy  EP Pacemaker  Cardioversion    Physical Exam     Patient Vitals for the past 24 hrs:   BP Temp Temp src Pulse Resp SpO2 Weight   07/16/25 0039 (!) 167/69 97.3  F (36.3  C) Temporal 70 18 98 % 86.8 kg (191 lb 5.8 oz)     Physical Exam  Constitutional:       Appearance: He is well-developed.   HENT:      Mouth/Throat:      Mouth: Mucous membranes are moist.      Pharynx: Oropharynx is clear. No oropharyngeal exudate.   Eyes:      General: No scleral " icterus.     Conjunctiva/sclera: Conjunctivae normal.      Pupils: Pupils are equal, round, and reactive to light.   Cardiovascular:      Rate and Rhythm: Normal rate and regular rhythm.      Heart sounds: Normal heart sounds. No murmur heard.     No friction rub. No gallop.   Pulmonary:      Effort: Pulmonary effort is normal. No respiratory distress.      Breath sounds: Normal breath sounds. No stridor. No wheezing, rhonchi or rales.   Abdominal:      General: Bowel sounds are normal. There is no distension.      Palpations: Abdomen is soft. There is no mass.      Tenderness: There is no abdominal tenderness. There is no right CVA tenderness or left CVA tenderness.      Comments: Grossly bloody urine noted in the leg bag.  Mckenzie is draining bloody urine.  No significant clots seen in the Mckenzie bag   Musculoskeletal:      Right lower leg: No edema.      Left lower leg: No edema.   Skin:     General: Skin is warm and dry.      Capillary Refill: Capillary refill takes less than 2 seconds.      Findings: No rash.   Neurological:      Mental Status: He is alert.           Diagnostics     Lab Results   Labs Ordered and Resulted from Time of ED Arrival to Time of ED Departure   BASIC METABOLIC PANEL - Abnormal       Result Value    Sodium 135      Potassium 4.4      Chloride 101      Carbon Dioxide (CO2) 23      Anion Gap 11      Urea Nitrogen 27.9 (*)     Creatinine 2.07 (*)     GFR Estimate 31 (*)     Calcium 8.9      Glucose 110 (*)    ROUTINE UA WITH MICROSCOPIC REFLEX TO CULTURE - Abnormal    Color Urine Red (*)     Appearance Urine Slightly Cloudy (*)     Glucose Urine        Bilirubin Urine        Ketones Urine        Specific Gravity Urine        Blood Urine Large (*)     pH Urine        Protein Albumin Urine        Urobilinogen Urine        Nitrite Urine        Leukocyte Esterase Urine        Mucus Urine Present (*)     Calcium Oxalate Crystals Urine Few (*)     RBC Urine 55 (*)     WBC Urine 6 (*)    CBC WITH  PLATELETS AND DIFFERENTIAL - Abnormal    WBC Count 7.4      RBC Count 3.85 (*)     Hemoglobin 11.0 (*)     Hematocrit 35.2 (*)     MCV 91      MCH 28.6      MCHC 31.3 (*)     RDW 14.1      Platelet Count 326      % Neutrophils 67      % Lymphocytes 22      % Monocytes 7      % Eosinophils 2      % Basophils 1      % Immature Granulocytes 1      NRBCs per 100 WBC 0      Absolute Neutrophils 5.0      Absolute Lymphocytes 1.6      Absolute Monocytes 0.6      Absolute Eosinophils 0.2      Absolute Basophils 0.0      Absolute Immature Granulocytes 0.0      Absolute NRBCs 0.0     INR - Normal    INR 1.03      PT 13.6         Imaging   Abd/pelvis CT no contrast - Stone Protocol   Final Result   IMPRESSION:    1.  There is a small amount of hemorrhage in the right renal pelvis and proximal right ureter and a small blood clot in the bladder.           Independent Interpretation   None    ED Course      Medications Administered   Medications - No data to display    Procedures   Procedures     Discussion of Management   Urology, Dr. Jessika Tavarez  Admitting Hospitalist,     ED Course   ED Course as of 07/16/25 0650   Wed Jul 16, 2025   0336 I obtained history and examined the patient as noted above.    0619 I rechecked and updated the patient. The patient's baker is not draining anymore.   0631 I discussed the patient's presentation with Dr. Jessika Tavarez, urology.        Additional Documentation  None    Medical Decision Making / Diagnosis     CMS Diagnoses: None    MIPS   None    [facility]   Gene Pagan is a 80 year old male with renal mass status post recent excision by Dr. Perez of urology.  Initially we were able to irrigate until the urine is pinkish color.  However when I went to check on the patient afterwards, he noted that his Baker has stopped draining and there was significant blood in his Baker bag.  Most likely this is caused by clot obstructing his Baker.  We replace his Baker with a three-way  catheter and started continuous bladder irrigation.  CT scan did show right renal hemorrhage from the renal pelvis and a clot in the bladder.  Urology was consulted.  Currently patient will be made n.p.o. waiting for surgical evaluation.  Unclear whether this will be cystoscopy versus IR.  Patient will be admitted to the hospital service while waiting evaluation.  Patient continues on the continuous bladder irrigation for now.  Patient does have baseline CKD which is stable for now.  1 L of normal saline is initiated.  Patient voiced understanding.    Disposition   The patient was admitted to the hospital.     Diagnosis     ICD-10-CM    1. Gross hematuria  R31.0       2. Renal hemorrhage, right  N28.89            Scribe Disclosure:  Sebas EKYS, am serving as a scribe at 4:32 AM on 7/16/2025 to document services personally performed by Deanna Ureña MD based on my observations and the provider's statements to me.        Deanna Ureña MD  07/16/25 0752

## 2025-07-16 NOTE — PLAN OF CARE
"PRIMARY DIAGNOSIS: Gross Hematuria  OUTPATIENT/OBSERVATION GOALS TO BE MET BEFORE DISCHARGE:  ADLs back to baseline: Yes    Activity and level of assistance: Ambulating independently.    Pain status: Pain free.    Return to near baseline physical activity: Yes     Discharge Planner Nurse   Safe discharge environment identified: Yes  Barriers to discharge: Yes       Entered by: Natalia Saldaña RN 07/16/2025 5:02 PM   Urine light pink. CBI set at slow. Eating and drinking. AxOx4. Independent in room. +BM. Tylenol for headache.   Please review provider order for any additional goals.   Nurse to notify provider when observation goals have been met and patient is ready for discharge.Goal Outcome Evaluation:      Plan of Care Reviewed With: patient    Overall Patient Progress: improvingOverall Patient Progress: improving    Outcome Evaluation: urine light pink in color, 1 small clot in baker, denies pain          Problem: Adult Inpatient Plan of Care  Goal: Plan of Care Review  Description: The Plan of Care Review/Shift note should be completed every shift.  The Outcome Evaluation is a brief statement about your assessment that the patient is improving, declining, or no change.  This information will be displayed automatically on your shift  note.  7/16/2025 1702 by Natalia Saldaña RN  Outcome: Progressing  Flowsheets (Taken 7/16/2025 1702)  Plan of Care Reviewed With: patient  Overall Patient Progress: improving  7/16/2025 1314 by Natalia Saldaña RN  Outcome: Progressing  Flowsheets (Taken 7/16/2025 1314)  Outcome Evaluation: urine light pink in color, 1 small clot in baker, denies pain  Plan of Care Reviewed With: patient  Overall Patient Progress: improving  Goal: Patient-Specific Goal (Individualized)  Description: You can add care plan individualizations to a care plan. Examples of Individualization might be:  \"Parent requests to be called daily at 9am for status\", \"I have a hard time hearing out of my right " "ear\", or \"Do not touch me to wake me up as it startles  me\".  7/16/2025 1702 by Natalia Saldaña RN  Outcome: Progressing  7/16/2025 1314 by Natalia Saldaña RN  Outcome: Progressing  Goal: Absence of Hospital-Acquired Illness or Injury  7/16/2025 1702 by Natalia Saldaña RN  Outcome: Progressing  7/16/2025 1314 by Natalia Saldaña RN  Outcome: Progressing  Intervention: Identify and Manage Fall Risk  Recent Flowsheet Documentation  Taken 7/16/2025 1000 by Natalia Saldaña RN  Safety Promotion/Fall Prevention:   activity supervised   treat underlying cause   treat reversible contributory factors   supervised activity   safety round/check completed   room organization consistent   patient and family education   nonskid shoes/slippers when out of bed   clutter free environment maintained  Intervention: Prevent Skin Injury  Recent Flowsheet Documentation  Taken 7/16/2025 1000 by Natalia Saldaña RN  Body Position: position changed independently  Goal: Optimal Comfort and Wellbeing  7/16/2025 1702 by Natalia Saldaña RN  Outcome: Progressing  7/16/2025 1314 by Natalia Saldaña RN  Outcome: Progressing  Goal: Readiness for Transition of Care  7/16/2025 1702 by Natalia Saldaña RN  Outcome: Progressing  7/16/2025 1314 by Natalia Saldaña RN  Outcome: Progressing     Problem: Delirium  Goal: Optimal Coping  7/16/2025 1702 by Natalia Saldaña RN  Outcome: Progressing  7/16/2025 1314 by Natalia Saldaña RN  Outcome: Progressing  Goal: Improved Behavioral Control  7/16/2025 1702 by Natalia Saldaña RN  Outcome: Progressing  7/16/2025 1314 by Natalia Saldaña RN  Outcome: Progressing  Intervention: Minimize Safety Risk  Recent Flowsheet Documentation  Taken 7/16/2025 1000 by Natalia Saldaña RN  Enhanced Safety Measures: review medications for side effects with activity  Goal: Improved Attention and Thought Clarity  7/16/2025 1702 by Natalia Saldaña RN  Outcome: Progressing  7/16/2025 1314 by Natalia Saldaña" RN  Outcome: Progressing  Goal: Improved Sleep  7/16/2025 1702 by Natalia Saldaña RN  Outcome: Progressing  7/16/2025 1314 by Natalia Saldaña RN  Outcome: Progressing

## 2025-07-16 NOTE — PHARMACY-ADMISSION MEDICATION HISTORY
Pharmacy Intern Admission Medication History    Admission medication history is complete. The information provided in this note is only as accurate as the sources available at the time of the update.    Information Source(s): Patient via in-person    Pertinent Information: Patient stopped taking eliquis 2 days ago when he realized he had blood in his urine. Patient takes metoprolol only once daily in morning    Changes made to PTA medication list:  Added: None  Deleted: Ciprofloxacin 500mg  Changed: Metoprolol er 25mg to 1/2 tablet daily, Tamsulosin to one capsule in morning and one in evening    Allergies reviewed with patient and updates made in EHR: yes    Medication History Completed By: oKry Sloan 7/16/2025 8:24 AM    PTA Med List   Medication Sig Note Last Dose/Taking    apixaban ANTICOAGULANT (ELIQUIS ANTICOAGULANT) 2.5 MG tablet Take 1 tablet (2.5 mg) by mouth 2 times daily. 7/16/2025: Patient held medication since he saw blood in urine Morning    bumetanide (BUMEX) 0.5 MG tablet Take 1 tablet (0.5 mg) by mouth daily (+ additional 1 mg to = 1.5 mg daily).  7/15/2025 Morning    bumetanide (BUMEX) 1 MG tablet Take 1 tablet (1 mg) by mouth daily. (+ additional 0.5 mg to = 1.5 mg daily).  7/15/2025 Morning    docusate sodium (COLACE) 100 MG tablet Take 1 tablet (100 mg) by mouth daily.  7/15/2025 Morning    finasteride (PROSCAR) 5 MG tablet Take 1 tablet (5 mg) by mouth daily.  7/15/2025 Morning    insulin glargine (LANTUS SOLOSTAR) 100 UNIT/ML pen Inject 13 Units subcutaneously at bedtime.  7/15/2025 Evening    loratadine 10 MG capsule Take 10 mg by mouth daily.  7/15/2025 Morning    melatonin 3 MG tablet Take 3 mg by mouth nightly as needed  7/15/2025 Bedtime    metoprolol succinate ER (TOPROL XL) 25 MG 24 hr tablet Take 12.5 mg by mouth every morning.  7/15/2025 Morning    Multiple Vitamin (MULTIVITAMIN ADULT) TABS Take 1 tablet by mouth daily.  7/15/2025 Evening    tamsulosin (FLOMAX) 0.4 MG capsule Take 0.4  mg by mouth 2 times daily. Take one capsule in the morning and one capsule in the evening by mouth  7/15/2025 Evening    vitamin C (ASCORBIC ACID) 1000 MG TABS Take 1,000 mg by mouth daily.  7/15/2025 Morning    zinc 50 MG TABS Take 50 mg by mouth daily  7/15/2025 Evening

## 2025-07-16 NOTE — CONSULTS
[facility] Consultation by [facility]    Gene Pagan MRN# [id number]   Age: 80 year old YOB: 1945     Date of Admission:  7/16/2025    Reason for consult: Gross hematuria, recent surgery on kidney       Requesting PA/MD: ORTIZ Murillo PA-C       Level of consult: Consult, follow and place orders           Assessment and Plan:   Assessment:   Recent right robotic partial nephrectomy with Dr. Perez on 06/23/2025 for grade 2 of 4, clear-cell renal cell carcinoma  Gross hematuria, thought to be likely secondary to BPH as opposed to active bleed from partial nephrectomy  CKD, stable  Chronic anticoagulation on Eliquis for atrial fibrillation, currently held  DM2  Recent urinary tract infection      Plan:   -Okay for diet today.  N.p.o. at midnight to reassess, but suspect no surgical intervention required.  -Continue with prior to admission finasteride 5 mg daily and Flomax 0.4 mg twice daily.  -Continue with three-way Mckenzie catheter.  Continue to wean CBI as able.  Please use chart below as a guideline.  -Would recommend continuing to hold Eliquis until gross hematuria has resolved.  Then can cautiously restart.  - Hemoglobin is stable.  Continue to monitor.  Transfuse as necessary.  - I discussed with the patient and his wife that if he had an active bleed from his partial nephrectomy, I would expect him to be more hemodynamically unstable, have flank pain, and hemoglobin that was starting to drift down.  None of these are currently true.  - Continue to monitor kidney function.  Appears relatively stable.  - Hand irrigate as needed for decreased output, bladder spasms, or clots.  -Urinalysis is not particularly concerning for urinary tract infection.  However, given recent urinary tract infection, I have added on urine culture.  If election is made by medicine, would be reasonable to start antibiotic, but but not necessarily needed.  - Will continue to follow along.         Laxmi BONILLA  ZOE [place]   [facility]  [phone]               Chief Complaint:   Gross hematuria     History is obtained from the patient and EMR.         History of Present Illness:   This patient is a 80 year old male who presented to the ER with past medical history significant for CHF, DM 2, CKD stage III, hypertension, atrial fibrillation with chronic anticoagulation with Eliquis, and recent right partial nephrectomy for clear-cell renal cell carcinoma grade 2 of 4 on 06/23/2025 for further evaluation of gross hematuria.    Patient had gross hematuria that was slightly more persistent starting yesterday.  He has had some intermittent episodes of pink urine since his partial nephrectomy.  Patient currently has Mckenzie catheter in place due to difficulties with urination after his surgery.  Patient previously underwent Rezum in 2020 and failed several voiding trials.  He has been maintained on finasteride and Flomax 0.4 mg twice daily without Mckenzie catheter.  He also recently had an Enterobacter UTI in 06/30/2025 and was treated with ciprofloxacin, which he has completed.  Patient held his Eliquis yesterday.    He currently denies any nausea, vomiting, fevers, chills, dysuria, flank pain, lightheaded, or dizziness.  Hemoglobin is stable at 11.0.  Creatinine is actually improved to 2.07 with an EGFR of 31.  WBC 7.1.  INR 1.03.  He is currently afebrile without tachycardia, and blood pressure is adequate.  Wife is at the bedside.  Urinalysis was not particularly concerning for infection, but urine culture has been added.    Patient initially had a 2-week catheter in and had a lot of bladder pressure.  This was switched for three-way catheter and CBI was started.  Multiple clots are and irrigated out.  Currently CBI is running at a slow rate with clear         Past Medical History:     Past Medical History:   Diagnosis Date    Anemia     Atrial fibrillation     AV node ablation and pacemaker placement 4/10/2020    BPH  (benign prostatic hyperplasia)     Chronic diastolic CHF     Chronic kidney disease (CKD), stage III (moderate) (H)     Diabetes mellitus - type 2     Palpitations     Systolic CHF (H)              Past Surgical History:     Past Surgical History:   Procedure Laterality Date    ANESTHESIA CARDIOVERSION N/A 2020    Procedure: ANESTHESIA, FOR CARDIOVERSION;  Surgeon: GENERIC ANESTHESIA PROVIDER;  Location: [facility] OR    CYSTOSCOPY      DAVINCI NEPHRECTOMY PARTIAL Right 2025    Procedure: RIGHT ROBOTIC ASSISTED LAPAROSOCPIC PARTIAL NEPHRECTOMY WITH INTRA-OPERATIVE RENAL ULTRASOUND,;  Surgeon: Cheikh Perez MD;  Location:  OR    EP PACEMAKER N/A 4/10/2020    Procedure: EP Pacemaker;  Surgeon: Abhay Willams MD;  Location:  HEART CARDIAC CATH LAB    REZU  2020    Done in Urology office with Dr Perez             Social History:     Never smoker.  .         Family History:     Family History   Problem Relation Age of Onset    Family History Negative Mother          in her 80's    Family History Negative Father          accidentally in boating accident.     Family history reviewed.             Allergies:   No Known Allergies          Medications:     Current Facility-Administered Medications   Medication Dose Route Frequency Provider Last Rate Last Admin    sodium chloride 0.9% irrigation (bag)   Irrigation Continuous Deanna Ureña MD   3,000 mL at 25 0905     Current Outpatient Medications   Medication Sig Dispense Refill    apixaban ANTICOAGULANT (ELIQUIS ANTICOAGULANT) 2.5 MG tablet Take 1 tablet (2.5 mg) by mouth 2 times daily. 180 tablet 3    bumetanide (BUMEX) 0.5 MG tablet Take 1 tablet (0.5 mg) by mouth daily (+ additional 1 mg to = 1.5 mg daily). 90 tablet 3    bumetanide (BUMEX) 1 MG tablet Take 1 tablet (1 mg) by mouth daily. (+ additional 0.5 mg to = 1.5 mg daily). 90 tablet 3    docusate sodium (COLACE) 100 MG tablet Take 1 tablet (100 mg) by mouth daily. 60 tablet 1     "finasteride (PROSCAR) 5 MG tablet Take 1 tablet (5 mg) by mouth daily. 90 tablet 3    insulin glargine (LANTUS SOLOSTAR) 100 UNIT/ML pen Inject 13 Units subcutaneously at bedtime. 15 mL 3    loratadine 10 MG capsule Take 10 mg by mouth daily.      melatonin 3 MG tablet Take 3 mg by mouth nightly as needed      metoprolol succinate ER (TOPROL XL) 25 MG 24 hr tablet Take 12.5 mg by mouth every morning.      Multiple Vitamin (MULTIVITAMIN ADULT) TABS Take 1 tablet by mouth daily.      tamsulosin (FLOMAX) 0.4 MG capsule Take 0.4 mg by mouth 2 times daily. Take one capsule in the morning and one capsule in the evening by mouth      vitamin C (ASCORBIC ACID) 1000 MG TABS Take 1,000 mg by mouth daily.      zinc 50 MG TABS Take 50 mg by mouth daily      blood glucose (ONETOUCH ULTRA TEST) test strip USE TO TEST BLOOD SUGAR ONE TIME DAILY OR AS DIRECTED. 100 strip 2    insulin pen needle (ULTICARE SHORT) 31G X 8 MM miscellaneous use 1 pen daily for injection 100 each 2     Facility-Administered Medications Ordered in Other Encounters   Medication Dose Route Frequency Provider Last Rate Last Admin    Patient is already receiving anticoagulation with heparin, enoxaparin (LOVENOX), warfarin (COUMADIN)  or other anticoagulant medication   Does not apply Continuous PRN Lin Ordonez MD        polyethylene glycol (MIRALAX) Packet 17 g  17 g Oral Daily PRN Lin Ordonez MD                 Review of Systems:   A comprehensive 10-point review of systems was performed and found to be negative except as described in the HPI.     /87   Pulse 70   Temp 97.3  F (36.3  C) (Temporal)   Resp 18   Ht 1.727 m (5' 8\")   Wt 86.8 kg (191 lb 5.8 oz)   SpO2 99%   BMI 29.10 kg/m    PSYCH: NAD  EYES: EOMI  MOUTH: MMM  NECK: Supple, no notable adenopathy  RESP: Unlabored breathing  CARDIAC: No LE edema  SKIN: Warm, no rashes  ABD: soft, nontender, incisions C/D/I  NEURO: AAO x3  URO: 3-way Mckenzie catheter in place with CBI on " slow-moderate rate.  Urine is clear with hint of pink.            Data:     Lab Results   Component Value Date    WBC 7.4 07/16/2025    HGB 11.0 (L) 07/16/2025    HCT 35.2 (L) 07/16/2025    MCV 91 07/16/2025     07/16/2025     Lab Results   Component Value Date    CR 2.07 (H) 07/16/2025    CR 2.43 (H) 06/30/2025     Recent Labs   Lab 07/16/25  0454   COLOR Red*   APPEARANCE Slightly Cloudy*   RBCU 55*   WBCU 6*     Urine culture: in process.    Abd/pelvis CT no contrast - Stone Protocol  Result Date: 7/16/2025  EXAM: CT ABDOMEN PELVIS W/O CONTRAST LOCATION: [facility] DATE: 7/16/2025 INDICATION: hematuria COMPARISON: 3/21/2025 TECHNIQUE: CT scan of the abdomen and pelvis was performed without IV contrast. Multiplanar reformats were obtained. Dose reduction techniques were used. CONTRAST: None. FINDINGS: LOWER CHEST: Unremarkable HEPATOBILIARY: Unremarkable PANCREAS: Unremarkable SPLEEN: Scattered calcified granulomas. ADRENAL GLANDS: Unremarkable KIDNEYS/BLADDER: Mckenzie catheter in place. Small blood clot within the bladder. Bilateral hydroureter. Small amount of hemorrhage in the right renal pelvis and proximal ureter. Status post partial right nephrectomy. Small postoperative perinephric fluid collection. BOWEL: Diverticulosis of the colon. No acute inflammatory change. No obstruction. LYMPH NODES: Normal. VASCULATURE: No abdominal aortic aneurysm. PELVIC ORGANS: Prostatomegaly MUSCULOSKELETAL: Unremarkable     IMPRESSION: 1.  There is a small amount of hemorrhage in the right renal pelvis and proximal right ureter and a small blood clot in the bladder.

## 2025-07-16 NOTE — ED TRIAGE NOTES
Pt here with c/o blood in his urine. States he noticed it around 24 hours ago. Has had a catheter in since April 1st, was last changed on 7/7, states this is not the first time he has had blood in his urine. Denies any pulling or trauma to the catheter. Just finished abx for a UTI. Was seen 6/29 for an obstruction, with the catheter. States it is draining normally. On eliquis, did not take his dose today because of the bleeding.      Triage Assessment (Adult)       Row Name 07/16/25 0040          Triage Assessment    Airway WDL WDL        Respiratory WDL    Respiratory WDL WDL        Skin Circulation/Temperature WDL    Skin Circulation/Temperature WDL WDL        Cardiac WDL    Cardiac WDL WDL        Peripheral/Neurovascular WDL    Peripheral Neurovascular WDL WDL        Cognitive/Neuro/Behavioral WDL    Cognitive/Neuro/Behavioral WDL WDL

## 2025-07-16 NOTE — H&P
[facility]    History and Physical  Hospitalist       Date of Admission:  7/16/2025    Assessment & Plan   Gene Pagan is a 80 year old man with PMH significant for insulin-dependent type 2 diabetes mellitus, CKD 4, systolic heart failure, BPH, atrial fibrillation status post AV node ablation and pacemaker, chronic anticoagulation with Eliquis, recent urinary tract infection, and recent right robotic partial nephrectomy on 6/23/2025 for clear-cell renal cell carcinoma.  Patient presented to the emergency department due to hematuria.      In the ED the vital signs stable, afebrile.  Continuous bladder irrigation was started due to hemorrhagic urine output with clots blocking previous indwelling Mckenzie catheter drainage.  Hemoglobin is 11.  Creatinine is 2.07 with a GFR of 31.  Coagulation studies normal.  Urinalysis is hemorrhagic.  CT abdomen pelvis without contrast shows small blood clot within the bladder, hemorrhage in the right renal pelvis and proximal ureter with a small postoperative perinephric fluid collection.  Urology was contacted from the emergency department.  Given 1 L of normal saline.  Admission requested from the hospitalist for further cares and monitoring.     Acute right renal hemorrhage  Acute blood loss anemia secondary to gross hematuria  Renal cell carcinoma of right kidney status post robotic partial nephrectomy  Chronic urinary retention and BPH s/p surgical intervention managed with chronic indwelling Mckenzie catheter  History of bilateral hydronephrosis  History of urinary retention and gross hematuria.  Follows with Dr. Perez of urology.  -Appreciate urology evaluation recommendations.  -Continuous bladder irrigation with hand irrigation as needed  -Monitor urine output and hemoglobin  -Hold prior to admission Eliquis last dose 7/15  - No antibiotics at this time.  Will follow.  - After midnight in case of potential intervention, that this was felt less  likely.     CKD 4  S/p partial nephrectomy  Progressive CKD, thought to be due to DM2, HTN, and CHF. Baseline Cr has varied, but is mostly between 2-2.5.  Slight.  Renally dose medications and avoid nephrotoxins.     HTN  Chronic persistent atrial fibrillation Afib  Chronic systolic HF (EF 45-50%) s/p ppm  Currently taking metoprolol XL 12.5mg daily. Continue bumex 1.5mg daily.   - metoprolol XL 12.5mg daily  - bumex 1.5mg daily      Anemia  Hgb near baseline around 11.    Insulin-dependent type 2 diabetes mellitus  PTA regimen includes Lantus 13 units at bedtime.  Checking hemoglobin A1c.  Given n.p.o. at midnight status, will order Lantus 5 units tonight.  AC/at bedtime glucose checks and correction insulin.    DVT Prophylaxis: Pneumatic Compression Devices while PTA DOAC on hold  Code Status: Full Code after discussion with patient.   Medically Ready for Discharge: Anticipated Tomorrow pending progress with CBI  Expected discharge: Possible discharge as early as tomorrow pending improvement in bleeding.     Shayy Ivan MD FACP  Hospitalist Service  [facility]        Primary Care Physician   Dm Lipscomb MD    Chief Complaint   Hematuria    History is obtained from the patient    History of Present Illness   Gene Pagan is a 80 year old man with PMH significant for insulin-dependent type 2 diabetes mellitus, CKD 4, systolic heart failure, BPH, atrial fibrillation status post AV node ablation and pacemaker, chronic anticoagulation with Eliquis, recent urinary tract infection, and recent right robotic partial nephrectomy on 6/23/2025 for clear-cell renal cell carcinoma.  Patient presented to the emergency department due to hematuria.  Hemoglobin stable since surgery.  CT scan completed and reviewed by urology.  Inflammation and hematoma above upper pole right kidney consistent with recent partial nephrectomy.  Patient seen by urology and is on continuous bladder irrigation.  Patient  brought in on hospital service under observation status.  Anticipate will likely resume Eliquis on 7/17.  Will make n.p.o. after midnight in case of potential need for intervention, though this is felt to be less likely.  Appreciate urology following.    Past Medical History    I have reviewed this patient's medical history and updated it with pertinent information if needed.   Past Medical History:   Diagnosis Date    Anemia     Atrial fibrillation     AV node ablation and pacemaker placement 4/10/2020    BPH (benign prostatic hyperplasia)     Chronic diastolic CHF     Chronic kidney disease (CKD), stage III (moderate) (H)     Diabetes mellitus - type 2     Palpitations     Systolic CHF (H)        Past Surgical History   I have reviewed this patient's surgical history and updated it with pertinent information if needed.  Past Surgical History:   Procedure Laterality Date    ANESTHESIA CARDIOVERSION N/A 4/6/2020    Procedure: ANESTHESIA, FOR CARDIOVERSION;  Surgeon: GENERIC ANESTHESIA PROVIDER;  Location: [facility] OR    CYSTOSCOPY      DAVINCI NEPHRECTOMY PARTIAL Right 6/23/2025    Procedure: RIGHT ROBOTIC ASSISTED LAPAROSOCPIC PARTIAL NEPHRECTOMY WITH INTRA-OPERATIVE RENAL ULTRASOUND,;  Surgeon: Cheikh Perez MD;  Location:  OR    EP PACEMAKER N/A 4/10/2020    Procedure: EP Pacemaker;  Surgeon: Abhay Willams MD;  Location:  HEART CARDIAC CATH LAB    [facility]  09/25/2020    Done in Urology office with Dr Perez       Prior to Admission Medications   Prior to Admission Medications   Prescriptions Last Dose Informant Patient Reported? Taking?   Multiple Vitamin (MULTIVITAMIN ADULT) TABS 7/15/2025 Evening Self Yes Yes   Sig: Take 1 tablet by mouth daily.   apixaban ANTICOAGULANT (ELIQUIS ANTICOAGULANT) 2.5 MG tablet Morning Self No Yes   Sig: Take 1 tablet (2.5 mg) by mouth 2 times daily.   blood glucose (ONETOUCH ULTRA TEST) test strip  Self No No   Sig: USE TO TEST BLOOD SUGAR ONE TIME DAILY OR AS DIRECTED.    bumetanide (BUMEX) 0.5 MG tablet 7/15/2025 Morning Self No Yes   Sig: Take 1 tablet (0.5 mg) by mouth daily (+ additional 1 mg to = 1.5 mg daily).   bumetanide (BUMEX) 1 MG tablet 7/15/2025 Morning Self No Yes   Sig: Take 1 tablet (1 mg) by mouth daily. (+ additional 0.5 mg to = 1.5 mg daily).   docusate sodium (COLACE) 100 MG tablet 7/15/2025 Morning  No Yes   Sig: Take 1 tablet (100 mg) by mouth daily.   finasteride (PROSCAR) 5 MG tablet 7/15/2025 Morning Self No Yes   Sig: Take 1 tablet (5 mg) by mouth daily.   insulin glargine (LANTUS SOLOSTAR) 100 UNIT/ML pen 7/15/2025 Evening Self No Yes   Sig: Inject 13 Units subcutaneously at bedtime.   insulin pen needle (ULTICARE SHORT) 31G X 8 MM miscellaneous  Self No No   Sig: use 1 pen daily for injection   loratadine 10 MG capsule 7/15/2025 Morning  Yes Yes   Sig: Take 10 mg by mouth daily.   melatonin 3 MG tablet 7/15/2025 Bedtime Self Yes Yes   Sig: Take 3 mg by mouth nightly as needed   metoprolol succinate ER (TOPROL XL) 25 MG 24 hr tablet 7/15/2025 Morning  Yes Yes   Sig: Take 12.5 mg by mouth every morning.   tamsulosin (FLOMAX) 0.4 MG capsule 7/15/2025 Evening  Yes Yes   Sig: Take 0.4 mg by mouth 2 times daily. Take one capsule in the morning and one capsule in the evening by mouth   vitamin C (ASCORBIC ACID) 1000 MG TABS 7/15/2025 Morning Self Yes Yes   Sig: Take 1,000 mg by mouth daily.   zinc 50 MG TABS 7/15/2025 Evening Self Yes Yes   Sig: Take 50 mg by mouth daily      Facility-Administered Medications: None     Allergies   No Known Allergies    Social History   Patient lives at home with his wife.   I have reviewed this patient's social history and updated it with pertinent information if needed. Gene Pagan  reports that he has never smoked. He has never used smokeless tobacco. He reports current alcohol use. He reports that he does not currently use drugs after having used the following drugs: Marijuana.    Family History   I have reviewed this  patient's family history and updated it with pertinent information if needed.   Family History   Problem Relation Age of Onset    Family History Negative Mother          in her 80's    Family History Negative Father          accidentally in boating accident.       Review of Systems   Unless otherwise stated above, remainder of 10 point review of systems is negative.     Physical Exam   Temp: 97.6  F (36.4  C) Temp src: Oral BP: 128/68 Pulse: 70   Resp: 18 SpO2: 98 % O2 Device: None (Room air)    Vital Signs with Ranges  Temp:  [97.3  F (36.3  C)-97.6  F (36.4  C)] 97.6  F (36.4  C)  Pulse:  [70-71] 70  Resp:  [18] 18  BP: (128-167)/(68-87) 128/68  SpO2:  [98 %-99 %] 98 %  186 lbs 1.6 oz    Constitutional: Pleasant gentleman resting in bed. Alert and oriented x3. No acute distress.   HEENT: NCAT. EOMI. Moist oral mucosa.  Respiratory: Clear to auscultation bilaterally. No crackles or wheezes.  Cardiovascular: Irregularly irregular rhythm. Regular rate. No murmur appreciated. No lower extremity edema.  GI: Distended, but soft. Non-tender. Normoactive bowel sounds.   Musculoskeletal: No gross deformities.   Neurologic: Alert and oriented x3. No focal neurologic deficits. Did not assess gait.      Data   Data reviewed today:  I personally reviewed CT abdomen report and labs.       Recent Labs   Lab 25  0414   WBC 7.4   HGB 11.0*   MCV 91      INR 1.03      POTASSIUM 4.4   CHLORIDE 101   CO2 23   BUN 27.9*   CR 2.07*   ANIONGAP 11   HARPER 8.9   *       Recent Results (from the past 24 hours)   Abd/pelvis CT no contrast - Stone Protocol    Narrative    EXAM: CT ABDOMEN PELVIS W/O CONTRAST  LOCATION: [facility]  DATE: 2025    INDICATION: hematuria  COMPARISON: 3/21/2025  TECHNIQUE: CT scan of the abdomen and pelvis was performed without IV contrast. Multiplanar reformats were obtained. Dose reduction techniques were used.  CONTRAST: None.    FINDINGS:   LOWER  CHEST: Unremarkable    HEPATOBILIARY: Unremarkable    PANCREAS: Unremarkable    SPLEEN: Scattered calcified granulomas.    ADRENAL GLANDS: Unremarkable    KIDNEYS/BLADDER: Mckenzie catheter in place. Small blood clot within the bladder. Bilateral hydroureter. Small amount of hemorrhage in the right renal pelvis and proximal ureter. Status post partial right nephrectomy. Small postoperative perinephric fluid   collection.    BOWEL: Diverticulosis of the colon. No acute inflammatory change. No obstruction.     LYMPH NODES: Normal.    VASCULATURE: No abdominal aortic aneurysm.    PELVIC ORGANS: Prostatomegaly    MUSCULOSKELETAL: Unremarkable      Impression    IMPRESSION:   1.  There is a small amount of hemorrhage in the right renal pelvis and proximal right ureter and a small blood clot in the bladder.

## 2025-07-16 NOTE — PLAN OF CARE
"PRIMARY DIAGNOSIS: Gross Hematuria  OUTPATIENT/OBSERVATION GOALS TO BE MET BEFORE DISCHARGE:  ADLs back to baseline: Yes    Activity and level of assistance: Ambulating independently.    Pain status: Pain free.    Return to near baseline physical activity: Yes     Discharge Planner Nurse   Safe discharge environment identified: Yes  Barriers to discharge: Yes       Entered by: Natalia Saldaña RN 07/16/2025 1:15 PM   Denies pain. AxOx4. Eating and drinking. Up independently in room. Educated on fall risks and declines bed alarm. Provider and CN informed. Urine light pink in color, CBI running slow. 1 small clot the size of a pea in urine.  Please review provider order for any additional goals.   Nurse to notify provider when observation goals have been met and patient is ready for discharge.Goal Outcome Evaluation:      Plan of Care Reviewed With: patient    Overall Patient Progress: improvingOverall Patient Progress: improving    Outcome Evaluation: urine light pink in color, 1 small clot in baker, denies pain        Problem: Adult Inpatient Plan of Care  Goal: Plan of Care Review  Description: The Plan of Care Review/Shift note should be completed every shift.  The Outcome Evaluation is a brief statement about your assessment that the patient is improving, declining, or no change.  This information will be displayed automatically on your shift  note.  Outcome: Progressing  Flowsheets (Taken 7/16/2025 1314)  Outcome Evaluation: urine light pink in color, 1 small clot in baker, denies pain  Plan of Care Reviewed With: patient  Overall Patient Progress: improving  Goal: Patient-Specific Goal (Individualized)  Description: You can add care plan individualizations to a care plan. Examples of Individualization might be:  \"Parent requests to be called daily at 9am for status\", \"I have a hard time hearing out of my right ear\", or \"Do not touch me to wake me up as it startles  me\".  Outcome: Progressing  Goal: Absence of " Hospital-Acquired Illness or Injury  Outcome: Progressing  Intervention: Identify and Manage Fall Risk  Recent Flowsheet Documentation  Taken 7/16/2025 1000 by Natalia Saldaña RN  Safety Promotion/Fall Prevention:   activity supervised   treat underlying cause   treat reversible contributory factors   supervised activity   safety round/check completed   room organization consistent   patient and family education   nonskid shoes/slippers when out of bed   clutter free environment maintained  Intervention: Prevent Skin Injury  Recent Flowsheet Documentation  Taken 7/16/2025 1000 by Natalia Saldaña RN  Body Position: position changed independently  Goal: Optimal Comfort and Wellbeing  Outcome: Progressing  Goal: Readiness for Transition of Care  Outcome: Progressing     Problem: Delirium  Goal: Optimal Coping  Outcome: Progressing  Goal: Improved Behavioral Control  Outcome: Progressing  Intervention: Minimize Safety Risk  Recent Flowsheet Documentation  Taken 7/16/2025 1000 by Natalia Saldaña RN  Enhanced Safety Measures: review medications for side effects with activity  Goal: Improved Attention and Thought Clarity  Outcome: Progressing  Goal: Improved Sleep  Outcome: Progressing

## 2025-07-16 NOTE — ED NOTES
"[facility]  ED Nurse Handoff Report    ED Chief complaint: Hematuria  . ED Diagnosis:   Final diagnoses:   Gross hematuria   Renal hemorrhage, right       Allergies: No Known Allergies    Code Status: Full Code    Activity level - Baseline/Home:  independent.  Activity Level - Current:   assist of 1.   Lift room needed: No.   Bariatric: No   Needed: No   Isolation: No.   Infection: Not Applicable.     Respiratory status: Room air    Vital Signs (within 30 minutes):   Vitals:    07/16/25 0039 07/16/25 0734   BP: (!) 167/69 130/87   Pulse: 70 70   Resp: 18 18   Temp: 97.3  F (36.3  C)    TempSrc: Temporal    SpO2: 98% 99%   Weight: 86.8 kg (191 lb 5.8 oz)        Cardiac Rhythm:  ,      Pain level:    Patient confused: No.   Patient Falls Risk: patient and family education, assistive device/personal items within reach, and activity supervised.   Elimination Status: Urethral catheter (baker) in place; refer to orders to discontinue as per protocol  -continuous bladder irrigation    Patient Report - Initial Complaint: hematuria.   Focused Assessment: Gene Pagan is a 80 year old male anticoagulated with Eliquis for a-fib with past medical history significant for congestive heart failure, type 2 diabetes mellitus, stage 3 chronic kidney disease, and hypertension who presents via car from home accompanied by wife with chief complaint of hematuria. The patient reports onset of hematuria 24 hours ago (7/15/25). He states he has had intermittent episodes of hematuria since his recent kidney surgery 4 weeks ago to remove a renal mass. These episodes are usually pink tinge and not \"fully red\". His catheter is still draining. Last night at 2200 (7/15/25), he noticed some clotting. He is not on chemo. He follows with his urologist Dr. Carrasco. He skipped his Eliquis dose this morning (07/16/25) and last night (7/15/25).      Abnormal Results:   Labs Ordered and Resulted from Time of ED Arrival " to Time of ED Departure   BASIC METABOLIC PANEL - Abnormal       Result Value    Sodium 135      Potassium 4.4      Chloride 101      Carbon Dioxide (CO2) 23      Anion Gap 11      Urea Nitrogen 27.9 (*)     Creatinine 2.07 (*)     GFR Estimate 31 (*)     Calcium 8.9      Glucose 110 (*)    ROUTINE UA WITH MICROSCOPIC REFLEX TO CULTURE - Abnormal    Color Urine Red (*)     Appearance Urine Slightly Cloudy (*)     Glucose Urine        Bilirubin Urine        Ketones Urine        Specific Gravity Urine        Blood Urine Large (*)     pH Urine        Protein Albumin Urine        Urobilinogen Urine        Nitrite Urine        Leukocyte Esterase Urine        Mucus Urine Present (*)     Calcium Oxalate Crystals Urine Few (*)     RBC Urine 55 (*)     WBC Urine 6 (*)    CBC WITH PLATELETS AND DIFFERENTIAL - Abnormal    WBC Count 7.4      RBC Count 3.85 (*)     Hemoglobin 11.0 (*)     Hematocrit 35.2 (*)     MCV 91      MCH 28.6      MCHC 31.3 (*)     RDW 14.1      Platelet Count 326      % Neutrophils 67      % Lymphocytes 22      % Monocytes 7      % Eosinophils 2      % Basophils 1      % Immature Granulocytes 1      NRBCs per 100 WBC 0      Absolute Neutrophils 5.0      Absolute Lymphocytes 1.6      Absolute Monocytes 0.6      Absolute Eosinophils 0.2      Absolute Basophils 0.0      Absolute Immature Granulocytes 0.0      Absolute NRBCs 0.0     INR - Normal    INR 1.03      PT 13.6          Abd/pelvis CT no contrast - Stone Protocol   Final Result   IMPRESSION:    1.  There is a small amount of hemorrhage in the right renal pelvis and proximal right ureter and a small blood clot in the bladder.             Treatments provided: see MAR  Family Comments: spouse at bedside  OBS brochure/video discussed/provided to patient:  N/A  ED Medications:   Medications   sodium chloride 0.9% BOLUS 1,000 mL (1,000 mLs Intravenous $New Bag 7/16/25 0733)   sodium chloride 0.9% irrigation (bag) (3,000 mLs Irrigation $New Bag 7/16/25  0733)       Drips infusing:  No  For the majority of the shift this patient was Green.   Interventions performed were n/a.    Sepsis treatment initiated: No    Cares/treatment/interventions/medications to be completed following ED care: CBI, I & O    ED Nurse Name: Jacqueline Ocasio RN  7:44 AM  RECEIVING UNIT ED HANDOFF REVIEW    Above ED Nurse Handoff Report was reviewed: Yes  Reviewed by: Natalia Saldaña RN on July 16, 2025 at 9:12 AM   I Nomi called the ED to inform them the note was read: Yes

## 2025-07-16 NOTE — PLAN OF CARE
ROOM # 204-2    Living Situation (if not independent, order SW consult):  Facility name:  : NA    Activity level at baseline: IND  Activity level on admit: Ax1    Who will be transporting you at discharge: family    Patient registered to observation; given Patient Bill of Rights; given the opportunity to ask questions about observation status and their plan of care.  Patient has been oriented to the observation room, bathroom and call light is in place.    Discussed discharge goals and expectations with patient/family.         Goal Outcome Evaluation:

## 2025-07-16 NOTE — ED NOTES
"Hand irrigated baker. Several small clots removed. Couple of hours later, patient stated urine was not emptying as well, and felt \"pressure.\" Per Provider, placed 3 way baker cath for irrigation.  "

## 2025-07-17 VITALS
HEIGHT: 68 IN | SYSTOLIC BLOOD PRESSURE: 130 MMHG | HEART RATE: 72 BPM | BODY MASS INDEX: 28.2 KG/M2 | DIASTOLIC BLOOD PRESSURE: 63 MMHG | WEIGHT: 186.1 LBS | RESPIRATION RATE: 18 BRPM | TEMPERATURE: 97.8 F | OXYGEN SATURATION: 94 %

## 2025-07-17 LAB
ANION GAP SERPL CALCULATED.3IONS-SCNC: 9 MMOL/L (ref 7–15)
BACTERIA UR CULT: NORMAL
BASOPHILS # BLD AUTO: 0 10E3/UL (ref 0–0.2)
BASOPHILS NFR BLD AUTO: 1 %
BUN SERPL-MCNC: 26.6 MG/DL (ref 8–23)
CALCIUM SERPL-MCNC: 9.1 MG/DL (ref 8.8–10.4)
CHLORIDE SERPL-SCNC: 103 MMOL/L (ref 98–107)
CREAT SERPL-MCNC: 2.04 MG/DL (ref 0.67–1.17)
EGFRCR SERPLBLD CKD-EPI 2021: 32 ML/MIN/1.73M2
EOSINOPHIL # BLD AUTO: 0.3 10E3/UL (ref 0–0.7)
EOSINOPHIL NFR BLD AUTO: 4 %
ERYTHROCYTE [DISTWIDTH] IN BLOOD BY AUTOMATED COUNT: 14 % (ref 10–15)
GLUCOSE BLDC GLUCOMTR-MCNC: 104 MG/DL (ref 70–99)
GLUCOSE BLDC GLUCOMTR-MCNC: 114 MG/DL (ref 70–99)
GLUCOSE BLDC GLUCOMTR-MCNC: 117 MG/DL (ref 70–99)
GLUCOSE BLDC GLUCOMTR-MCNC: 140 MG/DL (ref 70–99)
GLUCOSE BLDC GLUCOMTR-MCNC: 88 MG/DL (ref 70–99)
GLUCOSE SERPL-MCNC: 92 MG/DL (ref 70–99)
HCO3 SERPL-SCNC: 25 MMOL/L (ref 22–29)
HCT VFR BLD AUTO: 33.9 % (ref 40–53)
HGB BLD-MCNC: 10.7 G/DL (ref 13.3–17.7)
IMM GRANULOCYTES # BLD: 0 10E3/UL
IMM GRANULOCYTES NFR BLD: 1 %
LYMPHOCYTES # BLD AUTO: 1.6 10E3/UL (ref 0.8–5.3)
LYMPHOCYTES NFR BLD AUTO: 22 %
MCH RBC QN AUTO: 28.9 PG (ref 26.5–33)
MCHC RBC AUTO-ENTMCNC: 31.6 G/DL (ref 31.5–36.5)
MCV RBC AUTO: 92 FL (ref 78–100)
MONOCYTES # BLD AUTO: 0.6 10E3/UL (ref 0–1.3)
MONOCYTES NFR BLD AUTO: 8 %
NEUTROPHILS # BLD AUTO: 4.7 10E3/UL (ref 1.6–8.3)
NEUTROPHILS NFR BLD AUTO: 65 %
NRBC # BLD AUTO: 0 10E3/UL
NRBC BLD AUTO-RTO: 0 /100
PLATELET # BLD AUTO: 273 10E3/UL (ref 150–450)
POTASSIUM SERPL-SCNC: 4.1 MMOL/L (ref 3.4–5.3)
RBC # BLD AUTO: 3.7 10E6/UL (ref 4.4–5.9)
SODIUM SERPL-SCNC: 137 MMOL/L (ref 135–145)
WBC # BLD AUTO: 7.2 10E3/UL (ref 4–11)

## 2025-07-17 PROCEDURE — 82962 GLUCOSE BLOOD TEST: CPT

## 2025-07-17 PROCEDURE — 120N000001 HC R&B MED SURG/OB

## 2025-07-17 PROCEDURE — 99231 SBSQ HOSP IP/OBS SF/LOW 25: CPT | Performed by: PHYSICIAN ASSISTANT

## 2025-07-17 PROCEDURE — 85025 COMPLETE CBC W/AUTO DIFF WBC: CPT | Performed by: INTERNAL MEDICINE

## 2025-07-17 PROCEDURE — 80048 BASIC METABOLIC PNL TOTAL CA: CPT | Performed by: INTERNAL MEDICINE

## 2025-07-17 PROCEDURE — 36415 COLL VENOUS BLD VENIPUNCTURE: CPT | Performed by: INTERNAL MEDICINE

## 2025-07-17 PROCEDURE — 250N000013 HC RX MED GY IP 250 OP 250 PS 637: Performed by: INTERNAL MEDICINE

## 2025-07-17 PROCEDURE — 99232 SBSQ HOSP IP/OBS MODERATE 35: CPT | Performed by: INTERNAL MEDICINE

## 2025-07-17 PROCEDURE — 250N000012 HC RX MED GY IP 250 OP 636 PS 637: Performed by: INTERNAL MEDICINE

## 2025-07-17 RX ADMIN — Medication 1 MG: at 21:34

## 2025-07-17 RX ADMIN — DOCUSATE SODIUM 100 MG: 100 CAPSULE, LIQUID FILLED ORAL at 07:34

## 2025-07-17 RX ADMIN — INSULIN GLARGINE 5 UNITS: 100 INJECTION, SOLUTION SUBCUTANEOUS at 22:40

## 2025-07-17 RX ADMIN — BUMETANIDE 1.5 MG: 0.5 TABLET ORAL at 07:34

## 2025-07-17 RX ADMIN — TAMSULOSIN HYDROCHLORIDE 0.4 MG: 0.4 CAPSULE ORAL at 21:34

## 2025-07-17 RX ADMIN — METOPROLOL SUCCINATE 12.5 MG: 25 TABLET, EXTENDED RELEASE ORAL at 07:34

## 2025-07-17 RX ADMIN — FINASTERIDE 5 MG: 5 TABLET, FILM COATED ORAL at 07:34

## 2025-07-17 RX ADMIN — TAMSULOSIN HYDROCHLORIDE 0.4 MG: 0.4 CAPSULE ORAL at 07:34

## 2025-07-17 RX ADMIN — SENNOSIDES 2 TABLET: 8.6 TABLET, FILM COATED ORAL at 18:37

## 2025-07-17 RX ADMIN — LORATADINE 10 MG: 10 TABLET ORAL at 07:34

## 2025-07-17 RX ADMIN — APIXABAN 2.5 MG: 2.5 TABLET, FILM COATED ORAL at 10:31

## 2025-07-17 ASSESSMENT — ACTIVITIES OF DAILY LIVING (ADL)
CONCENTRATING,_REMEMBERING_OR_MAKING_DECISIONS_DIFFICULTY: NO
ADLS_ACUITY_SCORE: 48
DOING_ERRANDS_INDEPENDENTLY_DIFFICULTY: NO
ADLS_ACUITY_SCORE: 48
ADLS_ACUITY_SCORE: 49
ADLS_ACUITY_SCORE: 31
VISION_MANAGEMENT: GLASSES
ADLS_ACUITY_SCORE: 48
ADLS_ACUITY_SCORE: 48
FALL_HISTORY_WITHIN_LAST_SIX_MONTHS: NO
TOILETING_ISSUES: NO
ADLS_ACUITY_SCORE: 49
ADLS_ACUITY_SCORE: 48
ADLS_ACUITY_SCORE: 31
WEAR_GLASSES_OR_BLIND: YES
ADLS_ACUITY_SCORE: 48
ADLS_ACUITY_SCORE: 48
DIFFICULTY_EATING/SWALLOWING: NO
ADLS_ACUITY_SCORE: 31
ADLS_ACUITY_SCORE: 48
DRESSING/BATHING_DIFFICULTY: NO
ADLS_ACUITY_SCORE: 48
ADLS_ACUITY_SCORE: 31
ADLS_ACUITY_SCORE: 49
CHANGE_IN_FUNCTIONAL_STATUS_SINCE_ONSET_OF_CURRENT_ILLNESS/INJURY: NO
ADLS_ACUITY_SCORE: 31
ADLS_ACUITY_SCORE: 48
ADLS_ACUITY_SCORE: 49
WALKING_OR_CLIMBING_STAIRS_DIFFICULTY: NO
ADLS_ACUITY_SCORE: 48
ADLS_ACUITY_SCORE: 48

## 2025-07-17 NOTE — PLAN OF CARE
Notified provider about indwelling baker catheter discussed removal or continued need.    Did provider choose to remove indwelling baker catheter? No    Provider's baker indication for keeping indwelling baker catheter: Gross hematuria    Is the catheter for retention? NO    *If there is a plan to keep baker catheter in place at discharge daily notification with provider is not necessary, but please add a notation in the treatment team sticky note that the patient will be discharging with the catheter.

## 2025-07-17 NOTE — PLAN OF CARE
"Care from [id number]  Goal Outcome Evaluation:      Plan of Care Reviewed With: patient    Overall Patient Progress: improvingOverall Patient Progress: improving    Outcome Evaluation: A/O*4, VSS, RA, Denies pain. CBI with good output light pink in color, no blood clot observed. No hand irrigation needed. Urology following.      Problem: Adult Inpatient Plan of Care  Goal: Plan of Care Review  Description:   Outcome: Progressing  Flowsheets (Taken 7/17/2025 0040)  Outcome Evaluation: A/O*4, VSS, RA, Denies pain. CBI Urine output light pink in color, no blood clot observed yet.  Plan of Care Reviewed With: patient  Overall Patient Progress: improving  Goal: Patient-Specific Goal (Individualized)  Description: You can add care plan individualizations to a care plan. Examples of Individualization might be:  \"Parent requests to be called daily at 9am for status\", \"I have a hard time hearing out of my right ear\", or \"Do not touch me to wake me up as it startles  me\".  Outcome: Progressing  Goal: Absence of Hospital-Acquired Illness or Injury  Outcome: Progressing  Intervention: Identify and Manage Fall Risk  Recent Flowsheet Documentation  Taken 7/17/2025 0034 by Tisha Batres, RN  Safety Promotion/Fall Prevention:   activity supervised   treat underlying cause   treat reversible contributory factors   supervised activity   safety round/check completed   room organization consistent   patient and family education   nonskid shoes/slippers when out of bed   clutter free environment maintained  Intervention: Prevent Infection  Recent Flowsheet Documentation  Taken 7/17/2025 0034 by Tisha Batres, RN  Infection Prevention:   rest/sleep promoted   single patient room provided   hand hygiene promoted  Goal: Optimal Comfort and Wellbeing  Outcome: Progressing  Goal: Readiness for Transition of Care  Outcome: Progressing         " No

## 2025-07-17 NOTE — PLAN OF CARE
"PRIMARY DIAGNOSIS: Gross Hematuria  OUTPATIENT/OBSERVATION GOALS TO BE MET BEFORE DISCHARGE:  ADLs back to baseline: Yes    Activity and level of assistance: Ambulating independently.    Pain status: Pain free.    Return to near baseline physical activity: Yes     Discharge Planner Nurse   Safe discharge environment identified: Yes  Barriers to discharge: Yes       Entered by: Natalia Saldaña RN 07/17/2025 11:11 AM   CBI stopped by Urology. Urine became cherry red. Mckenzie flushed by urology. CBI resumed. Eating and drinking. Denies pain. AxOx4. Independent in room.   Please review provider order for any additional goals.   Nurse to notify provider when observation goals have been met and patient is ready for discharge.Goal Outcome Evaluation:      Plan of Care Reviewed With: patient    Overall Patient Progress: decliningOverall Patient Progress: declining    Outcome Evaluation: When CBI was stopped, urine became cherry red      Problem: Adult Inpatient Plan of Care  Goal: Plan of Care Review  Description: The Plan of Care Review/Shift note should be completed every shift.  The Outcome Evaluation is a brief statement about your assessment that the patient is improving, declining, or no change.  This information will be displayed automatically on your shift  note.  Outcome: Progressing  Flowsheets (Taken 7/17/2025 1111)  Outcome Evaluation: When CBI was stopped, urine became cherry red  Plan of Care Reviewed With: patient  Overall Patient Progress: declining  Goal: Patient-Specific Goal (Individualized)  Description: You can add care plan individualizations to a care plan. Examples of Individualization might be:  \"Parent requests to be called daily at 9am for status\", \"I have a hard time hearing out of my right ear\", or \"Do not touch me to wake me up as it startles  me\".  Outcome: Progressing  Goal: Absence of Hospital-Acquired Illness or Injury  Outcome: Progressing  Intervention: Prevent Skin Injury  Recent " Flowsheet Documentation  Taken 7/17/2025 0900 by Natalia Saldaña RN  Body Position: position changed independently  Goal: Optimal Comfort and Wellbeing  Outcome: Progressing  Goal: Readiness for Transition of Care  Outcome: Progressing     Problem: Delirium  Goal: Optimal Coping  Outcome: Progressing  Goal: Improved Behavioral Control  Outcome: Progressing  Goal: Improved Attention and Thought Clarity  Outcome: Progressing  Goal: Improved Sleep  Outcome: Progressing

## 2025-07-17 NOTE — PROGRESS NOTES
[facility] Progress Note          Assessment and Plan:     Assessment:    Recent right robotic partial nephrectomy with Dr. Perez on 06/23/2025 for grade 2 of 4, clear-cell renal cell carcinoma    Gross hematuria, thought to be likely secondary to BPH as opposed to active bleed from partial nephrectomy    CKD, stable    Chronic anticoagulation on Eliquis for atrial fibrillation, currently held    DM2    Recent urinary tract infection    Gross hematuria      Plan:   -Okay for diet today.  -Urine is clear on little to no CBI.  Okay to restart Eliquis.   - Hemoglobin and kidney function are stable.  -Continue prior to admission finasteride 5 mg daily and Flomax 0.4 mg twice daily.  - Tentative plan to follow-up with Dr. Perez as scheduled in August for repeat trial of void and cystoscopy.  - If urine remains relatively clear, suspect he will be able discharge home later today.    ADDENDUM  Patient's urine turned cherry colored.  He was also given a dose of Eliquis.  CBI increased wide open, and urine quickly cleared.  He had irrigated three-way Mckenzie catheter with approximately 750 mL.  Slight difficulty with irrigation, but at end irrigating well with no return of clots.  CBI restarted on slow drip.  Would recommend maintaining on CBI at a slow drip and wean as able.  N.p.o. at midnight to reassess.  Will likely need to continue to hold Eliquis at this point.         Laxmi Mansfield PA-C   [facility]  [phone]               Interval History:     Doing well.  Denies pain.  3-way Mckenzie catheter in place with CBI on faint drip, clamped.  Urine clear danyel.  Creatinine 2.04 EGFR 33 (2.07 EGFR 31).  Hemoglobin 10.7 (11.0).  WBC 7.2.  Afebrile without tachycardia. Denies N/V/F/C.  Urine culture in process.              Review of Systems:     The 5 point Review of Systems is negative other than noted in the HPI             Medications:     Current Facility-Administered Medications    Medication Dose Route Frequency Provider Last Rate Last Admin    acetaminophen (TYLENOL) tablet 650 mg  650 mg Oral Q4H PRN Shayy Lai MD   650 mg at 07/16/25 1627    Or    acetaminophen (TYLENOL) Suppository 650 mg  650 mg Rectal Q4H PRN Shayy Lai MD        apixaban ANTICOAGULANT (ELIQUIS) tablet 2.5 mg  2.5 mg Oral BID Shayy Lai MD   2.5 mg at 07/17/25 1031    bumetanide (BUMEX) tablet 1.5 mg  1.5 mg Oral Daily Shayy Lai MD   1.5 mg at 07/17/25 0734    glucose gel 15-30 g  15-30 g Oral Q15 Min PRN Shayy Lai MD        Or    dextrose 50 % injection 25-50 mL  25-50 mL Intravenous Q15 Min PRN Shayy Lai MD        Or    glucagon injection 1 mg  1 mg Subcutaneous Q15 Min PRN Shayy Lai MD        docusate sodium (COLACE) capsule 100 mg  100 mg Oral Daily Shayy Lai MD   100 mg at 07/17/25 0734    finasteride (PROSCAR) tablet 5 mg  5 mg Oral Daily Shayy Lai MD   5 mg at 07/17/25 0734    insulin aspart (NovoLOG) injection (RAPID ACTING)  1-7 Units Subcutaneous TID AC Shayy Lai MD        insulin aspart (NovoLOG) injection (RAPID ACTING)  1-5 Units Subcutaneous At Bedtime Shayy Lai MD        insulin glargine (LANTUS PEN) injection 5 Units  5 Units Subcutaneous At Bedtime Shayy Lai MD        loratadine (CLARITIN) tablet 10 mg  10 mg Oral Daily Shayy Lai MD   10 mg at 07/17/25 0734    melatonin tablet 1 mg  1 mg Oral At Bedtime PRN Shayy Lai MD   1 mg at 07/16/25 2229    metoprolol succinate ER (TOPROL-XL) 24 hr half-tab 12.5 mg  12.5 mg Oral QAM Shayy Lai MD   12.5 mg at 07/17/25 0734    ondansetron (ZOFRAN ODT) ODT tab 4 mg  4 mg Oral Q6H PRN Shayy Lai MD        Or    ondansetron (ZOFRAN) injection 4 mg  4 mg Intravenous Q6H PRN Shayy Lai MD        prochlorperazine (COMPAZINE) injection 5 mg  5 mg Intravenous Q6H PRN Ming,  Shayy Lira MD        Or    prochlorperazine (COMPAZINE) tablet 5 mg  5 mg Oral Q6H PRN Shayy Lai MD        sennosides (SENOKOT) tablet 1-2 tablet  1-2 tablet Oral BID PRN Shayy Lai MD        sodium chloride 0.9% irrigation (bag)   Irrigation Continuous Shayy Lai MD   3,000 mL at 07/16/25 0905    tamsulosin (FLOMAX) capsule 0.4 mg  0.4 mg Oral BID Shayy Lai MD   0.4 mg at 07/17/25 0734     Facility-Administered Medications Ordered in Other Encounters   Medication Dose Route Frequency Provider Last Rate Last Admin    Patient is already receiving anticoagulation with heparin, enoxaparin (LOVENOX), warfarin (COUMADIN)  or other anticoagulant medication   Does not apply Continuous PRN Lin Ordonez MD        polyethylene glycol (MIRALAX) Packet 17 g  17 g Oral Daily PRN Lin Ordonez MD                      Physical Exam:   Vitals were reviewed  Patient Vitals for the past 8 hrs:   BP Temp Temp src Pulse Resp SpO2   07/17/25 1123 114/61 98.1  F (36.7  C) Oral 71 18 94 %   07/17/25 0700 (!) 142/72 97.9  F (36.6  C) Oral 71 18 94 %   07/17/25 0423 123/68 98.4  F (36.9  C) Oral 72 18 99 %     GEN: NAD, lying in bed  EYES: EOMI  MOUTH: MMM  NECK: Supple  RESP: Unlabored breathing  SKIN: Warm  ABD: soft  NEURO: AAO  URO: 3-way Mckenzie catheter in place with CBI on faint drip, clamped.  Urine clear danyel.             Data:     Lab Results   Component Value Date    NTBNPI 4,059 (H) 05/24/2020    NTBNPI 9,653 (H) 05/07/2020    NTBNPI 8,995 (H) 05/06/2020    NTBNP 1,985 (H) 04/06/2021    NTBNP 1,825 (H) 11/12/2020    NTBNP 1,839 (H) 10/23/2020     Lab Results   Component Value Date    WBC 7.2 07/17/2025    WBC 7.4 07/16/2025    WBC 9.6 06/30/2025    HGB 10.7 (L) 07/17/2025    HGB 11.0 (L) 07/16/2025    HGB 10.6 (L) 06/30/2025    HCT 33.9 (L) 07/17/2025    HCT 35.2 (L) 07/16/2025    HCT 32.6 (L) 06/30/2025    MCV 92 07/17/2025    MCV 91 07/16/2025    MCV 89 06/30/2025      07/17/2025     07/16/2025     06/30/2025     Lab Results   Component Value Date    INR 1.03 07/16/2025    INR 1.17 (H) 04/06/2020    INR 1.23 (H) 04/03/2020

## 2025-07-17 NOTE — PLAN OF CARE
PRIMARY DIAGNOSIS: Hematuria   OUTPATIENT/OBSERVATION GOALS TO BE MET BEFORE DISCHARGE:  ADLs back to baseline: Yes    Activity and level of assistance: Ambulating independently.    Pain status: Pain free.    Return to near baseline physical activity: Yes     Discharge Planner Nurse   Safe discharge environment identified: Yes  Barriers to discharge: Yes       Entered by: Sera Christie RN 07/17/2025 2:39 AM    Vitals are Temp: 97.6  F (36.4  C) Temp src: Oral BP: 127/72 Pulse: 73   Resp: 16 SpO2: 95 %.  Patient is Alert and Oriented x4. They are independent with no assistive devices .  Pt is a Regular diet. They are denying pain. Patient is Saline locked. CBI running slow, urine clear w/ a few clots. Slowed rate. ACHS, held evening insulin due to lower glucose. Urology is following for possible morning intervention.        Please review provider order for any additional goals.   Nurse to notify provider when observation goals have been met and patient is ready for discharge.    Goal Outcome Evaluation:      Plan of Care Reviewed With: patient    Overall Patient Progress: improvingOverall Patient Progress: improving

## 2025-07-17 NOTE — PLAN OF CARE
"CBI running to keep urine light pink. Clots evacuated by urology with irrigation. Eating and drinking. AxOx4. Independent in room. Denies pain. NPO 0001 for urologic cares.         Goal Outcome Evaluation:      Plan of Care Reviewed With: patient    Overall Patient Progress: no changeOverall Patient Progress: no change    Outcome Evaluation: CBI running to keep urine clear        Problem: Adult Inpatient Plan of Care  Goal: Plan of Care Review  Description: The Plan of Care Review/Shift note should be completed every shift.  The Outcome Evaluation is a brief statement about your assessment that the patient is improving, declining, or no change.  This information will be displayed automatically on your shift  note.  7/17/2025 1718 by Natalia Saldaña RN  Outcome: Progressing  Flowsheets (Taken 7/17/2025 1718)  Outcome Evaluation: CBI running to keep urine clear  Plan of Care Reviewed With: patient  Overall Patient Progress: no change  7/17/2025 1111 by Natalia Saldaña RN  Outcome: Progressing  Flowsheets (Taken 7/17/2025 1111)  Outcome Evaluation: When CBI was stopped, urine became cherry red  Plan of Care Reviewed With: patient  Overall Patient Progress: declining  Goal: Patient-Specific Goal (Individualized)  Description: You can add care plan individualizations to a care plan. Examples of Individualization might be:  \"Parent requests to be called daily at 9am for status\", \"I have a hard time hearing out of my right ear\", or \"Do not touch me to wake me up as it startles  me\".  7/17/2025 1718 by Natalia Saldaña RN  Outcome: Progressing  7/17/2025 1111 by Natalia Saldaña RN  Outcome: Progressing  Goal: Absence of Hospital-Acquired Illness or Injury  7/17/2025 1718 by Natalia Saldaña RN  Outcome: Progressing  7/17/2025 1111 by Natalia Saldaña RN  Outcome: Progressing  Intervention: Identify and Manage Fall Risk  Recent Flowsheet Documentation  Taken 7/17/2025 1300 by Natalia Saldaña RN  Safety " Promotion/Fall Prevention:   treat underlying cause   treat reversible contributory factors   safety round/check completed  Intervention: Prevent Skin Injury  Recent Flowsheet Documentation  Taken 7/17/2025 1300 by Natalia Saldaña RN  Body Position: position changed independently  Taken 7/17/2025 0900 by Natalia Saldaña RN  Body Position: position changed independently  Intervention: Prevent Infection  Recent Flowsheet Documentation  Taken 7/17/2025 1300 by Natalia Saldaña RN  Infection Prevention: rest/sleep promoted  Goal: Optimal Comfort and Wellbeing  7/17/2025 1718 by Natalia Saldaña RN  Outcome: Progressing  7/17/2025 1111 by Natalia Saldaña RN  Outcome: Progressing  Goal: Readiness for Transition of Care  7/17/2025 1718 by Natalia Saldaña RN  Outcome: Progressing  7/17/2025 1111 by Natalia Saldaña RN  Outcome: Progressing  Intervention: Mutually Develop Transition Plan  Recent Flowsheet Documentation  Taken 7/17/2025 1600 by Natalia Saldaña RN  Equipment Currently Used at Home: none     Problem: Delirium  Goal: Optimal Coping  7/17/2025 1718 by Natalia Saldaña RN  Outcome: Progressing  7/17/2025 1111 by Natalia Saldaña RN  Outcome: Progressing  Goal: Improved Behavioral Control  7/17/2025 1718 by Natalia Saldaña RN  Outcome: Progressing  7/17/2025 1111 by Natalia Saldaña RN  Outcome: Progressing  Intervention: Minimize Safety Risk  Recent Flowsheet Documentation  Taken 7/17/2025 1300 by Natalia Saldaña RN  Enhanced Safety Measures: review medications for side effects with activity  Goal: Improved Attention and Thought Clarity  7/17/2025 1718 by Natalia Saldaña RN  Outcome: Progressing  7/17/2025 1111 by Natalia Saldaña RN  Outcome: Progressing  Goal: Improved Sleep  7/17/2025 1718 by Natalia Saldaña RN  Outcome: Progressing  7/17/2025 1111 by Natalia Saldaña RN  Outcome: Progressing

## 2025-07-17 NOTE — PROGRESS NOTES
[facility]    Hospitalist Progress Note      Assessment & Plan   Gene Pagan is a 80 year old man who was admitted on 7/16/2025. PMH significant for insulin-dependent type 2 diabetes mellitus, CKD 4, systolic heart failure, BPH, atrial fibrillation status post AV node ablation and pacemaker, chronic anticoagulation with Eliquis, recent urinary tract infection, and recent right robotic partial nephrectomy on 6/23/2025 for clear-cell renal cell carcinoma.  Patient presented to the emergency department due to hematuria.       In the ED the vital signs stable, afebrile.  Continuous bladder irrigation was started due to hemorrhagic urine output with clots blocking previous indwelling Mckenzie catheter drainage.  Hemoglobin is 11.  Creatinine is 2.07 with a GFR of 31.  Coagulation studies normal.  Urinalysis is hemorrhagic.  CT abdomen pelvis without contrast shows small blood clot within the bladder, hemorrhage in the right renal pelvis and proximal ureter with a small postoperative perinephric fluid collection.  Urology was contacted from the emergency department.  Given 1 L of normal saline.  Admission requested from the hospitalist for further cares and monitoring.     Acute right renal hemorrhage  Acute blood loss anemia secondary to gross hematuria  Renal cell carcinoma of right kidney status post robotic partial nephrectomy  Chronic urinary retention and BPH s/p surgical intervention managed with chronic indwelling Mckenzie catheter  History of bilateral hydronephrosis  History of urinary retention and gross hematuria.  Follows with Dr. Perez of urology.  -Appreciate urology evaluation recommendations.  -Continuous bladder irrigation with hand irrigation as needed  -Monitor urine output and hemoglobin  - Eliquis had been held on admission.  CBI has been running on 7/16 with resolution of bleeding.  Had planned to resume Eliquis 7/17 morning and possibly discharge patient if no further  bleeding.  However, bleeding returned at the same time as Eliquis was resumed 7/17 morning.  CBI has been resumed and patient will need to be n.p.o. after midnight with ongoing urology management.     CKD 4  S/p partial nephrectomy  Progressive CKD, thought to be due to DM2, HTN, and CHF. Baseline Cr has varied, but is mostly between 2-2.5.  Renally dose medications and avoid nephrotoxins.     HTN  Chronic persistent atrial fibrillation Afib  Chronic systolic HF (EF 45-50%) s/p ppm  Currently taking metoprolol XL 12.5mg daily. Continue bumex 1.5mg daily.   - metoprolol XL 12.5mg daily  - bumex 1.5mg daily      Anemia  Hgb near baseline around 11.     Insulin-dependent type 2 diabetes mellitus  PTA regimen includes Lantus 13 units at bedtime.  Hemoglobin A1c 6.4%.  Given n.p.o. at midnight status, continue with reduced dose of Lantus 5 units tonight.  AC/at bedtime glucose checks and correction insulin.     DVT Prophylaxis: DOAC on hold. SCDs.   Code Status: Full Code  Medically Ready for Discharge: Anticipated in 2-4 Days pending improvement of bleeding  Expected discharge: Anticipate discharge home in 2+ days pending improvement of bleeding and further discharge plan    Shayy Ivan MD FACP  Hospitalist Service  [facility]        Interval History   CBI had been stopped and no further bleeding.  Had planned on resuming Eliquis and likely discharge home.  However bleeding did return this morning at same time that Eliquis was given.  Patient was seen again by urology and CBI has been resumed.  N.p.o. after midnight for further evaluation.    -Data reviewed today: I reviewed all new labs and imaging results over the last 24 hours.       Physical Exam   Temp: 98.1  F (36.7  C) Temp src: Oral BP: 114/61 Pulse: 71   Resp: 18 SpO2: 94 % O2 Device: None (Room air)    Vitals:    07/16/25 0039 07/16/25 1000   Weight: 86.8 kg (191 lb 5.8 oz) 84.4 kg (186 lb 1.6 oz)     Vital Signs with Ranges  Temp:  [97.6   F (36.4  C)-98.4  F (36.9  C)] 98.1  F (36.7  C)  Pulse:  [70-73] 71  Resp:  [16-18] 18  BP: (114-142)/(61-72) 114/61  SpO2:  [94 %-99 %] 94 %  I/O last 3 completed shifts:  In: -   Out: 99518 [Urine:02502]    Constitutional: Pleasant gentleman resting in bed. Alert and oriented x3. No acute distress.   HEENT: NCAT. EOMI. Moist oral mucosa.  Respiratory: Clear to auscultation bilaterally. No crackles or wheezes.  Cardiovascular: Irregularly irregular rhythm. Regular rate. No murmur appreciated. No lower extremity edema.  GI: Distended, but soft. Non-tender. Normoactive bowel sounds.   Genitourinary: CBI has resumed with grade II-III hematuria noted.   Musculoskeletal: No gross deformities.   Neurologic: Alert and oriented x3. No focal neurologic deficits. Did not assess gait.       Medications   Current Facility-Administered Medications   Medication Dose Route Frequency Provider Last Rate Last Admin    sodium chloride 0.9% irrigation (bag)   Irrigation Continuous Shayy Lai MD   3,000 mL at 07/16/25 0905     Current Facility-Administered Medications   Medication Dose Route Frequency Provider Last Rate Last Admin    [Held by provider] apixaban ANTICOAGULANT (ELIQUIS) tablet 2.5 mg  2.5 mg Oral BID Shayy Lai MD   2.5 mg at 07/17/25 1031    bumetanide (BUMEX) tablet 1.5 mg  1.5 mg Oral Daily Shayy Lai MD   1.5 mg at 07/17/25 0734    docusate sodium (COLACE) capsule 100 mg  100 mg Oral Daily Shayy Lai MD   100 mg at 07/17/25 0734    finasteride (PROSCAR) tablet 5 mg  5 mg Oral Daily Shayy Lai MD   5 mg at 07/17/25 0734    insulin aspart (NovoLOG) injection (RAPID ACTING)  1-7 Units Subcutaneous TID AC Shayy Lai MD        insulin aspart (NovoLOG) injection (RAPID ACTING)  1-5 Units Subcutaneous At Bedtime Shayy Lai MD        insulin glargine (LANTUS PEN) injection 5 Units  5 Units Subcutaneous At Bedtime Shayy Lai MD         loratadine (CLARITIN) tablet 10 mg  10 mg Oral Daily Shayy Lai MD   10 mg at 07/17/25 0734    metoprolol succinate ER (TOPROL-XL) 24 hr half-tab 12.5 mg  12.5 mg Oral QAM Shayy Lai MD   12.5 mg at 07/17/25 0734    tamsulosin (FLOMAX) capsule 0.4 mg  0.4 mg Oral BID Shayy Lai MD   0.4 mg at 07/17/25 0734       Data   Recent Labs   Lab 07/17/25  0939 07/17/25  0525 07/17/25  0207 07/16/25  1754 07/16/25  0414   WBC  --  7.2  --   --  7.4   HGB  --  10.7*  --   --  11.0*   MCV  --  92  --   --  91   PLT  --  273  --   --  326   INR  --   --   --   --  1.03   NA  --  137  --   --  135   POTASSIUM  --  4.1  --   --  4.4   CHLORIDE  --  103  --   --  101   CO2  --  25  --   --  23   BUN  --  26.6*  --   --  27.9*   CR  --  2.04*  --   --  2.07*   ANIONGAP  --  9  --   --  11   HARPER  --  9.1  --   --  8.9   * 92 88   < > 110*    < > = values in this interval not displayed.       No results found for this or any previous visit (from the past 24 hours).

## 2025-07-18 LAB
ANION GAP SERPL CALCULATED.3IONS-SCNC: 10 MMOL/L (ref 7–15)
BACTERIA UR CULT: ABNORMAL
BASOPHILS # BLD AUTO: 0 10E3/UL (ref 0–0.2)
BASOPHILS NFR BLD AUTO: 0 %
BUN SERPL-MCNC: 29.4 MG/DL (ref 8–23)
CALCIUM SERPL-MCNC: 9.7 MG/DL (ref 8.8–10.4)
CHLORIDE SERPL-SCNC: 102 MMOL/L (ref 98–107)
CREAT SERPL-MCNC: 2.19 MG/DL (ref 0.67–1.17)
EGFRCR SERPLBLD CKD-EPI 2021: 29 ML/MIN/1.73M2
EOSINOPHIL # BLD AUTO: 0.2 10E3/UL (ref 0–0.7)
EOSINOPHIL NFR BLD AUTO: 2 %
ERYTHROCYTE [DISTWIDTH] IN BLOOD BY AUTOMATED COUNT: 14.1 % (ref 10–15)
GLUCOSE BLDC GLUCOMTR-MCNC: 112 MG/DL (ref 70–99)
GLUCOSE BLDC GLUCOMTR-MCNC: 114 MG/DL (ref 70–99)
GLUCOSE BLDC GLUCOMTR-MCNC: 118 MG/DL (ref 70–99)
GLUCOSE BLDC GLUCOMTR-MCNC: 124 MG/DL (ref 70–99)
GLUCOSE BLDC GLUCOMTR-MCNC: 124 MG/DL (ref 70–99)
GLUCOSE BLDC GLUCOMTR-MCNC: 139 MG/DL (ref 70–99)
GLUCOSE SERPL-MCNC: 109 MG/DL (ref 70–99)
HCO3 SERPL-SCNC: 25 MMOL/L (ref 22–29)
HCT VFR BLD AUTO: 35.5 % (ref 40–53)
HGB BLD-MCNC: 11.1 G/DL (ref 13.3–17.7)
IMM GRANULOCYTES # BLD: 0 10E3/UL
IMM GRANULOCYTES NFR BLD: 0 %
LYMPHOCYTES # BLD AUTO: 1.5 10E3/UL (ref 0.8–5.3)
LYMPHOCYTES NFR BLD AUTO: 15 %
MCH RBC QN AUTO: 28.6 PG (ref 26.5–33)
MCHC RBC AUTO-ENTMCNC: 31.3 G/DL (ref 31.5–36.5)
MCV RBC AUTO: 92 FL (ref 78–100)
MONOCYTES # BLD AUTO: 0.9 10E3/UL (ref 0–1.3)
MONOCYTES NFR BLD AUTO: 9 %
NEUTROPHILS # BLD AUTO: 6.8 10E3/UL (ref 1.6–8.3)
NEUTROPHILS NFR BLD AUTO: 72 %
NRBC # BLD AUTO: 0 10E3/UL
NRBC BLD AUTO-RTO: 0 /100
PLATELET # BLD AUTO: 287 10E3/UL (ref 150–450)
POTASSIUM SERPL-SCNC: 4.3 MMOL/L (ref 3.4–5.3)
RBC # BLD AUTO: 3.88 10E6/UL (ref 4.4–5.9)
SODIUM SERPL-SCNC: 137 MMOL/L (ref 135–145)
WBC # BLD AUTO: 9.4 10E3/UL (ref 4–11)

## 2025-07-18 PROCEDURE — 85014 HEMATOCRIT: CPT | Performed by: INTERNAL MEDICINE

## 2025-07-18 PROCEDURE — 82310 ASSAY OF CALCIUM: CPT | Performed by: INTERNAL MEDICINE

## 2025-07-18 PROCEDURE — 36415 COLL VENOUS BLD VENIPUNCTURE: CPT | Performed by: INTERNAL MEDICINE

## 2025-07-18 PROCEDURE — 99232 SBSQ HOSP IP/OBS MODERATE 35: CPT | Performed by: INTERNAL MEDICINE

## 2025-07-18 PROCEDURE — 250N000013 HC RX MED GY IP 250 OP 250 PS 637: Performed by: INTERNAL MEDICINE

## 2025-07-18 PROCEDURE — 99231 SBSQ HOSP IP/OBS SF/LOW 25: CPT | Performed by: PHYSICIAN ASSISTANT

## 2025-07-18 PROCEDURE — 120N000001 HC R&B MED SURG/OB

## 2025-07-18 PROCEDURE — 99231 SBSQ HOSP IP/OBS SF/LOW 25: CPT | Mod: FS | Performed by: UROLOGY

## 2025-07-18 PROCEDURE — 258N000001 HC RX 258: Performed by: INTERNAL MEDICINE

## 2025-07-18 RX ADMIN — DOCUSATE SODIUM 100 MG: 100 CAPSULE, LIQUID FILLED ORAL at 07:42

## 2025-07-18 RX ADMIN — FINASTERIDE 5 MG: 5 TABLET, FILM COATED ORAL at 07:42

## 2025-07-18 RX ADMIN — SODIUM CHLORIDE 3000 ML: 900 IRRIGANT IRRIGATION at 07:09

## 2025-07-18 RX ADMIN — TAMSULOSIN HYDROCHLORIDE 0.4 MG: 0.4 CAPSULE ORAL at 07:42

## 2025-07-18 RX ADMIN — ACETAMINOPHEN 650 MG: 325 TABLET ORAL at 07:42

## 2025-07-18 RX ADMIN — BUMETANIDE 1.5 MG: 0.5 TABLET ORAL at 07:42

## 2025-07-18 RX ADMIN — ACETAMINOPHEN 650 MG: 325 TABLET ORAL at 16:13

## 2025-07-18 RX ADMIN — LORATADINE 10 MG: 10 TABLET ORAL at 07:42

## 2025-07-18 RX ADMIN — SODIUM CHLORIDE 3000 ML: 900 IRRIGANT IRRIGATION at 18:22

## 2025-07-18 RX ADMIN — INSULIN GLARGINE 5 UNITS: 100 INJECTION, SOLUTION SUBCUTANEOUS at 22:25

## 2025-07-18 RX ADMIN — SODIUM CHLORIDE 3000 ML: 900 IRRIGANT IRRIGATION at 20:59

## 2025-07-18 RX ADMIN — Medication 1 MG: at 23:56

## 2025-07-18 RX ADMIN — SODIUM CHLORIDE 3000 ML: 900 IRRIGANT IRRIGATION at 13:27

## 2025-07-18 RX ADMIN — TAMSULOSIN HYDROCHLORIDE 0.4 MG: 0.4 CAPSULE ORAL at 21:02

## 2025-07-18 RX ADMIN — METOPROLOL SUCCINATE 12.5 MG: 25 TABLET, EXTENDED RELEASE ORAL at 07:42

## 2025-07-18 ASSESSMENT — ACTIVITIES OF DAILY LIVING (ADL)
ADLS_ACUITY_SCORE: 31

## 2025-07-18 NOTE — PROGRESS NOTES
[facility] Progress Note          Assessment and Plan:     Assessment:    Recent right robotic partial nephrectomy with Dr. Perez on 06/23/2025 for grade 2 of 4, clear-cell renal cell carcinoma    Gross hematuria, thought to be likely secondary to BPH as opposed to active bleed from partial nephrectomy    CKD, stable    Chronic anticoagulation on Eliquis for atrial fibrillation, currently held    DM2    Recent urinary tract infection    Gross hematuria      Plan:   -Okay for diet today.  NPO at midnight again to reassess.  -Continue with three-way Mckenzie catheter in place with CBI on a slow drip.  Will try to keep this going today and possible clamping trial tomorrow morning.  -Can you get Mckenzie catheter as needed for bladder spasms, decreased output, or clots.  -If urine remains clear, will possibly be able to restart Eliquis tomorrow.  - Hemoglobin and kidney function are stable.  -Continue prior to admission finasteride 5 mg daily and Flomax 0.4 mg twice daily.  - Tentative plan to follow-up with Dr. Perez as scheduled in August for repeat trial of void and cystoscopy.  - Will continue to follow along.    Discussed with Dr. Horne.  Plan to continue to monitor and continue with conservative management.         Laxmi Mansfield PA-C   [facility]  [phone]               Interval History:     Doing okay.  Patient notes that overnight his CBI ran dry several times.  Three-way Mckenzie catheter in place with CBI on slow rate with clear faint rust colored urine.  Hand irrigated with approximately 400 mL with return of 1 small clot.  Creatinine 2.19 EGFR 29 (2.04 EGFR 34).  WBC 9.4.  Hemoglobin 11.1 (10.7 (11.0)).  Afebrile without tachycardia.  Denies pain, nausea, vomiting, fevers, or chills.              Review of Systems:     The 5 point Review of Systems is negative other than noted in the HPI             Medications:     Current Facility-Administered Medications   Medication Dose  Route Frequency Provider Last Rate Last Admin    acetaminophen (TYLENOL) tablet 650 mg  650 mg Oral Q4H PRN Shayy Lai MD   650 mg at 07/18/25 0742    Or    acetaminophen (TYLENOL) Suppository 650 mg  650 mg Rectal Q4H PRN Shayy Lai MD        [Held by provider] apixaban ANTICOAGULANT (ELIQUIS) tablet 2.5 mg  2.5 mg Oral BID Shayy Lai MD   2.5 mg at 07/17/25 1031    bumetanide (BUMEX) tablet 1.5 mg  1.5 mg Oral Daily Shayy Lai MD   1.5 mg at 07/18/25 0742    glucose gel 15-30 g  15-30 g Oral Q15 Min PRN Shayy Lai MD        Or    dextrose 50 % injection 25-50 mL  25-50 mL Intravenous Q15 Min PRN Shayy Lai MD        Or    glucagon injection 1 mg  1 mg Subcutaneous Q15 Min PRN Shayy Lai MD        docusate sodium (COLACE) capsule 100 mg  100 mg Oral Daily Shayy Lai MD   100 mg at 07/18/25 0742    finasteride (PROSCAR) tablet 5 mg  5 mg Oral Daily Shayy Lai MD   5 mg at 07/18/25 0742    insulin aspart (NovoLOG) injection (RAPID ACTING)  1-7 Units Subcutaneous TID AC Shayy Lai MD        insulin aspart (NovoLOG) injection (RAPID ACTING)  1-5 Units Subcutaneous At Bedtime Shayy Lai MD        insulin glargine (LANTUS PEN) injection 5 Units  5 Units Subcutaneous At Bedtime Shayy Lai MD   5 Units at 07/17/25 2240    loratadine (CLARITIN) tablet 10 mg  10 mg Oral Daily Shayy Lai MD   10 mg at 07/18/25 0742    melatonin tablet 1 mg  1 mg Oral At Bedtime PRN Shayy Lai MD   1 mg at 07/17/25 2134    metoprolol succinate ER (TOPROL-XL) 24 hr half-tab 12.5 mg  12.5 mg Oral QAM Shayy Lai MD   12.5 mg at 07/18/25 0742    ondansetron (ZOFRAN ODT) ODT tab 4 mg  4 mg Oral Q6H PRN Shayy Lai MD        Or    ondansetron (ZOFRAN) injection 4 mg  4 mg Intravenous Q6H PRN Shayy Lai MD        prochlorperazine (COMPAZINE) injection 5 mg  5 mg  Intravenous Q6H PRN Shayy Lai MD        Or    prochlorperazine (COMPAZINE) tablet 5 mg  5 mg Oral Q6H PRN Shayy Lai MD        sennosides (SENOKOT) tablet 1-2 tablet  1-2 tablet Oral BID PRN Shayy Lai MD   2 tablet at 07/17/25 1837    sodium chloride 0.9% irrigation (bag)   Irrigation Continuous Shayy Lai MD   3,000 mL at 07/18/25 0709    tamsulosin (FLOMAX) capsule 0.4 mg  0.4 mg Oral BID Shayy Lai MD   0.4 mg at 07/18/25 0742     Facility-Administered Medications Ordered in Other Encounters   Medication Dose Route Frequency Provider Last Rate Last Admin    Patient is already receiving anticoagulation with heparin, enoxaparin (LOVENOX), warfarin (COUMADIN)  or other anticoagulant medication   Does not apply Continuous PRN Lin Ordonez MD        polyethylene glycol (MIRALAX) Packet 17 g  17 g Oral Daily PRN Lin Ordonez MD                      Physical Exam:   Vitals were reviewed  Patient Vitals for the past 8 hrs:   BP Temp Temp src Pulse Resp SpO2   07/18/25 1135 121/63 97.9  F (36.6  C) Oral 72 16 97 %   07/18/25 0735 125/64 97.8  F (36.6  C) Oral 72 16 96 %   07/18/25 0429 134/64 98  F (36.7  C) Oral 72 16 93 %     GEN: NAD, lying in bed  EYES: EOMI  MOUTH: MMM  NECK: Supple  RESP: Unlabored breathing  SKIN: Warm  ABD: soft  NEURO: AAO  URO: 3-way Mckenzie catheter in place with CBI slow rate with clear faint rust colored urine.           Data:     Lab Results   Component Value Date    NTBNPI 4,059 (H) 05/24/2020    NTBNPI 9,653 (H) 05/07/2020    NTBNPI 8,995 (H) 05/06/2020    NTBNP 1,985 (H) 04/06/2021    NTBNP 1,825 (H) 11/12/2020    NTBNP 1,839 (H) 10/23/2020     Lab Results   Component Value Date    WBC 9.4 07/18/2025    WBC 7.2 07/17/2025    WBC 7.4 07/16/2025    HGB 11.1 (L) 07/18/2025    HGB 10.7 (L) 07/17/2025    HGB 11.0 (L) 07/16/2025    HCT 35.5 (L) 07/18/2025    HCT 33.9 (L) 07/17/2025    HCT 35.2 (L) 07/16/2025    MCV 92 07/18/2025    MCV  92 07/17/2025    MCV 91 07/16/2025     07/18/2025     07/17/2025     07/16/2025     Lab Results   Component Value Date    INR 1.03 07/16/2025    INR 1.17 (H) 04/06/2020    INR 1.23 (H) 04/03/2020

## 2025-07-18 NOTE — PLAN OF CARE
Goal Outcome Evaluation:      Plan of Care Reviewed With: patient    Overall Patient Progress: no changeOverall Patient Progress: no change    Outcome Evaluation: Pt is a/ox4, ACHS, CBI ongoing at a slow rate and is looking like Grade II, NPO, scheudlued meds given, Tigist SAMUEL notified of small red blood clot in stool- none noted by dayshift RN. Tylenol was given for some right lower back/flank pain that is chronic per patient, PIV SL, UREA and CR elvated, Pt has a productive freq cough, Indep. in room due to refusing fall precautions, Urology following and patient has a follw up in august for a cytoscopy, CT showed a blood clot in bladder, patients deneis any n/t/t, passing gas,  Plan to be NPO @ MN and reassessed by Urology in AM, plan to have slow rate for CBI.    Problem: Adult Inpatient Plan of Care  Goal: Plan of Care Review  Description: The Plan of Care Review/Shift note should be completed every shift.  The Outcome Evaluation is a brief statement about your assessment that the patient is improving, declining, or no change.  This information will be displayed automatically on your shift  note.  Outcome: Progressing  Flowsheets (Taken 7/18/2025 0853)  Outcome Evaluation: Pt is a/ox4, ACHS, CBI ongoing at a slow rate and is looking like Grade II, NPO, scheudlued meds given, Tigist RN notified of small red blood clot in stool- none noted by dayshift RN. Tylenol was given for some right lower back/flank pain that is chronic per patient, PIV SL, UREA nad CR elvated, Pt has a productive freq cough, Indpe in room due to refusing fall precautions, Urology following and patient has a follw up in august for a cytoscopy, CT showed a blood clot in bladder, patients deneis any n/t/t, passing gas,  Plan of Care Reviewed With: patient  Overall Patient Progress: no change  Goal: Patient-Specific Goal (Individualized)  Description: You can add care plan individualizations to a care plan. Examples of Individualization  "might be:  \"Parent requests to be called daily at 9am for status\", \"I have a hard time hearing out of my right ear\", or \"Do not touch me to wake me up as it startles  me\".  Outcome: Progressing  Goal: Absence of Hospital-Acquired Illness or Injury  Outcome: Progressing  Intervention: Identify and Manage Fall Risk  Recent Flowsheet Documentation  Taken 7/18/2025 0730 by Polly Dunbar RN  Safety Promotion/Fall Prevention: safety round/check completed  Intervention: Prevent Skin Injury  Recent Flowsheet Documentation  Taken 7/18/2025 0730 by Polly Dunbar RN  Body Position: position changed independently  Skin Protection: adhesive use limited  Intervention: Prevent Infection  Recent Flowsheet Documentation  Taken 7/18/2025 0730 by Polly Dunbar RN  Infection Prevention:   cohorting utilized   hand hygiene promoted   rest/sleep promoted  Goal: Optimal Comfort and Wellbeing  Outcome: Progressing  Intervention: Provide Person-Centered Care  Recent Flowsheet Documentation  Taken 7/18/2025 0730 by Polly Dubnar RN  Trust Relationship/Rapport:   care explained   choices provided   emotional support provided   empathic listening provided   questions encouraged   questions answered  Goal: Readiness for Transition of Care  Outcome: Progressing     Problem: Delirium  Goal: Optimal Coping  Outcome: Progressing  Goal: Improved Behavioral Control  Outcome: Progressing  Intervention: Minimize Safety Risk  Recent Flowsheet Documentation  Taken 7/18/2025 0730 by Polly Dunbar RN  Enhanced Safety Measures: review medications for side effects with activity  Trust Relationship/Rapport:   care explained   choices provided   emotional support provided   empathic listening provided   questions encouraged   questions answered  Goal: Improved Attention and Thought Clarity  Outcome: Progressing  Goal: Improved Sleep  Outcome: Progressing     Problem: Comorbidity Management  Goal: Maintenance of Asthma Control  Outcome: " Progressing  Intervention: Maintain Asthma Symptom Control  Recent Flowsheet Documentation  Taken 7/18/2025 0730 by Polly Dunbar RN  Medication Review/Management: medications reviewed  Goal: Maintenance of Behavioral Health Symptom Control  Outcome: Progressing  Intervention: Maintain Behavioral Health Symptom Control  Recent Flowsheet Documentation  Taken 7/18/2025 0730 by Polly Dunbar RN  Medication Review/Management: medications reviewed  Goal: Maintenance of COPD Symptom Control  Outcome: Progressing  Intervention: Maintain COPD Symptom Control  Recent Flowsheet Documentation  Taken 7/18/2025 0730 by Polly Dunbar RN  Medication Review/Management: medications reviewed  Goal: Blood Glucose Levels Within Targeted Range  Outcome: Progressing  Intervention: Monitor and Manage Glycemia  Recent Flowsheet Documentation  Taken 7/18/2025 0730 by Polly Dunbar RN  Medication Review/Management: medications reviewed  Goal: Maintenance of Heart Failure Symptom Control  Outcome: Progressing  Intervention: Maintain Heart Failure Management  Recent Flowsheet Documentation  Taken 7/18/2025 0730 by Polly Dunbar RN  Medication Review/Management: medications reviewed  Goal: Blood Pressure in Desired Range  Outcome: Progressing  Intervention: Maintain Blood Pressure Management  Recent Flowsheet Documentation  Taken 7/18/2025 0730 by Polly Dunbar RN  Medication Review/Management: medications reviewed  Goal: Maintenance of Osteoarthritis Symptom Control  Outcome: Progressing  Intervention: Maintain Osteoarthritis Symptom Control  Recent Flowsheet Documentation  Taken 7/18/2025 0730 by Polly Dunbar RN  Activity Management: activity adjusted per tolerance  Medication Review/Management: medications reviewed  Goal: Bariatric Home Regimen Maintained  Outcome: Progressing  Intervention: Maintain and Manage Postbariatric Surgery Care  Recent Flowsheet Documentation  Taken 7/18/2025 0730 by Polly Dunbar  RN  Medication Review/Management: medications reviewed  Goal: Maintenance of Seizure Control  Outcome: Progressing  Intervention: Maintain Seizure Symptom Control  Recent Flowsheet Documentation  Taken 7/18/2025 [id number] by Polly Dunbar RN  Medication Review/Management: medications reviewed

## 2025-07-18 NOTE — PROGRESS NOTES
[facility]    Hospitalist Progress Note      Assessment & Plan   Gene Pagan is a 80 year old man who was admitted on 7/16/2025. PMH significant for insulin-dependent type 2 diabetes mellitus, CKD 4, systolic heart failure, BPH, atrial fibrillation status post AV node ablation and pacemaker, chronic anticoagulation with Eliquis, recent urinary tract infection, and recent right robotic partial nephrectomy on 6/23/2025 for clear-cell renal cell carcinoma.  Patient presented to the emergency department due to hematuria.       In the ED the vital signs stable, afebrile.  Continuous bladder irrigation was started due to hemorrhagic urine output with clots blocking previous indwelling Mckenzie catheter drainage.  Hemoglobin is 11.  Creatinine is 2.07 with a GFR of 31.  Coagulation studies normal.  Urinalysis is hemorrhagic.  CT abdomen pelvis without contrast shows small blood clot within the bladder, hemorrhage in the right renal pelvis and proximal ureter with a small postoperative perinephric fluid collection.  Urology was contacted from the emergency department.  Given 1 L of normal saline.  Admission requested from the hospitalist for further cares and monitoring.     Acute right renal hemorrhage  Acute blood loss anemia secondary to gross hematuria  Renal cell carcinoma of right kidney status post robotic partial nephrectomy  Chronic urinary retention and BPH s/p surgical intervention managed with chronic indwelling Mckenzie catheter  History of bilateral hydronephrosis  History of urinary retention and gross hematuria.  Follows with Dr. Perez of urology.  -Appreciate urology evaluation recommendations.  -Continuous bladder irrigation with hand irrigation as needed  -Monitor urine output and hemoglobin  - Eliquis had been held on admission.  CBI has been running on 7/16 with resolution of bleeding.  Had planned to resume Eliquis 7/17 morning and possibly discharge patient if no further  bleeding.  However, bleeding returned at the same time as Eliquis was resumed 7/17 morning.  CBI was resumed. CBI seems to have run dry overnight. Still with bleeding 7/18 morning. Urology recommends continuing CBI overnight with patient NPO after midnight and further plans to be determined 7/19.    CKD 4  S/p partial nephrectomy  Progressive CKD, thought to be due to DM2, HTN, and CHF. Baseline Cr has varied, but is mostly between 2-2.5.  Renally dose medications and avoid nephrotoxins.  Checking AM BMP.     Small clot of blood per rectum, x1  History of hemorrhoids  Patient did have 1 small clot of blood reported per rectum overnight on 7/18 morning.  No other bleeding per rectum and he has a history of hemorrhoids.   Can follow, but would likely recommend outpatient follow up unless additional bleeding noted.   Hgb has remained stable.     HTN  Chronic persistent atrial fibrillation Afib  Chronic systolic HF (EF 45-50%) s/p ppm  Currently taking metoprolol XL 12.5mg daily. Continue bumex 1.5mg daily.   - metoprolol XL 12.5mg daily  - bumex 1.5mg daily      Anemia  Hgb near baseline around 11.     Insulin-dependent type 2 diabetes mellitus  PTA regimen includes Lantus 13 units at bedtime.  Hemoglobin A1c 6.4%.  Given continued n.p.o. at midnight status, continue with reduced dose of Lantus 5 units tonight.  AC/at bedtime glucose checks and correction insulin.     DVT Prophylaxis: DOAC on hold. SCDs.   Code Status: Full Code  Medically Ready for Discharge: Anticipated in 2-4 Days pending improvement of bleeding  Expected discharge: Anticipate discharge home in 2+ days pending improvement of bleeding and further discharge plan    Shayy Ivan MD FACP  Hospitalist Service  [facility]        Interval History   CBI seems to have run dry overnight. Still with bleeding this morning. Urology recommends continuing CBI overnight with patient NPO after midnight and further plans tomorrow. Patient did have  1 small clot of blood reported per rectum overnight as well. No other bleeding per rectum and he has a history of hemorrhoids. Can follow, but would likely recommend outpatient follow up unless additional bleeding noted. Hgb has remained stable.       -Data reviewed today: I reviewed all new labs and imaging results over the last 24 hours.       Physical Exam   Temp: 98.2  F (36.8  C) Temp src: Oral BP: 122/60 Pulse: 70   Resp: 16 SpO2: 93 % O2 Device: None (Room air)    Vitals:    07/16/25 0039 07/16/25 1000   Weight: 86.8 kg (191 lb 5.8 oz) 84.4 kg (186 lb 1.6 oz)     Vital Signs with Ranges  Temp:  [97.8  F (36.6  C)-98.2  F (36.8  C)] 98.2  F (36.8  C)  Pulse:  [70-72] 70  Resp:  [16-18] 16  BP: (121-143)/(60-74) 122/60  SpO2:  [93 %-98 %] 93 %  I/O last 3 completed shifts:  In: -   Out: 08134 [Urine:06976]    Constitutional: Pleasant gentleman resting in bed, watching music videos. Alert and oriented x3. No acute distress.   HEENT: NCAT. EOMI. Moist oral mucosa.  Respiratory: Clear to auscultation bilaterally. No crackles or wheezes.  Cardiovascular: Irregularly irregular rhythm. Regular rate. No murmur appreciated. No lower extremity edema.  GI: Distended, but soft. Non-tender. Normoactive bowel sounds.   Genitourinary: CBI has resumed with grade II hematuria noted.   Musculoskeletal: No gross deformities.   Neurologic: Alert and oriented x3. No focal neurologic deficits. Did not assess gait.       Medications   Current Facility-Administered Medications   Medication Dose Route Frequency Provider Last Rate Last Admin    sodium chloride 0.9% irrigation (bag)   Irrigation Continuous Shayy Lai MD   3,000 mL at 07/18/25 1327     Current Facility-Administered Medications   Medication Dose Route Frequency Provider Last Rate Last Admin    [Held by provider] apixaban ANTICOAGULANT (ELIQUIS) tablet 2.5 mg  2.5 mg Oral BID Shayy Lai MD   2.5 mg at 07/17/25 1031    bumetanide (BUMEX) tablet 1.5 mg   1.5 mg Oral Daily Shayy Lai MD   1.5 mg at 07/18/25 0742    docusate sodium (COLACE) capsule 100 mg  100 mg Oral Daily Shayy Lai MD   100 mg at 07/18/25 0742    finasteride (PROSCAR) tablet 5 mg  5 mg Oral Daily Shayy Lai MD   5 mg at 07/18/25 0742    insulin aspart (NovoLOG) injection (RAPID ACTING)  1-7 Units Subcutaneous TID AC Shayy Lai MD        insulin aspart (NovoLOG) injection (RAPID ACTING)  1-5 Units Subcutaneous At Bedtime Shayy Lai MD        insulin glargine (LANTUS PEN) injection 5 Units  5 Units Subcutaneous At Bedtime Shayy Lai MD   5 Units at 07/17/25 2240    loratadine (CLARITIN) tablet 10 mg  10 mg Oral Daily Shayy Lai MD   10 mg at 07/18/25 0742    metoprolol succinate ER (TOPROL-XL) 24 hr half-tab 12.5 mg  12.5 mg Oral QAM Shayy Lai MD   12.5 mg at 07/18/25 0742    tamsulosin (FLOMAX) capsule 0.4 mg  0.4 mg Oral BID Shayy Lai MD   0.4 mg at 07/18/25 0742       Data   Recent Labs   Lab 07/18/25  1711 07/18/25  1223 07/18/25  0739 07/18/25  0525 07/17/25  0939 07/17/25  0525 07/16/25  1754 07/16/25  0414   WBC  --   --   --  9.4  --  7.2  --  7.4   HGB  --   --   --  11.1*  --  10.7*  --  11.0*   MCV  --   --   --  92  --  92  --  91   PLT  --   --   --  287  --  273  --  326   INR  --   --   --   --   --   --   --  1.03   NA  --   --   --  137  --  137  --  135   POTASSIUM  --   --   --  4.3  --  4.1  --  4.4   CHLORIDE  --   --   --  102  --  103  --  101   CO2  --   --   --  25  --  25  --  23   BUN  --   --   --  29.4*  --  26.6*  --  27.9*   CR  --   --   --  2.19*  --  2.04*  --  2.07*   ANIONGAP  --   --   --  10  --  9  --  11   HARPER  --   --   --  9.7  --  9.1  --  8.9   * 124* 114* 109*   < > 92   < > 110*    < > = values in this interval not displayed.       No results found for this or any previous visit (from the past 24 hours).

## 2025-07-18 NOTE — PLAN OF CARE
"Goal Outcome Evaluation:      Plan of Care Reviewed With: patient    Overall Patient Progress: no changeOverall Patient Progress: no change    Outcome Evaluation: A/O*4, RA, CBI running urine pink in color, pt independent able to use bathroom reported one episode of blood in stool, writer observered small blood clot in stool, Provider updated will monitor closely.    Problem: Adult Inpatient Plan of Care  Goal: Plan of Care Review  Description: The Plan of Care Review/Shift note should be completed every shift.  The Outcome Evaluation is a brief statement about your assessment that the patient is improving, declining, or no change.  This information will be displayed automatically on your shift  note.  Outcome: Progressing  Flowsheets (Taken 7/17/2025 2135)  Outcome Evaluation: A/O*4, RA, CBI running urine pink in color, pt independent able to use bathroom reported one episode of blood in stool, writer observered small blood clot in stool, Provider updated will monitor closely.  Plan of Care Reviewed With: patient  Overall Patient Progress: no change  Goal: Patient-Specific Goal (Individualized)  Description: You can add care plan individualizations to a care plan. Examples of Individualization might be:  \"Parent requests to be called daily at 9am for status\", \"I have a hard time hearing out of my right ear\", or \"Do not touch me to wake me up as it startles  me\".  Outcome: Progressing  Goal: Absence of Hospital-Acquired Illness or Injury  Outcome: Progressing  Intervention: Identify and Manage Fall Risk  Recent Flowsheet Documentation  Taken 7/17/2025 2135 by Tisha Batres RN  Safety Promotion/Fall Prevention:   treat underlying cause   treat reversible contributory factors   safety round/check completed  Intervention: Prevent Skin Injury  Recent Flowsheet Documentation  Taken 7/17/2025 2135 by Tisha Batres RN  Body Position: position changed independently  Intervention: Prevent Infection  Recent Flowsheet " Documentation  Taken 7/17/2025 2135 by Tisha Batres, RN  Infection Prevention: rest/sleep promoted  Goal: Optimal Comfort and Wellbeing  Outcome: Progressing  Goal: Readiness for Transition of Care  Outcome: Progressing

## 2025-07-18 NOTE — PLAN OF CARE
"PRIMARY DIAGNOSIS: HEMATURIA   OUTPATIENT/OBSERVATION GOALS TO BE MET BEFORE DISCHARGE:  ADLs back to baseline: Yes    Activity and level of assistance: Ambulating independently.    Pain status: Pain free.    Return to near baseline physical activity: Yes     Discharge Planner Nurse   Safe discharge environment identified: Yes  Barriers to discharge: Yes       Entered by: Tisha Batres RN 07/18/2025 6:54 AM     Please review provider order for any additional goals.   Nurse to notify provider when observation goals have been met and patient is ready for discharge.  Goal Outcome Evaluation:      Plan of Care Reviewed With: patient    Overall Patient Progress: no changeOverall Patient Progress: no change    Outcome Evaluation: A/O*4, RA, CBI running urine pink/red in color, pt independent able to use bathroom reported one episode of blood in stool, writer observered small blood clot in stool, Provider updated will monitor closely.    Problem: Adult Inpatient Plan of Care  Goal: Plan of Care Review  Description: T  7/18/2025 0651 by Tisha Batres RN  Outcome: Progressing  Flowsheets (Taken 7/18/2025 0651)  Plan of Care Reviewed With: patient  Overall Patient Progress: no change  7/17/2025 2135 by Tisha Batres RN  Outcome: Progressing  Flowsheets (Taken 7/17/2025 2135)  Outcome Evaluation: A/O*4, RA, CBI running urine pink in color, pt independent able to use bathroom reported one episode of blood in stool, writer observered small blood clot in stool, Provider updated will monitor closely.  Plan of Care Reviewed With: patient  Overall Patient Progress: no change  Goal: Patient-Specific Goal (Individualized)  Description: You can add care plan individualizations to a care plan. Examples of Individualization might be:  \"Parent requests to be called daily at 9am for status\", \"I have a hard time hearing out of my right ear\", or \"Do not touch me to wake me up as it startles  me\".  7/18/2025 0651 by Tisha Batres, " RN  Outcome: Progressing  7/17/2025 2135 by Tisha Batres RN  Outcome: Progressing  Goal: Absence of Hospital-Acquired Illness or Injury  7/18/2025 0651 by Tisha Batres RN  Outcome: Progressing  7/17/2025 2135 by Tisha Batres RN  Outcome: Progressing  Intervention: Identify and Manage Fall Risk  Recent Flowsheet Documentation  Taken 7/18/2025 0000 by Tisha Batres RN  Safety Promotion/Fall Prevention:   treat underlying cause   treat reversible contributory factors   safety round/check completed  Taken 7/17/2025 2135 by Tisha Batres RN  Safety Promotion/Fall Prevention:   treat underlying cause   treat reversible contributory factors   safety round/check completed  Intervention: Prevent Skin Injury  Recent Flowsheet Documentation  Taken 7/18/2025 0000 by Tisha Batres RN  Body Position: position changed independently  Taken 7/17/2025 2135 by Tisha Batres RN  Body Position: position changed independently  Intervention: Prevent Infection  Recent Flowsheet Documentation  Taken 7/18/2025 0000 by Tisha Batres RN  Infection Prevention: rest/sleep promoted  Taken 7/17/2025 2135 by Tisha Batres RN  Infection Prevention: rest/sleep promoted  Goal: Optimal Comfort and Wellbeing  7/18/2025 0651 by Tisha Batres RN  Outcome: Progressing  7/17/2025 2135 by Tisha Batres RN  Outcome: Progressing  Goal: Readiness for Transition of Care  7/18/2025 0651 by Tisha Batres RN  Outcome: Progressing  7/17/2025 2135 by Tisha Batres RN  Outcome: Progressing

## 2025-07-19 LAB
ANION GAP SERPL CALCULATED.3IONS-SCNC: 14 MMOL/L (ref 7–15)
BASOPHILS # BLD AUTO: 0.1 10E3/UL (ref 0–0.2)
BASOPHILS NFR BLD AUTO: 1 %
BUN SERPL-MCNC: 34.2 MG/DL (ref 8–23)
CALCIUM SERPL-MCNC: 9.7 MG/DL (ref 8.8–10.4)
CHLORIDE SERPL-SCNC: 101 MMOL/L (ref 98–107)
CREAT SERPL-MCNC: 2.21 MG/DL (ref 0.67–1.17)
EGFRCR SERPLBLD CKD-EPI 2021: 29 ML/MIN/1.73M2
EOSINOPHIL # BLD AUTO: 0.4 10E3/UL (ref 0–0.7)
EOSINOPHIL NFR BLD AUTO: 4 %
ERYTHROCYTE [DISTWIDTH] IN BLOOD BY AUTOMATED COUNT: 14.1 % (ref 10–15)
GLUCOSE BLDC GLUCOMTR-MCNC: 112 MG/DL (ref 70–99)
GLUCOSE BLDC GLUCOMTR-MCNC: 113 MG/DL (ref 70–99)
GLUCOSE BLDC GLUCOMTR-MCNC: 160 MG/DL (ref 70–99)
GLUCOSE BLDC GLUCOMTR-MCNC: 165 MG/DL (ref 70–99)
GLUCOSE BLDC GLUCOMTR-MCNC: 89 MG/DL (ref 70–99)
GLUCOSE SERPL-MCNC: 118 MG/DL (ref 70–99)
HCO3 SERPL-SCNC: 20 MMOL/L (ref 22–29)
HCT VFR BLD AUTO: 36.5 % (ref 40–53)
HGB BLD-MCNC: 11.8 G/DL (ref 13.3–17.7)
IMM GRANULOCYTES # BLD: 0.1 10E3/UL
IMM GRANULOCYTES NFR BLD: 1 %
LYMPHOCYTES # BLD AUTO: 2 10E3/UL (ref 0.8–5.3)
LYMPHOCYTES NFR BLD AUTO: 22 %
MCH RBC QN AUTO: 29.2 PG (ref 26.5–33)
MCHC RBC AUTO-ENTMCNC: 32.3 G/DL (ref 31.5–36.5)
MCV RBC AUTO: 90 FL (ref 78–100)
MONOCYTES # BLD AUTO: 0.8 10E3/UL (ref 0–1.3)
MONOCYTES NFR BLD AUTO: 9 %
NEUTROPHILS # BLD AUTO: 5.8 10E3/UL (ref 1.6–8.3)
NEUTROPHILS NFR BLD AUTO: 64 %
NRBC # BLD AUTO: 0 10E3/UL
NRBC BLD AUTO-RTO: 0 /100
PLATELET # BLD AUTO: 280 10E3/UL (ref 150–450)
POTASSIUM SERPL-SCNC: 4.4 MMOL/L (ref 3.4–5.3)
RBC # BLD AUTO: 4.04 10E6/UL (ref 4.4–5.9)
SODIUM SERPL-SCNC: 135 MMOL/L (ref 135–145)
WBC # BLD AUTO: 9.1 10E3/UL (ref 4–11)

## 2025-07-19 PROCEDURE — 250N000013 HC RX MED GY IP 250 OP 250 PS 637: Performed by: STUDENT IN AN ORGANIZED HEALTH CARE EDUCATION/TRAINING PROGRAM

## 2025-07-19 PROCEDURE — 250N000013 HC RX MED GY IP 250 OP 250 PS 637: Performed by: INTERNAL MEDICINE

## 2025-07-19 PROCEDURE — 85018 HEMOGLOBIN: CPT | Performed by: INTERNAL MEDICINE

## 2025-07-19 PROCEDURE — 99232 SBSQ HOSP IP/OBS MODERATE 35: CPT | Performed by: STUDENT IN AN ORGANIZED HEALTH CARE EDUCATION/TRAINING PROGRAM

## 2025-07-19 PROCEDURE — 36415 COLL VENOUS BLD VENIPUNCTURE: CPT | Performed by: INTERNAL MEDICINE

## 2025-07-19 PROCEDURE — 82310 ASSAY OF CALCIUM: CPT | Performed by: INTERNAL MEDICINE

## 2025-07-19 PROCEDURE — 120N000001 HC R&B MED SURG/OB

## 2025-07-19 PROCEDURE — 99231 SBSQ HOSP IP/OBS SF/LOW 25: CPT | Performed by: UROLOGY

## 2025-07-19 PROCEDURE — 258N000001 HC RX 258: Performed by: INTERNAL MEDICINE

## 2025-07-19 RX ADMIN — Medication 1 MG: at 21:53

## 2025-07-19 RX ADMIN — SODIUM CHLORIDE 3000 ML: 900 IRRIGANT IRRIGATION at 18:31

## 2025-07-19 RX ADMIN — ACETAMINOPHEN 650 MG: 325 TABLET ORAL at 12:58

## 2025-07-19 RX ADMIN — ACETAMINOPHEN 650 MG: 325 TABLET ORAL at 03:06

## 2025-07-19 RX ADMIN — ACETAMINOPHEN 650 MG: 325 TABLET ORAL at 07:23

## 2025-07-19 RX ADMIN — BUMETANIDE 1.5 MG: 0.5 TABLET ORAL at 07:23

## 2025-07-19 RX ADMIN — LORATADINE 10 MG: 10 TABLET ORAL at 07:23

## 2025-07-19 RX ADMIN — DOCUSATE SODIUM 100 MG: 100 CAPSULE, LIQUID FILLED ORAL at 07:23

## 2025-07-19 RX ADMIN — SENNOSIDES 2 TABLET: 8.6 TABLET, FILM COATED ORAL at 21:53

## 2025-07-19 RX ADMIN — APIXABAN 2.5 MG: 2.5 TABLET, FILM COATED ORAL at 19:53

## 2025-07-19 RX ADMIN — INSULIN GLARGINE 5 UNITS: 100 INJECTION, SOLUTION SUBCUTANEOUS at 21:54

## 2025-07-19 RX ADMIN — FINASTERIDE 5 MG: 5 TABLET, FILM COATED ORAL at 07:23

## 2025-07-19 RX ADMIN — TAMSULOSIN HYDROCHLORIDE 0.4 MG: 0.4 CAPSULE ORAL at 07:23

## 2025-07-19 RX ADMIN — TAMSULOSIN HYDROCHLORIDE 0.4 MG: 0.4 CAPSULE ORAL at 19:53

## 2025-07-19 RX ADMIN — METOPROLOL SUCCINATE 12.5 MG: 25 TABLET, EXTENDED RELEASE ORAL at 07:23

## 2025-07-19 ASSESSMENT — ACTIVITIES OF DAILY LIVING (ADL)
ADLS_ACUITY_SCORE: 31

## 2025-07-19 NOTE — PROGRESS NOTES
[facility]    Hospitalist Progress Note      Assessment & Plan   Gene Pagan is a 80 year old man who was admitted on 7/16/2025. PMH significant for insulin-dependent type 2 diabetes mellitus, CKD 4, systolic heart failure, BPH, atrial fibrillation status post AV node ablation and pacemaker, chronic anticoagulation with Eliquis, recent urinary tract infection, and recent right robotic partial nephrectomy on 6/23/2025 for clear-cell renal cell carcinoma.  Patient presented to the emergency department due to hematuria.       In the ED the vital signs stable, afebrile.  Continuous bladder irrigation was started due to hemorrhagic urine output with clots blocking previous indwelling Mckenzie catheter drainage.  Hemoglobin is 11.  Creatinine is 2.07 with a GFR of 31.  Coagulation studies normal.  Urinalysis is hemorrhagic.  CT abdomen pelvis without contrast shows small blood clot within the bladder, hemorrhage in the right renal pelvis and proximal ureter with a small postoperative perinephric fluid collection.  Urology was contacted from the emergency department.  Given 1 L of normal saline.  Admission requested from the hospitalist for further cares and monitoring.     Acute right renal hemorrhage  Acute blood loss anemia secondary to gross hematuria  Renal cell carcinoma of right kidney status post robotic partial nephrectomy  Chronic urinary retention and BPH s/p surgical intervention managed with chronic indwelling Mckenzie catheter  History of bilateral hydronephrosis  History of urinary retention and gross hematuria.  Follows with Dr. Perez of urology.  - Hematuria improved with continuous bladder irrigation.  Eliquis was initially held and resumed on 7/19 to make sure that hematuria does not return.    CKD 4  S/p partial nephrectomy  Progressive CKD, thought to be due to DM2, HTN, and CHF. Baseline Cr has varied, but is mostly between 2-2.5.  Renally dose medications and avoid  nephrotoxins.  Creatinine Stable     Small clot of blood per rectum, x1  History of hemorrhoids  Patient did have 1 small clot of blood reported per rectum overnight on 7/18 morning.  No other bleeding per rectum and he has a history of hemorrhoids.   Can follow, but would likely recommend outpatient follow up unless additional bleeding noted.   Hgb has remained stable.     HTN  Chronic persistent atrial fibrillation Afib  Chronic systolic HF (EF 45-50%) s/p ppm  Currently taking metoprolol XL 12.5mg daily. Continue bumex 1.5mg daily.   - metoprolol XL 12.5mg daily  - bumex 1.5mg daily   -Resuming apixaban.     Anemia  Hgb near baseline around 11.     Insulin-dependent type 2 diabetes mellitus  PTA regimen includes Lantus 13 units at bedtime.  Hemoglobin A1c 6.4%.  Continue with reduced dose of Lantus 5 units tonight.  AC/at bedtime glucose checks and correction insulin.     DVT Prophylaxis: DOAC on hold. SCDs.   Code Status: Full Code  Medically Ready for Discharge: Anticipated Tomorrow pending improvement of bleeding  Expected discharge: Anticipate discharge home in 2+ days pending improvement of bleeding and further discharge plan    Jaspal De Santiago MD FACP  Hospitalist Service  [facility]        Interval History   Urine remains clear with very slow drip of CBI.      -Data reviewed today: I reviewed all new labs and imaging results over the last 24 hours.       Physical Exam   Temp: 97.7  F (36.5  C) Temp src: Oral BP: 129/62 Pulse: 72   Resp: 18 SpO2: 93 % O2 Device: None (Room air)    Vitals:    07/16/25 0039 07/16/25 1000   Weight: 86.8 kg (191 lb 5.8 oz) 84.4 kg (186 lb 1.6 oz)     Vital Signs with Ranges  Temp:  [97.7  F (36.5  C)-98.4  F (36.9  C)] 97.7  F (36.5  C)  Pulse:  [70-72] 72  Resp:  [16-18] 18  BP: (122-140)/(60-71) 129/62  SpO2:  [93 %-97 %] 93 %  I/O last 3 completed shifts:  In: -   Out: 86884 [Urine:88136]    Constitutional: Pleasant gentleman resting in bed, watching music videos.  Alert and oriented x3. No acute distress.   HEENT: NCAT. EOMI. Moist oral mucosa.  Respiratory: Clear to auscultation bilaterally. No crackles or wheezes.  Cardiovascular: Irregularly irregular rhythm. Regular rate. No murmur appreciated. No lower extremity edema.  GI: Distended, but soft. Non-tender. Normoactive bowel sounds.   Genitourinary: CBI has resumed with grade II hematuria noted.   Musculoskeletal: No gross deformities.   Neurologic: Alert and oriented x3. No focal neurologic deficits. Did not assess gait.       Medications   Current Facility-Administered Medications   Medication Dose Route Frequency Provider Last Rate Last Admin    sodium chloride 0.9% irrigation (bag)   Irrigation Continuous Shayy Lai MD   3,000 mL at 07/18/25 2059     Current Facility-Administered Medications   Medication Dose Route Frequency Provider Last Rate Last Admin    apixaban ANTICOAGULANT (ELIQUIS) tablet 2.5 mg  2.5 mg Oral BID Jaspal De Santiago MD   2.5 mg at 07/17/25 1031    bumetanide (BUMEX) tablet 1.5 mg  1.5 mg Oral Daily Shayy Lai MD   1.5 mg at 07/19/25 0723    docusate sodium (COLACE) capsule 100 mg  100 mg Oral Daily Shayy Lai MD   100 mg at 07/19/25 0723    finasteride (PROSCAR) tablet 5 mg  5 mg Oral Daily Shayy Lai MD   5 mg at 07/19/25 0723    insulin aspart (NovoLOG) injection (RAPID ACTING)  1-7 Units Subcutaneous TID AC Shayy Lai MD        insulin aspart (NovoLOG) injection (RAPID ACTING)  1-5 Units Subcutaneous At Bedtime Shayy Lai MD        insulin glargine (LANTUS PEN) injection 5 Units  5 Units Subcutaneous At Bedtime Shayy Lai MD   5 Units at 07/18/25 2225    loratadine (CLARITIN) tablet 10 mg  10 mg Oral Daily Shayy Lai MD   10 mg at 07/19/25 0723    metoprolol succinate ER (TOPROL-XL) 24 hr half-tab 12.5 mg  12.5 mg Oral QAM Shayy Lai MD   12.5 mg at 07/19/25 0723    tamsulosin (FLOMAX) capsule 0.4 mg   0.4 mg Oral BID Shayy Lai MD   0.4 mg at 07/19/25 0723       Data   Recent Labs   Lab 07/19/25  1137 07/19/25  0722 07/19/25  0452 07/18/25  0739 07/18/25  0525 07/17/25  0939 07/17/25  0525 07/16/25  1754 07/16/25  0414   WBC  --   --  9.1  --  9.4  --  7.2  --  7.4   HGB  --   --  11.8*  --  11.1*  --  10.7*  --  11.0*   MCV  --   --  90  --  92  --  92  --  91   PLT  --   --  280  --  287  --  273  --  326   INR  --   --   --   --   --   --   --   --  1.03   NA  --   --  135  --  137  --  137  --  135   POTASSIUM  --   --  4.4  --  4.3  --  4.1  --  4.4   CHLORIDE  --   --  101  --  102  --  103  --  101   CO2  --   --  20*  --  25  --  25  --  23   BUN  --   --  34.2*  --  29.4*  --  26.6*  --  27.9*   CR  --   --  2.21*  --  2.19*  --  2.04*  --  2.07*   ANIONGAP  --   --  14  --  10  --  9  --  11   HARPER  --   --  9.7  --  9.7  --  9.1  --  8.9   * 112* 118*   < > 109*   < > 92   < > 110*    < > = values in this interval not displayed.       No results found for this or any previous visit (from the past 24 hours).

## 2025-07-19 NOTE — PROGRESS NOTES
Urology    Urine remains clear on a very slow drip CBI  No other events    A/P: Restart Eliquis this morning and monitor urine on the Eliquis

## 2025-07-19 NOTE — PLAN OF CARE
"INPATIENT NOTE: CARES PROVIDED FROM [id number]  Diagnosis:  Hematuria   Temp: 97.6  F (36.4  C) Temp src: Oral BP: 132/69 Pulse: 77   Resp: 16 SpO2: 95 % O2 Device: None (Room air)      Labs: creatine 2.21, GFR 29  Cardiac: WKL, pt denies CP  Resp: Wkl, pt denies SOB, lightheaded, and dizziness   Neuro:A&OX4  GI: A&Ax4  :CBI running Slow, pale pink, not clots, Just hung a new bag at 1830  Skin:WKL  Activity: Independent   Diet:regular diet and NPO at midnight  Pain: Pt denies pain   Lines: IVSL    Plan: NPO at midnight, and urology will reassess in AM hours       Goal Outcome Evaluation:      Plan of Care Reviewed With: patient    Overall Patient Progress: no changeOverall Patient Progress: no change    Outcome Evaluation: A&Ox4, pt denies pain      Problem: Adult Inpatient Plan of Care  Goal: Plan of Care Review  Description: The Plan of Care Review/Shift note should be completed every shift.  The Outcome Evaluation is a brief statement about your assessment that the patient is improving, declining, or no change.  This information will be displayed automatically on your shift  note.  Outcome: Progressing  Flowsheets (Taken 7/19/2025 1845)  Outcome Evaluation: A&Ox4, pt denies pain  Plan of Care Reviewed With: patient  Overall Patient Progress: no change  Goal: Patient-Specific Goal (Individualized)  Description: You can add care plan individualizations to a care plan. Examples of Individualization might be:  \"Parent requests to be called daily at 9am for status\", \"I have a hard time hearing out of my right ear\", or \"Do not touch me to wake me up as it startles  me\".  Outcome: Progressing  Goal: Absence of Hospital-Acquired Illness or Injury  Outcome: Progressing  Intervention: Identify and Manage Fall Risk  Recent Flowsheet Documentation  Taken 7/19/2025 1800 by Lesvia Macdonald RN  Safety Promotion/Fall Prevention:   safety round/check completed   clutter free environment maintained  Taken 7/19/2025 1625 by Renae, " Lesvia A, RN  Safety Promotion/Fall Prevention:   safety round/check completed   clutter free environment maintained  Intervention: Prevent and Manage VTE (Venous Thromboembolism) Risk  Recent Flowsheet Documentation  Taken 7/19/2025 1625 by Lesvia Macdonald RN  VTE Prevention/Management: SCDs off (sequential compression devices)  Intervention: Prevent Infection  Recent Flowsheet Documentation  Taken 7/19/2025 1625 by Lesvia Macdonald RN  Infection Prevention:   cohorting utilized   hand hygiene promoted   rest/sleep promoted  Goal: Optimal Comfort and Wellbeing  Outcome: Progressing  Intervention: Monitor Pain and Promote Comfort  Recent Flowsheet Documentation  Taken 7/19/2025 1625 by Lesvia Macdonald RN  Pain Management Interventions: declines  Intervention: Provide Person-Centered Care  Recent Flowsheet Documentation  Taken 7/19/2025 1625 by Lesvia Macdonald RN  Trust Relationship/Rapport:   care explained   choices provided   emotional support provided   empathic listening provided   questions encouraged   questions answered  Goal: Readiness for Transition of Care  Outcome: Progressing

## 2025-07-19 NOTE — PLAN OF CARE
Goal Outcome Evaluation:      Plan of Care Reviewed With: patient    Overall Patient Progress: no changeOverall Patient Progress: no change    Outcome Evaluation: Pt is a/ox4, ACHS, CBI ongoing at a slow rate and is looking like Grade II, NPO, scheudlued meds given, Tigist SAMUEL notified of small red blood clot in stool- none noted by ck RN. Tylenol was given for some right lower back/flank pain that is chronic per patient, PIV SL, UREA and CR elvated, Pt has a productive freq cough, Indep. in room due to refusing fall precautions, Urology following and patient has a follw up in august for a cytoscopy, CT showed a blood clot in bladder, patients deneis any n/t/t, passing gas,  Plan to be NPO @ [place] and reassessed by Urology in AM, resume eliquis, plan to have slow rate for CBI.    Problem: Adult Inpatient Plan of Care  Goal: Plan of Care Review  Description: The Plan of Care Review/Shift note should be completed every shift.  The Outcome Evaluation is a brief statement about your assessment that the patient is improving, declining, or no change.  This information will be displayed automatically on your shift  note.  Outcome: Progressing  Flowsheets (Taken 7/18/2025 0853)  Outcome Evaluation: Pt is a/ox4, ACHS, CBI ongoing at a slow rate and is looking like Grade II, NPO, scheudlued meds given, Tigist RN notified of small red blood clot in stool- none noted by ck RN. Tylenol was given for some right lower back/flank pain that is chronic per patient, PIV SL, UREA nad CR elvated, Pt has a productive freq cough, Indpe in room due to refusing fall precautions, Urology following and patient has a follw up in august for a cytoscopy, CT showed a blood clot in bladder, patients deneis any n/t/t, passing gas,  Plan of Care Reviewed With: patient  Overall Patient Progress: no change  Goal: Patient-Specific Goal (Individualized)  Description: You can add care plan individualizations to a care plan. Examples of  "Individualization might be:  \"Parent requests to be called daily at 9am for status\", \"I have a hard time hearing out of my right ear\", or \"Do not touch me to wake me up as it startles  me\".  Outcome: Progressing  Goal: Absence of Hospital-Acquired Illness or Injury  Outcome: Progressing  Intervention: Identify and Manage Fall Risk  Recent Flowsheet Documentation  Taken 7/18/2025 0730 by Polly Dunbar RN  Safety Promotion/Fall Prevention: safety round/check completed  Intervention: Prevent Skin Injury  Recent Flowsheet Documentation  Taken 7/18/2025 0730 by Polly Dunbar RN  Body Position: position changed independently  Skin Protection: adhesive use limited  Intervention: Prevent Infection  Recent Flowsheet Documentation  Taken 7/18/2025 0730 by Polly Dunbar RN  Infection Prevention:   cohorting utilized   hand hygiene promoted   rest/sleep promoted  Goal: Optimal Comfort and Wellbeing  Outcome: Progressing  Intervention: Provide Person-Centered Care  Recent Flowsheet Documentation  Taken 7/18/2025 0730 by Polly Dunbar RN  Trust Relationship/Rapport:   care explained   choices provided   emotional support provided   empathic listening provided   questions encouraged   questions answered  Goal: Readiness for Transition of Care  Outcome: Progressing     Problem: Delirium  Goal: Optimal Coping  Outcome: Progressing  Goal: Improved Behavioral Control  Outcome: Progressing  Intervention: Minimize Safety Risk  Recent Flowsheet Documentation  Taken 7/18/2025 0730 by Polly Dunbar RN  Enhanced Safety Measures: review medications for side effects with activity  Trust Relationship/Rapport:   care explained   choices provided   emotional support provided   empathic listening provided   questions encouraged   questions answered  Goal: Improved Attention and Thought Clarity  Outcome: Progressing  Goal: Improved Sleep  Outcome: Progressing     Problem: Comorbidity Management  Goal: Maintenance of Asthma " Control  Outcome: Progressing  Intervention: Maintain Asthma Symptom Control  Recent Flowsheet Documentation  Taken 7/18/2025 0730 by Polly Dunbar RN  Medication Review/Management: medications reviewed  Goal: Maintenance of Behavioral Health Symptom Control  Outcome: Progressing  Intervention: Maintain Behavioral Health Symptom Control  Recent Flowsheet Documentation  Taken 7/18/2025 0730 by Polly Dunbar RN  Medication Review/Management: medications reviewed  Goal: Maintenance of COPD Symptom Control  Outcome: Progressing  Intervention: Maintain COPD Symptom Control  Recent Flowsheet Documentation  Taken 7/18/2025 0730 by Polly Dunbar RN  Medication Review/Management: medications reviewed  Goal: Blood Glucose Levels Within Targeted Range  Outcome: Progressing  Intervention: Monitor and Manage Glycemia  Recent Flowsheet Documentation  Taken 7/18/2025 0730 by Polly Dunbar RN  Medication Review/Management: medications reviewed  Goal: Maintenance of Heart Failure Symptom Control  Outcome: Progressing  Intervention: Maintain Heart Failure Management  Recent Flowsheet Documentation  Taken 7/18/2025 0730 by Polly Dunbar RN  Medication Review/Management: medications reviewed  Goal: Blood Pressure in Desired Range  Outcome: Progressing  Intervention: Maintain Blood Pressure Management  Recent Flowsheet Documentation  Taken 7/18/2025 0730 by Polly Dunbar RN  Medication Review/Management: medications reviewed  Goal: Maintenance of Osteoarthritis Symptom Control  Outcome: Progressing  Intervention: Maintain Osteoarthritis Symptom Control  Recent Flowsheet Documentation  Taken 7/18/2025 0730 by Polly Dunbar RN  Activity Management: activity adjusted per tolerance  Medication Review/Management: medications reviewed  Goal: Bariatric Home Regimen Maintained  Outcome: Progressing  Intervention: Maintain and Manage Postbariatric Surgery Care  Recent Flowsheet Documentation  Taken 7/18/2025 0730 by  Polly Dunbar RN  Medication Review/Management: medications reviewed  Goal: Maintenance of Seizure Control  Outcome: Progressing  Intervention: Maintain Seizure Symptom Control  Recent Flowsheet Documentation  Taken 7/18/2025 0730 by Polly Dunbar RN  Medication Review/Management: medications reviewed

## 2025-07-19 NOTE — PLAN OF CARE
"Pt continues on CBI, with grade II output. New bag placed on slow rate. Independent in room, refused baker catheter care by NST and RN. RN provided wipes to pt and pt reports to prefer to do it himself. Last  not requiring Novolog. Denies pain or other needs at this time.     Problem: Adult Inpatient Plan of Care  Goal: Plan of Care Review  Description: The Plan of Care Review/Shift note should be completed every shift.  The Outcome Evaluation is a brief statement about your assessment that the patient is improving, declining, or no change.  This information will be displayed automatically on your shift  note.  Outcome: Progressing  Goal: Patient-Specific Goal (Individualized)  Description: You can add care plan individualizations to a care plan. Examples of Individualization might be:  \"Parent requests to be called daily at 9am for status\", \"I have a hard time hearing out of my right ear\", or \"Do not touch me to wake me up as it startles  me\".  Outcome: Progressing  Goal: Absence of Hospital-Acquired Illness or Injury  Outcome: Progressing  Goal: Optimal Comfort and Wellbeing  Outcome: Progressing  Goal: Readiness for Transition of Care  Outcome: Progressing     Problem: Delirium  Goal: Optimal Coping  Outcome: Progressing  Goal: Improved Behavioral Control  Outcome: Progressing  Goal: Improved Attention and Thought Clarity  Outcome: Progressing  Goal: Improved Sleep  Outcome: Progressing     Problem: Comorbidity Management  Goal: Maintenance of Asthma Control  Outcome: Progressing  Goal: Maintenance of Behavioral Health Symptom Control  Outcome: Progressing  Goal: Maintenance of COPD Symptom Control  Outcome: Progressing  Goal: Blood Glucose Levels Within Targeted Range  Outcome: Progressing  Goal: Maintenance of Heart Failure Symptom Control  Outcome: Progressing  Goal: Blood Pressure in Desired Range  Outcome: Progressing  Goal: Maintenance of Osteoarthritis Symptom Control  Outcome: Progressing  Goal: " Bariatric Home Regimen Maintained  Outcome: Progressing  Goal: Maintenance of Seizure Control  Outcome: Progressing

## 2025-07-19 NOTE — PLAN OF CARE
"Vitals are Temp: 98.1  F (36.7  C) Temp src: Oral BP: 122/64 Pulse: 70   Resp: 16 SpO2: 95 %.  Patient is Alert and Oriented x4. They are independent with no assistive devices .  Pt is a NPO diet.  They are complaining of 2/10 pain in their back.  Tylenol given for pain.  Patient is Saline locked. Denies nausea/dizziness/SOB. Mckenzie in place. CBI at slow rate, output red to pink. No clots noted. Urology following. Plan is for urology to reassess this morning.      Goal Outcome Evaluation:      Plan of Care Reviewed With: patient    Overall Patient Progress: improving    Outcome Evaluation: CBI continues at slow rate w/ red to pink output. No clots noted.      Problem: Adult Inpatient Plan of Care  Goal: Plan of Care Review  Description: The Plan of Care Review/Shift note should be completed every shift.  The Outcome Evaluation is a brief statement about your assessment that the patient is improving, declining, or no change.  This information will be displayed automatically on your shift  note.  Outcome: Progressing  Flowsheets (Taken 7/19/2025 0607)  Outcome Evaluation: CBI continues at slow rate w/ red to pink output. No clots noted.  Plan of Care Reviewed With: patient  Overall Patient Progress: improving  Goal: Patient-Specific Goal (Individualized)  Description: You can add care plan individualizations to a care plan. Examples of Individualization might be:  \"Parent requests to be called daily at 9am for status\", \"I have a hard time hearing out of my right ear\", or \"Do not touch me to wake me up as it startles  me\".  Outcome: Progressing  Goal: Absence of Hospital-Acquired Illness or Injury  Outcome: Progressing  Intervention: Identify and Manage Fall Risk  Recent Flowsheet Documentation  Taken 7/19/2025 0306 by Mady Borges RN  Safety Promotion/Fall Prevention:   clutter free environment maintained   nonskid shoes/slippers when out of bed   room near nurse's station   safety round/check " completed  Intervention: Prevent Skin Injury  Recent Flowsheet Documentation  Taken 7/19/2025 0306 by Mady Borges RN  Body Position: position changed independently  Intervention: Prevent and Manage VTE (Venous Thromboembolism) Risk  Recent Flowsheet Documentation  Taken 7/19/2025 0306 by Mady Borges RN  VTE Prevention/Management: SCDs off (sequential compression devices)  Goal: Optimal Comfort and Wellbeing  Outcome: Progressing  Goal: Readiness for Transition of Care  Outcome: Progressing     Problem: Delirium  Goal: Optimal Coping  Outcome: Progressing  Goal: Improved Behavioral Control  Outcome: Progressing  Goal: Improved Attention and Thought Clarity  Outcome: Progressing  Goal: Improved Sleep  Outcome: Progressing     Problem: Comorbidity Management  Goal: Maintenance of Asthma Control  Outcome: Progressing  Goal: Maintenance of Behavioral Health Symptom Control  Outcome: Progressing  Goal: Maintenance of COPD Symptom Control  Outcome: Progressing  Goal: Blood Glucose Levels Within Targeted Range  Outcome: Progressing  Goal: Maintenance of Heart Failure Symptom Control  Outcome: Progressing  Goal: Blood Pressure in Desired Range  Outcome: Progressing  Goal: Maintenance of Osteoarthritis Symptom Control  Outcome: Progressing  Intervention: Maintain Osteoarthritis Symptom Control  Recent Flowsheet Documentation  Taken 7/19/2025 0306 by Mady Borges RN  Activity Management: up ad chelita  Goal: Bariatric Home Regimen Maintained  Outcome: Progressing  Goal: Maintenance of Seizure Control  Outcome: Progressing

## 2025-07-20 VITALS
SYSTOLIC BLOOD PRESSURE: 135 MMHG | BODY MASS INDEX: 28.2 KG/M2 | HEIGHT: 68 IN | RESPIRATION RATE: 16 BRPM | OXYGEN SATURATION: 98 % | TEMPERATURE: 97.8 F | HEART RATE: 70 BPM | DIASTOLIC BLOOD PRESSURE: 70 MMHG | WEIGHT: 186.1 LBS

## 2025-07-20 LAB
ANION GAP SERPL CALCULATED.3IONS-SCNC: 14 MMOL/L (ref 7–15)
BASOPHILS # BLD AUTO: 0.1 10E3/UL (ref 0–0.2)
BASOPHILS NFR BLD AUTO: 1 %
BUN SERPL-MCNC: 38.2 MG/DL (ref 8–23)
CALCIUM SERPL-MCNC: 9.5 MG/DL (ref 8.8–10.4)
CHLORIDE SERPL-SCNC: 102 MMOL/L (ref 98–107)
CREAT SERPL-MCNC: 2.23 MG/DL (ref 0.67–1.17)
EGFRCR SERPLBLD CKD-EPI 2021: 29 ML/MIN/1.73M2
EOSINOPHIL # BLD AUTO: 0.4 10E3/UL (ref 0–0.7)
EOSINOPHIL NFR BLD AUTO: 5 %
ERYTHROCYTE [DISTWIDTH] IN BLOOD BY AUTOMATED COUNT: 14 % (ref 10–15)
GLUCOSE BLDC GLUCOMTR-MCNC: 121 MG/DL (ref 70–99)
GLUCOSE BLDC GLUCOMTR-MCNC: 125 MG/DL (ref 70–99)
GLUCOSE SERPL-MCNC: 105 MG/DL (ref 70–99)
HCO3 SERPL-SCNC: 23 MMOL/L (ref 22–29)
HCT VFR BLD AUTO: 36.6 % (ref 40–53)
HGB BLD-MCNC: 11.9 G/DL (ref 13.3–17.7)
IMM GRANULOCYTES # BLD: 0 10E3/UL
IMM GRANULOCYTES NFR BLD: 1 %
LYMPHOCYTES # BLD AUTO: 2.1 10E3/UL (ref 0.8–5.3)
LYMPHOCYTES NFR BLD AUTO: 25 %
MCH RBC QN AUTO: 29.2 PG (ref 26.5–33)
MCHC RBC AUTO-ENTMCNC: 32.5 G/DL (ref 31.5–36.5)
MCV RBC AUTO: 90 FL (ref 78–100)
MONOCYTES # BLD AUTO: 0.6 10E3/UL (ref 0–1.3)
MONOCYTES NFR BLD AUTO: 7 %
NEUTROPHILS # BLD AUTO: 5.2 10E3/UL (ref 1.6–8.3)
NEUTROPHILS NFR BLD AUTO: 62 %
NRBC # BLD AUTO: 0 10E3/UL
NRBC BLD AUTO-RTO: 0 /100
PLATELET # BLD AUTO: 305 10E3/UL (ref 150–450)
POTASSIUM SERPL-SCNC: 4.2 MMOL/L (ref 3.4–5.3)
RBC # BLD AUTO: 4.07 10E6/UL (ref 4.4–5.9)
SODIUM SERPL-SCNC: 139 MMOL/L (ref 135–145)
WBC # BLD AUTO: 8.4 10E3/UL (ref 4–11)

## 2025-07-20 PROCEDURE — 99231 SBSQ HOSP IP/OBS SF/LOW 25: CPT | Performed by: UROLOGY

## 2025-07-20 PROCEDURE — 85041 AUTOMATED RBC COUNT: CPT | Performed by: STUDENT IN AN ORGANIZED HEALTH CARE EDUCATION/TRAINING PROGRAM

## 2025-07-20 PROCEDURE — 36415 COLL VENOUS BLD VENIPUNCTURE: CPT | Performed by: STUDENT IN AN ORGANIZED HEALTH CARE EDUCATION/TRAINING PROGRAM

## 2025-07-20 PROCEDURE — 250N000013 HC RX MED GY IP 250 OP 250 PS 637: Performed by: STUDENT IN AN ORGANIZED HEALTH CARE EDUCATION/TRAINING PROGRAM

## 2025-07-20 PROCEDURE — 250N000013 HC RX MED GY IP 250 OP 250 PS 637: Performed by: INTERNAL MEDICINE

## 2025-07-20 PROCEDURE — 99238 HOSP IP/OBS DSCHRG MGMT 30/<: CPT | Performed by: INTERNAL MEDICINE

## 2025-07-20 PROCEDURE — 80048 BASIC METABOLIC PNL TOTAL CA: CPT | Performed by: STUDENT IN AN ORGANIZED HEALTH CARE EDUCATION/TRAINING PROGRAM

## 2025-07-20 RX ADMIN — DOCUSATE SODIUM 100 MG: 100 CAPSULE, LIQUID FILLED ORAL at 08:38

## 2025-07-20 RX ADMIN — APIXABAN 2.5 MG: 2.5 TABLET, FILM COATED ORAL at 08:38

## 2025-07-20 RX ADMIN — BUMETANIDE 1.5 MG: 0.5 TABLET ORAL at 08:38

## 2025-07-20 RX ADMIN — LORATADINE 10 MG: 10 TABLET ORAL at 08:38

## 2025-07-20 RX ADMIN — ACETAMINOPHEN 650 MG: 325 TABLET ORAL at 08:41

## 2025-07-20 RX ADMIN — TAMSULOSIN HYDROCHLORIDE 0.4 MG: 0.4 CAPSULE ORAL at 08:38

## 2025-07-20 RX ADMIN — FINASTERIDE 5 MG: 5 TABLET, FILM COATED ORAL at 08:38

## 2025-07-20 RX ADMIN — METOPROLOL SUCCINATE 12.5 MG: 25 TABLET, EXTENDED RELEASE ORAL at 08:38

## 2025-07-20 RX ADMIN — ACETAMINOPHEN 650 MG: 325 TABLET ORAL at 02:21

## 2025-07-20 ASSESSMENT — ACTIVITIES OF DAILY LIVING (ADL)
ADLS_ACUITY_SCORE: 31

## 2025-07-20 NOTE — PROGRESS NOTES
Urology     Patient was restarted on his Eliquis yesterday   urine has remained clear    On exam the urine is clear and yellow at this time with no CBI running    A/P: OK to discharge home with baker catheter from a urology standpoint  He already has followup scheduled with Dr Perez in 2 weeks

## 2025-07-20 NOTE — DISCHARGE SUMMARY
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    Weight as of this encounter: 84.4 kg (186 lb 1.6 oz).       Follow-ups Needed After Discharge   Follow-up Appointments       Follow Up      Follow up with  Dr Perez urologist regarding hematuria management and Mckenzie's catheter management in 2 weeks        Hospital Follow-up with Existing Primary Care Provider (PCP)          Schedule Primary Care visit within: 7 Days   Recommended labs and Imaging (to be ordered by Primary Care Provider): BMP to monitor creatinine               Discharge Disposition   Discharged to home  Condition at discharge: Good    Hospital Course   Gene Pagan is a pleasant 80-year-old gentleman with complicated medical history including insulin-dependent T2DM, anemia, HTN, chronic persistent atrial fibrillation s/p PPM, chronic systolic CHF with LVEF between 45 and 50%, stage IV CKD s/p partial nephrectomy with a baseline creatinine between 2 and 2.5, renal cell carcinoma right kidney s/p robotic " partial nephrectomy 6/23/2025 for clear-cell renal cell carcinoma recent urinary tract infection, came to [facility] 7/16/2025 with hematuria.  Hematuria improved with continuous bladder irrigation.  Eliquis was restarted on 7/19/2025 without any worsening of hematuria.  His hemoglobin was stable.  Patient was discharged home with a plan to follow-up with his primary urologist Dr. Perez in 2 weeks time      Consultations This Hospital Stay   None    Code Status   Full Code    Time Spent on this Encounter   I, Robinson Martino MD, personally saw the patient today and spent less than or equal to 30 minutes discharging this patient.       Robinson Martino MD  [facility] OBSERVATION DEPT  [address] [place] [address]  Phone: [phone]  ______________________________________________________________________    Physical Exam   Vital Signs: Temp: 97.8  F (36.6  C) Temp src: Oral BP: 135/70 Pulse: 70   Resp: 16 SpO2: 98 % O2 Device: None (Room air)    Weight: 186 lbs 1.6 oz    GENERAL: Patient does not look in any acute distress  HEENT: EOM+, Conjunctiva is clear   NECK: I did not see a  Jugular Venous distention  HEART: S1 S2 regular  Rate and Rhythm,     LUNGS: Respirations are  not laboured, Lungs are clear to auscultation without Crepitations or Wheezing   ABDOMEN: Soft , there is no tenderness ,  Bowel Sounds are  Positive   CNS:  Alert,  Oriented x 3, Moving all the Four Limbs          Primary Care Physician   Dm Lipscomb MD    Discharge Orders      Reason for your hospital stay    Hematuria     Activity    Your activity upon discharge: activity as tolerated     Tubes and Drains    Current Tubes and Drains:     Drain  Duration           Urinary Drain 07/16/25 Urethral Catheter Straight-tip 20 fr 4 days         To straight gravity drainage. Change catheter every 2 weeks and PRN for leaking or decreased urine output with signs of bladder distention. DO NOT change catheter without a specific  Provider order IF diagnosis of benign prostatic hypertrophy (BPH), neurogenic bladder, or other urological conditions      Follow Up    Follow up with  Dr Perez urologist regarding hematuria management and Mckenzie's catheter management in 2 weeks     Diet    Follow this diet upon discharge: Current Diet:Orders Placed This Encounter  Adult diabetic renal diet     Hospital Follow-up with Existing Primary Care Provider (PCP)            Significant Results and Procedures   Most Recent 3 CBC's:  Recent Labs   Lab Test 07/20/25  0619 07/19/25  0452 07/18/25  0525   WBC 8.4 9.1 9.4   HGB 11.9* 11.8* 11.1*   MCV 90 90 92    280 287     Most Recent 3 BMP's:  Recent Labs   Lab Test 07/20/25  0836 07/20/25  0619 07/20/25  0159 07/19/25  0722 07/19/25  0452 07/18/25  0739 07/18/25  0525   NA  --  139  --   --  135  --  137   POTASSIUM  --  4.2  --   --  4.4  --  4.3   CHLORIDE  --  102  --   --  101  --  102   CO2  --  23  --   --  20*  --  25   BUN  --  38.2*  --   --  34.2*  --  29.4*   CR  --  2.23*  --   --  2.21*  --  2.19*   ANIONGAP  --  14  --   --  14  --  10   HARPER  --  9.5  --   --  9.7  --  9.7   * 105* 125*   < > 118*   < > 109*    < > = values in this interval not displayed.     Most Recent Urinalysis:  Recent Labs   Lab Test 07/16/25  0454 06/30/25  0046 04/01/25  1538   COLOR Red* Orange* Yellow   APPEARANCE Slightly Cloudy* Slightly Cloudy* Cloudy*   URINEGLC  --  Negative Negative   URINEBILI  --  Negative Negative   URINEKETONE  --  Negative Negative   SG  --  1.020 1.015   UBLD Large* Large* Small*   URINEPH  --  5.5 6.5   PROTEIN  --  50* 30*   UROBILINOGEN  --   --  0.2   NITRITE  --  Negative Negative   LEUKEST  --  Moderate* Large*   RBCU 55* >182*  --    WBCU 6* 168*  --    ,   Results for orders placed or performed during the hospital encounter of 07/16/25   Abd/pelvis CT no contrast - Stone Protocol    Narrative    EXAM: CT ABDOMEN PELVIS W/O CONTRAST  LOCATION: [facility]  HOSPITAL  DATE: 7/16/2025    INDICATION: hematuria  COMPARISON: 3/21/2025  TECHNIQUE: CT scan of the abdomen and pelvis was performed without IV contrast. Multiplanar reformats were obtained. Dose reduction techniques were used.  CONTRAST: None.    FINDINGS:   LOWER CHEST: Unremarkable    HEPATOBILIARY: Unremarkable    PANCREAS: Unremarkable    SPLEEN: Scattered calcified granulomas.    ADRENAL GLANDS: Unremarkable    KIDNEYS/BLADDER: Mckenzie catheter in place. Small blood clot within the bladder. Bilateral hydroureter. Small amount of hemorrhage in the right renal pelvis and proximal ureter. Status post partial right nephrectomy. Small postoperative perinephric fluid   collection.    BOWEL: Diverticulosis of the colon. No acute inflammatory change. No obstruction.     LYMPH NODES: Normal.    VASCULATURE: No abdominal aortic aneurysm.    PELVIC ORGANS: Prostatomegaly    MUSCULOSKELETAL: Unremarkable      Impression    IMPRESSION:   1.  There is a small amount of hemorrhage in the right renal pelvis and proximal right ureter and a small blood clot in the bladder.         Discharge Medications      Review of your medicines        CONTINUE these medicines which have NOT CHANGED        Dose / Directions   apixaban ANTICOAGULANT 2.5 MG tablet  Commonly known as: ELIQUIS ANTICOAGULANT  Used for: Atrial fibrillation, unspecified type (H)      Dose: 2.5 mg  Take 1 tablet (2.5 mg) by mouth 2 times daily.  Quantity: 180 tablet  Refills: 3     * bumetanide 0.5 MG tablet  Commonly known as: BUMEX  Used for: Chronic combined systolic and diastolic heart failure (H)      Dose: 0.5 mg  Take 1 tablet (0.5 mg) by mouth daily (+ additional 1 mg to = 1.5 mg daily).  Quantity: 90 tablet  Refills: 3     * bumetanide 1 MG tablet  Commonly known as: BUMEX  Used for: Chronic combined systolic and diastolic heart failure (H)      Dose: 1 mg  Take 1 tablet (1 mg) by mouth daily. (+ additional 0.5 mg to = 1.5 mg daily).  Quantity: 90  tablet  Refills: 3     docusate sodium 100 MG tablet  Commonly known as: COLACE  Used for: Right renal mass      Dose: 100 mg  Take 1 tablet (100 mg) by mouth daily.  Quantity: 60 tablet  Refills: 1     finasteride 5 MG tablet  Commonly known as: PROSCAR  Used for: Benign prostatic hyperplasia with urinary retention      Dose: 5 mg  Take 1 tablet (5 mg) by mouth daily.  Quantity: 90 tablet  Refills: 3     Lantus SoloStar 100 UNIT/ML soln  Used for: Type 2 diabetes mellitus with other specified complication, unspecified whether long term insulin use (H)  Generic drug: insulin glargine      Dose: 13 Units  Inject 13 Units subcutaneously at bedtime.  Quantity: 15 mL  Refills: 3     loratadine 10 MG capsule      Dose: 10 mg  Take 10 mg by mouth daily.  Refills: 0     melatonin 3 MG tablet      Dose: 3 mg  Take 3 mg by mouth nightly as needed  Refills: 0     metoprolol succinate ER 25 MG 24 hr tablet  Commonly known as: TOPROL XL      Dose: 12.5 mg  Take 12.5 mg by mouth every morning.  Refills: 0     Multivitamin Adult Tabs      Dose: 1 tablet  Take 1 tablet by mouth daily.  Refills: 0     OneTouch Ultra Test test strip  Used for: Type 2 diabetes mellitus with stage 4 chronic kidney disease, with long-term current use of insulin (H)  Generic drug: blood glucose      USE TO TEST BLOOD SUGAR ONE TIME DAILY OR AS DIRECTED.  Quantity: 100 strip  Refills: 2     tamsulosin 0.4 MG capsule  Commonly known as: FLOMAX      Dose: 0.4 mg  Take 0.4 mg by mouth 2 times daily. Take one capsule in the morning and one capsule in the evening by mouth  Refills: 0     ULTICARE SHORT 31G X 8 MM insulin pen needle  Used for: Type 2 diabetes mellitus with stage 4 chronic kidney disease, with long-term current use of insulin (H)  Generic drug: insulin pen needle      use 1 pen daily for injection  Quantity: 100 each  Refills: 2     vitamin C 1000 MG Tabs  Commonly known as: ASCORBIC ACID      Dose: 1,000 mg  Take 1,000 mg by mouth  daily.  Refills: 0     zinc 50 MG Tabs      Dose: 50 mg  Take 50 mg by mouth daily  Refills: 0           * This list has 2 medication(s) that are the same as other medications prescribed for you. Read the directions carefully, and ask your doctor or other care provider to review them with you.                Allergies   No Known Allergies

## 2025-07-20 NOTE — PROGRESS NOTES
Patient's After Visit Summary was reviewed with patient.   Patient verbalized understanding of After Visit Summary, recommended follow up and was given an opportunity to ask questions.   Discharge medications sent home with patient/family: Not applicable   Discharged with self.

## 2025-07-22 ENCOUNTER — PATIENT OUTREACH (OUTPATIENT)
Dept: CARE COORDINATION | Facility: CLINIC | Age: 80
End: 2025-07-22
Payer: COMMERCIAL

## 2025-07-22 NOTE — PROGRESS NOTES
Clinic Care Coordination Contact  Transitions of Care Outreach    Chief Complaint   Patient presents with    Clinic Care Coordination - Post [facility]       Most Recent Admission Date: 7/16/2025   Most Recent Admission Diagnosis: Gross hematuria - R31.0  Renal hemorrhage, right - N28.89     Most Recent Discharge Date: 7/20/2025   Most Recent Discharge Diagnosis: Gross hematuria - R31.0  Renal hemorrhage, right - N28.89     Transitions of Care Assessment    Discharge Assessment  How are you doing now that you are home?: Pt stated that he is doing well.  How are your symptoms? (Red Flag symptoms escalate to triage hotline per guidelines): Improved  Do you know how to contact your clinic care team if you have future questions or changes to your health status? : Yes  Does the patient have their discharge instructions? : Yes  Does the patient have questions regarding their discharge instructions? : No  Were you started on any new medications or were there changes to any of your previous medications? : Yes  Does the patient have all of their medications?: Yes  Do you have questions regarding any of your medications? : No  Do you have all of your needed medical supplies or equipment (DME)?  (i.e. oxygen tank, CPAP, cane, etc.): Yes    Post-op ([facility] Only)  If the patient had a surgery or procedure, do they have any questions for a nurse?: No         [facility] Explained and offered Care Coordination support to eligible patients: Yes    Patient accepted? No    Follow up Plan     Discharge Follow-Up  Discharge follow up appointment scheduled in alignment with recommended follow up timeframe or Transitions of Risk Category? (Low = within 30 days; Moderate= within 14 days; High= within 7 days): Yes  Discharge Follow Up Appointment Date: 08/07/25  Discharge Follow Up Appointment Scheduled with?: Specialty Care Provider    Future Appointments   Date Time Provider Department Center   8/6/2025  3:30 PM Cheikh Perez MD [facility]  [facility]   8/7/2025 12:00 AM [facility] TECH1 [facility]   10/9/2025  3:30 PM Dm Lipscomb MD [facility]       Outpatient Plan as outlined on AVS reviewed with patient.      For any urgent concerns, please contact our 24 hour nurse triage line: [phone]         EDEN Dubose  [phone]  [facility]

## 2025-08-06 ENCOUNTER — OFFICE VISIT (OUTPATIENT)
Dept: UROLOGY | Facility: CLINIC | Age: 80
End: 2025-08-06
Payer: COMMERCIAL

## 2025-08-06 VITALS
OXYGEN SATURATION: 99 % | SYSTOLIC BLOOD PRESSURE: 124 MMHG | WEIGHT: 185 LBS | DIASTOLIC BLOOD PRESSURE: 70 MMHG | HEIGHT: 68 IN | HEART RATE: 70 BPM | BODY MASS INDEX: 28.04 KG/M2

## 2025-08-06 DIAGNOSIS — R33.9 URINARY RETENTION: Primary | ICD-10-CM

## 2025-08-06 DIAGNOSIS — R33.8 BENIGN PROSTATIC HYPERPLASIA WITH URINARY RETENTION: ICD-10-CM

## 2025-08-06 DIAGNOSIS — N40.1 BENIGN PROSTATIC HYPERPLASIA WITH URINARY RETENTION: ICD-10-CM

## 2025-08-06 DIAGNOSIS — C64.1 RENAL CELL CARCINOMA OF RIGHT KIDNEY (H): ICD-10-CM

## 2025-08-06 RX ORDER — LIDOCAINE HYDROCHLORIDE 20 MG/ML
JELLY TOPICAL ONCE
Status: COMPLETED | OUTPATIENT
Start: 2025-08-06 | End: 2025-08-06

## 2025-08-06 RX ADMIN — LIDOCAINE HYDROCHLORIDE 5 ML: 20 JELLY TOPICAL at 16:05

## 2025-08-06 ASSESSMENT — PAIN SCALES - GENERAL: PAINLEVEL_OUTOF10: NO PAIN (0)

## 2025-08-07 ENCOUNTER — ANCILLARY PROCEDURE (OUTPATIENT)
Dept: CARDIOLOGY | Facility: CLINIC | Age: 80
End: 2025-08-07
Attending: INTERNAL MEDICINE
Payer: COMMERCIAL

## 2025-08-07 DIAGNOSIS — I50.23 ACUTE ON CHRONIC SYSTOLIC CONGESTIVE HEART FAILURE (H): ICD-10-CM

## 2025-08-07 DIAGNOSIS — Z95.0 CARDIAC PACEMAKER IN SITU: ICD-10-CM

## 2025-08-07 LAB
MDC_IDC_EPISODE_DTM: NORMAL
MDC_IDC_EPISODE_DURATION: 14 S
MDC_IDC_EPISODE_ID: NORMAL
MDC_IDC_EPISODE_TYPE: NORMAL
MDC_IDC_EPISODE_TYPE_INDUCED: NO
MDC_IDC_LEAD_CONNECTION_STATUS: NORMAL
MDC_IDC_LEAD_IMPLANT_DT: NORMAL
MDC_IDC_LEAD_LOCATION: NORMAL
MDC_IDC_LEAD_LOCATION_DETAIL_1: NORMAL
MDC_IDC_LEAD_MFG: NORMAL
MDC_IDC_LEAD_MODEL: NORMAL
MDC_IDC_LEAD_POLARITY_TYPE: NORMAL
MDC_IDC_LEAD_SERIAL: NORMAL
MDC_IDC_MSMT_BATTERY_DTM: NORMAL
MDC_IDC_MSMT_BATTERY_REMAINING_LONGEVITY: 102 MO
MDC_IDC_MSMT_BATTERY_REMAINING_PERCENTAGE: 100 %
MDC_IDC_MSMT_BATTERY_STATUS: NORMAL
MDC_IDC_MSMT_LEADCHNL_LV_IMPEDANCE_VALUE: 1446 OHM
MDC_IDC_MSMT_LEADCHNL_LV_PACING_THRESHOLD_AMPLITUDE: 0.6 V
MDC_IDC_MSMT_LEADCHNL_LV_PACING_THRESHOLD_PULSEWIDTH: 0.5 MS
MDC_IDC_MSMT_LEADCHNL_RA_IMPEDANCE_VALUE: 763 OHM
MDC_IDC_MSMT_LEADCHNL_RV_IMPEDANCE_VALUE: 635 OHM
MDC_IDC_MSMT_LEADCHNL_RV_PACING_THRESHOLD_AMPLITUDE: 0.9 V
MDC_IDC_MSMT_LEADCHNL_RV_PACING_THRESHOLD_PULSEWIDTH: 0.4 MS
MDC_IDC_PG_IMPLANT_DTM: NORMAL
MDC_IDC_PG_MFG: NORMAL
MDC_IDC_PG_MODEL: NORMAL
MDC_IDC_PG_SERIAL: NORMAL
MDC_IDC_PG_TYPE: NORMAL
MDC_IDC_SESS_CLINIC_NAME: NORMAL
MDC_IDC_SESS_DTM: NORMAL
MDC_IDC_SESS_TYPE: NORMAL
MDC_IDC_SET_BRADY_AT_MODE_SWITCH_RATE: 140 {BEATS}/MIN
MDC_IDC_SET_BRADY_LOWRATE: 70 {BEATS}/MIN
MDC_IDC_SET_BRADY_MAX_SENSOR_RATE: 130 {BEATS}/MIN
MDC_IDC_SET_BRADY_MODE: NORMAL
MDC_IDC_SET_CRT_LVRV_DELAY: 30 MS
MDC_IDC_SET_CRT_PACED_CHAMBERS: NORMAL
MDC_IDC_SET_LEADCHNL_LV_PACING_AMPLITUDE: 2 V
MDC_IDC_SET_LEADCHNL_LV_PACING_ANODE_ELECTRODE_1: NORMAL
MDC_IDC_SET_LEADCHNL_LV_PACING_ANODE_LOCATION_1: NORMAL
MDC_IDC_SET_LEADCHNL_LV_PACING_CATHODE_ELECTRODE_1: NORMAL
MDC_IDC_SET_LEADCHNL_LV_PACING_CATHODE_LOCATION_1: NORMAL
MDC_IDC_SET_LEADCHNL_LV_PACING_PULSEWIDTH: 0.5 MS
MDC_IDC_SET_LEADCHNL_LV_SENSING_ADAPTATION_MODE: NORMAL
MDC_IDC_SET_LEADCHNL_LV_SENSING_ANODE_ELECTRODE_1: NORMAL
MDC_IDC_SET_LEADCHNL_LV_SENSING_ANODE_LOCATION_1: NORMAL
MDC_IDC_SET_LEADCHNL_LV_SENSING_CATHODE_ELECTRODE_1: NORMAL
MDC_IDC_SET_LEADCHNL_LV_SENSING_CATHODE_LOCATION_1: NORMAL
MDC_IDC_SET_LEADCHNL_LV_SENSING_SENSITIVITY: 1.5 MV
MDC_IDC_SET_LEADCHNL_RA_SENSING_ADAPTATION_MODE: NORMAL
MDC_IDC_SET_LEADCHNL_RA_SENSING_SENSITIVITY: 0.25 MV
MDC_IDC_SET_LEADCHNL_RV_PACING_AMPLITUDE: 2.2 V
MDC_IDC_SET_LEADCHNL_RV_PACING_CAPTURE_MODE: NORMAL
MDC_IDC_SET_LEADCHNL_RV_PACING_POLARITY: NORMAL
MDC_IDC_SET_LEADCHNL_RV_PACING_PULSEWIDTH: 0.4 MS
MDC_IDC_SET_LEADCHNL_RV_SENSING_ADAPTATION_MODE: NORMAL
MDC_IDC_SET_LEADCHNL_RV_SENSING_POLARITY: NORMAL
MDC_IDC_SET_LEADCHNL_RV_SENSING_SENSITIVITY: 1.5 MV
MDC_IDC_SET_ZONE_DETECTION_INTERVAL: 375 MS
MDC_IDC_SET_ZONE_STATUS: NORMAL
MDC_IDC_SET_ZONE_TYPE: NORMAL
MDC_IDC_SET_ZONE_VENDOR_TYPE: NORMAL
MDC_IDC_STAT_BRADY_DTM_END: NORMAL
MDC_IDC_STAT_BRADY_DTM_START: NORMAL
MDC_IDC_STAT_BRADY_RA_PERCENT_PACED: 0 %
MDC_IDC_STAT_BRADY_RV_PERCENT_PACED: 100 %
MDC_IDC_STAT_CRT_DTM_END: NORMAL
MDC_IDC_STAT_CRT_DTM_START: NORMAL
MDC_IDC_STAT_CRT_LV_PERCENT_PACED: 100 %
MDC_IDC_STAT_EPISODE_RECENT_COUNT: 0
MDC_IDC_STAT_EPISODE_RECENT_COUNT: 0
MDC_IDC_STAT_EPISODE_RECENT_COUNT: 5
MDC_IDC_STAT_EPISODE_RECENT_COUNT_DTM_END: NORMAL
MDC_IDC_STAT_EPISODE_RECENT_COUNT_DTM_START: NORMAL
MDC_IDC_STAT_EPISODE_TYPE: NORMAL
MDC_IDC_STAT_EPISODE_VENDOR_TYPE: NORMAL
MDC_IDC_STAT_EPISODE_VENDOR_TYPE: NORMAL

## 2025-08-07 PROCEDURE — 93294 REM INTERROG EVL PM/LDLS PM: CPT | Performed by: INTERNAL MEDICINE

## 2025-08-07 PROCEDURE — 93296 REM INTERROG EVL PM/IDS: CPT | Performed by: INTERNAL MEDICINE

## 2025-08-16 DIAGNOSIS — Z79.4 TYPE 2 DIABETES MELLITUS WITH STAGE 4 CHRONIC KIDNEY DISEASE, WITH LONG-TERM CURRENT USE OF INSULIN (H): ICD-10-CM

## 2025-08-16 DIAGNOSIS — E11.22 TYPE 2 DIABETES MELLITUS WITH STAGE 4 CHRONIC KIDNEY DISEASE, WITH LONG-TERM CURRENT USE OF INSULIN (H): ICD-10-CM

## 2025-08-16 DIAGNOSIS — N18.4 TYPE 2 DIABETES MELLITUS WITH STAGE 4 CHRONIC KIDNEY DISEASE, WITH LONG-TERM CURRENT USE OF INSULIN (H): ICD-10-CM

## 2025-08-18 RX ORDER — METHYLPREDNISOLONE SODIUM SUCCINATE 125 MG/2ML
INJECTION, POWDER, FOR SOLUTION INTRAMUSCULAR; INTRAVENOUS
Qty: 100 EACH | Refills: 2 | Status: SHIPPED | OUTPATIENT
Start: 2025-08-18

## 2025-09-03 ENCOUNTER — OFFICE VISIT (OUTPATIENT)
Dept: UROLOGY | Facility: CLINIC | Age: 80
End: 2025-09-03
Payer: COMMERCIAL

## 2025-09-03 VITALS
HEIGHT: 68 IN | SYSTOLIC BLOOD PRESSURE: 179 MMHG | WEIGHT: 185 LBS | HEART RATE: 72 BPM | OXYGEN SATURATION: 99 % | BODY MASS INDEX: 28.04 KG/M2 | DIASTOLIC BLOOD PRESSURE: 92 MMHG

## 2025-09-03 DIAGNOSIS — C64.1 RENAL CELL CARCINOMA OF RIGHT KIDNEY (H): ICD-10-CM

## 2025-09-03 DIAGNOSIS — R33.9 URINARY RETENTION: Primary | ICD-10-CM

## 2025-09-03 DIAGNOSIS — N40.1 BENIGN PROSTATIC HYPERPLASIA WITH URINARY RETENTION: ICD-10-CM

## 2025-09-03 DIAGNOSIS — R33.8 BENIGN PROSTATIC HYPERPLASIA WITH URINARY RETENTION: ICD-10-CM

## 2025-09-03 DIAGNOSIS — Z79.2 PROPHYLACTIC ANTIBIOTIC: Primary | ICD-10-CM

## 2025-09-03 RX ORDER — CIPROFLOXACIN 500 MG/1
500 TABLET, FILM COATED ORAL ONCE
Qty: 1 TABLET | Refills: 0 | Status: SHIPPED | OUTPATIENT
Start: 2025-09-03 | End: 2025-09-03

## 2025-09-03 ASSESSMENT — PAIN SCALES - GENERAL: PAINLEVEL_OUTOF10: NO PAIN (0)

## (undated) DEVICE — DAVINCI XI GRASPER ENDOWRIST PROGRASP 470093

## (undated) DEVICE — TAPE DURAPORE 3" SILK 1538-3

## (undated) DEVICE — Device

## (undated) DEVICE — TUBING CONMED AIRSEAL SMOKE EVAC INSUFFLATION ASM-EVAC

## (undated) DEVICE — DAVINCI XI SEAL UNIVERSAL 5-12MM 470500

## (undated) DEVICE — RULER SURGICAL PLASTIC STRL LF CS628

## (undated) DEVICE — ENDO TROCAR CONMED AIRSEAL BLADELESS 12X120MM IAS12-120LP

## (undated) DEVICE — GUIDEWIRE VASC 0.035INX150CM INQWIRE J TIP IQ35F150J3F/A

## (undated) DEVICE — GLOVE PROTEXIS POWDER FREE 6.5 ORTHO LIME 2D73ET65

## (undated) DEVICE — ULTRASOUND

## (undated) DEVICE — DRAIN JACKSON PRATT RESERVOIR 100ML SU130-1305

## (undated) DEVICE — NDL INSUFFLATION 13GA 120MM C2201

## (undated) DEVICE — SHEATH PRELUDE SNAP 13CM 6FR

## (undated) DEVICE — SU VICRYL 0 CT-1 27" J340H

## (undated) DEVICE — 7 FR X 115CM, F-J CURVE, EZ STEER BI-DIRECTIONAL DIAG/ABLATION DEFLECTABLE TIP CATH, THERMOCOUPLE, 1-7-4MM SPACING, 4MM TIP

## (undated) DEVICE — SURGICEL ABSORBABLE HEMOSTAT SNOW 2"X4" 2082

## (undated) DEVICE — ENDO TROCAR FIRST ENTRY KII FIOS Z-THRD 05X100MM CTF03

## (undated) DEVICE — DAVINCI XI DRAPE COLUMN 470341

## (undated) DEVICE — DAVINCI XI NDL DRIVER LARGE 470006

## (undated) DEVICE — PACK EP SRG PROC LF DISP SAN32EPFSR

## (undated) DEVICE — DAVINCI XI DRAPE ARM 470015

## (undated) DEVICE — CATH VENOGRAM BLLN W/INFLATION 6225

## (undated) DEVICE — RX SURGIFLO HEMOSTATIC MATRIX W/THROMBIN 8ML 2994

## (undated) DEVICE — LINEN TOWEL PACK X5 5464

## (undated) DEVICE — SU PDS II 0 CT-2 27" Z334H

## (undated) DEVICE — 0.014IN X 190CM HI-TORQUE WHISPER WIRE VIEW, EDS CS-J W/HYDROCOAT HYDROPHILIC COATING

## (undated) DEVICE — KIT TURNOVER FAIRVIEW SOUTHDALE FULL SP3889

## (undated) DEVICE — SUCTION IRR STRYKERFLOW II W/TIP 250-070-520

## (undated) DEVICE — SOL WATER IRRIG 1000ML BOTTLE 2F7114

## (undated) DEVICE — GLOVE BIOGEL PI MICRO INDICATOR UNDERGLOVE SZ 8.0 48980

## (undated) DEVICE — SU DERMABOND ADVANCED .7ML DNX12

## (undated) DEVICE — DRAPE IOBAN INCISE 23X17" 6650EZ

## (undated) DEVICE — NDL 19GA 1.5"

## (undated) DEVICE — ANTIFOG SOLUTION SEE SHARP 150M TROCAR SWABS 30978 (COI)

## (undated) DEVICE — DEFIB PRO-PADZ LVP LQD GEL ADULT 8900-2105-01

## (undated) DEVICE — CATH TRAY FOLEY SURESTEP 16FR WDRAIN BAG STLK LATEX A300316A

## (undated) DEVICE — 9FR X 45CM ACUITY PRO OUTER CORONARY SINUS CATHETER, HOOK

## (undated) DEVICE — SUCTION MANIFOLD NEPTUNE 2 SYS 4 PORT 0702-020-000

## (undated) DEVICE — CLEANER INST PRE-KLENZ SOAK SHIELD TUBE 6 ML MEDIUM 2D66J4

## (undated) DEVICE — 7.4FR X 60CM, 90-DEG ACUITY PRO CORONARY SINUS INNER CATHETER (IC 90)

## (undated) DEVICE — ESU GROUND PAD UNIVERSAL W/O CORD

## (undated) DEVICE — PACK DAVINCI UROLOGY SBA15UDFSG

## (undated) DEVICE — SU VICRYL 2-0 CT-2 27" UND J269H

## (undated) DEVICE — SYR 10ML FINGER CONTROL W/O NDL 309695

## (undated) DEVICE — ULTRASOUND PROBE

## (undated) DEVICE — DRAIN JACKSON PRATT CHANNEL 19FR ROUND HUBLESS SIL JP-2230

## (undated) DEVICE — CATH EP 60CM 5FR 2-8-2MM SPC-

## (undated) DEVICE — ENDO POUCH RETRIEVAL SYSTEM OD11 MM 265 ML CD005

## (undated) DEVICE — CLIP ENDO HEMO-LOC PURPLE LG 544240

## (undated) DEVICE — SU MONOCRYL 4-0 PS-2 18" UND Y496G

## (undated) DEVICE — DAVINCI HOT SHEARS TIP COVER  400180

## (undated) DEVICE — TUBING SUCTION MEDI-VAC SOFT 3/16"X20' N520A

## (undated) DEVICE — DAVINCI XI MONOPOLAR SCISSORS HOT SHEARS 8MM 470179

## (undated) DEVICE — INTRO CATH 12CM 8.5FR FST-CATH

## (undated) DEVICE — PACK PCMKR PERM SRG PROC LF SAN32PC573

## (undated) DEVICE — DRAPE BREAST/CHEST 29420

## (undated) DEVICE — SU SILK 2-0 SH 30" K833H

## (undated) RX ORDER — ALBUTEROL SULFATE 90 UG/1
INHALANT RESPIRATORY (INHALATION)
Status: DISPENSED
Start: 2025-06-23

## (undated) RX ORDER — FENTANYL CITRATE 0.05 MG/ML
INJECTION, SOLUTION INTRAMUSCULAR; INTRAVENOUS
Status: DISPENSED
Start: 2025-06-23

## (undated) RX ORDER — HYDROMORPHONE HYDROCHLORIDE 1 MG/ML
INJECTION, SOLUTION INTRAMUSCULAR; INTRAVENOUS; SUBCUTANEOUS
Status: DISPENSED
Start: 2025-06-23

## (undated) RX ORDER — LIDOCAINE HYDROCHLORIDE 10 MG/ML
INJECTION, SOLUTION EPIDURAL; INFILTRATION; INTRACAUDAL; PERINEURAL
Status: DISPENSED
Start: 2020-04-10

## (undated) RX ORDER — BUPIVACAINE HYDROCHLORIDE 2.5 MG/ML
INJECTION, SOLUTION EPIDURAL; INFILTRATION; INTRACAUDAL
Status: DISPENSED
Start: 2020-04-10

## (undated) RX ORDER — HEPARIN SODIUM 1000 [USP'U]/ML
INJECTION, SOLUTION INTRAVENOUS; SUBCUTANEOUS
Status: DISPENSED
Start: 2020-04-10

## (undated) RX ORDER — ACETAMINOPHEN 325 MG/1
TABLET ORAL
Status: DISPENSED
Start: 2025-06-23

## (undated) RX ORDER — FENTANYL CITRATE 50 UG/ML
INJECTION, SOLUTION INTRAMUSCULAR; INTRAVENOUS
Status: DISPENSED
Start: 2020-04-10

## (undated) RX ORDER — REGADENOSON 0.08 MG/ML
INJECTION, SOLUTION INTRAVENOUS
Status: DISPENSED
Start: 2020-09-11

## (undated) RX ORDER — FENTANYL CITRATE 50 UG/ML
INJECTION, SOLUTION INTRAMUSCULAR; INTRAVENOUS
Status: DISPENSED
Start: 2025-06-23